# Patient Record
Sex: MALE | Race: ASIAN | URBAN - METROPOLITAN AREA
[De-identification: names, ages, dates, MRNs, and addresses within clinical notes are randomized per-mention and may not be internally consistent; named-entity substitution may affect disease eponyms.]

---

## 2019-01-01 ENCOUNTER — APPOINTMENT (OUTPATIENT)
Dept: GENERAL RADIOLOGY | Facility: CLINIC | Age: 39
DRG: 270 | End: 2019-01-01
Attending: INTERNAL MEDICINE
Payer: COMMERCIAL

## 2019-01-01 ENCOUNTER — APPOINTMENT (OUTPATIENT)
Dept: GENERAL RADIOLOGY | Facility: CLINIC | Age: 39
DRG: 270 | End: 2019-01-01
Attending: NURSE PRACTITIONER
Payer: COMMERCIAL

## 2019-01-01 ENCOUNTER — APPOINTMENT (OUTPATIENT)
Dept: GENERAL RADIOLOGY | Facility: CLINIC | Age: 39
DRG: 270 | End: 2019-01-01
Payer: COMMERCIAL

## 2019-01-01 ENCOUNTER — APPOINTMENT (OUTPATIENT)
Dept: ULTRASOUND IMAGING | Facility: CLINIC | Age: 39
DRG: 270 | End: 2019-01-01
Attending: INTERNAL MEDICINE
Payer: COMMERCIAL

## 2019-01-01 ENCOUNTER — APPOINTMENT (OUTPATIENT)
Dept: CARDIOLOGY | Facility: CLINIC | Age: 39
DRG: 270 | End: 2019-01-01
Payer: COMMERCIAL

## 2019-01-01 ENCOUNTER — APPOINTMENT (OUTPATIENT)
Dept: GENERAL RADIOLOGY | Facility: CLINIC | Age: 39
DRG: 270 | End: 2019-01-01
Attending: HOSPITALIST
Payer: COMMERCIAL

## 2019-01-01 ENCOUNTER — ALLIED HEALTH/NURSE VISIT (OUTPATIENT)
Dept: NEUROLOGY | Facility: CLINIC | Age: 39
DRG: 270 | End: 2019-01-01
Attending: PSYCHIATRY & NEUROLOGY
Payer: COMMERCIAL

## 2019-01-01 ENCOUNTER — APPOINTMENT (OUTPATIENT)
Dept: CARDIOLOGY | Facility: CLINIC | Age: 39
DRG: 270 | End: 2019-01-01
Attending: INTERNAL MEDICINE
Payer: COMMERCIAL

## 2019-01-01 ENCOUNTER — APPOINTMENT (OUTPATIENT)
Dept: CT IMAGING | Facility: CLINIC | Age: 39
DRG: 270 | End: 2019-01-01
Attending: INTERNAL MEDICINE
Payer: COMMERCIAL

## 2019-01-01 ENCOUNTER — APPOINTMENT (OUTPATIENT)
Dept: CT IMAGING | Facility: CLINIC | Age: 39
DRG: 270 | End: 2019-01-01
Payer: COMMERCIAL

## 2019-01-01 ENCOUNTER — APPOINTMENT (OUTPATIENT)
Dept: GENERAL RADIOLOGY | Facility: CLINIC | Age: 39
DRG: 270 | End: 2019-01-01
Attending: THORACIC SURGERY (CARDIOTHORACIC VASCULAR SURGERY)
Payer: COMMERCIAL

## 2019-01-01 ENCOUNTER — HOSPITAL ENCOUNTER (INPATIENT)
Facility: CLINIC | Age: 39
LOS: 36 days | DRG: 270 | End: 2019-03-31
Admitting: INTERNAL MEDICINE
Payer: COMMERCIAL

## 2019-01-01 ENCOUNTER — ANESTHESIA EVENT (OUTPATIENT)
Dept: SURGERY | Facility: CLINIC | Age: 39
End: 2019-01-01

## 2019-01-01 ENCOUNTER — ANESTHESIA (OUTPATIENT)
Dept: SURGERY | Facility: CLINIC | Age: 39
DRG: 270 | End: 2019-01-01
Payer: COMMERCIAL

## 2019-01-01 ENCOUNTER — APPOINTMENT (OUTPATIENT)
Dept: OCCUPATIONAL THERAPY | Facility: CLINIC | Age: 39
DRG: 270 | End: 2019-01-01
Payer: COMMERCIAL

## 2019-01-01 ENCOUNTER — ANESTHESIA (OUTPATIENT)
Dept: SURGERY | Facility: CLINIC | Age: 39
End: 2019-01-01

## 2019-01-01 ENCOUNTER — ANESTHESIA EVENT (OUTPATIENT)
Dept: SURGERY | Facility: CLINIC | Age: 39
DRG: 270 | End: 2019-01-01
Payer: COMMERCIAL

## 2019-01-01 ENCOUNTER — ANESTHESIA (OUTPATIENT)
Dept: CARDIOLOGY | Facility: CLINIC | Age: 39
DRG: 270 | End: 2019-01-01
Payer: COMMERCIAL

## 2019-01-01 ENCOUNTER — ANESTHESIA EVENT (OUTPATIENT)
Dept: CARDIOLOGY | Facility: CLINIC | Age: 39
DRG: 270 | End: 2019-01-01
Payer: COMMERCIAL

## 2019-01-01 ENCOUNTER — APPOINTMENT (OUTPATIENT)
Dept: OCCUPATIONAL THERAPY | Facility: CLINIC | Age: 39
DRG: 270 | End: 2019-01-01
Attending: INTERNAL MEDICINE
Payer: COMMERCIAL

## 2019-01-01 ENCOUNTER — HOSPITAL ENCOUNTER (EMERGENCY)
Facility: CLINIC | Age: 39
Discharge: HOME | End: 2019-02-24
Attending: EMERGENCY MEDICINE | Admitting: EMERGENCY MEDICINE
Payer: COMMERCIAL

## 2019-01-01 VITALS
OXYGEN SATURATION: 94 % | DIASTOLIC BLOOD PRESSURE: 49 MMHG | HEART RATE: 89 BPM | HEIGHT: 66 IN | RESPIRATION RATE: 12 BRPM | SYSTOLIC BLOOD PRESSURE: 73 MMHG | WEIGHT: 195.99 LBS | TEMPERATURE: 97.3 F | BODY MASS INDEX: 31.5 KG/M2

## 2019-01-01 VITALS
DIASTOLIC BLOOD PRESSURE: 114 MMHG | TEMPERATURE: 96.7 F | OXYGEN SATURATION: 90 % | WEIGHT: 252.4 LBS | SYSTOLIC BLOOD PRESSURE: 144 MMHG | HEART RATE: 114 BPM

## 2019-01-01 DIAGNOSIS — I46.9 CARDIAC ARREST (H): Primary | ICD-10-CM

## 2019-01-01 DIAGNOSIS — I46.9 CARDIAC ARREST (H): ICD-10-CM

## 2019-01-01 LAB
7AMINOCLONAZEPAM BLD CFM-MCNC: NEGATIVE NG/ML
7AMINOCLONAZEPAM BLD CFM-MCNC: NEGATIVE NG/ML
ABO + RH BLD: NORMAL
ALBUMIN SERPL-MCNC: 1.3 G/DL (ref 3.4–5)
ALBUMIN SERPL-MCNC: 1.4 G/DL (ref 3.4–5)
ALBUMIN SERPL-MCNC: 1.4 G/DL (ref 3.4–5)
ALBUMIN SERPL-MCNC: 1.5 G/DL (ref 3.4–5)
ALBUMIN SERPL-MCNC: 1.6 G/DL (ref 3.4–5)
ALBUMIN SERPL-MCNC: 1.7 G/DL (ref 3.4–5)
ALBUMIN SERPL-MCNC: 1.8 G/DL (ref 3.4–5)
ALBUMIN SERPL-MCNC: 1.9 G/DL (ref 3.4–5)
ALBUMIN SERPL-MCNC: 2 G/DL (ref 3.4–5)
ALBUMIN SERPL-MCNC: 2.1 G/DL (ref 3.4–5)
ALBUMIN SERPL-MCNC: 2.2 G/DL (ref 3.4–5)
ALBUMIN SERPL-MCNC: 2.3 G/DL (ref 3.4–5)
ALBUMIN SERPL-MCNC: 2.4 G/DL (ref 3.4–5)
ALBUMIN SERPL-MCNC: 2.5 G/DL (ref 3.4–5)
ALBUMIN SERPL-MCNC: 2.6 G/DL (ref 3.4–5)
ALBUMIN SERPL-MCNC: 2.7 G/DL (ref 3.4–5)
ALBUMIN SERPL-MCNC: 2.7 G/DL (ref 3.4–5)
ALBUMIN SERPL-MCNC: 2.8 G/DL (ref 3.4–5)
ALBUMIN SERPL-MCNC: 2.9 G/DL (ref 3.4–5)
ALBUMIN SERPL-MCNC: 3 G/DL (ref 3.4–5)
ALBUMIN SERPL-MCNC: 3 G/DL (ref 3.4–5)
ALBUMIN SERPL-MCNC: 3.1 G/DL (ref 3.4–5)
ALBUMIN SERPL-MCNC: 3.2 G/DL (ref 3.4–5)
ALBUMIN SERPL-MCNC: 3.2 G/DL (ref 3.4–5)
ALBUMIN SERPL-MCNC: 3.3 G/DL (ref 3.4–5)
ALBUMIN SERPL-MCNC: 3.6 G/DL (ref 3.4–5)
ALBUMIN SERPL-MCNC: 3.9 G/DL (ref 3.4–5)
ALBUMIN SERPL-MCNC: 4.1 G/DL (ref 3.4–5)
ALBUMIN UR-MCNC: 100 MG/DL
ALP SERPL-CCNC: 100 U/L (ref 40–150)
ALP SERPL-CCNC: 102 U/L (ref 40–150)
ALP SERPL-CCNC: 103 U/L (ref 40–150)
ALP SERPL-CCNC: 103 U/L (ref 40–150)
ALP SERPL-CCNC: 104 U/L (ref 40–150)
ALP SERPL-CCNC: 107 U/L (ref 40–150)
ALP SERPL-CCNC: 107 U/L (ref 40–150)
ALP SERPL-CCNC: 110 U/L (ref 40–150)
ALP SERPL-CCNC: 112 U/L (ref 40–150)
ALP SERPL-CCNC: 114 U/L (ref 40–150)
ALP SERPL-CCNC: 114 U/L (ref 40–150)
ALP SERPL-CCNC: 115 U/L (ref 40–150)
ALP SERPL-CCNC: 116 U/L (ref 40–150)
ALP SERPL-CCNC: 116 U/L (ref 40–150)
ALP SERPL-CCNC: 117 U/L (ref 40–150)
ALP SERPL-CCNC: 120 U/L (ref 40–150)
ALP SERPL-CCNC: 122 U/L (ref 40–150)
ALP SERPL-CCNC: 124 U/L (ref 40–150)
ALP SERPL-CCNC: 134 U/L (ref 40–150)
ALP SERPL-CCNC: 138 U/L (ref 40–150)
ALP SERPL-CCNC: 139 U/L (ref 40–150)
ALP SERPL-CCNC: 139 U/L (ref 40–150)
ALP SERPL-CCNC: 140 U/L (ref 40–150)
ALP SERPL-CCNC: 141 U/L (ref 40–150)
ALP SERPL-CCNC: 142 U/L (ref 40–150)
ALP SERPL-CCNC: 143 U/L (ref 40–150)
ALP SERPL-CCNC: 144 U/L (ref 40–150)
ALP SERPL-CCNC: 147 U/L (ref 40–150)
ALP SERPL-CCNC: 155 U/L (ref 40–150)
ALP SERPL-CCNC: 198 U/L (ref 40–150)
ALP SERPL-CCNC: 219 U/L (ref 40–150)
ALP SERPL-CCNC: 226 U/L (ref 40–150)
ALP SERPL-CCNC: 228 U/L (ref 40–150)
ALP SERPL-CCNC: 239 U/L (ref 40–150)
ALP SERPL-CCNC: 243 U/L (ref 40–150)
ALP SERPL-CCNC: 254 U/L (ref 40–150)
ALP SERPL-CCNC: 255 U/L (ref 40–150)
ALP SERPL-CCNC: 263 U/L (ref 40–150)
ALP SERPL-CCNC: 264 U/L (ref 40–150)
ALP SERPL-CCNC: 266 U/L (ref 40–150)
ALP SERPL-CCNC: 267 U/L (ref 40–150)
ALP SERPL-CCNC: 269 U/L (ref 40–150)
ALP SERPL-CCNC: 278 U/L (ref 40–150)
ALP SERPL-CCNC: 281 U/L (ref 40–150)
ALP SERPL-CCNC: 281 U/L (ref 40–150)
ALP SERPL-CCNC: 284 U/L (ref 40–150)
ALP SERPL-CCNC: 285 U/L (ref 40–150)
ALP SERPL-CCNC: 287 U/L (ref 40–150)
ALP SERPL-CCNC: 287 U/L (ref 40–150)
ALP SERPL-CCNC: 290 U/L (ref 40–150)
ALP SERPL-CCNC: 292 U/L (ref 40–150)
ALP SERPL-CCNC: 293 U/L (ref 40–150)
ALP SERPL-CCNC: 293 U/L (ref 40–150)
ALP SERPL-CCNC: 295 U/L (ref 40–150)
ALP SERPL-CCNC: 298 U/L (ref 40–150)
ALP SERPL-CCNC: 299 U/L (ref 40–150)
ALP SERPL-CCNC: 302 U/L (ref 40–150)
ALP SERPL-CCNC: 303 U/L (ref 40–150)
ALP SERPL-CCNC: 305 U/L (ref 40–150)
ALP SERPL-CCNC: 309 U/L (ref 40–150)
ALP SERPL-CCNC: 312 U/L (ref 40–150)
ALP SERPL-CCNC: 314 U/L (ref 40–150)
ALP SERPL-CCNC: 316 U/L (ref 40–150)
ALP SERPL-CCNC: 318 U/L (ref 40–150)
ALP SERPL-CCNC: 323 U/L (ref 40–150)
ALP SERPL-CCNC: 324 U/L (ref 40–150)
ALP SERPL-CCNC: 332 U/L (ref 40–150)
ALP SERPL-CCNC: 334 U/L (ref 40–150)
ALP SERPL-CCNC: 335 U/L (ref 40–150)
ALP SERPL-CCNC: 34 U/L (ref 40–150)
ALP SERPL-CCNC: 35 U/L (ref 40–150)
ALP SERPL-CCNC: 35 U/L (ref 40–150)
ALP SERPL-CCNC: 36 U/L (ref 40–150)
ALP SERPL-CCNC: 38 U/L (ref 40–150)
ALP SERPL-CCNC: 38 U/L (ref 40–150)
ALP SERPL-CCNC: 389 U/L (ref 40–150)
ALP SERPL-CCNC: 40 U/L (ref 40–150)
ALP SERPL-CCNC: 40 U/L (ref 40–150)
ALP SERPL-CCNC: 42 U/L (ref 40–150)
ALP SERPL-CCNC: 44 U/L (ref 40–150)
ALP SERPL-CCNC: 45 U/L (ref 40–150)
ALP SERPL-CCNC: 47 U/L (ref 40–150)
ALP SERPL-CCNC: 477 U/L (ref 40–150)
ALP SERPL-CCNC: 492 U/L (ref 40–150)
ALP SERPL-CCNC: 498 U/L (ref 40–150)
ALP SERPL-CCNC: 504 U/L (ref 40–150)
ALP SERPL-CCNC: 508 U/L (ref 40–150)
ALP SERPL-CCNC: 515 U/L (ref 40–150)
ALP SERPL-CCNC: 52 U/L (ref 40–150)
ALP SERPL-CCNC: 525 U/L (ref 40–150)
ALP SERPL-CCNC: 53 U/L (ref 40–150)
ALP SERPL-CCNC: 539 U/L (ref 40–150)
ALP SERPL-CCNC: 54 U/L (ref 40–150)
ALP SERPL-CCNC: 540 U/L (ref 40–150)
ALP SERPL-CCNC: 546 U/L (ref 40–150)
ALP SERPL-CCNC: 547 U/L (ref 40–150)
ALP SERPL-CCNC: 560 U/L (ref 40–150)
ALP SERPL-CCNC: 561 U/L (ref 40–150)
ALP SERPL-CCNC: 58 U/L (ref 40–150)
ALP SERPL-CCNC: 615 U/L (ref 40–150)
ALP SERPL-CCNC: 615 U/L (ref 40–150)
ALP SERPL-CCNC: 620 U/L (ref 40–150)
ALP SERPL-CCNC: 626 U/L (ref 40–150)
ALP SERPL-CCNC: 631 U/L (ref 40–150)
ALP SERPL-CCNC: 64 U/L (ref 40–150)
ALP SERPL-CCNC: 640 U/L (ref 40–150)
ALP SERPL-CCNC: 67 U/L (ref 40–150)
ALP SERPL-CCNC: 672 U/L (ref 40–150)
ALP SERPL-CCNC: 681 U/L (ref 40–150)
ALP SERPL-CCNC: 69 U/L (ref 40–150)
ALP SERPL-CCNC: 69 U/L (ref 40–150)
ALP SERPL-CCNC: 701 U/L (ref 40–150)
ALP SERPL-CCNC: 72 U/L (ref 40–150)
ALP SERPL-CCNC: 73 U/L (ref 40–150)
ALP SERPL-CCNC: 74 U/L (ref 40–150)
ALP SERPL-CCNC: 74 U/L (ref 40–150)
ALP SERPL-CCNC: 76 U/L (ref 40–150)
ALP SERPL-CCNC: 76 U/L (ref 40–150)
ALP SERPL-CCNC: 78 U/L (ref 40–150)
ALP SERPL-CCNC: 82 U/L (ref 40–150)
ALP SERPL-CCNC: 83 U/L (ref 40–150)
ALP SERPL-CCNC: 84 U/L (ref 40–150)
ALP SERPL-CCNC: 85 U/L (ref 40–150)
ALP SERPL-CCNC: 86 U/L (ref 40–150)
ALP SERPL-CCNC: 88 U/L (ref 40–150)
ALP SERPL-CCNC: 89 U/L (ref 40–150)
ALP SERPL-CCNC: 95 U/L (ref 40–150)
ALP SERPL-CCNC: 98 U/L (ref 40–150)
ALP SERPL-CCNC: 99 U/L (ref 40–150)
ALPRAZ SERPL-MCNC: NEGATIVE NG/ML
ALPRAZ SERPL-MCNC: NEGATIVE NG/ML
ALT SERPL W P-5'-P-CCNC: 102 U/L (ref 0–70)
ALT SERPL W P-5'-P-CCNC: 103 U/L (ref 0–70)
ALT SERPL W P-5'-P-CCNC: 104 U/L (ref 0–70)
ALT SERPL W P-5'-P-CCNC: 105 U/L (ref 0–70)
ALT SERPL W P-5'-P-CCNC: 105 U/L (ref 0–70)
ALT SERPL W P-5'-P-CCNC: 110 U/L (ref 0–70)
ALT SERPL W P-5'-P-CCNC: 1107 U/L (ref 0–70)
ALT SERPL W P-5'-P-CCNC: 112 U/L (ref 0–70)
ALT SERPL W P-5'-P-CCNC: 113 U/L (ref 0–70)
ALT SERPL W P-5'-P-CCNC: 115 U/L (ref 0–70)
ALT SERPL W P-5'-P-CCNC: 1226 U/L (ref 0–70)
ALT SERPL W P-5'-P-CCNC: 125 U/L (ref 0–70)
ALT SERPL W P-5'-P-CCNC: 129 U/L (ref 0–70)
ALT SERPL W P-5'-P-CCNC: 133 U/L (ref 0–70)
ALT SERPL W P-5'-P-CCNC: 133 U/L (ref 0–70)
ALT SERPL W P-5'-P-CCNC: 135 U/L (ref 0–70)
ALT SERPL W P-5'-P-CCNC: 1360 U/L (ref 0–70)
ALT SERPL W P-5'-P-CCNC: 1372 U/L (ref 0–70)
ALT SERPL W P-5'-P-CCNC: 144 U/L (ref 0–70)
ALT SERPL W P-5'-P-CCNC: 145 U/L (ref 0–70)
ALT SERPL W P-5'-P-CCNC: 150 U/L (ref 0–70)
ALT SERPL W P-5'-P-CCNC: 152 U/L (ref 0–70)
ALT SERPL W P-5'-P-CCNC: 1563 U/L (ref 0–70)
ALT SERPL W P-5'-P-CCNC: 157 U/L (ref 0–70)
ALT SERPL W P-5'-P-CCNC: 161 U/L (ref 0–70)
ALT SERPL W P-5'-P-CCNC: 164 U/L (ref 0–70)
ALT SERPL W P-5'-P-CCNC: 164 U/L (ref 0–70)
ALT SERPL W P-5'-P-CCNC: 1656 U/L (ref 0–70)
ALT SERPL W P-5'-P-CCNC: 166 U/L (ref 0–70)
ALT SERPL W P-5'-P-CCNC: 169 U/L (ref 0–70)
ALT SERPL W P-5'-P-CCNC: 172 U/L (ref 0–70)
ALT SERPL W P-5'-P-CCNC: 176 U/L (ref 0–70)
ALT SERPL W P-5'-P-CCNC: 177 U/L (ref 0–70)
ALT SERPL W P-5'-P-CCNC: 178 U/L (ref 0–70)
ALT SERPL W P-5'-P-CCNC: 182 U/L (ref 0–70)
ALT SERPL W P-5'-P-CCNC: 1901 U/L (ref 0–70)
ALT SERPL W P-5'-P-CCNC: 198 U/L (ref 0–70)
ALT SERPL W P-5'-P-CCNC: 199 U/L (ref 0–70)
ALT SERPL W P-5'-P-CCNC: 2006 U/L (ref 0–70)
ALT SERPL W P-5'-P-CCNC: 206 U/L (ref 0–70)
ALT SERPL W P-5'-P-CCNC: 209 U/L (ref 0–70)
ALT SERPL W P-5'-P-CCNC: 215 U/L (ref 0–70)
ALT SERPL W P-5'-P-CCNC: 216 U/L (ref 0–70)
ALT SERPL W P-5'-P-CCNC: 217 U/L (ref 0–70)
ALT SERPL W P-5'-P-CCNC: 217 U/L (ref 0–70)
ALT SERPL W P-5'-P-CCNC: 219 U/L (ref 0–70)
ALT SERPL W P-5'-P-CCNC: 219 U/L (ref 0–70)
ALT SERPL W P-5'-P-CCNC: 220 U/L (ref 0–70)
ALT SERPL W P-5'-P-CCNC: 226 U/L (ref 0–70)
ALT SERPL W P-5'-P-CCNC: 227 U/L (ref 0–70)
ALT SERPL W P-5'-P-CCNC: 227 U/L (ref 0–70)
ALT SERPL W P-5'-P-CCNC: 228 U/L (ref 0–70)
ALT SERPL W P-5'-P-CCNC: 232 U/L (ref 0–70)
ALT SERPL W P-5'-P-CCNC: 232 U/L (ref 0–70)
ALT SERPL W P-5'-P-CCNC: 234 U/L (ref 0–70)
ALT SERPL W P-5'-P-CCNC: 234 U/L (ref 0–70)
ALT SERPL W P-5'-P-CCNC: 237 U/L (ref 0–70)
ALT SERPL W P-5'-P-CCNC: 237 U/L (ref 0–70)
ALT SERPL W P-5'-P-CCNC: 2372 U/L (ref 0–70)
ALT SERPL W P-5'-P-CCNC: 238 U/L (ref 0–70)
ALT SERPL W P-5'-P-CCNC: 2382 U/L (ref 0–70)
ALT SERPL W P-5'-P-CCNC: 239 U/L (ref 0–70)
ALT SERPL W P-5'-P-CCNC: 241 U/L (ref 0–70)
ALT SERPL W P-5'-P-CCNC: 241 U/L (ref 0–70)
ALT SERPL W P-5'-P-CCNC: 243 U/L (ref 0–70)
ALT SERPL W P-5'-P-CCNC: 245 U/L (ref 0–70)
ALT SERPL W P-5'-P-CCNC: 249 U/L (ref 0–70)
ALT SERPL W P-5'-P-CCNC: 250 U/L (ref 0–70)
ALT SERPL W P-5'-P-CCNC: 253 U/L (ref 0–70)
ALT SERPL W P-5'-P-CCNC: 2530 U/L (ref 0–70)
ALT SERPL W P-5'-P-CCNC: 256 U/L (ref 0–70)
ALT SERPL W P-5'-P-CCNC: 259 U/L (ref 0–70)
ALT SERPL W P-5'-P-CCNC: 262 U/L (ref 0–70)
ALT SERPL W P-5'-P-CCNC: 264 U/L (ref 0–70)
ALT SERPL W P-5'-P-CCNC: 265 U/L (ref 0–70)
ALT SERPL W P-5'-P-CCNC: 266 U/L (ref 0–70)
ALT SERPL W P-5'-P-CCNC: 2711 U/L (ref 0–70)
ALT SERPL W P-5'-P-CCNC: 274 U/L (ref 0–70)
ALT SERPL W P-5'-P-CCNC: 275 U/L (ref 0–70)
ALT SERPL W P-5'-P-CCNC: 276 U/L (ref 0–70)
ALT SERPL W P-5'-P-CCNC: 282 U/L (ref 0–70)
ALT SERPL W P-5'-P-CCNC: 2877 U/L (ref 0–70)
ALT SERPL W P-5'-P-CCNC: 291 U/L (ref 0–70)
ALT SERPL W P-5'-P-CCNC: 294 U/L (ref 0–70)
ALT SERPL W P-5'-P-CCNC: 313 U/L (ref 0–70)
ALT SERPL W P-5'-P-CCNC: 3160 U/L (ref 0–70)
ALT SERPL W P-5'-P-CCNC: 322 U/L (ref 0–70)
ALT SERPL W P-5'-P-CCNC: 329 U/L (ref 0–70)
ALT SERPL W P-5'-P-CCNC: 331 U/L (ref 0–70)
ALT SERPL W P-5'-P-CCNC: 3411 U/L (ref 0–70)
ALT SERPL W P-5'-P-CCNC: 374 U/L (ref 0–70)
ALT SERPL W P-5'-P-CCNC: 3748 U/L (ref 0–70)
ALT SERPL W P-5'-P-CCNC: 376 U/L (ref 0–70)
ALT SERPL W P-5'-P-CCNC: 3941 U/L (ref 0–70)
ALT SERPL W P-5'-P-CCNC: 4024 U/L (ref 0–70)
ALT SERPL W P-5'-P-CCNC: 421 U/L (ref 0–70)
ALT SERPL W P-5'-P-CCNC: 4353 U/L (ref 0–70)
ALT SERPL W P-5'-P-CCNC: 436 U/L (ref 0–70)
ALT SERPL W P-5'-P-CCNC: 4496 U/L (ref 0–70)
ALT SERPL W P-5'-P-CCNC: 4881 U/L (ref 0–70)
ALT SERPL W P-5'-P-CCNC: 54 U/L (ref 0–70)
ALT SERPL W P-5'-P-CCNC: 55 U/L (ref 0–70)
ALT SERPL W P-5'-P-CCNC: 56 U/L (ref 0–70)
ALT SERPL W P-5'-P-CCNC: 57 U/L (ref 0–70)
ALT SERPL W P-5'-P-CCNC: 577 U/L (ref 0–70)
ALT SERPL W P-5'-P-CCNC: 63 U/L (ref 0–70)
ALT SERPL W P-5'-P-CCNC: 64 U/L (ref 0–70)
ALT SERPL W P-5'-P-CCNC: 65 U/L (ref 0–70)
ALT SERPL W P-5'-P-CCNC: 68 U/L (ref 0–70)
ALT SERPL W P-5'-P-CCNC: 69 U/L (ref 0–70)
ALT SERPL W P-5'-P-CCNC: 70 U/L (ref 0–70)
ALT SERPL W P-5'-P-CCNC: 71 U/L (ref 0–70)
ALT SERPL W P-5'-P-CCNC: 73 U/L (ref 0–70)
ALT SERPL W P-5'-P-CCNC: 74 U/L (ref 0–70)
ALT SERPL W P-5'-P-CCNC: 76 U/L (ref 0–70)
ALT SERPL W P-5'-P-CCNC: 76 U/L (ref 0–70)
ALT SERPL W P-5'-P-CCNC: 768 U/L (ref 0–70)
ALT SERPL W P-5'-P-CCNC: 78 U/L (ref 0–70)
ALT SERPL W P-5'-P-CCNC: 80 U/L (ref 0–70)
ALT SERPL W P-5'-P-CCNC: 86 U/L (ref 0–70)
ALT SERPL W P-5'-P-CCNC: 88 U/L (ref 0–70)
ALT SERPL W P-5'-P-CCNC: 89 U/L (ref 0–70)
ALT SERPL W P-5'-P-CCNC: 90 U/L (ref 0–70)
ALT SERPL W P-5'-P-CCNC: 902 U/L (ref 0–70)
ALT SERPL W P-5'-P-CCNC: 93 U/L (ref 0–70)
ALT SERPL W P-5'-P-CCNC: 948 U/L (ref 0–70)
ALT SERPL W P-5'-P-CCNC: 95 U/L (ref 0–70)
ALT SERPL W P-5'-P-CCNC: 96 U/L (ref 0–70)
ALT SERPL W P-5'-P-CCNC: 98 U/L (ref 0–70)
ALT SERPL W P-5'-P-CCNC: 99 U/L (ref 0–70)
AMMONIA PLAS-SCNC: 43 UMOL/L (ref 10–50)
AMPHETAMINES SPEC-MCNC: NEGATIVE NG/ML
AMPHETAMINES SPEC-MCNC: NEGATIVE NG/ML
AMPHETAMINES UR QL SCN: NEGATIVE
ANGLE RATE OF CLOT STRENGTH: 63 DEGREES (ref 53–72)
ANION GAP SERPL CALCULATED.3IONS-SCNC: 10 MMOL/L (ref 3–14)
ANION GAP SERPL CALCULATED.3IONS-SCNC: 11 MMOL/L (ref 3–14)
ANION GAP SERPL CALCULATED.3IONS-SCNC: 12 MMOL/L (ref 3–14)
ANION GAP SERPL CALCULATED.3IONS-SCNC: 13 MMOL/L (ref 3–14)
ANION GAP SERPL CALCULATED.3IONS-SCNC: 14 MMOL/L (ref 3–14)
ANION GAP SERPL CALCULATED.3IONS-SCNC: 15 MMOL/L (ref 3–14)
ANION GAP SERPL CALCULATED.3IONS-SCNC: 16 MMOL/L (ref 3–14)
ANION GAP SERPL CALCULATED.3IONS-SCNC: 17 MMOL/L (ref 3–14)
ANION GAP SERPL CALCULATED.3IONS-SCNC: 18 MMOL/L (ref 3–14)
ANION GAP SERPL CALCULATED.3IONS-SCNC: 19 MMOL/L (ref 3–14)
ANION GAP SERPL CALCULATED.3IONS-SCNC: 20 MMOL/L (ref 3–14)
ANION GAP SERPL CALCULATED.3IONS-SCNC: 27 MMOL/L (ref 3–14)
ANION GAP SERPL CALCULATED.3IONS-SCNC: 7 MMOL/L (ref 3–14)
ANION GAP SERPL CALCULATED.3IONS-SCNC: 7 MMOL/L (ref 3–14)
ANION GAP SERPL CALCULATED.3IONS-SCNC: 8 MMOL/L (ref 3–14)
ANION GAP SERPL CALCULATED.3IONS-SCNC: 9 MMOL/L (ref 3–14)
ANISOCYTOSIS BLD QL SMEAR: ABNORMAL
ANISOCYTOSIS BLD QL SMEAR: SLIGHT
APAP BLD-MCNC: NEGATIVE UG/ML
APAP BLD-MCNC: NEGATIVE UG/ML
APPEARANCE UR: ABNORMAL
APTT PPP: 102 SEC (ref 22–37)
APTT PPP: 105 SEC (ref 22–37)
APTT PPP: 108 SEC (ref 22–37)
APTT PPP: 121 SEC (ref 22–37)
APTT PPP: 128 SEC (ref 22–37)
APTT PPP: 134 SEC (ref 22–37)
APTT PPP: 138 SEC (ref 22–37)
APTT PPP: 141 SEC (ref 22–37)
APTT PPP: 151 SEC (ref 22–37)
APTT PPP: 40 SEC (ref 22–37)
APTT PPP: 41 SEC (ref 22–37)
APTT PPP: 42 SEC (ref 22–37)
APTT PPP: 42 SEC (ref 22–37)
APTT PPP: 43 SEC (ref 22–37)
APTT PPP: 44 SEC (ref 22–37)
APTT PPP: 45 SEC (ref 22–37)
APTT PPP: 46 SEC (ref 22–37)
APTT PPP: 46 SEC (ref 22–37)
APTT PPP: 47 SEC (ref 22–37)
APTT PPP: 48 SEC (ref 22–37)
APTT PPP: 49 SEC (ref 22–37)
APTT PPP: 50 SEC (ref 22–37)
APTT PPP: 51 SEC (ref 22–37)
APTT PPP: 52 SEC (ref 22–37)
APTT PPP: 53 SEC (ref 22–37)
APTT PPP: 54 SEC (ref 22–37)
APTT PPP: 55 SEC (ref 22–37)
APTT PPP: 56 SEC (ref 22–37)
APTT PPP: 57 SEC (ref 22–37)
APTT PPP: 58 SEC (ref 22–37)
APTT PPP: 59 SEC (ref 22–37)
APTT PPP: 61 SEC (ref 22–37)
APTT PPP: 62 SEC (ref 22–37)
APTT PPP: 63 SEC (ref 22–37)
APTT PPP: 63 SEC (ref 22–37)
APTT PPP: 64 SEC (ref 22–37)
APTT PPP: 64 SEC (ref 22–37)
APTT PPP: 66 SEC (ref 22–37)
APTT PPP: 67 SEC (ref 22–37)
APTT PPP: 67 SEC (ref 22–37)
APTT PPP: 68 SEC (ref 22–37)
APTT PPP: 69 SEC (ref 22–37)
APTT PPP: 71 SEC (ref 22–37)
APTT PPP: 72 SEC (ref 22–37)
APTT PPP: 72 SEC (ref 22–37)
APTT PPP: 73 SEC (ref 22–37)
APTT PPP: 74 SEC (ref 22–37)
APTT PPP: 74 SEC (ref 22–37)
APTT PPP: 75 SEC (ref 22–37)
APTT PPP: 76 SEC (ref 22–37)
APTT PPP: 77 SEC (ref 22–37)
APTT PPP: 80 SEC (ref 22–37)
APTT PPP: 80 SEC (ref 22–37)
APTT PPP: 81 SEC (ref 22–37)
APTT PPP: 84 SEC (ref 22–37)
APTT PPP: 84 SEC (ref 22–37)
APTT PPP: 86 SEC (ref 22–37)
APTT PPP: 90 SEC (ref 22–37)
APTT PPP: 92 SEC (ref 22–37)
APTT PPP: 94 SEC (ref 22–37)
APTT PPP: 98 SEC (ref 22–37)
APTT PPP: >240 SEC (ref 22–37)
AST SERPL W P-5'-P-CCNC: 102 U/L (ref 0–45)
AST SERPL W P-5'-P-CCNC: 104 U/L (ref 0–45)
AST SERPL W P-5'-P-CCNC: 104 U/L (ref 0–45)
AST SERPL W P-5'-P-CCNC: 1083 U/L (ref 0–45)
AST SERPL W P-5'-P-CCNC: 109 U/L (ref 0–45)
AST SERPL W P-5'-P-CCNC: 1092 U/L (ref 0–45)
AST SERPL W P-5'-P-CCNC: 1102 U/L (ref 0–45)
AST SERPL W P-5'-P-CCNC: 115 U/L (ref 0–45)
AST SERPL W P-5'-P-CCNC: 115 U/L (ref 0–45)
AST SERPL W P-5'-P-CCNC: 116 U/L (ref 0–45)
AST SERPL W P-5'-P-CCNC: 118 U/L (ref 0–45)
AST SERPL W P-5'-P-CCNC: 118 U/L (ref 0–45)
AST SERPL W P-5'-P-CCNC: 121 U/L (ref 0–45)
AST SERPL W P-5'-P-CCNC: 1235 U/L (ref 0–45)
AST SERPL W P-5'-P-CCNC: 124 U/L (ref 0–45)
AST SERPL W P-5'-P-CCNC: 135 U/L (ref 0–45)
AST SERPL W P-5'-P-CCNC: 1372 U/L (ref 0–45)
AST SERPL W P-5'-P-CCNC: 1452 U/L (ref 0–45)
AST SERPL W P-5'-P-CCNC: 1466 U/L (ref 0–45)
AST SERPL W P-5'-P-CCNC: 1468 U/L (ref 0–45)
AST SERPL W P-5'-P-CCNC: 148 U/L (ref 0–45)
AST SERPL W P-5'-P-CCNC: 153 U/L (ref 0–45)
AST SERPL W P-5'-P-CCNC: 154 U/L (ref 0–45)
AST SERPL W P-5'-P-CCNC: 154 U/L (ref 0–45)
AST SERPL W P-5'-P-CCNC: 1588 U/L (ref 0–45)
AST SERPL W P-5'-P-CCNC: 1590 U/L (ref 0–45)
AST SERPL W P-5'-P-CCNC: 1602 U/L (ref 0–45)
AST SERPL W P-5'-P-CCNC: 161 U/L (ref 0–45)
AST SERPL W P-5'-P-CCNC: 162 U/L (ref 0–45)
AST SERPL W P-5'-P-CCNC: 162 U/L (ref 0–45)
AST SERPL W P-5'-P-CCNC: 173 U/L (ref 0–45)
AST SERPL W P-5'-P-CCNC: 175 U/L (ref 0–45)
AST SERPL W P-5'-P-CCNC: 180 U/L (ref 0–45)
AST SERPL W P-5'-P-CCNC: 1814 U/L (ref 0–45)
AST SERPL W P-5'-P-CCNC: 1823 U/L (ref 0–45)
AST SERPL W P-5'-P-CCNC: 184 U/L (ref 0–45)
AST SERPL W P-5'-P-CCNC: 189 U/L (ref 0–45)
AST SERPL W P-5'-P-CCNC: 193 U/L (ref 0–45)
AST SERPL W P-5'-P-CCNC: 1934 U/L (ref 0–45)
AST SERPL W P-5'-P-CCNC: 199 U/L (ref 0–45)
AST SERPL W P-5'-P-CCNC: 1999 U/L (ref 0–45)
AST SERPL W P-5'-P-CCNC: 204 U/L (ref 0–45)
AST SERPL W P-5'-P-CCNC: 207 U/L (ref 0–45)
AST SERPL W P-5'-P-CCNC: 208 U/L (ref 0–45)
AST SERPL W P-5'-P-CCNC: 215 U/L (ref 0–45)
AST SERPL W P-5'-P-CCNC: 223 U/L (ref 0–45)
AST SERPL W P-5'-P-CCNC: 229 U/L (ref 0–45)
AST SERPL W P-5'-P-CCNC: 242 U/L (ref 0–45)
AST SERPL W P-5'-P-CCNC: 243 U/L (ref 0–45)
AST SERPL W P-5'-P-CCNC: 244 U/L (ref 0–45)
AST SERPL W P-5'-P-CCNC: 247 U/L (ref 0–45)
AST SERPL W P-5'-P-CCNC: 248 U/L (ref 0–45)
AST SERPL W P-5'-P-CCNC: 254 U/L (ref 0–45)
AST SERPL W P-5'-P-CCNC: 254 U/L (ref 0–45)
AST SERPL W P-5'-P-CCNC: 260 U/L (ref 0–45)
AST SERPL W P-5'-P-CCNC: 2643 U/L (ref 0–45)
AST SERPL W P-5'-P-CCNC: 266 U/L (ref 0–45)
AST SERPL W P-5'-P-CCNC: 269 U/L (ref 0–45)
AST SERPL W P-5'-P-CCNC: 270 U/L (ref 0–45)
AST SERPL W P-5'-P-CCNC: 271 U/L (ref 0–45)
AST SERPL W P-5'-P-CCNC: 2719 U/L (ref 0–45)
AST SERPL W P-5'-P-CCNC: 273 U/L (ref 0–45)
AST SERPL W P-5'-P-CCNC: 280 U/L (ref 0–45)
AST SERPL W P-5'-P-CCNC: 282 U/L (ref 0–45)
AST SERPL W P-5'-P-CCNC: 284 U/L (ref 0–45)
AST SERPL W P-5'-P-CCNC: 285 U/L (ref 0–45)
AST SERPL W P-5'-P-CCNC: 287 U/L (ref 0–45)
AST SERPL W P-5'-P-CCNC: 2886 U/L (ref 0–45)
AST SERPL W P-5'-P-CCNC: 289 U/L (ref 0–45)
AST SERPL W P-5'-P-CCNC: 291 U/L (ref 0–45)
AST SERPL W P-5'-P-CCNC: 294 U/L (ref 0–45)
AST SERPL W P-5'-P-CCNC: 297 U/L (ref 0–45)
AST SERPL W P-5'-P-CCNC: 297 U/L (ref 0–45)
AST SERPL W P-5'-P-CCNC: 300 U/L (ref 0–45)
AST SERPL W P-5'-P-CCNC: 302 U/L (ref 0–45)
AST SERPL W P-5'-P-CCNC: 304 U/L (ref 0–45)
AST SERPL W P-5'-P-CCNC: 3046 U/L (ref 0–45)
AST SERPL W P-5'-P-CCNC: 305 U/L (ref 0–45)
AST SERPL W P-5'-P-CCNC: 313 U/L (ref 0–45)
AST SERPL W P-5'-P-CCNC: 313 U/L (ref 0–45)
AST SERPL W P-5'-P-CCNC: 318 U/L (ref 0–45)
AST SERPL W P-5'-P-CCNC: 324 U/L (ref 0–45)
AST SERPL W P-5'-P-CCNC: 325 U/L (ref 0–45)
AST SERPL W P-5'-P-CCNC: 330 U/L (ref 0–45)
AST SERPL W P-5'-P-CCNC: 334 U/L (ref 0–45)
AST SERPL W P-5'-P-CCNC: 338 U/L (ref 0–45)
AST SERPL W P-5'-P-CCNC: 338 U/L (ref 0–45)
AST SERPL W P-5'-P-CCNC: 342 U/L (ref 0–45)
AST SERPL W P-5'-P-CCNC: 344 U/L (ref 0–45)
AST SERPL W P-5'-P-CCNC: 345 U/L (ref 0–45)
AST SERPL W P-5'-P-CCNC: 346 U/L (ref 0–45)
AST SERPL W P-5'-P-CCNC: 347 U/L (ref 0–45)
AST SERPL W P-5'-P-CCNC: 3526 U/L (ref 0–45)
AST SERPL W P-5'-P-CCNC: 353 U/L (ref 0–45)
AST SERPL W P-5'-P-CCNC: 354 U/L (ref 0–45)
AST SERPL W P-5'-P-CCNC: 376 U/L (ref 0–45)
AST SERPL W P-5'-P-CCNC: 384 U/L (ref 0–45)
AST SERPL W P-5'-P-CCNC: 3880 U/L (ref 0–45)
AST SERPL W P-5'-P-CCNC: 389 U/L (ref 0–45)
AST SERPL W P-5'-P-CCNC: 390 U/L (ref 0–45)
AST SERPL W P-5'-P-CCNC: 412 U/L (ref 0–45)
AST SERPL W P-5'-P-CCNC: 438 U/L (ref 0–45)
AST SERPL W P-5'-P-CCNC: 443 U/L (ref 0–45)
AST SERPL W P-5'-P-CCNC: 468 U/L (ref 0–45)
AST SERPL W P-5'-P-CCNC: 478 U/L (ref 0–45)
AST SERPL W P-5'-P-CCNC: 481 U/L (ref 0–45)
AST SERPL W P-5'-P-CCNC: 4975 U/L (ref 0–45)
AST SERPL W P-5'-P-CCNC: 517 U/L (ref 0–45)
AST SERPL W P-5'-P-CCNC: 530 U/L (ref 0–45)
AST SERPL W P-5'-P-CCNC: 569 U/L (ref 0–45)
AST SERPL W P-5'-P-CCNC: 569 U/L (ref 0–45)
AST SERPL W P-5'-P-CCNC: 5882 U/L (ref 0–45)
AST SERPL W P-5'-P-CCNC: 594 U/L (ref 0–45)
AST SERPL W P-5'-P-CCNC: 598 U/L (ref 0–45)
AST SERPL W P-5'-P-CCNC: 632 U/L (ref 0–45)
AST SERPL W P-5'-P-CCNC: 686 U/L (ref 0–45)
AST SERPL W P-5'-P-CCNC: 7064 U/L (ref 0–45)
AST SERPL W P-5'-P-CCNC: 722 U/L (ref 0–45)
AST SERPL W P-5'-P-CCNC: 7466 U/L (ref 0–45)
AST SERPL W P-5'-P-CCNC: 755 U/L (ref 0–45)
AST SERPL W P-5'-P-CCNC: 854 U/L (ref 0–45)
AST SERPL W P-5'-P-CCNC: 8672 U/L (ref 0–45)
AST SERPL W P-5'-P-CCNC: 900 U/L (ref 0–45)
AST SERPL W P-5'-P-CCNC: 958 U/L (ref 0–45)
AST SERPL W P-5'-P-CCNC: 99 U/L (ref 0–45)
AST SERPL W P-5'-P-CCNC: 99 U/L (ref 0–45)
AST SERPL W P-5'-P-CCNC: ABNORMAL U/L (ref 0–45)
AT III ACT/NOR PPP CHRO: 30 % (ref 85–135)
AT III ACT/NOR PPP CHRO: 33 % (ref 85–135)
AT III ACT/NOR PPP CHRO: 33 % (ref 85–135)
AT III ACT/NOR PPP CHRO: 37 % (ref 85–135)
AT III ACT/NOR PPP CHRO: 38 % (ref 85–135)
AT III ACT/NOR PPP CHRO: 38 % (ref 85–135)
AT III ACT/NOR PPP CHRO: 41 % (ref 85–135)
AT III ACT/NOR PPP CHRO: 51 % (ref 85–135)
AT III ACT/NOR PPP CHRO: 52 % (ref 85–135)
AT III ACT/NOR PPP CHRO: 52 % (ref 85–135)
AT III ACT/NOR PPP CHRO: 55 % (ref 85–135)
AT III ACT/NOR PPP CHRO: 56 % (ref 85–135)
AT III ACT/NOR PPP CHRO: 57 % (ref 85–135)
AT III ACT/NOR PPP CHRO: 59 % (ref 85–135)
AT III ACT/NOR PPP CHRO: 61 % (ref 85–135)
AT III ACT/NOR PPP CHRO: 61 % (ref 85–135)
AT III ACT/NOR PPP CHRO: 64 % (ref 85–135)
AT III ACT/NOR PPP CHRO: 65 % (ref 85–135)
AT III ACT/NOR PPP CHRO: 68 % (ref 85–135)
AT III ACT/NOR PPP CHRO: 69 % (ref 85–135)
AT III ACT/NOR PPP CHRO: 76 % (ref 85–135)
AT III ACT/NOR PPP CHRO: 78 % (ref 85–135)
AT III ACT/NOR PPP CHRO: 90 % (ref 85–135)
AT III ACT/NOR PPP CHRO: 95 % (ref 85–135)
BACTERIA #/AREA URNS HPF: ABNORMAL /HPF
BACTERIA SPEC CULT: ABNORMAL
BACTERIA SPEC CULT: NO GROWTH
BACTERIA SPEC CULT: NORMAL
BARBITURATES SPEC-MCNC: NEGATIVE UG/ML
BARBITURATES SPEC-MCNC: NEGATIVE UG/ML
BARBITURATES UR QL: NEGATIVE
BASE DEFICIT BLDA-SCNC: 0.1 MMOL/L
BASE DEFICIT BLDA-SCNC: 0.2 MMOL/L
BASE DEFICIT BLDA-SCNC: 0.2 MMOL/L
BASE DEFICIT BLDA-SCNC: 0.3 MMOL/L
BASE DEFICIT BLDA-SCNC: 0.3 MMOL/L
BASE DEFICIT BLDA-SCNC: 0.4 MMOL/L
BASE DEFICIT BLDA-SCNC: 0.5 MMOL/L
BASE DEFICIT BLDA-SCNC: 0.7 MMOL/L
BASE DEFICIT BLDA-SCNC: 0.8 MMOL/L
BASE DEFICIT BLDA-SCNC: 0.9 MMOL/L
BASE DEFICIT BLDA-SCNC: 1 MMOL/L
BASE DEFICIT BLDA-SCNC: 1.1 MMOL/L
BASE DEFICIT BLDA-SCNC: 1.2 MMOL/L
BASE DEFICIT BLDA-SCNC: 1.3 MMOL/L
BASE DEFICIT BLDA-SCNC: 1.4 MMOL/L
BASE DEFICIT BLDA-SCNC: 1.5 MMOL/L
BASE DEFICIT BLDA-SCNC: 1.6 MMOL/L
BASE DEFICIT BLDA-SCNC: 1.7 MMOL/L
BASE DEFICIT BLDA-SCNC: 1.8 MMOL/L
BASE DEFICIT BLDA-SCNC: 1.9 MMOL/L
BASE DEFICIT BLDA-SCNC: 10.6 MMOL/L
BASE DEFICIT BLDA-SCNC: 10.6 MMOL/L
BASE DEFICIT BLDA-SCNC: 10.8 MMOL/L
BASE DEFICIT BLDA-SCNC: 10.9 MMOL/L
BASE DEFICIT BLDA-SCNC: 11 MMOL/L
BASE DEFICIT BLDA-SCNC: 11.2 MMOL/L
BASE DEFICIT BLDA-SCNC: 11.3 MMOL/L
BASE DEFICIT BLDA-SCNC: 11.6 MMOL/L
BASE DEFICIT BLDA-SCNC: 12.2 MMOL/L
BASE DEFICIT BLDA-SCNC: 12.3 MMOL/L
BASE DEFICIT BLDA-SCNC: 13.5 MMOL/L
BASE DEFICIT BLDA-SCNC: 16.2 MMOL/L
BASE DEFICIT BLDA-SCNC: 2 MMOL/L
BASE DEFICIT BLDA-SCNC: 2.1 MMOL/L
BASE DEFICIT BLDA-SCNC: 2.2 MMOL/L
BASE DEFICIT BLDA-SCNC: 2.3 MMOL/L
BASE DEFICIT BLDA-SCNC: 2.4 MMOL/L
BASE DEFICIT BLDA-SCNC: 2.5 MMOL/L
BASE DEFICIT BLDA-SCNC: 2.6 MMOL/L
BASE DEFICIT BLDA-SCNC: 2.7 MMOL/L
BASE DEFICIT BLDA-SCNC: 2.8 MMOL/L
BASE DEFICIT BLDA-SCNC: 2.9 MMOL/L
BASE DEFICIT BLDA-SCNC: 20.3 MMOL/L
BASE DEFICIT BLDA-SCNC: 3 MMOL/L
BASE DEFICIT BLDA-SCNC: 3.1 MMOL/L
BASE DEFICIT BLDA-SCNC: 3.1 MMOL/L
BASE DEFICIT BLDA-SCNC: 3.2 MMOL/L
BASE DEFICIT BLDA-SCNC: 3.3 MMOL/L
BASE DEFICIT BLDA-SCNC: 3.4 MMOL/L
BASE DEFICIT BLDA-SCNC: 3.5 MMOL/L
BASE DEFICIT BLDA-SCNC: 3.6 MMOL/L
BASE DEFICIT BLDA-SCNC: 3.7 MMOL/L
BASE DEFICIT BLDA-SCNC: 3.7 MMOL/L
BASE DEFICIT BLDA-SCNC: 3.8 MMOL/L
BASE DEFICIT BLDA-SCNC: 3.9 MMOL/L
BASE DEFICIT BLDA-SCNC: 3.9 MMOL/L
BASE DEFICIT BLDA-SCNC: 4 MMOL/L
BASE DEFICIT BLDA-SCNC: 4.1 MMOL/L
BASE DEFICIT BLDA-SCNC: 4.2 MMOL/L
BASE DEFICIT BLDA-SCNC: 4.3 MMOL/L
BASE DEFICIT BLDA-SCNC: 4.4 MMOL/L
BASE DEFICIT BLDA-SCNC: 4.4 MMOL/L
BASE DEFICIT BLDA-SCNC: 4.5 MMOL/L
BASE DEFICIT BLDA-SCNC: 4.6 MMOL/L
BASE DEFICIT BLDA-SCNC: 4.6 MMOL/L
BASE DEFICIT BLDA-SCNC: 4.7 MMOL/L
BASE DEFICIT BLDA-SCNC: 4.8 MMOL/L
BASE DEFICIT BLDA-SCNC: 4.8 MMOL/L
BASE DEFICIT BLDA-SCNC: 4.9 MMOL/L
BASE DEFICIT BLDA-SCNC: 5 MMOL/L
BASE DEFICIT BLDA-SCNC: 5.1 MMOL/L
BASE DEFICIT BLDA-SCNC: 5.2 MMOL/L
BASE DEFICIT BLDA-SCNC: 5.3 MMOL/L
BASE DEFICIT BLDA-SCNC: 5.5 MMOL/L
BASE DEFICIT BLDA-SCNC: 5.5 MMOL/L
BASE DEFICIT BLDA-SCNC: 5.6 MMOL/L
BASE DEFICIT BLDA-SCNC: 5.8 MMOL/L
BASE DEFICIT BLDA-SCNC: 5.8 MMOL/L
BASE DEFICIT BLDA-SCNC: 5.9 MMOL/L
BASE DEFICIT BLDA-SCNC: 5.9 MMOL/L
BASE DEFICIT BLDA-SCNC: 6 MMOL/L
BASE DEFICIT BLDA-SCNC: 6.1 MMOL/L
BASE DEFICIT BLDA-SCNC: 6.3 MMOL/L
BASE DEFICIT BLDA-SCNC: 6.3 MMOL/L
BASE DEFICIT BLDA-SCNC: 6.4 MMOL/L
BASE DEFICIT BLDA-SCNC: 6.5 MMOL/L
BASE DEFICIT BLDA-SCNC: 6.5 MMOL/L
BASE DEFICIT BLDA-SCNC: 6.6 MMOL/L
BASE DEFICIT BLDA-SCNC: 6.7 MMOL/L
BASE DEFICIT BLDA-SCNC: 6.8 MMOL/L
BASE DEFICIT BLDA-SCNC: 7 MMOL/L
BASE DEFICIT BLDA-SCNC: 7.1 MMOL/L
BASE DEFICIT BLDA-SCNC: 7.1 MMOL/L
BASE DEFICIT BLDA-SCNC: 7.2 MMOL/L
BASE DEFICIT BLDA-SCNC: 7.3 MMOL/L
BASE DEFICIT BLDA-SCNC: 7.3 MMOL/L
BASE DEFICIT BLDA-SCNC: 7.4 MMOL/L
BASE DEFICIT BLDA-SCNC: 7.5 MMOL/L
BASE DEFICIT BLDA-SCNC: 7.6 MMOL/L
BASE DEFICIT BLDA-SCNC: 7.7 MMOL/L
BASE DEFICIT BLDA-SCNC: 7.8 MMOL/L
BASE DEFICIT BLDA-SCNC: 7.9 MMOL/L
BASE DEFICIT BLDA-SCNC: 8 MMOL/L
BASE DEFICIT BLDA-SCNC: 8.1 MMOL/L
BASE DEFICIT BLDA-SCNC: 8.2 MMOL/L
BASE DEFICIT BLDA-SCNC: 8.4 MMOL/L
BASE DEFICIT BLDA-SCNC: 8.4 MMOL/L
BASE DEFICIT BLDA-SCNC: 8.5 MMOL/L
BASE DEFICIT BLDA-SCNC: 8.6 MMOL/L
BASE DEFICIT BLDA-SCNC: 8.6 MMOL/L
BASE DEFICIT BLDA-SCNC: 8.7 MMOL/L
BASE DEFICIT BLDA-SCNC: 8.9 MMOL/L
BASE DEFICIT BLDA-SCNC: 9 MMOL/L
BASE DEFICIT BLDA-SCNC: 9.3 MMOL/L
BASE DEFICIT BLDA-SCNC: 9.4 MMOL/L
BASE DEFICIT BLDA-SCNC: 9.5 MMOL/L
BASE DEFICIT BLDA-SCNC: 9.5 MMOL/L
BASE DEFICIT BLDA-SCNC: 9.6 MMOL/L
BASE DEFICIT BLDA-SCNC: 9.8 MMOL/L
BASE DEFICIT BLDA-SCNC: NORMAL MMOL/L
BASE DEFICIT BLDA-SCNC: NORMAL MMOL/L
BASE DEFICIT BLDV-SCNC: 0.2 MMOL/L
BASE DEFICIT BLDV-SCNC: 0.2 MMOL/L
BASE DEFICIT BLDV-SCNC: 0.3 MMOL/L
BASE DEFICIT BLDV-SCNC: 0.4 MMOL/L
BASE DEFICIT BLDV-SCNC: 0.7 MMOL/L
BASE DEFICIT BLDV-SCNC: 0.9 MMOL/L
BASE DEFICIT BLDV-SCNC: 0.9 MMOL/L
BASE DEFICIT BLDV-SCNC: 1 MMOL/L
BASE DEFICIT BLDV-SCNC: 1.1 MMOL/L
BASE DEFICIT BLDV-SCNC: 1.1 MMOL/L
BASE DEFICIT BLDV-SCNC: 1.2 MMOL/L
BASE DEFICIT BLDV-SCNC: 1.4 MMOL/L
BASE DEFICIT BLDV-SCNC: 1.5 MMOL/L
BASE DEFICIT BLDV-SCNC: 10.4 MMOL/L
BASE DEFICIT BLDV-SCNC: 19.3 MMOL/L
BASE DEFICIT BLDV-SCNC: 2 MMOL/L
BASE DEFICIT BLDV-SCNC: 2.4 MMOL/L
BASE DEFICIT BLDV-SCNC: 2.5 MMOL/L
BASE DEFICIT BLDV-SCNC: 2.8 MMOL/L
BASE DEFICIT BLDV-SCNC: 3.3 MMOL/L
BASE DEFICIT BLDV-SCNC: 3.8 MMOL/L
BASE DEFICIT BLDV-SCNC: 4 MMOL/L
BASE DEFICIT BLDV-SCNC: 5 MMOL/L
BASE DEFICIT BLDV-SCNC: 5.3 MMOL/L
BASE DEFICIT BLDV-SCNC: 5.6 MMOL/L
BASE DEFICIT BLDV-SCNC: 5.7 MMOL/L
BASE DEFICIT BLDV-SCNC: 6.5 MMOL/L
BASE DEFICIT BLDV-SCNC: 6.8 MMOL/L
BASE DEFICIT BLDV-SCNC: 6.8 MMOL/L
BASE DEFICIT BLDV-SCNC: 7 MMOL/L
BASE DEFICIT BLDV-SCNC: 7.3 MMOL/L
BASE DEFICIT BLDV-SCNC: 7.6 MMOL/L
BASE DEFICIT BLDV-SCNC: 9.6 MMOL/L
BASE EXCESS BLDA CALC-SCNC: 0 MMOL/L
BASE EXCESS BLDA CALC-SCNC: 0.1 MMOL/L
BASE EXCESS BLDA CALC-SCNC: 0.2 MMOL/L
BASE EXCESS BLDA CALC-SCNC: 0.3 MMOL/L
BASE EXCESS BLDA CALC-SCNC: 0.4 MMOL/L
BASE EXCESS BLDA CALC-SCNC: 0.5 MMOL/L
BASE EXCESS BLDA CALC-SCNC: 0.6 MMOL/L
BASE EXCESS BLDA CALC-SCNC: 0.7 MMOL/L
BASE EXCESS BLDA CALC-SCNC: 0.7 MMOL/L
BASE EXCESS BLDA CALC-SCNC: 0.8 MMOL/L
BASE EXCESS BLDA CALC-SCNC: 0.9 MMOL/L
BASE EXCESS BLDA CALC-SCNC: 1 MMOL/L
BASE EXCESS BLDA CALC-SCNC: 1.1 MMOL/L
BASE EXCESS BLDA CALC-SCNC: 1.2 MMOL/L
BASE EXCESS BLDA CALC-SCNC: 1.3 MMOL/L
BASE EXCESS BLDA CALC-SCNC: 1.4 MMOL/L
BASE EXCESS BLDA CALC-SCNC: 1.5 MMOL/L
BASE EXCESS BLDA CALC-SCNC: 1.6 MMOL/L
BASE EXCESS BLDA CALC-SCNC: 1.7 MMOL/L
BASE EXCESS BLDA CALC-SCNC: 1.8 MMOL/L
BASE EXCESS BLDA CALC-SCNC: 1.9 MMOL/L
BASE EXCESS BLDA CALC-SCNC: 2 MMOL/L
BASE EXCESS BLDA CALC-SCNC: 2 MMOL/L
BASE EXCESS BLDA CALC-SCNC: 2.2 MMOL/L
BASE EXCESS BLDA CALC-SCNC: NORMAL MMOL/L
BASE EXCESS BLDA CALC-SCNC: NORMAL MMOL/L
BASE EXCESS BLDV CALC-SCNC: 0.1 MMOL/L
BASE EXCESS BLDV CALC-SCNC: 0.1 MMOL/L
BASE EXCESS BLDV CALC-SCNC: 0.4 MMOL/L
BASE EXCESS BLDV CALC-SCNC: 0.6 MMOL/L
BASE EXCESS BLDV CALC-SCNC: 0.8 MMOL/L
BASE EXCESS BLDV CALC-SCNC: 1 MMOL/L
BASE EXCESS BLDV CALC-SCNC: 1.4 MMOL/L
BASE EXCESS BLDV CALC-SCNC: 1.6 MMOL/L
BASE EXCESS BLDV CALC-SCNC: 1.6 MMOL/L
BASE EXCESS BLDV CALC-SCNC: 1.8 MMOL/L
BASE EXCESS BLDV CALC-SCNC: 1.8 MMOL/L
BASE EXCESS BLDV CALC-SCNC: 1.9 MMOL/L
BASE EXCESS BLDV CALC-SCNC: 2.1 MMOL/L
BASE EXCESS BLDV CALC-SCNC: 2.2 MMOL/L
BASE EXCESS BLDV CALC-SCNC: 2.3 MMOL/L
BASE EXCESS BLDV CALC-SCNC: 2.3 MMOL/L
BASE EXCESS BLDV CALC-SCNC: 2.4 MMOL/L
BASE EXCESS BLDV CALC-SCNC: 2.5 MMOL/L
BASE EXCESS BLDV CALC-SCNC: 2.8 MMOL/L
BASO STIPL BLD QL SMEAR: PRESENT
BASO STIPL BLD QL SMEAR: PRESENT
BASOPHILS # BLD AUTO: 0 10E9/L (ref 0–0.2)
BASOPHILS # BLD AUTO: 0.1 10E9/L (ref 0–0.2)
BASOPHILS # BLD AUTO: 0.2 10E9/L (ref 0–0.2)
BASOPHILS # BLD AUTO: 0.3 10E9/L (ref 0–0.2)
BASOPHILS # BLD AUTO: 0.4 10E9/L (ref 0–0.2)
BASOPHILS # BLD AUTO: 0.4 10E9/L (ref 0–0.2)
BASOPHILS NFR BLD AUTO: 0 %
BASOPHILS NFR BLD AUTO: 0.1 %
BASOPHILS NFR BLD AUTO: 0.2 %
BASOPHILS NFR BLD AUTO: 0.3 %
BASOPHILS NFR BLD AUTO: 0.4 %
BASOPHILS NFR BLD AUTO: 0.8 %
BASOPHILS NFR BLD AUTO: 0.8 %
BASOPHILS NFR BLD AUTO: 0.9 %
BASOPHILS NFR BLD AUTO: 1 %
BASOPHILS NFR BLD AUTO: 1 %
BASOPHILS NFR BLD AUTO: 1.8 %
BENZODIAZ SERPL QL: POSITIVE
BENZODIAZ SERPL QL: POSITIVE
BENZODIAZ SPEC-MCNC: POSITIVE NG/ML
BENZODIAZ SPEC-MCNC: POSITIVE NG/ML
BENZODIAZ UR QL: POSITIVE
BILIRUB DIRECT SERPL-MCNC: 17.4 MG/DL (ref 0–0.2)
BILIRUB DIRECT SERPL-MCNC: 18.6 MG/DL (ref 0–0.2)
BILIRUB DIRECT SERPL-MCNC: 19.4 MG/DL (ref 0–0.2)
BILIRUB DIRECT SERPL-MCNC: 19.4 MG/DL (ref 0–0.2)
BILIRUB DIRECT SERPL-MCNC: 19.5 MG/DL (ref 0–0.2)
BILIRUB DIRECT SERPL-MCNC: 19.9 MG/DL (ref 0–0.2)
BILIRUB DIRECT SERPL-MCNC: 20.1 MG/DL (ref 0–0.2)
BILIRUB DIRECT SERPL-MCNC: 20.9 MG/DL (ref 0–0.2)
BILIRUB DIRECT SERPL-MCNC: 21.9 MG/DL (ref 0–0.2)
BILIRUB DIRECT SERPL-MCNC: 21.9 MG/DL (ref 0–0.2)
BILIRUB DIRECT SERPL-MCNC: 23.1 MG/DL (ref 0–0.2)
BILIRUB DIRECT SERPL-MCNC: 24.3 MG/DL (ref 0–0.2)
BILIRUB DIRECT SERPL-MCNC: 24.8 MG/DL (ref 0–0.2)
BILIRUB DIRECT SERPL-MCNC: 25.9 MG/DL (ref 0–0.2)
BILIRUB DIRECT SERPL-MCNC: 26.5 MG/DL (ref 0–0.2)
BILIRUB DIRECT SERPL-MCNC: 27.3 MG/DL (ref 0–0.2)
BILIRUB DIRECT SERPL-MCNC: 27.4 MG/DL (ref 0–0.2)
BILIRUB DIRECT SERPL-MCNC: 27.7 MG/DL (ref 0–0.2)
BILIRUB DIRECT SERPL-MCNC: 29.2 MG/DL (ref 0–0.2)
BILIRUB DIRECT SERPL-MCNC: 29.6 MG/DL (ref 0–0.2)
BILIRUB DIRECT SERPL-MCNC: 30.2 MG/DL (ref 0–0.2)
BILIRUB DIRECT SERPL-MCNC: 30.4 MG/DL (ref 0–0.2)
BILIRUB DIRECT SERPL-MCNC: 30.7 MG/DL (ref 0–0.2)
BILIRUB DIRECT SERPL-MCNC: 30.9 MG/DL (ref 0–0.2)
BILIRUB DIRECT SERPL-MCNC: 31 MG/DL (ref 0–0.2)
BILIRUB DIRECT SERPL-MCNC: 31.1 MG/DL (ref 0–0.2)
BILIRUB DIRECT SERPL-MCNC: 9 MG/DL (ref 0–0.2)
BILIRUB SERPL-MCNC: 0.4 MG/DL (ref 0.2–1.3)
BILIRUB SERPL-MCNC: 0.4 MG/DL (ref 0.2–1.3)
BILIRUB SERPL-MCNC: 0.5 MG/DL (ref 0.2–1.3)
BILIRUB SERPL-MCNC: 0.7 MG/DL (ref 0.2–1.3)
BILIRUB SERPL-MCNC: 0.8 MG/DL (ref 0.2–1.3)
BILIRUB SERPL-MCNC: 0.9 MG/DL (ref 0.2–1.3)
BILIRUB SERPL-MCNC: 1 MG/DL (ref 0.2–1.3)
BILIRUB SERPL-MCNC: 1.1 MG/DL (ref 0.2–1.3)
BILIRUB SERPL-MCNC: 1.1 MG/DL (ref 0.2–1.3)
BILIRUB SERPL-MCNC: 1.2 MG/DL (ref 0.2–1.3)
BILIRUB SERPL-MCNC: 1.2 MG/DL (ref 0.2–1.3)
BILIRUB SERPL-MCNC: 1.3 MG/DL (ref 0.2–1.3)
BILIRUB SERPL-MCNC: 1.5 MG/DL (ref 0.2–1.3)
BILIRUB SERPL-MCNC: 1.5 MG/DL (ref 0.2–1.3)
BILIRUB SERPL-MCNC: 1.6 MG/DL (ref 0.2–1.3)
BILIRUB SERPL-MCNC: 1.7 MG/DL (ref 0.2–1.3)
BILIRUB SERPL-MCNC: 1.8 MG/DL (ref 0.2–1.3)
BILIRUB SERPL-MCNC: 1.9 MG/DL (ref 0.2–1.3)
BILIRUB SERPL-MCNC: 1.9 MG/DL (ref 0.2–1.3)
BILIRUB SERPL-MCNC: 10.2 MG/DL (ref 0.2–1.3)
BILIRUB SERPL-MCNC: 10.2 MG/DL (ref 0.2–1.3)
BILIRUB SERPL-MCNC: 10.6 MG/DL (ref 0.2–1.3)
BILIRUB SERPL-MCNC: 10.6 MG/DL (ref 0.2–1.3)
BILIRUB SERPL-MCNC: 10.7 MG/DL (ref 0.2–1.3)
BILIRUB SERPL-MCNC: 10.8 MG/DL (ref 0.2–1.3)
BILIRUB SERPL-MCNC: 10.8 MG/DL (ref 0.2–1.3)
BILIRUB SERPL-MCNC: 11 MG/DL (ref 0.2–1.3)
BILIRUB SERPL-MCNC: 11.1 MG/DL (ref 0.2–1.3)
BILIRUB SERPL-MCNC: 11.2 MG/DL (ref 0.2–1.3)
BILIRUB SERPL-MCNC: 11.4 MG/DL (ref 0.2–1.3)
BILIRUB SERPL-MCNC: 11.5 MG/DL (ref 0.2–1.3)
BILIRUB SERPL-MCNC: 11.7 MG/DL (ref 0.2–1.3)
BILIRUB SERPL-MCNC: 11.9 MG/DL (ref 0.2–1.3)
BILIRUB SERPL-MCNC: 12.2 MG/DL (ref 0.2–1.3)
BILIRUB SERPL-MCNC: 12.3 MG/DL (ref 0.2–1.3)
BILIRUB SERPL-MCNC: 12.5 MG/DL (ref 0.2–1.3)
BILIRUB SERPL-MCNC: 13 MG/DL (ref 0.2–1.3)
BILIRUB SERPL-MCNC: 13.6 MG/DL (ref 0.2–1.3)
BILIRUB SERPL-MCNC: 14.1 MG/DL (ref 0.2–1.3)
BILIRUB SERPL-MCNC: 15.7 MG/DL (ref 0.2–1.3)
BILIRUB SERPL-MCNC: 17 MG/DL (ref 0.2–1.3)
BILIRUB SERPL-MCNC: 17.8 MG/DL (ref 0.2–1.3)
BILIRUB SERPL-MCNC: 19.8 MG/DL (ref 0.2–1.3)
BILIRUB SERPL-MCNC: 2 MG/DL (ref 0.2–1.3)
BILIRUB SERPL-MCNC: 2.4 MG/DL (ref 0.2–1.3)
BILIRUB SERPL-MCNC: 2.5 MG/DL (ref 0.2–1.3)
BILIRUB SERPL-MCNC: 2.7 MG/DL (ref 0.2–1.3)
BILIRUB SERPL-MCNC: 2.8 MG/DL (ref 0.2–1.3)
BILIRUB SERPL-MCNC: 2.9 MG/DL (ref 0.2–1.3)
BILIRUB SERPL-MCNC: 20.7 MG/DL (ref 0.2–1.3)
BILIRUB SERPL-MCNC: 21.4 MG/DL (ref 0.2–1.3)
BILIRUB SERPL-MCNC: 22 MG/DL (ref 0.2–1.3)
BILIRUB SERPL-MCNC: 22 MG/DL (ref 0.2–1.3)
BILIRUB SERPL-MCNC: 22.1 MG/DL (ref 0.2–1.3)
BILIRUB SERPL-MCNC: 22.1 MG/DL (ref 0.2–1.3)
BILIRUB SERPL-MCNC: 22.4 MG/DL (ref 0.2–1.3)
BILIRUB SERPL-MCNC: 22.9 MG/DL (ref 0.2–1.3)
BILIRUB SERPL-MCNC: 23.3 MG/DL (ref 0.2–1.3)
BILIRUB SERPL-MCNC: 23.5 MG/DL (ref 0.2–1.3)
BILIRUB SERPL-MCNC: 23.5 MG/DL (ref 0.2–1.3)
BILIRUB SERPL-MCNC: 23.6 MG/DL (ref 0.2–1.3)
BILIRUB SERPL-MCNC: 23.7 MG/DL (ref 0.2–1.3)
BILIRUB SERPL-MCNC: 24.2 MG/DL (ref 0.2–1.3)
BILIRUB SERPL-MCNC: 24.2 MG/DL (ref 0.2–1.3)
BILIRUB SERPL-MCNC: 24.3 MG/DL (ref 0.2–1.3)
BILIRUB SERPL-MCNC: 24.6 MG/DL (ref 0.2–1.3)
BILIRUB SERPL-MCNC: 24.6 MG/DL (ref 0.2–1.3)
BILIRUB SERPL-MCNC: 25 MG/DL (ref 0.2–1.3)
BILIRUB SERPL-MCNC: 25.3 MG/DL (ref 0.2–1.3)
BILIRUB SERPL-MCNC: 25.4 MG/DL (ref 0.2–1.3)
BILIRUB SERPL-MCNC: 26 MG/DL (ref 0.2–1.3)
BILIRUB SERPL-MCNC: 26.9 MG/DL (ref 0.2–1.3)
BILIRUB SERPL-MCNC: 27 MG/DL (ref 0.2–1.3)
BILIRUB SERPL-MCNC: 27.1 MG/DL (ref 0.2–1.3)
BILIRUB SERPL-MCNC: 27.3 MG/DL (ref 0.2–1.3)
BILIRUB SERPL-MCNC: 27.3 MG/DL (ref 0.2–1.3)
BILIRUB SERPL-MCNC: 27.4 MG/DL (ref 0.2–1.3)
BILIRUB SERPL-MCNC: 28.6 MG/DL (ref 0.2–1.3)
BILIRUB SERPL-MCNC: 29 MG/DL (ref 0.2–1.3)
BILIRUB SERPL-MCNC: 29.1 MG/DL (ref 0.2–1.3)
BILIRUB SERPL-MCNC: 3 MG/DL (ref 0.2–1.3)
BILIRUB SERPL-MCNC: 3.1 MG/DL (ref 0.2–1.3)
BILIRUB SERPL-MCNC: 3.1 MG/DL (ref 0.2–1.3)
BILIRUB SERPL-MCNC: 3.2 MG/DL (ref 0.2–1.3)
BILIRUB SERPL-MCNC: 3.5 MG/DL (ref 0.2–1.3)
BILIRUB SERPL-MCNC: 3.7 MG/DL (ref 0.2–1.3)
BILIRUB SERPL-MCNC: 3.9 MG/DL (ref 0.2–1.3)
BILIRUB SERPL-MCNC: 30.2 MG/DL (ref 0.2–1.3)
BILIRUB SERPL-MCNC: 30.4 MG/DL (ref 0.2–1.3)
BILIRUB SERPL-MCNC: 30.6 MG/DL (ref 0.2–1.3)
BILIRUB SERPL-MCNC: 30.7 MG/DL (ref 0.2–1.3)
BILIRUB SERPL-MCNC: 31 MG/DL (ref 0.2–1.3)
BILIRUB SERPL-MCNC: 32.1 MG/DL (ref 0.2–1.3)
BILIRUB SERPL-MCNC: 32.9 MG/DL (ref 0.2–1.3)
BILIRUB SERPL-MCNC: 33.4 MG/DL (ref 0.2–1.3)
BILIRUB SERPL-MCNC: 33.4 MG/DL (ref 0.2–1.3)
BILIRUB SERPL-MCNC: 34.2 MG/DL (ref 0.2–1.3)
BILIRUB SERPL-MCNC: 34.5 MG/DL (ref 0.2–1.3)
BILIRUB SERPL-MCNC: 34.6 MG/DL (ref 0.2–1.3)
BILIRUB SERPL-MCNC: 34.7 MG/DL (ref 0.2–1.3)
BILIRUB SERPL-MCNC: 35 MG/DL (ref 0.2–1.3)
BILIRUB SERPL-MCNC: 35.3 MG/DL (ref 0.2–1.3)
BILIRUB SERPL-MCNC: 35.9 MG/DL (ref 0.2–1.3)
BILIRUB SERPL-MCNC: 36.5 MG/DL (ref 0.2–1.3)
BILIRUB SERPL-MCNC: 36.6 MG/DL (ref 0.2–1.3)
BILIRUB SERPL-MCNC: 36.7 MG/DL (ref 0.2–1.3)
BILIRUB SERPL-MCNC: 36.7 MG/DL (ref 0.2–1.3)
BILIRUB SERPL-MCNC: 4.2 MG/DL (ref 0.2–1.3)
BILIRUB SERPL-MCNC: 4.3 MG/DL (ref 0.2–1.3)
BILIRUB SERPL-MCNC: 4.5 MG/DL (ref 0.2–1.3)
BILIRUB SERPL-MCNC: 4.7 MG/DL (ref 0.2–1.3)
BILIRUB SERPL-MCNC: 5.2 MG/DL (ref 0.2–1.3)
BILIRUB SERPL-MCNC: 5.8 MG/DL (ref 0.2–1.3)
BILIRUB SERPL-MCNC: 6.1 MG/DL (ref 0.2–1.3)
BILIRUB SERPL-MCNC: 6.8 MG/DL (ref 0.2–1.3)
BILIRUB SERPL-MCNC: 7.3 MG/DL (ref 0.2–1.3)
BILIRUB SERPL-MCNC: 8.7 MG/DL (ref 0.2–1.3)
BILIRUB SERPL-MCNC: 8.7 MG/DL (ref 0.2–1.3)
BILIRUB SERPL-MCNC: 8.8 MG/DL (ref 0.2–1.3)
BILIRUB SERPL-MCNC: 9.3 MG/DL (ref 0.2–1.3)
BILIRUB SERPL-MCNC: 9.9 MG/DL (ref 0.2–1.3)
BILIRUB SERPL-MCNC: 9.9 MG/DL (ref 0.2–1.3)
BILIRUB UR QL STRIP: NEGATIVE
BITE CELLS BLD QL SMEAR: SLIGHT
BLD GP AB SCN SERPL QL: NORMAL
BLD PROD TYP BPU: NORMAL
BLD UNIT ID BPU: 0
BLOOD BANK CMNT PATIENT-IMP: NORMAL
BLOOD PRODUCT CODE: NORMAL
BPU ID: NORMAL
BUN SERPL-MCNC: 16 MG/DL (ref 7–30)
BUN SERPL-MCNC: 21 MG/DL (ref 7–30)
BUN SERPL-MCNC: 25 MG/DL (ref 7–30)
BUN SERPL-MCNC: 26 MG/DL (ref 7–30)
BUN SERPL-MCNC: 28 MG/DL (ref 7–30)
BUN SERPL-MCNC: 28 MG/DL (ref 7–30)
BUN SERPL-MCNC: 30 MG/DL (ref 7–30)
BUN SERPL-MCNC: 31 MG/DL (ref 7–30)
BUN SERPL-MCNC: 32 MG/DL (ref 7–30)
BUN SERPL-MCNC: 35 MG/DL (ref 7–30)
BUN SERPL-MCNC: 36 MG/DL (ref 7–30)
BUN SERPL-MCNC: 37 MG/DL (ref 7–30)
BUN SERPL-MCNC: 38 MG/DL (ref 7–30)
BUN SERPL-MCNC: 39 MG/DL (ref 7–30)
BUN SERPL-MCNC: 40 MG/DL (ref 7–30)
BUN SERPL-MCNC: 40 MG/DL (ref 7–30)
BUN SERPL-MCNC: 41 MG/DL (ref 7–30)
BUN SERPL-MCNC: 42 MG/DL (ref 7–30)
BUN SERPL-MCNC: 43 MG/DL (ref 7–30)
BUN SERPL-MCNC: 43 MG/DL (ref 7–30)
BUN SERPL-MCNC: 45 MG/DL (ref 7–30)
BUN SERPL-MCNC: 46 MG/DL (ref 7–30)
BUN SERPL-MCNC: 46 MG/DL (ref 7–30)
BUN SERPL-MCNC: 47 MG/DL (ref 7–30)
BUN SERPL-MCNC: 47 MG/DL (ref 7–30)
BUN SERPL-MCNC: 48 MG/DL (ref 7–30)
BUN SERPL-MCNC: 49 MG/DL (ref 7–30)
BUN SERPL-MCNC: 50 MG/DL (ref 7–30)
BUN SERPL-MCNC: 51 MG/DL (ref 7–30)
BUN SERPL-MCNC: 52 MG/DL (ref 7–30)
BUN SERPL-MCNC: 53 MG/DL (ref 7–30)
BUN SERPL-MCNC: 54 MG/DL (ref 7–30)
BUN SERPL-MCNC: 55 MG/DL (ref 7–30)
BUN SERPL-MCNC: 56 MG/DL (ref 7–30)
BUN SERPL-MCNC: 57 MG/DL (ref 7–30)
BUN SERPL-MCNC: 58 MG/DL (ref 7–30)
BUN SERPL-MCNC: 60 MG/DL (ref 7–30)
BUN SERPL-MCNC: 61 MG/DL (ref 7–30)
BUN SERPL-MCNC: 62 MG/DL (ref 7–30)
BUN SERPL-MCNC: 63 MG/DL (ref 7–30)
BUN SERPL-MCNC: 64 MG/DL (ref 7–30)
BUN SERPL-MCNC: 65 MG/DL (ref 7–30)
BUN SERPL-MCNC: 66 MG/DL (ref 7–30)
BUN SERPL-MCNC: 67 MG/DL (ref 7–30)
BUN SERPL-MCNC: 68 MG/DL (ref 7–30)
BUN SERPL-MCNC: 68 MG/DL (ref 7–30)
BUN SERPL-MCNC: 69 MG/DL (ref 7–30)
BUN SERPL-MCNC: 70 MG/DL (ref 7–30)
BUN SERPL-MCNC: 72 MG/DL (ref 7–30)
BUN SERPL-MCNC: 73 MG/DL (ref 7–30)
BUN SERPL-MCNC: 74 MG/DL (ref 7–30)
BUN SERPL-MCNC: 75 MG/DL (ref 7–30)
BUN SERPL-MCNC: 75 MG/DL (ref 7–30)
BUN SERPL-MCNC: 76 MG/DL (ref 7–30)
BUN SERPL-MCNC: 78 MG/DL (ref 7–30)
BUN SERPL-MCNC: 80 MG/DL (ref 7–30)
BUN SERPL-MCNC: 86 MG/DL (ref 7–30)
BUN SERPL-MCNC: 86 MG/DL (ref 7–30)
BUPRENORPHINE SERPLBLD-MCNC: NEGATIVE NG/ML
BUPRENORPHINE SERPLBLD-MCNC: NEGATIVE NG/ML
BURR CELLS BLD QL SMEAR: ABNORMAL
BURR CELLS BLD QL SMEAR: ABNORMAL
BURR CELLS BLD QL SMEAR: SLIGHT
C DIFF TOX B STL QL: NEGATIVE
CA-I BLD-MCNC: 3.9 MG/DL (ref 4.4–5.2)
CA-I BLD-MCNC: 4 MG/DL (ref 4.4–5.2)
CA-I BLD-MCNC: 4.1 MG/DL (ref 4.4–5.2)
CA-I BLD-MCNC: 4.1 MG/DL (ref 4.4–5.2)
CA-I BLD-MCNC: 4.2 MG/DL (ref 4.4–5.2)
CA-I BLD-MCNC: 4.3 MG/DL (ref 4.4–5.2)
CA-I BLD-MCNC: 4.4 MG/DL (ref 4.4–5.2)
CA-I BLD-MCNC: 4.5 MG/DL (ref 4.4–5.2)
CA-I BLD-MCNC: 4.6 MG/DL (ref 4.4–5.2)
CA-I BLD-MCNC: 4.7 MG/DL (ref 4.4–5.2)
CA-I BLD-MCNC: 4.8 MG/DL (ref 4.4–5.2)
CA-I BLD-MCNC: 4.9 MG/DL (ref 4.4–5.2)
CA-I BLD-MCNC: 5 MG/DL (ref 4.4–5.2)
CA-I BLD-MCNC: 5.1 MG/DL (ref 4.4–5.2)
CA-I BLD-MCNC: 5.2 MG/DL (ref 4.4–5.2)
CA-I BLD-MCNC: 5.4 MG/DL (ref 4.4–5.2)
CA-I BLD-MCNC: NORMAL MG/DL (ref 4.4–5.2)
CA-I BLD-SCNC: 3.4 MG/DL (ref 4.4–5.2)
CA-I BLD-SCNC: 3.9 MG/DL (ref 4.4–5.2)
CA-I BLD-SCNC: 4 MG/DL (ref 4.4–5.2)
CA-I SERPL ISE-MCNC: 4 MG/DL (ref 4.4–5.2)
CA-I SERPL ISE-MCNC: 4.1 MG/DL (ref 4.4–5.2)
CA-I SERPL ISE-MCNC: 4.2 MG/DL (ref 4.4–5.2)
CA-I SERPL ISE-MCNC: 4.3 MG/DL (ref 4.4–5.2)
CA-I SERPL ISE-MCNC: 4.4 MG/DL (ref 4.4–5.2)
CA-I SERPL ISE-MCNC: 4.5 MG/DL (ref 4.4–5.2)
CA-I SERPL ISE-MCNC: 4.6 MG/DL (ref 4.4–5.2)
CA-I SERPL ISE-MCNC: 4.7 MG/DL (ref 4.4–5.2)
CA-I SERPL ISE-MCNC: NORMAL MG/DL (ref 4.4–5.2)
CALCIUM SERPL-MCNC: 6 MG/DL (ref 8.5–10.1)
CALCIUM SERPL-MCNC: 6.3 MG/DL (ref 8.5–10.1)
CALCIUM SERPL-MCNC: 6.8 MG/DL (ref 8.5–10.1)
CALCIUM SERPL-MCNC: 6.8 MG/DL (ref 8.5–10.1)
CALCIUM SERPL-MCNC: 7 MG/DL (ref 8.5–10.1)
CALCIUM SERPL-MCNC: 7.1 MG/DL (ref 8.5–10.1)
CALCIUM SERPL-MCNC: 7.1 MG/DL (ref 8.5–10.1)
CALCIUM SERPL-MCNC: 7.2 MG/DL (ref 8.5–10.1)
CALCIUM SERPL-MCNC: 7.3 MG/DL (ref 8.5–10.1)
CALCIUM SERPL-MCNC: 7.4 MG/DL (ref 8.5–10.1)
CALCIUM SERPL-MCNC: 7.5 MG/DL (ref 8.5–10.1)
CALCIUM SERPL-MCNC: 7.6 MG/DL (ref 8.5–10.1)
CALCIUM SERPL-MCNC: 7.7 MG/DL (ref 8.5–10.1)
CALCIUM SERPL-MCNC: 7.8 MG/DL (ref 8.5–10.1)
CALCIUM SERPL-MCNC: 7.9 MG/DL (ref 8.5–10.1)
CALCIUM SERPL-MCNC: 8 MG/DL (ref 8.5–10.1)
CALCIUM SERPL-MCNC: 8.1 MG/DL (ref 8.5–10.1)
CALCIUM SERPL-MCNC: 8.2 MG/DL (ref 8.5–10.1)
CALCIUM SERPL-MCNC: 8.3 MG/DL (ref 8.5–10.1)
CALCIUM SERPL-MCNC: 8.4 MG/DL (ref 8.5–10.1)
CALCIUM SERPL-MCNC: 8.5 MG/DL (ref 8.5–10.1)
CALCIUM SERPL-MCNC: 8.6 MG/DL (ref 8.5–10.1)
CALCIUM SERPL-MCNC: 8.7 MG/DL (ref 8.5–10.1)
CALCIUM SERPL-MCNC: 8.8 MG/DL (ref 8.5–10.1)
CALCIUM SERPL-MCNC: 9 MG/DL (ref 8.5–10.1)
CALCIUM SERPL-MCNC: 9.1 MG/DL (ref 8.5–10.1)
CALCIUM SERPL-MCNC: 9.2 MG/DL (ref 8.5–10.1)
CALCIUM SERPL-MCNC: 9.3 MG/DL (ref 8.5–10.1)
CALCIUM SERPL-MCNC: 9.3 MG/DL (ref 8.5–10.1)
CALCIUM SERPL-MCNC: 9.4 MG/DL (ref 8.5–10.1)
CALCIUM SERPL-MCNC: 9.4 MG/DL (ref 8.5–10.1)
CALCIUM SERPL-MCNC: 9.5 MG/DL (ref 8.5–10.1)
CALCIUM SERPL-MCNC: 9.6 MG/DL (ref 8.5–10.1)
CANNABINOIDS UR QL SCN: NEGATIVE
CARBOXYTHC BLD-MCNC: NEGATIVE NG/ML
CARBOXYTHC BLD-MCNC: NEGATIVE NG/ML
CARISOPRODOL IA: NEGATIVE UG/ML
CARISOPRODOL IA: NEGATIVE UG/ML
CHLORDIAZEP SERPL-MCNC: NEGATIVE NG/ML
CHLORDIAZEP SERPL-MCNC: NEGATIVE NG/ML
CHLORIDE SERPL-SCNC: 100 MMOL/L (ref 94–109)
CHLORIDE SERPL-SCNC: 101 MMOL/L (ref 94–109)
CHLORIDE SERPL-SCNC: 102 MMOL/L (ref 94–109)
CHLORIDE SERPL-SCNC: 103 MMOL/L (ref 94–109)
CHLORIDE SERPL-SCNC: 104 MMOL/L (ref 94–109)
CHLORIDE SERPL-SCNC: 105 MMOL/L (ref 94–109)
CHLORIDE SERPL-SCNC: 106 MMOL/L (ref 94–109)
CHLORIDE SERPL-SCNC: 107 MMOL/L (ref 94–109)
CHLORIDE SERPL-SCNC: 108 MMOL/L (ref 94–109)
CHLORIDE SERPL-SCNC: 109 MMOL/L (ref 94–109)
CHLORIDE SERPL-SCNC: 112 MMOL/L (ref 94–109)
CHLORIDE SERPL-SCNC: 113 MMOL/L (ref 94–109)
CHLORIDE SERPL-SCNC: 115 MMOL/L (ref 94–109)
CHLORIDE SERPL-SCNC: 115 MMOL/L (ref 94–109)
CHLORIDE SERPL-SCNC: 116 MMOL/L (ref 94–109)
CHLORIDE SERPL-SCNC: 116 MMOL/L (ref 94–109)
CHLORIDE SERPL-SCNC: 117 MMOL/L (ref 94–109)
CHLORIDE SERPL-SCNC: 118 MMOL/L (ref 94–109)
CHLORIDE SERPL-SCNC: 119 MMOL/L (ref 94–109)
CHLORIDE SERPL-SCNC: 120 MMOL/L (ref 94–109)
CHLORIDE SERPL-SCNC: 121 MMOL/L (ref 94–109)
CHLORIDE SERPL-SCNC: 121 MMOL/L (ref 94–109)
CHLORIDE SERPL-SCNC: 122 MMOL/L (ref 94–109)
CHLORIDE SERPL-SCNC: 124 MMOL/L (ref 94–109)
CHLORIDE SERPL-SCNC: 125 MMOL/L (ref 94–109)
CHLORIDE SERPL-SCNC: 126 MMOL/L (ref 94–109)
CHLORIDE SERPL-SCNC: 127 MMOL/L (ref 94–109)
CHLORIDE SERPL-SCNC: 128 MMOL/L (ref 94–109)
CHLORIDE SERPL-SCNC: 95 MMOL/L (ref 94–109)
CHLORIDE SERPL-SCNC: 97 MMOL/L (ref 94–109)
CHLORIDE SERPL-SCNC: 98 MMOL/L (ref 94–109)
CHLORIDE SERPL-SCNC: 98 MMOL/L (ref 94–109)
CHLORIDE SERPL-SCNC: 99 MMOL/L (ref 94–109)
CHOLEST SERPL-MCNC: ABNORMAL MG/DL
CI HYPOCOAGULATION INDEX: 0.6 RATIO (ref 0–3)
CK SERPL-CCNC: 1064 U/L (ref 30–300)
CK SERPL-CCNC: 3187 U/L (ref 30–300)
CK SERPL-CCNC: 358 U/L (ref 30–300)
CK SERPL-CCNC: 359 U/L (ref 30–300)
CK SERPL-CCNC: 3676 U/L (ref 30–300)
CK SERPL-CCNC: 413 U/L (ref 30–300)
CK SERPL-CCNC: 593 U/L (ref 30–300)
CLONAZEPAM SERPL CFM-MCNC: NEGATIVE NG/ML
CLONAZEPAM SERPL CFM-MCNC: NEGATIVE NG/ML
CO2 BLD-SCNC: 15 MMOL/L (ref 21–28)
CO2 BLD-SCNC: 16 MMOL/L (ref 21–28)
CO2 BLD-SCNC: 20 MMOL/L (ref 21–28)
CO2 BLD-SCNC: 21 MMOL/L (ref 21–28)
CO2 BLD-SCNC: 22 MMOL/L (ref 21–28)
CO2 BLDCOV-SCNC: 25 MMOL/L (ref 21–28)
CO2 BLDCOV-SCNC: 25 MMOL/L (ref 21–28)
CO2 SERPL-SCNC: 12 MMOL/L (ref 20–32)
CO2 SERPL-SCNC: 13 MMOL/L (ref 20–32)
CO2 SERPL-SCNC: 14 MMOL/L (ref 20–32)
CO2 SERPL-SCNC: 14 MMOL/L (ref 20–32)
CO2 SERPL-SCNC: 15 MMOL/L (ref 20–32)
CO2 SERPL-SCNC: 16 MMOL/L (ref 20–32)
CO2 SERPL-SCNC: 17 MMOL/L (ref 20–32)
CO2 SERPL-SCNC: 18 MMOL/L (ref 20–32)
CO2 SERPL-SCNC: 19 MMOL/L (ref 20–32)
CO2 SERPL-SCNC: 20 MMOL/L (ref 20–32)
CO2 SERPL-SCNC: 21 MMOL/L (ref 20–32)
CO2 SERPL-SCNC: 22 MMOL/L (ref 20–32)
CO2 SERPL-SCNC: 23 MMOL/L (ref 20–32)
CO2 SERPL-SCNC: 24 MMOL/L (ref 20–32)
CO2 SERPL-SCNC: 25 MMOL/L (ref 20–32)
COCAINE METABOLITE IA: NEGATIVE NG/ML
COCAINE METABOLITE IA: NEGATIVE NG/ML
COCAINE UR QL: NEGATIVE
COLOR UR AUTO: YELLOW
CORTIS SERPL-MCNC: 47.7 UG/DL (ref 4–22)
CREAT SERPL-MCNC: 0.67 MG/DL (ref 0.66–1.25)
CREAT SERPL-MCNC: 0.71 MG/DL (ref 0.66–1.25)
CREAT SERPL-MCNC: 0.74 MG/DL (ref 0.66–1.25)
CREAT SERPL-MCNC: 0.84 MG/DL (ref 0.66–1.25)
CREAT SERPL-MCNC: 0.85 MG/DL (ref 0.66–1.25)
CREAT SERPL-MCNC: 0.87 MG/DL (ref 0.66–1.25)
CREAT SERPL-MCNC: 0.87 MG/DL (ref 0.66–1.25)
CREAT SERPL-MCNC: 0.88 MG/DL (ref 0.66–1.25)
CREAT SERPL-MCNC: 0.89 MG/DL (ref 0.66–1.25)
CREAT SERPL-MCNC: 0.92 MG/DL (ref 0.66–1.25)
CREAT SERPL-MCNC: 0.93 MG/DL (ref 0.66–1.25)
CREAT SERPL-MCNC: 0.93 MG/DL (ref 0.66–1.25)
CREAT SERPL-MCNC: 0.94 MG/DL (ref 0.66–1.25)
CREAT SERPL-MCNC: 0.95 MG/DL (ref 0.66–1.25)
CREAT SERPL-MCNC: 0.95 MG/DL (ref 0.66–1.25)
CREAT SERPL-MCNC: 0.96 MG/DL (ref 0.66–1.25)
CREAT SERPL-MCNC: 0.97 MG/DL (ref 0.66–1.25)
CREAT SERPL-MCNC: 0.97 MG/DL (ref 0.66–1.25)
CREAT SERPL-MCNC: 0.98 MG/DL (ref 0.66–1.25)
CREAT SERPL-MCNC: 0.99 MG/DL (ref 0.66–1.25)
CREAT SERPL-MCNC: 1 MG/DL (ref 0.66–1.25)
CREAT SERPL-MCNC: 1.01 MG/DL (ref 0.66–1.25)
CREAT SERPL-MCNC: 1.02 MG/DL (ref 0.66–1.25)
CREAT SERPL-MCNC: 1.03 MG/DL (ref 0.66–1.25)
CREAT SERPL-MCNC: 1.05 MG/DL (ref 0.66–1.25)
CREAT SERPL-MCNC: 1.05 MG/DL (ref 0.66–1.25)
CREAT SERPL-MCNC: 1.06 MG/DL (ref 0.66–1.25)
CREAT SERPL-MCNC: 1.08 MG/DL (ref 0.66–1.25)
CREAT SERPL-MCNC: 1.08 MG/DL (ref 0.66–1.25)
CREAT SERPL-MCNC: 1.09 MG/DL (ref 0.66–1.25)
CREAT SERPL-MCNC: 1.12 MG/DL (ref 0.66–1.25)
CREAT SERPL-MCNC: 1.13 MG/DL (ref 0.66–1.25)
CREAT SERPL-MCNC: 1.14 MG/DL (ref 0.66–1.25)
CREAT SERPL-MCNC: 1.15 MG/DL (ref 0.66–1.25)
CREAT SERPL-MCNC: 1.22 MG/DL (ref 0.66–1.25)
CREAT SERPL-MCNC: 1.24 MG/DL (ref 0.66–1.25)
CREAT SERPL-MCNC: 1.26 MG/DL (ref 0.66–1.25)
CREAT SERPL-MCNC: 1.26 MG/DL (ref 0.66–1.25)
CREAT SERPL-MCNC: 1.27 MG/DL (ref 0.66–1.25)
CREAT SERPL-MCNC: 1.28 MG/DL (ref 0.66–1.25)
CREAT SERPL-MCNC: 1.28 MG/DL (ref 0.66–1.25)
CREAT SERPL-MCNC: 1.29 MG/DL (ref 0.66–1.25)
CREAT SERPL-MCNC: 1.3 MG/DL (ref 0.66–1.25)
CREAT SERPL-MCNC: 1.33 MG/DL (ref 0.66–1.25)
CREAT SERPL-MCNC: 1.33 MG/DL (ref 0.66–1.25)
CREAT SERPL-MCNC: 1.34 MG/DL (ref 0.66–1.25)
CREAT SERPL-MCNC: 1.34 MG/DL (ref 0.66–1.25)
CREAT SERPL-MCNC: 1.36 MG/DL (ref 0.66–1.25)
CREAT SERPL-MCNC: 1.38 MG/DL (ref 0.66–1.25)
CREAT SERPL-MCNC: 1.38 MG/DL (ref 0.66–1.25)
CREAT SERPL-MCNC: 1.39 MG/DL (ref 0.66–1.25)
CREAT SERPL-MCNC: 1.41 MG/DL (ref 0.66–1.25)
CREAT SERPL-MCNC: 1.42 MG/DL (ref 0.66–1.25)
CREAT SERPL-MCNC: 1.44 MG/DL (ref 0.66–1.25)
CREAT SERPL-MCNC: 1.47 MG/DL (ref 0.66–1.25)
CREAT SERPL-MCNC: 1.48 MG/DL (ref 0.66–1.25)
CREAT SERPL-MCNC: 1.53 MG/DL (ref 0.66–1.25)
CREAT SERPL-MCNC: 1.55 MG/DL (ref 0.66–1.25)
CREAT SERPL-MCNC: 1.56 MG/DL (ref 0.66–1.25)
CREAT SERPL-MCNC: 1.56 MG/DL (ref 0.66–1.25)
CREAT SERPL-MCNC: 1.58 MG/DL (ref 0.66–1.25)
CREAT SERPL-MCNC: 1.59 MG/DL (ref 0.66–1.25)
CREAT SERPL-MCNC: 1.59 MG/DL (ref 0.66–1.25)
CREAT SERPL-MCNC: 1.61 MG/DL (ref 0.66–1.25)
CREAT SERPL-MCNC: 1.65 MG/DL (ref 0.66–1.25)
CREAT SERPL-MCNC: 1.68 MG/DL (ref 0.66–1.25)
CREAT SERPL-MCNC: 1.69 MG/DL (ref 0.66–1.25)
CREAT SERPL-MCNC: 1.69 MG/DL (ref 0.66–1.25)
CREAT SERPL-MCNC: 1.7 MG/DL (ref 0.66–1.25)
CREAT SERPL-MCNC: 1.72 MG/DL (ref 0.66–1.25)
CREAT SERPL-MCNC: 1.74 MG/DL (ref 0.66–1.25)
CREAT SERPL-MCNC: 1.75 MG/DL (ref 0.66–1.25)
CREAT SERPL-MCNC: 1.79 MG/DL (ref 0.66–1.25)
CREAT SERPL-MCNC: 1.87 MG/DL (ref 0.66–1.25)
CREAT SERPL-MCNC: 1.89 MG/DL (ref 0.66–1.25)
CREAT SERPL-MCNC: 1.93 MG/DL (ref 0.66–1.25)
CREAT SERPL-MCNC: 1.93 MG/DL (ref 0.66–1.25)
CREAT SERPL-MCNC: 1.98 MG/DL (ref 0.66–1.25)
CREAT SERPL-MCNC: 2.02 MG/DL (ref 0.66–1.25)
CREAT SERPL-MCNC: 2.04 MG/DL (ref 0.66–1.25)
CREAT SERPL-MCNC: 2.05 MG/DL (ref 0.66–1.25)
CREAT SERPL-MCNC: 2.07 MG/DL (ref 0.66–1.25)
CREAT SERPL-MCNC: 2.07 MG/DL (ref 0.66–1.25)
CREAT SERPL-MCNC: 2.09 MG/DL (ref 0.66–1.25)
CREAT SERPL-MCNC: 2.19 MG/DL (ref 0.66–1.25)
CREAT SERPL-MCNC: 2.22 MG/DL (ref 0.66–1.25)
CREAT SERPL-MCNC: 2.3 MG/DL (ref 0.66–1.25)
CREAT SERPL-MCNC: 2.33 MG/DL (ref 0.66–1.25)
CREAT SERPL-MCNC: 2.36 MG/DL (ref 0.66–1.25)
CREAT SERPL-MCNC: 2.37 MG/DL (ref 0.66–1.25)
CREAT SERPL-MCNC: 2.39 MG/DL (ref 0.66–1.25)
CREAT SERPL-MCNC: 2.41 MG/DL (ref 0.66–1.25)
CREAT SERPL-MCNC: 2.42 MG/DL (ref 0.66–1.25)
CREAT SERPL-MCNC: 2.48 MG/DL (ref 0.66–1.25)
CREAT SERPL-MCNC: 2.5 MG/DL (ref 0.66–1.25)
CREAT SERPL-MCNC: 2.52 MG/DL (ref 0.66–1.25)
CREAT SERPL-MCNC: 2.52 MG/DL (ref 0.66–1.25)
CREAT SERPL-MCNC: 2.53 MG/DL (ref 0.66–1.25)
CREAT SERPL-MCNC: 2.59 MG/DL (ref 0.66–1.25)
CREAT SERPL-MCNC: 2.6 MG/DL (ref 0.66–1.25)
CREAT SERPL-MCNC: 2.6 MG/DL (ref 0.66–1.25)
CREAT SERPL-MCNC: 2.61 MG/DL (ref 0.66–1.25)
CREAT SERPL-MCNC: 2.62 MG/DL (ref 0.66–1.25)
CREAT SERPL-MCNC: 2.63 MG/DL (ref 0.66–1.25)
CREAT SERPL-MCNC: 2.68 MG/DL (ref 0.66–1.25)
CREAT SERPL-MCNC: 2.69 MG/DL (ref 0.66–1.25)
CREAT SERPL-MCNC: 2.7 MG/DL (ref 0.66–1.25)
CREAT SERPL-MCNC: 2.76 MG/DL (ref 0.66–1.25)
CREAT SERPL-MCNC: 2.78 MG/DL (ref 0.66–1.25)
CREAT SERPL-MCNC: 2.79 MG/DL (ref 0.66–1.25)
CREAT SERPL-MCNC: 2.81 MG/DL (ref 0.66–1.25)
CREAT SERPL-MCNC: 2.85 MG/DL (ref 0.66–1.25)
CREAT SERPL-MCNC: 2.88 MG/DL (ref 0.66–1.25)
CREAT SERPL-MCNC: 2.94 MG/DL (ref 0.66–1.25)
CREAT SERPL-MCNC: 2.98 MG/DL (ref 0.66–1.25)
CREAT SERPL-MCNC: 3.05 MG/DL (ref 0.66–1.25)
CREAT SERPL-MCNC: 3.14 MG/DL (ref 0.66–1.25)
CREAT SERPL-MCNC: 3.14 MG/DL (ref 0.66–1.25)
CREAT SERPL-MCNC: 3.28 MG/DL (ref 0.66–1.25)
CREAT SERPL-MCNC: 3.31 MG/DL (ref 0.66–1.25)
CREAT SERPL-MCNC: 3.51 MG/DL (ref 0.66–1.25)
CREAT SERPL-MCNC: 3.64 MG/DL (ref 0.66–1.25)
CREAT SERPL-MCNC: 3.91 MG/DL (ref 0.66–1.25)
CREAT SERPL-MCNC: 4.23 MG/DL (ref 0.66–1.25)
CREAT SERPL-MCNC: 4.26 MG/DL (ref 0.66–1.25)
CREAT SERPL-MCNC: 4.3 MG/DL (ref 0.66–1.25)
CREAT SERPL-MCNC: 4.43 MG/DL (ref 0.66–1.25)
CREAT SERPL-MCNC: 4.43 MG/DL (ref 0.66–1.25)
CREAT SERPL-MCNC: 4.48 MG/DL (ref 0.66–1.25)
CREAT SERPL-MCNC: 4.58 MG/DL (ref 0.66–1.25)
CREAT SERPL-MCNC: 4.6 MG/DL (ref 0.66–1.25)
CREAT SERPL-MCNC: 4.71 MG/DL (ref 0.66–1.25)
CREAT SERPL-MCNC: 4.76 MG/DL (ref 0.66–1.25)
CREAT SERPL-MCNC: 4.86 MG/DL (ref 0.66–1.25)
CREAT SERPL-MCNC: 4.93 MG/DL (ref 0.66–1.25)
CREAT SERPL-MCNC: 4.93 MG/DL (ref 0.66–1.25)
CRP SERPL-MCNC: 100 MG/L (ref 0–8)
CRP SERPL-MCNC: 110 MG/L (ref 0–8)
CRP SERPL-MCNC: 110 MG/L (ref 0–8)
CRP SERPL-MCNC: 120 MG/L (ref 0–8)
CRP SERPL-MCNC: 120 MG/L (ref 0–8)
CRP SERPL-MCNC: 130 MG/L (ref 0–8)
CRP SERPL-MCNC: 16 MG/L (ref 0–8)
CRP SERPL-MCNC: 160 MG/L (ref 0–8)
CRP SERPL-MCNC: 170 MG/L (ref 0–8)
CRP SERPL-MCNC: 180 MG/L (ref 0–8)
CRP SERPL-MCNC: 200 MG/L (ref 0–8)
CRP SERPL-MCNC: 230 MG/L (ref 0–8)
CRP SERPL-MCNC: 240 MG/L (ref 0–8)
CRP SERPL-MCNC: 250 MG/L (ref 0–8)
CRP SERPL-MCNC: 250 MG/L (ref 0–8)
CRP SERPL-MCNC: 260 MG/L (ref 0–8)
CRP SERPL-MCNC: 270 MG/L (ref 0–8)
CRP SERPL-MCNC: 280 MG/L (ref 0–8)
CRP SERPL-MCNC: 300 MG/L (ref 0–8)
CRP SERPL-MCNC: 320 MG/L (ref 0–8)
CRP SERPL-MCNC: 340 MG/L (ref 0–8)
CRP SERPL-MCNC: 57 MG/L (ref 0–8)
CRP SERPL-MCNC: 59 MG/L (ref 0–8)
CRP SERPL-MCNC: 61 MG/L (ref 0–8)
CRP SERPL-MCNC: 66 MG/L (ref 0–8)
CRP SERPL-MCNC: 68 MG/L (ref 0–8)
CRP SERPL-MCNC: 70 MG/L (ref 0–8)
CRP SERPL-MCNC: 74 MG/L (ref 0–8)
CRP SERPL-MCNC: 79 MG/L (ref 0–8)
CRP SERPL-MCNC: 86 MG/L (ref 0–8)
D DIMER PPP FEU-MCNC: 10.2 UG/ML FEU (ref 0–0.5)
D DIMER PPP FEU-MCNC: 11 UG/ML FEU (ref 0–0.5)
D DIMER PPP FEU-MCNC: 11 UG/ML FEU (ref 0–0.5)
D DIMER PPP FEU-MCNC: 11.3 UG/ML FEU (ref 0–0.5)
D DIMER PPP FEU-MCNC: 11.4 UG/ML FEU (ref 0–0.5)
D DIMER PPP FEU-MCNC: 11.7 UG/ML FEU (ref 0–0.5)
D DIMER PPP FEU-MCNC: 11.8 UG/ML FEU (ref 0–0.5)
D DIMER PPP FEU-MCNC: 12.1 UG/ML FEU (ref 0–0.5)
D DIMER PPP FEU-MCNC: 12.1 UG/ML FEU (ref 0–0.5)
D DIMER PPP FEU-MCNC: 12.3 UG/ML FEU (ref 0–0.5)
D DIMER PPP FEU-MCNC: 12.3 UG/ML FEU (ref 0–0.5)
D DIMER PPP FEU-MCNC: 12.6 UG/ML FEU (ref 0–0.5)
D DIMER PPP FEU-MCNC: 12.6 UG/ML FEU (ref 0–0.5)
D DIMER PPP FEU-MCNC: 12.7 UG/ML FEU (ref 0–0.5)
D DIMER PPP FEU-MCNC: 12.8 UG/ML FEU (ref 0–0.5)
D DIMER PPP FEU-MCNC: 12.9 UG/ML FEU (ref 0–0.5)
D DIMER PPP FEU-MCNC: 13 UG/ML FEU (ref 0–0.5)
D DIMER PPP FEU-MCNC: 13.1 UG/ML FEU (ref 0–0.5)
D DIMER PPP FEU-MCNC: 13.2 UG/ML FEU (ref 0–0.5)
D DIMER PPP FEU-MCNC: 13.4 UG/ML FEU (ref 0–0.5)
D DIMER PPP FEU-MCNC: 13.4 UG/ML FEU (ref 0–0.5)
D DIMER PPP FEU-MCNC: 13.6 UG/ML FEU (ref 0–0.5)
D DIMER PPP FEU-MCNC: 13.7 UG/ML FEU (ref 0–0.5)
D DIMER PPP FEU-MCNC: 13.7 UG/ML FEU (ref 0–0.5)
D DIMER PPP FEU-MCNC: 13.9 UG/ML FEU (ref 0–0.5)
D DIMER PPP FEU-MCNC: 14.1 UG/ML FEU (ref 0–0.5)
D DIMER PPP FEU-MCNC: 14.2 UG/ML FEU (ref 0–0.5)
D DIMER PPP FEU-MCNC: 14.2 UG/ML FEU (ref 0–0.5)
D DIMER PPP FEU-MCNC: 14.4 UG/ML FEU (ref 0–0.5)
D DIMER PPP FEU-MCNC: 14.4 UG/ML FEU (ref 0–0.5)
D DIMER PPP FEU-MCNC: 15.4 UG/ML FEU (ref 0–0.5)
D DIMER PPP FEU-MCNC: 15.6 UG/ML FEU (ref 0–0.5)
D DIMER PPP FEU-MCNC: 15.7 UG/ML FEU (ref 0–0.5)
D DIMER PPP FEU-MCNC: 15.7 UG/ML FEU (ref 0–0.5)
D DIMER PPP FEU-MCNC: 16.1 UG/ML FEU (ref 0–0.5)
D DIMER PPP FEU-MCNC: 17.3 UG/ML FEU (ref 0–0.5)
D DIMER PPP FEU-MCNC: 17.5 UG/ML FEU (ref 0–0.5)
D DIMER PPP FEU-MCNC: 2.1 UG/ML FEU (ref 0–0.5)
D DIMER PPP FEU-MCNC: 2.8 UG/ML FEU (ref 0–0.5)
D DIMER PPP FEU-MCNC: 3.1 UG/ML FEU (ref 0–0.5)
D DIMER PPP FEU-MCNC: 3.4 UG/ML FEU (ref 0–0.5)
D DIMER PPP FEU-MCNC: 4 UG/ML FEU (ref 0–0.5)
D DIMER PPP FEU-MCNC: 5.2 UG/ML FEU (ref 0–0.5)
D DIMER PPP FEU-MCNC: 5.8 UG/ML FEU (ref 0–0.5)
D DIMER PPP FEU-MCNC: 7.8 UG/ML FEU (ref 0–0.5)
D DIMER PPP FEU-MCNC: 8.5 UG/ML FEU (ref 0–0.5)
D DIMER PPP FEU-MCNC: 8.7 UG/ML FEU (ref 0–0.5)
D DIMER PPP FEU-MCNC: >20 UG/ML FEU (ref 0–0.5)
DACRYOCYTES BLD QL SMEAR: SLIGHT
DECLARED MEDICATIONS: ABNORMAL
DECLARED MEDICATIONS: ABNORMAL
DESALKYLFLURAZ SERPL-MCNC: NEGATIVE NG/ML
DESALKYLFLURAZ SERPL-MCNC: NEGATIVE NG/ML
DIAZEPAM SERPL-MCNC: NEGATIVE NG/ML
DIAZEPAM SERPL-MCNC: NEGATIVE NG/ML
DIFFERENTIAL METHOD BLD: ABNORMAL
DIFFERENTIAL METHOD BLD: NORMAL
ELLIPTOCYTES BLD QL SMEAR: SLIGHT
ELLIPTOCYTES BLD QL SMEAR: SLIGHT
EOSINOPHIL # BLD AUTO: 0 10E9/L (ref 0–0.7)
EOSINOPHIL # BLD AUTO: 0.1 10E9/L (ref 0–0.7)
EOSINOPHIL # BLD AUTO: 0.2 10E9/L (ref 0–0.7)
EOSINOPHIL # BLD AUTO: 0.3 10E9/L (ref 0–0.7)
EOSINOPHIL # BLD AUTO: 0.4 10E9/L (ref 0–0.7)
EOSINOPHIL # BLD AUTO: 0.5 10E9/L (ref 0–0.7)
EOSINOPHIL # BLD AUTO: 0.6 10E9/L (ref 0–0.7)
EOSINOPHIL # BLD AUTO: 0.7 10E9/L (ref 0–0.7)
EOSINOPHIL # BLD AUTO: 0.8 10E9/L (ref 0–0.7)
EOSINOPHIL # BLD AUTO: 0.8 10E9/L (ref 0–0.7)
EOSINOPHIL # BLD AUTO: 0.9 10E9/L (ref 0–0.7)
EOSINOPHIL # BLD AUTO: 1.2 10E9/L (ref 0–0.7)
EOSINOPHIL # BLD AUTO: 1.3 10E9/L (ref 0–0.7)
EOSINOPHIL # BLD AUTO: 1.3 10E9/L (ref 0–0.7)
EOSINOPHIL # BLD AUTO: 1.7 10E9/L (ref 0–0.7)
EOSINOPHIL NFR BLD AUTO: 0 %
EOSINOPHIL NFR BLD AUTO: 0.1 %
EOSINOPHIL NFR BLD AUTO: 0.2 %
EOSINOPHIL NFR BLD AUTO: 0.3 %
EOSINOPHIL NFR BLD AUTO: 0.5 %
EOSINOPHIL NFR BLD AUTO: 0.8 %
EOSINOPHIL NFR BLD AUTO: 0.9 %
EOSINOPHIL NFR BLD AUTO: 1 %
EOSINOPHIL NFR BLD AUTO: 1.5 %
EOSINOPHIL NFR BLD AUTO: 1.7 %
EOSINOPHIL NFR BLD AUTO: 1.8 %
EOSINOPHIL NFR BLD AUTO: 2 %
EOSINOPHIL NFR BLD AUTO: 2.6 %
EOSINOPHIL NFR BLD AUTO: 2.6 %
EOSINOPHIL NFR BLD AUTO: 2.7 %
EOSINOPHIL NFR BLD AUTO: 2.8 %
EOSINOPHIL NFR BLD AUTO: 3.4 %
EOSINOPHIL NFR BLD AUTO: 3.5 %
EOSINOPHIL NFR BLD AUTO: 3.5 %
EOSINOPHIL NFR BLD AUTO: 3.7 %
EOSINOPHIL NFR BLD AUTO: 3.7 %
EOSINOPHIL NFR BLD AUTO: 4.4 %
EOSINOPHIL NFR BLD AUTO: 4.5 %
EOSINOPHIL NFR BLD AUTO: 4.6 %
EOSINOPHIL NFR BLD AUTO: 4.7 %
EOSINOPHIL NFR BLD AUTO: 5 %
EOSINOPHIL NFR BLD AUTO: 5.2 %
EOSINOPHIL NFR BLD AUTO: 5.4 %
EOSINOPHIL NFR BLD AUTO: 5.4 %
EOSINOPHIL NFR BLD AUTO: 6.1 %
EOSINOPHIL NFR BLD AUTO: 6.8 %
EOSINOPHIL NFR BLD AUTO: 7.1 %
EOSINOPHIL NFR BLD AUTO: 8 %
ERYTHROCYTE [DISTWIDTH] IN BLOOD BY AUTOMATED COUNT: 13.2 % (ref 10–15)
ERYTHROCYTE [DISTWIDTH] IN BLOOD BY AUTOMATED COUNT: 13.3 % (ref 10–15)
ERYTHROCYTE [DISTWIDTH] IN BLOOD BY AUTOMATED COUNT: 13.3 % (ref 10–15)
ERYTHROCYTE [DISTWIDTH] IN BLOOD BY AUTOMATED COUNT: 13.4 % (ref 10–15)
ERYTHROCYTE [DISTWIDTH] IN BLOOD BY AUTOMATED COUNT: 13.5 % (ref 10–15)
ERYTHROCYTE [DISTWIDTH] IN BLOOD BY AUTOMATED COUNT: 13.6 % (ref 10–15)
ERYTHROCYTE [DISTWIDTH] IN BLOOD BY AUTOMATED COUNT: 14.2 % (ref 10–15)
ERYTHROCYTE [DISTWIDTH] IN BLOOD BY AUTOMATED COUNT: 14.4 % (ref 10–15)
ERYTHROCYTE [DISTWIDTH] IN BLOOD BY AUTOMATED COUNT: 14.4 % (ref 10–15)
ERYTHROCYTE [DISTWIDTH] IN BLOOD BY AUTOMATED COUNT: 14.5 % (ref 10–15)
ERYTHROCYTE [DISTWIDTH] IN BLOOD BY AUTOMATED COUNT: 14.6 % (ref 10–15)
ERYTHROCYTE [DISTWIDTH] IN BLOOD BY AUTOMATED COUNT: 14.7 % (ref 10–15)
ERYTHROCYTE [DISTWIDTH] IN BLOOD BY AUTOMATED COUNT: 14.9 % (ref 10–15)
ERYTHROCYTE [DISTWIDTH] IN BLOOD BY AUTOMATED COUNT: 15 % (ref 10–15)
ERYTHROCYTE [DISTWIDTH] IN BLOOD BY AUTOMATED COUNT: 15.1 % (ref 10–15)
ERYTHROCYTE [DISTWIDTH] IN BLOOD BY AUTOMATED COUNT: 15.1 % (ref 10–15)
ERYTHROCYTE [DISTWIDTH] IN BLOOD BY AUTOMATED COUNT: 15.2 % (ref 10–15)
ERYTHROCYTE [DISTWIDTH] IN BLOOD BY AUTOMATED COUNT: 15.3 % (ref 10–15)
ERYTHROCYTE [DISTWIDTH] IN BLOOD BY AUTOMATED COUNT: 15.4 % (ref 10–15)
ERYTHROCYTE [DISTWIDTH] IN BLOOD BY AUTOMATED COUNT: 15.5 % (ref 10–15)
ERYTHROCYTE [DISTWIDTH] IN BLOOD BY AUTOMATED COUNT: 15.6 % (ref 10–15)
ERYTHROCYTE [DISTWIDTH] IN BLOOD BY AUTOMATED COUNT: 15.9 % (ref 10–15)
ERYTHROCYTE [DISTWIDTH] IN BLOOD BY AUTOMATED COUNT: 15.9 % (ref 10–15)
ERYTHROCYTE [DISTWIDTH] IN BLOOD BY AUTOMATED COUNT: 16 % (ref 10–15)
ERYTHROCYTE [DISTWIDTH] IN BLOOD BY AUTOMATED COUNT: 16.6 % (ref 10–15)
ERYTHROCYTE [DISTWIDTH] IN BLOOD BY AUTOMATED COUNT: 16.7 % (ref 10–15)
ERYTHROCYTE [DISTWIDTH] IN BLOOD BY AUTOMATED COUNT: 16.9 % (ref 10–15)
ERYTHROCYTE [DISTWIDTH] IN BLOOD BY AUTOMATED COUNT: 17.1 % (ref 10–15)
ERYTHROCYTE [DISTWIDTH] IN BLOOD BY AUTOMATED COUNT: 17.2 % (ref 10–15)
ERYTHROCYTE [DISTWIDTH] IN BLOOD BY AUTOMATED COUNT: 17.3 % (ref 10–15)
ERYTHROCYTE [DISTWIDTH] IN BLOOD BY AUTOMATED COUNT: 17.4 % (ref 10–15)
ERYTHROCYTE [DISTWIDTH] IN BLOOD BY AUTOMATED COUNT: 17.6 % (ref 10–15)
ERYTHROCYTE [DISTWIDTH] IN BLOOD BY AUTOMATED COUNT: 18 % (ref 10–15)
ERYTHROCYTE [DISTWIDTH] IN BLOOD BY AUTOMATED COUNT: 18.1 % (ref 10–15)
ERYTHROCYTE [DISTWIDTH] IN BLOOD BY AUTOMATED COUNT: 18.2 % (ref 10–15)
ERYTHROCYTE [DISTWIDTH] IN BLOOD BY AUTOMATED COUNT: 18.2 % (ref 10–15)
ERYTHROCYTE [DISTWIDTH] IN BLOOD BY AUTOMATED COUNT: 18.3 % (ref 10–15)
ERYTHROCYTE [DISTWIDTH] IN BLOOD BY AUTOMATED COUNT: 18.6 % (ref 10–15)
ERYTHROCYTE [DISTWIDTH] IN BLOOD BY AUTOMATED COUNT: 18.8 % (ref 10–15)
ERYTHROCYTE [DISTWIDTH] IN BLOOD BY AUTOMATED COUNT: 19 % (ref 10–15)
ERYTHROCYTE [DISTWIDTH] IN BLOOD BY AUTOMATED COUNT: 19 % (ref 10–15)
ERYTHROCYTE [DISTWIDTH] IN BLOOD BY AUTOMATED COUNT: 19.1 % (ref 10–15)
ERYTHROCYTE [DISTWIDTH] IN BLOOD BY AUTOMATED COUNT: 19.1 % (ref 10–15)
ERYTHROCYTE [DISTWIDTH] IN BLOOD BY AUTOMATED COUNT: 19.2 % (ref 10–15)
ERYTHROCYTE [DISTWIDTH] IN BLOOD BY AUTOMATED COUNT: 19.2 % (ref 10–15)
ERYTHROCYTE [DISTWIDTH] IN BLOOD BY AUTOMATED COUNT: 19.3 % (ref 10–15)
ERYTHROCYTE [DISTWIDTH] IN BLOOD BY AUTOMATED COUNT: 19.3 % (ref 10–15)
ERYTHROCYTE [DISTWIDTH] IN BLOOD BY AUTOMATED COUNT: 19.4 % (ref 10–15)
ERYTHROCYTE [DISTWIDTH] IN BLOOD BY AUTOMATED COUNT: 19.4 % (ref 10–15)
ERYTHROCYTE [DISTWIDTH] IN BLOOD BY AUTOMATED COUNT: 19.5 % (ref 10–15)
ERYTHROCYTE [DISTWIDTH] IN BLOOD BY AUTOMATED COUNT: 19.6 % (ref 10–15)
ERYTHROCYTE [DISTWIDTH] IN BLOOD BY AUTOMATED COUNT: 19.7 % (ref 10–15)
ERYTHROCYTE [DISTWIDTH] IN BLOOD BY AUTOMATED COUNT: 19.8 % (ref 10–15)
ERYTHROCYTE [DISTWIDTH] IN BLOOD BY AUTOMATED COUNT: 19.9 % (ref 10–15)
ERYTHROCYTE [DISTWIDTH] IN BLOOD BY AUTOMATED COUNT: 20.1 % (ref 10–15)
ERYTHROCYTE [DISTWIDTH] IN BLOOD BY AUTOMATED COUNT: 20.2 % (ref 10–15)
ERYTHROCYTE [DISTWIDTH] IN BLOOD BY AUTOMATED COUNT: 20.3 % (ref 10–15)
ERYTHROCYTE [DISTWIDTH] IN BLOOD BY AUTOMATED COUNT: 20.4 % (ref 10–15)
ERYTHROCYTE [DISTWIDTH] IN BLOOD BY AUTOMATED COUNT: 20.5 % (ref 10–15)
ERYTHROCYTE [DISTWIDTH] IN BLOOD BY AUTOMATED COUNT: 20.6 % (ref 10–15)
ERYTHROCYTE [DISTWIDTH] IN BLOOD BY AUTOMATED COUNT: 20.7 % (ref 10–15)
ERYTHROCYTE [DISTWIDTH] IN BLOOD BY AUTOMATED COUNT: 20.8 % (ref 10–15)
ERYTHROCYTE [DISTWIDTH] IN BLOOD BY AUTOMATED COUNT: 20.9 % (ref 10–15)
ERYTHROCYTE [DISTWIDTH] IN BLOOD BY AUTOMATED COUNT: 21 % (ref 10–15)
ERYTHROCYTE [DISTWIDTH] IN BLOOD BY AUTOMATED COUNT: 21 % (ref 10–15)
ERYTHROCYTE [DISTWIDTH] IN BLOOD BY AUTOMATED COUNT: 21.1 % (ref 10–15)
ERYTHROCYTE [DISTWIDTH] IN BLOOD BY AUTOMATED COUNT: 21.1 % (ref 10–15)
ERYTHROCYTE [DISTWIDTH] IN BLOOD BY AUTOMATED COUNT: 21.2 % (ref 10–15)
ERYTHROCYTE [DISTWIDTH] IN BLOOD BY AUTOMATED COUNT: 21.4 % (ref 10–15)
ERYTHROCYTE [DISTWIDTH] IN BLOOD BY AUTOMATED COUNT: 21.4 % (ref 10–15)
ERYTHROCYTE [DISTWIDTH] IN BLOOD BY AUTOMATED COUNT: 21.6 % (ref 10–15)
ERYTHROCYTE [DISTWIDTH] IN BLOOD BY AUTOMATED COUNT: 21.8 % (ref 10–15)
ERYTHROCYTE [DISTWIDTH] IN BLOOD BY AUTOMATED COUNT: 21.9 % (ref 10–15)
ERYTHROCYTE [DISTWIDTH] IN BLOOD BY AUTOMATED COUNT: 22 % (ref 10–15)
ERYTHROCYTE [DISTWIDTH] IN BLOOD BY AUTOMATED COUNT: 22.4 % (ref 10–15)
ERYTHROCYTE [DISTWIDTH] IN BLOOD BY AUTOMATED COUNT: 22.4 % (ref 10–15)
ERYTHROCYTE [DISTWIDTH] IN BLOOD BY AUTOMATED COUNT: 22.5 % (ref 10–15)
ERYTHROCYTE [DISTWIDTH] IN BLOOD BY AUTOMATED COUNT: 22.6 % (ref 10–15)
ERYTHROCYTE [DISTWIDTH] IN BLOOD BY AUTOMATED COUNT: 23.2 % (ref 10–15)
ERYTHROCYTE [DISTWIDTH] IN BLOOD BY AUTOMATED COUNT: 23.2 % (ref 10–15)
ERYTHROCYTE [DISTWIDTH] IN BLOOD BY AUTOMATED COUNT: 23.3 % (ref 10–15)
ERYTHROCYTE [DISTWIDTH] IN BLOOD BY AUTOMATED COUNT: 23.4 % (ref 10–15)
ERYTHROCYTE [DISTWIDTH] IN BLOOD BY AUTOMATED COUNT: 23.5 % (ref 10–15)
ERYTHROCYTE [DISTWIDTH] IN BLOOD BY AUTOMATED COUNT: 23.7 % (ref 10–15)
ERYTHROCYTE [DISTWIDTH] IN BLOOD BY AUTOMATED COUNT: 23.7 % (ref 10–15)
ERYTHROCYTE [DISTWIDTH] IN BLOOD BY AUTOMATED COUNT: 23.8 % (ref 10–15)
ERYTHROCYTE [DISTWIDTH] IN BLOOD BY AUTOMATED COUNT: 23.9 % (ref 10–15)
ERYTHROCYTE [DISTWIDTH] IN BLOOD BY AUTOMATED COUNT: 24.4 % (ref 10–15)
ERYTHROCYTE [DISTWIDTH] IN BLOOD BY AUTOMATED COUNT: 24.9 % (ref 10–15)
ERYTHROCYTE [DISTWIDTH] IN BLOOD BY AUTOMATED COUNT: 25.3 % (ref 10–15)
ERYTHROCYTE [DISTWIDTH] IN BLOOD BY AUTOMATED COUNT: 25.8 % (ref 10–15)
ERYTHROCYTE [DISTWIDTH] IN BLOOD BY AUTOMATED COUNT: 26.3 % (ref 10–15)
ERYTHROCYTE [DISTWIDTH] IN BLOOD BY AUTOMATED COUNT: 26.5 % (ref 10–15)
ERYTHROCYTE [DISTWIDTH] IN BLOOD BY AUTOMATED COUNT: NORMAL % (ref 10–15)
ERYTHROCYTE [SEDIMENTATION RATE] IN BLOOD BY WESTERGREN METHOD: 104 MM/H (ref 0–15)
ERYTHROCYTE [SEDIMENTATION RATE] IN BLOOD BY WESTERGREN METHOD: 116 MM/H (ref 0–15)
ERYTHROCYTE [SEDIMENTATION RATE] IN BLOOD BY WESTERGREN METHOD: 140 MM/H (ref 0–15)
ERYTHROCYTE [SEDIMENTATION RATE] IN BLOOD BY WESTERGREN METHOD: 15 MM/H (ref 0–15)
ERYTHROCYTE [SEDIMENTATION RATE] IN BLOOD BY WESTERGREN METHOD: 17 MM/H (ref 0–15)
ERYTHROCYTE [SEDIMENTATION RATE] IN BLOOD BY WESTERGREN METHOD: 2 MM/H (ref 0–15)
ERYTHROCYTE [SEDIMENTATION RATE] IN BLOOD BY WESTERGREN METHOD: 27 MM/H (ref 0–15)
ERYTHROCYTE [SEDIMENTATION RATE] IN BLOOD BY WESTERGREN METHOD: 28 MM/H (ref 0–15)
ERYTHROCYTE [SEDIMENTATION RATE] IN BLOOD BY WESTERGREN METHOD: 36 MM/H (ref 0–15)
ERYTHROCYTE [SEDIMENTATION RATE] IN BLOOD BY WESTERGREN METHOD: 40 MM/H (ref 0–15)
ERYTHROCYTE [SEDIMENTATION RATE] IN BLOOD BY WESTERGREN METHOD: 42 MM/H (ref 0–15)
ERYTHROCYTE [SEDIMENTATION RATE] IN BLOOD BY WESTERGREN METHOD: 44 MM/H (ref 0–15)
ERYTHROCYTE [SEDIMENTATION RATE] IN BLOOD BY WESTERGREN METHOD: 44 MM/H (ref 0–15)
ERYTHROCYTE [SEDIMENTATION RATE] IN BLOOD BY WESTERGREN METHOD: 5 MM/H (ref 0–15)
ERYTHROCYTE [SEDIMENTATION RATE] IN BLOOD BY WESTERGREN METHOD: 54 MM/H (ref 0–15)
ERYTHROCYTE [SEDIMENTATION RATE] IN BLOOD BY WESTERGREN METHOD: 56 MM/H (ref 0–15)
ERYTHROCYTE [SEDIMENTATION RATE] IN BLOOD BY WESTERGREN METHOD: 57 MM/H (ref 0–15)
ERYTHROCYTE [SEDIMENTATION RATE] IN BLOOD BY WESTERGREN METHOD: 57 MM/H (ref 0–15)
ERYTHROCYTE [SEDIMENTATION RATE] IN BLOOD BY WESTERGREN METHOD: 58 MM/H (ref 0–15)
ERYTHROCYTE [SEDIMENTATION RATE] IN BLOOD BY WESTERGREN METHOD: 59 MM/H (ref 0–15)
ERYTHROCYTE [SEDIMENTATION RATE] IN BLOOD BY WESTERGREN METHOD: 6 MM/H (ref 0–15)
ERYTHROCYTE [SEDIMENTATION RATE] IN BLOOD BY WESTERGREN METHOD: 69 MM/H (ref 0–15)
ERYTHROCYTE [SEDIMENTATION RATE] IN BLOOD BY WESTERGREN METHOD: 7 MM/H (ref 0–15)
ERYTHROCYTE [SEDIMENTATION RATE] IN BLOOD BY WESTERGREN METHOD: 77 MM/H (ref 0–15)
ERYTHROCYTE [SEDIMENTATION RATE] IN BLOOD BY WESTERGREN METHOD: 79 MM/H (ref 0–15)
ERYTHROCYTE [SEDIMENTATION RATE] IN BLOOD BY WESTERGREN METHOD: 8 MM/H (ref 0–15)
ERYTHROCYTE [SEDIMENTATION RATE] IN BLOOD BY WESTERGREN METHOD: 80 MM/H (ref 0–15)
ERYTHROCYTE [SEDIMENTATION RATE] IN BLOOD BY WESTERGREN METHOD: 80 MM/H (ref 0–15)
ERYTHROCYTE [SEDIMENTATION RATE] IN BLOOD BY WESTERGREN METHOD: 83 MM/H (ref 0–15)
ERYTHROCYTE [SEDIMENTATION RATE] IN BLOOD BY WESTERGREN METHOD: 93 MM/H (ref 0–15)
ERYTHROCYTE [SEDIMENTATION RATE] IN BLOOD BY WESTERGREN METHOD: 97 MM/H (ref 0–15)
ERYTHROCYTE [SEDIMENTATION RATE] IN BLOOD BY WESTERGREN METHOD: >140 MM/H (ref 0–15)
ERYTHROCYTE [SEDIMENTATION RATE] IN BLOOD BY WESTERGREN METHOD: >140 MM/H (ref 0–15)
ETHANOL BLD-MCNC: NEGATIVE GM/DL
ETHANOL BLD-MCNC: NEGATIVE GM/DL
ETHANOL UR QL SCN: NEGATIVE
FENTANYL BLD CFM-MCNC: 2 NG/ML
FENTANYL IA: NEGATIVE NG/ML
FENTANYL IA: POSITIVE NG/ML
FENTANYL SPEC QL: POSITIVE
FIBRINOGEN PPP-MCNC: 230 MG/DL (ref 200–420)
FIBRINOGEN PPP-MCNC: 243 MG/DL (ref 200–420)
FIBRINOGEN PPP-MCNC: 244 MG/DL (ref 200–420)
FIBRINOGEN PPP-MCNC: 254 MG/DL (ref 200–420)
FIBRINOGEN PPP-MCNC: 259 MG/DL (ref 200–420)
FIBRINOGEN PPP-MCNC: 259 MG/DL (ref 200–420)
FIBRINOGEN PPP-MCNC: 260 MG/DL (ref 200–420)
FIBRINOGEN PPP-MCNC: 270 MG/DL (ref 200–420)
FIBRINOGEN PPP-MCNC: 277 MG/DL (ref 200–420)
FIBRINOGEN PPP-MCNC: 317 MG/DL (ref 200–420)
FIBRINOGEN PPP-MCNC: 355 MG/DL (ref 200–420)
FIBRINOGEN PPP-MCNC: 359 MG/DL (ref 200–420)
FIBRINOGEN PPP-MCNC: 365 MG/DL (ref 200–420)
FIBRINOGEN PPP-MCNC: 369 MG/DL (ref 200–420)
FIBRINOGEN PPP-MCNC: 372 MG/DL (ref 200–420)
FIBRINOGEN PPP-MCNC: 377 MG/DL (ref 200–420)
FIBRINOGEN PPP-MCNC: 380 MG/DL (ref 200–420)
FIBRINOGEN PPP-MCNC: 389 MG/DL (ref 200–420)
FIBRINOGEN PPP-MCNC: 389 MG/DL (ref 200–420)
FIBRINOGEN PPP-MCNC: 392 MG/DL (ref 200–420)
FIBRINOGEN PPP-MCNC: 395 MG/DL (ref 200–420)
FIBRINOGEN PPP-MCNC: 404 MG/DL (ref 200–420)
FIBRINOGEN PPP-MCNC: 414 MG/DL (ref 200–420)
FIBRINOGEN PPP-MCNC: 424 MG/DL (ref 200–420)
FIBRINOGEN PPP-MCNC: 427 MG/DL (ref 200–420)
FIBRINOGEN PPP-MCNC: 435 MG/DL (ref 200–420)
FIBRINOGEN PPP-MCNC: 442 MG/DL (ref 200–420)
FIBRINOGEN PPP-MCNC: 442 MG/DL (ref 200–420)
FIBRINOGEN PPP-MCNC: 446 MG/DL (ref 200–420)
FIBRINOGEN PPP-MCNC: 450 MG/DL (ref 200–420)
FIBRINOGEN PPP-MCNC: 451 MG/DL (ref 200–420)
FIBRINOGEN PPP-MCNC: 454 MG/DL (ref 200–420)
FIBRINOGEN PPP-MCNC: 458 MG/DL (ref 200–420)
FIBRINOGEN PPP-MCNC: 459 MG/DL (ref 200–420)
FIBRINOGEN PPP-MCNC: 466 MG/DL (ref 200–420)
FIBRINOGEN PPP-MCNC: 466 MG/DL (ref 200–420)
FIBRINOGEN PPP-MCNC: 470 MG/DL (ref 200–420)
FIBRINOGEN PPP-MCNC: 475 MG/DL (ref 200–420)
FIBRINOGEN PPP-MCNC: 488 MG/DL (ref 200–420)
FIBRINOGEN PPP-MCNC: 493 MG/DL (ref 200–420)
FIBRINOGEN PPP-MCNC: 498 MG/DL (ref 200–420)
FIBRINOGEN PPP-MCNC: 503 MG/DL (ref 200–420)
FIBRINOGEN PPP-MCNC: 508 MG/DL (ref 200–420)
FIBRINOGEN PPP-MCNC: 513 MG/DL (ref 200–420)
FIBRINOGEN PPP-MCNC: 521 MG/DL (ref 200–420)
FIBRINOGEN PPP-MCNC: 523 MG/DL (ref 200–420)
FIBRINOGEN PPP-MCNC: 527 MG/DL (ref 200–420)
FIBRINOGEN PPP-MCNC: 528 MG/DL (ref 200–420)
FIBRINOGEN PPP-MCNC: 540 MG/DL (ref 200–420)
FIBRINOGEN PPP-MCNC: 557 MG/DL (ref 200–420)
FIBRINOGEN PPP-MCNC: 651 MG/DL (ref 200–420)
FIBRINOGEN PPP-MCNC: 696 MG/DL (ref 200–420)
FIBRINOGEN PPP-MCNC: 717 MG/DL (ref 200–420)
FIBRINOGEN PPP-MCNC: 717 MG/DL (ref 200–420)
FIBRINOGEN PPP-MCNC: 727 MG/DL (ref 200–420)
FIBRINOGEN PPP-MCNC: 772 MG/DL (ref 200–420)
FIBRINOGEN PPP-MCNC: 772 MG/DL (ref 200–420)
FIBRINOGEN PPP-MCNC: 785 MG/DL (ref 200–420)
FIBRINOGEN PPP-MCNC: 785 MG/DL (ref 200–420)
FIBRINOGEN PPP-MCNC: 810 MG/DL (ref 200–420)
FIBRINOGEN PPP-MCNC: 824 MG/DL (ref 200–420)
FIBRINOGEN PPP-MCNC: 824 MG/DL (ref 200–420)
FIBRINOGEN PPP-MCNC: 849 MG/DL (ref 200–420)
FIBRINOGEN PPP-MCNC: 867 MG/DL (ref 200–420)
FIBRINOGEN PPP-MCNC: 981 MG/DL (ref 200–420)
FLUAV H1 2009 PAND RNA SPEC QL NAA+PROBE: NEGATIVE
FLUAV H1 RNA SPEC QL NAA+PROBE: NEGATIVE
FLUAV H3 RNA SPEC QL NAA+PROBE: POSITIVE
FLUAV RNA SPEC QL NAA+PROBE: POSITIVE
FLUBV RNA SPEC QL NAA+PROBE: NEGATIVE
FLURAZEPAM SERPL-MCNC: NEGATIVE NG/ML
FLURAZEPAM SERPL-MCNC: NEGATIVE NG/ML
G ACTUAL CLOT STRENGTH: 7.4 KD/SC (ref 4.5–11)
GABAPENTIN IA: NEGATIVE UG/ML
GABAPENTIN IA: NEGATIVE UG/ML
GFR SERPL CREATININE-BSD FRML MDRD: 14 ML/MIN/{1.73_M2}
GFR SERPL CREATININE-BSD FRML MDRD: 15 ML/MIN/{1.73_M2}
GFR SERPL CREATININE-BSD FRML MDRD: 16 ML/MIN/{1.73_M2}
GFR SERPL CREATININE-BSD FRML MDRD: 18 ML/MIN/{1.73_M2}
GFR SERPL CREATININE-BSD FRML MDRD: 20 ML/MIN/{1.73_M2}
GFR SERPL CREATININE-BSD FRML MDRD: 20 ML/MIN/{1.73_M2}
GFR SERPL CREATININE-BSD FRML MDRD: 22 ML/MIN/{1.73_M2}
GFR SERPL CREATININE-BSD FRML MDRD: 22 ML/MIN/{1.73_M2}
GFR SERPL CREATININE-BSD FRML MDRD: 23 ML/MIN/{1.73_M2}
GFR SERPL CREATININE-BSD FRML MDRD: 23 ML/MIN/{1.73_M2}
GFR SERPL CREATININE-BSD FRML MDRD: 24 ML/MIN/{1.73_M2}
GFR SERPL CREATININE-BSD FRML MDRD: 25 ML/MIN/{1.73_M2}
GFR SERPL CREATININE-BSD FRML MDRD: 25 ML/MIN/{1.73_M2}
GFR SERPL CREATININE-BSD FRML MDRD: 26 ML/MIN/{1.73_M2}
GFR SERPL CREATININE-BSD FRML MDRD: 26 ML/MIN/{1.73_M2}
GFR SERPL CREATININE-BSD FRML MDRD: 27 ML/MIN/{1.73_M2}
GFR SERPL CREATININE-BSD FRML MDRD: 28 ML/MIN/{1.73_M2}
GFR SERPL CREATININE-BSD FRML MDRD: 29 ML/MIN/{1.73_M2}
GFR SERPL CREATININE-BSD FRML MDRD: 30 ML/MIN/{1.73_M2}
GFR SERPL CREATININE-BSD FRML MDRD: 31 ML/MIN/{1.73_M2}
GFR SERPL CREATININE-BSD FRML MDRD: 31 ML/MIN/{1.73_M2}
GFR SERPL CREATININE-BSD FRML MDRD: 32 ML/MIN/{1.73_M2}
GFR SERPL CREATININE-BSD FRML MDRD: 33 ML/MIN/{1.73_M2}
GFR SERPL CREATININE-BSD FRML MDRD: 34 ML/MIN/{1.73_M2}
GFR SERPL CREATININE-BSD FRML MDRD: 35 ML/MIN/{1.73_M2}
GFR SERPL CREATININE-BSD FRML MDRD: 36 ML/MIN/{1.73_M2}
GFR SERPL CREATININE-BSD FRML MDRD: 38 ML/MIN/{1.73_M2}
GFR SERPL CREATININE-BSD FRML MDRD: 39 ML/MIN/{1.73_M2}
GFR SERPL CREATININE-BSD FRML MDRD: 40 ML/MIN/{1.73_M2}
GFR SERPL CREATININE-BSD FRML MDRD: 41 ML/MIN/{1.73_M2}
GFR SERPL CREATININE-BSD FRML MDRD: 42 ML/MIN/{1.73_M2}
GFR SERPL CREATININE-BSD FRML MDRD: 43 ML/MIN/{1.73_M2}
GFR SERPL CREATININE-BSD FRML MDRD: 44 ML/MIN/{1.73_M2}
GFR SERPL CREATININE-BSD FRML MDRD: 46 ML/MIN/{1.73_M2}
GFR SERPL CREATININE-BSD FRML MDRD: 47 ML/MIN/{1.73_M2}
GFR SERPL CREATININE-BSD FRML MDRD: 48 ML/MIN/{1.73_M2}
GFR SERPL CREATININE-BSD FRML MDRD: 48 ML/MIN/{1.73_M2}
GFR SERPL CREATININE-BSD FRML MDRD: 49 ML/MIN/{1.73_M2}
GFR SERPL CREATININE-BSD FRML MDRD: 50 ML/MIN/{1.73_M2}
GFR SERPL CREATININE-BSD FRML MDRD: 52 ML/MIN/{1.73_M2}
GFR SERPL CREATININE-BSD FRML MDRD: 53 ML/MIN/{1.73_M2}
GFR SERPL CREATININE-BSD FRML MDRD: 53 ML/MIN/{1.73_M2}
GFR SERPL CREATININE-BSD FRML MDRD: 54 ML/MIN/{1.73_M2}
GFR SERPL CREATININE-BSD FRML MDRD: 55 ML/MIN/{1.73_M2}
GFR SERPL CREATININE-BSD FRML MDRD: 56 ML/MIN/{1.73_M2}
GFR SERPL CREATININE-BSD FRML MDRD: 58 ML/MIN/{1.73_M2}
GFR SERPL CREATININE-BSD FRML MDRD: 58 ML/MIN/{1.73_M2}
GFR SERPL CREATININE-BSD FRML MDRD: 60 ML/MIN/{1.73_M2}
GFR SERPL CREATININE-BSD FRML MDRD: 61 ML/MIN/{1.73_M2}
GFR SERPL CREATININE-BSD FRML MDRD: 61 ML/MIN/{1.73_M2}
GFR SERPL CREATININE-BSD FRML MDRD: 62 ML/MIN/{1.73_M2}
GFR SERPL CREATININE-BSD FRML MDRD: 63 ML/MIN/{1.73_M2}
GFR SERPL CREATININE-BSD FRML MDRD: 63 ML/MIN/{1.73_M2}
GFR SERPL CREATININE-BSD FRML MDRD: 64 ML/MIN/{1.73_M2}
GFR SERPL CREATININE-BSD FRML MDRD: 65 ML/MIN/{1.73_M2}
GFR SERPL CREATININE-BSD FRML MDRD: 65 ML/MIN/{1.73_M2}
GFR SERPL CREATININE-BSD FRML MDRD: 66 ML/MIN/{1.73_M2}
GFR SERPL CREATININE-BSD FRML MDRD: 66 ML/MIN/{1.73_M2}
GFR SERPL CREATININE-BSD FRML MDRD: 68 ML/MIN/{1.73_M2}
GFR SERPL CREATININE-BSD FRML MDRD: 68 ML/MIN/{1.73_M2}
GFR SERPL CREATININE-BSD FRML MDRD: 69 ML/MIN/{1.73_M2}
GFR SERPL CREATININE-BSD FRML MDRD: 69 ML/MIN/{1.73_M2}
GFR SERPL CREATININE-BSD FRML MDRD: 70 ML/MIN/{1.73_M2}
GFR SERPL CREATININE-BSD FRML MDRD: 72 ML/MIN/{1.73_M2}
GFR SERPL CREATININE-BSD FRML MDRD: 73 ML/MIN/{1.73_M2}
GFR SERPL CREATININE-BSD FRML MDRD: 79 ML/MIN/{1.73_M2}
GFR SERPL CREATININE-BSD FRML MDRD: 79 ML/MIN/{1.73_M2}
GFR SERPL CREATININE-BSD FRML MDRD: 80 ML/MIN/{1.73_M2}
GFR SERPL CREATININE-BSD FRML MDRD: 81 ML/MIN/{1.73_M2}
GFR SERPL CREATININE-BSD FRML MDRD: 84 ML/MIN/{1.73_M2}
GFR SERPL CREATININE-BSD FRML MDRD: 85 ML/MIN/{1.73_M2}
GFR SERPL CREATININE-BSD FRML MDRD: 85 ML/MIN/{1.73_M2}
GFR SERPL CREATININE-BSD FRML MDRD: 87 ML/MIN/{1.73_M2}
GFR SERPL CREATININE-BSD FRML MDRD: 88 ML/MIN/{1.73_M2}
GFR SERPL CREATININE-BSD FRML MDRD: 88 ML/MIN/{1.73_M2}
GFR SERPL CREATININE-BSD FRML MDRD: 90 ML/MIN/{1.73_M2}
GFR SERPL CREATININE-BSD FRML MDRD: >90 ML/MIN/{1.73_M2}
GFR SERPL CREATININE-BSD FRML MDRD: ABNORMAL ML/MIN/{1.73_M2}
GFR SERPL CREATININE-BSD FRML MDRD: ABNORMAL ML/MIN/{1.73_M2}
GLUCOSE BLD-MCNC: 108 MG/DL (ref 70–99)
GLUCOSE BLD-MCNC: 109 MG/DL (ref 70–99)
GLUCOSE BLD-MCNC: 111 MG/DL (ref 70–99)
GLUCOSE BLD-MCNC: 112 MG/DL (ref 70–99)
GLUCOSE BLD-MCNC: 115 MG/DL (ref 70–99)
GLUCOSE BLD-MCNC: 116 MG/DL (ref 70–99)
GLUCOSE BLD-MCNC: 117 MG/DL (ref 70–99)
GLUCOSE BLD-MCNC: 118 MG/DL (ref 70–99)
GLUCOSE BLD-MCNC: 118 MG/DL (ref 70–99)
GLUCOSE BLD-MCNC: 119 MG/DL (ref 70–99)
GLUCOSE BLD-MCNC: 120 MG/DL (ref 70–99)
GLUCOSE BLD-MCNC: 122 MG/DL (ref 70–99)
GLUCOSE BLD-MCNC: 122 MG/DL (ref 70–99)
GLUCOSE BLD-MCNC: 123 MG/DL (ref 70–99)
GLUCOSE BLD-MCNC: 125 MG/DL (ref 70–99)
GLUCOSE BLD-MCNC: 127 MG/DL (ref 70–99)
GLUCOSE BLD-MCNC: 128 MG/DL (ref 70–99)
GLUCOSE BLD-MCNC: 129 MG/DL (ref 70–99)
GLUCOSE BLD-MCNC: 130 MG/DL (ref 70–99)
GLUCOSE BLD-MCNC: 130 MG/DL (ref 70–99)
GLUCOSE BLD-MCNC: 131 MG/DL (ref 70–99)
GLUCOSE BLD-MCNC: 134 MG/DL (ref 70–99)
GLUCOSE BLD-MCNC: 136 MG/DL (ref 70–99)
GLUCOSE BLD-MCNC: 137 MG/DL (ref 70–99)
GLUCOSE BLD-MCNC: 139 MG/DL (ref 70–99)
GLUCOSE BLD-MCNC: 140 MG/DL (ref 70–99)
GLUCOSE BLD-MCNC: 141 MG/DL (ref 70–99)
GLUCOSE BLD-MCNC: 142 MG/DL (ref 70–99)
GLUCOSE BLD-MCNC: 143 MG/DL (ref 70–99)
GLUCOSE BLD-MCNC: 144 MG/DL (ref 70–99)
GLUCOSE BLD-MCNC: 144 MG/DL (ref 70–99)
GLUCOSE BLD-MCNC: 145 MG/DL (ref 70–99)
GLUCOSE BLD-MCNC: 146 MG/DL (ref 70–99)
GLUCOSE BLD-MCNC: 146 MG/DL (ref 70–99)
GLUCOSE BLD-MCNC: 147 MG/DL (ref 70–99)
GLUCOSE BLD-MCNC: 148 MG/DL (ref 70–99)
GLUCOSE BLD-MCNC: 149 MG/DL (ref 70–99)
GLUCOSE BLD-MCNC: 151 MG/DL (ref 70–99)
GLUCOSE BLD-MCNC: 152 MG/DL (ref 70–99)
GLUCOSE BLD-MCNC: 152 MG/DL (ref 70–99)
GLUCOSE BLD-MCNC: 153 MG/DL (ref 70–99)
GLUCOSE BLD-MCNC: 154 MG/DL (ref 70–99)
GLUCOSE BLD-MCNC: 154 MG/DL (ref 70–99)
GLUCOSE BLD-MCNC: 155 MG/DL (ref 70–99)
GLUCOSE BLD-MCNC: 156 MG/DL (ref 70–99)
GLUCOSE BLD-MCNC: 157 MG/DL (ref 70–99)
GLUCOSE BLD-MCNC: 157 MG/DL (ref 70–99)
GLUCOSE BLD-MCNC: 159 MG/DL (ref 70–99)
GLUCOSE BLD-MCNC: 160 MG/DL (ref 70–99)
GLUCOSE BLD-MCNC: 161 MG/DL (ref 70–99)
GLUCOSE BLD-MCNC: 161 MG/DL (ref 70–99)
GLUCOSE BLD-MCNC: 162 MG/DL (ref 70–99)
GLUCOSE BLD-MCNC: 163 MG/DL (ref 70–99)
GLUCOSE BLD-MCNC: 164 MG/DL (ref 70–99)
GLUCOSE BLD-MCNC: 164 MG/DL (ref 70–99)
GLUCOSE BLD-MCNC: 165 MG/DL (ref 70–99)
GLUCOSE BLD-MCNC: 167 MG/DL (ref 70–99)
GLUCOSE BLD-MCNC: 168 MG/DL (ref 70–99)
GLUCOSE BLD-MCNC: 169 MG/DL (ref 70–99)
GLUCOSE BLD-MCNC: 175 MG/DL (ref 70–99)
GLUCOSE BLD-MCNC: 176 MG/DL (ref 70–99)
GLUCOSE BLD-MCNC: 181 MG/DL (ref 70–99)
GLUCOSE BLD-MCNC: 183 MG/DL (ref 70–99)
GLUCOSE BLD-MCNC: 187 MG/DL (ref 70–99)
GLUCOSE BLD-MCNC: 188 MG/DL (ref 70–99)
GLUCOSE BLD-MCNC: 189 MG/DL (ref 70–99)
GLUCOSE BLD-MCNC: 190 MG/DL (ref 70–99)
GLUCOSE BLD-MCNC: 190 MG/DL (ref 70–99)
GLUCOSE BLD-MCNC: 191 MG/DL (ref 70–99)
GLUCOSE BLD-MCNC: 198 MG/DL (ref 70–99)
GLUCOSE BLD-MCNC: 206 MG/DL (ref 70–99)
GLUCOSE BLD-MCNC: 215 MG/DL (ref 70–99)
GLUCOSE BLD-MCNC: 233 MG/DL (ref 70–99)
GLUCOSE BLD-MCNC: 278 MG/DL (ref 70–99)
GLUCOSE BLD-MCNC: 287 MG/DL (ref 70–99)
GLUCOSE BLD-MCNC: 310 MG/DL (ref 70–99)
GLUCOSE BLD-MCNC: 324 MG/DL (ref 70–99)
GLUCOSE BLD-MCNC: NORMAL MG/DL (ref 70–99)
GLUCOSE BLDC GLUCOMTR-MCNC: 100 MG/DL (ref 70–99)
GLUCOSE BLDC GLUCOMTR-MCNC: 101 MG/DL (ref 70–99)
GLUCOSE BLDC GLUCOMTR-MCNC: 103 MG/DL (ref 70–99)
GLUCOSE BLDC GLUCOMTR-MCNC: 103 MG/DL (ref 70–99)
GLUCOSE BLDC GLUCOMTR-MCNC: 106 MG/DL (ref 70–99)
GLUCOSE BLDC GLUCOMTR-MCNC: 107 MG/DL (ref 70–99)
GLUCOSE BLDC GLUCOMTR-MCNC: 108 MG/DL (ref 70–99)
GLUCOSE BLDC GLUCOMTR-MCNC: 109 MG/DL (ref 70–99)
GLUCOSE BLDC GLUCOMTR-MCNC: 109 MG/DL (ref 70–99)
GLUCOSE BLDC GLUCOMTR-MCNC: 110 MG/DL (ref 70–99)
GLUCOSE BLDC GLUCOMTR-MCNC: 111 MG/DL (ref 70–99)
GLUCOSE BLDC GLUCOMTR-MCNC: 112 MG/DL (ref 70–99)
GLUCOSE BLDC GLUCOMTR-MCNC: 113 MG/DL (ref 70–99)
GLUCOSE BLDC GLUCOMTR-MCNC: 114 MG/DL (ref 70–99)
GLUCOSE BLDC GLUCOMTR-MCNC: 115 MG/DL (ref 70–99)
GLUCOSE BLDC GLUCOMTR-MCNC: 115 MG/DL (ref 70–99)
GLUCOSE BLDC GLUCOMTR-MCNC: 116 MG/DL (ref 70–99)
GLUCOSE BLDC GLUCOMTR-MCNC: 116 MG/DL (ref 70–99)
GLUCOSE BLDC GLUCOMTR-MCNC: 118 MG/DL (ref 70–99)
GLUCOSE BLDC GLUCOMTR-MCNC: 119 MG/DL (ref 70–99)
GLUCOSE BLDC GLUCOMTR-MCNC: 120 MG/DL (ref 70–99)
GLUCOSE BLDC GLUCOMTR-MCNC: 121 MG/DL (ref 70–99)
GLUCOSE BLDC GLUCOMTR-MCNC: 122 MG/DL (ref 70–99)
GLUCOSE BLDC GLUCOMTR-MCNC: 123 MG/DL (ref 70–99)
GLUCOSE BLDC GLUCOMTR-MCNC: 124 MG/DL (ref 70–99)
GLUCOSE BLDC GLUCOMTR-MCNC: 125 MG/DL (ref 70–99)
GLUCOSE BLDC GLUCOMTR-MCNC: 126 MG/DL (ref 70–99)
GLUCOSE BLDC GLUCOMTR-MCNC: 127 MG/DL (ref 70–99)
GLUCOSE BLDC GLUCOMTR-MCNC: 128 MG/DL (ref 70–99)
GLUCOSE BLDC GLUCOMTR-MCNC: 129 MG/DL (ref 70–99)
GLUCOSE BLDC GLUCOMTR-MCNC: 130 MG/DL (ref 70–99)
GLUCOSE BLDC GLUCOMTR-MCNC: 131 MG/DL (ref 70–99)
GLUCOSE BLDC GLUCOMTR-MCNC: 132 MG/DL (ref 70–99)
GLUCOSE BLDC GLUCOMTR-MCNC: 133 MG/DL (ref 70–99)
GLUCOSE BLDC GLUCOMTR-MCNC: 134 MG/DL (ref 70–99)
GLUCOSE BLDC GLUCOMTR-MCNC: 135 MG/DL (ref 70–99)
GLUCOSE BLDC GLUCOMTR-MCNC: 136 MG/DL (ref 70–99)
GLUCOSE BLDC GLUCOMTR-MCNC: 137 MG/DL (ref 70–99)
GLUCOSE BLDC GLUCOMTR-MCNC: 138 MG/DL (ref 70–99)
GLUCOSE BLDC GLUCOMTR-MCNC: 139 MG/DL (ref 70–99)
GLUCOSE BLDC GLUCOMTR-MCNC: 140 MG/DL (ref 70–99)
GLUCOSE BLDC GLUCOMTR-MCNC: 141 MG/DL (ref 70–99)
GLUCOSE BLDC GLUCOMTR-MCNC: 142 MG/DL (ref 70–99)
GLUCOSE BLDC GLUCOMTR-MCNC: 143 MG/DL (ref 70–99)
GLUCOSE BLDC GLUCOMTR-MCNC: 144 MG/DL (ref 70–99)
GLUCOSE BLDC GLUCOMTR-MCNC: 145 MG/DL (ref 70–99)
GLUCOSE BLDC GLUCOMTR-MCNC: 146 MG/DL (ref 70–99)
GLUCOSE BLDC GLUCOMTR-MCNC: 146 MG/DL (ref 70–99)
GLUCOSE BLDC GLUCOMTR-MCNC: 147 MG/DL (ref 70–99)
GLUCOSE BLDC GLUCOMTR-MCNC: 148 MG/DL (ref 70–99)
GLUCOSE BLDC GLUCOMTR-MCNC: 149 MG/DL (ref 70–99)
GLUCOSE BLDC GLUCOMTR-MCNC: 150 MG/DL (ref 70–99)
GLUCOSE BLDC GLUCOMTR-MCNC: 151 MG/DL (ref 70–99)
GLUCOSE BLDC GLUCOMTR-MCNC: 152 MG/DL (ref 70–99)
GLUCOSE BLDC GLUCOMTR-MCNC: 153 MG/DL (ref 70–99)
GLUCOSE BLDC GLUCOMTR-MCNC: 153 MG/DL (ref 70–99)
GLUCOSE BLDC GLUCOMTR-MCNC: 154 MG/DL (ref 70–99)
GLUCOSE BLDC GLUCOMTR-MCNC: 155 MG/DL (ref 70–99)
GLUCOSE BLDC GLUCOMTR-MCNC: 156 MG/DL (ref 70–99)
GLUCOSE BLDC GLUCOMTR-MCNC: 157 MG/DL (ref 70–99)
GLUCOSE BLDC GLUCOMTR-MCNC: 158 MG/DL (ref 70–99)
GLUCOSE BLDC GLUCOMTR-MCNC: 158 MG/DL (ref 70–99)
GLUCOSE BLDC GLUCOMTR-MCNC: 159 MG/DL (ref 70–99)
GLUCOSE BLDC GLUCOMTR-MCNC: 160 MG/DL (ref 70–99)
GLUCOSE BLDC GLUCOMTR-MCNC: 161 MG/DL (ref 70–99)
GLUCOSE BLDC GLUCOMTR-MCNC: 161 MG/DL (ref 70–99)
GLUCOSE BLDC GLUCOMTR-MCNC: 162 MG/DL (ref 70–99)
GLUCOSE BLDC GLUCOMTR-MCNC: 163 MG/DL (ref 70–99)
GLUCOSE BLDC GLUCOMTR-MCNC: 164 MG/DL (ref 70–99)
GLUCOSE BLDC GLUCOMTR-MCNC: 165 MG/DL (ref 70–99)
GLUCOSE BLDC GLUCOMTR-MCNC: 166 MG/DL (ref 70–99)
GLUCOSE BLDC GLUCOMTR-MCNC: 166 MG/DL (ref 70–99)
GLUCOSE BLDC GLUCOMTR-MCNC: 167 MG/DL (ref 70–99)
GLUCOSE BLDC GLUCOMTR-MCNC: 168 MG/DL (ref 70–99)
GLUCOSE BLDC GLUCOMTR-MCNC: 171 MG/DL (ref 70–99)
GLUCOSE BLDC GLUCOMTR-MCNC: 171 MG/DL (ref 70–99)
GLUCOSE BLDC GLUCOMTR-MCNC: 172 MG/DL (ref 70–99)
GLUCOSE BLDC GLUCOMTR-MCNC: 173 MG/DL (ref 70–99)
GLUCOSE BLDC GLUCOMTR-MCNC: 174 MG/DL (ref 70–99)
GLUCOSE BLDC GLUCOMTR-MCNC: 175 MG/DL (ref 70–99)
GLUCOSE BLDC GLUCOMTR-MCNC: 176 MG/DL (ref 70–99)
GLUCOSE BLDC GLUCOMTR-MCNC: 177 MG/DL (ref 70–99)
GLUCOSE BLDC GLUCOMTR-MCNC: 178 MG/DL (ref 70–99)
GLUCOSE BLDC GLUCOMTR-MCNC: 179 MG/DL (ref 70–99)
GLUCOSE BLDC GLUCOMTR-MCNC: 179 MG/DL (ref 70–99)
GLUCOSE BLDC GLUCOMTR-MCNC: 180 MG/DL (ref 70–99)
GLUCOSE BLDC GLUCOMTR-MCNC: 182 MG/DL (ref 70–99)
GLUCOSE BLDC GLUCOMTR-MCNC: 186 MG/DL (ref 70–99)
GLUCOSE BLDC GLUCOMTR-MCNC: 188 MG/DL (ref 70–99)
GLUCOSE BLDC GLUCOMTR-MCNC: 190 MG/DL (ref 70–99)
GLUCOSE BLDC GLUCOMTR-MCNC: 190 MG/DL (ref 70–99)
GLUCOSE BLDC GLUCOMTR-MCNC: 193 MG/DL (ref 70–99)
GLUCOSE BLDC GLUCOMTR-MCNC: 193 MG/DL (ref 70–99)
GLUCOSE BLDC GLUCOMTR-MCNC: 194 MG/DL (ref 70–99)
GLUCOSE BLDC GLUCOMTR-MCNC: 198 MG/DL (ref 70–99)
GLUCOSE BLDC GLUCOMTR-MCNC: 212 MG/DL (ref 70–99)
GLUCOSE BLDC GLUCOMTR-MCNC: 230 MG/DL (ref 70–99)
GLUCOSE BLDC GLUCOMTR-MCNC: 239 MG/DL (ref 70–99)
GLUCOSE BLDC GLUCOMTR-MCNC: 277 MG/DL (ref 70–99)
GLUCOSE BLDC GLUCOMTR-MCNC: 41 MG/DL (ref 70–99)
GLUCOSE BLDC GLUCOMTR-MCNC: 50 MG/DL (ref 70–99)
GLUCOSE BLDC GLUCOMTR-MCNC: 59 MG/DL (ref 70–99)
GLUCOSE BLDC GLUCOMTR-MCNC: 60 MG/DL (ref 70–99)
GLUCOSE BLDC GLUCOMTR-MCNC: 63 MG/DL (ref 70–99)
GLUCOSE BLDC GLUCOMTR-MCNC: 65 MG/DL (ref 70–99)
GLUCOSE BLDC GLUCOMTR-MCNC: 74 MG/DL (ref 70–99)
GLUCOSE BLDC GLUCOMTR-MCNC: 77 MG/DL (ref 70–99)
GLUCOSE BLDC GLUCOMTR-MCNC: 77 MG/DL (ref 70–99)
GLUCOSE BLDC GLUCOMTR-MCNC: 80 MG/DL (ref 70–99)
GLUCOSE BLDC GLUCOMTR-MCNC: 81 MG/DL (ref 70–99)
GLUCOSE BLDC GLUCOMTR-MCNC: 82 MG/DL (ref 70–99)
GLUCOSE BLDC GLUCOMTR-MCNC: 83 MG/DL (ref 70–99)
GLUCOSE BLDC GLUCOMTR-MCNC: 85 MG/DL (ref 70–99)
GLUCOSE BLDC GLUCOMTR-MCNC: 88 MG/DL (ref 70–99)
GLUCOSE BLDC GLUCOMTR-MCNC: 94 MG/DL (ref 70–99)
GLUCOSE BLDC GLUCOMTR-MCNC: 95 MG/DL (ref 70–99)
GLUCOSE BLDC GLUCOMTR-MCNC: 96 MG/DL (ref 70–99)
GLUCOSE BLDC GLUCOMTR-MCNC: 96 MG/DL (ref 70–99)
GLUCOSE BLDC GLUCOMTR-MCNC: 97 MG/DL (ref 70–99)
GLUCOSE BLDC GLUCOMTR-MCNC: 99 MG/DL (ref 70–99)
GLUCOSE SERPL-MCNC: 100 MG/DL (ref 70–99)
GLUCOSE SERPL-MCNC: 101 MG/DL (ref 70–99)
GLUCOSE SERPL-MCNC: 101 MG/DL (ref 70–99)
GLUCOSE SERPL-MCNC: 102 MG/DL (ref 70–99)
GLUCOSE SERPL-MCNC: 104 MG/DL (ref 70–99)
GLUCOSE SERPL-MCNC: 104 MG/DL (ref 70–99)
GLUCOSE SERPL-MCNC: 105 MG/DL (ref 70–99)
GLUCOSE SERPL-MCNC: 106 MG/DL (ref 70–99)
GLUCOSE SERPL-MCNC: 107 MG/DL (ref 70–99)
GLUCOSE SERPL-MCNC: 108 MG/DL (ref 70–99)
GLUCOSE SERPL-MCNC: 109 MG/DL (ref 70–99)
GLUCOSE SERPL-MCNC: 110 MG/DL (ref 70–99)
GLUCOSE SERPL-MCNC: 111 MG/DL (ref 70–99)
GLUCOSE SERPL-MCNC: 112 MG/DL (ref 70–99)
GLUCOSE SERPL-MCNC: 114 MG/DL (ref 70–99)
GLUCOSE SERPL-MCNC: 115 MG/DL (ref 70–99)
GLUCOSE SERPL-MCNC: 115 MG/DL (ref 70–99)
GLUCOSE SERPL-MCNC: 116 MG/DL (ref 70–99)
GLUCOSE SERPL-MCNC: 117 MG/DL (ref 70–99)
GLUCOSE SERPL-MCNC: 118 MG/DL (ref 70–99)
GLUCOSE SERPL-MCNC: 119 MG/DL (ref 70–99)
GLUCOSE SERPL-MCNC: 120 MG/DL (ref 70–99)
GLUCOSE SERPL-MCNC: 121 MG/DL (ref 70–99)
GLUCOSE SERPL-MCNC: 122 MG/DL (ref 70–99)
GLUCOSE SERPL-MCNC: 122 MG/DL (ref 70–99)
GLUCOSE SERPL-MCNC: 124 MG/DL (ref 70–99)
GLUCOSE SERPL-MCNC: 124 MG/DL (ref 70–99)
GLUCOSE SERPL-MCNC: 125 MG/DL (ref 70–99)
GLUCOSE SERPL-MCNC: 126 MG/DL (ref 70–99)
GLUCOSE SERPL-MCNC: 126 MG/DL (ref 70–99)
GLUCOSE SERPL-MCNC: 127 MG/DL (ref 70–99)
GLUCOSE SERPL-MCNC: 128 MG/DL (ref 70–99)
GLUCOSE SERPL-MCNC: 128 MG/DL (ref 70–99)
GLUCOSE SERPL-MCNC: 129 MG/DL (ref 70–99)
GLUCOSE SERPL-MCNC: 129 MG/DL (ref 70–99)
GLUCOSE SERPL-MCNC: 130 MG/DL (ref 70–99)
GLUCOSE SERPL-MCNC: 130 MG/DL (ref 70–99)
GLUCOSE SERPL-MCNC: 131 MG/DL (ref 70–99)
GLUCOSE SERPL-MCNC: 132 MG/DL (ref 70–99)
GLUCOSE SERPL-MCNC: 133 MG/DL (ref 70–99)
GLUCOSE SERPL-MCNC: 133 MG/DL (ref 70–99)
GLUCOSE SERPL-MCNC: 134 MG/DL (ref 70–99)
GLUCOSE SERPL-MCNC: 135 MG/DL (ref 70–99)
GLUCOSE SERPL-MCNC: 136 MG/DL (ref 70–99)
GLUCOSE SERPL-MCNC: 139 MG/DL (ref 70–99)
GLUCOSE SERPL-MCNC: 140 MG/DL (ref 70–99)
GLUCOSE SERPL-MCNC: 141 MG/DL (ref 70–99)
GLUCOSE SERPL-MCNC: 142 MG/DL (ref 70–99)
GLUCOSE SERPL-MCNC: 142 MG/DL (ref 70–99)
GLUCOSE SERPL-MCNC: 143 MG/DL (ref 70–99)
GLUCOSE SERPL-MCNC: 144 MG/DL (ref 70–99)
GLUCOSE SERPL-MCNC: 145 MG/DL (ref 70–99)
GLUCOSE SERPL-MCNC: 146 MG/DL (ref 70–99)
GLUCOSE SERPL-MCNC: 146 MG/DL (ref 70–99)
GLUCOSE SERPL-MCNC: 147 MG/DL (ref 70–99)
GLUCOSE SERPL-MCNC: 148 MG/DL (ref 70–99)
GLUCOSE SERPL-MCNC: 149 MG/DL (ref 70–99)
GLUCOSE SERPL-MCNC: 150 MG/DL (ref 70–99)
GLUCOSE SERPL-MCNC: 150 MG/DL (ref 70–99)
GLUCOSE SERPL-MCNC: 151 MG/DL (ref 70–99)
GLUCOSE SERPL-MCNC: 152 MG/DL (ref 70–99)
GLUCOSE SERPL-MCNC: 153 MG/DL (ref 70–99)
GLUCOSE SERPL-MCNC: 154 MG/DL (ref 70–99)
GLUCOSE SERPL-MCNC: 154 MG/DL (ref 70–99)
GLUCOSE SERPL-MCNC: 155 MG/DL (ref 70–99)
GLUCOSE SERPL-MCNC: 156 MG/DL (ref 70–99)
GLUCOSE SERPL-MCNC: 157 MG/DL (ref 70–99)
GLUCOSE SERPL-MCNC: 158 MG/DL (ref 70–99)
GLUCOSE SERPL-MCNC: 158 MG/DL (ref 70–99)
GLUCOSE SERPL-MCNC: 159 MG/DL (ref 70–99)
GLUCOSE SERPL-MCNC: 161 MG/DL (ref 70–99)
GLUCOSE SERPL-MCNC: 162 MG/DL (ref 70–99)
GLUCOSE SERPL-MCNC: 163 MG/DL (ref 70–99)
GLUCOSE SERPL-MCNC: 163 MG/DL (ref 70–99)
GLUCOSE SERPL-MCNC: 164 MG/DL (ref 70–99)
GLUCOSE SERPL-MCNC: 166 MG/DL (ref 70–99)
GLUCOSE SERPL-MCNC: 167 MG/DL (ref 70–99)
GLUCOSE SERPL-MCNC: 168 MG/DL (ref 70–99)
GLUCOSE SERPL-MCNC: 169 MG/DL (ref 70–99)
GLUCOSE SERPL-MCNC: 172 MG/DL (ref 70–99)
GLUCOSE SERPL-MCNC: 173 MG/DL (ref 70–99)
GLUCOSE SERPL-MCNC: 174 MG/DL (ref 70–99)
GLUCOSE SERPL-MCNC: 178 MG/DL (ref 70–99)
GLUCOSE SERPL-MCNC: 180 MG/DL (ref 70–99)
GLUCOSE SERPL-MCNC: 181 MG/DL (ref 70–99)
GLUCOSE SERPL-MCNC: 181 MG/DL (ref 70–99)
GLUCOSE SERPL-MCNC: 183 MG/DL (ref 70–99)
GLUCOSE SERPL-MCNC: 183 MG/DL (ref 70–99)
GLUCOSE SERPL-MCNC: 184 MG/DL (ref 70–99)
GLUCOSE SERPL-MCNC: 186 MG/DL (ref 70–99)
GLUCOSE SERPL-MCNC: 188 MG/DL (ref 70–99)
GLUCOSE SERPL-MCNC: 192 MG/DL (ref 70–99)
GLUCOSE SERPL-MCNC: 194 MG/DL (ref 70–99)
GLUCOSE SERPL-MCNC: 208 MG/DL (ref 70–99)
GLUCOSE SERPL-MCNC: 215 MG/DL (ref 70–99)
GLUCOSE SERPL-MCNC: 228 MG/DL (ref 70–99)
GLUCOSE SERPL-MCNC: 272 MG/DL (ref 70–99)
GLUCOSE SERPL-MCNC: 326 MG/DL (ref 70–99)
GLUCOSE SERPL-MCNC: 42 MG/DL (ref 70–99)
GLUCOSE SERPL-MCNC: 52 MG/DL (ref 70–99)
GLUCOSE SERPL-MCNC: 72 MG/DL (ref 70–99)
GLUCOSE SERPL-MCNC: 82 MG/DL (ref 70–99)
GLUCOSE SERPL-MCNC: 87 MG/DL (ref 70–99)
GLUCOSE SERPL-MCNC: 98 MG/DL (ref 70–99)
GLUCOSE UR STRIP-MCNC: 300 MG/DL
GRAM STN SPEC: ABNORMAL
GRAM STN SPEC: ABNORMAL
GRAM STN SPEC: NORMAL
HADV DNA SPEC QL NAA+PROBE: NEGATIVE
HADV DNA SPEC QL NAA+PROBE: NEGATIVE
HBA1C MFR BLD: 5 % (ref 0–5.6)
HBA1C MFR BLD: 6.3 % (ref 0–5.6)
HCO3 BLD-SCNC: 11 MMOL/L (ref 21–28)
HCO3 BLD-SCNC: 13 MMOL/L (ref 21–28)
HCO3 BLD-SCNC: 13 MMOL/L (ref 21–28)
HCO3 BLD-SCNC: 14 MMOL/L (ref 21–28)
HCO3 BLD-SCNC: 15 MMOL/L (ref 21–28)
HCO3 BLD-SCNC: 16 MMOL/L (ref 21–28)
HCO3 BLD-SCNC: 17 MMOL/L (ref 21–28)
HCO3 BLD-SCNC: 18 MMOL/L (ref 21–28)
HCO3 BLD-SCNC: 19 MMOL/L (ref 21–28)
HCO3 BLD-SCNC: 20 MMOL/L (ref 21–28)
HCO3 BLD-SCNC: 21 MMOL/L (ref 21–28)
HCO3 BLD-SCNC: 22 MMOL/L (ref 21–28)
HCO3 BLD-SCNC: 23 MMOL/L (ref 21–28)
HCO3 BLD-SCNC: 24 MMOL/L (ref 21–28)
HCO3 BLD-SCNC: 25 MMOL/L (ref 21–28)
HCO3 BLD-SCNC: 26 MMOL/L (ref 21–28)
HCO3 BLD-SCNC: 27 MMOL/L (ref 21–28)
HCO3 BLD-SCNC: NORMAL MMOL/L (ref 21–28)
HCO3 BLD-SCNC: NORMAL MMOL/L (ref 21–28)
HCO3 BLDA-SCNC: 15 MMOL/L (ref 21–28)
HCO3 BLDA-SCNC: 16 MMOL/L (ref 21–28)
HCO3 BLDA-SCNC: 17 MMOL/L (ref 21–28)
HCO3 BLDA-SCNC: 17 MMOL/L (ref 21–28)
HCO3 BLDA-SCNC: 18 MMOL/L (ref 21–28)
HCO3 BLDA-SCNC: 19 MMOL/L (ref 21–28)
HCO3 BLDA-SCNC: 20 MMOL/L (ref 21–28)
HCO3 BLDA-SCNC: 21 MMOL/L (ref 21–28)
HCO3 BLDA-SCNC: 22 MMOL/L (ref 21–28)
HCO3 BLDA-SCNC: 23 MMOL/L (ref 21–28)
HCO3 BLDA-SCNC: 24 MMOL/L (ref 21–28)
HCO3 BLDA-SCNC: 25 MMOL/L (ref 21–28)
HCO3 BLDA-SCNC: 26 MMOL/L (ref 21–28)
HCO3 BLDA-SCNC: 27 MMOL/L (ref 21–28)
HCO3 BLDA-SCNC: NORMAL MMOL/L (ref 21–28)
HCO3 BLDV-SCNC: 12 MMOL/L (ref 21–28)
HCO3 BLDV-SCNC: 17 MMOL/L (ref 21–28)
HCO3 BLDV-SCNC: 17 MMOL/L (ref 21–28)
HCO3 BLDV-SCNC: 19 MMOL/L (ref 21–28)
HCO3 BLDV-SCNC: 20 MMOL/L (ref 21–28)
HCO3 BLDV-SCNC: 20 MMOL/L (ref 21–28)
HCO3 BLDV-SCNC: 21 MMOL/L (ref 21–28)
HCO3 BLDV-SCNC: 21 MMOL/L (ref 21–28)
HCO3 BLDV-SCNC: 22 MMOL/L (ref 21–28)
HCO3 BLDV-SCNC: 23 MMOL/L (ref 21–28)
HCO3 BLDV-SCNC: 23 MMOL/L (ref 21–28)
HCO3 BLDV-SCNC: 24 MMOL/L (ref 21–28)
HCO3 BLDV-SCNC: 25 MMOL/L (ref 21–28)
HCO3 BLDV-SCNC: 26 MMOL/L (ref 21–28)
HCO3 BLDV-SCNC: 27 MMOL/L (ref 21–28)
HCO3 BLDV-SCNC: 28 MMOL/L (ref 21–28)
HCT VFR BLD AUTO: 23.5 % (ref 40–53)
HCT VFR BLD AUTO: 23.8 % (ref 40–53)
HCT VFR BLD AUTO: 24.5 % (ref 40–53)
HCT VFR BLD AUTO: 24.5 % (ref 40–53)
HCT VFR BLD AUTO: 24.6 % (ref 40–53)
HCT VFR BLD AUTO: 24.7 % (ref 40–53)
HCT VFR BLD AUTO: 24.8 % (ref 40–53)
HCT VFR BLD AUTO: 24.8 % (ref 40–53)
HCT VFR BLD AUTO: 25.1 % (ref 40–53)
HCT VFR BLD AUTO: 25.3 % (ref 40–53)
HCT VFR BLD AUTO: 25.4 % (ref 40–53)
HCT VFR BLD AUTO: 25.7 % (ref 40–53)
HCT VFR BLD AUTO: 25.8 % (ref 40–53)
HCT VFR BLD AUTO: 25.9 % (ref 40–53)
HCT VFR BLD AUTO: 26 % (ref 40–53)
HCT VFR BLD AUTO: 26.2 % (ref 40–53)
HCT VFR BLD AUTO: 26.3 % (ref 40–53)
HCT VFR BLD AUTO: 26.5 % (ref 40–53)
HCT VFR BLD AUTO: 26.6 % (ref 40–53)
HCT VFR BLD AUTO: 26.6 % (ref 40–53)
HCT VFR BLD AUTO: 26.7 % (ref 40–53)
HCT VFR BLD AUTO: 26.8 % (ref 40–53)
HCT VFR BLD AUTO: 26.9 % (ref 40–53)
HCT VFR BLD AUTO: 27 % (ref 40–53)
HCT VFR BLD AUTO: 27.1 % (ref 40–53)
HCT VFR BLD AUTO: 27.2 % (ref 40–53)
HCT VFR BLD AUTO: 27.2 % (ref 40–53)
HCT VFR BLD AUTO: 27.3 % (ref 40–53)
HCT VFR BLD AUTO: 27.3 % (ref 40–53)
HCT VFR BLD AUTO: 27.4 % (ref 40–53)
HCT VFR BLD AUTO: 27.4 % (ref 40–53)
HCT VFR BLD AUTO: 27.5 % (ref 40–53)
HCT VFR BLD AUTO: 27.5 % (ref 40–53)
HCT VFR BLD AUTO: 27.6 % (ref 40–53)
HCT VFR BLD AUTO: 27.7 % (ref 40–53)
HCT VFR BLD AUTO: 27.8 % (ref 40–53)
HCT VFR BLD AUTO: 27.8 % (ref 40–53)
HCT VFR BLD AUTO: 27.9 % (ref 40–53)
HCT VFR BLD AUTO: 27.9 % (ref 40–53)
HCT VFR BLD AUTO: 28 % (ref 40–53)
HCT VFR BLD AUTO: 28.1 % (ref 40–53)
HCT VFR BLD AUTO: 28.2 % (ref 40–53)
HCT VFR BLD AUTO: 28.3 % (ref 40–53)
HCT VFR BLD AUTO: 28.4 % (ref 40–53)
HCT VFR BLD AUTO: 28.4 % (ref 40–53)
HCT VFR BLD AUTO: 28.5 % (ref 40–53)
HCT VFR BLD AUTO: 28.6 % (ref 40–53)
HCT VFR BLD AUTO: 28.7 % (ref 40–53)
HCT VFR BLD AUTO: 28.8 % (ref 40–53)
HCT VFR BLD AUTO: 28.8 % (ref 40–53)
HCT VFR BLD AUTO: 28.9 % (ref 40–53)
HCT VFR BLD AUTO: 29 % (ref 40–53)
HCT VFR BLD AUTO: 29.1 % (ref 40–53)
HCT VFR BLD AUTO: 29.2 % (ref 40–53)
HCT VFR BLD AUTO: 29.3 % (ref 40–53)
HCT VFR BLD AUTO: 29.3 % (ref 40–53)
HCT VFR BLD AUTO: 29.4 % (ref 40–53)
HCT VFR BLD AUTO: 29.4 % (ref 40–53)
HCT VFR BLD AUTO: 29.9 % (ref 40–53)
HCT VFR BLD AUTO: 29.9 % (ref 40–53)
HCT VFR BLD AUTO: 30.2 % (ref 40–53)
HCT VFR BLD AUTO: 30.4 % (ref 40–53)
HCT VFR BLD AUTO: 30.4 % (ref 40–53)
HCT VFR BLD AUTO: 30.5 % (ref 40–53)
HCT VFR BLD AUTO: 30.6 % (ref 40–53)
HCT VFR BLD AUTO: 30.6 % (ref 40–53)
HCT VFR BLD AUTO: 30.7 % (ref 40–53)
HCT VFR BLD AUTO: 30.8 % (ref 40–53)
HCT VFR BLD AUTO: 30.8 % (ref 40–53)
HCT VFR BLD AUTO: 30.9 % (ref 40–53)
HCT VFR BLD AUTO: 31 % (ref 40–53)
HCT VFR BLD AUTO: 31.1 % (ref 40–53)
HCT VFR BLD AUTO: 31.2 % (ref 40–53)
HCT VFR BLD AUTO: 31.4 % (ref 40–53)
HCT VFR BLD AUTO: 31.5 % (ref 40–53)
HCT VFR BLD AUTO: 31.5 % (ref 40–53)
HCT VFR BLD AUTO: 31.6 % (ref 40–53)
HCT VFR BLD AUTO: 31.7 % (ref 40–53)
HCT VFR BLD AUTO: 31.8 % (ref 40–53)
HCT VFR BLD AUTO: 32 % (ref 40–53)
HCT VFR BLD AUTO: 32 % (ref 40–53)
HCT VFR BLD AUTO: 32.1 % (ref 40–53)
HCT VFR BLD AUTO: 32.2 % (ref 40–53)
HCT VFR BLD AUTO: 32.2 % (ref 40–53)
HCT VFR BLD AUTO: 32.3 % (ref 40–53)
HCT VFR BLD AUTO: 32.6 % (ref 40–53)
HCT VFR BLD AUTO: 32.9 % (ref 40–53)
HCT VFR BLD AUTO: 33.1 % (ref 40–53)
HCT VFR BLD AUTO: 33.2 % (ref 40–53)
HCT VFR BLD AUTO: 33.2 % (ref 40–53)
HCT VFR BLD AUTO: 33.4 % (ref 40–53)
HCT VFR BLD AUTO: 33.4 % (ref 40–53)
HCT VFR BLD AUTO: 34 % (ref 40–53)
HCT VFR BLD AUTO: 34 % (ref 40–53)
HCT VFR BLD AUTO: 34.5 % (ref 40–53)
HCT VFR BLD AUTO: 34.5 % (ref 40–53)
HCT VFR BLD AUTO: 34.6 % (ref 40–53)
HCT VFR BLD AUTO: 35 % (ref 40–53)
HCT VFR BLD AUTO: 35.3 % (ref 40–53)
HCT VFR BLD AUTO: 35.5 % (ref 40–53)
HCT VFR BLD AUTO: 35.5 % (ref 40–53)
HCT VFR BLD AUTO: 35.7 % (ref 40–53)
HCT VFR BLD AUTO: 36.7 % (ref 40–53)
HCT VFR BLD AUTO: 39.8 % (ref 40–53)
HCT VFR BLD AUTO: 41.9 % (ref 40–53)
HCT VFR BLD AUTO: 43.2 % (ref 40–53)
HCT VFR BLD AUTO: 45 % (ref 40–53)
HCT VFR BLD AUTO: 46.1 % (ref 40–53)
HCT VFR BLD AUTO: 49.1 % (ref 40–53)
HCT VFR BLD AUTO: NORMAL % (ref 40–53)
HCT VFR BLD CALC: 43 %PCV (ref 40–53)
HCT VFR BLD CALC: 51 %PCV (ref 40–53)
HCT VFR BLD CALC: 56 %PCV (ref 40–53)
HDLC SERPL-MCNC: 8 MG/DL
HGB BLD CALC-MCNC: 14.6 G/DL (ref 13.3–17.7)
HGB BLD CALC-MCNC: 17.3 G/DL (ref 13.3–17.7)
HGB BLD CALC-MCNC: 19 G/DL (ref 13.3–17.7)
HGB BLD-MCNC: 10 G/DL (ref 13.3–17.7)
HGB BLD-MCNC: 10.1 G/DL (ref 13.3–17.7)
HGB BLD-MCNC: 10.2 G/DL (ref 13.3–17.7)
HGB BLD-MCNC: 10.3 G/DL (ref 13.3–17.7)
HGB BLD-MCNC: 10.4 G/DL (ref 13.3–17.7)
HGB BLD-MCNC: 10.5 G/DL (ref 13.3–17.7)
HGB BLD-MCNC: 10.6 G/DL (ref 13.3–17.7)
HGB BLD-MCNC: 10.6 G/DL (ref 13.3–17.7)
HGB BLD-MCNC: 10.7 G/DL (ref 13.3–17.7)
HGB BLD-MCNC: 10.7 G/DL (ref 13.3–17.7)
HGB BLD-MCNC: 10.8 G/DL (ref 13.3–17.7)
HGB BLD-MCNC: 10.9 G/DL (ref 13.3–17.7)
HGB BLD-MCNC: 11 G/DL (ref 13.3–17.7)
HGB BLD-MCNC: 11 G/DL (ref 13.3–17.7)
HGB BLD-MCNC: 11.1 G/DL (ref 13.3–17.7)
HGB BLD-MCNC: 11.2 G/DL (ref 13.3–17.7)
HGB BLD-MCNC: 11.4 G/DL (ref 13.3–17.7)
HGB BLD-MCNC: 11.5 G/DL (ref 13.3–17.7)
HGB BLD-MCNC: 11.9 G/DL (ref 13.3–17.7)
HGB BLD-MCNC: 12.1 G/DL (ref 13.3–17.7)
HGB BLD-MCNC: 12.4 G/DL (ref 13.3–17.7)
HGB BLD-MCNC: 12.9 G/DL (ref 13.3–17.7)
HGB BLD-MCNC: 13.8 G/DL (ref 13.3–17.7)
HGB BLD-MCNC: 14.3 G/DL (ref 13.3–17.7)
HGB BLD-MCNC: 14.9 G/DL (ref 13.3–17.7)
HGB BLD-MCNC: 14.9 G/DL (ref 13.3–17.7)
HGB BLD-MCNC: 15.6 G/DL (ref 13.3–17.7)
HGB BLD-MCNC: 16.3 G/DL (ref 13.3–17.7)
HGB BLD-MCNC: 6.9 G/DL (ref 13.3–17.7)
HGB BLD-MCNC: 7.4 G/DL (ref 13.3–17.7)
HGB BLD-MCNC: 7.4 G/DL (ref 13.3–17.7)
HGB BLD-MCNC: 7.6 G/DL (ref 13.3–17.7)
HGB BLD-MCNC: 7.7 G/DL (ref 13.3–17.7)
HGB BLD-MCNC: 7.9 G/DL (ref 13.3–17.7)
HGB BLD-MCNC: 8 G/DL (ref 13.3–17.7)
HGB BLD-MCNC: 8.1 G/DL (ref 13.3–17.7)
HGB BLD-MCNC: 8.2 G/DL (ref 13.3–17.7)
HGB BLD-MCNC: 8.3 G/DL (ref 13.3–17.7)
HGB BLD-MCNC: 8.4 G/DL (ref 13.3–17.7)
HGB BLD-MCNC: 8.4 G/DL (ref 13.3–17.7)
HGB BLD-MCNC: 8.5 G/DL (ref 13.3–17.7)
HGB BLD-MCNC: 8.6 G/DL (ref 13.3–17.7)
HGB BLD-MCNC: 8.7 G/DL (ref 13.3–17.7)
HGB BLD-MCNC: 8.8 G/DL (ref 13.3–17.7)
HGB BLD-MCNC: 8.9 G/DL (ref 13.3–17.7)
HGB BLD-MCNC: 9 G/DL (ref 13.3–17.7)
HGB BLD-MCNC: 9.1 G/DL (ref 13.3–17.7)
HGB BLD-MCNC: 9.2 G/DL (ref 13.3–17.7)
HGB BLD-MCNC: 9.3 G/DL (ref 13.3–17.7)
HGB BLD-MCNC: 9.4 G/DL (ref 13.3–17.7)
HGB BLD-MCNC: 9.5 G/DL (ref 13.3–17.7)
HGB BLD-MCNC: 9.6 G/DL (ref 13.3–17.7)
HGB BLD-MCNC: 9.6 G/DL (ref 13.3–17.7)
HGB BLD-MCNC: 9.7 G/DL (ref 13.3–17.7)
HGB BLD-MCNC: 9.8 G/DL (ref 13.3–17.7)
HGB BLD-MCNC: 9.9 G/DL (ref 13.3–17.7)
HGB BLD-MCNC: 9.9 G/DL (ref 13.3–17.7)
HGB BLD-MCNC: NORMAL G/DL (ref 13.3–17.7)
HGB FREE PLAS-MCNC: 100 MG/DL
HGB FREE PLAS-MCNC: 130 MG/DL
HGB FREE PLAS-MCNC: 160 MG/DL
HGB FREE PLAS-MCNC: 30 MG/DL
HGB FREE PLAS-MCNC: 40 MG/DL
HGB FREE PLAS-MCNC: 40 MG/DL
HGB FREE PLAS-MCNC: 50 MG/DL
HGB FREE PLAS-MCNC: 60 MG/DL
HGB FREE PLAS-MCNC: 70 MG/DL
HGB FREE PLAS-MCNC: 80 MG/DL
HGB FREE PLAS-MCNC: 80 MG/DL
HGB FREE PLAS-MCNC: <30 MG/DL
HGB FREE PLAS-MCNC: <30 MG/DL
HGB FREE PLAS-MCNC: NORMAL MG/DL
HGB UR QL STRIP: ABNORMAL
HMPV RNA SPEC QL NAA+PROBE: NEGATIVE
HPIV1 RNA SPEC QL NAA+PROBE: NEGATIVE
HPIV2 RNA SPEC QL NAA+PROBE: NEGATIVE
HPIV3 RNA SPEC QL NAA+PROBE: NEGATIVE
HYPOCHROMIA BLD QL: PRESENT
IMM GRANULOCYTES # BLD: 0.1 10E9/L (ref 0–0.4)
IMM GRANULOCYTES # BLD: 0.2 10E9/L (ref 0–0.4)
IMM GRANULOCYTES # BLD: 0.3 10E9/L (ref 0–0.4)
IMM GRANULOCYTES # BLD: 0.4 10E9/L (ref 0–0.4)
IMM GRANULOCYTES # BLD: 0.5 10E9/L (ref 0–0.4)
IMM GRANULOCYTES # BLD: 0.6 10E9/L (ref 0–0.4)
IMM GRANULOCYTES # BLD: 0.7 10E9/L (ref 0–0.4)
IMM GRANULOCYTES # BLD: 0.8 10E9/L (ref 0–0.4)
IMM GRANULOCYTES # BLD: 1 10E9/L (ref 0–0.4)
IMM GRANULOCYTES # BLD: 1.6 10E9/L (ref 0–0.4)
IMM GRANULOCYTES NFR BLD: 0.8 %
IMM GRANULOCYTES NFR BLD: 0.8 %
IMM GRANULOCYTES NFR BLD: 0.9 %
IMM GRANULOCYTES NFR BLD: 1.1 %
IMM GRANULOCYTES NFR BLD: 1.1 %
IMM GRANULOCYTES NFR BLD: 1.4 %
IMM GRANULOCYTES NFR BLD: 1.5 %
IMM GRANULOCYTES NFR BLD: 1.6 %
IMM GRANULOCYTES NFR BLD: 1.7 %
IMM GRANULOCYTES NFR BLD: 1.7 %
IMM GRANULOCYTES NFR BLD: 1.8 %
IMM GRANULOCYTES NFR BLD: 1.9 %
IMM GRANULOCYTES NFR BLD: 2 %
IMM GRANULOCYTES NFR BLD: 2.1 %
IMM GRANULOCYTES NFR BLD: 2.3 %
IMM GRANULOCYTES NFR BLD: 2.6 %
IMM GRANULOCYTES NFR BLD: 2.7 %
IMM GRANULOCYTES NFR BLD: 2.8 %
IMM GRANULOCYTES NFR BLD: 3.3 %
IMM GRANULOCYTES NFR BLD: 3.6 %
IMM GRANULOCYTES NFR BLD: 4.5 %
IMM PLASMA CELLS NFR BLD: 0.9 %
INR PPP: 1.09 (ref 0.86–1.14)
INR PPP: 1.1 (ref 0.86–1.14)
INR PPP: 1.11 (ref 0.86–1.14)
INR PPP: 1.12 (ref 0.86–1.14)
INR PPP: 1.13 (ref 0.86–1.14)
INR PPP: 1.14 (ref 0.86–1.14)
INR PPP: 1.15 (ref 0.86–1.14)
INR PPP: 1.16 (ref 0.86–1.14)
INR PPP: 1.17 (ref 0.86–1.14)
INR PPP: 1.18 (ref 0.86–1.14)
INR PPP: 1.18 (ref 0.86–1.14)
INR PPP: 1.19 (ref 0.86–1.14)
INR PPP: 1.2 (ref 0.86–1.14)
INR PPP: 1.21 (ref 0.86–1.14)
INR PPP: 1.22 (ref 0.86–1.14)
INR PPP: 1.23 (ref 0.86–1.14)
INR PPP: 1.24 (ref 0.86–1.14)
INR PPP: 1.25 (ref 0.86–1.14)
INR PPP: 1.26 (ref 0.86–1.14)
INR PPP: 1.28 (ref 0.86–1.14)
INR PPP: 1.28 (ref 0.86–1.14)
INR PPP: 1.29 (ref 0.86–1.14)
INR PPP: 1.29 (ref 0.86–1.14)
INR PPP: 1.3 (ref 0.86–1.14)
INR PPP: 1.33 (ref 0.86–1.14)
INR PPP: 1.34 (ref 0.86–1.14)
INR PPP: 1.35 (ref 0.86–1.14)
INR PPP: 1.36 (ref 0.86–1.14)
INR PPP: 1.37 (ref 0.86–1.14)
INR PPP: 1.39 (ref 0.86–1.14)
INR PPP: 1.43 (ref 0.86–1.14)
INR PPP: 1.43 (ref 0.86–1.14)
INR PPP: 1.44 (ref 0.86–1.14)
INR PPP: 1.44 (ref 0.86–1.14)
INR PPP: 1.47 (ref 0.86–1.14)
INR PPP: 1.47 (ref 0.86–1.14)
INR PPP: 1.48 (ref 0.86–1.14)
INR PPP: 1.51 (ref 0.86–1.14)
INR PPP: 1.53 (ref 0.86–1.14)
INR PPP: 1.54 (ref 0.86–1.14)
INR PPP: 1.56 (ref 0.86–1.14)
INR PPP: 1.59 (ref 0.86–1.14)
INR PPP: 1.6 (ref 0.86–1.14)
INR PPP: 1.67 (ref 0.86–1.14)
INR PPP: 1.67 (ref 0.86–1.14)
INR PPP: 1.69 (ref 0.86–1.14)
INR PPP: 1.69 (ref 0.86–1.14)
INR PPP: 1.72 (ref 0.86–1.14)
INR PPP: 1.77 (ref 0.86–1.14)
INR PPP: 1.83 (ref 0.86–1.14)
INR PPP: 1.86 (ref 0.86–1.14)
INR PPP: 1.9 (ref 0.86–1.14)
INR PPP: 1.92 (ref 0.86–1.14)
INR PPP: 1.94 (ref 0.86–1.14)
INR PPP: 1.97 (ref 0.86–1.14)
INR PPP: 1.98 (ref 0.86–1.14)
INR PPP: 2.02 (ref 0.86–1.14)
INR PPP: 2.02 (ref 0.86–1.14)
INR PPP: 2.05 (ref 0.86–1.14)
INR PPP: 2.05 (ref 0.86–1.14)
INR PPP: 2.06 (ref 0.86–1.14)
INR PPP: 2.1 (ref 0.86–1.14)
INR PPP: 2.13 (ref 0.86–1.14)
INR PPP: 2.15 (ref 0.86–1.14)
INR PPP: 2.16 (ref 0.86–1.14)
INR PPP: 2.33 (ref 0.86–1.14)
INR PPP: 2.35 (ref 0.86–1.14)
INR PPP: 2.57 (ref 0.86–1.14)
INTERPRETATION ECG - MUSE: NORMAL
K TIME TO SPEC CLOT STRENGTH: 2 MINUTE (ref 1–3)
KCT BLD-ACNC: 135 SEC (ref 75–150)
KCT BLD-ACNC: 140 SEC (ref 75–150)
KCT BLD-ACNC: 144 SEC (ref 75–150)
KCT BLD-ACNC: 148 SEC (ref 75–150)
KCT BLD-ACNC: 152 SEC (ref 75–150)
KCT BLD-ACNC: 156 SEC (ref 75–150)
KCT BLD-ACNC: 160 SEC (ref 75–150)
KCT BLD-ACNC: 164 SEC (ref 75–150)
KCT BLD-ACNC: 168 SEC (ref 75–150)
KCT BLD-ACNC: 172 SEC (ref 75–150)
KCT BLD-ACNC: 176 SEC (ref 75–150)
KCT BLD-ACNC: 180 SEC (ref 75–150)
KCT BLD-ACNC: 184 SEC (ref 75–150)
KCT BLD-ACNC: 188 SEC (ref 75–150)
KCT BLD-ACNC: 192 SEC (ref 75–150)
KCT BLD-ACNC: 196 SEC (ref 75–150)
KCT BLD-ACNC: 197 SEC (ref 75–150)
KCT BLD-ACNC: 200 SEC (ref 75–150)
KCT BLD-ACNC: 204 SEC (ref 75–150)
KCT BLD-ACNC: 205 SEC (ref 75–150)
KCT BLD-ACNC: 209 SEC (ref 75–150)
KCT BLD-ACNC: 209 SEC (ref 75–150)
KCT BLD-ACNC: 213 SEC (ref 75–150)
KCT BLD-ACNC: 217 SEC (ref 75–150)
KCT BLD-ACNC: 221 SEC (ref 75–150)
KCT BLD-ACNC: 225 SEC (ref 75–150)
KCT BLD-ACNC: 233 SEC (ref 75–150)
KCT BLD-ACNC: 245 SEC (ref 75–150)
KCT BLD-ACNC: 249 SEC (ref 75–150)
KCT BLD-ACNC: 253 SEC (ref 75–150)
KCT BLD-ACNC: 261 SEC (ref 75–150)
KCT BLD-ACNC: 265 SEC (ref 75–150)
KCT BLD-ACNC: 270 SEC (ref 75–150)
KCT BLD-ACNC: 274 SEC (ref 75–150)
KCT BLD-ACNC: 294 SEC (ref 75–150)
KETONES UR STRIP-MCNC: 5 MG/DL
LACTATE BLD-SCNC: 1.2 MMOL/L (ref 0.7–2)
LACTATE BLD-SCNC: 1.3 MMOL/L (ref 0.7–2)
LACTATE BLD-SCNC: 1.4 MMOL/L (ref 0.7–2)
LACTATE BLD-SCNC: 1.5 MMOL/L (ref 0.7–2)
LACTATE BLD-SCNC: 1.6 MMOL/L (ref 0.7–2)
LACTATE BLD-SCNC: 1.7 MMOL/L (ref 0.7–2)
LACTATE BLD-SCNC: 1.8 MMOL/L (ref 0.7–2)
LACTATE BLD-SCNC: 1.9 MMOL/L (ref 0.7–2)
LACTATE BLD-SCNC: 10.3 MMOL/L (ref 0.7–2.1)
LACTATE BLD-SCNC: 11 MMOL/L (ref 0.7–2)
LACTATE BLD-SCNC: 13.2 MMOL/L (ref 0.7–2.1)
LACTATE BLD-SCNC: 13.5 MMOL/L (ref 0.7–2)
LACTATE BLD-SCNC: 13.8 MMOL/L (ref 0.7–2)
LACTATE BLD-SCNC: 14.4 MMOL/L (ref 0.7–2.1)
LACTATE BLD-SCNC: 2 MMOL/L (ref 0.7–2)
LACTATE BLD-SCNC: 2.1 MMOL/L (ref 0.7–2)
LACTATE BLD-SCNC: 2.2 MMOL/L (ref 0.7–2)
LACTATE BLD-SCNC: 2.3 MMOL/L (ref 0.7–2)
LACTATE BLD-SCNC: 2.4 MMOL/L (ref 0.7–2)
LACTATE BLD-SCNC: 2.5 MMOL/L (ref 0.7–2)
LACTATE BLD-SCNC: 2.6 MMOL/L (ref 0.7–2)
LACTATE BLD-SCNC: 2.7 MMOL/L (ref 0.7–2)
LACTATE BLD-SCNC: 2.8 MMOL/L (ref 0.7–2)
LACTATE BLD-SCNC: 2.9 MMOL/L (ref 0.7–2)
LACTATE BLD-SCNC: 2.9 MMOL/L (ref 0.7–2)
LACTATE BLD-SCNC: 3 MMOL/L (ref 0.7–2)
LACTATE BLD-SCNC: 3.1 MMOL/L (ref 0.7–2)
LACTATE BLD-SCNC: 3.2 MMOL/L (ref 0.7–2)
LACTATE BLD-SCNC: 3.3 MMOL/L (ref 0.7–2)
LACTATE BLD-SCNC: 3.4 MMOL/L (ref 0.7–2)
LACTATE BLD-SCNC: 3.6 MMOL/L (ref 0.7–2)
LACTATE BLD-SCNC: 3.7 MMOL/L (ref 0.7–2)
LACTATE BLD-SCNC: 3.8 MMOL/L (ref 0.7–2)
LACTATE BLD-SCNC: 3.9 MMOL/L (ref 0.7–2)
LACTATE BLD-SCNC: 3.9 MMOL/L (ref 0.7–2)
LACTATE BLD-SCNC: 4 MMOL/L (ref 0.7–2)
LACTATE BLD-SCNC: 4.1 MMOL/L (ref 0.7–2)
LACTATE BLD-SCNC: 4.2 MMOL/L (ref 0.7–2)
LACTATE BLD-SCNC: 4.3 MMOL/L (ref 0.7–2)
LACTATE BLD-SCNC: 4.5 MMOL/L (ref 0.7–2)
LACTATE BLD-SCNC: 4.7 MMOL/L (ref 0.7–2)
LACTATE BLD-SCNC: 4.8 MMOL/L (ref 0.7–2)
LACTATE BLD-SCNC: 4.9 MMOL/L (ref 0.7–2)
LACTATE BLD-SCNC: 5 MMOL/L (ref 0.7–2)
LACTATE BLD-SCNC: 5.1 MMOL/L (ref 0.7–2)
LACTATE BLD-SCNC: 5.2 MMOL/L (ref 0.7–2)
LACTATE BLD-SCNC: 5.3 MMOL/L (ref 0.7–2)
LACTATE BLD-SCNC: 5.4 MMOL/L (ref 0.7–2)
LACTATE BLD-SCNC: 5.4 MMOL/L (ref 0.7–2)
LACTATE BLD-SCNC: 5.5 MMOL/L (ref 0.7–2)
LACTATE BLD-SCNC: 5.5 MMOL/L (ref 0.7–2)
LACTATE BLD-SCNC: 5.6 MMOL/L (ref 0.7–2)
LACTATE BLD-SCNC: 5.7 MMOL/L (ref 0.7–2)
LACTATE BLD-SCNC: 5.8 MMOL/L (ref 0.7–2)
LACTATE BLD-SCNC: 5.9 MMOL/L (ref 0.7–2)
LACTATE BLD-SCNC: 6 MMOL/L (ref 0.7–2)
LACTATE BLD-SCNC: 6.2 MMOL/L (ref 0.7–2)
LACTATE BLD-SCNC: 6.3 MMOL/L (ref 0.7–2)
LACTATE BLD-SCNC: 6.4 MMOL/L (ref 0.7–2)
LACTATE BLD-SCNC: 6.5 MMOL/L (ref 0.7–2)
LACTATE BLD-SCNC: 6.6 MMOL/L (ref 0.7–2)
LACTATE BLD-SCNC: 6.7 MMOL/L (ref 0.7–2)
LACTATE BLD-SCNC: 6.7 MMOL/L (ref 0.7–2)
LACTATE BLD-SCNC: 6.9 MMOL/L (ref 0.7–2)
LACTATE BLD-SCNC: 7 MMOL/L (ref 0.7–2)
LACTATE BLD-SCNC: 7 MMOL/L (ref 0.7–2)
LACTATE BLD-SCNC: 7.1 MMOL/L (ref 0.7–2)
LACTATE BLD-SCNC: 7.2 MMOL/L (ref 0.7–2)
LACTATE BLD-SCNC: 7.3 MMOL/L (ref 0.7–2)
LACTATE BLD-SCNC: 7.5 MMOL/L (ref 0.7–2)
LACTATE BLD-SCNC: 7.5 MMOL/L (ref 0.7–2)
LACTATE BLD-SCNC: 7.6 MMOL/L (ref 0.7–2)
LACTATE BLD-SCNC: 7.8 MMOL/L (ref 0.7–2)
LACTATE BLD-SCNC: 7.9 MMOL/L (ref 0.7–2)
LACTATE BLD-SCNC: 8 MMOL/L (ref 0.7–2)
LACTATE BLD-SCNC: 8 MMOL/L (ref 0.7–2)
LACTATE BLD-SCNC: 8.3 MMOL/L (ref 0.7–2)
LACTATE BLD-SCNC: 8.3 MMOL/L (ref 0.7–2)
LACTATE BLD-SCNC: 8.4 MMOL/L (ref 0.7–2)
LACTATE BLD-SCNC: 8.7 MMOL/L (ref 0.7–2)
LACTATE BLD-SCNC: 8.7 MMOL/L (ref 0.7–2)
LACTATE BLD-SCNC: 8.7 MMOL/L (ref 0.7–2.1)
LACTATE BLD-SCNC: 8.9 MMOL/L (ref 0.7–2)
LACTATE BLD-SCNC: 9.4 MMOL/L (ref 0.7–2)
LACTATE BLD-SCNC: NORMAL MMOL/L (ref 0.7–2)
LDH SERPL L TO P-CCNC: 1024 U/L (ref 85–227)
LDH SERPL L TO P-CCNC: 1044 U/L (ref 85–227)
LDH SERPL L TO P-CCNC: 1067 U/L (ref 85–227)
LDH SERPL L TO P-CCNC: 1068 U/L (ref 85–227)
LDH SERPL L TO P-CCNC: 1106 U/L (ref 85–227)
LDH SERPL L TO P-CCNC: 1140 U/L (ref 85–227)
LDH SERPL L TO P-CCNC: 1175 U/L (ref 85–227)
LDH SERPL L TO P-CCNC: 1222 U/L (ref 85–227)
LDH SERPL L TO P-CCNC: 1348 U/L (ref 85–227)
LDH SERPL L TO P-CCNC: 1439 U/L (ref 85–227)
LDH SERPL L TO P-CCNC: 1534 U/L (ref 85–227)
LDH SERPL L TO P-CCNC: 1539 U/L (ref 85–227)
LDH SERPL L TO P-CCNC: 1547 U/L (ref 85–227)
LDH SERPL L TO P-CCNC: 1620 U/L (ref 85–227)
LDH SERPL L TO P-CCNC: 1698 U/L (ref 85–227)
LDH SERPL L TO P-CCNC: 1788 U/L (ref 85–227)
LDH SERPL L TO P-CCNC: 1844 U/L (ref 85–227)
LDH SERPL L TO P-CCNC: 1938 U/L (ref 85–227)
LDH SERPL L TO P-CCNC: 2131 U/L (ref 85–227)
LDH SERPL L TO P-CCNC: 2463 U/L (ref 85–227)
LDH SERPL L TO P-CCNC: 2484 U/L (ref 85–227)
LDH SERPL L TO P-CCNC: 2606 U/L (ref 85–227)
LDH SERPL L TO P-CCNC: 2656 U/L (ref 85–227)
LDH SERPL L TO P-CCNC: 2910 U/L (ref 85–227)
LDH SERPL L TO P-CCNC: 6194 U/L (ref 85–227)
LDH SERPL L TO P-CCNC: 662 U/L (ref 85–227)
LDH SERPL L TO P-CCNC: 678 U/L (ref 85–227)
LDH SERPL L TO P-CCNC: 698 U/L (ref 85–227)
LDH SERPL L TO P-CCNC: 707 U/L (ref 85–227)
LDH SERPL L TO P-CCNC: 745 U/L (ref 85–227)
LDH SERPL L TO P-CCNC: 777 U/L (ref 85–227)
LDH SERPL L TO P-CCNC: 795 U/L (ref 85–227)
LDH SERPL L TO P-CCNC: 837 U/L (ref 85–227)
LDH SERPL L TO P-CCNC: 856 U/L (ref 85–227)
LDH SERPL L TO P-CCNC: 924 U/L (ref 85–227)
LDH SERPL L TO P-CCNC: 996 U/L (ref 85–227)
LDH SERPL L TO P-CCNC: >1000 U/L (ref 85–227)
LDH SERPL L TO P-CCNC: NORMAL U/L (ref 85–227)
LDLC SERPL CALC-MCNC: ABNORMAL MG/DL
LEUKOCYTE ESTERASE UR QL STRIP: NEGATIVE
LIPASE SERPL-CCNC: 476 U/L (ref 73–393)
LMWH PPP CHRO-ACNC: 0.11 IU/ML
LMWH PPP CHRO-ACNC: 0.11 IU/ML
LMWH PPP CHRO-ACNC: 0.13 IU/ML
LMWH PPP CHRO-ACNC: 0.14 IU/ML
LMWH PPP CHRO-ACNC: 0.16 IU/ML
LMWH PPP CHRO-ACNC: 0.17 IU/ML
LMWH PPP CHRO-ACNC: 0.18 IU/ML
LMWH PPP CHRO-ACNC: 0.18 IU/ML
LMWH PPP CHRO-ACNC: 0.2 IU/ML
LMWH PPP CHRO-ACNC: 0.21 IU/ML
LMWH PPP CHRO-ACNC: 0.24 IU/ML
LMWH PPP CHRO-ACNC: 0.26 IU/ML
LMWH PPP CHRO-ACNC: 0.27 IU/ML
LMWH PPP CHRO-ACNC: 0.27 IU/ML
LMWH PPP CHRO-ACNC: 0.28 IU/ML
LMWH PPP CHRO-ACNC: 0.31 IU/ML
LMWH PPP CHRO-ACNC: 0.31 IU/ML
LMWH PPP CHRO-ACNC: 0.32 IU/ML
LMWH PPP CHRO-ACNC: 0.33 IU/ML
LMWH PPP CHRO-ACNC: 0.33 IU/ML
LMWH PPP CHRO-ACNC: 0.34 IU/ML
LMWH PPP CHRO-ACNC: 0.35 IU/ML
LMWH PPP CHRO-ACNC: 0.35 IU/ML
LMWH PPP CHRO-ACNC: 0.36 IU/ML
LMWH PPP CHRO-ACNC: 0.37 IU/ML
LMWH PPP CHRO-ACNC: 0.4 IU/ML
LMWH PPP CHRO-ACNC: 0.41 IU/ML
LMWH PPP CHRO-ACNC: 0.42 IU/ML
LMWH PPP CHRO-ACNC: 0.43 IU/ML
LMWH PPP CHRO-ACNC: 0.45 IU/ML
LMWH PPP CHRO-ACNC: 0.45 IU/ML
LMWH PPP CHRO-ACNC: 0.46 IU/ML
LMWH PPP CHRO-ACNC: 0.47 IU/ML
LMWH PPP CHRO-ACNC: 0.52 IU/ML
LMWH PPP CHRO-ACNC: 0.55 IU/ML
LMWH PPP CHRO-ACNC: 0.58 IU/ML
LMWH PPP CHRO-ACNC: 0.59 IU/ML
LMWH PPP CHRO-ACNC: 0.62 IU/ML
LMWH PPP CHRO-ACNC: 0.65 IU/ML
LMWH PPP CHRO-ACNC: 0.7 IU/ML
LMWH PPP CHRO-ACNC: 0.73 IU/ML
LMWH PPP CHRO-ACNC: 0.73 IU/ML
LMWH PPP CHRO-ACNC: 0.84 IU/ML
LMWH PPP CHRO-ACNC: 1.44 IU/ML
LMWH PPP CHRO-ACNC: <0.1 IU/ML
LMWH PPP CHRO-ACNC: NORMAL IU/ML
LORAZEPAM BLD CFM-MCNC: NEGATIVE NG/ML
LORAZEPAM BLD CFM-MCNC: NEGATIVE NG/ML
LY30 LYSIS AT 30 MINUTES: 0 % (ref 0–8)
LY60 LYSIS AT 60 MINUTES: 0.1 % (ref 0–15)
LYMPHOCYTES # BLD AUTO: 0 10E9/L (ref 0.8–5.3)
LYMPHOCYTES # BLD AUTO: 0.2 10E9/L (ref 0.8–5.3)
LYMPHOCYTES # BLD AUTO: 0.3 10E9/L (ref 0.8–5.3)
LYMPHOCYTES # BLD AUTO: 0.4 10E9/L (ref 0.8–5.3)
LYMPHOCYTES # BLD AUTO: 0.5 10E9/L (ref 0.8–5.3)
LYMPHOCYTES # BLD AUTO: 0.6 10E9/L (ref 0.8–5.3)
LYMPHOCYTES # BLD AUTO: 0.7 10E9/L (ref 0.8–5.3)
LYMPHOCYTES # BLD AUTO: 0.8 10E9/L (ref 0.8–5.3)
LYMPHOCYTES # BLD AUTO: 0.9 10E9/L (ref 0.8–5.3)
LYMPHOCYTES # BLD AUTO: 1 10E9/L (ref 0.8–5.3)
LYMPHOCYTES # BLD AUTO: 1.1 10E9/L (ref 0.8–5.3)
LYMPHOCYTES # BLD AUTO: 1.2 10E9/L (ref 0.8–5.3)
LYMPHOCYTES # BLD AUTO: 1.3 10E9/L (ref 0.8–5.3)
LYMPHOCYTES # BLD AUTO: 1.4 10E9/L (ref 0.8–5.3)
LYMPHOCYTES # BLD AUTO: 1.5 10E9/L (ref 0.8–5.3)
LYMPHOCYTES # BLD AUTO: 1.6 10E9/L (ref 0.8–5.3)
LYMPHOCYTES # BLD AUTO: 1.8 10E9/L (ref 0.8–5.3)
LYMPHOCYTES # BLD AUTO: 1.8 10E9/L (ref 0.8–5.3)
LYMPHOCYTES # BLD AUTO: 1.9 10E9/L (ref 0.8–5.3)
LYMPHOCYTES # BLD AUTO: 1.9 10E9/L (ref 0.8–5.3)
LYMPHOCYTES # BLD AUTO: 2 10E9/L (ref 0.8–5.3)
LYMPHOCYTES # BLD AUTO: 2 10E9/L (ref 0.8–5.3)
LYMPHOCYTES # BLD AUTO: 2.1 10E9/L (ref 0.8–5.3)
LYMPHOCYTES # BLD AUTO: 2.2 10E9/L (ref 0.8–5.3)
LYMPHOCYTES # BLD AUTO: 2.5 10E9/L (ref 0.8–5.3)
LYMPHOCYTES # BLD AUTO: 2.8 10E9/L (ref 0.8–5.3)
LYMPHOCYTES NFR BLD AUTO: 0 %
LYMPHOCYTES NFR BLD AUTO: 0.8 %
LYMPHOCYTES NFR BLD AUTO: 0.9 %
LYMPHOCYTES NFR BLD AUTO: 1 %
LYMPHOCYTES NFR BLD AUTO: 1.1 %
LYMPHOCYTES NFR BLD AUTO: 1.2 %
LYMPHOCYTES NFR BLD AUTO: 1.3 %
LYMPHOCYTES NFR BLD AUTO: 1.4 %
LYMPHOCYTES NFR BLD AUTO: 1.5 %
LYMPHOCYTES NFR BLD AUTO: 1.6 %
LYMPHOCYTES NFR BLD AUTO: 1.7 %
LYMPHOCYTES NFR BLD AUTO: 1.8 %
LYMPHOCYTES NFR BLD AUTO: 1.9 %
LYMPHOCYTES NFR BLD AUTO: 1.9 %
LYMPHOCYTES NFR BLD AUTO: 10.1 %
LYMPHOCYTES NFR BLD AUTO: 10.7 %
LYMPHOCYTES NFR BLD AUTO: 11 %
LYMPHOCYTES NFR BLD AUTO: 11 %
LYMPHOCYTES NFR BLD AUTO: 11.1 %
LYMPHOCYTES NFR BLD AUTO: 15 %
LYMPHOCYTES NFR BLD AUTO: 2 %
LYMPHOCYTES NFR BLD AUTO: 2 %
LYMPHOCYTES NFR BLD AUTO: 2.1 %
LYMPHOCYTES NFR BLD AUTO: 2.1 %
LYMPHOCYTES NFR BLD AUTO: 2.5 %
LYMPHOCYTES NFR BLD AUTO: 2.6 %
LYMPHOCYTES NFR BLD AUTO: 2.7 %
LYMPHOCYTES NFR BLD AUTO: 2.8 %
LYMPHOCYTES NFR BLD AUTO: 2.8 %
LYMPHOCYTES NFR BLD AUTO: 2.9 %
LYMPHOCYTES NFR BLD AUTO: 3 %
LYMPHOCYTES NFR BLD AUTO: 3 %
LYMPHOCYTES NFR BLD AUTO: 3.3 %
LYMPHOCYTES NFR BLD AUTO: 3.3 %
LYMPHOCYTES NFR BLD AUTO: 3.4 %
LYMPHOCYTES NFR BLD AUTO: 3.4 %
LYMPHOCYTES NFR BLD AUTO: 3.5 %
LYMPHOCYTES NFR BLD AUTO: 3.6 %
LYMPHOCYTES NFR BLD AUTO: 3.9 %
LYMPHOCYTES NFR BLD AUTO: 4.1 %
LYMPHOCYTES NFR BLD AUTO: 4.3 %
LYMPHOCYTES NFR BLD AUTO: 4.6 %
LYMPHOCYTES NFR BLD AUTO: 4.6 %
LYMPHOCYTES NFR BLD AUTO: 4.9 %
LYMPHOCYTES NFR BLD AUTO: 4.9 %
LYMPHOCYTES NFR BLD AUTO: 5 %
LYMPHOCYTES NFR BLD AUTO: 5 %
LYMPHOCYTES NFR BLD AUTO: 5.2 %
LYMPHOCYTES NFR BLD AUTO: 5.2 %
LYMPHOCYTES NFR BLD AUTO: 5.3 %
LYMPHOCYTES NFR BLD AUTO: 5.4 %
LYMPHOCYTES NFR BLD AUTO: 5.5 %
LYMPHOCYTES NFR BLD AUTO: 5.6 %
LYMPHOCYTES NFR BLD AUTO: 5.6 %
LYMPHOCYTES NFR BLD AUTO: 5.9 %
LYMPHOCYTES NFR BLD AUTO: 5.9 %
LYMPHOCYTES NFR BLD AUTO: 6 %
LYMPHOCYTES NFR BLD AUTO: 6.1 %
LYMPHOCYTES NFR BLD AUTO: 6.3 %
LYMPHOCYTES NFR BLD AUTO: 6.3 %
LYMPHOCYTES NFR BLD AUTO: 6.4 %
LYMPHOCYTES NFR BLD AUTO: 6.4 %
LYMPHOCYTES NFR BLD AUTO: 6.5 %
LYMPHOCYTES NFR BLD AUTO: 7 %
LYMPHOCYTES NFR BLD AUTO: 8 %
LYMPHOCYTES NFR BLD AUTO: 8 %
LYMPHOCYTES NFR BLD AUTO: 8.2 %
LYMPHOCYTES NFR BLD AUTO: 8.4 %
LYMPHOCYTES NFR BLD AUTO: 8.8 %
LYMPHOCYTES NFR BLD AUTO: 8.9 %
LYMPHOCYTES NFR BLD AUTO: 9 %
LYMPHOCYTES NFR BLD AUTO: 9 %
LYMPHOCYTES NFR BLD AUTO: 9.7 %
Lab: ABNORMAL
Lab: NORMAL
MA MAXIMUM CLOT STRENGTH: 59.7 MM (ref 50–70)
MACROCYTES BLD QL SMEAR: PRESENT
MAGNESIUM SERPL-MCNC: 1.8 MG/DL (ref 1.6–2.3)
MAGNESIUM SERPL-MCNC: 1.9 MG/DL (ref 1.6–2.3)
MAGNESIUM SERPL-MCNC: 1.9 MG/DL (ref 1.6–2.3)
MAGNESIUM SERPL-MCNC: 2 MG/DL (ref 1.6–2.3)
MAGNESIUM SERPL-MCNC: 2.1 MG/DL (ref 1.6–2.3)
MAGNESIUM SERPL-MCNC: 2.2 MG/DL (ref 1.6–2.3)
MAGNESIUM SERPL-MCNC: 2.3 MG/DL (ref 1.6–2.3)
MAGNESIUM SERPL-MCNC: 2.4 MG/DL (ref 1.6–2.3)
MAGNESIUM SERPL-MCNC: 2.5 MG/DL (ref 1.6–2.3)
MAGNESIUM SERPL-MCNC: 2.6 MG/DL (ref 1.6–2.3)
MAGNESIUM SERPL-MCNC: 2.7 MG/DL (ref 1.6–2.3)
MAGNESIUM SERPL-MCNC: 2.8 MG/DL (ref 1.6–2.3)
MAGNESIUM SERPL-MCNC: 2.9 MG/DL (ref 1.6–2.3)
MAGNESIUM SERPL-MCNC: 3 MG/DL (ref 1.6–2.3)
MAGNESIUM SERPL-MCNC: 3.1 MG/DL (ref 1.6–2.3)
MCH RBC QN AUTO: 29.2 PG (ref 26.5–33)
MCH RBC QN AUTO: 29.3 PG (ref 26.5–33)
MCH RBC QN AUTO: 29.4 PG (ref 26.5–33)
MCH RBC QN AUTO: 29.4 PG (ref 26.5–33)
MCH RBC QN AUTO: 29.5 PG (ref 26.5–33)
MCH RBC QN AUTO: 29.5 PG (ref 26.5–33)
MCH RBC QN AUTO: 29.6 PG (ref 26.5–33)
MCH RBC QN AUTO: 29.6 PG (ref 26.5–33)
MCH RBC QN AUTO: 29.7 PG (ref 26.5–33)
MCH RBC QN AUTO: 29.8 PG (ref 26.5–33)
MCH RBC QN AUTO: 29.8 PG (ref 26.5–33)
MCH RBC QN AUTO: 29.9 PG (ref 26.5–33)
MCH RBC QN AUTO: 30 PG (ref 26.5–33)
MCH RBC QN AUTO: 30.1 PG (ref 26.5–33)
MCH RBC QN AUTO: 30.2 PG (ref 26.5–33)
MCH RBC QN AUTO: 30.3 PG (ref 26.5–33)
MCH RBC QN AUTO: 30.4 PG (ref 26.5–33)
MCH RBC QN AUTO: 30.5 PG (ref 26.5–33)
MCH RBC QN AUTO: 30.6 PG (ref 26.5–33)
MCH RBC QN AUTO: 30.7 PG (ref 26.5–33)
MCH RBC QN AUTO: 30.8 PG (ref 26.5–33)
MCH RBC QN AUTO: 30.9 PG (ref 26.5–33)
MCH RBC QN AUTO: 31 PG (ref 26.5–33)
MCH RBC QN AUTO: 31.1 PG (ref 26.5–33)
MCH RBC QN AUTO: 31.1 PG (ref 26.5–33)
MCH RBC QN AUTO: 31.2 PG (ref 26.5–33)
MCH RBC QN AUTO: 31.2 PG (ref 26.5–33)
MCH RBC QN AUTO: 31.3 PG (ref 26.5–33)
MCH RBC QN AUTO: 31.4 PG (ref 26.5–33)
MCH RBC QN AUTO: 31.5 PG (ref 26.5–33)
MCH RBC QN AUTO: 31.6 PG (ref 26.5–33)
MCH RBC QN AUTO: NORMAL PG (ref 26.5–33)
MCHC RBC AUTO-ENTMCNC: 28 G/DL (ref 31.5–36.5)
MCHC RBC AUTO-ENTMCNC: 30 G/DL (ref 31.5–36.5)
MCHC RBC AUTO-ENTMCNC: 31 G/DL (ref 31.5–36.5)
MCHC RBC AUTO-ENTMCNC: 31 G/DL (ref 31.5–36.5)
MCHC RBC AUTO-ENTMCNC: 31.1 G/DL (ref 31.5–36.5)
MCHC RBC AUTO-ENTMCNC: 31.2 G/DL (ref 31.5–36.5)
MCHC RBC AUTO-ENTMCNC: 31.2 G/DL (ref 31.5–36.5)
MCHC RBC AUTO-ENTMCNC: 31.3 G/DL (ref 31.5–36.5)
MCHC RBC AUTO-ENTMCNC: 31.3 G/DL (ref 31.5–36.5)
MCHC RBC AUTO-ENTMCNC: 31.5 G/DL (ref 31.5–36.5)
MCHC RBC AUTO-ENTMCNC: 31.6 G/DL (ref 31.5–36.5)
MCHC RBC AUTO-ENTMCNC: 31.7 G/DL (ref 31.5–36.5)
MCHC RBC AUTO-ENTMCNC: 31.8 G/DL (ref 31.5–36.5)
MCHC RBC AUTO-ENTMCNC: 31.9 G/DL (ref 31.5–36.5)
MCHC RBC AUTO-ENTMCNC: 32 G/DL (ref 31.5–36.5)
MCHC RBC AUTO-ENTMCNC: 32.1 G/DL (ref 31.5–36.5)
MCHC RBC AUTO-ENTMCNC: 32.2 G/DL (ref 31.5–36.5)
MCHC RBC AUTO-ENTMCNC: 32.3 G/DL (ref 31.5–36.5)
MCHC RBC AUTO-ENTMCNC: 32.4 G/DL (ref 31.5–36.5)
MCHC RBC AUTO-ENTMCNC: 32.5 G/DL (ref 31.5–36.5)
MCHC RBC AUTO-ENTMCNC: 32.6 G/DL (ref 31.5–36.5)
MCHC RBC AUTO-ENTMCNC: 32.7 G/DL (ref 31.5–36.5)
MCHC RBC AUTO-ENTMCNC: 32.8 G/DL (ref 31.5–36.5)
MCHC RBC AUTO-ENTMCNC: 32.9 G/DL (ref 31.5–36.5)
MCHC RBC AUTO-ENTMCNC: 33 G/DL (ref 31.5–36.5)
MCHC RBC AUTO-ENTMCNC: 33.1 G/DL (ref 31.5–36.5)
MCHC RBC AUTO-ENTMCNC: 33.2 G/DL (ref 31.5–36.5)
MCHC RBC AUTO-ENTMCNC: 33.3 G/DL (ref 31.5–36.5)
MCHC RBC AUTO-ENTMCNC: 33.4 G/DL (ref 31.5–36.5)
MCHC RBC AUTO-ENTMCNC: 33.5 G/DL (ref 31.5–36.5)
MCHC RBC AUTO-ENTMCNC: 33.6 G/DL (ref 31.5–36.5)
MCHC RBC AUTO-ENTMCNC: 33.6 G/DL (ref 31.5–36.5)
MCHC RBC AUTO-ENTMCNC: 33.7 G/DL (ref 31.5–36.5)
MCHC RBC AUTO-ENTMCNC: 33.8 G/DL (ref 31.5–36.5)
MCHC RBC AUTO-ENTMCNC: 34 G/DL (ref 31.5–36.5)
MCHC RBC AUTO-ENTMCNC: 34 G/DL (ref 31.5–36.5)
MCHC RBC AUTO-ENTMCNC: 34.1 G/DL (ref 31.5–36.5)
MCHC RBC AUTO-ENTMCNC: 34.1 G/DL (ref 31.5–36.5)
MCHC RBC AUTO-ENTMCNC: 34.3 G/DL (ref 31.5–36.5)
MCHC RBC AUTO-ENTMCNC: 34.4 G/DL (ref 31.5–36.5)
MCHC RBC AUTO-ENTMCNC: 34.5 G/DL (ref 31.5–36.5)
MCHC RBC AUTO-ENTMCNC: 34.6 G/DL (ref 31.5–36.5)
MCHC RBC AUTO-ENTMCNC: 34.7 G/DL (ref 31.5–36.5)
MCHC RBC AUTO-ENTMCNC: 35.1 G/DL (ref 31.5–36.5)
MCHC RBC AUTO-ENTMCNC: 35.1 G/DL (ref 31.5–36.5)
MCHC RBC AUTO-ENTMCNC: NORMAL G/DL (ref 31.5–36.5)
MCV RBC AUTO: 102 FL (ref 78–100)
MCV RBC AUTO: 106 FL (ref 78–100)
MCV RBC AUTO: 110 FL (ref 78–100)
MCV RBC AUTO: 88 FL (ref 78–100)
MCV RBC AUTO: 88 FL (ref 78–100)
MCV RBC AUTO: 89 FL (ref 78–100)
MCV RBC AUTO: 90 FL (ref 78–100)
MCV RBC AUTO: 91 FL (ref 78–100)
MCV RBC AUTO: 92 FL (ref 78–100)
MCV RBC AUTO: 93 FL (ref 78–100)
MCV RBC AUTO: 94 FL (ref 78–100)
MCV RBC AUTO: 95 FL (ref 78–100)
MCV RBC AUTO: 96 FL (ref 78–100)
MCV RBC AUTO: 97 FL (ref 78–100)
MCV RBC AUTO: 98 FL (ref 78–100)
MCV RBC AUTO: 99 FL (ref 78–100)
MCV RBC AUTO: NORMAL FL (ref 78–100)
MEPERIDINE SERPLBLD-MCNC: NEGATIVE NG/ML
MEPERIDINE SERPLBLD-MCNC: NEGATIVE NG/ML
METAMYELOCYTES # BLD: 0.1 10E9/L
METAMYELOCYTES # BLD: 0.1 10E9/L
METAMYELOCYTES # BLD: 0.2 10E9/L
METAMYELOCYTES # BLD: 0.3 10E9/L
METAMYELOCYTES # BLD: 0.4 10E9/L
METAMYELOCYTES # BLD: 0.5 10E9/L
METAMYELOCYTES # BLD: 0.5 10E9/L
METAMYELOCYTES # BLD: 0.6 10E9/L
METAMYELOCYTES # BLD: 0.7 10E9/L
METAMYELOCYTES # BLD: 0.8 10E9/L
METAMYELOCYTES # BLD: 0.9 10E9/L
METAMYELOCYTES # BLD: 1 10E9/L
METAMYELOCYTES # BLD: 1.1 10E9/L
METAMYELOCYTES # BLD: 1.2 10E9/L
METAMYELOCYTES # BLD: 1.8 10E9/L
METAMYELOCYTES # BLD: 2 10E9/L
METAMYELOCYTES NFR BLD MANUAL: 0.9 %
METAMYELOCYTES NFR BLD MANUAL: 1 %
METAMYELOCYTES NFR BLD MANUAL: 1.7 %
METAMYELOCYTES NFR BLD MANUAL: 1.7 %
METAMYELOCYTES NFR BLD MANUAL: 1.8 %
METAMYELOCYTES NFR BLD MANUAL: 1.9 %
METAMYELOCYTES NFR BLD MANUAL: 1.9 %
METAMYELOCYTES NFR BLD MANUAL: 2 %
METAMYELOCYTES NFR BLD MANUAL: 2.6 %
METAMYELOCYTES NFR BLD MANUAL: 2.7 %
METAMYELOCYTES NFR BLD MANUAL: 2.7 %
METAMYELOCYTES NFR BLD MANUAL: 2.8 %
METAMYELOCYTES NFR BLD MANUAL: 3 %
METAMYELOCYTES NFR BLD MANUAL: 3 %
METAMYELOCYTES NFR BLD MANUAL: 3.6 %
METAMYELOCYTES NFR BLD MANUAL: 3.7 %
METAMYELOCYTES NFR BLD MANUAL: 4.6 %
METAMYELOCYTES NFR BLD MANUAL: 5.4 %
METAMYELOCYTES NFR BLD MANUAL: 7.4 %
METHADONE BLD-MCNC: NEGATIVE NG/ML
METHADONE BLD-MCNC: NEGATIVE NG/ML
MICROBIOLOGIST REVIEW: ABNORMAL
MICROCYTES BLD QL SMEAR: PRESENT
MIDAZOLAM BLD CFM-MCNC: 24 NG/ML
MIDAZOLAM BLD CFM-MCNC: 91 NG/ML
MISCELLANEOUS TEST: NORMAL
MISCELLANEOUS TEST: NORMAL
MONOCYTES # BLD AUTO: 0 10E9/L (ref 0–1.3)
MONOCYTES # BLD AUTO: 0.2 10E9/L (ref 0–1.3)
MONOCYTES # BLD AUTO: 0.3 10E9/L (ref 0–1.3)
MONOCYTES # BLD AUTO: 0.4 10E9/L (ref 0–1.3)
MONOCYTES # BLD AUTO: 0.5 10E9/L (ref 0–1.3)
MONOCYTES # BLD AUTO: 0.6 10E9/L (ref 0–1.3)
MONOCYTES # BLD AUTO: 0.7 10E9/L (ref 0–1.3)
MONOCYTES # BLD AUTO: 0.8 10E9/L (ref 0–1.3)
MONOCYTES # BLD AUTO: 0.9 10E9/L (ref 0–1.3)
MONOCYTES # BLD AUTO: 1 10E9/L (ref 0–1.3)
MONOCYTES # BLD AUTO: 1.1 10E9/L (ref 0–1.3)
MONOCYTES # BLD AUTO: 1.2 10E9/L (ref 0–1.3)
MONOCYTES # BLD AUTO: 1.3 10E9/L (ref 0–1.3)
MONOCYTES # BLD AUTO: 1.4 10E9/L (ref 0–1.3)
MONOCYTES # BLD AUTO: 1.5 10E9/L (ref 0–1.3)
MONOCYTES # BLD AUTO: 1.6 10E9/L (ref 0–1.3)
MONOCYTES # BLD AUTO: 1.7 10E9/L (ref 0–1.3)
MONOCYTES # BLD AUTO: 1.8 10E9/L (ref 0–1.3)
MONOCYTES # BLD AUTO: 1.8 10E9/L (ref 0–1.3)
MONOCYTES # BLD AUTO: 1.9 10E9/L (ref 0–1.3)
MONOCYTES # BLD AUTO: 2 10E9/L (ref 0–1.3)
MONOCYTES # BLD AUTO: 2 10E9/L (ref 0–1.3)
MONOCYTES # BLD AUTO: 2.1 10E9/L (ref 0–1.3)
MONOCYTES # BLD AUTO: 2.1 10E9/L (ref 0–1.3)
MONOCYTES # BLD AUTO: 2.2 10E9/L (ref 0–1.3)
MONOCYTES # BLD AUTO: 2.3 10E9/L (ref 0–1.3)
MONOCYTES # BLD AUTO: 2.4 10E9/L (ref 0–1.3)
MONOCYTES # BLD AUTO: 2.7 10E9/L (ref 0–1.3)
MONOCYTES # BLD AUTO: 2.8 10E9/L (ref 0–1.3)
MONOCYTES # BLD AUTO: 3.5 10E9/L (ref 0–1.3)
MONOCYTES # BLD AUTO: 3.5 10E9/L (ref 0–1.3)
MONOCYTES # BLD AUTO: 3.8 10E9/L (ref 0–1.3)
MONOCYTES NFR BLD AUTO: 0 %
MONOCYTES NFR BLD AUTO: 0.8 %
MONOCYTES NFR BLD AUTO: 0.9 %
MONOCYTES NFR BLD AUTO: 1 %
MONOCYTES NFR BLD AUTO: 1.3 %
MONOCYTES NFR BLD AUTO: 1.5 %
MONOCYTES NFR BLD AUTO: 1.5 %
MONOCYTES NFR BLD AUTO: 1.6 %
MONOCYTES NFR BLD AUTO: 1.7 %
MONOCYTES NFR BLD AUTO: 1.8 %
MONOCYTES NFR BLD AUTO: 1.9 %
MONOCYTES NFR BLD AUTO: 10 %
MONOCYTES NFR BLD AUTO: 10 %
MONOCYTES NFR BLD AUTO: 13 %
MONOCYTES NFR BLD AUTO: 2 %
MONOCYTES NFR BLD AUTO: 2.1 %
MONOCYTES NFR BLD AUTO: 2.1 %
MONOCYTES NFR BLD AUTO: 2.2 %
MONOCYTES NFR BLD AUTO: 2.3 %
MONOCYTES NFR BLD AUTO: 2.3 %
MONOCYTES NFR BLD AUTO: 2.4 %
MONOCYTES NFR BLD AUTO: 2.4 %
MONOCYTES NFR BLD AUTO: 2.5 %
MONOCYTES NFR BLD AUTO: 2.5 %
MONOCYTES NFR BLD AUTO: 2.6 %
MONOCYTES NFR BLD AUTO: 2.7 %
MONOCYTES NFR BLD AUTO: 2.8 %
MONOCYTES NFR BLD AUTO: 2.9 %
MONOCYTES NFR BLD AUTO: 3 %
MONOCYTES NFR BLD AUTO: 3 %
MONOCYTES NFR BLD AUTO: 3.1 %
MONOCYTES NFR BLD AUTO: 3.3 %
MONOCYTES NFR BLD AUTO: 3.4 %
MONOCYTES NFR BLD AUTO: 3.4 %
MONOCYTES NFR BLD AUTO: 3.5 %
MONOCYTES NFR BLD AUTO: 3.6 %
MONOCYTES NFR BLD AUTO: 3.7 %
MONOCYTES NFR BLD AUTO: 3.7 %
MONOCYTES NFR BLD AUTO: 3.9 %
MONOCYTES NFR BLD AUTO: 4 %
MONOCYTES NFR BLD AUTO: 4.2 %
MONOCYTES NFR BLD AUTO: 4.3 %
MONOCYTES NFR BLD AUTO: 4.4 %
MONOCYTES NFR BLD AUTO: 4.4 %
MONOCYTES NFR BLD AUTO: 4.5 %
MONOCYTES NFR BLD AUTO: 4.6 %
MONOCYTES NFR BLD AUTO: 4.7 %
MONOCYTES NFR BLD AUTO: 4.7 %
MONOCYTES NFR BLD AUTO: 5 %
MONOCYTES NFR BLD AUTO: 5.2 %
MONOCYTES NFR BLD AUTO: 5.4 %
MONOCYTES NFR BLD AUTO: 5.5 %
MONOCYTES NFR BLD AUTO: 5.9 %
MONOCYTES NFR BLD AUTO: 6 %
MONOCYTES NFR BLD AUTO: 6 %
MONOCYTES NFR BLD AUTO: 6.1 %
MONOCYTES NFR BLD AUTO: 6.2 %
MONOCYTES NFR BLD AUTO: 6.3 %
MONOCYTES NFR BLD AUTO: 6.4 %
MONOCYTES NFR BLD AUTO: 6.4 %
MONOCYTES NFR BLD AUTO: 6.8 %
MONOCYTES NFR BLD AUTO: 6.9 %
MONOCYTES NFR BLD AUTO: 7 %
MONOCYTES NFR BLD AUTO: 7.1 %
MONOCYTES NFR BLD AUTO: 8 %
MONOCYTES NFR BLD AUTO: 8 %
MONOCYTES NFR BLD AUTO: 8.6 %
MONOCYTES NFR BLD AUTO: 8.7 %
MONOCYTES NFR BLD AUTO: 8.7 %
MONOCYTES NFR BLD AUTO: 9.6 %
MONOCYTES NFR BLD AUTO: 9.6 %
MRSA DNA SPEC QL NAA+PROBE: NEGATIVE
MUCOUS THREADS #/AREA URNS LPF: PRESENT /LPF
MYELOCYTES # BLD: 0.2 10E9/L
MYELOCYTES # BLD: 0.2 10E9/L
MYELOCYTES # BLD: 0.3 10E9/L
MYELOCYTES # BLD: 0.4 10E9/L
MYELOCYTES # BLD: 0.5 10E9/L
MYELOCYTES # BLD: 0.6 10E9/L
MYELOCYTES # BLD: 0.7 10E9/L
MYELOCYTES # BLD: 0.8 10E9/L
MYELOCYTES # BLD: 0.9 10E9/L
MYELOCYTES # BLD: 1 10E9/L
MYELOCYTES # BLD: 1 10E9/L
MYELOCYTES # BLD: 1.1 10E9/L
MYELOCYTES # BLD: 1.2 10E9/L
MYELOCYTES # BLD: 1.3 10E9/L
MYELOCYTES # BLD: 1.4 10E9/L
MYELOCYTES # BLD: 1.5 10E9/L
MYELOCYTES # BLD: 1.7 10E9/L
MYELOCYTES # BLD: 1.9 10E9/L
MYELOCYTES # BLD: 2 10E9/L
MYELOCYTES # BLD: 2.1 10E9/L
MYELOCYTES # BLD: 2.2 10E9/L
MYELOCYTES # BLD: 2.2 10E9/L
MYELOCYTES # BLD: 2.4 10E9/L
MYELOCYTES # BLD: 2.5 10E9/L
MYELOCYTES # BLD: 2.5 10E9/L
MYELOCYTES # BLD: 2.7 10E9/L
MYELOCYTES # BLD: 2.7 10E9/L
MYELOCYTES # BLD: 2.9 10E9/L
MYELOCYTES # BLD: 3 10E9/L
MYELOCYTES # BLD: 3 10E9/L
MYELOCYTES # BLD: 3.2 10E9/L
MYELOCYTES # BLD: 3.6 10E9/L
MYELOCYTES # BLD: 3.6 10E9/L
MYELOCYTES # BLD: 3.8 10E9/L
MYELOCYTES NFR BLD MANUAL: 0.9 %
MYELOCYTES NFR BLD MANUAL: 1 %
MYELOCYTES NFR BLD MANUAL: 1 %
MYELOCYTES NFR BLD MANUAL: 1.7 %
MYELOCYTES NFR BLD MANUAL: 1.8 %
MYELOCYTES NFR BLD MANUAL: 10 %
MYELOCYTES NFR BLD MANUAL: 11.5 %
MYELOCYTES NFR BLD MANUAL: 11.7 %
MYELOCYTES NFR BLD MANUAL: 13.9 %
MYELOCYTES NFR BLD MANUAL: 14.7 %
MYELOCYTES NFR BLD MANUAL: 15 %
MYELOCYTES NFR BLD MANUAL: 16.4 %
MYELOCYTES NFR BLD MANUAL: 2 %
MYELOCYTES NFR BLD MANUAL: 2 %
MYELOCYTES NFR BLD MANUAL: 2.5 %
MYELOCYTES NFR BLD MANUAL: 2.6 %
MYELOCYTES NFR BLD MANUAL: 2.7 %
MYELOCYTES NFR BLD MANUAL: 2.8 %
MYELOCYTES NFR BLD MANUAL: 3 %
MYELOCYTES NFR BLD MANUAL: 3.3 %
MYELOCYTES NFR BLD MANUAL: 3.4 %
MYELOCYTES NFR BLD MANUAL: 3.5 %
MYELOCYTES NFR BLD MANUAL: 3.5 %
MYELOCYTES NFR BLD MANUAL: 3.6 %
MYELOCYTES NFR BLD MANUAL: 3.7 %
MYELOCYTES NFR BLD MANUAL: 4 %
MYELOCYTES NFR BLD MANUAL: 4 %
MYELOCYTES NFR BLD MANUAL: 4.4 %
MYELOCYTES NFR BLD MANUAL: 4.5 %
MYELOCYTES NFR BLD MANUAL: 4.7 %
MYELOCYTES NFR BLD MANUAL: 4.9 %
MYELOCYTES NFR BLD MANUAL: 5 %
MYELOCYTES NFR BLD MANUAL: 5.2 %
MYELOCYTES NFR BLD MANUAL: 5.3 %
MYELOCYTES NFR BLD MANUAL: 5.4 %
MYELOCYTES NFR BLD MANUAL: 5.9 %
MYELOCYTES NFR BLD MANUAL: 6 %
MYELOCYTES NFR BLD MANUAL: 6 %
MYELOCYTES NFR BLD MANUAL: 6.3 %
MYELOCYTES NFR BLD MANUAL: 6.3 %
MYELOCYTES NFR BLD MANUAL: 6.4 %
MYELOCYTES NFR BLD MANUAL: 6.5 %
MYELOCYTES NFR BLD MANUAL: 7 %
MYELOCYTES NFR BLD MANUAL: 7.1 %
MYELOCYTES NFR BLD MANUAL: 7.2 %
MYELOCYTES NFR BLD MANUAL: 7.6 %
MYELOCYTES NFR BLD MANUAL: 8 %
MYELOCYTES NFR BLD MANUAL: 8.7 %
MYELOCYTES NFR BLD MANUAL: 8.8 %
MYELOCYTES NFR BLD MANUAL: 9 %
MYELOCYTES NFR BLD MANUAL: 9.8 %
NEUTROPHILS # BLD AUTO: 10.2 10E9/L (ref 1.6–8.3)
NEUTROPHILS # BLD AUTO: 10.2 10E9/L (ref 1.6–8.3)
NEUTROPHILS # BLD AUTO: 11.6 10E9/L (ref 1.6–8.3)
NEUTROPHILS # BLD AUTO: 12.2 10E9/L (ref 1.6–8.3)
NEUTROPHILS # BLD AUTO: 12.7 10E9/L (ref 1.6–8.3)
NEUTROPHILS # BLD AUTO: 13.1 10E9/L (ref 1.6–8.3)
NEUTROPHILS # BLD AUTO: 14.1 10E9/L (ref 1.6–8.3)
NEUTROPHILS # BLD AUTO: 14.2 10E9/L (ref 1.6–8.3)
NEUTROPHILS # BLD AUTO: 14.2 10E9/L (ref 1.6–8.3)
NEUTROPHILS # BLD AUTO: 14.6 10E9/L (ref 1.6–8.3)
NEUTROPHILS # BLD AUTO: 15 10E9/L (ref 1.6–8.3)
NEUTROPHILS # BLD AUTO: 15.4 10E9/L (ref 1.6–8.3)
NEUTROPHILS # BLD AUTO: 15.4 10E9/L (ref 1.6–8.3)
NEUTROPHILS # BLD AUTO: 15.6 10E9/L (ref 1.6–8.3)
NEUTROPHILS # BLD AUTO: 15.7 10E9/L (ref 1.6–8.3)
NEUTROPHILS # BLD AUTO: 15.8 10E9/L (ref 1.6–8.3)
NEUTROPHILS # BLD AUTO: 16.1 10E9/L (ref 1.6–8.3)
NEUTROPHILS # BLD AUTO: 16.1 10E9/L (ref 1.6–8.3)
NEUTROPHILS # BLD AUTO: 17.1 10E9/L (ref 1.6–8.3)
NEUTROPHILS # BLD AUTO: 17.4 10E9/L (ref 1.6–8.3)
NEUTROPHILS # BLD AUTO: 17.5 10E9/L (ref 1.6–8.3)
NEUTROPHILS # BLD AUTO: 17.6 10E9/L (ref 1.6–8.3)
NEUTROPHILS # BLD AUTO: 17.6 10E9/L (ref 1.6–8.3)
NEUTROPHILS # BLD AUTO: 17.8 10E9/L (ref 1.6–8.3)
NEUTROPHILS # BLD AUTO: 18.2 10E9/L (ref 1.6–8.3)
NEUTROPHILS # BLD AUTO: 18.3 10E9/L (ref 1.6–8.3)
NEUTROPHILS # BLD AUTO: 18.4 10E9/L (ref 1.6–8.3)
NEUTROPHILS # BLD AUTO: 18.4 10E9/L (ref 1.6–8.3)
NEUTROPHILS # BLD AUTO: 18.6 10E9/L (ref 1.6–8.3)
NEUTROPHILS # BLD AUTO: 18.7 10E9/L (ref 1.6–8.3)
NEUTROPHILS # BLD AUTO: 18.8 10E9/L (ref 1.6–8.3)
NEUTROPHILS # BLD AUTO: 19 10E9/L (ref 1.6–8.3)
NEUTROPHILS # BLD AUTO: 19.4 10E9/L (ref 1.6–8.3)
NEUTROPHILS # BLD AUTO: 19.5 10E9/L (ref 1.6–8.3)
NEUTROPHILS # BLD AUTO: 19.6 10E9/L (ref 1.6–8.3)
NEUTROPHILS # BLD AUTO: 20.4 10E9/L (ref 1.6–8.3)
NEUTROPHILS # BLD AUTO: 20.6 10E9/L (ref 1.6–8.3)
NEUTROPHILS # BLD AUTO: 20.7 10E9/L (ref 1.6–8.3)
NEUTROPHILS # BLD AUTO: 20.8 10E9/L (ref 1.6–8.3)
NEUTROPHILS # BLD AUTO: 21.2 10E9/L (ref 1.6–8.3)
NEUTROPHILS # BLD AUTO: 21.9 10E9/L (ref 1.6–8.3)
NEUTROPHILS # BLD AUTO: 22 10E9/L (ref 1.6–8.3)
NEUTROPHILS # BLD AUTO: 22.2 10E9/L (ref 1.6–8.3)
NEUTROPHILS # BLD AUTO: 22.4 10E9/L (ref 1.6–8.3)
NEUTROPHILS # BLD AUTO: 22.5 10E9/L (ref 1.6–8.3)
NEUTROPHILS # BLD AUTO: 22.6 10E9/L (ref 1.6–8.3)
NEUTROPHILS # BLD AUTO: 22.6 10E9/L (ref 1.6–8.3)
NEUTROPHILS # BLD AUTO: 22.9 10E9/L (ref 1.6–8.3)
NEUTROPHILS # BLD AUTO: 23.3 10E9/L (ref 1.6–8.3)
NEUTROPHILS # BLD AUTO: 23.4 10E9/L (ref 1.6–8.3)
NEUTROPHILS # BLD AUTO: 23.4 10E9/L (ref 1.6–8.3)
NEUTROPHILS # BLD AUTO: 23.8 10E9/L (ref 1.6–8.3)
NEUTROPHILS # BLD AUTO: 23.9 10E9/L (ref 1.6–8.3)
NEUTROPHILS # BLD AUTO: 24 10E9/L (ref 1.6–8.3)
NEUTROPHILS # BLD AUTO: 24.2 10E9/L (ref 1.6–8.3)
NEUTROPHILS # BLD AUTO: 24.4 10E9/L (ref 1.6–8.3)
NEUTROPHILS # BLD AUTO: 24.4 10E9/L (ref 1.6–8.3)
NEUTROPHILS # BLD AUTO: 24.6 10E9/L (ref 1.6–8.3)
NEUTROPHILS # BLD AUTO: 24.8 10E9/L (ref 1.6–8.3)
NEUTROPHILS # BLD AUTO: 25 10E9/L (ref 1.6–8.3)
NEUTROPHILS # BLD AUTO: 25.1 10E9/L (ref 1.6–8.3)
NEUTROPHILS # BLD AUTO: 25.3 10E9/L (ref 1.6–8.3)
NEUTROPHILS # BLD AUTO: 25.3 10E9/L (ref 1.6–8.3)
NEUTROPHILS # BLD AUTO: 25.6 10E9/L (ref 1.6–8.3)
NEUTROPHILS # BLD AUTO: 25.8 10E9/L (ref 1.6–8.3)
NEUTROPHILS # BLD AUTO: 26.1 10E9/L (ref 1.6–8.3)
NEUTROPHILS # BLD AUTO: 26.1 10E9/L (ref 1.6–8.3)
NEUTROPHILS # BLD AUTO: 26.5 10E9/L (ref 1.6–8.3)
NEUTROPHILS # BLD AUTO: 26.8 10E9/L (ref 1.6–8.3)
NEUTROPHILS # BLD AUTO: 26.9 10E9/L (ref 1.6–8.3)
NEUTROPHILS # BLD AUTO: 27 10E9/L (ref 1.6–8.3)
NEUTROPHILS # BLD AUTO: 27.5 10E9/L (ref 1.6–8.3)
NEUTROPHILS # BLD AUTO: 27.5 10E9/L (ref 1.6–8.3)
NEUTROPHILS # BLD AUTO: 27.6 10E9/L (ref 1.6–8.3)
NEUTROPHILS # BLD AUTO: 28.3 10E9/L (ref 1.6–8.3)
NEUTROPHILS # BLD AUTO: 28.4 10E9/L (ref 1.6–8.3)
NEUTROPHILS # BLD AUTO: 28.5 10E9/L (ref 1.6–8.3)
NEUTROPHILS # BLD AUTO: 28.6 10E9/L (ref 1.6–8.3)
NEUTROPHILS # BLD AUTO: 28.6 10E9/L (ref 1.6–8.3)
NEUTROPHILS # BLD AUTO: 28.7 10E9/L (ref 1.6–8.3)
NEUTROPHILS # BLD AUTO: 28.9 10E9/L (ref 1.6–8.3)
NEUTROPHILS # BLD AUTO: 29 10E9/L (ref 1.6–8.3)
NEUTROPHILS # BLD AUTO: 29.3 10E9/L (ref 1.6–8.3)
NEUTROPHILS # BLD AUTO: 29.7 10E9/L (ref 1.6–8.3)
NEUTROPHILS # BLD AUTO: 29.7 10E9/L (ref 1.6–8.3)
NEUTROPHILS # BLD AUTO: 30.2 10E9/L (ref 1.6–8.3)
NEUTROPHILS # BLD AUTO: 30.4 10E9/L (ref 1.6–8.3)
NEUTROPHILS # BLD AUTO: 30.6 10E9/L (ref 1.6–8.3)
NEUTROPHILS # BLD AUTO: 30.6 10E9/L (ref 1.6–8.3)
NEUTROPHILS # BLD AUTO: 30.9 10E9/L (ref 1.6–8.3)
NEUTROPHILS # BLD AUTO: 31.2 10E9/L (ref 1.6–8.3)
NEUTROPHILS # BLD AUTO: 31.2 10E9/L (ref 1.6–8.3)
NEUTROPHILS # BLD AUTO: 31.6 10E9/L (ref 1.6–8.3)
NEUTROPHILS # BLD AUTO: 32.1 10E9/L (ref 1.6–8.3)
NEUTROPHILS # BLD AUTO: 32.3 10E9/L (ref 1.6–8.3)
NEUTROPHILS # BLD AUTO: 32.4 10E9/L (ref 1.6–8.3)
NEUTROPHILS # BLD AUTO: 32.6 10E9/L (ref 1.6–8.3)
NEUTROPHILS # BLD AUTO: 33 10E9/L (ref 1.6–8.3)
NEUTROPHILS # BLD AUTO: 33 10E9/L (ref 1.6–8.3)
NEUTROPHILS # BLD AUTO: 33.1 10E9/L (ref 1.6–8.3)
NEUTROPHILS # BLD AUTO: 33.4 10E9/L (ref 1.6–8.3)
NEUTROPHILS # BLD AUTO: 34.3 10E9/L (ref 1.6–8.3)
NEUTROPHILS # BLD AUTO: 35.3 10E9/L (ref 1.6–8.3)
NEUTROPHILS # BLD AUTO: 37.2 10E9/L (ref 1.6–8.3)
NEUTROPHILS # BLD AUTO: 37.7 10E9/L (ref 1.6–8.3)
NEUTROPHILS # BLD AUTO: 42.7 10E9/L (ref 1.6–8.3)
NEUTROPHILS # BLD AUTO: 6 10E9/L (ref 1.6–8.3)
NEUTROPHILS # BLD AUTO: 7.2 10E9/L (ref 1.6–8.3)
NEUTROPHILS # BLD AUTO: 7.3 10E9/L (ref 1.6–8.3)
NEUTROPHILS # BLD AUTO: 7.5 10E9/L (ref 1.6–8.3)
NEUTROPHILS # BLD AUTO: 8.1 10E9/L (ref 1.6–8.3)
NEUTROPHILS # BLD AUTO: 8.7 10E9/L (ref 1.6–8.3)
NEUTROPHILS # BLD AUTO: 8.8 10E9/L (ref 1.6–8.3)
NEUTROPHILS # BLD AUTO: 9.2 10E9/L (ref 1.6–8.3)
NEUTROPHILS # BLD AUTO: 9.5 10E9/L (ref 1.6–8.3)
NEUTROPHILS NFR BLD AUTO: 64 %
NEUTROPHILS NFR BLD AUTO: 67 %
NEUTROPHILS NFR BLD AUTO: 67.8 %
NEUTROPHILS NFR BLD AUTO: 70.6 %
NEUTROPHILS NFR BLD AUTO: 72.5 %
NEUTROPHILS NFR BLD AUTO: 72.7 %
NEUTROPHILS NFR BLD AUTO: 73.7 %
NEUTROPHILS NFR BLD AUTO: 73.9 %
NEUTROPHILS NFR BLD AUTO: 75.2 %
NEUTROPHILS NFR BLD AUTO: 75.7 %
NEUTROPHILS NFR BLD AUTO: 75.9 %
NEUTROPHILS NFR BLD AUTO: 76.3 %
NEUTROPHILS NFR BLD AUTO: 77 %
NEUTROPHILS NFR BLD AUTO: 77 %
NEUTROPHILS NFR BLD AUTO: 77.6 %
NEUTROPHILS NFR BLD AUTO: 77.6 %
NEUTROPHILS NFR BLD AUTO: 78 %
NEUTROPHILS NFR BLD AUTO: 78.1 %
NEUTROPHILS NFR BLD AUTO: 78.5 %
NEUTROPHILS NFR BLD AUTO: 78.9 %
NEUTROPHILS NFR BLD AUTO: 79.3 %
NEUTROPHILS NFR BLD AUTO: 80 %
NEUTROPHILS NFR BLD AUTO: 80.1 %
NEUTROPHILS NFR BLD AUTO: 80.2 %
NEUTROPHILS NFR BLD AUTO: 80.4 %
NEUTROPHILS NFR BLD AUTO: 80.9 %
NEUTROPHILS NFR BLD AUTO: 80.9 %
NEUTROPHILS NFR BLD AUTO: 81.1 %
NEUTROPHILS NFR BLD AUTO: 81.2 %
NEUTROPHILS NFR BLD AUTO: 81.2 %
NEUTROPHILS NFR BLD AUTO: 81.4 %
NEUTROPHILS NFR BLD AUTO: 81.4 %
NEUTROPHILS NFR BLD AUTO: 81.6 %
NEUTROPHILS NFR BLD AUTO: 81.7 %
NEUTROPHILS NFR BLD AUTO: 81.8 %
NEUTROPHILS NFR BLD AUTO: 82 %
NEUTROPHILS NFR BLD AUTO: 82 %
NEUTROPHILS NFR BLD AUTO: 82.4 %
NEUTROPHILS NFR BLD AUTO: 82.5 %
NEUTROPHILS NFR BLD AUTO: 82.9 %
NEUTROPHILS NFR BLD AUTO: 83.5 %
NEUTROPHILS NFR BLD AUTO: 84 %
NEUTROPHILS NFR BLD AUTO: 84 %
NEUTROPHILS NFR BLD AUTO: 84.1 %
NEUTROPHILS NFR BLD AUTO: 84.4 %
NEUTROPHILS NFR BLD AUTO: 84.7 %
NEUTROPHILS NFR BLD AUTO: 84.7 %
NEUTROPHILS NFR BLD AUTO: 84.8 %
NEUTROPHILS NFR BLD AUTO: 84.9 %
NEUTROPHILS NFR BLD AUTO: 84.9 %
NEUTROPHILS NFR BLD AUTO: 85 %
NEUTROPHILS NFR BLD AUTO: 85.3 %
NEUTROPHILS NFR BLD AUTO: 85.6 %
NEUTROPHILS NFR BLD AUTO: 85.9 %
NEUTROPHILS NFR BLD AUTO: 86.2 %
NEUTROPHILS NFR BLD AUTO: 87 %
NEUTROPHILS NFR BLD AUTO: 87.3 %
NEUTROPHILS NFR BLD AUTO: 87.5 %
NEUTROPHILS NFR BLD AUTO: 87.6 %
NEUTROPHILS NFR BLD AUTO: 88 %
NEUTROPHILS NFR BLD AUTO: 88.1 %
NEUTROPHILS NFR BLD AUTO: 88.3 %
NEUTROPHILS NFR BLD AUTO: 88.3 %
NEUTROPHILS NFR BLD AUTO: 88.6 %
NEUTROPHILS NFR BLD AUTO: 88.6 %
NEUTROPHILS NFR BLD AUTO: 89.3 %
NEUTROPHILS NFR BLD AUTO: 90 %
NEUTROPHILS NFR BLD AUTO: 90 %
NEUTROPHILS NFR BLD AUTO: 90.1 %
NEUTROPHILS NFR BLD AUTO: 90.2 %
NEUTROPHILS NFR BLD AUTO: 90.3 %
NEUTROPHILS NFR BLD AUTO: 90.5 %
NEUTROPHILS NFR BLD AUTO: 90.7 %
NEUTROPHILS NFR BLD AUTO: 90.9 %
NEUTROPHILS NFR BLD AUTO: 91 %
NEUTROPHILS NFR BLD AUTO: 91.1 %
NEUTROPHILS NFR BLD AUTO: 91.1 %
NEUTROPHILS NFR BLD AUTO: 91.7 %
NEUTROPHILS NFR BLD AUTO: 91.9 %
NEUTROPHILS NFR BLD AUTO: 92 %
NEUTROPHILS NFR BLD AUTO: 92 %
NEUTROPHILS NFR BLD AUTO: 92.3 %
NEUTROPHILS NFR BLD AUTO: 92.3 %
NEUTROPHILS NFR BLD AUTO: 92.5 %
NEUTROPHILS NFR BLD AUTO: 92.5 %
NEUTROPHILS NFR BLD AUTO: 92.7 %
NEUTROPHILS NFR BLD AUTO: 92.8 %
NEUTROPHILS NFR BLD AUTO: 93.1 %
NEUTROPHILS NFR BLD AUTO: 93.1 %
NEUTROPHILS NFR BLD AUTO: 93.3 %
NEUTROPHILS NFR BLD AUTO: 93.4 %
NEUTROPHILS NFR BLD AUTO: 93.5 %
NEUTROPHILS NFR BLD AUTO: 93.6 %
NEUTROPHILS NFR BLD AUTO: 93.6 %
NEUTROPHILS NFR BLD AUTO: 93.7 %
NEUTROPHILS NFR BLD AUTO: 93.8 %
NEUTROPHILS NFR BLD AUTO: 93.8 %
NEUTROPHILS NFR BLD AUTO: 94 %
NEUTROPHILS NFR BLD AUTO: 94.2 %
NEUTROPHILS NFR BLD AUTO: 94.4 %
NEUTROPHILS NFR BLD AUTO: 94.5 %
NEUTROPHILS NFR BLD AUTO: 94.5 %
NEUTROPHILS NFR BLD AUTO: 94.6 %
NEUTROPHILS NFR BLD AUTO: 94.7 %
NEUTROPHILS NFR BLD AUTO: 94.7 %
NEUTROPHILS NFR BLD AUTO: 94.8 %
NEUTROPHILS NFR BLD AUTO: 94.9 %
NEUTROPHILS NFR BLD AUTO: 95 %
NEUTROPHILS NFR BLD AUTO: 95 %
NEUTROPHILS NFR BLD AUTO: 95.1 %
NEUTROPHILS NFR BLD AUTO: 95.1 %
NEUTROPHILS NFR BLD AUTO: 95.2 %
NEUTROPHILS NFR BLD AUTO: 95.3 %
NEUTROPHILS NFR BLD AUTO: 95.4 %
NEUTROPHILS NFR BLD AUTO: 95.6 %
NEUTROPHILS NFR BLD AUTO: 96.1 %
NEUTROPHILS NFR BLD AUTO: 96.4 %
NEUTROPHILS NFR BLD AUTO: 96.6 %
NEUTROPHILS NFR BLD AUTO: 97.4 %
NEUTROPHILS NFR BLD AUTO: 97.4 %
NEUTROPHILS NFR BLD AUTO: 98.1 %
NITRATE UR QL: NEGATIVE
NONHDLC SERPL-MCNC: ABNORMAL MG/DL
NORCHLORDIAZEP SERPL-MCNC: NEGATIVE NG/ML
NORCHLORDIAZEP SERPL-MCNC: NEGATIVE NG/ML
NORDIAZEPAM SERPL-MCNC: NEGATIVE NG/ML
NORDIAZEPAM SERPL-MCNC: NEGATIVE NG/ML
NORFENTANYL BLD CFM-MCNC: 0.5 NG/ML
NRBC # BLD AUTO: 0 10*3/UL
NRBC # BLD AUTO: 0 10*3/UL
NRBC # BLD AUTO: 0.1 10*3/UL
NRBC # BLD AUTO: 0.2 10*3/UL
NRBC # BLD AUTO: 0.3 10*3/UL
NRBC # BLD AUTO: 0.4 10*3/UL
NRBC # BLD AUTO: 0.5 10*3/UL
NRBC # BLD AUTO: 0.6 10*3/UL
NRBC # BLD AUTO: 0.7 10*3/UL
NRBC # BLD AUTO: 0.8 10*3/UL
NRBC # BLD AUTO: 0.8 10*3/UL
NRBC # BLD AUTO: 1 10*3/UL
NRBC # BLD AUTO: 1.2 10*3/UL
NRBC # BLD AUTO: 1.2 10*3/UL
NRBC # BLD AUTO: 1.3 10*3/UL
NRBC # BLD AUTO: 1.5 10*3/UL
NRBC # BLD AUTO: 1.6 10*3/UL
NRBC # BLD AUTO: 1.7 10*3/UL
NRBC # BLD AUTO: 1.8 10*3/UL
NRBC # BLD AUTO: 1.8 10*3/UL
NRBC # BLD AUTO: 1.9 10*3/UL
NRBC # BLD AUTO: 1.9 10*3/UL
NRBC # BLD AUTO: 10.5 10*3/UL
NRBC # BLD AUTO: 10.6 10*3/UL
NRBC # BLD AUTO: 12 10*3/UL
NRBC # BLD AUTO: 12.8 10*3/UL
NRBC # BLD AUTO: 16.1 10*3/UL
NRBC # BLD AUTO: 2.1 10*3/UL
NRBC # BLD AUTO: 2.3 10*3/UL
NRBC # BLD AUTO: 2.5 10*3/UL
NRBC # BLD AUTO: 2.9 10*3/UL
NRBC # BLD AUTO: 20.7 10*3/UL
NRBC # BLD AUTO: 21.3 10*3/UL
NRBC # BLD AUTO: 22.3 10*3/UL
NRBC # BLD AUTO: 25.7 10*3/UL
NRBC # BLD AUTO: 26.9 10*3/UL
NRBC # BLD AUTO: 27.9 10*3/UL
NRBC # BLD AUTO: 29.3 10*3/UL
NRBC # BLD AUTO: 3.1 10*3/UL
NRBC # BLD AUTO: 3.2 10*3/UL
NRBC # BLD AUTO: 3.3 10*3/UL
NRBC # BLD AUTO: 3.3 10*3/UL
NRBC # BLD AUTO: 3.5 10*3/UL
NRBC # BLD AUTO: 3.6 10*3/UL
NRBC # BLD AUTO: 3.8 10*3/UL
NRBC # BLD AUTO: 3.9 10*3/UL
NRBC # BLD AUTO: 31.2 10*3/UL
NRBC # BLD AUTO: 31.4 10*3/UL
NRBC # BLD AUTO: 31.5 10*3/UL
NRBC # BLD AUTO: 34.4 10*3/UL
NRBC # BLD AUTO: 34.6 10*3/UL
NRBC # BLD AUTO: 34.8 10*3/UL
NRBC # BLD AUTO: 36.8 10*3/UL
NRBC # BLD AUTO: 38 10*3/UL
NRBC # BLD AUTO: 4.3 10*3/UL
NRBC # BLD AUTO: 4.3 10*3/UL
NRBC # BLD AUTO: 4.6 10*3/UL
NRBC # BLD AUTO: 4.9 10*3/UL
NRBC # BLD AUTO: 5 10*3/UL
NRBC # BLD AUTO: 5.1 10*3/UL
NRBC # BLD AUTO: 5.4 10*3/UL
NRBC # BLD AUTO: 5.5 10*3/UL
NRBC # BLD AUTO: 5.7 10*3/UL
NRBC # BLD AUTO: 5.7 10*3/UL
NRBC # BLD AUTO: 5.9 10*3/UL
NRBC # BLD AUTO: 6.3 10*3/UL
NRBC # BLD AUTO: 6.4 10*3/UL
NRBC # BLD AUTO: 7 10*3/UL
NRBC # BLD AUTO: 7.3 10*3/UL
NRBC # BLD AUTO: 7.8 10*3/UL
NRBC # BLD AUTO: 8.1 10*3/UL
NRBC # BLD AUTO: 8.8 10*3/UL
NRBC BLD AUTO-RTO: 0 /100
NRBC BLD AUTO-RTO: 1 /100
NRBC BLD AUTO-RTO: 10 /100
NRBC BLD AUTO-RTO: 10 /100
NRBC BLD AUTO-RTO: 101 /100
NRBC BLD AUTO-RTO: 101 /100
NRBC BLD AUTO-RTO: 103 /100
NRBC BLD AUTO-RTO: 11 /100
NRBC BLD AUTO-RTO: 12 /100
NRBC BLD AUTO-RTO: 13 /100
NRBC BLD AUTO-RTO: 13 /100
NRBC BLD AUTO-RTO: 14 /100
NRBC BLD AUTO-RTO: 16 /100
NRBC BLD AUTO-RTO: 16 /100
NRBC BLD AUTO-RTO: 18 /100
NRBC BLD AUTO-RTO: 19 /100
NRBC BLD AUTO-RTO: 2 /100
NRBC BLD AUTO-RTO: 20 /100
NRBC BLD AUTO-RTO: 21 /100
NRBC BLD AUTO-RTO: 22 /100
NRBC BLD AUTO-RTO: 22 /100
NRBC BLD AUTO-RTO: 23 /100
NRBC BLD AUTO-RTO: 26 /100
NRBC BLD AUTO-RTO: 27 /100
NRBC BLD AUTO-RTO: 28 /100
NRBC BLD AUTO-RTO: 3 /100
NRBC BLD AUTO-RTO: 33 /100
NRBC BLD AUTO-RTO: 33 /100
NRBC BLD AUTO-RTO: 34 /100
NRBC BLD AUTO-RTO: 37 /100
NRBC BLD AUTO-RTO: 39 /100
NRBC BLD AUTO-RTO: 4 /100
NRBC BLD AUTO-RTO: 4 /100
NRBC BLD AUTO-RTO: 49 /100
NRBC BLD AUTO-RTO: 5 /100
NRBC BLD AUTO-RTO: 58 /100
NRBC BLD AUTO-RTO: 59 /100
NRBC BLD AUTO-RTO: 6 /100
NRBC BLD AUTO-RTO: 63 /100
NRBC BLD AUTO-RTO: 67 /100
NRBC BLD AUTO-RTO: 68 /100
NRBC BLD AUTO-RTO: 7 /100
NRBC BLD AUTO-RTO: 7 /100
NRBC BLD AUTO-RTO: 73 /100
NRBC BLD AUTO-RTO: 77 /100
NRBC BLD AUTO-RTO: 79 /100
NRBC BLD AUTO-RTO: 8 /100
NRBC BLD AUTO-RTO: 8 /100
NRBC BLD AUTO-RTO: 83 /100
NRBC BLD AUTO-RTO: 88 /100
NRBC BLD AUTO-RTO: 89 /100
NRBC BLD AUTO-RTO: 9 /100
NRBC BLD AUTO-RTO: 9 /100
NRBC BLD AUTO-RTO: 94 /100
NSE SERPL-MCNC: 27.2 UG/L (ref 3.7–8.9)
NSE SERPL-MCNC: 27.8 UG/L (ref 3.7–8.9)
NSE SERPL-MCNC: 34.7 UG/L (ref 3.7–8.9)
NUM BPU REQUESTED: 1
NUM BPU REQUESTED: 15
NUM BPU REQUESTED: 2
NUM BPU REQUESTED: 3
NUM BPU REQUESTED: 3
NUM BPU REQUESTED: 5
NUM BPU REQUESTED: 6
NUM BPU REQUESTED: 9
NUM BPU REQUESTED: NORMAL
O2/TOTAL GAS SETTING VFR VENT: 100 %
O2/TOTAL GAS SETTING VFR VENT: 21 %
O2/TOTAL GAS SETTING VFR VENT: 40 %
O2/TOTAL GAS SETTING VFR VENT: 45 %
O2/TOTAL GAS SETTING VFR VENT: 50 %
O2/TOTAL GAS SETTING VFR VENT: 55 %
O2/TOTAL GAS SETTING VFR VENT: 60 %
O2/TOTAL GAS SETTING VFR VENT: 7 %
O2/TOTAL GAS SETTING VFR VENT: 70 %
O2/TOTAL GAS SETTING VFR VENT: 80 %
O2/TOTAL GAS SETTING VFR VENT: 90 %
O2/TOTAL GAS SETTING VFR VENT: 956 %
O2/TOTAL GAS SETTING VFR VENT: ABNORMAL %
O2/TOTAL GAS SETTING VFR VENT: NORMAL %
O2/TOTAL GAS SETTING VFR VENT: NORMAL %
OPIATES SPEC-MCNC: NEGATIVE NG/ML
OPIATES SPEC-MCNC: NEGATIVE NG/ML
OPIATES UR QL SCN: NEGATIVE
OTHER DRUGS DETECTED: ABNORMAL
OTHER DRUGS DETECTED: ABNORMAL
OVALOCYTES BLD QL SMEAR: ABNORMAL
OVALOCYTES BLD QL SMEAR: SLIGHT
OXAZEPAM SERPL-MCNC: NEGATIVE NG/ML
OXAZEPAM SERPL-MCNC: NEGATIVE NG/ML
OXYCODONE SERPLBLD SCN-MCNC: NEGATIVE NG/ML
OXYCODONE SERPLBLD SCN-MCNC: NEGATIVE NG/ML
OXYHGB MFR BLD: 61 % (ref 92–100)
OXYHGB MFR BLD: 76 % (ref 92–100)
OXYHGB MFR BLD: 81 % (ref 92–100)
OXYHGB MFR BLD: 84 % (ref 92–100)
OXYHGB MFR BLD: 85 % (ref 92–100)
OXYHGB MFR BLD: 86 % (ref 92–100)
OXYHGB MFR BLD: 87 % (ref 92–100)
OXYHGB MFR BLD: 88 % (ref 92–100)
OXYHGB MFR BLD: 89 % (ref 92–100)
OXYHGB MFR BLD: 90 % (ref 92–100)
OXYHGB MFR BLD: 91 % (ref 92–100)
OXYHGB MFR BLD: 92 % (ref 92–100)
OXYHGB MFR BLD: 93 % (ref 92–100)
OXYHGB MFR BLD: 94 % (ref 92–100)
OXYHGB MFR BLD: 95 % (ref 92–100)
OXYHGB MFR BLD: 96 % (ref 92–100)
OXYHGB MFR BLD: 97 % (ref 92–100)
OXYHGB MFR BLD: 98 % (ref 92–100)
OXYHGB MFR BLD: 99 % (ref 92–100)
OXYHGB MFR BLD: NORMAL % (ref 92–100)
OXYHGB MFR BLD: NORMAL % (ref 92–100)
OXYHGB MFR BLDA: 55 % (ref 75–100)
OXYHGB MFR BLDA: 89 % (ref 75–100)
OXYHGB MFR BLDA: 90 % (ref 75–100)
OXYHGB MFR BLDA: 92 % (ref 75–100)
OXYHGB MFR BLDA: 95 % (ref 75–100)
OXYHGB MFR BLDA: 96 % (ref 75–100)
OXYHGB MFR BLDA: 97 % (ref 75–100)
OXYHGB MFR BLDA: 98 % (ref 75–100)
OXYHGB MFR BLDA: NORMAL % (ref 75–100)
OXYHGB MFR BLDV: 31 %
OXYHGB MFR BLDV: 49 %
OXYHGB MFR BLDV: 50 %
OXYHGB MFR BLDV: 51 %
OXYHGB MFR BLDV: 52 %
OXYHGB MFR BLDV: 53 %
OXYHGB MFR BLDV: 54 %
OXYHGB MFR BLDV: 55 %
OXYHGB MFR BLDV: 56 %
OXYHGB MFR BLDV: 57 %
OXYHGB MFR BLDV: 58 %
OXYHGB MFR BLDV: 59 %
OXYHGB MFR BLDV: 60 %
OXYHGB MFR BLDV: 60 %
OXYHGB MFR BLDV: 61 %
OXYHGB MFR BLDV: 62 %
OXYHGB MFR BLDV: 63 %
OXYHGB MFR BLDV: 63 %
OXYHGB MFR BLDV: 64 %
OXYHGB MFR BLDV: 64 %
OXYHGB MFR BLDV: 65 %
OXYHGB MFR BLDV: 66 %
OXYHGB MFR BLDV: 66 %
OXYHGB MFR BLDV: 67 %
OXYHGB MFR BLDV: 68 %
OXYHGB MFR BLDV: 69 %
OXYHGB MFR BLDV: 69 %
OXYHGB MFR BLDV: 70 %
OXYHGB MFR BLDV: 72 %
OXYHGB MFR BLDV: 72 %
OXYHGB MFR BLDV: 73 %
OXYHGB MFR BLDV: 73 %
OXYHGB MFR BLDV: 74 %
OXYHGB MFR BLDV: 76 %
OXYHGB MFR BLDV: 77 %
OXYHGB MFR BLDV: 77 %
PCO2 BLD: 28 MM HG (ref 35–45)
PCO2 BLD: 29 MM HG (ref 35–45)
PCO2 BLD: 30 MM HG (ref 35–45)
PCO2 BLD: 31 MM HG (ref 35–45)
PCO2 BLD: 32 MM HG (ref 35–45)
PCO2 BLD: 33 MM HG (ref 35–45)
PCO2 BLD: 34 MM HG (ref 35–45)
PCO2 BLD: 35 MM HG (ref 35–45)
PCO2 BLD: 36 MM HG (ref 35–45)
PCO2 BLD: 37 MM HG (ref 35–45)
PCO2 BLD: 38 MM HG (ref 35–45)
PCO2 BLD: 39 MM HG (ref 35–45)
PCO2 BLD: 40 MM HG (ref 35–45)
PCO2 BLD: 41 MM HG (ref 35–45)
PCO2 BLD: 42 MM HG (ref 35–45)
PCO2 BLD: 43 MM HG (ref 35–45)
PCO2 BLD: 44 MM HG (ref 35–45)
PCO2 BLD: 46 MM HG (ref 35–45)
PCO2 BLD: 47 MM HG (ref 35–45)
PCO2 BLD: 47 MM HG (ref 35–45)
PCO2 BLD: 48 MM HG (ref 35–45)
PCO2 BLD: 49 MM HG (ref 35–45)
PCO2 BLD: 50 MM HG (ref 35–45)
PCO2 BLD: 50 MM HG (ref 35–45)
PCO2 BLD: 52 MM HG (ref 35–45)
PCO2 BLD: 53 MM HG (ref 35–45)
PCO2 BLD: 53 MM HG (ref 35–45)
PCO2 BLD: 54 MM HG (ref 35–45)
PCO2 BLD: 54 MM HG (ref 35–45)
PCO2 BLD: 56 MM HG (ref 35–45)
PCO2 BLD: 75 MM HG (ref 35–45)
PCO2 BLD: 90 MM HG (ref 35–45)
PCO2 BLD: 93 MM HG (ref 35–45)
PCO2 BLD: NORMAL MM HG (ref 35–45)
PCO2 BLD: NORMAL MM HG (ref 35–45)
PCO2 BLDA: 29 MM HG (ref 35–45)
PCO2 BLDA: 30 MM HG (ref 35–45)
PCO2 BLDA: 30 MM HG (ref 35–45)
PCO2 BLDA: 32 MM HG (ref 35–45)
PCO2 BLDA: 32 MM HG (ref 35–45)
PCO2 BLDA: 33 MM HG (ref 35–45)
PCO2 BLDA: 34 MM HG (ref 35–45)
PCO2 BLDA: 35 MM HG (ref 35–45)
PCO2 BLDA: 36 MM HG (ref 35–45)
PCO2 BLDA: 37 MM HG (ref 35–45)
PCO2 BLDA: 38 MM HG (ref 35–45)
PCO2 BLDA: 39 MM HG (ref 35–45)
PCO2 BLDA: 40 MM HG (ref 35–45)
PCO2 BLDA: 41 MM HG (ref 35–45)
PCO2 BLDA: 42 MM HG (ref 35–45)
PCO2 BLDA: 43 MM HG (ref 35–45)
PCO2 BLDA: 44 MM HG (ref 35–45)
PCO2 BLDA: 46 MM HG (ref 35–45)
PCO2 BLDA: 46 MM HG (ref 35–45)
PCO2 BLDA: 48 MM HG (ref 35–45)
PCO2 BLDA: 49 MM HG (ref 35–45)
PCO2 BLDA: 51 MM HG (ref 35–45)
PCO2 BLDA: 69 MM HG (ref 35–45)
PCO2 BLDA: NORMAL MM HG (ref 35–45)
PCO2 BLDV: 35 MM HG (ref 40–50)
PCO2 BLDV: 35 MM HG (ref 40–50)
PCO2 BLDV: 36 MM HG (ref 40–50)
PCO2 BLDV: 37 MM HG (ref 40–50)
PCO2 BLDV: 39 MM HG (ref 40–50)
PCO2 BLDV: 40 MM HG (ref 40–50)
PCO2 BLDV: 41 MM HG (ref 40–50)
PCO2 BLDV: 42 MM HG (ref 40–50)
PCO2 BLDV: 44 MM HG (ref 40–50)
PCO2 BLDV: 45 MM HG (ref 40–50)
PCO2 BLDV: 46 MM HG (ref 40–50)
PCO2 BLDV: 47 MM HG (ref 40–50)
PCO2 BLDV: 48 MM HG (ref 40–50)
PCO2 BLDV: 49 MM HG (ref 40–50)
PCO2 BLDV: 49 MM HG (ref 40–50)
PCO2 BLDV: 50 MM HG (ref 40–50)
PCO2 BLDV: 52 MM HG (ref 40–50)
PCO2 BLDV: 54 MM HG (ref 40–50)
PCO2 BLDV: 55 MM HG (ref 40–50)
PCO2 BLDV: 55 MM HG (ref 40–50)
PCO2 BLDV: 85 MM HG (ref 40–50)
PCO2 BLDV: 89 MM HG (ref 40–50)
PCO2 BLDV: 90 MM HG (ref 40–50)
PCP SPEC-MCNC: NEGATIVE NG/ML
PCP SPEC-MCNC: NEGATIVE NG/ML
PH BLD: 6.91 PH (ref 7.35–7.45)
PH BLD: 6.95 PH (ref 7.35–7.45)
PH BLD: 6.96 PH (ref 7.35–7.45)
PH BLD: 6.99 PH (ref 7.35–7.45)
PH BLD: 7.09 PH (ref 7.35–7.45)
PH BLD: 7.14 PH (ref 7.35–7.45)
PH BLD: 7.17 PH (ref 7.35–7.45)
PH BLD: 7.17 PH (ref 7.35–7.45)
PH BLD: 7.18 PH (ref 7.35–7.45)
PH BLD: 7.19 PH (ref 7.35–7.45)
PH BLD: 7.2 PH (ref 7.35–7.45)
PH BLD: 7.2 PH (ref 7.35–7.45)
PH BLD: 7.21 PH (ref 7.35–7.45)
PH BLD: 7.22 PH (ref 7.35–7.45)
PH BLD: 7.23 PH (ref 7.35–7.45)
PH BLD: 7.23 PH (ref 7.35–7.45)
PH BLD: 7.24 PH (ref 7.35–7.45)
PH BLD: 7.24 PH (ref 7.35–7.45)
PH BLD: 7.25 PH (ref 7.35–7.45)
PH BLD: 7.26 PH (ref 7.35–7.45)
PH BLD: 7.27 PH (ref 7.35–7.45)
PH BLD: 7.28 PH (ref 7.35–7.45)
PH BLD: 7.29 PH (ref 7.35–7.45)
PH BLD: 7.3 PH (ref 7.35–7.45)
PH BLD: 7.31 PH (ref 7.35–7.45)
PH BLD: 7.32 PH (ref 7.35–7.45)
PH BLD: 7.33 PH (ref 7.35–7.45)
PH BLD: 7.34 PH (ref 7.35–7.45)
PH BLD: 7.35 PH (ref 7.35–7.45)
PH BLD: 7.36 PH (ref 7.35–7.45)
PH BLD: 7.37 PH (ref 7.35–7.45)
PH BLD: 7.38 PH (ref 7.35–7.45)
PH BLD: 7.39 PH (ref 7.35–7.45)
PH BLD: 7.4 PH (ref 7.35–7.45)
PH BLD: 7.41 PH (ref 7.35–7.45)
PH BLD: 7.42 PH (ref 7.35–7.45)
PH BLD: 7.43 PH (ref 7.35–7.45)
PH BLD: 7.44 PH (ref 7.35–7.45)
PH BLD: 7.45 PH (ref 7.35–7.45)
PH BLD: 7.46 PH (ref 7.35–7.45)
PH BLD: 7.47 PH (ref 7.35–7.45)
PH BLD: 7.48 PH (ref 7.35–7.45)
PH BLD: 7.49 PH (ref 7.35–7.45)
PH BLD: 7.5 PH (ref 7.35–7.45)
PH BLD: 7.51 PH (ref 7.35–7.45)
PH BLD: NORMAL PH (ref 7.35–7.45)
PH BLD: NORMAL PH (ref 7.35–7.45)
PH BLDA: 7.11 PH (ref 7.35–7.45)
PH BLDA: 7.23 PH (ref 7.35–7.45)
PH BLDA: 7.25 PH (ref 7.35–7.45)
PH BLDA: 7.25 PH (ref 7.35–7.45)
PH BLDA: 7.27 PH (ref 7.35–7.45)
PH BLDA: 7.28 PH (ref 7.35–7.45)
PH BLDA: 7.29 PH (ref 7.35–7.45)
PH BLDA: 7.31 PH (ref 7.35–7.45)
PH BLDA: 7.32 PH (ref 7.35–7.45)
PH BLDA: 7.33 PH (ref 7.35–7.45)
PH BLDA: 7.33 PH (ref 7.35–7.45)
PH BLDA: 7.34 PH (ref 7.35–7.45)
PH BLDA: 7.35 PH (ref 7.35–7.45)
PH BLDA: 7.35 PH (ref 7.35–7.45)
PH BLDA: 7.36 PH (ref 7.35–7.45)
PH BLDA: 7.36 PH (ref 7.35–7.45)
PH BLDA: 7.37 PH (ref 7.35–7.45)
PH BLDA: 7.38 PH (ref 7.35–7.45)
PH BLDA: 7.39 PH (ref 7.35–7.45)
PH BLDA: 7.4 PH (ref 7.35–7.45)
PH BLDA: 7.4 PH (ref 7.35–7.45)
PH BLDA: 7.41 PH (ref 7.35–7.45)
PH BLDA: 7.42 PH (ref 7.35–7.45)
PH BLDA: 7.43 PH (ref 7.35–7.45)
PH BLDA: 7.44 PH (ref 7.35–7.45)
PH BLDA: 7.44 PH (ref 7.35–7.45)
PH BLDA: 7.45 PH (ref 7.35–7.45)
PH BLDA: 7.45 PH (ref 7.35–7.45)
PH BLDA: 7.46 PH (ref 7.35–7.45)
PH BLDA: 7.48 PH (ref 7.35–7.45)
PH BLDA: 7.49 PH (ref 7.35–7.45)
PH BLDA: 7.53 PH (ref 7.35–7.45)
PH BLDA: NORMAL PH (ref 7.35–7.45)
PH BLDV: 6.94 PH (ref 7.32–7.43)
PH BLDV: 7.05 PH (ref 7.32–7.43)
PH BLDV: 7.06 PH (ref 7.32–7.43)
PH BLDV: 7.07 PH (ref 7.32–7.43)
PH BLDV: 7.2 PH (ref 7.32–7.43)
PH BLDV: 7.23 PH (ref 7.32–7.43)
PH BLDV: 7.24 PH (ref 7.32–7.43)
PH BLDV: 7.25 PH (ref 7.32–7.43)
PH BLDV: 7.26 PH (ref 7.32–7.43)
PH BLDV: 7.27 PH (ref 7.32–7.43)
PH BLDV: 7.28 PH (ref 7.32–7.43)
PH BLDV: 7.29 PH (ref 7.32–7.43)
PH BLDV: 7.3 PH (ref 7.32–7.43)
PH BLDV: 7.3 PH (ref 7.32–7.43)
PH BLDV: 7.32 PH (ref 7.32–7.43)
PH BLDV: 7.33 PH (ref 7.32–7.43)
PH BLDV: 7.34 PH (ref 7.32–7.43)
PH BLDV: 7.34 PH (ref 7.32–7.43)
PH BLDV: 7.35 PH (ref 7.32–7.43)
PH BLDV: 7.36 PH (ref 7.32–7.43)
PH BLDV: 7.37 PH (ref 7.32–7.43)
PH BLDV: 7.38 PH (ref 7.32–7.43)
PH BLDV: 7.39 PH (ref 7.32–7.43)
PH BLDV: 7.39 PH (ref 7.32–7.43)
PH BLDV: 7.4 PH (ref 7.32–7.43)
PH BLDV: 7.41 PH (ref 7.32–7.43)
PH BLDV: 7.42 PH (ref 7.32–7.43)
PH BLDV: 7.43 PH (ref 7.32–7.43)
PH BLDV: 7.43 PH (ref 7.32–7.43)
PH BLDV: 7.44 PH (ref 7.32–7.43)
PH UR STRIP: 5.5 PH (ref 5–7)
PHOSPHATE SERPL-MCNC: 0.8 MG/DL (ref 2.5–4.5)
PHOSPHATE SERPL-MCNC: 1.9 MG/DL (ref 2.5–4.5)
PHOSPHATE SERPL-MCNC: 2.7 MG/DL (ref 2.5–4.5)
PHOSPHATE SERPL-MCNC: 2.7 MG/DL (ref 2.5–4.5)
PHOSPHATE SERPL-MCNC: 2.8 MG/DL (ref 2.5–4.5)
PHOSPHATE SERPL-MCNC: 2.8 MG/DL (ref 2.5–4.5)
PHOSPHATE SERPL-MCNC: 2.9 MG/DL (ref 2.5–4.5)
PHOSPHATE SERPL-MCNC: 2.9 MG/DL (ref 2.5–4.5)
PHOSPHATE SERPL-MCNC: 3 MG/DL (ref 2.5–4.5)
PHOSPHATE SERPL-MCNC: 3.1 MG/DL (ref 2.5–4.5)
PHOSPHATE SERPL-MCNC: 3.1 MG/DL (ref 2.5–4.5)
PHOSPHATE SERPL-MCNC: 3.2 MG/DL (ref 2.5–4.5)
PHOSPHATE SERPL-MCNC: 3.2 MG/DL (ref 2.5–4.5)
PHOSPHATE SERPL-MCNC: 3.3 MG/DL (ref 2.5–4.5)
PHOSPHATE SERPL-MCNC: 3.4 MG/DL (ref 2.5–4.5)
PHOSPHATE SERPL-MCNC: 3.5 MG/DL (ref 2.5–4.5)
PHOSPHATE SERPL-MCNC: 3.6 MG/DL (ref 2.5–4.5)
PHOSPHATE SERPL-MCNC: 3.7 MG/DL (ref 2.5–4.5)
PHOSPHATE SERPL-MCNC: 3.8 MG/DL (ref 2.5–4.5)
PHOSPHATE SERPL-MCNC: 3.8 MG/DL (ref 2.5–4.5)
PHOSPHATE SERPL-MCNC: 3.9 MG/DL (ref 2.5–4.5)
PHOSPHATE SERPL-MCNC: 4 MG/DL (ref 2.5–4.5)
PHOSPHATE SERPL-MCNC: 4.1 MG/DL (ref 2.5–4.5)
PHOSPHATE SERPL-MCNC: 4.2 MG/DL (ref 2.5–4.5)
PHOSPHATE SERPL-MCNC: 4.3 MG/DL (ref 2.5–4.5)
PHOSPHATE SERPL-MCNC: 4.4 MG/DL (ref 2.5–4.5)
PHOSPHATE SERPL-MCNC: 4.5 MG/DL (ref 2.5–4.5)
PHOSPHATE SERPL-MCNC: 4.6 MG/DL (ref 2.5–4.5)
PHOSPHATE SERPL-MCNC: 4.7 MG/DL (ref 2.5–4.5)
PHOSPHATE SERPL-MCNC: 4.8 MG/DL (ref 2.5–4.5)
PHOSPHATE SERPL-MCNC: 4.9 MG/DL (ref 2.5–4.5)
PHOSPHATE SERPL-MCNC: 5 MG/DL (ref 2.5–4.5)
PHOSPHATE SERPL-MCNC: 5.1 MG/DL (ref 2.5–4.5)
PHOSPHATE SERPL-MCNC: 5.1 MG/DL (ref 2.5–4.5)
PHOSPHATE SERPL-MCNC: 5.2 MG/DL (ref 2.5–4.5)
PHOSPHATE SERPL-MCNC: 5.3 MG/DL (ref 2.5–4.5)
PHOSPHATE SERPL-MCNC: 5.4 MG/DL (ref 2.5–4.5)
PHOSPHATE SERPL-MCNC: 5.4 MG/DL (ref 2.5–4.5)
PHOSPHATE SERPL-MCNC: 5.5 MG/DL (ref 2.5–4.5)
PHOSPHATE SERPL-MCNC: 5.6 MG/DL (ref 2.5–4.5)
PHOSPHATE SERPL-MCNC: 5.7 MG/DL (ref 2.5–4.5)
PHOSPHATE SERPL-MCNC: 5.8 MG/DL (ref 2.5–4.5)
PHOSPHATE SERPL-MCNC: 5.8 MG/DL (ref 2.5–4.5)
PHOSPHATE SERPL-MCNC: 5.9 MG/DL (ref 2.5–4.5)
PHOSPHATE SERPL-MCNC: 6 MG/DL (ref 2.5–4.5)
PHOSPHATE SERPL-MCNC: 6.1 MG/DL (ref 2.5–4.5)
PHOSPHATE SERPL-MCNC: 6.1 MG/DL (ref 2.5–4.5)
PHOSPHATE SERPL-MCNC: 6.2 MG/DL (ref 2.5–4.5)
PHOSPHATE SERPL-MCNC: 6.4 MG/DL (ref 2.5–4.5)
PHOSPHATE SERPL-MCNC: 6.5 MG/DL (ref 2.5–4.5)
PHOSPHATE SERPL-MCNC: 6.6 MG/DL (ref 2.5–4.5)
PHOSPHATE SERPL-MCNC: 6.6 MG/DL (ref 2.5–4.5)
PHOSPHATE SERPL-MCNC: 6.7 MG/DL (ref 2.5–4.5)
PHOSPHATE SERPL-MCNC: 6.9 MG/DL (ref 2.5–4.5)
PHOSPHATE SERPL-MCNC: 6.9 MG/DL (ref 2.5–4.5)
PHOSPHATE SERPL-MCNC: 7 MG/DL (ref 2.5–4.5)
PHOSPHATE SERPL-MCNC: 7 MG/DL (ref 2.5–4.5)
PHOSPHATE SERPL-MCNC: 7.1 MG/DL (ref 2.5–4.5)
PHOSPHATE SERPL-MCNC: 7.1 MG/DL (ref 2.5–4.5)
PHOSPHATE SERPL-MCNC: 7.2 MG/DL (ref 2.5–4.5)
PHOSPHATE SERPL-MCNC: 7.3 MG/DL (ref 2.5–4.5)
PHOSPHATE SERPL-MCNC: 7.3 MG/DL (ref 2.5–4.5)
PHOSPHATE SERPL-MCNC: 7.5 MG/DL (ref 2.5–4.5)
PHOSPHATE SERPL-MCNC: 7.6 MG/DL (ref 2.5–4.5)
PHOSPHATE SERPL-MCNC: 7.6 MG/DL (ref 2.5–4.5)
PHOSPHATE SERPL-MCNC: 7.8 MG/DL (ref 2.5–4.5)
PHOSPHATE SERPL-MCNC: 7.8 MG/DL (ref 2.5–4.5)
PHOSPHATE SERPL-MCNC: 7.9 MG/DL (ref 2.5–4.5)
PHOSPHATE SERPL-MCNC: 8.3 MG/DL (ref 2.5–4.5)
PHOSPHATE SERPL-MCNC: 8.5 MG/DL (ref 2.5–4.5)
PHOSPHATE SERPL-MCNC: 8.9 MG/DL (ref 2.5–4.5)
PHOSPHATE SERPL-MCNC: 9 MG/DL (ref 2.5–4.5)
PLASMA CELLS # BLD MANUAL: 0.2 10E9/L
PLATELET # BLD AUTO: 100 10E9/L (ref 150–450)
PLATELET # BLD AUTO: 100 10E9/L (ref 150–450)
PLATELET # BLD AUTO: 101 10E9/L (ref 150–450)
PLATELET # BLD AUTO: 102 10E9/L (ref 150–450)
PLATELET # BLD AUTO: 103 10E9/L (ref 150–450)
PLATELET # BLD AUTO: 103 10E9/L (ref 150–450)
PLATELET # BLD AUTO: 105 10E9/L (ref 150–450)
PLATELET # BLD AUTO: 108 10E9/L (ref 150–450)
PLATELET # BLD AUTO: 109 10E9/L (ref 150–450)
PLATELET # BLD AUTO: 110 10E9/L (ref 150–450)
PLATELET # BLD AUTO: 111 10E9/L (ref 150–450)
PLATELET # BLD AUTO: 113 10E9/L (ref 150–450)
PLATELET # BLD AUTO: 116 10E9/L (ref 150–450)
PLATELET # BLD AUTO: 117 10E9/L (ref 150–450)
PLATELET # BLD AUTO: 119 10E9/L (ref 150–450)
PLATELET # BLD AUTO: 121 10E9/L (ref 150–450)
PLATELET # BLD AUTO: 123 10E9/L (ref 150–450)
PLATELET # BLD AUTO: 124 10E9/L (ref 150–450)
PLATELET # BLD AUTO: 124 10E9/L (ref 150–450)
PLATELET # BLD AUTO: 127 10E9/L (ref 150–450)
PLATELET # BLD AUTO: 130 10E9/L (ref 150–450)
PLATELET # BLD AUTO: 131 10E9/L (ref 150–450)
PLATELET # BLD AUTO: 131 10E9/L (ref 150–450)
PLATELET # BLD AUTO: 133 10E9/L (ref 150–450)
PLATELET # BLD AUTO: 134 10E9/L (ref 150–450)
PLATELET # BLD AUTO: 135 10E9/L (ref 150–450)
PLATELET # BLD AUTO: 137 10E9/L (ref 150–450)
PLATELET # BLD AUTO: 138 10E9/L (ref 150–450)
PLATELET # BLD AUTO: 139 10E9/L (ref 150–450)
PLATELET # BLD AUTO: 140 10E9/L (ref 150–450)
PLATELET # BLD AUTO: 143 10E9/L (ref 150–450)
PLATELET # BLD AUTO: 145 10E9/L (ref 150–450)
PLATELET # BLD AUTO: 146 10E9/L (ref 150–450)
PLATELET # BLD AUTO: 147 10E9/L (ref 150–450)
PLATELET # BLD AUTO: 148 10E9/L (ref 150–450)
PLATELET # BLD AUTO: 150 10E9/L (ref 150–450)
PLATELET # BLD AUTO: 155 10E9/L (ref 150–450)
PLATELET # BLD AUTO: 161 10E9/L (ref 150–450)
PLATELET # BLD AUTO: 165 10E9/L (ref 150–450)
PLATELET # BLD AUTO: 165 10E9/L (ref 150–450)
PLATELET # BLD AUTO: 166 10E9/L (ref 150–450)
PLATELET # BLD AUTO: 167 10E9/L (ref 150–450)
PLATELET # BLD AUTO: 170 10E9/L (ref 150–450)
PLATELET # BLD AUTO: 172 10E9/L (ref 150–450)
PLATELET # BLD AUTO: 179 10E9/L (ref 150–450)
PLATELET # BLD AUTO: 183 10E9/L (ref 150–450)
PLATELET # BLD AUTO: 185 10E9/L (ref 150–450)
PLATELET # BLD AUTO: 186 10E9/L (ref 150–450)
PLATELET # BLD AUTO: 187 10E9/L (ref 150–450)
PLATELET # BLD AUTO: 198 10E9/L (ref 150–450)
PLATELET # BLD AUTO: 199 10E9/L (ref 150–450)
PLATELET # BLD AUTO: 199 10E9/L (ref 150–450)
PLATELET # BLD AUTO: 202 10E9/L (ref 150–450)
PLATELET # BLD AUTO: 208 10E9/L (ref 150–450)
PLATELET # BLD AUTO: 214 10E9/L (ref 150–450)
PLATELET # BLD AUTO: 216 10E9/L (ref 150–450)
PLATELET # BLD AUTO: 224 10E9/L (ref 150–450)
PLATELET # BLD AUTO: 225 10E9/L (ref 150–450)
PLATELET # BLD AUTO: 231 10E9/L (ref 150–450)
PLATELET # BLD AUTO: 234 10E9/L (ref 150–450)
PLATELET # BLD AUTO: 239 10E9/L (ref 150–450)
PLATELET # BLD AUTO: 239 10E9/L (ref 150–450)
PLATELET # BLD AUTO: 241 10E9/L (ref 150–450)
PLATELET # BLD AUTO: 261 10E9/L (ref 150–450)
PLATELET # BLD AUTO: 261 10E9/L (ref 150–450)
PLATELET # BLD AUTO: 264 10E9/L (ref 150–450)
PLATELET # BLD AUTO: 271 10E9/L (ref 150–450)
PLATELET # BLD AUTO: 272 10E9/L (ref 150–450)
PLATELET # BLD AUTO: 272 10E9/L (ref 150–450)
PLATELET # BLD AUTO: 277 10E9/L (ref 150–450)
PLATELET # BLD AUTO: 279 10E9/L (ref 150–450)
PLATELET # BLD AUTO: 289 10E9/L (ref 150–450)
PLATELET # BLD AUTO: 289 10E9/L (ref 150–450)
PLATELET # BLD AUTO: 298 10E9/L (ref 150–450)
PLATELET # BLD AUTO: 298 10E9/L (ref 150–450)
PLATELET # BLD AUTO: 300 10E9/L (ref 150–450)
PLATELET # BLD AUTO: 308 10E9/L (ref 150–450)
PLATELET # BLD AUTO: 308 10E9/L (ref 150–450)
PLATELET # BLD AUTO: 314 10E9/L (ref 150–450)
PLATELET # BLD AUTO: 316 10E9/L (ref 150–450)
PLATELET # BLD AUTO: 326 10E9/L (ref 150–450)
PLATELET # BLD AUTO: 334 10E9/L (ref 150–450)
PLATELET # BLD AUTO: 335 10E9/L (ref 150–450)
PLATELET # BLD AUTO: 336 10E9/L (ref 150–450)
PLATELET # BLD AUTO: 345 10E9/L (ref 150–450)
PLATELET # BLD AUTO: 348 10E9/L (ref 150–450)
PLATELET # BLD AUTO: 359 10E9/L (ref 150–450)
PLATELET # BLD AUTO: 372 10E9/L (ref 150–450)
PLATELET # BLD AUTO: 378 10E9/L (ref 150–450)
PLATELET # BLD AUTO: 379 10E9/L (ref 150–450)
PLATELET # BLD AUTO: 379 10E9/L (ref 150–450)
PLATELET # BLD AUTO: 382 10E9/L (ref 150–450)
PLATELET # BLD AUTO: 389 10E9/L (ref 150–450)
PLATELET # BLD AUTO: 394 10E9/L (ref 150–450)
PLATELET # BLD AUTO: 49 10E9/L (ref 150–450)
PLATELET # BLD AUTO: 52 10E9/L (ref 150–450)
PLATELET # BLD AUTO: 54 10E9/L (ref 150–450)
PLATELET # BLD AUTO: 55 10E9/L (ref 150–450)
PLATELET # BLD AUTO: 57 10E9/L (ref 150–450)
PLATELET # BLD AUTO: 58 10E9/L (ref 150–450)
PLATELET # BLD AUTO: 59 10E9/L (ref 150–450)
PLATELET # BLD AUTO: 60 10E9/L (ref 150–450)
PLATELET # BLD AUTO: 63 10E9/L (ref 150–450)
PLATELET # BLD AUTO: 65 10E9/L (ref 150–450)
PLATELET # BLD AUTO: 66 10E9/L (ref 150–450)
PLATELET # BLD AUTO: 66 10E9/L (ref 150–450)
PLATELET # BLD AUTO: 70 10E9/L (ref 150–450)
PLATELET # BLD AUTO: 70 10E9/L (ref 150–450)
PLATELET # BLD AUTO: 73 10E9/L (ref 150–450)
PLATELET # BLD AUTO: 74 10E9/L (ref 150–450)
PLATELET # BLD AUTO: 75 10E9/L (ref 150–450)
PLATELET # BLD AUTO: 75 10E9/L (ref 150–450)
PLATELET # BLD AUTO: 77 10E9/L (ref 150–450)
PLATELET # BLD AUTO: 77 10E9/L (ref 150–450)
PLATELET # BLD AUTO: 78 10E9/L (ref 150–450)
PLATELET # BLD AUTO: 80 10E9/L (ref 150–450)
PLATELET # BLD AUTO: 81 10E9/L (ref 150–450)
PLATELET # BLD AUTO: 81 10E9/L (ref 150–450)
PLATELET # BLD AUTO: 82 10E9/L (ref 150–450)
PLATELET # BLD AUTO: 82 10E9/L (ref 150–450)
PLATELET # BLD AUTO: 83 10E9/L (ref 150–450)
PLATELET # BLD AUTO: 84 10E9/L (ref 150–450)
PLATELET # BLD AUTO: 85 10E9/L (ref 150–450)
PLATELET # BLD AUTO: 85 10E9/L (ref 150–450)
PLATELET # BLD AUTO: 87 10E9/L (ref 150–450)
PLATELET # BLD AUTO: 88 10E9/L (ref 150–450)
PLATELET # BLD AUTO: 90 10E9/L (ref 150–450)
PLATELET # BLD AUTO: 91 10E9/L (ref 150–450)
PLATELET # BLD AUTO: 91 10E9/L (ref 150–450)
PLATELET # BLD AUTO: 92 10E9/L (ref 150–450)
PLATELET # BLD AUTO: 93 10E9/L (ref 150–450)
PLATELET # BLD AUTO: 94 10E9/L (ref 150–450)
PLATELET # BLD AUTO: 96 10E9/L (ref 150–450)
PLATELET # BLD AUTO: NORMAL 10E9/L (ref 150–450)
PLATELET # BLD EST: ABNORMAL 10*3/UL
PLATELET INHIBITION WITH AA: 89 %
PLATELET INHIBITION WITH ADP: 97 %
PO2 BLD: 100 MM HG (ref 80–105)
PO2 BLD: 101 MM HG (ref 80–105)
PO2 BLD: 102 MM HG (ref 80–105)
PO2 BLD: 103 MM HG (ref 80–105)
PO2 BLD: 104 MM HG (ref 80–105)
PO2 BLD: 105 MM HG (ref 80–105)
PO2 BLD: 105 MM HG (ref 80–105)
PO2 BLD: 106 MM HG (ref 80–105)
PO2 BLD: 107 MM HG (ref 80–105)
PO2 BLD: 108 MM HG (ref 80–105)
PO2 BLD: 109 MM HG (ref 80–105)
PO2 BLD: 111 MM HG (ref 80–105)
PO2 BLD: 112 MM HG (ref 80–105)
PO2 BLD: 113 MM HG (ref 80–105)
PO2 BLD: 114 MM HG (ref 80–105)
PO2 BLD: 115 MM HG (ref 80–105)
PO2 BLD: 116 MM HG (ref 80–105)
PO2 BLD: 116 MM HG (ref 80–105)
PO2 BLD: 118 MM HG (ref 80–105)
PO2 BLD: 118 MM HG (ref 80–105)
PO2 BLD: 119 MM HG (ref 80–105)
PO2 BLD: 120 MM HG (ref 80–105)
PO2 BLD: 120 MM HG (ref 80–105)
PO2 BLD: 121 MM HG (ref 80–105)
PO2 BLD: 122 MM HG (ref 80–105)
PO2 BLD: 122 MM HG (ref 80–105)
PO2 BLD: 124 MM HG (ref 80–105)
PO2 BLD: 124 MM HG (ref 80–105)
PO2 BLD: 125 MM HG (ref 80–105)
PO2 BLD: 126 MM HG (ref 80–105)
PO2 BLD: 127 MM HG (ref 80–105)
PO2 BLD: 128 MM HG (ref 80–105)
PO2 BLD: 129 MM HG (ref 80–105)
PO2 BLD: 129 MM HG (ref 80–105)
PO2 BLD: 130 MM HG (ref 80–105)
PO2 BLD: 131 MM HG (ref 80–105)
PO2 BLD: 132 MM HG (ref 80–105)
PO2 BLD: 133 MM HG (ref 80–105)
PO2 BLD: 134 MM HG (ref 80–105)
PO2 BLD: 135 MM HG (ref 80–105)
PO2 BLD: 137 MM HG (ref 80–105)
PO2 BLD: 137 MM HG (ref 80–105)
PO2 BLD: 138 MM HG (ref 80–105)
PO2 BLD: 139 MM HG (ref 80–105)
PO2 BLD: 140 MM HG (ref 80–105)
PO2 BLD: 141 MM HG (ref 80–105)
PO2 BLD: 142 MM HG (ref 80–105)
PO2 BLD: 143 MM HG (ref 80–105)
PO2 BLD: 144 MM HG (ref 80–105)
PO2 BLD: 144 MM HG (ref 80–105)
PO2 BLD: 145 MM HG (ref 80–105)
PO2 BLD: 146 MM HG (ref 80–105)
PO2 BLD: 146 MM HG (ref 80–105)
PO2 BLD: 147 MM HG (ref 80–105)
PO2 BLD: 149 MM HG (ref 80–105)
PO2 BLD: 149 MM HG (ref 80–105)
PO2 BLD: 152 MM HG (ref 80–105)
PO2 BLD: 152 MM HG (ref 80–105)
PO2 BLD: 153 MM HG (ref 80–105)
PO2 BLD: 153 MM HG (ref 80–105)
PO2 BLD: 154 MM HG (ref 80–105)
PO2 BLD: 154 MM HG (ref 80–105)
PO2 BLD: 156 MM HG (ref 80–105)
PO2 BLD: 157 MM HG (ref 80–105)
PO2 BLD: 157 MM HG (ref 80–105)
PO2 BLD: 160 MM HG (ref 80–105)
PO2 BLD: 160 MM HG (ref 80–105)
PO2 BLD: 161 MM HG (ref 80–105)
PO2 BLD: 162 MM HG (ref 80–105)
PO2 BLD: 163 MM HG (ref 80–105)
PO2 BLD: 163 MM HG (ref 80–105)
PO2 BLD: 166 MM HG (ref 80–105)
PO2 BLD: 168 MM HG (ref 80–105)
PO2 BLD: 169 MM HG (ref 80–105)
PO2 BLD: 169 MM HG (ref 80–105)
PO2 BLD: 173 MM HG (ref 80–105)
PO2 BLD: 174 MM HG (ref 80–105)
PO2 BLD: 175 MM HG (ref 80–105)
PO2 BLD: 178 MM HG (ref 80–105)
PO2 BLD: 179 MM HG (ref 80–105)
PO2 BLD: 179 MM HG (ref 80–105)
PO2 BLD: 184 MM HG (ref 80–105)
PO2 BLD: 186 MM HG (ref 80–105)
PO2 BLD: 193 MM HG (ref 80–105)
PO2 BLD: 201 MM HG (ref 80–105)
PO2 BLD: 201 MM HG (ref 80–105)
PO2 BLD: 204 MM HG (ref 80–105)
PO2 BLD: 205 MM HG (ref 80–105)
PO2 BLD: 210 MM HG (ref 80–105)
PO2 BLD: 211 MM HG (ref 80–105)
PO2 BLD: 215 MM HG (ref 80–105)
PO2 BLD: 221 MM HG (ref 80–105)
PO2 BLD: 222 MM HG (ref 80–105)
PO2 BLD: 223 MM HG (ref 80–105)
PO2 BLD: 224 MM HG (ref 80–105)
PO2 BLD: 225 MM HG (ref 80–105)
PO2 BLD: 237 MM HG (ref 80–105)
PO2 BLD: 240 MM HG (ref 80–105)
PO2 BLD: 241 MM HG (ref 80–105)
PO2 BLD: 249 MM HG (ref 80–105)
PO2 BLD: 253 MM HG (ref 80–105)
PO2 BLD: 254 MM HG (ref 80–105)
PO2 BLD: 265 MM HG (ref 80–105)
PO2 BLD: 266 MM HG (ref 80–105)
PO2 BLD: 273 MM HG (ref 80–105)
PO2 BLD: 293 MM HG (ref 80–105)
PO2 BLD: 293 MM HG (ref 80–105)
PO2 BLD: 294 MM HG (ref 80–105)
PO2 BLD: 302 MM HG (ref 80–105)
PO2 BLD: 344 MM HG (ref 80–105)
PO2 BLD: 358 MM HG (ref 80–105)
PO2 BLD: 45 MM HG (ref 80–105)
PO2 BLD: 469 MM HG (ref 80–105)
PO2 BLD: 48 MM HG (ref 80–105)
PO2 BLD: 49 MM HG (ref 80–105)
PO2 BLD: 49 MM HG (ref 80–105)
PO2 BLD: 53 MM HG (ref 80–105)
PO2 BLD: 55 MM HG (ref 80–105)
PO2 BLD: 56 MM HG (ref 80–105)
PO2 BLD: 57 MM HG (ref 80–105)
PO2 BLD: 58 MM HG (ref 80–105)
PO2 BLD: 58 MM HG (ref 80–105)
PO2 BLD: 59 MM HG (ref 80–105)
PO2 BLD: 60 MM HG (ref 80–105)
PO2 BLD: 61 MM HG (ref 80–105)
PO2 BLD: 61 MM HG (ref 80–105)
PO2 BLD: 62 MM HG (ref 80–105)
PO2 BLD: 63 MM HG (ref 80–105)
PO2 BLD: 63 MM HG (ref 80–105)
PO2 BLD: 64 MM HG (ref 80–105)
PO2 BLD: 65 MM HG (ref 80–105)
PO2 BLD: 65 MM HG (ref 80–105)
PO2 BLD: 66 MM HG (ref 80–105)
PO2 BLD: 68 MM HG (ref 80–105)
PO2 BLD: 69 MM HG (ref 80–105)
PO2 BLD: 70 MM HG (ref 80–105)
PO2 BLD: 71 MM HG (ref 80–105)
PO2 BLD: 72 MM HG (ref 80–105)
PO2 BLD: 73 MM HG (ref 80–105)
PO2 BLD: 74 MM HG (ref 80–105)
PO2 BLD: 75 MM HG (ref 80–105)
PO2 BLD: 76 MM HG (ref 80–105)
PO2 BLD: 77 MM HG (ref 80–105)
PO2 BLD: 79 MM HG (ref 80–105)
PO2 BLD: 80 MM HG (ref 80–105)
PO2 BLD: 81 MM HG (ref 80–105)
PO2 BLD: 82 MM HG (ref 80–105)
PO2 BLD: 83 MM HG (ref 80–105)
PO2 BLD: 84 MM HG (ref 80–105)
PO2 BLD: 85 MM HG (ref 80–105)
PO2 BLD: 86 MM HG (ref 80–105)
PO2 BLD: 87 MM HG (ref 80–105)
PO2 BLD: 88 MM HG (ref 80–105)
PO2 BLD: 89 MM HG (ref 80–105)
PO2 BLD: 90 MM HG (ref 80–105)
PO2 BLD: 91 MM HG (ref 80–105)
PO2 BLD: 92 MM HG (ref 80–105)
PO2 BLD: 93 MM HG (ref 80–105)
PO2 BLD: 94 MM HG (ref 80–105)
PO2 BLD: 95 MM HG (ref 80–105)
PO2 BLD: 96 MM HG (ref 80–105)
PO2 BLD: 97 MM HG (ref 80–105)
PO2 BLD: 98 MM HG (ref 80–105)
PO2 BLD: 99 MM HG (ref 80–105)
PO2 BLD: NORMAL MM HG (ref 80–105)
PO2 BLD: NORMAL MM HG (ref 80–105)
PO2 BLDA: 114 MM HG (ref 80–105)
PO2 BLDA: 114 MM HG (ref 80–105)
PO2 BLDA: 127 MM HG (ref 80–105)
PO2 BLDA: 142 MM HG (ref 80–105)
PO2 BLDA: 150 MM HG (ref 80–105)
PO2 BLDA: 150 MM HG (ref 80–105)
PO2 BLDA: 156 MM HG (ref 80–105)
PO2 BLDA: 161 MM HG (ref 80–105)
PO2 BLDA: 180 MM HG (ref 80–105)
PO2 BLDA: 188 MM HG (ref 80–105)
PO2 BLDA: 192 MM HG (ref 80–105)
PO2 BLDA: 203 MM HG (ref 80–105)
PO2 BLDA: 207 MM HG (ref 80–105)
PO2 BLDA: 250 MM HG (ref 80–105)
PO2 BLDA: 253 MM HG (ref 80–105)
PO2 BLDA: 253 MM HG (ref 80–105)
PO2 BLDA: 262 MM HG (ref 80–105)
PO2 BLDA: 263 MM HG (ref 80–105)
PO2 BLDA: 270 MM HG (ref 80–105)
PO2 BLDA: 286 MM HG (ref 80–105)
PO2 BLDA: 290 MM HG (ref 80–105)
PO2 BLDA: 298 MM HG (ref 80–105)
PO2 BLDA: 302 MM HG (ref 80–105)
PO2 BLDA: 307 MM HG (ref 80–105)
PO2 BLDA: 308 MM HG (ref 80–105)
PO2 BLDA: 31 MM HG (ref 80–105)
PO2 BLDA: 320 MM HG (ref 80–105)
PO2 BLDA: 321 MM HG (ref 80–105)
PO2 BLDA: 325 MM HG (ref 80–105)
PO2 BLDA: 331 MM HG (ref 80–105)
PO2 BLDA: 332 MM HG (ref 80–105)
PO2 BLDA: 334 MM HG (ref 80–105)
PO2 BLDA: 334 MM HG (ref 80–105)
PO2 BLDA: 340 MM HG (ref 80–105)
PO2 BLDA: 347 MM HG (ref 80–105)
PO2 BLDA: 350 MM HG (ref 80–105)
PO2 BLDA: 350 MM HG (ref 80–105)
PO2 BLDA: 353 MM HG (ref 80–105)
PO2 BLDA: 360 MM HG (ref 80–105)
PO2 BLDA: 360 MM HG (ref 80–105)
PO2 BLDA: 363 MM HG (ref 80–105)
PO2 BLDA: 368 MM HG (ref 80–105)
PO2 BLDA: 370 MM HG (ref 80–105)
PO2 BLDA: 386 MM HG (ref 80–105)
PO2 BLDA: 386 MM HG (ref 80–105)
PO2 BLDA: 389 MM HG (ref 80–105)
PO2 BLDA: 391 MM HG (ref 80–105)
PO2 BLDA: 407 MM HG (ref 80–105)
PO2 BLDA: 410 MM HG (ref 80–105)
PO2 BLDA: 427 MM HG (ref 80–105)
PO2 BLDA: 506 MM HG (ref 80–105)
PO2 BLDA: 511 MM HG (ref 80–105)
PO2 BLDA: 64 MM HG (ref 80–105)
PO2 BLDA: 65 MM HG (ref 80–105)
PO2 BLDA: 68 MM HG (ref 80–105)
PO2 BLDA: 88 MM HG (ref 80–105)
PO2 BLDA: 90 MM HG (ref 80–105)
PO2 BLDA: NORMAL MM HG (ref 80–105)
PO2 BLDV: 19 MM HG (ref 25–47)
PO2 BLDV: 21 MM HG (ref 25–47)
PO2 BLDV: 22 MM HG (ref 25–47)
PO2 BLDV: 27 MM HG (ref 25–47)
PO2 BLDV: 29 MM HG (ref 25–47)
PO2 BLDV: 30 MM HG (ref 25–47)
PO2 BLDV: 31 MM HG (ref 25–47)
PO2 BLDV: 32 MM HG (ref 25–47)
PO2 BLDV: 33 MM HG (ref 25–47)
PO2 BLDV: 34 MM HG (ref 25–47)
PO2 BLDV: 35 MM HG (ref 25–47)
PO2 BLDV: 35 MM HG (ref 25–47)
PO2 BLDV: 36 MM HG (ref 25–47)
PO2 BLDV: 36 MM HG (ref 25–47)
PO2 BLDV: 37 MM HG (ref 25–47)
PO2 BLDV: 38 MM HG (ref 25–47)
PO2 BLDV: 39 MM HG (ref 25–47)
PO2 BLDV: 40 MM HG (ref 25–47)
PO2 BLDV: 41 MM HG (ref 25–47)
PO2 BLDV: 42 MM HG (ref 25–47)
PO2 BLDV: 42 MM HG (ref 25–47)
PO2 BLDV: 43 MM HG (ref 25–47)
PO2 BLDV: 44 MM HG (ref 25–47)
PO2 BLDV: 45 MM HG (ref 25–47)
PO2 BLDV: 45 MM HG (ref 25–47)
PO2 BLDV: 46 MM HG (ref 25–47)
PO2 BLDV: 47 MM HG (ref 25–47)
PO2 BLDV: 48 MM HG (ref 25–47)
PO2 BLDV: 49 MM HG (ref 25–47)
PO2 BLDV: 54 MM HG (ref 25–47)
POIKILOCYTOSIS BLD QL SMEAR: ABNORMAL
POIKILOCYTOSIS BLD QL SMEAR: SLIGHT
POLYCHROMASIA BLD QL SMEAR: ABNORMAL
POLYCHROMASIA BLD QL SMEAR: SLIGHT
POTASSIUM BLD-SCNC: 2.1 MMOL/L (ref 3.4–5.3)
POTASSIUM BLD-SCNC: 2.1 MMOL/L (ref 3.4–5.3)
POTASSIUM BLD-SCNC: 2.6 MMOL/L (ref 3.4–5.3)
POTASSIUM BLD-SCNC: 3 MMOL/L (ref 3.4–5.3)
POTASSIUM BLD-SCNC: 3 MMOL/L (ref 3.4–5.3)
POTASSIUM BLD-SCNC: 3.1 MMOL/L (ref 3.4–5.3)
POTASSIUM BLD-SCNC: 3.4 MMOL/L (ref 3.4–5.3)
POTASSIUM BLD-SCNC: 3.5 MMOL/L (ref 3.4–5.3)
POTASSIUM BLD-SCNC: 3.8 MMOL/L (ref 3.4–5.3)
POTASSIUM BLD-SCNC: 4 MMOL/L (ref 3.4–5.3)
POTASSIUM BLD-SCNC: 4 MMOL/L (ref 3.4–5.3)
POTASSIUM BLD-SCNC: 4.2 MMOL/L (ref 3.4–5.3)
POTASSIUM BLD-SCNC: 4.3 MMOL/L (ref 3.4–5.3)
POTASSIUM BLD-SCNC: 4.4 MMOL/L (ref 3.4–5.3)
POTASSIUM BLD-SCNC: 4.5 MMOL/L (ref 3.4–5.3)
POTASSIUM BLD-SCNC: 4.6 MMOL/L (ref 3.4–5.3)
POTASSIUM SERPL-SCNC: 2.2 MMOL/L (ref 3.4–5.3)
POTASSIUM SERPL-SCNC: 2.4 MMOL/L (ref 3.4–5.3)
POTASSIUM SERPL-SCNC: 2.7 MMOL/L (ref 3.4–5.3)
POTASSIUM SERPL-SCNC: 2.9 MMOL/L (ref 3.4–5.3)
POTASSIUM SERPL-SCNC: 2.9 MMOL/L (ref 3.4–5.3)
POTASSIUM SERPL-SCNC: 3 MMOL/L (ref 3.4–5.3)
POTASSIUM SERPL-SCNC: 3.4 MMOL/L (ref 3.4–5.3)
POTASSIUM SERPL-SCNC: 3.6 MMOL/L (ref 3.4–5.3)
POTASSIUM SERPL-SCNC: 3.6 MMOL/L (ref 3.4–5.3)
POTASSIUM SERPL-SCNC: 3.7 MMOL/L (ref 3.4–5.3)
POTASSIUM SERPL-SCNC: 3.8 MMOL/L (ref 3.4–5.3)
POTASSIUM SERPL-SCNC: 3.9 MMOL/L (ref 3.4–5.3)
POTASSIUM SERPL-SCNC: 4 MMOL/L (ref 3.4–5.3)
POTASSIUM SERPL-SCNC: 4.1 MMOL/L (ref 3.4–5.3)
POTASSIUM SERPL-SCNC: 4.2 MMOL/L (ref 3.4–5.3)
POTASSIUM SERPL-SCNC: 4.3 MMOL/L (ref 3.4–5.3)
POTASSIUM SERPL-SCNC: 4.4 MMOL/L (ref 3.4–5.3)
POTASSIUM SERPL-SCNC: 4.5 MMOL/L (ref 3.4–5.3)
POTASSIUM SERPL-SCNC: 4.6 MMOL/L (ref 3.4–5.3)
POTASSIUM SERPL-SCNC: 4.7 MMOL/L (ref 3.4–5.3)
POTASSIUM SERPL-SCNC: 4.8 MMOL/L (ref 3.4–5.3)
POTASSIUM SERPL-SCNC: 4.9 MMOL/L (ref 3.4–5.3)
POTASSIUM SERPL-SCNC: 5 MMOL/L (ref 3.4–5.3)
POTASSIUM SERPL-SCNC: 5.1 MMOL/L (ref 3.4–5.3)
POTASSIUM SERPL-SCNC: 5.2 MMOL/L (ref 3.4–5.3)
POTASSIUM SERPL-SCNC: 5.3 MMOL/L (ref 3.4–5.3)
POTASSIUM SERPL-SCNC: 5.4 MMOL/L (ref 3.4–5.3)
POTASSIUM SERPL-SCNC: 5.4 MMOL/L (ref 3.4–5.3)
POTASSIUM SERPL-SCNC: 5.5 MMOL/L (ref 3.4–5.3)
POTASSIUM SERPL-SCNC: 5.6 MMOL/L (ref 3.4–5.3)
POTASSIUM SERPL-SCNC: 5.7 MMOL/L (ref 3.4–5.3)
POTASSIUM SERPL-SCNC: 5.8 MMOL/L (ref 3.4–5.3)
POTASSIUM SERPL-SCNC: 6.4 MMOL/L (ref 3.4–5.3)
PROMYELOCYTES # BLD MANUAL: 0.1 10E9/L
PROMYELOCYTES # BLD MANUAL: 0.1 10E9/L
PROMYELOCYTES # BLD MANUAL: 0.2 10E9/L
PROMYELOCYTES # BLD MANUAL: 0.3 10E9/L
PROMYELOCYTES # BLD MANUAL: 0.4 10E9/L
PROMYELOCYTES # BLD MANUAL: 0.5 10E9/L
PROMYELOCYTES # BLD MANUAL: 0.6 10E9/L
PROMYELOCYTES # BLD MANUAL: 0.6 10E9/L
PROMYELOCYTES # BLD MANUAL: 0.7 10E9/L
PROMYELOCYTES # BLD MANUAL: 0.7 10E9/L
PROMYELOCYTES # BLD MANUAL: 0.8 10E9/L
PROMYELOCYTES # BLD MANUAL: 0.8 10E9/L
PROMYELOCYTES NFR BLD MANUAL: 0.9 %
PROMYELOCYTES NFR BLD MANUAL: 1 %
PROMYELOCYTES NFR BLD MANUAL: 1 %
PROMYELOCYTES NFR BLD MANUAL: 1.7 %
PROMYELOCYTES NFR BLD MANUAL: 1.8 %
PROMYELOCYTES NFR BLD MANUAL: 2 %
PROMYELOCYTES NFR BLD MANUAL: 2 %
PROMYELOCYTES NFR BLD MANUAL: 2.5 %
PROMYELOCYTES NFR BLD MANUAL: 2.6 %
PROMYELOCYTES NFR BLD MANUAL: 2.8 %
PROMYELOCYTES NFR BLD MANUAL: 3.6 %
PROPOXYPH SPEC-MCNC: NEGATIVE NG/ML
PROPOXYPH SPEC-MCNC: NEGATIVE NG/ML
PROT SERPL-MCNC: 3.9 G/DL (ref 6.8–8.8)
PROT SERPL-MCNC: 4.2 G/DL (ref 6.8–8.8)
PROT SERPL-MCNC: 4.3 G/DL (ref 6.8–8.8)
PROT SERPL-MCNC: 4.4 G/DL (ref 6.8–8.8)
PROT SERPL-MCNC: 4.4 G/DL (ref 6.8–8.8)
PROT SERPL-MCNC: 4.5 G/DL (ref 6.8–8.8)
PROT SERPL-MCNC: 4.5 G/DL (ref 6.8–8.8)
PROT SERPL-MCNC: 4.6 G/DL (ref 6.8–8.8)
PROT SERPL-MCNC: 4.7 G/DL (ref 6.8–8.8)
PROT SERPL-MCNC: 4.8 G/DL (ref 6.8–8.8)
PROT SERPL-MCNC: 4.9 G/DL (ref 6.8–8.8)
PROT SERPL-MCNC: 5 G/DL (ref 6.8–8.8)
PROT SERPL-MCNC: 5.1 G/DL (ref 6.8–8.8)
PROT SERPL-MCNC: 5.2 G/DL (ref 6.8–8.8)
PROT SERPL-MCNC: 5.3 G/DL (ref 6.8–8.8)
PROT SERPL-MCNC: 5.4 G/DL (ref 6.8–8.8)
PROT SERPL-MCNC: 5.5 G/DL (ref 6.8–8.8)
PROT SERPL-MCNC: 5.6 G/DL (ref 6.8–8.8)
PROT SERPL-MCNC: 5.6 G/DL (ref 6.8–8.8)
PROT SERPL-MCNC: 5.8 G/DL (ref 6.8–8.8)
PROT SERPL-MCNC: 5.8 G/DL (ref 6.8–8.8)
PROT SERPL-MCNC: 5.9 G/DL (ref 6.8–8.8)
PROT SERPL-MCNC: 6 G/DL (ref 6.8–8.8)
PROT SERPL-MCNC: 6.1 G/DL (ref 6.8–8.8)
PROT SERPL-MCNC: 6.2 G/DL (ref 6.8–8.8)
PROT SERPL-MCNC: 6.2 G/DL (ref 6.8–8.8)
PROT SERPL-MCNC: 6.4 G/DL (ref 6.8–8.8)
PROT SERPL-MCNC: 6.4 G/DL (ref 6.8–8.8)
PROT SERPL-MCNC: 6.6 G/DL (ref 6.8–8.8)
PROT SERPL-MCNC: 6.6 G/DL (ref 6.8–8.8)
PROT SERPL-MCNC: 6.7 G/DL (ref 6.8–8.8)
PROT SERPL-MCNC: 6.7 G/DL (ref 6.8–8.8)
PROT SERPL-MCNC: 6.8 G/DL (ref 6.8–8.8)
PROT SERPL-MCNC: 6.8 G/DL (ref 6.8–8.8)
PROT SERPL-MCNC: 6.9 G/DL (ref 6.8–8.8)
PROT SERPL-MCNC: 7 G/DL (ref 6.8–8.8)
PROT SERPL-MCNC: 7.1 G/DL (ref 6.8–8.8)
PROT SERPL-MCNC: 7.1 G/DL (ref 6.8–8.8)
PROT SERPL-MCNC: 7.2 G/DL (ref 6.8–8.8)
PROT SERPL-MCNC: 7.3 G/DL (ref 6.8–8.8)
PROT SERPL-MCNC: 7.4 G/DL (ref 6.8–8.8)
PROT SERPL-MCNC: 7.4 G/DL (ref 6.8–8.8)
PROT SERPL-MCNC: 7.5 G/DL (ref 6.8–8.8)
PROT SERPL-MCNC: 7.8 G/DL (ref 6.8–8.8)
PROT SERPL-MCNC: 7.8 G/DL (ref 6.8–8.8)
PROT SERPL-MCNC: 8 G/DL (ref 6.8–8.8)
PROT SERPL-MCNC: 8.1 G/DL (ref 6.8–8.8)
R TIME UNTIL CLOT FORMS: 6.3 MINUTE (ref 5–10)
RADIOLOGIST FLAGS: ABNORMAL
RADIOLOGIST FLAGS: ABNORMAL
RBC # BLD AUTO: 2.23 10E12/L (ref 4.4–5.9)
RBC # BLD AUTO: 2.34 10E12/L (ref 4.4–5.9)
RBC # BLD AUTO: 2.41 10E12/L (ref 4.4–5.9)
RBC # BLD AUTO: 2.5 10E12/L (ref 4.4–5.9)
RBC # BLD AUTO: 2.5 10E12/L (ref 4.4–5.9)
RBC # BLD AUTO: 2.52 10E12/L (ref 4.4–5.9)
RBC # BLD AUTO: 2.55 10E12/L (ref 4.4–5.9)
RBC # BLD AUTO: 2.58 10E12/L (ref 4.4–5.9)
RBC # BLD AUTO: 2.6 10E12/L (ref 4.4–5.9)
RBC # BLD AUTO: 2.61 10E12/L (ref 4.4–5.9)
RBC # BLD AUTO: 2.63 10E12/L (ref 4.4–5.9)
RBC # BLD AUTO: 2.69 10E12/L (ref 4.4–5.9)
RBC # BLD AUTO: 2.69 10E12/L (ref 4.4–5.9)
RBC # BLD AUTO: 2.7 10E12/L (ref 4.4–5.9)
RBC # BLD AUTO: 2.71 10E12/L (ref 4.4–5.9)
RBC # BLD AUTO: 2.74 10E12/L (ref 4.4–5.9)
RBC # BLD AUTO: 2.75 10E12/L (ref 4.4–5.9)
RBC # BLD AUTO: 2.77 10E12/L (ref 4.4–5.9)
RBC # BLD AUTO: 2.78 10E12/L (ref 4.4–5.9)
RBC # BLD AUTO: 2.79 10E12/L (ref 4.4–5.9)
RBC # BLD AUTO: 2.8 10E12/L (ref 4.4–5.9)
RBC # BLD AUTO: 2.8 10E12/L (ref 4.4–5.9)
RBC # BLD AUTO: 2.81 10E12/L (ref 4.4–5.9)
RBC # BLD AUTO: 2.82 10E12/L (ref 4.4–5.9)
RBC # BLD AUTO: 2.82 10E12/L (ref 4.4–5.9)
RBC # BLD AUTO: 2.85 10E12/L (ref 4.4–5.9)
RBC # BLD AUTO: 2.86 10E12/L (ref 4.4–5.9)
RBC # BLD AUTO: 2.86 10E12/L (ref 4.4–5.9)
RBC # BLD AUTO: 2.87 10E12/L (ref 4.4–5.9)
RBC # BLD AUTO: 2.88 10E12/L (ref 4.4–5.9)
RBC # BLD AUTO: 2.89 10E12/L (ref 4.4–5.9)
RBC # BLD AUTO: 2.91 10E12/L (ref 4.4–5.9)
RBC # BLD AUTO: 2.92 10E12/L (ref 4.4–5.9)
RBC # BLD AUTO: 2.93 10E12/L (ref 4.4–5.9)
RBC # BLD AUTO: 2.94 10E12/L (ref 4.4–5.9)
RBC # BLD AUTO: 2.94 10E12/L (ref 4.4–5.9)
RBC # BLD AUTO: 2.95 10E12/L (ref 4.4–5.9)
RBC # BLD AUTO: 2.96 10E12/L (ref 4.4–5.9)
RBC # BLD AUTO: 2.97 10E12/L (ref 4.4–5.9)
RBC # BLD AUTO: 2.97 10E12/L (ref 4.4–5.9)
RBC # BLD AUTO: 2.98 10E12/L (ref 4.4–5.9)
RBC # BLD AUTO: 3 10E12/L (ref 4.4–5.9)
RBC # BLD AUTO: 3 10E12/L (ref 4.4–5.9)
RBC # BLD AUTO: 3.02 10E12/L (ref 4.4–5.9)
RBC # BLD AUTO: 3.03 10E12/L (ref 4.4–5.9)
RBC # BLD AUTO: 3.04 10E12/L (ref 4.4–5.9)
RBC # BLD AUTO: 3.06 10E12/L (ref 4.4–5.9)
RBC # BLD AUTO: 3.06 10E12/L (ref 4.4–5.9)
RBC # BLD AUTO: 3.07 10E12/L (ref 4.4–5.9)
RBC # BLD AUTO: 3.07 10E12/L (ref 4.4–5.9)
RBC # BLD AUTO: 3.08 10E12/L (ref 4.4–5.9)
RBC # BLD AUTO: 3.08 10E12/L (ref 4.4–5.9)
RBC # BLD AUTO: 3.09 10E12/L (ref 4.4–5.9)
RBC # BLD AUTO: 3.1 10E12/L (ref 4.4–5.9)
RBC # BLD AUTO: 3.11 10E12/L (ref 4.4–5.9)
RBC # BLD AUTO: 3.12 10E12/L (ref 4.4–5.9)
RBC # BLD AUTO: 3.16 10E12/L (ref 4.4–5.9)
RBC # BLD AUTO: 3.17 10E12/L (ref 4.4–5.9)
RBC # BLD AUTO: 3.19 10E12/L (ref 4.4–5.9)
RBC # BLD AUTO: 3.19 10E12/L (ref 4.4–5.9)
RBC # BLD AUTO: 3.2 10E12/L (ref 4.4–5.9)
RBC # BLD AUTO: 3.21 10E12/L (ref 4.4–5.9)
RBC # BLD AUTO: 3.22 10E12/L (ref 4.4–5.9)
RBC # BLD AUTO: 3.25 10E12/L (ref 4.4–5.9)
RBC # BLD AUTO: 3.27 10E12/L (ref 4.4–5.9)
RBC # BLD AUTO: 3.28 10E12/L (ref 4.4–5.9)
RBC # BLD AUTO: 3.28 10E12/L (ref 4.4–5.9)
RBC # BLD AUTO: 3.3 10E12/L (ref 4.4–5.9)
RBC # BLD AUTO: 3.31 10E12/L (ref 4.4–5.9)
RBC # BLD AUTO: 3.31 10E12/L (ref 4.4–5.9)
RBC # BLD AUTO: 3.32 10E12/L (ref 4.4–5.9)
RBC # BLD AUTO: 3.33 10E12/L (ref 4.4–5.9)
RBC # BLD AUTO: 3.34 10E12/L (ref 4.4–5.9)
RBC # BLD AUTO: 3.35 10E12/L (ref 4.4–5.9)
RBC # BLD AUTO: 3.35 10E12/L (ref 4.4–5.9)
RBC # BLD AUTO: 3.36 10E12/L (ref 4.4–5.9)
RBC # BLD AUTO: 3.36 10E12/L (ref 4.4–5.9)
RBC # BLD AUTO: 3.37 10E12/L (ref 4.4–5.9)
RBC # BLD AUTO: 3.38 10E12/L (ref 4.4–5.9)
RBC # BLD AUTO: 3.38 10E12/L (ref 4.4–5.9)
RBC # BLD AUTO: 3.39 10E12/L (ref 4.4–5.9)
RBC # BLD AUTO: 3.41 10E12/L (ref 4.4–5.9)
RBC # BLD AUTO: 3.43 10E12/L (ref 4.4–5.9)
RBC # BLD AUTO: 3.44 10E12/L (ref 4.4–5.9)
RBC # BLD AUTO: 3.45 10E12/L (ref 4.4–5.9)
RBC # BLD AUTO: 3.45 10E12/L (ref 4.4–5.9)
RBC # BLD AUTO: 3.46 10E12/L (ref 4.4–5.9)
RBC # BLD AUTO: 3.46 10E12/L (ref 4.4–5.9)
RBC # BLD AUTO: 3.5 10E12/L (ref 4.4–5.9)
RBC # BLD AUTO: 3.51 10E12/L (ref 4.4–5.9)
RBC # BLD AUTO: 3.53 10E12/L (ref 4.4–5.9)
RBC # BLD AUTO: 3.54 10E12/L (ref 4.4–5.9)
RBC # BLD AUTO: 3.55 10E12/L (ref 4.4–5.9)
RBC # BLD AUTO: 3.55 10E12/L (ref 4.4–5.9)
RBC # BLD AUTO: 3.57 10E12/L (ref 4.4–5.9)
RBC # BLD AUTO: 3.61 10E12/L (ref 4.4–5.9)
RBC # BLD AUTO: 3.62 10E12/L (ref 4.4–5.9)
RBC # BLD AUTO: 3.63 10E12/L (ref 4.4–5.9)
RBC # BLD AUTO: 3.65 10E12/L (ref 4.4–5.9)
RBC # BLD AUTO: 3.66 10E12/L (ref 4.4–5.9)
RBC # BLD AUTO: 3.68 10E12/L (ref 4.4–5.9)
RBC # BLD AUTO: 3.77 10E12/L (ref 4.4–5.9)
RBC # BLD AUTO: 3.78 10E12/L (ref 4.4–5.9)
RBC # BLD AUTO: 3.85 10E12/L (ref 4.4–5.9)
RBC # BLD AUTO: 3.95 10E12/L (ref 4.4–5.9)
RBC # BLD AUTO: 4.02 10E12/L (ref 4.4–5.9)
RBC # BLD AUTO: 4.13 10E12/L (ref 4.4–5.9)
RBC # BLD AUTO: 4.5 10E12/L (ref 4.4–5.9)
RBC # BLD AUTO: 4.62 10E12/L (ref 4.4–5.9)
RBC # BLD AUTO: 4.8 10E12/L (ref 4.4–5.9)
RBC # BLD AUTO: 4.84 10E12/L (ref 4.4–5.9)
RBC # BLD AUTO: 5.05 10E12/L (ref 4.4–5.9)
RBC # BLD AUTO: 5.3 10E12/L (ref 4.4–5.9)
RBC # BLD AUTO: NORMAL 10E12/L (ref 4.4–5.9)
RBC #/AREA URNS AUTO: >182 /HPF (ref 0–2)
RBC INCLUSIONS BLD: SLIGHT
RBC MORPH BLD: NORMAL
RESULT: ABNORMAL
RHINOVIRUS RNA SPEC QL NAA+PROBE: NEGATIVE
RSV RNA SPEC QL NAA+PROBE: NEGATIVE
RSV RNA SPEC QL NAA+PROBE: NEGATIVE
SAO2 % BLDA FROM PO2: 100 % (ref 92–100)
SAO2 % BLDA FROM PO2: 53 % (ref 92–100)
SAO2 % BLDA FROM PO2: 59 % (ref 92–100)
SAO2 % BLDA FROM PO2: 85 % (ref 92–100)
SAO2 % BLDA FROM PO2: 99 % (ref 92–100)
SAO2 % BLDV FROM PO2: 19 %
SAO2 % BLDV FROM PO2: 19 %
SEND OUTS MISC TEST CODE: ABNORMAL
SEND OUTS MISC TEST SPECIMEN: ABNORMAL
SODIUM BLD-SCNC: 134 MMOL/L (ref 133–144)
SODIUM BLD-SCNC: 135 MMOL/L (ref 133–144)
SODIUM BLD-SCNC: 136 MMOL/L (ref 133–144)
SODIUM BLD-SCNC: 136 MMOL/L (ref 133–144)
SODIUM BLD-SCNC: 138 MMOL/L (ref 133–144)
SODIUM BLD-SCNC: 139 MMOL/L (ref 133–144)
SODIUM BLD-SCNC: 140 MMOL/L (ref 133–144)
SODIUM BLD-SCNC: 140 MMOL/L (ref 133–144)
SODIUM BLD-SCNC: 149 MMOL/L (ref 133–144)
SODIUM BLD-SCNC: 149 MMOL/L (ref 133–144)
SODIUM BLD-SCNC: 152 MMOL/L (ref 133–144)
SODIUM BLD-SCNC: 153 MMOL/L (ref 133–144)
SODIUM BLD-SCNC: 153 MMOL/L (ref 133–144)
SODIUM BLD-SCNC: 155 MMOL/L (ref 133–144)
SODIUM BLD-SCNC: 157 MMOL/L (ref 133–144)
SODIUM BLD-SCNC: 157 MMOL/L (ref 133–144)
SODIUM BLD-SCNC: 158 MMOL/L (ref 133–144)
SODIUM BLD-SCNC: 159 MMOL/L (ref 133–144)
SODIUM BLD-SCNC: NORMAL MMOL/L (ref 133–144)
SODIUM SERPL-SCNC: 132 MMOL/L (ref 133–144)
SODIUM SERPL-SCNC: 133 MMOL/L (ref 133–144)
SODIUM SERPL-SCNC: 134 MMOL/L (ref 133–144)
SODIUM SERPL-SCNC: 135 MMOL/L (ref 133–144)
SODIUM SERPL-SCNC: 136 MMOL/L (ref 133–144)
SODIUM SERPL-SCNC: 137 MMOL/L (ref 133–144)
SODIUM SERPL-SCNC: 138 MMOL/L (ref 133–144)
SODIUM SERPL-SCNC: 139 MMOL/L (ref 133–144)
SODIUM SERPL-SCNC: 140 MMOL/L (ref 133–144)
SODIUM SERPL-SCNC: 141 MMOL/L (ref 133–144)
SODIUM SERPL-SCNC: 141 MMOL/L (ref 133–144)
SODIUM SERPL-SCNC: 144 MMOL/L (ref 133–144)
SODIUM SERPL-SCNC: 145 MMOL/L (ref 133–144)
SODIUM SERPL-SCNC: 147 MMOL/L (ref 133–144)
SODIUM SERPL-SCNC: 148 MMOL/L (ref 133–144)
SODIUM SERPL-SCNC: 150 MMOL/L (ref 133–144)
SODIUM SERPL-SCNC: 150 MMOL/L (ref 133–144)
SODIUM SERPL-SCNC: 151 MMOL/L (ref 133–144)
SODIUM SERPL-SCNC: 152 MMOL/L (ref 133–144)
SODIUM SERPL-SCNC: 153 MMOL/L (ref 133–144)
SODIUM SERPL-SCNC: 154 MMOL/L (ref 133–144)
SODIUM SERPL-SCNC: 155 MMOL/L (ref 133–144)
SODIUM SERPL-SCNC: 156 MMOL/L (ref 133–144)
SODIUM SERPL-SCNC: 158 MMOL/L (ref 133–144)
SODIUM SERPL-SCNC: 158 MMOL/L (ref 133–144)
SODIUM SERPL-SCNC: 159 MMOL/L (ref 133–144)
SODIUM SERPL-SCNC: 161 MMOL/L (ref 133–144)
SOURCE: ABNORMAL
SP GR UR STRIP: 1.03 (ref 1–1.03)
SPECIMEN EXP DATE BLD: NORMAL
SPECIMEN SOURCE: ABNORMAL
SPECIMEN SOURCE: NORMAL
SPHEROCYTES BLD QL SMEAR: SLIGHT
SQUAMOUS #/AREA URNS AUTO: 1 /HPF (ref 0–1)
TARGETS BLD QL SMEAR: ABNORMAL
TARGETS BLD QL SMEAR: SLIGHT
TEMAZEPAM SERPL-MCNC: NEGATIVE NG/ML
TEMAZEPAM SERPL-MCNC: NEGATIVE NG/ML
TEST NAME: ABNORMAL
TRAMADOL BLD-MCNC: NEGATIVE NG/ML
TRAMADOL BLD-MCNC: NEGATIVE NG/ML
TRANS CELLS #/AREA URNS HPF: <1 /HPF (ref 0–1)
TRANSFUSION STATUS PATIENT QL: NORMAL
TRIAZOLAM BLD-MCNC: NEGATIVE NG/ML
TRIAZOLAM BLD-MCNC: NEGATIVE NG/ML
TRIGL SERPL-MCNC: 292 MG/DL
TRIGL SERPL-MCNC: 619 MG/DL
TRIGL SERPL-MCNC: ABNORMAL MG/DL
TROPONIN I BLD-MCNC: 0.44 UG/L (ref 0–0.08)
TROPONIN I SERPL-MCNC: 0.26 UG/L (ref 0–0.04)
TROPONIN I SERPL-MCNC: 0.27 UG/L (ref 0–0.04)
TROPONIN I SERPL-MCNC: 0.27 UG/L (ref 0–0.04)
TROPONIN I SERPL-MCNC: 0.29 UG/L (ref 0–0.04)
TROPONIN I SERPL-MCNC: 0.29 UG/L (ref 0–0.04)
TROPONIN I SERPL-MCNC: 0.34 UG/L (ref 0–0.04)
TROPONIN I SERPL-MCNC: 0.37 UG/L (ref 0–0.04)
TROPONIN I SERPL-MCNC: 0.39 UG/L (ref 0–0.04)
TROPONIN I SERPL-MCNC: 0.42 UG/L (ref 0–0.04)
TROPONIN I SERPL-MCNC: 0.47 UG/L (ref 0–0.04)
TROPONIN I SERPL-MCNC: 0.55 UG/L (ref 0–0.04)
TROPONIN I SERPL-MCNC: 0.69 UG/L (ref 0–0.04)
TROPONIN I SERPL-MCNC: 0.86 UG/L (ref 0–0.04)
TROPONIN I SERPL-MCNC: 1.36 UG/L (ref 0–0.04)
TROPONIN I SERPL-MCNC: 1.44 UG/L (ref 0–0.04)
TROPONIN I SERPL-MCNC: 1.46 UG/L (ref 0–0.04)
TROPONIN I SERPL-MCNC: 1.52 UG/L (ref 0–0.04)
TROPONIN I SERPL-MCNC: 1.54 UG/L (ref 0–0.04)
TROPONIN I SERPL-MCNC: 1.56 UG/L (ref 0–0.04)
TROPONIN I SERPL-MCNC: 1.59 UG/L (ref 0–0.04)
TROPONIN I SERPL-MCNC: 1.73 UG/L (ref 0–0.04)
TROPONIN I SERPL-MCNC: 1.76 UG/L (ref 0–0.04)
TROPONIN I SERPL-MCNC: 1.82 UG/L (ref 0–0.04)
TROPONIN I SERPL-MCNC: 1.87 UG/L (ref 0–0.04)
TROPONIN I SERPL-MCNC: 10.45 UG/L (ref 0–0.04)
TROPONIN I SERPL-MCNC: 10.55 UG/L (ref 0–0.04)
TROPONIN I SERPL-MCNC: 114.74 UG/L (ref 0–0.04)
TROPONIN I SERPL-MCNC: 119.87 UG/L (ref 0–0.04)
TROPONIN I SERPL-MCNC: 126.13 UG/L (ref 0–0.04)
TROPONIN I SERPL-MCNC: 127.7 UG/L (ref 0–0.04)
TROPONIN I SERPL-MCNC: 13.76 UG/L (ref 0–0.04)
TROPONIN I SERPL-MCNC: 133.56 UG/L (ref 0–0.04)
TROPONIN I SERPL-MCNC: 134.29 UG/L (ref 0–0.04)
TROPONIN I SERPL-MCNC: 14.34 UG/L (ref 0–0.04)
TROPONIN I SERPL-MCNC: 143.01 UG/L (ref 0–0.04)
TROPONIN I SERPL-MCNC: 150.44 UG/L (ref 0–0.04)
TROPONIN I SERPL-MCNC: 151.69 UG/L (ref 0–0.04)
TROPONIN I SERPL-MCNC: 158.71 UG/L (ref 0–0.04)
TROPONIN I SERPL-MCNC: 164.6 UG/L (ref 0–0.04)
TROPONIN I SERPL-MCNC: 167.85 UG/L (ref 0–0.04)
TROPONIN I SERPL-MCNC: 17.64 UG/L (ref 0–0.04)
TROPONIN I SERPL-MCNC: 17.66 UG/L (ref 0–0.04)
TROPONIN I SERPL-MCNC: 196.43 UG/L (ref 0–0.04)
TROPONIN I SERPL-MCNC: 2.08 UG/L (ref 0–0.04)
TROPONIN I SERPL-MCNC: 2.09 UG/L (ref 0–0.04)
TROPONIN I SERPL-MCNC: 2.21 UG/L (ref 0–0.04)
TROPONIN I SERPL-MCNC: 2.29 UG/L (ref 0–0.04)
TROPONIN I SERPL-MCNC: 2.42 UG/L (ref 0–0.04)
TROPONIN I SERPL-MCNC: 2.52 UG/L (ref 0–0.04)
TROPONIN I SERPL-MCNC: 2.52 UG/L (ref 0–0.04)
TROPONIN I SERPL-MCNC: 2.53 UG/L (ref 0–0.04)
TROPONIN I SERPL-MCNC: 2.53 UG/L (ref 0–0.04)
TROPONIN I SERPL-MCNC: 2.58 UG/L (ref 0–0.04)
TROPONIN I SERPL-MCNC: 2.61 UG/L (ref 0–0.04)
TROPONIN I SERPL-MCNC: 2.62 UG/L (ref 0–0.04)
TROPONIN I SERPL-MCNC: 2.62 UG/L (ref 0–0.04)
TROPONIN I SERPL-MCNC: 2.63 UG/L (ref 0–0.04)
TROPONIN I SERPL-MCNC: 2.63 UG/L (ref 0–0.04)
TROPONIN I SERPL-MCNC: 2.64 UG/L (ref 0–0.04)
TROPONIN I SERPL-MCNC: 2.84 UG/L (ref 0–0.04)
TROPONIN I SERPL-MCNC: 2.87 UG/L (ref 0–0.04)
TROPONIN I SERPL-MCNC: 24.61 UG/L (ref 0–0.04)
TROPONIN I SERPL-MCNC: 27.7 UG/L (ref 0–0.04)
TROPONIN I SERPL-MCNC: 29.78 UG/L (ref 0–0.04)
TROPONIN I SERPL-MCNC: 3.01 UG/L (ref 0–0.04)
TROPONIN I SERPL-MCNC: 3.09 UG/L (ref 0–0.04)
TROPONIN I SERPL-MCNC: 3.1 UG/L (ref 0–0.04)
TROPONIN I SERPL-MCNC: 3.13 UG/L (ref 0–0.04)
TROPONIN I SERPL-MCNC: 3.16 UG/L (ref 0–0.04)
TROPONIN I SERPL-MCNC: 3.17 UG/L (ref 0–0.04)
TROPONIN I SERPL-MCNC: 3.26 UG/L (ref 0–0.04)
TROPONIN I SERPL-MCNC: 3.3 UG/L (ref 0–0.04)
TROPONIN I SERPL-MCNC: 3.6 UG/L (ref 0–0.04)
TROPONIN I SERPL-MCNC: 4.48 UG/L (ref 0–0.04)
TROPONIN I SERPL-MCNC: 48.49 UG/L (ref 0–0.04)
TROPONIN I SERPL-MCNC: 5.48 UG/L (ref 0–0.04)
TROPONIN I SERPL-MCNC: 5.5 UG/L (ref 0–0.04)
TROPONIN I SERPL-MCNC: 5.82 UG/L (ref 0–0.04)
TROPONIN I SERPL-MCNC: 6.74 UG/L (ref 0–0.04)
TROPONIN I SERPL-MCNC: 6.84 UG/L (ref 0–0.04)
TROPONIN I SERPL-MCNC: 61.7 UG/L (ref 0–0.04)
TROPONIN I SERPL-MCNC: 66.06 UG/L (ref 0–0.04)
TROPONIN I SERPL-MCNC: 69.34 UG/L (ref 0–0.04)
TROPONIN I SERPL-MCNC: 69.8 UG/L (ref 0–0.04)
TROPONIN I SERPL-MCNC: 7.07 UG/L (ref 0–0.04)
TROPONIN I SERPL-MCNC: 72.55 UG/L (ref 0–0.04)
TROPONIN I SERPL-MCNC: 9.04 UG/L (ref 0–0.04)
TROPONIN I SERPL-MCNC: 94.13 UG/L (ref 0–0.04)
TROPONIN I SERPL-MCNC: 94.82 UG/L (ref 0–0.04)
TROPONIN I SERPL-MCNC: >200 UG/L (ref 0–0.04)
UROBILINOGEN UR STRIP-MCNC: NORMAL MG/DL (ref 0–2)
VANCOMYCIN SERPL-MCNC: 21.3 MG/L
VANCOMYCIN SERPL-MCNC: 24.5 MG/L
VANCOMYCIN SERPL-MCNC: 26.3 MG/L
VARIANT LYMPHS BLD QL SMEAR: PRESENT
VARIANT LYMPHS BLD QL SMEAR: PRESENT
WBC # BLD AUTO: 10 10E9/L (ref 4–11)
WBC # BLD AUTO: 10 10E9/L (ref 4–11)
WBC # BLD AUTO: 11.4 10E9/L (ref 4–11)
WBC # BLD AUTO: 11.7 10E9/L (ref 4–11)
WBC # BLD AUTO: 12.4 10E9/L (ref 4–11)
WBC # BLD AUTO: 12.7 10E9/L (ref 4–11)
WBC # BLD AUTO: 12.8 10E9/L (ref 4–11)
WBC # BLD AUTO: 12.9 10E9/L (ref 4–11)
WBC # BLD AUTO: 13.2 10E9/L (ref 4–11)
WBC # BLD AUTO: 13.6 10E9/L (ref 4–11)
WBC # BLD AUTO: 15 10E9/L (ref 4–11)
WBC # BLD AUTO: 15.2 10E9/L (ref 4–11)
WBC # BLD AUTO: 15.4 10E9/L (ref 4–11)
WBC # BLD AUTO: 15.5 10E9/L (ref 4–11)
WBC # BLD AUTO: 16.2 10E9/L (ref 4–11)
WBC # BLD AUTO: 16.6 10E9/L (ref 4–11)
WBC # BLD AUTO: 17 10E9/L (ref 4–11)
WBC # BLD AUTO: 17.3 10E9/L (ref 4–11)
WBC # BLD AUTO: 17.4 10E9/L (ref 4–11)
WBC # BLD AUTO: 17.4 10E9/L (ref 4–11)
WBC # BLD AUTO: 17.6 10E9/L (ref 4–11)
WBC # BLD AUTO: 17.9 10E9/L (ref 4–11)
WBC # BLD AUTO: 17.9 10E9/L (ref 4–11)
WBC # BLD AUTO: 18.1 10E9/L (ref 4–11)
WBC # BLD AUTO: 18.3 10E9/L (ref 4–11)
WBC # BLD AUTO: 18.4 10E9/L (ref 4–11)
WBC # BLD AUTO: 18.6 10E9/L (ref 4–11)
WBC # BLD AUTO: 19.4 10E9/L (ref 4–11)
WBC # BLD AUTO: 19.8 10E9/L (ref 4–11)
WBC # BLD AUTO: 19.9 10E9/L (ref 4–11)
WBC # BLD AUTO: 21.1 10E9/L (ref 4–11)
WBC # BLD AUTO: 21.2 10E9/L (ref 4–11)
WBC # BLD AUTO: 21.3 10E9/L (ref 4–11)
WBC # BLD AUTO: 21.5 10E9/L (ref 4–11)
WBC # BLD AUTO: 21.6 10E9/L (ref 4–11)
WBC # BLD AUTO: 21.7 10E9/L (ref 4–11)
WBC # BLD AUTO: 21.8 10E9/L (ref 4–11)
WBC # BLD AUTO: 22 10E9/L (ref 4–11)
WBC # BLD AUTO: 22.7 10E9/L (ref 4–11)
WBC # BLD AUTO: 23 10E9/L (ref 4–11)
WBC # BLD AUTO: 23.1 10E9/L (ref 4–11)
WBC # BLD AUTO: 23.2 10E9/L (ref 4–11)
WBC # BLD AUTO: 23.3 10E9/L (ref 4–11)
WBC # BLD AUTO: 23.4 10E9/L (ref 4–11)
WBC # BLD AUTO: 23.5 10E9/L (ref 4–11)
WBC # BLD AUTO: 23.6 10E9/L (ref 4–11)
WBC # BLD AUTO: 23.7 10E9/L (ref 4–11)
WBC # BLD AUTO: 24.2 10E9/L (ref 4–11)
WBC # BLD AUTO: 24.2 10E9/L (ref 4–11)
WBC # BLD AUTO: 24.4 10E9/L (ref 4–11)
WBC # BLD AUTO: 24.5 10E9/L (ref 4–11)
WBC # BLD AUTO: 24.6 10E9/L (ref 4–11)
WBC # BLD AUTO: 24.6 10E9/L (ref 4–11)
WBC # BLD AUTO: 24.7 10E9/L (ref 4–11)
WBC # BLD AUTO: 24.7 10E9/L (ref 4–11)
WBC # BLD AUTO: 24.8 10E9/L (ref 4–11)
WBC # BLD AUTO: 24.9 10E9/L (ref 4–11)
WBC # BLD AUTO: 25.1 10E9/L (ref 4–11)
WBC # BLD AUTO: 25.1 10E9/L (ref 4–11)
WBC # BLD AUTO: 25.2 10E9/L (ref 4–11)
WBC # BLD AUTO: 25.6 10E9/L (ref 4–11)
WBC # BLD AUTO: 25.7 10E9/L (ref 4–11)
WBC # BLD AUTO: 26 10E9/L (ref 4–11)
WBC # BLD AUTO: 26.2 10E9/L (ref 4–11)
WBC # BLD AUTO: 26.3 10E9/L (ref 4–11)
WBC # BLD AUTO: 26.4 10E9/L (ref 4–11)
WBC # BLD AUTO: 26.4 10E9/L (ref 4–11)
WBC # BLD AUTO: 26.6 10E9/L (ref 4–11)
WBC # BLD AUTO: 26.7 10E9/L (ref 4–11)
WBC # BLD AUTO: 26.7 10E9/L (ref 4–11)
WBC # BLD AUTO: 26.8 10E9/L (ref 4–11)
WBC # BLD AUTO: 26.8 10E9/L (ref 4–11)
WBC # BLD AUTO: 27 10E9/L (ref 4–11)
WBC # BLD AUTO: 27.3 10E9/L (ref 4–11)
WBC # BLD AUTO: 27.4 10E9/L (ref 4–11)
WBC # BLD AUTO: 27.7 10E9/L (ref 4–11)
WBC # BLD AUTO: 27.7 10E9/L (ref 4–11)
WBC # BLD AUTO: 28 10E9/L (ref 4–11)
WBC # BLD AUTO: 28.3 10E9/L (ref 4–11)
WBC # BLD AUTO: 28.6 10E9/L (ref 4–11)
WBC # BLD AUTO: 28.9 10E9/L (ref 4–11)
WBC # BLD AUTO: 29 10E9/L (ref 4–11)
WBC # BLD AUTO: 29.1 10E9/L (ref 4–11)
WBC # BLD AUTO: 29.2 10E9/L (ref 4–11)
WBC # BLD AUTO: 29.8 10E9/L (ref 4–11)
WBC # BLD AUTO: 30.2 10E9/L (ref 4–11)
WBC # BLD AUTO: 30.2 10E9/L (ref 4–11)
WBC # BLD AUTO: 30.9 10E9/L (ref 4–11)
WBC # BLD AUTO: 30.9 10E9/L (ref 4–11)
WBC # BLD AUTO: 31.6 10E9/L (ref 4–11)
WBC # BLD AUTO: 31.9 10E9/L (ref 4–11)
WBC # BLD AUTO: 32.2 10E9/L (ref 4–11)
WBC # BLD AUTO: 32.4 10E9/L (ref 4–11)
WBC # BLD AUTO: 32.5 10E9/L (ref 4–11)
WBC # BLD AUTO: 32.7 10E9/L (ref 4–11)
WBC # BLD AUTO: 32.8 10E9/L (ref 4–11)
WBC # BLD AUTO: 32.9 10E9/L (ref 4–11)
WBC # BLD AUTO: 33 10E9/L (ref 4–11)
WBC # BLD AUTO: 33.7 10E9/L (ref 4–11)
WBC # BLD AUTO: 33.8 10E9/L (ref 4–11)
WBC # BLD AUTO: 34.1 10E9/L (ref 4–11)
WBC # BLD AUTO: 34.2 10E9/L (ref 4–11)
WBC # BLD AUTO: 34.3 10E9/L (ref 4–11)
WBC # BLD AUTO: 34.3 10E9/L (ref 4–11)
WBC # BLD AUTO: 34.6 10E9/L (ref 4–11)
WBC # BLD AUTO: 34.6 10E9/L (ref 4–11)
WBC # BLD AUTO: 34.7 10E9/L (ref 4–11)
WBC # BLD AUTO: 34.7 10E9/L (ref 4–11)
WBC # BLD AUTO: 35 10E9/L (ref 4–11)
WBC # BLD AUTO: 35.2 10E9/L (ref 4–11)
WBC # BLD AUTO: 35.3 10E9/L (ref 4–11)
WBC # BLD AUTO: 35.8 10E9/L (ref 4–11)
WBC # BLD AUTO: 36.8 10E9/L (ref 4–11)
WBC # BLD AUTO: 36.9 10E9/L (ref 4–11)
WBC # BLD AUTO: 37.1 10E9/L (ref 4–11)
WBC # BLD AUTO: 37.6 10E9/L (ref 4–11)
WBC # BLD AUTO: 37.9 10E9/L (ref 4–11)
WBC # BLD AUTO: 38.1 10E9/L (ref 4–11)
WBC # BLD AUTO: 38.4 10E9/L (ref 4–11)
WBC # BLD AUTO: 41.1 10E9/L (ref 4–11)
WBC # BLD AUTO: 41.5 10E9/L (ref 4–11)
WBC # BLD AUTO: 42.1 10E9/L (ref 4–11)
WBC # BLD AUTO: 45.7 10E9/L (ref 4–11)
WBC # BLD AUTO: 8.9 10E9/L (ref 4–11)
WBC # BLD AUTO: 9.4 10E9/L (ref 4–11)
WBC # BLD AUTO: 9.7 10E9/L (ref 4–11)
WBC # BLD AUTO: 9.8 10E9/L (ref 4–11)
WBC # BLD AUTO: NORMAL 10E9/L (ref 4–11)
WBC #/AREA URNS AUTO: 7 /HPF (ref 0–5)
WBC CLUMPS #/AREA URNS HPF: PRESENT /HPF

## 2019-01-01 PROCEDURE — 25000132 ZZH RX MED GY IP 250 OP 250 PS 637

## 2019-01-01 PROCEDURE — 93321 DOPPLER ECHO F-UP/LMTD STD: CPT | Mod: 26 | Performed by: INTERNAL MEDICINE

## 2019-01-01 PROCEDURE — 27210437 ZZH NUTRITION PRODUCT SEMIELEM INTERMED LITER

## 2019-01-01 PROCEDURE — 84484 ASSAY OF TROPONIN QUANT: CPT | Performed by: GENERAL ACUTE CARE HOSPITAL

## 2019-01-01 PROCEDURE — 83735 ASSAY OF MAGNESIUM: CPT | Performed by: CLINICAL NURSE SPECIALIST

## 2019-01-01 PROCEDURE — 40000281 ZZH STATISTIC TRANSPORT TIME EA 15 MIN

## 2019-01-01 PROCEDURE — 83605 ASSAY OF LACTIC ACID: CPT | Performed by: INTERNAL MEDICINE

## 2019-01-01 PROCEDURE — 85730 THROMBOPLASTIN TIME PARTIAL: CPT | Performed by: INTERNAL MEDICINE

## 2019-01-01 PROCEDURE — 83051 HEMOGLOBIN PLASMA: CPT | Performed by: CLINICAL NURSE SPECIALIST

## 2019-01-01 PROCEDURE — 33949 ECMO/ECLS DAILY MGMT ARTERY: CPT

## 2019-01-01 PROCEDURE — 86901 BLOOD TYPING SEROLOGIC RH(D): CPT | Performed by: INTERNAL MEDICINE

## 2019-01-01 PROCEDURE — 25000128 H RX IP 250 OP 636: Performed by: INTERNAL MEDICINE

## 2019-01-01 PROCEDURE — 82330 ASSAY OF CALCIUM: CPT | Performed by: INTERNAL MEDICINE

## 2019-01-01 PROCEDURE — 00000146 ZZHCL STATISTIC GLUCOSE BY METER IP

## 2019-01-01 PROCEDURE — 25000132 ZZH RX MED GY IP 250 OP 250 PS 637: Performed by: INTERNAL MEDICINE

## 2019-01-01 PROCEDURE — 25000125 ZZHC RX 250: Performed by: INTERNAL MEDICINE

## 2019-01-01 PROCEDURE — 95951 ZZHC EEG VIDEO EACH 24 HR: CPT | Mod: ZF

## 2019-01-01 PROCEDURE — 87040 BLOOD CULTURE FOR BACTERIA: CPT | Performed by: INTERNAL MEDICINE

## 2019-01-01 PROCEDURE — 85379 FIBRIN DEGRADATION QUANT: CPT | Performed by: INTERNAL MEDICINE

## 2019-01-01 PROCEDURE — 85347 COAGULATION TIME ACTIVATED: CPT

## 2019-01-01 PROCEDURE — 36592 COLLECT BLOOD FROM PICC: CPT

## 2019-01-01 PROCEDURE — 90947 DIALYSIS REPEATED EVAL: CPT

## 2019-01-01 PROCEDURE — 80354 DRUG SCREENING FENTANYL: CPT | Performed by: INTERNAL MEDICINE

## 2019-01-01 PROCEDURE — 82803 BLOOD GASES ANY COMBINATION: CPT | Mod: 91

## 2019-01-01 PROCEDURE — 86900 BLOOD TYPING SEROLOGIC ABO: CPT | Performed by: INTERNAL MEDICINE

## 2019-01-01 PROCEDURE — 83615 LACTATE (LD) (LDH) ENZYME: CPT | Performed by: INTERNAL MEDICINE

## 2019-01-01 PROCEDURE — 84484 ASSAY OF TROPONIN QUANT: CPT | Performed by: CLINICAL NURSE SPECIALIST

## 2019-01-01 PROCEDURE — 82947 ASSAY GLUCOSE BLOOD QUANT: CPT | Performed by: INTERNAL MEDICINE

## 2019-01-01 PROCEDURE — 83615 LACTATE (LD) (LDH) ENZYME: CPT | Performed by: GENERAL ACUTE CARE HOSPITAL

## 2019-01-01 PROCEDURE — 25000128 H RX IP 250 OP 636

## 2019-01-01 PROCEDURE — C1751 CATH, INF, PER/CENT/MIDLINE: HCPCS | Performed by: INTERNAL MEDICINE

## 2019-01-01 PROCEDURE — 82805 BLOOD GASES W/O2 SATURATION: CPT | Performed by: STUDENT IN AN ORGANIZED HEALTH CARE EDUCATION/TRAINING PROGRAM

## 2019-01-01 PROCEDURE — 40000275 ZZH STATISTIC RCP TIME EA 10 MIN

## 2019-01-01 PROCEDURE — 40000081 ZZH STATISTIC INSERT IABP

## 2019-01-01 PROCEDURE — 25000128 H RX IP 250 OP 636: Performed by: STUDENT IN AN ORGANIZED HEALTH CARE EDUCATION/TRAINING PROGRAM

## 2019-01-01 PROCEDURE — 25000132 ZZH RX MED GY IP 250 OP 250 PS 637: Performed by: NURSE PRACTITIONER

## 2019-01-01 PROCEDURE — 82805 BLOOD GASES W/O2 SATURATION: CPT | Performed by: INTERNAL MEDICINE

## 2019-01-01 PROCEDURE — P9016 RBC LEUKOCYTES REDUCED: HCPCS | Performed by: INTERNAL MEDICINE

## 2019-01-01 PROCEDURE — 87070 CULTURE OTHR SPECIMN AEROBIC: CPT | Performed by: INTERNAL MEDICINE

## 2019-01-01 PROCEDURE — 20000004 ZZH R&B ICU UMMC

## 2019-01-01 PROCEDURE — 85652 RBC SED RATE AUTOMATED: CPT | Performed by: GENERAL ACUTE CARE HOSPITAL

## 2019-01-01 PROCEDURE — 25000125 ZZHC RX 250: Performed by: CLINICAL NURSE SPECIALIST

## 2019-01-01 PROCEDURE — 25000131 ZZH RX MED GY IP 250 OP 636 PS 637: Performed by: INTERNAL MEDICINE

## 2019-01-01 PROCEDURE — 85652 RBC SED RATE AUTOMATED: CPT | Performed by: CLINICAL NURSE SPECIALIST

## 2019-01-01 PROCEDURE — 80053 COMPREHEN METABOLIC PANEL: CPT | Performed by: INTERNAL MEDICINE

## 2019-01-01 PROCEDURE — G0463 HOSPITAL OUTPT CLINIC VISIT: HCPCS

## 2019-01-01 PROCEDURE — 86923 COMPATIBILITY TEST ELECTRIC: CPT | Performed by: INTERNAL MEDICINE

## 2019-01-01 PROCEDURE — 83051 HEMOGLOBIN PLASMA: CPT | Performed by: INTERNAL MEDICINE

## 2019-01-01 PROCEDURE — 82248 BILIRUBIN DIRECT: CPT | Performed by: CLINICAL NURSE SPECIALIST

## 2019-01-01 PROCEDURE — 99291 CRITICAL CARE FIRST HOUR: CPT | Performed by: INTERNAL MEDICINE

## 2019-01-01 PROCEDURE — 99291 CRITICAL CARE FIRST HOUR: CPT | Mod: 25 | Performed by: INTERNAL MEDICINE

## 2019-01-01 PROCEDURE — C9113 INJ PANTOPRAZOLE SODIUM, VIA: HCPCS | Performed by: INTERNAL MEDICINE

## 2019-01-01 PROCEDURE — 33949 ECMO/ECLS DAILY MGMT ARTERY: CPT | Performed by: INTERNAL MEDICINE

## 2019-01-01 PROCEDURE — 5A02210 ASSISTANCE WITH CARDIAC OUTPUT USING BALLOON PUMP, CONTINUOUS: ICD-10-PCS | Performed by: INTERNAL MEDICINE

## 2019-01-01 PROCEDURE — 87077 CULTURE AEROBIC IDENTIFY: CPT | Performed by: INTERNAL MEDICINE

## 2019-01-01 PROCEDURE — 40000076 ZZH STATISTIC IABP MONITORING

## 2019-01-01 PROCEDURE — 94002 VENT MGMT INPAT INIT DAY: CPT

## 2019-01-01 PROCEDURE — 85025 COMPLETE CBC W/AUTO DIFF WBC: CPT | Performed by: INTERNAL MEDICINE

## 2019-01-01 PROCEDURE — 36415 COLL VENOUS BLD VENIPUNCTURE: CPT

## 2019-01-01 PROCEDURE — 40000014 ZZH STATISTIC ARTERIAL MONITORING DAILY

## 2019-01-01 PROCEDURE — 25000125 ZZHC RX 250: Performed by: STUDENT IN AN ORGANIZED HEALTH CARE EDUCATION/TRAINING PROGRAM

## 2019-01-01 PROCEDURE — 25800030 ZZH RX IP 258 OP 636: Performed by: INTERNAL MEDICINE

## 2019-01-01 PROCEDURE — 84100 ASSAY OF PHOSPHORUS: CPT | Performed by: GENERAL ACUTE CARE HOSPITAL

## 2019-01-01 PROCEDURE — 84100 ASSAY OF PHOSPHORUS: CPT | Performed by: INTERNAL MEDICINE

## 2019-01-01 PROCEDURE — 83735 ASSAY OF MAGNESIUM: CPT | Performed by: GENERAL ACUTE CARE HOSPITAL

## 2019-01-01 PROCEDURE — 80048 BASIC METABOLIC PNL TOTAL CA: CPT | Performed by: INTERNAL MEDICINE

## 2019-01-01 PROCEDURE — 84484 ASSAY OF TROPONIN QUANT: CPT | Performed by: INTERNAL MEDICINE

## 2019-01-01 PROCEDURE — 25800030 ZZH RX IP 258 OP 636: Performed by: STUDENT IN AN ORGANIZED HEALTH CARE EDUCATION/TRAINING PROGRAM

## 2019-01-01 PROCEDURE — 84295 ASSAY OF SERUM SODIUM: CPT

## 2019-01-01 PROCEDURE — 87040 BLOOD CULTURE FOR BACTERIA: CPT

## 2019-01-01 PROCEDURE — 83735 ASSAY OF MAGNESIUM: CPT | Performed by: INTERNAL MEDICINE

## 2019-01-01 PROCEDURE — 85025 COMPLETE CBC W/AUTO DIFF WBC: CPT | Performed by: CLINICAL NURSE SPECIALIST

## 2019-01-01 PROCEDURE — 85300 ANTITHROMBIN III ACTIVITY: CPT | Performed by: INTERNAL MEDICINE

## 2019-01-01 PROCEDURE — 93005 ELECTROCARDIOGRAM TRACING: CPT

## 2019-01-01 PROCEDURE — 93010 ELECTROCARDIOGRAM REPORT: CPT | Performed by: INTERNAL MEDICINE

## 2019-01-01 PROCEDURE — 27210794 ZZH OR GENERAL SUPPLY STERILE: Performed by: SURGERY

## 2019-01-01 PROCEDURE — 83615 LACTATE (LD) (LDH) ENZYME: CPT | Performed by: CLINICAL NURSE SPECIALIST

## 2019-01-01 PROCEDURE — 40000497 ZZHCL STATISTIC SODIUM ED POCT

## 2019-01-01 PROCEDURE — 85520 HEPARIN ASSAY: CPT | Performed by: INTERNAL MEDICINE

## 2019-01-01 PROCEDURE — 93308 TTE F-UP OR LMTD: CPT | Mod: 26 | Performed by: INTERNAL MEDICINE

## 2019-01-01 PROCEDURE — 82330 ASSAY OF CALCIUM: CPT | Performed by: STUDENT IN AN ORGANIZED HEALTH CARE EDUCATION/TRAINING PROGRAM

## 2019-01-01 PROCEDURE — 86140 C-REACTIVE PROTEIN: CPT | Performed by: CLINICAL NURSE SPECIALIST

## 2019-01-01 PROCEDURE — 71045 X-RAY EXAM CHEST 1 VIEW: CPT

## 2019-01-01 PROCEDURE — 85384 FIBRINOGEN ACTIVITY: CPT | Performed by: INTERNAL MEDICINE

## 2019-01-01 PROCEDURE — 83605 ASSAY OF LACTIC ACID: CPT

## 2019-01-01 PROCEDURE — 027137Z DILATION OF CORONARY ARTERY, TWO ARTERIES WITH FOUR OR MORE DRUG-ELUTING INTRALUMINAL DEVICES, PERCUTANEOUS APPROACH: ICD-10-PCS | Performed by: INTERNAL MEDICINE

## 2019-01-01 PROCEDURE — 86140 C-REACTIVE PROTEIN: CPT | Performed by: INTERNAL MEDICINE

## 2019-01-01 PROCEDURE — 37000008 ZZH ANESTHESIA TECHNICAL FEE, 1ST 30 MIN: Performed by: SURGERY

## 2019-01-01 PROCEDURE — 85004 AUTOMATED DIFF WBC COUNT: CPT | Performed by: INTERNAL MEDICINE

## 2019-01-01 PROCEDURE — 33948 ECMO/ECLS DAILY MGMT-VENOUS: CPT | Performed by: INTERNAL MEDICINE

## 2019-01-01 PROCEDURE — 99153 MOD SED SAME PHYS/QHP EA: CPT | Performed by: INTERNAL MEDICINE

## 2019-01-01 PROCEDURE — 85730 THROMBOPLASTIN TIME PARTIAL: CPT | Performed by: GENERAL ACUTE CARE HOSPITAL

## 2019-01-01 PROCEDURE — 94003 VENT MGMT INPAT SUBQ DAY: CPT

## 2019-01-01 PROCEDURE — 82805 BLOOD GASES W/O2 SATURATION: CPT

## 2019-01-01 PROCEDURE — 80053 COMPREHEN METABOLIC PANEL: CPT | Performed by: GENERAL ACUTE CARE HOSPITAL

## 2019-01-01 PROCEDURE — 87075 CULTR BACTERIA EXCEPT BLOOD: CPT | Performed by: INTERNAL MEDICINE

## 2019-01-01 PROCEDURE — 85610 PROTHROMBIN TIME: CPT | Performed by: CLINICAL NURSE SPECIALIST

## 2019-01-01 PROCEDURE — 80076 HEPATIC FUNCTION PANEL: CPT | Performed by: INTERNAL MEDICINE

## 2019-01-01 PROCEDURE — C1887 CATHETER, GUIDING: HCPCS | Performed by: INTERNAL MEDICINE

## 2019-01-01 PROCEDURE — 84100 ASSAY OF PHOSPHORUS: CPT | Performed by: CLINICAL NURSE SPECIALIST

## 2019-01-01 PROCEDURE — 85610 PROTHROMBIN TIME: CPT | Performed by: INTERNAL MEDICINE

## 2019-01-01 PROCEDURE — 86850 RBC ANTIBODY SCREEN: CPT | Performed by: INTERNAL MEDICINE

## 2019-01-01 PROCEDURE — 99285 EMERGENCY DEPT VISIT HI MDM: CPT | Mod: 25 | Performed by: EMERGENCY MEDICINE

## 2019-01-01 PROCEDURE — C1725 CATH, TRANSLUMIN NON-LASER: HCPCS | Performed by: INTERNAL MEDICINE

## 2019-01-01 PROCEDURE — 40000501 ZZHCL STATISTIC HEMATOCRIT ED POCT

## 2019-01-01 PROCEDURE — 94645 CONT INHLJ TX EACH ADDL HOUR: CPT

## 2019-01-01 PROCEDURE — 84999 UNLISTED CHEMISTRY PROCEDURE: CPT | Performed by: INTERNAL MEDICINE

## 2019-01-01 PROCEDURE — 85652 RBC SED RATE AUTOMATED: CPT | Performed by: INTERNAL MEDICINE

## 2019-01-01 PROCEDURE — 82565 ASSAY OF CREATININE: CPT

## 2019-01-01 PROCEDURE — 93325 DOPPLER ECHO COLOR FLOW MAPG: CPT | Mod: 26 | Performed by: INTERNAL MEDICINE

## 2019-01-01 PROCEDURE — 04QK0ZZ REPAIR RIGHT FEMORAL ARTERY, OPEN APPROACH: ICD-10-PCS | Performed by: THORACIC SURGERY (CARDIOTHORACIC VASCULAR SURGERY)

## 2019-01-01 PROCEDURE — 83718 ASSAY OF LIPOPROTEIN: CPT

## 2019-01-01 PROCEDURE — 40000344 ZZHCL STATISTIC THAWING COMPONENT: Performed by: INTERNAL MEDICINE

## 2019-01-01 PROCEDURE — 85379 FIBRIN DEGRADATION QUANT: CPT | Performed by: GENERAL ACUTE CARE HOSPITAL

## 2019-01-01 PROCEDURE — 36415 COLL VENOUS BLD VENIPUNCTURE: CPT | Performed by: INTERNAL MEDICINE

## 2019-01-01 PROCEDURE — 37000009 ZZH ANESTHESIA TECHNICAL FEE, EACH ADDTL 15 MIN: Performed by: SURGERY

## 2019-01-01 PROCEDURE — 87106 FUNGI IDENTIFICATION YEAST: CPT | Performed by: INTERNAL MEDICINE

## 2019-01-01 PROCEDURE — 99239 HOSP IP/OBS DSCHRG MGMT >30: CPT | Mod: GC | Performed by: INTERNAL MEDICINE

## 2019-01-01 PROCEDURE — 85520 HEPARIN ASSAY: CPT | Performed by: CLINICAL NURSE SPECIALIST

## 2019-01-01 PROCEDURE — 25800030 ZZH RX IP 258 OP 636: Performed by: CLINICAL NURSE SPECIALIST

## 2019-01-01 PROCEDURE — 82248 BILIRUBIN DIRECT: CPT | Performed by: INTERNAL MEDICINE

## 2019-01-01 PROCEDURE — 06BP0ZZ EXCISION OF RIGHT SAPHENOUS VEIN, OPEN APPROACH: ICD-10-PCS | Performed by: SURGERY

## 2019-01-01 PROCEDURE — C1894 INTRO/SHEATH, NON-LASER: HCPCS | Performed by: INTERNAL MEDICINE

## 2019-01-01 PROCEDURE — 87633 RESP VIRUS 12-25 TARGETS: CPT | Performed by: STUDENT IN AN ORGANIZED HEALTH CARE EDUCATION/TRAINING PROGRAM

## 2019-01-01 PROCEDURE — C1874 STENT, COATED/COV W/DEL SYS: HCPCS | Performed by: INTERNAL MEDICINE

## 2019-01-01 PROCEDURE — 85379 FIBRIN DEGRADATION QUANT: CPT | Performed by: CLINICAL NURSE SPECIALIST

## 2019-01-01 PROCEDURE — 87186 SC STD MICRODIL/AGAR DIL: CPT | Performed by: INTERNAL MEDICINE

## 2019-01-01 PROCEDURE — 82550 ASSAY OF CK (CPK): CPT | Performed by: CLINICAL NURSE SPECIALIST

## 2019-01-01 PROCEDURE — 27210468 ZZH SENSOR SOMATIC ADULT

## 2019-01-01 PROCEDURE — 40000986 XR CHEST PORT 1 VW

## 2019-01-01 PROCEDURE — P9041 ALBUMIN (HUMAN),5%, 50ML: HCPCS

## 2019-01-01 PROCEDURE — 99291 CRITICAL CARE FIRST HOUR: CPT | Mod: GC | Performed by: INTERNAL MEDICINE

## 2019-01-01 PROCEDURE — 85300 ANTITHROMBIN III ACTIVITY: CPT | Performed by: CLINICAL NURSE SPECIALIST

## 2019-01-01 PROCEDURE — 97110 THERAPEUTIC EXERCISES: CPT | Mod: GO | Performed by: OCCUPATIONAL THERAPIST

## 2019-01-01 PROCEDURE — 99221 1ST HOSP IP/OBS SF/LOW 40: CPT | Performed by: INTERNAL MEDICINE

## 2019-01-01 PROCEDURE — 83036 HEMOGLOBIN GLYCOSYLATED A1C: CPT | Performed by: INTERNAL MEDICINE

## 2019-01-01 PROCEDURE — 85025 COMPLETE CBC W/AUTO DIFF WBC: CPT | Performed by: GENERAL ACUTE CARE HOSPITAL

## 2019-01-01 PROCEDURE — 5A1522G EXTRACORPOREAL OXYGENATION, MEMBRANE, PERIPHERAL VENO-ARTERIAL: ICD-10-PCS | Performed by: THORACIC SURGERY (CARDIOTHORACIC VASCULAR SURGERY)

## 2019-01-01 PROCEDURE — 85384 FIBRINOGEN ACTIVITY: CPT | Performed by: GENERAL ACUTE CARE HOSPITAL

## 2019-01-01 PROCEDURE — 33947 ECMO/ECLS INITIATION ARTERY: CPT

## 2019-01-01 PROCEDURE — 84075 ASSAY ALKALINE PHOSPHATASE: CPT

## 2019-01-01 PROCEDURE — 82803 BLOOD GASES ANY COMBINATION: CPT

## 2019-01-01 PROCEDURE — 84295 ASSAY OF SERUM SODIUM: CPT | Performed by: INTERNAL MEDICINE

## 2019-01-01 PROCEDURE — 74018 RADEX ABDOMEN 1 VIEW: CPT

## 2019-01-01 PROCEDURE — 82330 ASSAY OF CALCIUM: CPT

## 2019-01-01 PROCEDURE — C1769 GUIDE WIRE: HCPCS | Performed by: INTERNAL MEDICINE

## 2019-01-01 PROCEDURE — 80053 COMPREHEN METABOLIC PANEL: CPT | Performed by: CLINICAL NURSE SPECIALIST

## 2019-01-01 PROCEDURE — 25000565 ZZH ISOFLURANE, EA 15 MIN: Performed by: SURGERY

## 2019-01-01 PROCEDURE — 86140 C-REACTIVE PROTEIN: CPT | Performed by: GENERAL ACUTE CARE HOSPITAL

## 2019-01-01 PROCEDURE — 83051 HEMOGLOBIN PLASMA: CPT | Performed by: GENERAL ACUTE CARE HOSPITAL

## 2019-01-01 PROCEDURE — 93308 TTE F-UP OR LMTD: CPT

## 2019-01-01 PROCEDURE — 84132 ASSAY OF SERUM POTASSIUM: CPT | Performed by: INTERNAL MEDICINE

## 2019-01-01 PROCEDURE — 99152 MOD SED SAME PHYS/QHP 5/>YRS: CPT | Performed by: INTERNAL MEDICINE

## 2019-01-01 PROCEDURE — 027135Z DILATION OF CORONARY ARTERY, TWO ARTERIES WITH TWO DRUG-ELUTING INTRALUMINAL DEVICES, PERCUTANEOUS APPROACH: ICD-10-PCS | Performed by: INTERNAL MEDICINE

## 2019-01-01 PROCEDURE — 86316 IMMUNOASSAY TUMOR OTHER: CPT | Performed by: INTERNAL MEDICINE

## 2019-01-01 PROCEDURE — C1757 CATH, THROMBECTOMY/EMBOLECT: HCPCS | Performed by: SURGERY

## 2019-01-01 PROCEDURE — 85520 HEPARIN ASSAY: CPT | Performed by: GENERAL ACUTE CARE HOSPITAL

## 2019-01-01 PROCEDURE — 85610 PROTHROMBIN TIME: CPT | Performed by: GENERAL ACUTE CARE HOSPITAL

## 2019-01-01 PROCEDURE — 84460 ALANINE AMINO (ALT) (SGPT): CPT

## 2019-01-01 PROCEDURE — 82040 ASSAY OF SERUM ALBUMIN: CPT

## 2019-01-01 PROCEDURE — 84478 ASSAY OF TRIGLYCERIDES: CPT | Performed by: GENERAL ACUTE CARE HOSPITAL

## 2019-01-01 PROCEDURE — 84520 ASSAY OF UREA NITROGEN: CPT

## 2019-01-01 PROCEDURE — 40000809 ZZH STATISTIC NO DOCUMENTATION TO SUPPORT CHARGE

## 2019-01-01 PROCEDURE — 25800025 ZZH RX 258: Performed by: NURSE PRACTITIONER

## 2019-01-01 PROCEDURE — 80320 DRUG SCREEN QUANTALCOHOLS: CPT | Performed by: INTERNAL MEDICINE

## 2019-01-01 PROCEDURE — 25000128 H RX IP 250 OP 636: Performed by: NURSE PRACTITIONER

## 2019-01-01 PROCEDURE — 84999 UNLISTED CHEMISTRY PROCEDURE: CPT

## 2019-01-01 PROCEDURE — 95951 ZZHC EEG VIDEO < 12 HR: CPT | Mod: 52,ZF

## 2019-01-01 PROCEDURE — 84295 ASSAY OF SERUM SODIUM: CPT | Performed by: GENERAL ACUTE CARE HOSPITAL

## 2019-01-01 PROCEDURE — 87181 SC STD AGAR DILUTION PER AGT: CPT

## 2019-01-01 PROCEDURE — P9059 PLASMA, FRZ BETWEEN 8-24HOUR: HCPCS | Performed by: INTERNAL MEDICINE

## 2019-01-01 PROCEDURE — 25000125 ZZHC RX 250: Performed by: GENERAL ACUTE CARE HOSPITAL

## 2019-01-01 PROCEDURE — 82310 ASSAY OF CALCIUM: CPT

## 2019-01-01 PROCEDURE — 82330 ASSAY OF CALCIUM: CPT | Performed by: CLINICAL NURSE SPECIALIST

## 2019-01-01 PROCEDURE — 82803 BLOOD GASES ANY COMBINATION: CPT | Performed by: INTERNAL MEDICINE

## 2019-01-01 PROCEDURE — 27210136 ZZH KIT CATH ARTERIAL EXT SUPPLY

## 2019-01-01 PROCEDURE — 70450 CT HEAD/BRAIN W/O DYE: CPT

## 2019-01-01 PROCEDURE — P9037 PLATE PHERES LEUKOREDU IRRAD: HCPCS | Performed by: INTERNAL MEDICINE

## 2019-01-01 PROCEDURE — P9041 ALBUMIN (HUMAN),5%, 50ML: HCPCS | Performed by: NURSE ANESTHETIST, CERTIFIED REGISTERED

## 2019-01-01 PROCEDURE — 5A1D90Z PERFORMANCE OF URINARY FILTRATION, CONTINUOUS, GREATER THAN 18 HOURS PER DAY: ICD-10-PCS | Performed by: INTERNAL MEDICINE

## 2019-01-01 PROCEDURE — 99292 CRITICAL CARE ADDL 30 MIN: CPT | Mod: 25 | Performed by: INTERNAL MEDICINE

## 2019-01-01 PROCEDURE — P9073 PLATELETS PHERESIS PATH REDU: HCPCS | Performed by: INTERNAL MEDICINE

## 2019-01-01 PROCEDURE — 25000566 ZZH SEVOFLURANE, EA 15 MIN: Performed by: SURGERY

## 2019-01-01 PROCEDURE — 94644 CONT INHLJ TX 1ST HOUR: CPT

## 2019-01-01 PROCEDURE — 06QM0ZZ REPAIR RIGHT FEMORAL VEIN, OPEN APPROACH: ICD-10-PCS | Performed by: SURGERY

## 2019-01-01 PROCEDURE — 99291 CRITICAL CARE FIRST HOUR: CPT | Mod: Z6 | Performed by: EMERGENCY MEDICINE

## 2019-01-01 PROCEDURE — 04QK0ZZ REPAIR RIGHT FEMORAL ARTERY, OPEN APPROACH: ICD-10-PCS | Performed by: SURGERY

## 2019-01-01 PROCEDURE — 82550 ASSAY OF CK (CPK): CPT | Performed by: INTERNAL MEDICINE

## 2019-01-01 PROCEDURE — 82947 ASSAY GLUCOSE BLOOD QUANT: CPT

## 2019-01-01 PROCEDURE — 74176 CT ABD & PELVIS W/O CONTRAST: CPT

## 2019-01-01 PROCEDURE — 36000074 ZZH SURGERY LEVEL 6 1ST 30 MIN - UMMC: Performed by: SURGERY

## 2019-01-01 PROCEDURE — 85384 FIBRINOGEN ACTIVITY: CPT | Performed by: CLINICAL NURSE SPECIALIST

## 2019-01-01 PROCEDURE — 25800025 ZZH RX 258: Performed by: INTERNAL MEDICINE

## 2019-01-01 PROCEDURE — 99232 SBSQ HOSP IP/OBS MODERATE 35: CPT | Performed by: INTERNAL MEDICINE

## 2019-01-01 PROCEDURE — 82330 ASSAY OF CALCIUM: CPT | Performed by: GENERAL ACUTE CARE HOSPITAL

## 2019-01-01 PROCEDURE — 33948 ECMO/ECLS DAILY MGMT-VENOUS: CPT

## 2019-01-01 PROCEDURE — 25800030 ZZH RX IP 258 OP 636: Performed by: NURSE ANESTHETIST, CERTIFIED REGISTERED

## 2019-01-01 PROCEDURE — 25000132 ZZH RX MED GY IP 250 OP 250 PS 637: Performed by: STUDENT IN AN ORGANIZED HEALTH CARE EDUCATION/TRAINING PROGRAM

## 2019-01-01 PROCEDURE — 33967 INSERT I-AORT PERCUT DEVICE: CPT | Performed by: INTERNAL MEDICINE

## 2019-01-01 PROCEDURE — 31645 BRNCHSC W/THER ASPIR 1ST: CPT | Mod: 59 | Performed by: INTERNAL MEDICINE

## 2019-01-01 PROCEDURE — 84132 ASSAY OF SERUM POTASSIUM: CPT

## 2019-01-01 PROCEDURE — 85027 COMPLETE CBC AUTOMATED: CPT | Performed by: INTERNAL MEDICINE

## 2019-01-01 PROCEDURE — B2111ZZ FLUOROSCOPY OF MULTIPLE CORONARY ARTERIES USING LOW OSMOLAR CONTRAST: ICD-10-PCS | Performed by: INTERNAL MEDICINE

## 2019-01-01 PROCEDURE — 25000125 ZZHC RX 250: Performed by: NURSE PRACTITIONER

## 2019-01-01 PROCEDURE — 82247 BILIRUBIN TOTAL: CPT

## 2019-01-01 PROCEDURE — 93454 CORONARY ARTERY ANGIO S&I: CPT | Performed by: INTERNAL MEDICINE

## 2019-01-01 PROCEDURE — 84155 ASSAY OF PROTEIN SERUM: CPT

## 2019-01-01 PROCEDURE — 85300 ANTITHROMBIN III ACTIVITY: CPT | Performed by: GENERAL ACUTE CARE HOSPITAL

## 2019-01-01 PROCEDURE — 85730 THROMBOPLASTIN TIME PARTIAL: CPT | Performed by: CLINICAL NURSE SPECIALIST

## 2019-01-01 PROCEDURE — 71045 X-RAY EXAM CHEST 1 VIEW: CPT | Mod: 77

## 2019-01-01 PROCEDURE — P9016 RBC LEUKOCYTES REDUCED: HCPCS

## 2019-01-01 PROCEDURE — 87077 CULTURE AEROBIC IDENTIFY: CPT | Performed by: STUDENT IN AN ORGANIZED HEALTH CARE EDUCATION/TRAINING PROGRAM

## 2019-01-01 PROCEDURE — 80202 ASSAY OF VANCOMYCIN: CPT | Performed by: INTERNAL MEDICINE

## 2019-01-01 PROCEDURE — 25800030 ZZH RX IP 258 OP 636: Performed by: ANESTHESIOLOGY

## 2019-01-01 PROCEDURE — 25000125 ZZHC RX 250: Performed by: NURSE ANESTHETIST, CERTIFIED REGISTERED

## 2019-01-01 PROCEDURE — 87205 SMEAR GRAM STAIN: CPT | Performed by: INTERNAL MEDICINE

## 2019-01-01 PROCEDURE — 27211184 ZZH CARDIOHELP CIRCUIT

## 2019-01-01 PROCEDURE — 25800030 ZZH RX IP 258 OP 636: Performed by: GENERAL ACUTE CARE HOSPITAL

## 2019-01-01 PROCEDURE — 33952 ECMO/ECLS INSJ PRPH CANNULA: CPT | Performed by: INTERNAL MEDICINE

## 2019-01-01 PROCEDURE — C9600 PERC DRUG-EL COR STENT SING: HCPCS | Mod: RC | Performed by: INTERNAL MEDICINE

## 2019-01-01 PROCEDURE — 25000128 H RX IP 250 OP 636: Performed by: NURSE ANESTHETIST, CERTIFIED REGISTERED

## 2019-01-01 PROCEDURE — 87186 SC STD MICRODIL/AGAR DIL: CPT

## 2019-01-01 PROCEDURE — 97165 OT EVAL LOW COMPLEX 30 MIN: CPT | Mod: GO | Performed by: OCCUPATIONAL THERAPIST

## 2019-01-01 PROCEDURE — 99233 SBSQ HOSP IP/OBS HIGH 50: CPT | Mod: GC | Performed by: INTERNAL MEDICINE

## 2019-01-01 PROCEDURE — 31622 DX BRONCHOSCOPE/WASH: CPT

## 2019-01-01 PROCEDURE — 41000018 ZZH PER-PERFUSION 1ST 30 MIN: Performed by: SURGERY

## 2019-01-01 PROCEDURE — 25800030 ZZH RX IP 258 OP 636

## 2019-01-01 PROCEDURE — 86316 IMMUNOASSAY TUMOR OTHER: CPT

## 2019-01-01 PROCEDURE — 25000125 ZZHC RX 250: Performed by: RADIOLOGY

## 2019-01-01 PROCEDURE — 25000128 H RX IP 250 OP 636: Performed by: CLINICAL NURSE SPECIALIST

## 2019-01-01 PROCEDURE — 5A1522G EXTRACORPOREAL OXYGENATION, MEMBRANE, PERIPHERAL VENO-ARTERIAL: ICD-10-PCS | Performed by: INTERNAL MEDICINE

## 2019-01-01 PROCEDURE — 93306 TTE W/DOPPLER COMPLETE: CPT

## 2019-01-01 PROCEDURE — C9606 PERC D-E COR REVASC W AMI S: HCPCS | Performed by: INTERNAL MEDICINE

## 2019-01-01 PROCEDURE — 93306 TTE W/DOPPLER COMPLETE: CPT | Mod: 26 | Performed by: INTERNAL MEDICINE

## 2019-01-01 PROCEDURE — 87070 CULTURE OTHR SPECIMN AEROBIC: CPT | Performed by: STUDENT IN AN ORGANIZED HEALTH CARE EDUCATION/TRAINING PROGRAM

## 2019-01-01 PROCEDURE — 36569 INSJ PICC 5 YR+ W/O IMAGING: CPT

## 2019-01-01 PROCEDURE — 80307 DRUG TEST PRSMV CHEM ANLYZR: CPT | Performed by: INTERNAL MEDICINE

## 2019-01-01 PROCEDURE — 85014 HEMATOCRIT: CPT

## 2019-01-01 PROCEDURE — 33946 ECMO/ECLS INITIATION VENOUS: CPT | Mod: 79 | Performed by: INTERNAL MEDICINE

## 2019-01-01 PROCEDURE — 6A4Z0ZZ HYPOTHERMIA, SINGLE: ICD-10-PCS | Performed by: INTERNAL MEDICINE

## 2019-01-01 PROCEDURE — 85396 CLOTTING ASSAY WHOLE BLOOD: CPT | Performed by: INTERNAL MEDICINE

## 2019-01-01 PROCEDURE — 84484 ASSAY OF TROPONIN QUANT: CPT

## 2019-01-01 PROCEDURE — 87106 FUNGI IDENTIFICATION YEAST: CPT | Performed by: STUDENT IN AN ORGANIZED HEALTH CARE EDUCATION/TRAINING PROGRAM

## 2019-01-01 PROCEDURE — 36000076 ZZH SURGERY LEVEL 6 EA 15 ADDTL MIN - UMMC: Performed by: SURGERY

## 2019-01-01 PROCEDURE — 87493 C DIFF AMPLIFIED PROBE: CPT | Performed by: INTERNAL MEDICINE

## 2019-01-01 PROCEDURE — 82374 ASSAY BLOOD CARBON DIOXIDE: CPT

## 2019-01-01 PROCEDURE — 27211402 ZZH SENSOR NIRS OXIMETER, ADULT

## 2019-01-01 PROCEDURE — 86923 COMPATIBILITY TEST ELECTRIC: CPT

## 2019-01-01 PROCEDURE — 40000502 ZZHCL STATISTIC GLUCOSE ED POCT

## 2019-01-01 PROCEDURE — 99359 PROLONG SERV W/O CONTACT ADD: CPT | Performed by: NURSE PRACTITIONER

## 2019-01-01 PROCEDURE — 27210305 ZZH CATH BALLOON IABP

## 2019-01-01 PROCEDURE — 3E043XZ INTRODUCTION OF VASOPRESSOR INTO CENTRAL VEIN, PERCUTANEOUS APPROACH: ICD-10-PCS | Performed by: INTERNAL MEDICINE

## 2019-01-01 PROCEDURE — 81001 URINALYSIS AUTO W/SCOPE: CPT | Performed by: INTERNAL MEDICINE

## 2019-01-01 PROCEDURE — 80202 ASSAY OF VANCOMYCIN: CPT | Performed by: GENERAL ACUTE CARE HOSPITAL

## 2019-01-01 PROCEDURE — 27210794 ZZH OR GENERAL SUPPLY STERILE: Performed by: INTERNAL MEDICINE

## 2019-01-01 PROCEDURE — 25000128 H RX IP 250 OP 636: Performed by: SURGERY

## 2019-01-01 PROCEDURE — 99358 PROLONG SERVICE W/O CONTACT: CPT | Performed by: NURSE PRACTITIONER

## 2019-01-01 PROCEDURE — 76705 ECHO EXAM OF ABDOMEN: CPT

## 2019-01-01 PROCEDURE — 83605 ASSAY OF LACTIC ACID: CPT | Performed by: STUDENT IN AN ORGANIZED HEALTH CARE EDUCATION/TRAINING PROGRAM

## 2019-01-01 PROCEDURE — 94681 O2 UPTK CO2 OUTP % O2 XTRC: CPT

## 2019-01-01 PROCEDURE — 80202 ASSAY OF VANCOMYCIN: CPT

## 2019-01-01 PROCEDURE — 25000125 ZZHC RX 250: Performed by: SURGERY

## 2019-01-01 PROCEDURE — 04UK07Z SUPPLEMENT RIGHT FEMORAL ARTERY WITH AUTOLOGOUS TISSUE SUBSTITUTE, OPEN APPROACH: ICD-10-PCS | Performed by: SURGERY

## 2019-01-01 PROCEDURE — 92928 PRQ TCAT PLMT NTRAC ST 1 LES: CPT | Mod: LC | Performed by: INTERNAL MEDICINE

## 2019-01-01 PROCEDURE — 40000986 XR ABDOMEN PORT 1 VW

## 2019-01-01 PROCEDURE — 36620 INSERTION CATHETER ARTERY: CPT

## 2019-01-01 PROCEDURE — 44500 INTRO GASTROINTESTINAL TUBE: CPT | Performed by: DIETITIAN, REGISTERED

## 2019-01-01 PROCEDURE — 87800 DETECT AGNT MULT DNA DIREC: CPT

## 2019-01-01 PROCEDURE — 92928 PRQ TCAT PLMT NTRAC ST 1 LES: CPT | Mod: RC | Performed by: INTERNAL MEDICINE

## 2019-01-01 PROCEDURE — 86901 BLOOD TYPING SEROLOGIC RH(D): CPT

## 2019-01-01 PROCEDURE — 33952 ECMO/ECLS INSJ PRPH CANNULA: CPT | Mod: 79 | Performed by: INTERNAL MEDICINE

## 2019-01-01 PROCEDURE — 99232 SBSQ HOSP IP/OBS MODERATE 35: CPT | Mod: GC | Performed by: INTERNAL MEDICINE

## 2019-01-01 PROCEDURE — 25000125 ZZHC RX 250: Performed by: HOSPITALIST

## 2019-01-01 PROCEDURE — 33952 ECMO/ECLS INSJ PRPH CANNULA: CPT | Mod: GC | Performed by: INTERNAL MEDICINE

## 2019-01-01 PROCEDURE — 5A1955Z RESPIRATORY VENTILATION, GREATER THAN 96 CONSECUTIVE HOURS: ICD-10-PCS | Performed by: INTERNAL MEDICINE

## 2019-01-01 PROCEDURE — 25000128 H RX IP 250 OP 636: Performed by: ANESTHESIOLOGY

## 2019-01-01 PROCEDURE — 82550 ASSAY OF CK (CPK): CPT | Performed by: GENERAL ACUTE CARE HOSPITAL

## 2019-01-01 PROCEDURE — 37799 UNLISTED PX VASCULAR SURGERY: CPT | Performed by: INTERNAL MEDICINE

## 2019-01-01 PROCEDURE — 0B9G8ZX DRAINAGE OF LEFT UPPER LUNG LOBE, VIA NATURAL OR ARTIFICIAL OPENING ENDOSCOPIC, DIAGNOSTIC: ICD-10-PCS | Performed by: INTERNAL MEDICINE

## 2019-01-01 PROCEDURE — 33946 ECMO/ECLS INITIATION VENOUS: CPT | Performed by: INTERNAL MEDICINE

## 2019-01-01 PROCEDURE — C9607 PERC D-E COR REVASC CHRO SIN: HCPCS | Mod: LD | Performed by: INTERNAL MEDICINE

## 2019-01-01 PROCEDURE — 71250 CT THORAX DX C-: CPT

## 2019-01-01 PROCEDURE — 36000070 ZZH SURGERY LEVEL 5 EA 15 ADDTL MIN - UMMC: Performed by: SURGERY

## 2019-01-01 PROCEDURE — 86900 BLOOD TYPING SEROLOGIC ABO: CPT

## 2019-01-01 PROCEDURE — 04CK0ZZ EXTIRPATION OF MATTER FROM RIGHT FEMORAL ARTERY, OPEN APPROACH: ICD-10-PCS | Performed by: SURGERY

## 2019-01-01 PROCEDURE — 74174 CTA ABD&PLVS W/CONTRAST: CPT

## 2019-01-01 PROCEDURE — C9600 PERC DRUG-EL COR STENT SING: HCPCS | Performed by: INTERNAL MEDICINE

## 2019-01-01 PROCEDURE — 33947 ECMO/ECLS INITIATION ARTERY: CPT | Mod: GC | Performed by: INTERNAL MEDICINE

## 2019-01-01 PROCEDURE — 87205 SMEAR GRAM STAIN: CPT | Performed by: STUDENT IN AN ORGANIZED HEALTH CARE EDUCATION/TRAINING PROGRAM

## 2019-01-01 PROCEDURE — 27210302 ZZH CANNULA V-V

## 2019-01-01 PROCEDURE — 87106 FUNGI IDENTIFICATION YEAST: CPT

## 2019-01-01 PROCEDURE — 40000671 ZZH STATISTIC ANESTHESIA CASE

## 2019-01-01 PROCEDURE — 40000498 ZZHCL STATISTIC POTASSIUM ED POCT

## 2019-01-01 PROCEDURE — 27210995 ZZH RX 272: Performed by: SURGERY

## 2019-01-01 PROCEDURE — 25000125 ZZHC RX 250

## 2019-01-01 PROCEDURE — 92950 HEART/LUNG RESUSCITATION CPR: CPT | Performed by: EMERGENCY MEDICINE

## 2019-01-01 PROCEDURE — 25000125 ZZHC RX 250: Performed by: ANESTHESIOLOGY

## 2019-01-01 PROCEDURE — 82435 ASSAY OF BLOOD CHLORIDE: CPT

## 2019-01-01 PROCEDURE — 25000132 ZZH RX MED GY IP 250 OP 250 PS 637: Performed by: NURSE ANESTHETIST, CERTIFIED REGISTERED

## 2019-01-01 PROCEDURE — 93925 LOWER EXTREMITY STUDY: CPT

## 2019-01-01 PROCEDURE — 82140 ASSAY OF AMMONIA: CPT | Performed by: INTERNAL MEDICINE

## 2019-01-01 PROCEDURE — 36556 INSERT NON-TUNNEL CV CATH: CPT

## 2019-01-01 PROCEDURE — 93926 LOWER EXTREMITY STUDY: CPT | Mod: RT

## 2019-01-01 PROCEDURE — 86850 RBC ANTIBODY SCREEN: CPT

## 2019-01-01 PROCEDURE — 27211391 ZZ H KIT, 6 FR TL BIOFLO OPEN ENDED PICC

## 2019-01-01 PROCEDURE — 87186 SC STD MICRODIL/AGAR DIL: CPT | Performed by: STUDENT IN AN ORGANIZED HEALTH CARE EDUCATION/TRAINING PROGRAM

## 2019-01-01 PROCEDURE — 87077 CULTURE AEROBIC IDENTIFY: CPT

## 2019-01-01 PROCEDURE — 27210299 ZZH CANNULA, ARTERIAL FEMORAL

## 2019-01-01 PROCEDURE — 40000902 ZZH STATISTIC WOC PT EDUCATION, 16-30 MIN

## 2019-01-01 PROCEDURE — P9041 ALBUMIN (HUMAN),5%, 50ML: HCPCS | Performed by: INTERNAL MEDICINE

## 2019-01-01 PROCEDURE — 70470 CT HEAD/BRAIN W/O & W/DYE: CPT

## 2019-01-01 PROCEDURE — 82533 TOTAL CORTISOL: CPT | Performed by: INTERNAL MEDICINE

## 2019-01-01 PROCEDURE — 25000128 H RX IP 250 OP 636: Performed by: RADIOLOGY

## 2019-01-01 PROCEDURE — 83690 ASSAY OF LIPASE: CPT | Performed by: CLINICAL NURSE SPECIALIST

## 2019-01-01 PROCEDURE — 33966 ECMO/ECLS RMVL PRPH CANNULA: CPT

## 2019-01-01 PROCEDURE — 27211040 ZZH CONTINUOUS NEBULIZER MICRO PUMP

## 2019-01-01 PROCEDURE — 99233 SBSQ HOSP IP/OBS HIGH 50: CPT | Performed by: NURSE PRACTITIONER

## 2019-01-01 PROCEDURE — 80347 BENZODIAZEPINES 13 OR MORE: CPT | Performed by: INTERNAL MEDICINE

## 2019-01-01 PROCEDURE — 33968 REMOVE AORTIC ASSIST DEVICE: CPT

## 2019-01-01 PROCEDURE — 40000556 ZZH STATISTIC PERIPHERAL IV START W US GUIDANCE

## 2019-01-01 PROCEDURE — 93454 CORONARY ARTERY ANGIO S&I: CPT | Mod: 26 | Performed by: INTERNAL MEDICINE

## 2019-01-01 PROCEDURE — 27211355

## 2019-01-01 PROCEDURE — 87493 C DIFF AMPLIFIED PROBE: CPT | Performed by: STUDENT IN AN ORGANIZED HEALTH CARE EDUCATION/TRAINING PROGRAM

## 2019-01-01 PROCEDURE — 5A2204Z RESTORATION OF CARDIAC RHYTHM, SINGLE: ICD-10-PCS | Performed by: INTERNAL MEDICINE

## 2019-01-01 PROCEDURE — 36000068 ZZH SURGERY LEVEL 5 1ST 30 MIN - UMMC: Performed by: SURGERY

## 2019-01-01 PROCEDURE — 06QN0ZZ REPAIR LEFT FEMORAL VEIN, OPEN APPROACH: ICD-10-PCS | Performed by: THORACIC SURGERY (CARDIOTHORACIC VASCULAR SURGERY)

## 2019-01-01 PROCEDURE — 87641 MR-STAPH DNA AMP PROBE: CPT

## 2019-01-01 PROCEDURE — 87640 STAPH A DNA AMP PROBE: CPT

## 2019-01-01 DEVICE — STENT SYNERGY DRUG ELUTING 2.50X38MM  H7493926038250: Type: IMPLANTABLE DEVICE | Status: FUNCTIONAL

## 2019-01-01 DEVICE — STENT SYNERGY DRUG ELUTING 2.50X24MM  H7493926024250: Type: IMPLANTABLE DEVICE | Status: FUNCTIONAL

## 2019-01-01 DEVICE — STENT SYNERGY DRUG ELUTING 3.00X20MM  H7493926020300: Type: IMPLANTABLE DEVICE | Status: FUNCTIONAL

## 2019-01-01 DEVICE — STENT SYNERGY DRUG ELUTING 2.25X24MM  H7493926024220: Type: IMPLANTABLE DEVICE | Status: FUNCTIONAL

## 2019-01-01 DEVICE — STENT SYNERGY DRUG ELUTING 2.25X16MM  H7493926016220: Type: IMPLANTABLE DEVICE | Status: FUNCTIONAL

## 2019-01-01 DEVICE — IMPLANTABLE DEVICE: Type: IMPLANTABLE DEVICE | Status: FUNCTIONAL

## 2019-01-01 RX ORDER — FENTANYL CITRATE 50 UG/ML
INJECTION, SOLUTION INTRAMUSCULAR; INTRAVENOUS
Status: DISCONTINUED | OUTPATIENT
Start: 2019-01-01 | End: 2019-01-01 | Stop reason: HOSPADM

## 2019-01-01 RX ORDER — MIDAZOLAM (PF) 1 MG/ML IN 0.9 % SODIUM CHLORIDE INTRAVENOUS SOLUTION
1-8 CONTINUOUS
Status: DISCONTINUED | OUTPATIENT
Start: 2019-01-01 | End: 2019-01-01

## 2019-01-01 RX ORDER — HEPARIN SODIUM (PORCINE) LOCK FLUSH IV SOLN 100 UNIT/ML 100 UNIT/ML
5-10 SOLUTION INTRAVENOUS EVERY 30 MIN PRN
Status: DISCONTINUED | OUTPATIENT
Start: 2019-01-01 | End: 2019-01-01

## 2019-01-01 RX ORDER — POTASSIUM CHLORIDE 29.8 MG/ML
20 INJECTION INTRAVENOUS EVERY 6 HOURS PRN
Status: DISCONTINUED | OUTPATIENT
Start: 2019-01-01 | End: 2019-01-01

## 2019-01-01 RX ORDER — ALBUMIN, HUMAN INJ 5% 5 %
SOLUTION INTRAVENOUS
Status: COMPLETED
Start: 2019-01-01 | End: 2019-01-01

## 2019-01-01 RX ORDER — SODIUM CHLORIDE, SODIUM LACTATE, POTASSIUM CHLORIDE, CALCIUM CHLORIDE 600; 310; 30; 20 MG/100ML; MG/100ML; MG/100ML; MG/100ML
INJECTION, SOLUTION INTRAVENOUS CONTINUOUS PRN
Status: DISCONTINUED | OUTPATIENT
Start: 2019-01-01 | End: 2019-01-01

## 2019-01-01 RX ORDER — NOREPINEPHRINE BITARTRATE/D5W 16MG/250ML
.03-.4 PLASTIC BAG, INJECTION (ML) INTRAVENOUS CONTINUOUS PRN
Status: DISCONTINUED | OUTPATIENT
Start: 2019-01-01 | End: 2019-01-01

## 2019-01-01 RX ORDER — POTASSIUM CHLORIDE 29.8 MG/ML
20 INJECTION INTRAVENOUS
Status: COMPLETED | OUTPATIENT
Start: 2019-01-01 | End: 2019-01-01

## 2019-01-01 RX ORDER — HEPARIN SODIUM 1000 [USP'U]/ML
INJECTION, SOLUTION INTRAVENOUS; SUBCUTANEOUS PRN
Status: DISCONTINUED | OUTPATIENT
Start: 2019-01-01 | End: 2019-01-01

## 2019-01-01 RX ORDER — NALOXONE HYDROCHLORIDE 0.4 MG/ML
.1-.4 INJECTION, SOLUTION INTRAMUSCULAR; INTRAVENOUS; SUBCUTANEOUS
Status: DISCONTINUED | OUTPATIENT
Start: 2019-01-01 | End: 2019-01-01

## 2019-01-01 RX ORDER — HEPARIN SODIUM,PORCINE 10 UNIT/ML
5-10 VIAL (ML) INTRAVENOUS EVERY 24 HOURS
Status: DISCONTINUED | OUTPATIENT
Start: 2019-01-01 | End: 2019-01-01 | Stop reason: HOSPADM

## 2019-01-01 RX ORDER — MEROPENEM 1 G/1
1 INJECTION, POWDER, FOR SOLUTION INTRAVENOUS EVERY 8 HOURS
Status: DISCONTINUED | OUTPATIENT
Start: 2019-01-01 | End: 2019-01-01

## 2019-01-01 RX ORDER — ASPIRIN 325 MG
325 TABLET ORAL ONCE
Status: COMPLETED | OUTPATIENT
Start: 2019-01-01 | End: 2019-01-01

## 2019-01-01 RX ORDER — AMOXICILLIN 250 MG
1 CAPSULE ORAL AT BEDTIME
Status: DISCONTINUED | OUTPATIENT
Start: 2019-01-01 | End: 2019-01-01

## 2019-01-01 RX ORDER — LIDOCAINE HYDROCHLORIDE ANHYDROUS AND DEXTROSE MONOHYDRATE .8; 5 G/100ML; G/100ML
1-4 INJECTION, SOLUTION INTRAVENOUS CONTINUOUS
Status: DISCONTINUED | OUTPATIENT
Start: 2019-01-01 | End: 2019-01-01 | Stop reason: HOSPADM

## 2019-01-01 RX ORDER — HEPARIN SODIUM 1000 [USP'U]/ML
INJECTION, SOLUTION INTRAVENOUS; SUBCUTANEOUS
Status: DISCONTINUED | OUTPATIENT
Start: 2019-01-01 | End: 2019-01-01 | Stop reason: HOSPADM

## 2019-01-01 RX ORDER — PIPERACILLIN SODIUM, TAZOBACTAM SODIUM 4; .5 G/20ML; G/20ML
4.5 INJECTION, POWDER, LYOPHILIZED, FOR SOLUTION INTRAVENOUS EVERY 6 HOURS
Status: COMPLETED | OUTPATIENT
Start: 2019-01-01 | End: 2019-01-01

## 2019-01-01 RX ORDER — ERYTHROMYCIN 5 MG/G
OINTMENT OPHTHALMIC AT BEDTIME
Status: DISCONTINUED | OUTPATIENT
Start: 2019-01-01 | End: 2019-01-01

## 2019-01-01 RX ORDER — PROTAMINE SULFATE 10 MG/ML
INJECTION, SOLUTION INTRAVENOUS PRN
Status: DISCONTINUED | OUTPATIENT
Start: 2019-01-01 | End: 2019-01-01

## 2019-01-01 RX ORDER — ASPIRIN 81 MG/1
81 TABLET, CHEWABLE ORAL DAILY
Status: DISCONTINUED | OUTPATIENT
Start: 2019-01-01 | End: 2019-01-01

## 2019-01-01 RX ORDER — IOPAMIDOL 755 MG/ML
130 INJECTION, SOLUTION INTRAVASCULAR ONCE
Status: COMPLETED | OUTPATIENT
Start: 2019-01-01 | End: 2019-01-01

## 2019-01-01 RX ORDER — FLUCONAZOLE 200 MG/1
800 TABLET ORAL ONCE
Status: COMPLETED | OUTPATIENT
Start: 2019-01-01 | End: 2019-01-01

## 2019-01-01 RX ORDER — HEPARIN SODIUM 10000 [USP'U]/100ML
100-1000 INJECTION, SOLUTION INTRAVENOUS CONTINUOUS PRN
Status: DISCONTINUED | OUTPATIENT
Start: 2019-01-01 | End: 2019-01-01 | Stop reason: HOSPADM

## 2019-01-01 RX ORDER — MAGNESIUM SULFATE HEPTAHYDRATE 40 MG/ML
2 INJECTION, SOLUTION INTRAVENOUS EVERY 6 HOURS PRN
Status: DISCONTINUED | OUTPATIENT
Start: 2019-01-01 | End: 2019-01-01

## 2019-01-01 RX ORDER — NICOTINE POLACRILEX 4 MG
15-30 LOZENGE BUCCAL
Status: DISCONTINUED | OUTPATIENT
Start: 2019-01-01 | End: 2019-01-01

## 2019-01-01 RX ORDER — AMINO AC/PROTEIN HYDR/WHEY PRO 10G-100/30
1 LIQUID (ML) ORAL 2 TIMES DAILY
Status: DISCONTINUED | OUTPATIENT
Start: 2019-01-01 | End: 2019-01-01

## 2019-01-01 RX ORDER — NITROGLYCERIN 20 MG/100ML
.07-2 INJECTION INTRAVENOUS CONTINUOUS PRN
Status: DISCONTINUED | OUTPATIENT
Start: 2019-01-01 | End: 2019-01-01 | Stop reason: HOSPADM

## 2019-01-01 RX ORDER — ALBUMIN, HUMAN INJ 5% 5 %
SOLUTION INTRAVENOUS
Status: DISCONTINUED
Start: 2019-01-01 | End: 2019-01-01 | Stop reason: HOSPADM

## 2019-01-01 RX ORDER — 3% SODIUM CHLORIDE 3 G/100ML
INJECTION, SOLUTION INTRAVENOUS CONTINUOUS
Status: DISCONTINUED | OUTPATIENT
Start: 2019-01-01 | End: 2019-01-01

## 2019-01-01 RX ORDER — ARGATROBAN 1 MG/ML
350 INJECTION, SOLUTION INTRAVENOUS
Status: DISCONTINUED | OUTPATIENT
Start: 2019-01-01 | End: 2019-01-01 | Stop reason: HOSPADM

## 2019-01-01 RX ORDER — FENTANYL CITRATE 50 UG/ML
50 INJECTION, SOLUTION INTRAMUSCULAR; INTRAVENOUS EVERY 30 MIN PRN
Status: DISCONTINUED | OUTPATIENT
Start: 2019-01-01 | End: 2019-01-01 | Stop reason: HOSPADM

## 2019-01-01 RX ORDER — DOBUTAMINE HYDROCHLORIDE 200 MG/100ML
5-40 INJECTION INTRAVENOUS CONTINUOUS PRN
Status: DISCONTINUED | OUTPATIENT
Start: 2019-01-01 | End: 2019-01-01 | Stop reason: HOSPADM

## 2019-01-01 RX ORDER — CALCIUM CHLORIDE 100 MG/ML
1 INJECTION INTRAVENOUS; INTRAVENTRICULAR EVERY 10 MIN PRN
Status: DISCONTINUED | OUTPATIENT
Start: 2019-01-01 | End: 2019-01-01

## 2019-01-01 RX ORDER — LIDOCAINE 40 MG/G
CREAM TOPICAL
Status: DISCONTINUED | OUTPATIENT
Start: 2019-01-01 | End: 2019-01-01 | Stop reason: HOSPADM

## 2019-01-01 RX ORDER — PROPOFOL 10 MG/ML
5-75 INJECTION, EMULSION INTRAVENOUS CONTINUOUS
Status: DISCONTINUED | OUTPATIENT
Start: 2019-01-01 | End: 2019-01-01

## 2019-01-01 RX ORDER — ATROPINE SULFATE 0.1 MG/ML
0.5 INJECTION INTRAVENOUS EVERY 5 MIN PRN
Status: ACTIVE | OUTPATIENT
Start: 2019-01-01 | End: 2019-01-01

## 2019-01-01 RX ORDER — MIDAZOLAM (PF) 1 MG/ML IN 0.9 % SODIUM CHLORIDE INTRAVENOUS SOLUTION
1-16 CONTINUOUS
Status: DISCONTINUED | OUTPATIENT
Start: 2019-01-01 | End: 2019-01-01

## 2019-01-01 RX ORDER — IOPAMIDOL 755 MG/ML
75 INJECTION, SOLUTION INTRAVASCULAR ONCE
Status: COMPLETED | OUTPATIENT
Start: 2019-01-01 | End: 2019-01-01

## 2019-01-01 RX ORDER — EPTIFIBATIDE 2 MG/ML
1 INJECTION, SOLUTION INTRAVENOUS CONTINUOUS PRN
Status: DISCONTINUED | OUTPATIENT
Start: 2019-01-01 | End: 2019-01-01 | Stop reason: HOSPADM

## 2019-01-01 RX ORDER — SODIUM CHLORIDE, SODIUM GLUCONATE, SODIUM ACETATE, POTASSIUM CHLORIDE AND MAGNESIUM CHLORIDE 526; 502; 368; 37; 30 MG/100ML; MG/100ML; MG/100ML; MG/100ML; MG/100ML
INJECTION, SOLUTION INTRAVENOUS CONTINUOUS PRN
Status: DISCONTINUED | OUTPATIENT
Start: 2019-01-01 | End: 2019-01-01

## 2019-01-01 RX ORDER — POTASSIUM CHLORIDE 29.8 MG/ML
INJECTION INTRAVENOUS CONTINUOUS PRN
Status: COMPLETED | OUTPATIENT
Start: 2019-01-01 | End: 2019-01-01

## 2019-01-01 RX ORDER — NEOSTIGMINE METHYLSULFATE 1 MG/ML
VIAL (ML) INJECTION PRN
Status: DISCONTINUED | OUTPATIENT
Start: 2019-01-01 | End: 2019-01-01

## 2019-01-01 RX ORDER — OSELTAMIVIR PHOSPHATE 6 MG/ML
75 FOR SUSPENSION ORAL 2 TIMES DAILY
Status: DISCONTINUED | OUTPATIENT
Start: 2019-01-01 | End: 2019-01-01

## 2019-01-01 RX ORDER — ARGATROBAN 1 MG/ML
150 INJECTION, SOLUTION INTRAVENOUS
Status: DISCONTINUED | OUTPATIENT
Start: 2019-01-01 | End: 2019-01-01 | Stop reason: HOSPADM

## 2019-01-01 RX ORDER — NALOXONE HYDROCHLORIDE 0.4 MG/ML
.2-.4 INJECTION, SOLUTION INTRAMUSCULAR; INTRAVENOUS; SUBCUTANEOUS
Status: ACTIVE | OUTPATIENT
Start: 2019-01-01 | End: 2019-01-01

## 2019-01-01 RX ORDER — MAGNESIUM SULFATE HEPTAHYDRATE 40 MG/ML
4 INJECTION, SOLUTION INTRAVENOUS EVERY 4 HOURS PRN
Status: DISCONTINUED | OUTPATIENT
Start: 2019-01-01 | End: 2019-01-01

## 2019-01-01 RX ORDER — POLYETHYLENE GLYCOL 3350 17 G/17G
17 POWDER, FOR SOLUTION ORAL DAILY
Status: DISCONTINUED | OUTPATIENT
Start: 2019-01-01 | End: 2019-01-01

## 2019-01-01 RX ORDER — PROPOFOL 10 MG/ML
INJECTION, EMULSION INTRAVENOUS PRN
Status: DISCONTINUED | OUTPATIENT
Start: 2019-01-01 | End: 2019-01-01

## 2019-01-01 RX ORDER — ALBUMIN, HUMAN INJ 5% 5 %
SOLUTION INTRAVENOUS CONTINUOUS PRN
Status: DISCONTINUED | OUTPATIENT
Start: 2019-01-01 | End: 2019-01-01

## 2019-01-01 RX ORDER — CALCIUM CHLORIDE 100 MG/ML
INJECTION INTRAVENOUS; INTRAVENTRICULAR
Status: DISCONTINUED
Start: 2019-01-01 | End: 2019-01-01 | Stop reason: HOSPADM

## 2019-01-01 RX ORDER — DOPAMINE HYDROCHLORIDE 160 MG/100ML
2-20 INJECTION, SOLUTION INTRAVENOUS CONTINUOUS PRN
Status: DISCONTINUED | OUTPATIENT
Start: 2019-01-01 | End: 2019-01-01 | Stop reason: HOSPADM

## 2019-01-01 RX ORDER — AMOXICILLIN 250 MG
1 CAPSULE ORAL
Status: DISCONTINUED | OUTPATIENT
Start: 2019-01-01 | End: 2019-01-01

## 2019-01-01 RX ORDER — ALBUMIN, HUMAN INJ 5% 5 %
25 SOLUTION INTRAVENOUS ONCE
Status: COMPLETED | OUTPATIENT
Start: 2019-01-01 | End: 2019-01-01

## 2019-01-01 RX ORDER — PIPERACILLIN SODIUM, TAZOBACTAM SODIUM 4; .5 G/20ML; G/20ML
4.5 INJECTION, POWDER, LYOPHILIZED, FOR SOLUTION INTRAVENOUS EVERY 6 HOURS
Status: DISCONTINUED | OUTPATIENT
Start: 2019-01-01 | End: 2019-01-01

## 2019-01-01 RX ORDER — NOREPINEPHRINE BITARTRATE/D5W 16MG/250ML
0.03-0.4 PLASTIC BAG, INJECTION (ML) INTRAVENOUS CONTINUOUS
Status: DISCONTINUED | OUTPATIENT
Start: 2019-01-01 | End: 2019-01-01

## 2019-01-01 RX ORDER — NITROGLYCERIN 5 MG/ML
VIAL (ML) INTRAVENOUS
Status: DISCONTINUED | OUTPATIENT
Start: 2019-01-01 | End: 2019-01-01 | Stop reason: HOSPADM

## 2019-01-01 RX ORDER — FLUCONAZOLE 200 MG/1
400 TABLET ORAL DAILY
Status: DISCONTINUED | OUTPATIENT
Start: 2019-01-01 | End: 2019-01-01

## 2019-01-01 RX ORDER — ALBUMIN, HUMAN INJ 5% 5 %
500 SOLUTION INTRAVENOUS ONCE
Status: COMPLETED | OUTPATIENT
Start: 2019-01-01 | End: 2019-01-01

## 2019-01-01 RX ORDER — FENTANYL CITRATE 50 UG/ML
50 INJECTION, SOLUTION INTRAMUSCULAR; INTRAVENOUS EVERY 30 MIN PRN
Status: DISCONTINUED | OUTPATIENT
Start: 2019-01-01 | End: 2019-01-01

## 2019-01-01 RX ORDER — NITROGLYCERIN 0.4 MG/1
0.4 TABLET SUBLINGUAL EVERY 5 MIN PRN
Status: DISCONTINUED | OUTPATIENT
Start: 2019-01-01 | End: 2019-01-01

## 2019-01-01 RX ORDER — IOPAMIDOL 755 MG/ML
INJECTION, SOLUTION INTRAVASCULAR
Status: DISCONTINUED | OUTPATIENT
Start: 2019-01-01 | End: 2019-01-01 | Stop reason: HOSPADM

## 2019-01-01 RX ORDER — GUAR GUM
1 PACKET (EA) ORAL EVERY 6 HOURS
Status: DISCONTINUED | OUTPATIENT
Start: 2019-01-01 | End: 2019-01-01

## 2019-01-01 RX ORDER — DEXTROSE MONOHYDRATE 25 G/50ML
25-50 INJECTION, SOLUTION INTRAVENOUS
Status: DISCONTINUED | OUTPATIENT
Start: 2019-01-01 | End: 2019-01-01

## 2019-01-01 RX ORDER — ERYTHROMYCIN 5 MG/G
OINTMENT OPHTHALMIC 4 TIMES DAILY
Status: DISCONTINUED | OUTPATIENT
Start: 2019-01-01 | End: 2019-01-01

## 2019-01-01 RX ORDER — ALBUTEROL SULFATE 0.83 MG/ML
2.5 SOLUTION RESPIRATORY (INHALATION)
Status: DISCONTINUED | OUTPATIENT
Start: 2019-01-01 | End: 2019-01-01 | Stop reason: HOSPADM

## 2019-01-01 RX ORDER — B COMPLEX C NO.10/FOLIC ACID 900MCG/5ML
5 LIQUID (ML) ORAL DAILY
Status: DISCONTINUED | OUTPATIENT
Start: 2019-01-01 | End: 2019-01-01

## 2019-01-01 RX ORDER — FLUMAZENIL 0.1 MG/ML
0.2 INJECTION, SOLUTION INTRAVENOUS
Status: ACTIVE | OUTPATIENT
Start: 2019-01-01 | End: 2019-01-01

## 2019-01-01 RX ORDER — ACETAMINOPHEN 325 MG/1
325 TABLET ORAL EVERY 4 HOURS PRN
Status: DISCONTINUED | OUTPATIENT
Start: 2019-01-01 | End: 2019-01-01 | Stop reason: HOSPADM

## 2019-01-01 RX ORDER — CALCIUM GLUCONATE 94 MG/ML
2 INJECTION, SOLUTION INTRAVENOUS ONCE
Status: DISCONTINUED | OUTPATIENT
Start: 2019-01-01 | End: 2019-01-01

## 2019-01-01 RX ORDER — MIDAZOLAM (PF) 1 MG/ML IN 0.9 % SODIUM CHLORIDE INTRAVENOUS SOLUTION
2-16 CONTINUOUS
Status: DISPENSED | OUTPATIENT
Start: 2019-01-01 | End: 2019-01-01

## 2019-01-01 RX ORDER — SODIUM CHLORIDE 9 MG/ML
INJECTION, SOLUTION INTRAVENOUS CONTINUOUS
Status: DISCONTINUED | OUTPATIENT
Start: 2019-01-01 | End: 2019-01-01

## 2019-01-01 RX ORDER — NOREPINEPHRINE BITARTRATE/D5W 16MG/250ML
.03-.4 PLASTIC BAG, INJECTION (ML) INTRAVENOUS CONTINUOUS PRN
Status: DISCONTINUED | OUTPATIENT
Start: 2019-01-01 | End: 2019-01-01 | Stop reason: HOSPADM

## 2019-01-01 RX ORDER — MAGNESIUM SULFATE HEPTAHYDRATE 40 MG/ML
2 INJECTION, SOLUTION INTRAVENOUS DAILY PRN
Status: DISCONTINUED | OUTPATIENT
Start: 2019-01-01 | End: 2019-01-01

## 2019-01-01 RX ORDER — ASPIRIN 81 MG/1
81 TABLET ORAL DAILY
Status: DISCONTINUED | OUTPATIENT
Start: 2019-01-01 | End: 2019-01-01

## 2019-01-01 RX ORDER — CARBOXYMETHYLCELLULOSE SODIUM 5 MG/ML
2 SOLUTION/ DROPS OPHTHALMIC 4 TIMES DAILY
Status: DISCONTINUED | OUTPATIENT
Start: 2019-01-01 | End: 2019-01-01

## 2019-01-01 RX ORDER — ESMOLOL HYDROCHLORIDE 20 MG/ML
50-300 INJECTION, SOLUTION INTRAVENOUS CONTINUOUS
Status: DISCONTINUED | OUTPATIENT
Start: 2019-01-01 | End: 2019-01-01

## 2019-01-01 RX ORDER — POLYETHYLENE GLYCOL 3350 17 G/17G
17 POWDER, FOR SOLUTION ORAL DAILY PRN
Status: DISCONTINUED | OUTPATIENT
Start: 2019-01-01 | End: 2019-01-01

## 2019-01-01 RX ORDER — PIPERACILLIN SODIUM, TAZOBACTAM SODIUM 3; .375 G/15ML; G/15ML
3.38 INJECTION, POWDER, LYOPHILIZED, FOR SOLUTION INTRAVENOUS EVERY 6 HOURS
Status: DISCONTINUED | OUTPATIENT
Start: 2019-01-01 | End: 2019-01-01

## 2019-01-01 RX ORDER — HEPARIN SODIUM,PORCINE 10 UNIT/ML
2-5 VIAL (ML) INTRAVENOUS
Status: DISCONTINUED | OUTPATIENT
Start: 2019-01-01 | End: 2019-01-01 | Stop reason: HOSPADM

## 2019-01-01 RX ORDER — ALBUMIN, HUMAN INJ 5% 5 %
12.5 SOLUTION INTRAVENOUS ONCE
Status: COMPLETED | OUTPATIENT
Start: 2019-01-01 | End: 2019-01-01

## 2019-01-01 RX ORDER — GLYCOPYRROLATE 0.2 MG/ML
INJECTION, SOLUTION INTRAMUSCULAR; INTRAVENOUS PRN
Status: DISCONTINUED | OUTPATIENT
Start: 2019-01-01 | End: 2019-01-01

## 2019-01-01 RX ORDER — DEXMEDETOMIDINE HYDROCHLORIDE 4 UG/ML
0.2-0.7 INJECTION, SOLUTION INTRAVENOUS CONTINUOUS
Status: DISCONTINUED | OUTPATIENT
Start: 2019-01-01 | End: 2019-01-01

## 2019-01-01 RX ORDER — MAGNESIUM SULFATE 1 G/100ML
2 INJECTION INTRAVENOUS ONCE
Status: COMPLETED | OUTPATIENT
Start: 2019-01-01 | End: 2019-01-01

## 2019-01-01 RX ORDER — FENTANYL CITRATE 50 UG/ML
25-50 INJECTION, SOLUTION INTRAMUSCULAR; INTRAVENOUS
Status: ACTIVE | OUTPATIENT
Start: 2019-01-01 | End: 2019-01-01

## 2019-01-01 RX ORDER — ERTAPENEM 1 G/1
1 INJECTION, POWDER, LYOPHILIZED, FOR SOLUTION INTRAMUSCULAR; INTRAVENOUS EVERY 24 HOURS
Status: DISCONTINUED | OUTPATIENT
Start: 2019-01-01 | End: 2019-01-01

## 2019-01-01 RX ORDER — DOBUTAMINE HYDROCHLORIDE 200 MG/100ML
2-20 INJECTION INTRAVENOUS CONTINUOUS PRN
Status: DISCONTINUED | OUTPATIENT
Start: 2019-01-01 | End: 2019-01-01 | Stop reason: HOSPADM

## 2019-01-01 RX ORDER — HEPARIN SODIUM,PORCINE 10 UNIT/ML
5-10 VIAL (ML) INTRAVENOUS
Status: DISCONTINUED | OUTPATIENT
Start: 2019-01-01 | End: 2019-01-01 | Stop reason: HOSPADM

## 2019-01-01 RX ORDER — MINERAL OIL AND WHITE PETROLATUM 150; 830 MG/G; MG/G
OINTMENT OPHTHALMIC 4 TIMES DAILY
Status: DISCONTINUED | OUTPATIENT
Start: 2019-01-01 | End: 2019-01-01

## 2019-01-01 RX ORDER — SODIUM CHLORIDE 9 MG/ML
INJECTION, SOLUTION INTRAVENOUS CONTINUOUS PRN
Status: DISCONTINUED | OUTPATIENT
Start: 2019-01-01 | End: 2019-01-01

## 2019-01-01 RX ORDER — ALBUMIN, HUMAN INJ 5% 5 %
50 SOLUTION INTRAVENOUS ONCE
Status: COMPLETED | OUTPATIENT
Start: 2019-01-01 | End: 2019-01-01

## 2019-01-01 RX ORDER — SENNOSIDES 8.6 MG
8.6 TABLET ORAL 2 TIMES DAILY
Status: DISCONTINUED | OUTPATIENT
Start: 2019-01-01 | End: 2019-01-01

## 2019-01-01 RX ORDER — EPTIFIBATIDE 2 MG/ML
180 INJECTION, SOLUTION INTRAVENOUS EVERY 10 MIN PRN
Status: DISCONTINUED | OUTPATIENT
Start: 2019-01-01 | End: 2019-01-01 | Stop reason: HOSPADM

## 2019-01-01 RX ADMIN — CALCIUM CHLORIDE, MAGNESIUM CHLORIDE, SODIUM CHLORIDE, SODIUM BICARBONATE, POTASSIUM CHLORIDE AND SODIUM PHOSPHATE DIBASIC DIHYDRATE 12.5 ML/KG/HR: 3.68; 3.05; 6.34; 3.09; .314; .187 INJECTION INTRAVENOUS at 18:34

## 2019-01-01 RX ADMIN — CALCIUM CHLORIDE, MAGNESIUM CHLORIDE, SODIUM CHLORIDE, SODIUM BICARBONATE, POTASSIUM CHLORIDE AND SODIUM PHOSPHATE DIBASIC DIHYDRATE 12.5 ML/KG/HR: 3.68; 3.05; 6.34; 3.09; .314; .187 INJECTION INTRAVENOUS at 15:42

## 2019-01-01 RX ADMIN — CALCIUM CHLORIDE, MAGNESIUM CHLORIDE, DEXTROSE MONOHYDRATE, LACTIC ACID, SODIUM CHLORIDE, SODIUM BICARBONATE AND POTASSIUM CHLORIDE: 5.15; 2.03; 22; 5.4; 6.46; 3.09; .157 INJECTION INTRAVENOUS at 18:22

## 2019-01-01 RX ADMIN — HYDROCORTISONE SODIUM SUCCINATE 50 MG: 100 INJECTION, POWDER, FOR SOLUTION INTRAMUSCULAR; INTRAVENOUS at 06:34

## 2019-01-01 RX ADMIN — MICAFUNGIN SODIUM 150 MG: 10 INJECTION, POWDER, LYOPHILIZED, FOR SOLUTION INTRAVENOUS at 20:07

## 2019-01-01 RX ADMIN — Medication 0.05 MCG/KG/MIN: at 03:04

## 2019-01-01 RX ADMIN — NOREPINEPHRINE BITARTRATE 6.4 MCG: 1 INJECTION INTRAVENOUS at 14:33

## 2019-01-01 RX ADMIN — CALCIUM CHLORIDE, MAGNESIUM CHLORIDE, SODIUM CHLORIDE, SODIUM BICARBONATE, POTASSIUM CHLORIDE AND SODIUM PHOSPHATE DIBASIC DIHYDRATE 12.5 ML/KG/HR: 3.68; 3.05; 6.34; 3.09; .314; .187 INJECTION INTRAVENOUS at 23:24

## 2019-01-01 RX ADMIN — Medication: at 09:21

## 2019-01-01 RX ADMIN — HUMAN INSULIN 7 UNITS/HR: 100 INJECTION, SOLUTION SUBCUTANEOUS at 09:02

## 2019-01-01 RX ADMIN — FENTANYL CITRATE 100 MCG/HR: 50 INJECTION INTRAVENOUS at 22:30

## 2019-01-01 RX ADMIN — CALCIUM CHLORIDE, MAGNESIUM CHLORIDE, SODIUM CHLORIDE, SODIUM BICARBONATE, POTASSIUM CHLORIDE AND SODIUM PHOSPHATE DIBASIC DIHYDRATE 1.89 ML/KG/HR: 3.68; 3.05; 6.34; 3.09; .314; .187 INJECTION INTRAVENOUS at 18:38

## 2019-01-01 RX ADMIN — FENTANYL CITRATE 50 MCG: 50 INJECTION INTRAMUSCULAR; INTRAVENOUS at 07:26

## 2019-01-01 RX ADMIN — CALCIUM CHLORIDE, MAGNESIUM CHLORIDE, SODIUM CHLORIDE, SODIUM BICARBONATE, POTASSIUM CHLORIDE AND SODIUM PHOSPHATE DIBASIC DIHYDRATE 12.5 ML/KG/HR: 3.68; 3.05; 6.34; 3.09; .314; .187 INJECTION INTRAVENOUS at 15:31

## 2019-01-01 RX ADMIN — Medication 5 ML: at 08:14

## 2019-01-01 RX ADMIN — FENTANYL CITRATE 100 MCG/HR: 50 INJECTION INTRAVENOUS at 19:43

## 2019-01-01 RX ADMIN — Medication 1 PACKET: at 08:25

## 2019-01-01 RX ADMIN — SODIUM CHLORIDE: 3 INJECTION, SOLUTION INTRAVENOUS at 09:23

## 2019-01-01 RX ADMIN — TICAGRELOR 90 MG: 90 TABLET ORAL at 19:28

## 2019-01-01 RX ADMIN — CALCIUM CHLORIDE, MAGNESIUM CHLORIDE, DEXTROSE MONOHYDRATE, LACTIC ACID, SODIUM CHLORIDE, SODIUM BICARBONATE AND POTASSIUM CHLORIDE 12.5 ML/KG/HR: 5.15; 2.03; 22; 5.4; 6.46; 3.09; .157 INJECTION INTRAVENOUS at 19:02

## 2019-01-01 RX ADMIN — CALCIUM CHLORIDE, MAGNESIUM CHLORIDE, DEXTROSE MONOHYDRATE, LACTIC ACID, SODIUM CHLORIDE, SODIUM BICARBONATE AND POTASSIUM CHLORIDE: 5.15; 2.03; 22; 5.4; 6.46; 3.09; .157 INJECTION INTRAVENOUS at 19:19

## 2019-01-01 RX ADMIN — EPINEPHRINE 0.07 MCG/KG/MIN: 1 INJECTION PARENTERAL at 02:48

## 2019-01-01 RX ADMIN — MICAFUNGIN SODIUM 100 MG: 10 INJECTION, POWDER, LYOPHILIZED, FOR SOLUTION INTRAVENOUS at 19:28

## 2019-01-01 RX ADMIN — OSELTAMIVIR PHOSPHATE 75 MG: 6 POWDER, FOR SUSPENSION ORAL at 07:47

## 2019-01-01 RX ADMIN — PANTOPRAZOLE SODIUM 40 MG: 40 INJECTION, POWDER, FOR SOLUTION INTRAVENOUS at 15:37

## 2019-01-01 RX ADMIN — VANCOMYCIN HYDROCHLORIDE 1750 MG: 1 INJECTION, POWDER, LYOPHILIZED, FOR SOLUTION INTRAVENOUS at 06:56

## 2019-01-01 RX ADMIN — CALCIUM CHLORIDE, MAGNESIUM CHLORIDE, SODIUM CHLORIDE, SODIUM BICARBONATE, POTASSIUM CHLORIDE AND SODIUM PHOSPHATE DIBASIC DIHYDRATE 1.89 ML/KG/HR: 3.68; 3.05; 6.34; 3.09; .314; .187 INJECTION INTRAVENOUS at 10:09

## 2019-01-01 RX ADMIN — Medication 5 ML: at 08:28

## 2019-01-01 RX ADMIN — TICAGRELOR 90 MG: 90 TABLET ORAL at 07:45

## 2019-01-01 RX ADMIN — CISATRACURIUM BESYLATE 6 MG: 2 INJECTION INTRAVENOUS at 14:56

## 2019-01-01 RX ADMIN — MEROPENEM 2 G: 1 INJECTION, POWDER, FOR SOLUTION INTRAVENOUS at 13:34

## 2019-01-01 RX ADMIN — CALCIUM CHLORIDE, MAGNESIUM CHLORIDE, SODIUM CHLORIDE, SODIUM BICARBONATE, POTASSIUM CHLORIDE AND SODIUM PHOSPHATE DIBASIC DIHYDRATE 12.5 ML/KG/HR: 3.68; 3.05; 6.34; 3.09; .314; .187 INJECTION INTRAVENOUS at 19:49

## 2019-01-01 RX ADMIN — CALCIUM CHLORIDE, MAGNESIUM CHLORIDE, DEXTROSE MONOHYDRATE, LACTIC ACID, SODIUM CHLORIDE, SODIUM BICARBONATE AND POTASSIUM CHLORIDE 12.5 ML/KG/HR: 5.15; 2.03; 22; 5.4; 6.46; 3.09; .157 INJECTION INTRAVENOUS at 02:12

## 2019-01-01 RX ADMIN — PANTOPRAZOLE SODIUM 40 MG: 40 TABLET, DELAYED RELEASE ORAL at 07:45

## 2019-01-01 RX ADMIN — PIPERACILLIN AND TAZOBACTAM 4.5 G: 4; .5 INJECTION, POWDER, FOR SOLUTION INTRAVENOUS at 06:29

## 2019-01-01 RX ADMIN — AMIODARONE HYDROCHLORIDE 0.5 MG/MIN: 50 INJECTION, SOLUTION INTRAVENOUS at 20:25

## 2019-01-01 RX ADMIN — OSELTAMIVIR PHOSPHATE 75 MG: 6 POWDER, FOR SUSPENSION ORAL at 19:44

## 2019-01-01 RX ADMIN — CALCIUM CHLORIDE, MAGNESIUM CHLORIDE, SODIUM CHLORIDE, SODIUM BICARBONATE, POTASSIUM CHLORIDE AND SODIUM PHOSPHATE DIBASIC DIHYDRATE 12.5 ML/KG/HR: 3.68; 3.05; 6.34; 3.09; .314; .187 INJECTION INTRAVENOUS at 03:40

## 2019-01-01 RX ADMIN — CALCIUM CHLORIDE, MAGNESIUM CHLORIDE, SODIUM CHLORIDE, SODIUM BICARBONATE, POTASSIUM CHLORIDE AND SODIUM PHOSPHATE DIBASIC DIHYDRATE 12.5 ML/KG/HR: 3.68; 3.05; 6.34; 3.09; .314; .187 INJECTION INTRAVENOUS at 00:04

## 2019-01-01 RX ADMIN — PIPERACILLIN SODIUM AND TAZOBACTAM SODIUM 3.38 G: 3; .375 INJECTION, POWDER, LYOPHILIZED, FOR SOLUTION INTRAVENOUS at 12:30

## 2019-01-01 RX ADMIN — Medication: at 04:40

## 2019-01-01 RX ADMIN — CALCIUM CHLORIDE, MAGNESIUM CHLORIDE, DEXTROSE MONOHYDRATE, LACTIC ACID, SODIUM CHLORIDE, SODIUM BICARBONATE AND POTASSIUM CHLORIDE: 5.15; 2.03; 22; 5.4; 6.46; 3.09; .157 INJECTION INTRAVENOUS at 18:16

## 2019-01-01 RX ADMIN — CALCIUM CHLORIDE, MAGNESIUM CHLORIDE, DEXTROSE MONOHYDRATE, LACTIC ACID, SODIUM CHLORIDE, SODIUM BICARBONATE AND POTASSIUM CHLORIDE: 5.15; 2.03; 22; 5.4; 6.46; 3.09; .157 INJECTION INTRAVENOUS at 04:30

## 2019-01-01 RX ADMIN — PIPERACILLIN SODIUM AND TAZOBACTAM SODIUM 3.38 G: 3; .375 INJECTION, POWDER, LYOPHILIZED, FOR SOLUTION INTRAVENOUS at 18:26

## 2019-01-01 RX ADMIN — CALCIUM CHLORIDE, MAGNESIUM CHLORIDE, SODIUM CHLORIDE, SODIUM BICARBONATE, POTASSIUM CHLORIDE AND SODIUM PHOSPHATE DIBASIC DIHYDRATE 2.05 ML/KG/HR: 3.68; 3.05; 6.34; 3.09; .314; .187 INJECTION INTRAVENOUS at 20:22

## 2019-01-01 RX ADMIN — CALCIUM CHLORIDE, MAGNESIUM CHLORIDE, SODIUM CHLORIDE, SODIUM BICARBONATE, POTASSIUM CHLORIDE AND SODIUM PHOSPHATE DIBASIC DIHYDRATE 12.5 ML/KG/HR: 3.68; 3.05; 6.34; 3.09; .314; .187 INJECTION INTRAVENOUS at 22:37

## 2019-01-01 RX ADMIN — HEPARIN SODIUM 1000 UNITS/HR: 10000 INJECTION, SOLUTION INTRAVENOUS at 13:42

## 2019-01-01 RX ADMIN — FENTANYL CITRATE 50 MCG: 50 INJECTION INTRAMUSCULAR; INTRAVENOUS at 18:24

## 2019-01-01 RX ADMIN — Medication 0.15 MCG/KG/MIN: at 21:20

## 2019-01-01 RX ADMIN — HUMAN INSULIN 6 UNITS/HR: 100 INJECTION, SOLUTION SUBCUTANEOUS at 16:25

## 2019-01-01 RX ADMIN — HUMAN INSULIN 3 UNITS/HR: 100 INJECTION, SOLUTION SUBCUTANEOUS at 01:53

## 2019-01-01 RX ADMIN — ALBUMIN HUMAN: 0.05 INJECTION, SOLUTION INTRAVENOUS at 18:29

## 2019-01-01 RX ADMIN — TICAGRELOR 90 MG: 90 TABLET ORAL at 08:28

## 2019-01-01 RX ADMIN — HUMAN INSULIN 4 UNITS/HR: 100 INJECTION, SOLUTION SUBCUTANEOUS at 00:12

## 2019-01-01 RX ADMIN — CALCIUM CHLORIDE, MAGNESIUM CHLORIDE, SODIUM CHLORIDE, SODIUM BICARBONATE, POTASSIUM CHLORIDE AND SODIUM PHOSPHATE DIBASIC DIHYDRATE 12.5 ML/KG/HR: 3.68; 3.05; 6.34; 3.09; .314; .187 INJECTION INTRAVENOUS at 17:02

## 2019-01-01 RX ADMIN — CALCIUM CHLORIDE, MAGNESIUM CHLORIDE, SODIUM CHLORIDE, SODIUM BICARBONATE, POTASSIUM CHLORIDE AND SODIUM PHOSPHATE DIBASIC DIHYDRATE 12.5 ML/KG/HR: 3.68; 3.05; 6.34; 3.09; .314; .187 INJECTION INTRAVENOUS at 11:19

## 2019-01-01 RX ADMIN — CALCIUM CHLORIDE, MAGNESIUM CHLORIDE, DEXTROSE MONOHYDRATE, LACTIC ACID, SODIUM CHLORIDE, SODIUM BICARBONATE AND POTASSIUM CHLORIDE 12.5 ML/KG/HR: 5.15; 2.03; 22; 5.4; 6.46; 3.09; .157 INJECTION INTRAVENOUS at 01:37

## 2019-01-01 RX ADMIN — FENTANYL CITRATE 100 MCG/HR: 50 INJECTION INTRAVENOUS at 04:58

## 2019-01-01 RX ADMIN — Medication 1 PACKET: at 19:56

## 2019-01-01 RX ADMIN — NOREPINEPHRINE BITARTRATE 6.4 MCG: 1 INJECTION INTRAVENOUS at 14:35

## 2019-01-01 RX ADMIN — HEPARIN SODIUM 3000 UNITS: 1000 INJECTION, SOLUTION INTRAVENOUS; SUBCUTANEOUS at 16:22

## 2019-01-01 RX ADMIN — HEPARIN SODIUM 700 UNITS/HR: 10000 INJECTION, SOLUTION INTRAVENOUS at 23:30

## 2019-01-01 RX ADMIN — CALCIUM CHLORIDE, MAGNESIUM CHLORIDE, SODIUM CHLORIDE, SODIUM BICARBONATE, POTASSIUM CHLORIDE AND SODIUM PHOSPHATE DIBASIC DIHYDRATE 12.5 ML/KG/HR: 3.68; 3.05; 6.34; 3.09; .314; .187 INJECTION INTRAVENOUS at 13:17

## 2019-01-01 RX ADMIN — CALCIUM CHLORIDE, MAGNESIUM CHLORIDE, DEXTROSE MONOHYDRATE, LACTIC ACID, SODIUM CHLORIDE, SODIUM BICARBONATE AND POTASSIUM CHLORIDE 12.5 ML/KG/HR: 5.15; 2.03; 22; 5.4; 6.46; 3.09; .157 INJECTION INTRAVENOUS at 10:35

## 2019-01-01 RX ADMIN — Medication 1 PACKET: at 18:34

## 2019-01-01 RX ADMIN — CALCIUM CHLORIDE 2 G: 100 INJECTION, SOLUTION INTRAVENOUS at 04:58

## 2019-01-01 RX ADMIN — Medication 5 ML: at 07:30

## 2019-01-01 RX ADMIN — CALCIUM CHLORIDE, MAGNESIUM CHLORIDE, SODIUM CHLORIDE, SODIUM BICARBONATE, POTASSIUM CHLORIDE AND SODIUM PHOSPHATE DIBASIC DIHYDRATE 12.5 ML/KG/HR: 3.68; 3.05; 6.34; 3.09; .314; .187 INJECTION INTRAVENOUS at 10:42

## 2019-01-01 RX ADMIN — ASPIRIN 81 MG CHEWABLE TABLET 81 MG: 81 TABLET CHEWABLE at 08:42

## 2019-01-01 RX ADMIN — CALCIUM CHLORIDE, MAGNESIUM CHLORIDE, SODIUM CHLORIDE, SODIUM BICARBONATE, POTASSIUM CHLORIDE AND SODIUM PHOSPHATE DIBASIC DIHYDRATE 12.5 ML/KG/HR: 3.68; 3.05; 6.34; 3.09; .314; .187 INJECTION INTRAVENOUS at 03:47

## 2019-01-01 RX ADMIN — Medication 200 MG: at 07:33

## 2019-01-01 RX ADMIN — Medication 0.06 MCG/KG/MIN: at 01:29

## 2019-01-01 RX ADMIN — SODIUM CHLORIDE, POTASSIUM CHLORIDE, SODIUM LACTATE AND CALCIUM CHLORIDE 500 ML: 600; 310; 30; 20 INJECTION, SOLUTION INTRAVENOUS at 08:11

## 2019-01-01 RX ADMIN — Medication 5 ML: at 07:57

## 2019-01-01 RX ADMIN — CALCIUM CHLORIDE, MAGNESIUM CHLORIDE, DEXTROSE MONOHYDRATE, LACTIC ACID, SODIUM CHLORIDE, SODIUM BICARBONATE AND POTASSIUM CHLORIDE 12.5 ML/KG/HR: 5.15; 2.03; 22; 5.4; 6.46; 3.09; .157 INJECTION INTRAVENOUS at 20:35

## 2019-01-01 RX ADMIN — FENTANYL CITRATE 50 MCG/HR: 50 INJECTION INTRAVENOUS at 23:06

## 2019-01-01 RX ADMIN — Medication: at 15:36

## 2019-01-01 RX ADMIN — PIPERACILLIN AND TAZOBACTAM 4.5 G: 4; .5 INJECTION, POWDER, FOR SOLUTION INTRAVENOUS at 17:41

## 2019-01-01 RX ADMIN — FENTANYL CITRATE 200 MCG/HR: 50 INJECTION INTRAVENOUS at 04:07

## 2019-01-01 RX ADMIN — OSELTAMIVIR PHOSPHATE 75 MG: 6 POWDER, FOR SUSPENSION ORAL at 19:54

## 2019-01-01 RX ADMIN — SODIUM CHLORIDE: 3 INJECTION, SOLUTION INTRAVENOUS at 07:09

## 2019-01-01 RX ADMIN — EPINEPHRINE 0.02 MCG/KG/MIN: 1 INJECTION PARENTERAL at 00:24

## 2019-01-01 RX ADMIN — ERTAPENEM SODIUM 1 G: 1 INJECTION, POWDER, LYOPHILIZED, FOR SOLUTION INTRAMUSCULAR; INTRAVENOUS at 09:51

## 2019-01-01 RX ADMIN — CALCIUM CHLORIDE, MAGNESIUM CHLORIDE, DEXTROSE MONOHYDRATE, LACTIC ACID, SODIUM CHLORIDE, SODIUM BICARBONATE AND POTASSIUM CHLORIDE 12.5 ML/KG/HR: 5.15; 2.03; 22; 5.4; 6.46; 3.09; .157 INJECTION INTRAVENOUS at 22:25

## 2019-01-01 RX ADMIN — Medication: at 22:51

## 2019-01-01 RX ADMIN — PROPOFOL 40 MCG/KG/MIN: 10 INJECTION, EMULSION INTRAVENOUS at 22:12

## 2019-01-01 RX ADMIN — Medication 14 MG/HR: at 20:40

## 2019-01-01 RX ADMIN — ERYTHROMYCIN 1 G: 5 OINTMENT OPHTHALMIC at 16:28

## 2019-01-01 RX ADMIN — PIPERACILLIN AND TAZOBACTAM 4.5 G: 4; .5 INJECTION, POWDER, FOR SOLUTION INTRAVENOUS at 23:50

## 2019-01-01 RX ADMIN — ALBUMIN HUMAN 25 G: 0.05 INJECTION, SOLUTION INTRAVENOUS at 03:39

## 2019-01-01 RX ADMIN — VASOPRESSIN 3 UNITS/HR: 20 INJECTION INTRAVENOUS at 05:37

## 2019-01-01 RX ADMIN — DAPTOMYCIN 800 MG: 500 INJECTION, POWDER, LYOPHILIZED, FOR SOLUTION INTRAVENOUS at 13:22

## 2019-01-01 RX ADMIN — VASOPRESSIN 1.5 UNITS/HR: 20 INJECTION INTRAVENOUS at 16:00

## 2019-01-01 RX ADMIN — MEROPENEM 2 G: 1 INJECTION, POWDER, FOR SOLUTION INTRAVENOUS at 21:56

## 2019-01-01 RX ADMIN — CALCIUM CHLORIDE, MAGNESIUM CHLORIDE, SODIUM CHLORIDE, SODIUM BICARBONATE, POTASSIUM CHLORIDE AND SODIUM PHOSPHATE DIBASIC DIHYDRATE 2.05 ML/KG/HR: 3.68; 3.05; 6.34; 3.09; .314; .187 INJECTION INTRAVENOUS at 20:27

## 2019-01-01 RX ADMIN — TICAGRELOR 90 MG: 90 TABLET ORAL at 07:51

## 2019-01-01 RX ADMIN — TICAGRELOR 90 MG: 90 TABLET ORAL at 20:08

## 2019-01-01 RX ADMIN — ALBUMIN HUMAN 25 G: 0.05 INJECTION, SOLUTION INTRAVENOUS at 12:52

## 2019-01-01 RX ADMIN — TICAGRELOR 90 MG: 90 TABLET ORAL at 08:14

## 2019-01-01 RX ADMIN — CALCIUM CHLORIDE, MAGNESIUM CHLORIDE, DEXTROSE MONOHYDRATE, LACTIC ACID, SODIUM CHLORIDE, SODIUM BICARBONATE AND POTASSIUM CHLORIDE 12.5 ML/KG/HR: 5.15; 2.03; 22; 5.4; 6.46; 3.09; .157 INJECTION INTRAVENOUS at 19:19

## 2019-01-01 RX ADMIN — FENTANYL CITRATE 50 MCG: 50 INJECTION INTRAMUSCULAR; INTRAVENOUS at 01:02

## 2019-01-01 RX ADMIN — TICAGRELOR 90 MG: 90 TABLET ORAL at 08:11

## 2019-01-01 RX ADMIN — EPINEPHRINE 0.04 MCG/KG/MIN: 1 INJECTION PARENTERAL at 11:03

## 2019-01-01 RX ADMIN — CALCIUM CHLORIDE, MAGNESIUM CHLORIDE, SODIUM CHLORIDE, SODIUM BICARBONATE, POTASSIUM CHLORIDE AND SODIUM PHOSPHATE DIBASIC DIHYDRATE 12.5 ML/KG/HR: 3.68; 3.05; 6.34; 3.09; .314; .187 INJECTION INTRAVENOUS at 00:41

## 2019-01-01 RX ADMIN — FENTANYL CITRATE 50 MCG: 50 INJECTION INTRAMUSCULAR; INTRAVENOUS at 05:46

## 2019-01-01 RX ADMIN — POLYETHYLENE GLYCOL 400 AND PROPYLENE GLYCOL 2 DROP: 4; 3 SOLUTION/ DROPS OPHTHALMIC at 14:18

## 2019-01-01 RX ADMIN — CALCIUM CHLORIDE, MAGNESIUM CHLORIDE, SODIUM CHLORIDE, SODIUM BICARBONATE, POTASSIUM CHLORIDE AND SODIUM PHOSPHATE DIBASIC DIHYDRATE 1.89 ML/KG/HR: 3.68; 3.05; 6.34; 3.09; .314; .187 INJECTION INTRAVENOUS at 07:58

## 2019-01-01 RX ADMIN — Medication 1 PACKET: at 08:01

## 2019-01-01 RX ADMIN — CALCIUM CHLORIDE, MAGNESIUM CHLORIDE, DEXTROSE MONOHYDRATE, LACTIC ACID, SODIUM CHLORIDE, SODIUM BICARBONATE AND POTASSIUM CHLORIDE 12.5 ML/KG/HR: 5.15; 2.03; 22; 5.4; 6.46; 3.09; .157 INJECTION INTRAVENOUS at 09:33

## 2019-01-01 RX ADMIN — POLYETHYLENE GLYCOL 3350 17 G: 17 POWDER, FOR SOLUTION ORAL at 07:45

## 2019-01-01 RX ADMIN — CALCIUM CHLORIDE, MAGNESIUM CHLORIDE, DEXTROSE MONOHYDRATE, LACTIC ACID, SODIUM CHLORIDE, SODIUM BICARBONATE AND POTASSIUM CHLORIDE 12.5 ML/KG/HR: 5.15; 2.03; 22; 5.4; 6.46; 3.09; .157 INJECTION INTRAVENOUS at 20:10

## 2019-01-01 RX ADMIN — CALCIUM CHLORIDE, MAGNESIUM CHLORIDE, SODIUM CHLORIDE, SODIUM BICARBONATE, POTASSIUM CHLORIDE AND SODIUM PHOSPHATE DIBASIC DIHYDRATE 1.89 ML/KG/HR: 3.68; 3.05; 6.34; 3.09; .314; .187 INJECTION INTRAVENOUS at 15:27

## 2019-01-01 RX ADMIN — CALCIUM CHLORIDE, MAGNESIUM CHLORIDE, DEXTROSE MONOHYDRATE, LACTIC ACID, SODIUM CHLORIDE, SODIUM BICARBONATE AND POTASSIUM CHLORIDE 12.5 ML/KG/HR: 5.15; 2.03; 22; 5.4; 6.46; 3.09; .157 INJECTION INTRAVENOUS at 15:50

## 2019-01-01 RX ADMIN — TICAGRELOR 90 MG: 90 TABLET ORAL at 19:36

## 2019-01-01 RX ADMIN — PIPERACILLIN AND TAZOBACTAM 4.5 G: 4; .5 INJECTION, POWDER, FOR SOLUTION INTRAVENOUS at 17:13

## 2019-01-01 RX ADMIN — CALCIUM CHLORIDE, MAGNESIUM CHLORIDE, SODIUM CHLORIDE, SODIUM BICARBONATE, POTASSIUM CHLORIDE AND SODIUM PHOSPHATE DIBASIC DIHYDRATE 12.5 ML/KG/HR: 3.68; 3.05; 6.34; 3.09; .314; .187 INJECTION INTRAVENOUS at 18:39

## 2019-01-01 RX ADMIN — CALCIUM CHLORIDE, MAGNESIUM CHLORIDE, SODIUM CHLORIDE, SODIUM BICARBONATE, POTASSIUM CHLORIDE AND SODIUM PHOSPHATE DIBASIC DIHYDRATE 12.5 ML/KG/HR: 3.68; 3.05; 6.34; 3.09; .314; .187 INJECTION INTRAVENOUS at 11:53

## 2019-01-01 RX ADMIN — ASPIRIN 81 MG CHEWABLE TABLET 81 MG: 81 TABLET CHEWABLE at 07:45

## 2019-01-01 RX ADMIN — CALCIUM CHLORIDE, MAGNESIUM CHLORIDE, DEXTROSE MONOHYDRATE, LACTIC ACID, SODIUM CHLORIDE, SODIUM BICARBONATE AND POTASSIUM CHLORIDE 12.5 ML/KG/HR: 5.15; 2.03; 22; 5.4; 6.46; 3.09; .157 INJECTION INTRAVENOUS at 05:06

## 2019-01-01 RX ADMIN — CALCIUM CHLORIDE, MAGNESIUM CHLORIDE, SODIUM CHLORIDE, SODIUM BICARBONATE, POTASSIUM CHLORIDE AND SODIUM PHOSPHATE DIBASIC DIHYDRATE 12.5 ML/KG/HR: 3.68; 3.05; 6.34; 3.09; .314; .187 INJECTION INTRAVENOUS at 03:28

## 2019-01-01 RX ADMIN — METRONIDAZOLE 500 MG: 500 INJECTION, SOLUTION INTRAVENOUS at 07:34

## 2019-01-01 RX ADMIN — ASPIRIN 81 MG CHEWABLE TABLET 81 MG: 81 TABLET CHEWABLE at 07:44

## 2019-01-01 RX ADMIN — PANTOPRAZOLE SODIUM 40 MG: 40 INJECTION, POWDER, FOR SOLUTION INTRAVENOUS at 03:43

## 2019-01-01 RX ADMIN — PIPERACILLIN SODIUM AND TAZOBACTAM SODIUM 3.38 G: 3; .375 INJECTION, POWDER, LYOPHILIZED, FOR SOLUTION INTRAVENOUS at 06:26

## 2019-01-01 RX ADMIN — PIPERACILLIN AND TAZOBACTAM 4.5 G: 4; .5 INJECTION, POWDER, FOR SOLUTION INTRAVENOUS at 01:16

## 2019-01-01 RX ADMIN — CALCIUM CHLORIDE, MAGNESIUM CHLORIDE, SODIUM CHLORIDE, SODIUM BICARBONATE, POTASSIUM CHLORIDE AND SODIUM PHOSPHATE DIBASIC DIHYDRATE 12.5 ML/KG/HR: 3.68; 3.05; 6.34; 3.09; .314; .187 INJECTION INTRAVENOUS at 08:12

## 2019-01-01 RX ADMIN — FENTANYL CITRATE 50 MCG: 50 INJECTION INTRAMUSCULAR; INTRAVENOUS at 06:23

## 2019-01-01 RX ADMIN — MEROPENEM 2 G: 1 INJECTION, POWDER, FOR SOLUTION INTRAVENOUS at 07:43

## 2019-01-01 RX ADMIN — CALCIUM CHLORIDE, MAGNESIUM CHLORIDE, SODIUM CHLORIDE, SODIUM BICARBONATE, POTASSIUM CHLORIDE AND SODIUM PHOSPHATE DIBASIC DIHYDRATE 12.5 ML/KG/HR: 3.68; 3.05; 6.34; 3.09; .314; .187 INJECTION INTRAVENOUS at 03:06

## 2019-01-01 RX ADMIN — HEPARIN SODIUM 10000 UNITS: 1000 INJECTION, SOLUTION INTRAVENOUS; SUBCUTANEOUS at 19:38

## 2019-01-01 RX ADMIN — HUMAN INSULIN 13 UNITS/HR: 100 INJECTION, SOLUTION SUBCUTANEOUS at 00:29

## 2019-01-01 RX ADMIN — CALCIUM CHLORIDE, MAGNESIUM CHLORIDE, DEXTROSE MONOHYDRATE, LACTIC ACID, SODIUM CHLORIDE, SODIUM BICARBONATE AND POTASSIUM CHLORIDE 12.5 ML/KG/HR: 5.15; 2.03; 22; 5.4; 6.46; 3.09; .157 INJECTION INTRAVENOUS at 03:38

## 2019-01-01 RX ADMIN — HUMAN INSULIN 6 UNITS/HR: 100 INJECTION, SOLUTION SUBCUTANEOUS at 03:59

## 2019-01-01 RX ADMIN — CALCIUM CHLORIDE, MAGNESIUM CHLORIDE, SODIUM CHLORIDE, SODIUM BICARBONATE, POTASSIUM CHLORIDE AND SODIUM PHOSPHATE DIBASIC DIHYDRATE 1.89 ML/KG/HR: 3.68; 3.05; 6.34; 3.09; .314; .187 INJECTION INTRAVENOUS at 08:32

## 2019-01-01 RX ADMIN — CALCIUM CHLORIDE, MAGNESIUM CHLORIDE, SODIUM CHLORIDE, SODIUM BICARBONATE, POTASSIUM CHLORIDE AND SODIUM PHOSPHATE DIBASIC DIHYDRATE 12.5 ML/KG/HR: 3.68; 3.05; 6.34; 3.09; .314; .187 INJECTION INTRAVENOUS at 14:28

## 2019-01-01 RX ADMIN — PIPERACILLIN AND TAZOBACTAM 4.5 G: 4; .5 INJECTION, POWDER, FOR SOLUTION INTRAVENOUS at 23:46

## 2019-01-01 RX ADMIN — CALCIUM CHLORIDE, MAGNESIUM CHLORIDE, SODIUM CHLORIDE, SODIUM BICARBONATE, POTASSIUM CHLORIDE AND SODIUM PHOSPHATE DIBASIC DIHYDRATE 12.5 ML/KG/HR: 3.68; 3.05; 6.34; 3.09; .314; .187 INJECTION INTRAVENOUS at 19:35

## 2019-01-01 RX ADMIN — FENTANYL CITRATE 200 MCG/HR: 50 INJECTION INTRAVENOUS at 09:15

## 2019-01-01 RX ADMIN — CALCIUM CHLORIDE, MAGNESIUM CHLORIDE, DEXTROSE MONOHYDRATE, LACTIC ACID, SODIUM CHLORIDE, SODIUM BICARBONATE AND POTASSIUM CHLORIDE 12.5 ML/KG/HR: 5.15; 2.03; 22; 5.4; 6.46; 3.09; .157 INJECTION INTRAVENOUS at 14:51

## 2019-01-01 RX ADMIN — Medication 5 ML: at 08:32

## 2019-01-01 RX ADMIN — Medication 200 MG: at 08:28

## 2019-01-01 RX ADMIN — AMIODARONE HYDROCHLORIDE 150 MG: 50 INJECTION, SOLUTION INTRAVENOUS at 19:11

## 2019-01-01 RX ADMIN — PIPERACILLIN AND TAZOBACTAM 4.5 G: 4; .5 INJECTION, POWDER, FOR SOLUTION INTRAVENOUS at 19:26

## 2019-01-01 RX ADMIN — HUMAN INSULIN 0.5 UNITS/HR: 100 INJECTION, SOLUTION SUBCUTANEOUS at 12:12

## 2019-01-01 RX ADMIN — CALCIUM CHLORIDE, MAGNESIUM CHLORIDE, SODIUM CHLORIDE, SODIUM BICARBONATE, POTASSIUM CHLORIDE AND SODIUM PHOSPHATE DIBASIC DIHYDRATE 12.5 ML/KG/HR: 3.68; 3.05; 6.34; 3.09; .314; .187 INJECTION INTRAVENOUS at 08:31

## 2019-01-01 RX ADMIN — CALCIUM CHLORIDE, MAGNESIUM CHLORIDE, SODIUM CHLORIDE, SODIUM BICARBONATE, POTASSIUM CHLORIDE AND SODIUM PHOSPHATE DIBASIC DIHYDRATE 12.5 ML/KG/HR: 3.68; 3.05; 6.34; 3.09; .314; .187 INJECTION INTRAVENOUS at 23:43

## 2019-01-01 RX ADMIN — TICAGRELOR 90 MG: 90 TABLET ORAL at 19:34

## 2019-01-01 RX ADMIN — CALCIUM CHLORIDE, MAGNESIUM CHLORIDE, SODIUM CHLORIDE, SODIUM BICARBONATE, POTASSIUM CHLORIDE AND SODIUM PHOSPHATE DIBASIC DIHYDRATE 12.5 ML/KG/HR: 3.68; 3.05; 6.34; 3.09; .314; .187 INJECTION INTRAVENOUS at 04:26

## 2019-01-01 RX ADMIN — EPINEPHRINE 0.07 MCG/KG/MIN: 1 INJECTION PARENTERAL at 02:50

## 2019-01-01 RX ADMIN — SENNOSIDES AND DOCUSATE SODIUM 1 TABLET: 8.6; 5 TABLET ORAL at 20:08

## 2019-01-01 RX ADMIN — HEPARIN SODIUM 5000 UNITS: 1000 INJECTION, SOLUTION INTRAVENOUS; SUBCUTANEOUS at 15:56

## 2019-01-01 RX ADMIN — ERYTHROMYCIN 1 G: 5 OINTMENT OPHTHALMIC at 21:23

## 2019-01-01 RX ADMIN — CALCIUM CHLORIDE 1 G: 100 INJECTION, SOLUTION INTRAVENOUS at 07:02

## 2019-01-01 RX ADMIN — METRONIDAZOLE 500 MG: 500 INJECTION, SOLUTION INTRAVENOUS at 20:37

## 2019-01-01 RX ADMIN — INSULIN ASPART 1 UNITS: 100 INJECTION, SOLUTION INTRAVENOUS; SUBCUTANEOUS at 08:31

## 2019-01-01 RX ADMIN — CALCIUM CHLORIDE, MAGNESIUM CHLORIDE, DEXTROSE MONOHYDRATE, LACTIC ACID, SODIUM CHLORIDE, SODIUM BICARBONATE AND POTASSIUM CHLORIDE 12.5 ML/KG/HR: 5.15; 2.03; 22; 5.4; 6.46; 3.09; .157 INJECTION INTRAVENOUS at 00:36

## 2019-01-01 RX ADMIN — FENTANYL CITRATE 100 MCG/HR: 50 INJECTION INTRAVENOUS at 16:05

## 2019-01-01 RX ADMIN — CALCIUM CHLORIDE, MAGNESIUM CHLORIDE, SODIUM CHLORIDE, SODIUM BICARBONATE, POTASSIUM CHLORIDE AND SODIUM PHOSPHATE DIBASIC DIHYDRATE 12.5 ML/KG/HR: 3.68; 3.05; 6.34; 3.09; .314; .187 INJECTION INTRAVENOUS at 03:08

## 2019-01-01 RX ADMIN — Medication 14 MG/HR: at 05:31

## 2019-01-01 RX ADMIN — CALCIUM CHLORIDE, MAGNESIUM CHLORIDE, DEXTROSE MONOHYDRATE, LACTIC ACID, SODIUM CHLORIDE, SODIUM BICARBONATE AND POTASSIUM CHLORIDE 12.5 ML/KG/HR: 5.15; 2.03; 22; 5.4; 6.46; 3.09; .157 INJECTION INTRAVENOUS at 15:07

## 2019-01-01 RX ADMIN — PIPERACILLIN AND TAZOBACTAM 4.5 G: 4; .5 INJECTION, POWDER, FOR SOLUTION INTRAVENOUS at 00:14

## 2019-01-01 RX ADMIN — ERTAPENEM SODIUM 1 G: 1 INJECTION, POWDER, LYOPHILIZED, FOR SOLUTION INTRAMUSCULAR; INTRAVENOUS at 10:46

## 2019-01-01 RX ADMIN — MEROPENEM 1 G: 1 INJECTION, POWDER, FOR SOLUTION INTRAVENOUS at 08:14

## 2019-01-01 RX ADMIN — MAGNESIUM SULFATE HEPTAHYDRATE 2 G: 40 INJECTION, SOLUTION INTRAVENOUS at 19:43

## 2019-01-01 RX ADMIN — CARBOXYMETHYLCELLULOSE SODIUM 2 DROP: 5 SOLUTION/ DROPS OPHTHALMIC at 11:09

## 2019-01-01 RX ADMIN — Medication 7 MG/HR: at 08:09

## 2019-01-01 RX ADMIN — CALCIUM CHLORIDE, MAGNESIUM CHLORIDE, SODIUM CHLORIDE, SODIUM BICARBONATE, POTASSIUM CHLORIDE AND SODIUM PHOSPHATE DIBASIC DIHYDRATE 12.5 ML/KG/HR: 3.68; 3.05; 6.34; 3.09; .314; .187 INJECTION INTRAVENOUS at 02:27

## 2019-01-01 RX ADMIN — MEROPENEM 2 G: 1 INJECTION, POWDER, FOR SOLUTION INTRAVENOUS at 05:14

## 2019-01-01 RX ADMIN — MEROPENEM 2 G: 1 INJECTION, POWDER, FOR SOLUTION INTRAVENOUS at 14:06

## 2019-01-01 RX ADMIN — MEROPENEM 2 G: 1 INJECTION, POWDER, FOR SOLUTION INTRAVENOUS at 21:13

## 2019-01-01 RX ADMIN — CALCIUM CHLORIDE, MAGNESIUM CHLORIDE, SODIUM CHLORIDE, SODIUM BICARBONATE, POTASSIUM CHLORIDE AND SODIUM PHOSPHATE DIBASIC DIHYDRATE 12.5 ML/KG/HR: 3.68; 3.05; 6.34; 3.09; .314; .187 INJECTION INTRAVENOUS at 13:35

## 2019-01-01 RX ADMIN — Medication 5 ML: at 07:56

## 2019-01-01 RX ADMIN — Medication 1 PACKET: at 20:48

## 2019-01-01 RX ADMIN — CALCIUM CHLORIDE, MAGNESIUM CHLORIDE, DEXTROSE MONOHYDRATE, LACTIC ACID, SODIUM CHLORIDE, SODIUM BICARBONATE AND POTASSIUM CHLORIDE 12.5 ML/KG/HR: 5.15; 2.03; 22; 5.4; 6.46; 3.09; .157 INJECTION INTRAVENOUS at 20:32

## 2019-01-01 RX ADMIN — CALCIUM CHLORIDE, MAGNESIUM CHLORIDE, SODIUM CHLORIDE, SODIUM BICARBONATE, POTASSIUM CHLORIDE AND SODIUM PHOSPHATE DIBASIC DIHYDRATE 12.5 ML/KG/HR: 3.68; 3.05; 6.34; 3.09; .314; .187 INJECTION INTRAVENOUS at 03:43

## 2019-01-01 RX ADMIN — VANCOMYCIN HYDROCHLORIDE 2000 MG: 1 INJECTION, POWDER, LYOPHILIZED, FOR SOLUTION INTRAVENOUS at 18:34

## 2019-01-01 RX ADMIN — PROPOFOL 10 MCG/KG/MIN: 10 INJECTION, EMULSION INTRAVENOUS at 21:25

## 2019-01-01 RX ADMIN — VANCOMYCIN HYDROCHLORIDE 2000 MG: 10 INJECTION, POWDER, LYOPHILIZED, FOR SOLUTION INTRAVENOUS at 08:56

## 2019-01-01 RX ADMIN — CALCIUM CHLORIDE, MAGNESIUM CHLORIDE, DEXTROSE MONOHYDRATE, LACTIC ACID, SODIUM CHLORIDE, SODIUM BICARBONATE AND POTASSIUM CHLORIDE 12.5 ML/KG/HR: 5.15; 2.03; 22; 5.4; 6.46; 3.09; .157 INJECTION INTRAVENOUS at 03:20

## 2019-01-01 RX ADMIN — Medication 8 MG/HR: at 23:52

## 2019-01-01 RX ADMIN — CARBOXYMETHYLCELLULOSE SODIUM 2 DROP: 5 SOLUTION/ DROPS OPHTHALMIC at 16:25

## 2019-01-01 RX ADMIN — CALCIUM CHLORIDE, MAGNESIUM CHLORIDE, DEXTROSE MONOHYDRATE, LACTIC ACID, SODIUM CHLORIDE, SODIUM BICARBONATE AND POTASSIUM CHLORIDE 12.5 ML/KG/HR: 5.15; 2.03; 22; 5.4; 6.46; 3.09; .157 INJECTION INTRAVENOUS at 20:43

## 2019-01-01 RX ADMIN — ERYTHROMYCIN 1 G: 5 OINTMENT OPHTHALMIC at 19:40

## 2019-01-01 RX ADMIN — EPOPROSTENOL 20 NG/KG/MIN: 1.5 INJECTION, POWDER, LYOPHILIZED, FOR SOLUTION INTRAVENOUS at 16:40

## 2019-01-01 RX ADMIN — PANTOPRAZOLE SODIUM 40 MG: 40 TABLET, DELAYED RELEASE ORAL at 07:46

## 2019-01-01 RX ADMIN — CALCIUM CHLORIDE, MAGNESIUM CHLORIDE, SODIUM CHLORIDE, SODIUM BICARBONATE, POTASSIUM CHLORIDE AND SODIUM PHOSPHATE DIBASIC DIHYDRATE 12.5 ML/KG/HR: 3.68; 3.05; 6.34; 3.09; .314; .187 INJECTION INTRAVENOUS at 22:17

## 2019-01-01 RX ADMIN — OSELTAMIVIR PHOSPHATE 75 MG: 6 POWDER, FOR SUSPENSION ORAL at 08:23

## 2019-01-01 RX ADMIN — CALCIUM CHLORIDE, MAGNESIUM CHLORIDE, SODIUM CHLORIDE, SODIUM BICARBONATE, POTASSIUM CHLORIDE AND SODIUM PHOSPHATE DIBASIC DIHYDRATE 12.5 ML/KG/HR: 3.68; 3.05; 6.34; 3.09; .314; .187 INJECTION INTRAVENOUS at 02:20

## 2019-01-01 RX ADMIN — Medication 0.06 MCG/KG/MIN: at 00:39

## 2019-01-01 RX ADMIN — SENNOSIDES AND DOCUSATE SODIUM 1 TABLET: 8.6; 5 TABLET ORAL at 23:48

## 2019-01-01 RX ADMIN — PANTOPRAZOLE SODIUM 40 MG: 40 TABLET, DELAYED RELEASE ORAL at 07:26

## 2019-01-01 RX ADMIN — OSELTAMIVIR PHOSPHATE 75 MG: 6 POWDER, FOR SUSPENSION ORAL at 20:22

## 2019-01-01 RX ADMIN — TICAGRELOR 90 MG: 90 TABLET ORAL at 20:14

## 2019-01-01 RX ADMIN — DEXTROSE MONOHYDRATE 50 ML: 25 INJECTION, SOLUTION INTRAVENOUS at 18:37

## 2019-01-01 RX ADMIN — PIPERACILLIN AND TAZOBACTAM 4.5 G: 4; .5 INJECTION, POWDER, FOR SOLUTION INTRAVENOUS at 13:23

## 2019-01-01 RX ADMIN — PIPERACILLIN AND TAZOBACTAM 4.5 G: 4; .5 INJECTION, POWDER, FOR SOLUTION INTRAVENOUS at 07:30

## 2019-01-01 RX ADMIN — CALCIUM CHLORIDE, MAGNESIUM CHLORIDE, DEXTROSE MONOHYDRATE, LACTIC ACID, SODIUM CHLORIDE, SODIUM BICARBONATE AND POTASSIUM CHLORIDE 12.5 ML/KG/HR: 5.15; 2.03; 22; 5.4; 6.46; 3.09; .157 INJECTION INTRAVENOUS at 18:18

## 2019-01-01 RX ADMIN — HYDROCORTISONE SODIUM SUCCINATE 50 MG: 100 INJECTION, POWDER, FOR SOLUTION INTRAMUSCULAR; INTRAVENOUS at 05:51

## 2019-01-01 RX ADMIN — CALCIUM CHLORIDE, MAGNESIUM CHLORIDE, SODIUM CHLORIDE, SODIUM BICARBONATE, POTASSIUM CHLORIDE AND SODIUM PHOSPHATE DIBASIC DIHYDRATE 12.5 ML/KG/HR: 3.68; 3.05; 6.34; 3.09; .314; .187 INJECTION INTRAVENOUS at 19:36

## 2019-01-01 RX ADMIN — VANCOMYCIN HYDROCHLORIDE 2000 MG: 1 INJECTION, POWDER, LYOPHILIZED, FOR SOLUTION INTRAVENOUS at 12:59

## 2019-01-01 RX ADMIN — VASOPRESSIN 0.5 UNITS/HR: 20 INJECTION INTRAVENOUS at 15:22

## 2019-01-01 RX ADMIN — Medication: at 17:40

## 2019-01-01 RX ADMIN — HUMAN INSULIN 10 UNITS/HR: 100 INJECTION, SOLUTION SUBCUTANEOUS at 05:18

## 2019-01-01 RX ADMIN — TICAGRELOR 90 MG: 90 TABLET ORAL at 08:00

## 2019-01-01 RX ADMIN — INSULIN ASPART 1 UNITS: 100 INJECTION, SOLUTION INTRAVENOUS; SUBCUTANEOUS at 20:16

## 2019-01-01 RX ADMIN — PROPOFOL 20 MCG/KG/MIN: 10 INJECTION, EMULSION INTRAVENOUS at 13:37

## 2019-01-01 RX ADMIN — ASPIRIN 81 MG CHEWABLE TABLET 81 MG: 81 TABLET CHEWABLE at 07:26

## 2019-01-01 RX ADMIN — INSULIN ASPART 1 UNITS: 100 INJECTION, SOLUTION INTRAVENOUS; SUBCUTANEOUS at 07:57

## 2019-01-01 RX ADMIN — HUMAN INSULIN 2 UNITS/HR: 100 INJECTION, SOLUTION SUBCUTANEOUS at 02:05

## 2019-01-01 RX ADMIN — CARBOXYMETHYLCELLULOSE SODIUM 2 DROP: 5 SOLUTION/ DROPS OPHTHALMIC at 19:35

## 2019-01-01 RX ADMIN — CISATRACURIUM BESYLATE 4 MG: 2 INJECTION INTRAVENOUS at 16:15

## 2019-01-01 RX ADMIN — ERYTHROMYCIN 1 G: 5 OINTMENT OPHTHALMIC at 16:17

## 2019-01-01 RX ADMIN — HUMAN INSULIN 4 UNITS/HR: 100 INJECTION, SOLUTION SUBCUTANEOUS at 06:56

## 2019-01-01 RX ADMIN — PANTOPRAZOLE SODIUM 40 MG: 40 INJECTION, POWDER, FOR SOLUTION INTRAVENOUS at 15:41

## 2019-01-01 RX ADMIN — Medication: at 22:13

## 2019-01-01 RX ADMIN — PROPOFOL 50 MG: 10 INJECTION, EMULSION INTRAVENOUS at 02:10

## 2019-01-01 RX ADMIN — CALCIUM CHLORIDE, MAGNESIUM CHLORIDE, SODIUM CHLORIDE, SODIUM BICARBONATE, POTASSIUM CHLORIDE AND SODIUM PHOSPHATE DIBASIC DIHYDRATE 12.5 ML/KG/HR: 3.68; 3.05; 6.34; 3.09; .314; .187 INJECTION INTRAVENOUS at 04:47

## 2019-01-01 RX ADMIN — DAPTOMYCIN 800 MG: 500 INJECTION, POWDER, LYOPHILIZED, FOR SOLUTION INTRAVENOUS at 13:20

## 2019-01-01 RX ADMIN — Medication 0.08 MCG/KG/MIN: at 23:51

## 2019-01-01 RX ADMIN — ERYTHROMYCIN 1 G: 5 OINTMENT OPHTHALMIC at 19:34

## 2019-01-01 RX ADMIN — INSULIN ASPART 1 UNITS: 100 INJECTION, SOLUTION INTRAVENOUS; SUBCUTANEOUS at 05:22

## 2019-01-01 RX ADMIN — PROPOFOL 50 MCG/KG/MIN: 10 INJECTION, EMULSION INTRAVENOUS at 01:53

## 2019-01-01 RX ADMIN — CARBOXYMETHYLCELLULOSE SODIUM 2 DROP: 5 SOLUTION/ DROPS OPHTHALMIC at 07:51

## 2019-01-01 RX ADMIN — ERYTHROMYCIN 1 G: 5 OINTMENT OPHTHALMIC at 12:07

## 2019-01-01 RX ADMIN — PANTOPRAZOLE SODIUM 40 MG: 40 INJECTION, POWDER, FOR SOLUTION INTRAVENOUS at 15:57

## 2019-01-01 RX ADMIN — Medication 0.1 MCG/KG/MIN: at 21:52

## 2019-01-01 RX ADMIN — CALCIUM CHLORIDE, MAGNESIUM CHLORIDE, SODIUM CHLORIDE, SODIUM BICARBONATE, POTASSIUM CHLORIDE AND SODIUM PHOSPHATE DIBASIC DIHYDRATE 12.5 ML/KG/HR: 3.68; 3.05; 6.34; 3.09; .314; .187 INJECTION INTRAVENOUS at 23:40

## 2019-01-01 RX ADMIN — MEROPENEM 1 G: 1 INJECTION, POWDER, FOR SOLUTION INTRAVENOUS at 10:56

## 2019-01-01 RX ADMIN — CALCIUM CHLORIDE, MAGNESIUM CHLORIDE, DEXTROSE MONOHYDRATE, LACTIC ACID, SODIUM CHLORIDE, SODIUM BICARBONATE AND POTASSIUM CHLORIDE 12.5 ML/KG/HR: 5.15; 2.03; 22; 5.4; 6.46; 3.09; .157 INJECTION INTRAVENOUS at 22:49

## 2019-01-01 RX ADMIN — CALCIUM CHLORIDE, MAGNESIUM CHLORIDE, DEXTROSE MONOHYDRATE, LACTIC ACID, SODIUM CHLORIDE, SODIUM BICARBONATE AND POTASSIUM CHLORIDE 12.5 ML/KG/HR: 5.15; 2.03; 22; 5.4; 6.46; 3.09; .157 INJECTION INTRAVENOUS at 12:17

## 2019-01-01 RX ADMIN — CALCIUM CHLORIDE, MAGNESIUM CHLORIDE, SODIUM CHLORIDE, SODIUM BICARBONATE, POTASSIUM CHLORIDE AND SODIUM PHOSPHATE DIBASIC DIHYDRATE 12.5 ML/KG/HR: 3.68; 3.05; 6.34; 3.09; .314; .187 INJECTION INTRAVENOUS at 07:35

## 2019-01-01 RX ADMIN — CALCIUM CHLORIDE, MAGNESIUM CHLORIDE, SODIUM CHLORIDE, SODIUM BICARBONATE, POTASSIUM CHLORIDE AND SODIUM PHOSPHATE DIBASIC DIHYDRATE 12.5 ML/KG/HR: 3.68; 3.05; 6.34; 3.09; .314; .187 INJECTION INTRAVENOUS at 22:51

## 2019-01-01 RX ADMIN — CALCIUM CHLORIDE, MAGNESIUM CHLORIDE, DEXTROSE MONOHYDRATE, LACTIC ACID, SODIUM CHLORIDE, SODIUM BICARBONATE AND POTASSIUM CHLORIDE 12.5 ML/KG/HR: 5.15; 2.03; 22; 5.4; 6.46; 3.09; .157 INJECTION INTRAVENOUS at 16:24

## 2019-01-01 RX ADMIN — TICAGRELOR 90 MG: 90 TABLET ORAL at 21:50

## 2019-01-01 RX ADMIN — HUMAN INSULIN 12 UNITS/HR: 100 INJECTION, SOLUTION SUBCUTANEOUS at 16:55

## 2019-01-01 RX ADMIN — HYDROCORTISONE SODIUM SUCCINATE 50 MG: 100 INJECTION, POWDER, FOR SOLUTION INTRAMUSCULAR; INTRAVENOUS at 11:45

## 2019-01-01 RX ADMIN — SODIUM BICARBONATE 50 MEQ: 84 INJECTION, SOLUTION INTRAVENOUS at 16:42

## 2019-01-01 RX ADMIN — CALCIUM CHLORIDE, MAGNESIUM CHLORIDE, SODIUM CHLORIDE, SODIUM BICARBONATE, POTASSIUM CHLORIDE AND SODIUM PHOSPHATE DIBASIC DIHYDRATE 1.86 ML/KG/HR: 3.68; 3.05; 6.34; 3.09; .314; .187 INJECTION INTRAVENOUS at 16:31

## 2019-01-01 RX ADMIN — Medication 5 ML: at 07:45

## 2019-01-01 RX ADMIN — HYDROCORTISONE SODIUM SUCCINATE 50 MG: 100 INJECTION, POWDER, FOR SOLUTION INTRAMUSCULAR; INTRAVENOUS at 13:40

## 2019-01-01 RX ADMIN — TICAGRELOR 90 MG: 90 TABLET ORAL at 09:21

## 2019-01-01 RX ADMIN — VASOPRESSIN 3 UNITS/HR: 20 INJECTION INTRAVENOUS at 22:11

## 2019-01-01 RX ADMIN — Medication 1 PACKET: at 20:16

## 2019-01-01 RX ADMIN — CALCIUM CHLORIDE, MAGNESIUM CHLORIDE, SODIUM CHLORIDE, SODIUM BICARBONATE, POTASSIUM CHLORIDE AND SODIUM PHOSPHATE DIBASIC DIHYDRATE 12.5 ML/KG/HR: 3.68; 3.05; 6.34; 3.09; .314; .187 INJECTION INTRAVENOUS at 11:21

## 2019-01-01 RX ADMIN — MICAFUNGIN SODIUM 100 MG: 10 INJECTION, POWDER, LYOPHILIZED, FOR SOLUTION INTRAVENOUS at 22:29

## 2019-01-01 RX ADMIN — Medication 1 PACKET: at 07:40

## 2019-01-01 RX ADMIN — NOREPINEPHRINE BITARTRATE 6.4 MCG: 1 INJECTION INTRAVENOUS at 16:44

## 2019-01-01 RX ADMIN — CALCIUM CHLORIDE, MAGNESIUM CHLORIDE, SODIUM CHLORIDE, SODIUM BICARBONATE, POTASSIUM CHLORIDE AND SODIUM PHOSPHATE DIBASIC DIHYDRATE 12.5 ML/KG/HR: 3.68; 3.05; 6.34; 3.09; .314; .187 INJECTION INTRAVENOUS at 19:38

## 2019-01-01 RX ADMIN — Medication: at 14:30

## 2019-01-01 RX ADMIN — PIPERACILLIN AND TAZOBACTAM 4.5 G: 4; .5 INJECTION, POWDER, FOR SOLUTION INTRAVENOUS at 12:11

## 2019-01-01 RX ADMIN — TICAGRELOR 90 MG: 90 TABLET ORAL at 20:09

## 2019-01-01 RX ADMIN — EPOPROSTENOL 10 NG/KG/MIN: 1.5 INJECTION, POWDER, LYOPHILIZED, FOR SOLUTION INTRAVENOUS at 09:11

## 2019-01-01 RX ADMIN — Medication 0.03 MCG/KG/MIN: at 18:57

## 2019-01-01 RX ADMIN — CALCIUM CHLORIDE, MAGNESIUM CHLORIDE, SODIUM CHLORIDE, SODIUM BICARBONATE, POTASSIUM CHLORIDE AND SODIUM PHOSPHATE DIBASIC DIHYDRATE 12.5 ML/KG/HR: 3.68; 3.05; 6.34; 3.09; .314; .187 INJECTION INTRAVENOUS at 02:12

## 2019-01-01 RX ADMIN — INSULIN ASPART 1 UNITS: 100 INJECTION, SOLUTION INTRAVENOUS; SUBCUTANEOUS at 15:57

## 2019-01-01 RX ADMIN — SODIUM CHLORIDE: 3 INJECTION, SOLUTION INTRAVENOUS at 18:27

## 2019-01-01 RX ADMIN — EPINEPHRINE 0.1 MCG/KG/MIN: 1 INJECTION PARENTERAL at 16:00

## 2019-01-01 RX ADMIN — CALCIUM CHLORIDE, MAGNESIUM CHLORIDE, SODIUM CHLORIDE, SODIUM BICARBONATE, POTASSIUM CHLORIDE AND SODIUM PHOSPHATE DIBASIC DIHYDRATE 12.5 ML/KG/HR: 3.68; 3.05; 6.34; 3.09; .314; .187 INJECTION INTRAVENOUS at 00:18

## 2019-01-01 RX ADMIN — PANTOPRAZOLE SODIUM 40 MG: 40 TABLET, DELAYED RELEASE ORAL at 08:32

## 2019-01-01 RX ADMIN — CARBOXYMETHYLCELLULOSE SODIUM 2 DROP: 5 SOLUTION/ DROPS OPHTHALMIC at 07:55

## 2019-01-01 RX ADMIN — LIDOCAINE HYDROCHLORIDE 5 ML: 20 SOLUTION ORAL; TOPICAL at 13:43

## 2019-01-01 RX ADMIN — ERYTHROMYCIN 1 G: 5 OINTMENT OPHTHALMIC at 23:13

## 2019-01-01 RX ADMIN — ASPIRIN 81 MG CHEWABLE TABLET 81 MG: 81 TABLET CHEWABLE at 08:29

## 2019-01-01 RX ADMIN — FENTANYL CITRATE 200 MCG/HR: 50 INJECTION INTRAVENOUS at 04:14

## 2019-01-01 RX ADMIN — POTASSIUM CHLORIDE 20 MEQ: 29.8 INJECTION, SOLUTION INTRAVENOUS at 00:55

## 2019-01-01 RX ADMIN — VANCOMYCIN HYDROCHLORIDE 1750 MG: 10 INJECTION, POWDER, LYOPHILIZED, FOR SOLUTION INTRAVENOUS at 12:08

## 2019-01-01 RX ADMIN — DEXTROSE MONOHYDRATE 1 MCG/KG/MIN: 50 INJECTION, SOLUTION INTRAVENOUS at 08:32

## 2019-01-01 RX ADMIN — HYDROCORTISONE SODIUM SUCCINATE 50 MG: 100 INJECTION, POWDER, FOR SOLUTION INTRAMUSCULAR; INTRAVENOUS at 08:14

## 2019-01-01 RX ADMIN — Medication 5 ML: at 07:26

## 2019-01-01 RX ADMIN — PROTAMINE SULFATE 30 MG: 10 INJECTION, SOLUTION INTRAVENOUS at 18:00

## 2019-01-01 RX ADMIN — ASPIRIN 81 MG CHEWABLE TABLET 81 MG: 81 TABLET CHEWABLE at 08:00

## 2019-01-01 RX ADMIN — CALCIUM CHLORIDE, MAGNESIUM CHLORIDE, DEXTROSE MONOHYDRATE, LACTIC ACID, SODIUM CHLORIDE, SODIUM BICARBONATE AND POTASSIUM CHLORIDE 12.5 ML/KG/HR: 5.15; 2.03; 22; 5.4; 6.46; 3.09; .157 INJECTION INTRAVENOUS at 23:59

## 2019-01-01 RX ADMIN — PANTOPRAZOLE SODIUM 40 MG: 40 INJECTION, POWDER, FOR SOLUTION INTRAVENOUS at 16:28

## 2019-01-01 RX ADMIN — FENTANYL CITRATE 100 MCG/HR: 50 INJECTION INTRAVENOUS at 20:20

## 2019-01-01 RX ADMIN — CALCIUM CHLORIDE, MAGNESIUM CHLORIDE, SODIUM CHLORIDE, SODIUM BICARBONATE, POTASSIUM CHLORIDE AND SODIUM PHOSPHATE DIBASIC DIHYDRATE 12.5 ML/KG/HR: 3.68; 3.05; 6.34; 3.09; .314; .187 INJECTION INTRAVENOUS at 10:12

## 2019-01-01 RX ADMIN — POLYETHYLENE GLYCOL 3350 17 G: 17 POWDER, FOR SOLUTION ORAL at 08:29

## 2019-01-01 RX ADMIN — MEROPENEM 2 G: 1 INJECTION, POWDER, FOR SOLUTION INTRAVENOUS at 00:06

## 2019-01-01 RX ADMIN — Medication 4 MG/HR: at 20:55

## 2019-01-01 RX ADMIN — CALCIUM CHLORIDE, MAGNESIUM CHLORIDE, SODIUM CHLORIDE, SODIUM BICARBONATE, POTASSIUM CHLORIDE AND SODIUM PHOSPHATE DIBASIC DIHYDRATE 12.5 ML/KG/HR: 3.68; 3.05; 6.34; 3.09; .314; .187 INJECTION INTRAVENOUS at 15:57

## 2019-01-01 RX ADMIN — Medication: at 00:14

## 2019-01-01 RX ADMIN — EPOPROSTENOL 10 NG/KG/MIN: 1.5 INJECTION, POWDER, LYOPHILIZED, FOR SOLUTION INTRAVENOUS at 16:28

## 2019-01-01 RX ADMIN — CALCIUM CHLORIDE, MAGNESIUM CHLORIDE, SODIUM CHLORIDE, SODIUM BICARBONATE, POTASSIUM CHLORIDE AND SODIUM PHOSPHATE DIBASIC DIHYDRATE 12.5 ML/KG/HR: 3.68; 3.05; 6.34; 3.09; .314; .187 INJECTION INTRAVENOUS at 22:15

## 2019-01-01 RX ADMIN — OSELTAMIVIR PHOSPHATE 75 MG: 6 POWDER, FOR SUSPENSION ORAL at 07:41

## 2019-01-01 RX ADMIN — CALCIUM CHLORIDE, MAGNESIUM CHLORIDE, SODIUM CHLORIDE, SODIUM BICARBONATE, POTASSIUM CHLORIDE AND SODIUM PHOSPHATE DIBASIC DIHYDRATE 12.5 ML/KG/HR: 3.68; 3.05; 6.34; 3.09; .314; .187 INJECTION INTRAVENOUS at 23:27

## 2019-01-01 RX ADMIN — CALCIUM CHLORIDE, MAGNESIUM CHLORIDE, DEXTROSE MONOHYDRATE, LACTIC ACID, SODIUM CHLORIDE, SODIUM BICARBONATE AND POTASSIUM CHLORIDE 12.5 ML/KG/HR: 5.15; 2.03; 22; 5.4; 6.46; 3.09; .157 INJECTION INTRAVENOUS at 08:30

## 2019-01-01 RX ADMIN — CALCIUM CHLORIDE, MAGNESIUM CHLORIDE, DEXTROSE MONOHYDRATE, LACTIC ACID, SODIUM CHLORIDE, SODIUM BICARBONATE AND POTASSIUM CHLORIDE 12.5 ML/KG/HR: 5.15; 2.03; 22; 5.4; 6.46; 3.09; .157 INJECTION INTRAVENOUS at 03:07

## 2019-01-01 RX ADMIN — CALCIUM CHLORIDE, MAGNESIUM CHLORIDE, SODIUM CHLORIDE, SODIUM BICARBONATE, POTASSIUM CHLORIDE AND SODIUM PHOSPHATE DIBASIC DIHYDRATE 12.5 ML/KG/HR: 3.68; 3.05; 6.34; 3.09; .314; .187 INJECTION INTRAVENOUS at 20:43

## 2019-01-01 RX ADMIN — Medication: at 13:56

## 2019-01-01 RX ADMIN — MEROPENEM 2 G: 1 INJECTION, POWDER, FOR SOLUTION INTRAVENOUS at 16:45

## 2019-01-01 RX ADMIN — TICAGRELOR 90 MG: 90 TABLET ORAL at 07:26

## 2019-01-01 RX ADMIN — EPOPROSTENOL 20 NG/KG/MIN: 1.5 INJECTION, POWDER, LYOPHILIZED, FOR SOLUTION INTRAVENOUS at 10:51

## 2019-01-01 RX ADMIN — INSULIN ASPART 1 UNITS: 100 INJECTION, SOLUTION INTRAVENOUS; SUBCUTANEOUS at 05:31

## 2019-01-01 RX ADMIN — CALCIUM CHLORIDE, MAGNESIUM CHLORIDE, DEXTROSE MONOHYDRATE, LACTIC ACID, SODIUM CHLORIDE, SODIUM BICARBONATE AND POTASSIUM CHLORIDE 12.5 ML/KG/HR: 5.15; 2.03; 22; 5.4; 6.46; 3.09; .157 INJECTION INTRAVENOUS at 11:51

## 2019-01-01 RX ADMIN — HUMAN INSULIN 10 UNITS/HR: 100 INJECTION, SOLUTION SUBCUTANEOUS at 09:39

## 2019-01-01 RX ADMIN — Medication 1 PACKET: at 08:16

## 2019-01-01 RX ADMIN — ALBUMIN HUMAN 25 G: 0.05 INJECTION, SOLUTION INTRAVENOUS at 14:39

## 2019-01-01 RX ADMIN — HYDROCORTISONE SODIUM SUCCINATE 50 MG: 100 INJECTION, POWDER, FOR SOLUTION INTRAMUSCULAR; INTRAVENOUS at 06:20

## 2019-01-01 RX ADMIN — Medication: at 13:37

## 2019-01-01 RX ADMIN — INSULIN ASPART 1 UNITS: 100 INJECTION, SOLUTION INTRAVENOUS; SUBCUTANEOUS at 16:46

## 2019-01-01 RX ADMIN — CALCIUM CHLORIDE, MAGNESIUM CHLORIDE, SODIUM CHLORIDE, SODIUM BICARBONATE, POTASSIUM CHLORIDE AND SODIUM PHOSPHATE DIBASIC DIHYDRATE 12.5 ML/KG/HR: 3.68; 3.05; 6.34; 3.09; .314; .187 INJECTION INTRAVENOUS at 17:40

## 2019-01-01 RX ADMIN — CISATRACURIUM BESYLATE 4 MG: 2 INJECTION INTRAVENOUS at 17:23

## 2019-01-01 RX ADMIN — HUMAN INSULIN 4 UNITS/HR: 100 INJECTION, SOLUTION SUBCUTANEOUS at 14:28

## 2019-01-01 RX ADMIN — PIPERACILLIN AND TAZOBACTAM 4.5 G: 4; .5 INJECTION, POWDER, FOR SOLUTION INTRAVENOUS at 04:41

## 2019-01-01 RX ADMIN — EPOPROSTENOL 20 NG/KG/MIN: 1.5 INJECTION, POWDER, LYOPHILIZED, FOR SOLUTION INTRAVENOUS at 13:25

## 2019-01-01 RX ADMIN — MEROPENEM 1 G: 1 INJECTION, POWDER, FOR SOLUTION INTRAVENOUS at 15:38

## 2019-01-01 RX ADMIN — FENTANYL CITRATE 200 MCG/HR: 50 INJECTION INTRAVENOUS at 19:55

## 2019-01-01 RX ADMIN — Medication 1 PACKET: at 13:08

## 2019-01-01 RX ADMIN — POTASSIUM CHLORIDE 20 MEQ: 29.8 INJECTION, SOLUTION INTRAVENOUS at 23:48

## 2019-01-01 RX ADMIN — PANTOPRAZOLE SODIUM 40 MG: 40 INJECTION, POWDER, FOR SOLUTION INTRAVENOUS at 03:25

## 2019-01-01 RX ADMIN — EPINEPHRINE 10 MCG: 1 INJECTION PARENTERAL at 18:48

## 2019-01-01 RX ADMIN — CALCIUM CHLORIDE, MAGNESIUM CHLORIDE, DEXTROSE MONOHYDRATE, LACTIC ACID, SODIUM CHLORIDE, SODIUM BICARBONATE AND POTASSIUM CHLORIDE 12.5 ML/KG/HR: 5.15; 2.03; 22; 5.4; 6.46; 3.09; .157 INJECTION INTRAVENOUS at 19:37

## 2019-01-01 RX ADMIN — PIPERACILLIN AND TAZOBACTAM 4.5 G: 4; .5 INJECTION, POWDER, FOR SOLUTION INTRAVENOUS at 17:51

## 2019-01-01 RX ADMIN — CALCIUM CHLORIDE 1 G: 100 INJECTION, SOLUTION INTRAVENOUS at 02:19

## 2019-01-01 RX ADMIN — Medication 1 PACKET: at 19:54

## 2019-01-01 RX ADMIN — VANCOMYCIN HYDROCHLORIDE 1750 MG: 10 INJECTION, POWDER, LYOPHILIZED, FOR SOLUTION INTRAVENOUS at 14:07

## 2019-01-01 RX ADMIN — POTASSIUM CHLORIDE 20 MEQ: 29.8 INJECTION, SOLUTION INTRAVENOUS at 03:29

## 2019-01-01 RX ADMIN — INSULIN ASPART 1 UNITS: 100 INJECTION, SOLUTION INTRAVENOUS; SUBCUTANEOUS at 00:39

## 2019-01-01 RX ADMIN — MEROPENEM 2 G: 1 INJECTION, POWDER, FOR SOLUTION INTRAVENOUS at 16:07

## 2019-01-01 RX ADMIN — INSULIN ASPART 1 UNITS: 100 INJECTION, SOLUTION INTRAVENOUS; SUBCUTANEOUS at 04:00

## 2019-01-01 RX ADMIN — PANTOPRAZOLE SODIUM 40 MG: 40 INJECTION, POWDER, FOR SOLUTION INTRAVENOUS at 07:20

## 2019-01-01 RX ADMIN — CALCIUM CHLORIDE, MAGNESIUM CHLORIDE, SODIUM CHLORIDE, SODIUM BICARBONATE, POTASSIUM CHLORIDE AND SODIUM PHOSPHATE DIBASIC DIHYDRATE 12.5 ML/KG/HR: 3.68; 3.05; 6.34; 3.09; .314; .187 INJECTION INTRAVENOUS at 21:38

## 2019-01-01 RX ADMIN — TICAGRELOR 90 MG: 90 TABLET ORAL at 19:31

## 2019-01-01 RX ADMIN — CALCIUM CHLORIDE, MAGNESIUM CHLORIDE, SODIUM CHLORIDE, SODIUM BICARBONATE, POTASSIUM CHLORIDE AND SODIUM PHOSPHATE DIBASIC DIHYDRATE 12.5 ML/KG/HR: 3.68; 3.05; 6.34; 3.09; .314; .187 INJECTION INTRAVENOUS at 02:11

## 2019-01-01 RX ADMIN — CALCIUM CHLORIDE, MAGNESIUM CHLORIDE, DEXTROSE MONOHYDRATE, LACTIC ACID, SODIUM CHLORIDE, SODIUM BICARBONATE AND POTASSIUM CHLORIDE 12.5 ML/KG/HR: 5.15; 2.03; 22; 5.4; 6.46; 3.09; .157 INJECTION INTRAVENOUS at 02:48

## 2019-01-01 RX ADMIN — HUMAN INSULIN 12 UNITS/HR: 100 INJECTION, SOLUTION SUBCUTANEOUS at 10:52

## 2019-01-01 RX ADMIN — EPOPROSTENOL 20 NG/KG/MIN: 1.5 INJECTION, POWDER, LYOPHILIZED, FOR SOLUTION INTRAVENOUS at 05:29

## 2019-01-01 RX ADMIN — TICAGRELOR 90 MG: 90 TABLET ORAL at 20:12

## 2019-01-01 RX ADMIN — CALCIUM CHLORIDE, MAGNESIUM CHLORIDE, DEXTROSE MONOHYDRATE, LACTIC ACID, SODIUM CHLORIDE, SODIUM BICARBONATE AND POTASSIUM CHLORIDE 12.5 ML/KG/HR: 5.15; 2.03; 22; 5.4; 6.46; 3.09; .157 INJECTION INTRAVENOUS at 08:49

## 2019-01-01 RX ADMIN — FENTANYL CITRATE 100 MCG/HR: 50 INJECTION INTRAVENOUS at 07:01

## 2019-01-01 RX ADMIN — CALCIUM CHLORIDE, MAGNESIUM CHLORIDE, DEXTROSE MONOHYDRATE, LACTIC ACID, SODIUM CHLORIDE, SODIUM BICARBONATE AND POTASSIUM CHLORIDE: 5.15; 2.03; 22; 5.4; 6.46; 3.09; .157 INJECTION INTRAVENOUS at 17:17

## 2019-01-01 RX ADMIN — Medication: at 20:22

## 2019-01-01 RX ADMIN — POTASSIUM CHLORIDE 20 MEQ: 29.8 INJECTION, SOLUTION INTRAVENOUS at 05:06

## 2019-01-01 RX ADMIN — TICAGRELOR 90 MG: 90 TABLET ORAL at 19:35

## 2019-01-01 RX ADMIN — CALCIUM CHLORIDE, MAGNESIUM CHLORIDE, DEXTROSE MONOHYDRATE, LACTIC ACID, SODIUM CHLORIDE, SODIUM BICARBONATE AND POTASSIUM CHLORIDE 12.5 ML/KG/HR: 5.15; 2.03; 22; 5.4; 6.46; 3.09; .157 INJECTION INTRAVENOUS at 08:21

## 2019-01-01 RX ADMIN — INSULIN ASPART 1 UNITS: 100 INJECTION, SOLUTION INTRAVENOUS; SUBCUTANEOUS at 12:18

## 2019-01-01 RX ADMIN — HEPARIN SODIUM 24.96 UNITS/KG/HR: 10000 INJECTION, SOLUTION INTRAVENOUS at 15:53

## 2019-01-01 RX ADMIN — CALCIUM CHLORIDE, MAGNESIUM CHLORIDE, DEXTROSE MONOHYDRATE, LACTIC ACID, SODIUM CHLORIDE, SODIUM BICARBONATE AND POTASSIUM CHLORIDE 12.5 ML/KG/HR: 5.15; 2.03; 22; 5.4; 6.46; 3.09; .157 INJECTION INTRAVENOUS at 10:41

## 2019-01-01 RX ADMIN — HUMAN INSULIN 3 UNITS/HR: 100 INJECTION, SOLUTION SUBCUTANEOUS at 20:02

## 2019-01-01 RX ADMIN — INSULIN ASPART 1 UNITS: 100 INJECTION, SOLUTION INTRAVENOUS; SUBCUTANEOUS at 08:45

## 2019-01-01 RX ADMIN — EPOPROSTENOL 10 NG/KG/MIN: 1.5 INJECTION, POWDER, LYOPHILIZED, FOR SOLUTION INTRAVENOUS at 05:41

## 2019-01-01 RX ADMIN — CALCIUM CHLORIDE, MAGNESIUM CHLORIDE, DEXTROSE MONOHYDRATE, LACTIC ACID, SODIUM CHLORIDE, SODIUM BICARBONATE AND POTASSIUM CHLORIDE: 5.15; 2.03; 22; 5.4; 6.46; 3.09; .157 INJECTION INTRAVENOUS at 16:41

## 2019-01-01 RX ADMIN — ALBUMIN HUMAN 50 G: 0.05 INJECTION, SOLUTION INTRAVENOUS at 03:29

## 2019-01-01 RX ADMIN — TICAGRELOR 90 MG: 90 TABLET ORAL at 08:29

## 2019-01-01 RX ADMIN — HEPARIN SODIUM 3 ML/HR: 1000 INJECTION, SOLUTION INTRAVENOUS; SUBCUTANEOUS at 00:05

## 2019-01-01 RX ADMIN — EPINEPHRINE 0.05 MCG/KG/MIN: 1 INJECTION PARENTERAL at 16:00

## 2019-01-01 RX ADMIN — EPOPROSTENOL 20 NG/KG/MIN: 1.5 INJECTION, POWDER, LYOPHILIZED, FOR SOLUTION INTRAVENOUS at 22:18

## 2019-01-01 RX ADMIN — TICAGRELOR 90 MG: 90 TABLET ORAL at 19:26

## 2019-01-01 RX ADMIN — PROPOFOL 30 MCG/KG/MIN: 10 INJECTION, EMULSION INTRAVENOUS at 11:50

## 2019-01-01 RX ADMIN — CALCIUM CHLORIDE, MAGNESIUM CHLORIDE, DEXTROSE MONOHYDRATE, LACTIC ACID, SODIUM CHLORIDE, SODIUM BICARBONATE AND POTASSIUM CHLORIDE 12.5 ML/KG/HR: 5.15; 2.03; 22; 5.4; 6.46; 3.09; .157 INJECTION INTRAVENOUS at 00:45

## 2019-01-01 RX ADMIN — EPINEPHRINE 0.04 MCG/KG/MIN: 1 INJECTION PARENTERAL at 16:56

## 2019-01-01 RX ADMIN — HUMAN INSULIN 3 UNITS/HR: 100 INJECTION, SOLUTION SUBCUTANEOUS at 16:32

## 2019-01-01 RX ADMIN — CALCIUM CHLORIDE, MAGNESIUM CHLORIDE, SODIUM CHLORIDE, SODIUM BICARBONATE, POTASSIUM CHLORIDE AND SODIUM PHOSPHATE DIBASIC DIHYDRATE 12.5 ML/KG/HR: 3.68; 3.05; 6.34; 3.09; .314; .187 INJECTION INTRAVENOUS at 10:41

## 2019-01-01 RX ADMIN — ASPIRIN 81 MG CHEWABLE TABLET 81 MG: 81 TABLET CHEWABLE at 07:40

## 2019-01-01 RX ADMIN — INSULIN ASPART 1 UNITS: 100 INJECTION, SOLUTION INTRAVENOUS; SUBCUTANEOUS at 23:11

## 2019-01-01 RX ADMIN — CALCIUM CHLORIDE, MAGNESIUM CHLORIDE, SODIUM CHLORIDE, SODIUM BICARBONATE, POTASSIUM CHLORIDE AND SODIUM PHOSPHATE DIBASIC DIHYDRATE 12.5 ML/KG/HR: 3.68; 3.05; 6.34; 3.09; .314; .187 INJECTION INTRAVENOUS at 06:21

## 2019-01-01 RX ADMIN — INSULIN ASPART 1 UNITS: 100 INJECTION, SOLUTION INTRAVENOUS; SUBCUTANEOUS at 03:50

## 2019-01-01 RX ADMIN — IOPAMIDOL 75 ML: 755 INJECTION, SOLUTION INTRAVENOUS at 18:41

## 2019-01-01 RX ADMIN — MEROPENEM 1 G: 1 INJECTION, POWDER, FOR SOLUTION INTRAVENOUS at 23:06

## 2019-01-01 RX ADMIN — Medication 1 PACKET: at 19:31

## 2019-01-01 RX ADMIN — VANCOMYCIN HYDROCHLORIDE 2000 MG: 1 INJECTION, POWDER, LYOPHILIZED, FOR SOLUTION INTRAVENOUS at 07:04

## 2019-01-01 RX ADMIN — Medication 5 ML: at 07:55

## 2019-01-01 RX ADMIN — CALCIUM CHLORIDE, MAGNESIUM CHLORIDE, SODIUM CHLORIDE, SODIUM BICARBONATE, POTASSIUM CHLORIDE AND SODIUM PHOSPHATE DIBASIC DIHYDRATE 12.5 ML/KG/HR: 3.68; 3.05; 6.34; 3.09; .314; .187 INJECTION INTRAVENOUS at 05:08

## 2019-01-01 RX ADMIN — CALCIUM CHLORIDE, MAGNESIUM CHLORIDE, SODIUM CHLORIDE, SODIUM BICARBONATE, POTASSIUM CHLORIDE AND SODIUM PHOSPHATE DIBASIC DIHYDRATE 12.5 ML/KG/HR: 3.68; 3.05; 6.34; 3.09; .314; .187 INJECTION INTRAVENOUS at 14:47

## 2019-01-01 RX ADMIN — EPOPROSTENOL 10 NG/KG/MIN: 1.5 INJECTION, POWDER, LYOPHILIZED, FOR SOLUTION INTRAVENOUS at 21:28

## 2019-01-01 RX ADMIN — CALCIUM CHLORIDE, MAGNESIUM CHLORIDE, DEXTROSE MONOHYDRATE, LACTIC ACID, SODIUM CHLORIDE, SODIUM BICARBONATE AND POTASSIUM CHLORIDE 12.5 ML/KG/HR: 5.15; 2.03; 22; 5.4; 6.46; 3.09; .157 INJECTION INTRAVENOUS at 06:17

## 2019-01-01 RX ADMIN — CALCIUM CHLORIDE, MAGNESIUM CHLORIDE, DEXTROSE MONOHYDRATE, LACTIC ACID, SODIUM CHLORIDE, SODIUM BICARBONATE AND POTASSIUM CHLORIDE 12.5 ML/KG/HR: 5.15; 2.03; 22; 5.4; 6.46; 3.09; .157 INJECTION INTRAVENOUS at 04:28

## 2019-01-01 RX ADMIN — Medication 5 ML: at 08:19

## 2019-01-01 RX ADMIN — CALCIUM CHLORIDE, MAGNESIUM CHLORIDE, SODIUM CHLORIDE, SODIUM BICARBONATE, POTASSIUM CHLORIDE AND SODIUM PHOSPHATE DIBASIC DIHYDRATE 12.5 ML/KG/HR: 3.68; 3.05; 6.34; 3.09; .314; .187 INJECTION INTRAVENOUS at 05:10

## 2019-01-01 RX ADMIN — HYDROCORTISONE SODIUM SUCCINATE 50 MG: 100 INJECTION, POWDER, FOR SOLUTION INTRAMUSCULAR; INTRAVENOUS at 00:05

## 2019-01-01 RX ADMIN — TICAGRELOR 90 MG: 90 TABLET ORAL at 07:41

## 2019-01-01 RX ADMIN — ALBUMIN HUMAN 50 G: 50 SOLUTION INTRAVENOUS at 14:35

## 2019-01-01 RX ADMIN — TICAGRELOR 90 MG: 90 TABLET ORAL at 19:33

## 2019-01-01 RX ADMIN — PIPERACILLIN AND TAZOBACTAM 4.5 G: 4; .5 INJECTION, POWDER, FOR SOLUTION INTRAVENOUS at 12:04

## 2019-01-01 RX ADMIN — DEXTROSE MONOHYDRATE: 100 INJECTION, SOLUTION INTRAVENOUS at 00:00

## 2019-01-01 RX ADMIN — PANTOPRAZOLE SODIUM 40 MG: 40 TABLET, DELAYED RELEASE ORAL at 07:39

## 2019-01-01 RX ADMIN — CALCIUM CHLORIDE 1 G: 100 INJECTION, SOLUTION INTRAVENOUS at 23:00

## 2019-01-01 RX ADMIN — OSELTAMIVIR PHOSPHATE 75 MG: 6 POWDER, FOR SUSPENSION ORAL at 08:05

## 2019-01-01 RX ADMIN — HEPARIN SODIUM 1400 UNITS/HR: 10000 INJECTION, SOLUTION INTRAVENOUS at 11:01

## 2019-01-01 RX ADMIN — ERYTHROMYCIN 1 G: 5 OINTMENT OPHTHALMIC at 22:01

## 2019-01-01 RX ADMIN — EPINEPHRINE 0.03 MCG/KG/MIN: 1 INJECTION PARENTERAL at 20:08

## 2019-01-01 RX ADMIN — Medication 0.05 MCG/KG/MIN: at 07:57

## 2019-01-01 RX ADMIN — Medication 1 PACKET: at 22:40

## 2019-01-01 RX ADMIN — PANTOPRAZOLE SODIUM 40 MG: 40 TABLET, DELAYED RELEASE ORAL at 08:25

## 2019-01-01 RX ADMIN — HUMAN INSULIN 4 UNITS/HR: 100 INJECTION, SOLUTION SUBCUTANEOUS at 21:21

## 2019-01-01 RX ADMIN — Medication 200 MG: at 07:57

## 2019-01-01 RX ADMIN — CALCIUM CHLORIDE, MAGNESIUM CHLORIDE, SODIUM CHLORIDE, SODIUM BICARBONATE, POTASSIUM CHLORIDE AND SODIUM PHOSPHATE DIBASIC DIHYDRATE 12.5 ML/KG/HR: 3.68; 3.05; 6.34; 3.09; .314; .187 INJECTION INTRAVENOUS at 06:35

## 2019-01-01 RX ADMIN — CALCIUM CHLORIDE, MAGNESIUM CHLORIDE, DEXTROSE MONOHYDRATE, LACTIC ACID, SODIUM CHLORIDE, SODIUM BICARBONATE AND POTASSIUM CHLORIDE 12.5 ML/KG/HR: 5.15; 2.03; 22; 5.4; 6.46; 3.09; .157 INJECTION INTRAVENOUS at 01:33

## 2019-01-01 RX ADMIN — HYDROCORTISONE SODIUM SUCCINATE 50 MG: 100 INJECTION, POWDER, FOR SOLUTION INTRAMUSCULAR; INTRAVENOUS at 02:25

## 2019-01-01 RX ADMIN — CALCIUM CHLORIDE, MAGNESIUM CHLORIDE, SODIUM CHLORIDE, SODIUM BICARBONATE, POTASSIUM CHLORIDE AND SODIUM PHOSPHATE DIBASIC DIHYDRATE 12.5 ML/KG/HR: 3.68; 3.05; 6.34; 3.09; .314; .187 INJECTION INTRAVENOUS at 22:10

## 2019-01-01 RX ADMIN — Medication 1 PACKET: at 19:26

## 2019-01-01 RX ADMIN — CALCIUM CHLORIDE, MAGNESIUM CHLORIDE, SODIUM CHLORIDE, SODIUM BICARBONATE, POTASSIUM CHLORIDE AND SODIUM PHOSPHATE DIBASIC DIHYDRATE 12.5 ML/KG/HR: 3.68; 3.05; 6.34; 3.09; .314; .187 INJECTION INTRAVENOUS at 06:06

## 2019-01-01 RX ADMIN — POTASSIUM CHLORIDE 20 MEQ: 29.8 INJECTION, SOLUTION INTRAVENOUS at 22:55

## 2019-01-01 RX ADMIN — SODIUM CHLORIDE: 9 INJECTION, SOLUTION INTRAVENOUS at 15:35

## 2019-01-01 RX ADMIN — MEROPENEM 1 G: 1 INJECTION, POWDER, FOR SOLUTION INTRAVENOUS at 22:12

## 2019-01-01 RX ADMIN — HEPARIN SODIUM 1400 UNITS/HR: 10000 INJECTION, SOLUTION INTRAVENOUS at 03:12

## 2019-01-01 RX ADMIN — CALCIUM CHLORIDE, MAGNESIUM CHLORIDE, DEXTROSE MONOHYDRATE, LACTIC ACID, SODIUM CHLORIDE, SODIUM BICARBONATE AND POTASSIUM CHLORIDE 12.5 ML/KG/HR: 5.15; 2.03; 22; 5.4; 6.46; 3.09; .157 INJECTION INTRAVENOUS at 15:34

## 2019-01-01 RX ADMIN — CALCIUM CHLORIDE, MAGNESIUM CHLORIDE, SODIUM CHLORIDE, SODIUM BICARBONATE, POTASSIUM CHLORIDE AND SODIUM PHOSPHATE DIBASIC DIHYDRATE 12.5 ML/KG/HR: 3.68; 3.05; 6.34; 3.09; .314; .187 INJECTION INTRAVENOUS at 07:02

## 2019-01-01 RX ADMIN — CARBOXYMETHYLCELLULOSE SODIUM 2 DROP: 5 SOLUTION/ DROPS OPHTHALMIC at 08:27

## 2019-01-01 RX ADMIN — CALCIUM CHLORIDE, MAGNESIUM CHLORIDE, SODIUM CHLORIDE, SODIUM BICARBONATE, POTASSIUM CHLORIDE AND SODIUM PHOSPHATE DIBASIC DIHYDRATE 12.5 ML/KG/HR: 3.68; 3.05; 6.34; 3.09; .314; .187 INJECTION INTRAVENOUS at 04:46

## 2019-01-01 RX ADMIN — METRONIDAZOLE 500 MG: 500 INJECTION, SOLUTION INTRAVENOUS at 13:51

## 2019-01-01 RX ADMIN — CALCIUM CHLORIDE 1 G: 100 INJECTION, SOLUTION INTRAVENOUS at 12:30

## 2019-01-01 RX ADMIN — HUMAN INSULIN 5.5 UNITS/HR: 100 INJECTION, SOLUTION SUBCUTANEOUS at 21:20

## 2019-01-01 RX ADMIN — FENTANYL CITRATE 100 MCG/HR: 50 INJECTION INTRAVENOUS at 19:48

## 2019-01-01 RX ADMIN — CALCIUM CHLORIDE 1 G: 100 INJECTION, SOLUTION INTRAVENOUS at 20:26

## 2019-01-01 RX ADMIN — Medication 5 ML: at 07:41

## 2019-01-01 RX ADMIN — PANTOPRAZOLE SODIUM 40 MG: 40 TABLET, DELAYED RELEASE ORAL at 07:41

## 2019-01-01 RX ADMIN — CARBOXYMETHYLCELLULOSE SODIUM 2 DROP: 5 SOLUTION/ DROPS OPHTHALMIC at 11:56

## 2019-01-01 RX ADMIN — CALCIUM CHLORIDE, MAGNESIUM CHLORIDE, SODIUM CHLORIDE, SODIUM BICARBONATE, POTASSIUM CHLORIDE AND SODIUM PHOSPHATE DIBASIC DIHYDRATE 12.5 ML/KG/HR: 3.68; 3.05; 6.34; 3.09; .314; .187 INJECTION INTRAVENOUS at 11:03

## 2019-01-01 RX ADMIN — EPINEPHRINE 0.05 MCG/KG/MIN: 1 INJECTION PARENTERAL at 12:01

## 2019-01-01 RX ADMIN — POTASSIUM CHLORIDE 20 MEQ: 29.8 INJECTION, SOLUTION INTRAVENOUS at 05:42

## 2019-01-01 RX ADMIN — MEROPENEM 1 G: 1 INJECTION, POWDER, FOR SOLUTION INTRAVENOUS at 10:30

## 2019-01-01 RX ADMIN — CALCIUM CHLORIDE, MAGNESIUM CHLORIDE, DEXTROSE MONOHYDRATE, LACTIC ACID, SODIUM CHLORIDE, SODIUM BICARBONATE AND POTASSIUM CHLORIDE 12.5 ML/KG/HR: 5.15; 2.03; 22; 5.4; 6.46; 3.09; .157 INJECTION INTRAVENOUS at 05:45

## 2019-01-01 RX ADMIN — VANCOMYCIN HYDROCHLORIDE 2000 MG: 1 INJECTION, POWDER, LYOPHILIZED, FOR SOLUTION INTRAVENOUS at 17:57

## 2019-01-01 RX ADMIN — CALCIUM CHLORIDE 1 G: 100 INJECTION, SOLUTION INTRAVENOUS at 17:49

## 2019-01-01 RX ADMIN — Medication 5 ML: at 08:00

## 2019-01-01 RX ADMIN — Medication: at 15:57

## 2019-01-01 RX ADMIN — CALCIUM CHLORIDE, MAGNESIUM CHLORIDE, DEXTROSE MONOHYDRATE, LACTIC ACID, SODIUM CHLORIDE, SODIUM BICARBONATE AND POTASSIUM CHLORIDE: 5.15; 2.03; 22; 5.4; 6.46; 3.09; .157 INJECTION INTRAVENOUS at 20:34

## 2019-01-01 RX ADMIN — Medication: at 10:11

## 2019-01-01 RX ADMIN — CALCIUM CHLORIDE, MAGNESIUM CHLORIDE, DEXTROSE MONOHYDRATE, LACTIC ACID, SODIUM CHLORIDE, SODIUM BICARBONATE AND POTASSIUM CHLORIDE 12.5 ML/KG/HR: 5.15; 2.03; 22; 5.4; 6.46; 3.09; .157 INJECTION INTRAVENOUS at 23:09

## 2019-01-01 RX ADMIN — CALCIUM CHLORIDE, MAGNESIUM CHLORIDE, SODIUM CHLORIDE, SODIUM BICARBONATE, POTASSIUM CHLORIDE AND SODIUM PHOSPHATE DIBASIC DIHYDRATE 12.5 ML/KG/HR: 3.68; 3.05; 6.34; 3.09; .314; .187 INJECTION INTRAVENOUS at 19:34

## 2019-01-01 RX ADMIN — Medication 0.7 MCG/KG/HR: at 06:10

## 2019-01-01 RX ADMIN — HYDROCORTISONE SODIUM SUCCINATE 50 MG: 100 INJECTION, POWDER, FOR SOLUTION INTRAMUSCULAR; INTRAVENOUS at 08:05

## 2019-01-01 RX ADMIN — PANTOPRAZOLE SODIUM 40 MG: 40 TABLET, DELAYED RELEASE ORAL at 08:16

## 2019-01-01 RX ADMIN — ALBUMIN HUMAN 25 G: 50 SOLUTION INTRAVENOUS at 19:05

## 2019-01-01 RX ADMIN — MEROPENEM 1 G: 1 INJECTION, POWDER, FOR SOLUTION INTRAVENOUS at 01:49

## 2019-01-01 RX ADMIN — POLYETHYLENE GLYCOL 3350 17 G: 17 POWDER, FOR SOLUTION ORAL at 08:15

## 2019-01-01 RX ADMIN — CALCIUM CHLORIDE, MAGNESIUM CHLORIDE, DEXTROSE MONOHYDRATE, LACTIC ACID, SODIUM CHLORIDE, SODIUM BICARBONATE AND POTASSIUM CHLORIDE 12.5 ML/KG/HR: 5.15; 2.03; 22; 5.4; 6.46; 3.09; .157 INJECTION INTRAVENOUS at 17:27

## 2019-01-01 RX ADMIN — VASOPRESSIN 3 UNITS/HR: 20 INJECTION INTRAVENOUS at 18:48

## 2019-01-01 RX ADMIN — CALCIUM GLUCONATE 1 G: 98 INJECTION, SOLUTION INTRAVENOUS at 23:56

## 2019-01-01 RX ADMIN — CALCIUM CHLORIDE, MAGNESIUM CHLORIDE, SODIUM CHLORIDE, SODIUM BICARBONATE, POTASSIUM CHLORIDE AND SODIUM PHOSPHATE DIBASIC DIHYDRATE 12.5 ML/KG/HR: 3.68; 3.05; 6.34; 3.09; .314; .187 INJECTION INTRAVENOUS at 03:26

## 2019-01-01 RX ADMIN — ERYTHROMYCIN 1 G: 5 OINTMENT OPHTHALMIC at 07:57

## 2019-01-01 RX ADMIN — TICAGRELOR 90 MG: 90 TABLET ORAL at 19:44

## 2019-01-01 RX ADMIN — HUMAN INSULIN 1.5 UNITS/HR: 100 INJECTION, SOLUTION SUBCUTANEOUS at 14:57

## 2019-01-01 RX ADMIN — MEROPENEM 2 G: 1 INJECTION, POWDER, FOR SOLUTION INTRAVENOUS at 14:11

## 2019-01-01 RX ADMIN — Medication 1 PACKET: at 07:41

## 2019-01-01 RX ADMIN — CALCIUM CHLORIDE, MAGNESIUM CHLORIDE, DEXTROSE MONOHYDRATE, LACTIC ACID, SODIUM CHLORIDE, SODIUM BICARBONATE AND POTASSIUM CHLORIDE 12.5 ML/KG/HR: 5.15; 2.03; 22; 5.4; 6.46; 3.09; .157 INJECTION INTRAVENOUS at 07:55

## 2019-01-01 RX ADMIN — CALCIUM CHLORIDE, MAGNESIUM CHLORIDE, DEXTROSE MONOHYDRATE, LACTIC ACID, SODIUM CHLORIDE, SODIUM BICARBONATE AND POTASSIUM CHLORIDE 12.5 ML/KG/HR: 5.15; 2.03; 22; 5.4; 6.46; 3.09; .157 INJECTION INTRAVENOUS at 11:06

## 2019-01-01 RX ADMIN — CALCIUM CHLORIDE 1 G: 100 INJECTION, SOLUTION INTRAVENOUS at 07:05

## 2019-01-01 RX ADMIN — ALBUMIN HUMAN: 0.05 INJECTION, SOLUTION INTRAVENOUS at 22:46

## 2019-01-01 RX ADMIN — MEROPENEM 1 G: 1 INJECTION, POWDER, FOR SOLUTION INTRAVENOUS at 01:29

## 2019-01-01 RX ADMIN — CALCIUM CHLORIDE, MAGNESIUM CHLORIDE, DEXTROSE MONOHYDRATE, LACTIC ACID, SODIUM CHLORIDE, SODIUM BICARBONATE AND POTASSIUM CHLORIDE 12.5 ML/KG/HR: 5.15; 2.03; 22; 5.4; 6.46; 3.09; .157 INJECTION INTRAVENOUS at 04:59

## 2019-01-01 RX ADMIN — CARBOXYMETHYLCELLULOSE SODIUM 2 DROP: 5 SOLUTION/ DROPS OPHTHALMIC at 08:01

## 2019-01-01 RX ADMIN — NOREPINEPHRINE BITARTRATE 6.4 MCG: 1 INJECTION INTRAVENOUS at 14:40

## 2019-01-01 RX ADMIN — CALCIUM CHLORIDE, MAGNESIUM CHLORIDE, DEXTROSE MONOHYDRATE, LACTIC ACID, SODIUM CHLORIDE, SODIUM BICARBONATE AND POTASSIUM CHLORIDE 12.5 ML/KG/HR: 5.15; 2.03; 22; 5.4; 6.46; 3.09; .157 INJECTION INTRAVENOUS at 11:59

## 2019-01-01 RX ADMIN — EPINEPHRINE 0.07 MCG/KG/MIN: 1 INJECTION PARENTERAL at 14:33

## 2019-01-01 RX ADMIN — CARBOXYMETHYLCELLULOSE SODIUM 2 DROP: 5 SOLUTION/ DROPS OPHTHALMIC at 19:52

## 2019-01-01 RX ADMIN — EPINEPHRINE 0.04 MCG/KG/MIN: 1 INJECTION PARENTERAL at 07:06

## 2019-01-01 RX ADMIN — CALCIUM CHLORIDE, MAGNESIUM CHLORIDE, SODIUM CHLORIDE, SODIUM BICARBONATE, POTASSIUM CHLORIDE AND SODIUM PHOSPHATE DIBASIC DIHYDRATE 12.5 ML/KG/HR: 3.68; 3.05; 6.34; 3.09; .314; .187 INJECTION INTRAVENOUS at 03:46

## 2019-01-01 RX ADMIN — Medication 3 ML: at 19:01

## 2019-01-01 RX ADMIN — MEROPENEM 2 G: 1 INJECTION, POWDER, FOR SOLUTION INTRAVENOUS at 08:15

## 2019-01-01 RX ADMIN — Medication 2 MG/HR: at 16:11

## 2019-01-01 RX ADMIN — CALCIUM CHLORIDE, MAGNESIUM CHLORIDE, SODIUM CHLORIDE, SODIUM BICARBONATE, POTASSIUM CHLORIDE AND SODIUM PHOSPHATE DIBASIC DIHYDRATE 12.5 ML/KG/HR: 3.68; 3.05; 6.34; 3.09; .314; .187 INJECTION INTRAVENOUS at 08:00

## 2019-01-01 RX ADMIN — SODIUM CHLORIDE, POTASSIUM CHLORIDE, SODIUM LACTATE AND CALCIUM CHLORIDE: 600; 310; 30; 20 INJECTION, SOLUTION INTRAVENOUS at 15:40

## 2019-01-01 RX ADMIN — PANTOPRAZOLE SODIUM 40 MG: 40 INJECTION, POWDER, FOR SOLUTION INTRAVENOUS at 04:11

## 2019-01-01 RX ADMIN — FENTANYL CITRATE 50 MCG: 50 INJECTION INTRAMUSCULAR; INTRAVENOUS at 04:11

## 2019-01-01 RX ADMIN — Medication 5 ML: at 07:51

## 2019-01-01 RX ADMIN — OSELTAMIVIR PHOSPHATE 75 MG: 6 POWDER, FOR SUSPENSION ORAL at 20:05

## 2019-01-01 RX ADMIN — ERYTHROMYCIN 1 G: 5 OINTMENT OPHTHALMIC at 08:01

## 2019-01-01 RX ADMIN — CALCIUM CHLORIDE, MAGNESIUM CHLORIDE, SODIUM CHLORIDE, SODIUM BICARBONATE, POTASSIUM CHLORIDE AND SODIUM PHOSPHATE DIBASIC DIHYDRATE 12.5 ML/KG/HR: 3.68; 3.05; 6.34; 3.09; .314; .187 INJECTION INTRAVENOUS at 00:03

## 2019-01-01 RX ADMIN — PANTOPRAZOLE SODIUM 40 MG: 40 INJECTION, POWDER, FOR SOLUTION INTRAVENOUS at 07:26

## 2019-01-01 RX ADMIN — FENTANYL CITRATE 50 MCG: 50 INJECTION INTRAMUSCULAR; INTRAVENOUS at 22:27

## 2019-01-01 RX ADMIN — DEXTROSE MONOHYDRATE: 100 INJECTION, SOLUTION INTRAVENOUS at 12:00

## 2019-01-01 RX ADMIN — CALCIUM CHLORIDE, MAGNESIUM CHLORIDE, DEXTROSE MONOHYDRATE, LACTIC ACID, SODIUM CHLORIDE, SODIUM BICARBONATE AND POTASSIUM CHLORIDE 12.5 ML/KG/HR: 5.15; 2.03; 22; 5.4; 6.46; 3.09; .157 INJECTION INTRAVENOUS at 18:50

## 2019-01-01 RX ADMIN — TICAGRELOR 90 MG: 90 TABLET ORAL at 20:13

## 2019-01-01 RX ADMIN — OSELTAMIVIR PHOSPHATE 75 MG: 6 POWDER, FOR SUSPENSION ORAL at 22:40

## 2019-01-01 RX ADMIN — VASOPRESSIN 4 UNITS/HR: 20 INJECTION INTRAVENOUS at 14:09

## 2019-01-01 RX ADMIN — ERTAPENEM SODIUM 1 G: 1 INJECTION, POWDER, LYOPHILIZED, FOR SOLUTION INTRAMUSCULAR; INTRAVENOUS at 10:04

## 2019-01-01 RX ADMIN — SODIUM BICARBONATE 50 MEQ: 84 INJECTION, SOLUTION INTRAVENOUS at 15:08

## 2019-01-01 RX ADMIN — Medication: at 10:08

## 2019-01-01 RX ADMIN — CALCIUM CHLORIDE, MAGNESIUM CHLORIDE, SODIUM CHLORIDE, SODIUM BICARBONATE, POTASSIUM CHLORIDE AND SODIUM PHOSPHATE DIBASIC DIHYDRATE 12.5 ML/KG/HR: 3.68; 3.05; 6.34; 3.09; .314; .187 INJECTION INTRAVENOUS at 06:22

## 2019-01-01 RX ADMIN — ERYTHROMYCIN 1 G: 5 OINTMENT OPHTHALMIC at 11:53

## 2019-01-01 RX ADMIN — ALBUMIN HUMAN 25 G: 0.05 INJECTION, SOLUTION INTRAVENOUS at 19:05

## 2019-01-01 RX ADMIN — SODIUM CHLORIDE 80 MG: 9 INJECTION, SOLUTION INTRAVENOUS at 03:11

## 2019-01-01 RX ADMIN — CALCIUM CHLORIDE, MAGNESIUM CHLORIDE, SODIUM CHLORIDE, SODIUM BICARBONATE, POTASSIUM CHLORIDE AND SODIUM PHOSPHATE DIBASIC DIHYDRATE 12.5 ML/KG/HR: 3.68; 3.05; 6.34; 3.09; .314; .187 INJECTION INTRAVENOUS at 03:05

## 2019-01-01 RX ADMIN — PROPOFOL 40 MCG/KG/MIN: 10 INJECTION, EMULSION INTRAVENOUS at 14:35

## 2019-01-01 RX ADMIN — CALCIUM CHLORIDE, MAGNESIUM CHLORIDE, SODIUM CHLORIDE, SODIUM BICARBONATE, POTASSIUM CHLORIDE AND SODIUM PHOSPHATE DIBASIC DIHYDRATE 12.5 ML/KG/HR: 3.68; 3.05; 6.34; 3.09; .314; .187 INJECTION INTRAVENOUS at 01:40

## 2019-01-01 RX ADMIN — TICAGRELOR 90 MG: 90 TABLET ORAL at 22:40

## 2019-01-01 RX ADMIN — SODIUM CHLORIDE: 3 INJECTION, SOLUTION INTRAVENOUS at 22:51

## 2019-01-01 RX ADMIN — Medication 14 MG/HR: at 20:52

## 2019-01-01 RX ADMIN — MEROPENEM 1 G: 1 INJECTION, POWDER, FOR SOLUTION INTRAVENOUS at 23:12

## 2019-01-01 RX ADMIN — HYDROCORTISONE SODIUM SUCCINATE 50 MG: 100 INJECTION, POWDER, FOR SOLUTION INTRAMUSCULAR; INTRAVENOUS at 19:31

## 2019-01-01 RX ADMIN — CALCIUM CHLORIDE, MAGNESIUM CHLORIDE, SODIUM CHLORIDE, SODIUM BICARBONATE, POTASSIUM CHLORIDE AND SODIUM PHOSPHATE DIBASIC DIHYDRATE 12.5 ML/KG/HR: 3.68; 3.05; 6.34; 3.09; .314; .187 INJECTION INTRAVENOUS at 15:10

## 2019-01-01 RX ADMIN — PANTOPRAZOLE SODIUM 40 MG: 40 INJECTION, POWDER, FOR SOLUTION INTRAVENOUS at 07:29

## 2019-01-01 RX ADMIN — CISATRACURIUM BESYLATE 20 MG: 2 INJECTION INTRAVENOUS at 15:53

## 2019-01-01 RX ADMIN — MICAFUNGIN SODIUM 100 MG: 10 INJECTION, POWDER, LYOPHILIZED, FOR SOLUTION INTRAVENOUS at 20:06

## 2019-01-01 RX ADMIN — TICAGRELOR 90 MG: 90 TABLET ORAL at 19:54

## 2019-01-01 RX ADMIN — MEROPENEM 2 G: 1 INJECTION, POWDER, FOR SOLUTION INTRAVENOUS at 22:27

## 2019-01-01 RX ADMIN — EPOPROSTENOL 20 NG/KG/MIN: 1.5 INJECTION, POWDER, LYOPHILIZED, FOR SOLUTION INTRAVENOUS at 11:33

## 2019-01-01 RX ADMIN — INSULIN ASPART 2 UNITS: 100 INJECTION, SOLUTION INTRAVENOUS; SUBCUTANEOUS at 12:06

## 2019-01-01 RX ADMIN — Medication: at 14:43

## 2019-01-01 RX ADMIN — CALCIUM CHLORIDE, MAGNESIUM CHLORIDE, SODIUM CHLORIDE, SODIUM BICARBONATE, POTASSIUM CHLORIDE AND SODIUM PHOSPHATE DIBASIC DIHYDRATE 12.5 ML/KG/HR: 3.68; 3.05; 6.34; 3.09; .314; .187 INJECTION INTRAVENOUS at 22:59

## 2019-01-01 RX ADMIN — Medication 200 MG: at 07:55

## 2019-01-01 RX ADMIN — PROPOFOL 20 MCG/KG/MIN: 10 INJECTION, EMULSION INTRAVENOUS at 19:48

## 2019-01-01 RX ADMIN — VANCOMYCIN HYDROCHLORIDE 2000 MG: 1 INJECTION, POWDER, LYOPHILIZED, FOR SOLUTION INTRAVENOUS at 23:46

## 2019-01-01 RX ADMIN — MICAFUNGIN SODIUM 100 MG: 10 INJECTION, POWDER, LYOPHILIZED, FOR SOLUTION INTRAVENOUS at 20:15

## 2019-01-01 RX ADMIN — CALCIUM CHLORIDE, MAGNESIUM CHLORIDE, DEXTROSE MONOHYDRATE, LACTIC ACID, SODIUM CHLORIDE, SODIUM BICARBONATE AND POTASSIUM CHLORIDE 12.5 ML/KG/HR: 5.15; 2.03; 22; 5.4; 6.46; 3.09; .157 INJECTION INTRAVENOUS at 00:35

## 2019-01-01 RX ADMIN — PIPERACILLIN SODIUM AND TAZOBACTAM SODIUM 3.38 G: 3; .375 INJECTION, POWDER, LYOPHILIZED, FOR SOLUTION INTRAVENOUS at 06:44

## 2019-01-01 RX ADMIN — CALCIUM CHLORIDE 1 G: 100 INJECTION, SOLUTION INTRAVENOUS at 23:22

## 2019-01-01 RX ADMIN — CARBOXYMETHYLCELLULOSE SODIUM 2 DROP: 5 SOLUTION/ DROPS OPHTHALMIC at 15:53

## 2019-01-01 RX ADMIN — MEROPENEM 1 G: 1 INJECTION, POWDER, FOR SOLUTION INTRAVENOUS at 15:03

## 2019-01-01 RX ADMIN — SODIUM BICARBONATE 50 MEQ: 84 INJECTION, SOLUTION INTRAVENOUS at 06:44

## 2019-01-01 RX ADMIN — HUMAN INSULIN 4 UNITS/HR: 100 INJECTION, SOLUTION SUBCUTANEOUS at 03:04

## 2019-01-01 RX ADMIN — INSULIN ASPART 1 UNITS: 100 INJECTION, SOLUTION INTRAVENOUS; SUBCUTANEOUS at 20:11

## 2019-01-01 RX ADMIN — CALCIUM CHLORIDE, MAGNESIUM CHLORIDE, SODIUM CHLORIDE, SODIUM BICARBONATE, POTASSIUM CHLORIDE AND SODIUM PHOSPHATE DIBASIC DIHYDRATE 12.5 ML/KG/HR: 3.68; 3.05; 6.34; 3.09; .314; .187 INJECTION INTRAVENOUS at 15:11

## 2019-01-01 RX ADMIN — ASPIRIN 81 MG CHEWABLE TABLET 81 MG: 81 TABLET CHEWABLE at 08:16

## 2019-01-01 RX ADMIN — PIPERACILLIN AND TAZOBACTAM 4.5 G: 4; .5 INJECTION, POWDER, FOR SOLUTION INTRAVENOUS at 00:28

## 2019-01-01 RX ADMIN — FENTANYL CITRATE 50 MCG: 50 INJECTION INTRAMUSCULAR; INTRAVENOUS at 02:41

## 2019-01-01 RX ADMIN — EPOPROSTENOL 8 NG/KG/MIN: 1.5 INJECTION, POWDER, LYOPHILIZED, FOR SOLUTION INTRAVENOUS at 12:22

## 2019-01-01 RX ADMIN — DAPTOMYCIN 800 MG: 500 INJECTION, POWDER, LYOPHILIZED, FOR SOLUTION INTRAVENOUS at 14:10

## 2019-01-01 RX ADMIN — TICAGRELOR 90 MG: 90 TABLET ORAL at 07:36

## 2019-01-01 RX ADMIN — HYDROCORTISONE SODIUM SUCCINATE 50 MG: 100 INJECTION, POWDER, FOR SOLUTION INTRAMUSCULAR; INTRAVENOUS at 07:34

## 2019-01-01 RX ADMIN — TICAGRELOR 90 MG: 90 TABLET ORAL at 20:02

## 2019-01-01 RX ADMIN — CALCIUM CHLORIDE, MAGNESIUM CHLORIDE, SODIUM CHLORIDE, SODIUM BICARBONATE, POTASSIUM CHLORIDE AND SODIUM PHOSPHATE DIBASIC DIHYDRATE 12.5 ML/KG/HR: 3.68; 3.05; 6.34; 3.09; .314; .187 INJECTION INTRAVENOUS at 01:31

## 2019-01-01 RX ADMIN — HYDROCORTISONE SODIUM SUCCINATE 50 MG: 100 INJECTION, POWDER, FOR SOLUTION INTRAMUSCULAR; INTRAVENOUS at 02:30

## 2019-01-01 RX ADMIN — CALCIUM CHLORIDE, MAGNESIUM CHLORIDE, SODIUM CHLORIDE, SODIUM BICARBONATE, POTASSIUM CHLORIDE AND SODIUM PHOSPHATE DIBASIC DIHYDRATE 12.5 ML/KG/HR: 3.68; 3.05; 6.34; 3.09; .314; .187 INJECTION INTRAVENOUS at 20:46

## 2019-01-01 RX ADMIN — OSELTAMIVIR PHOSPHATE 75 MG: 6 POWDER, FOR SUSPENSION ORAL at 20:16

## 2019-01-01 RX ADMIN — CARBOXYMETHYLCELLULOSE SODIUM 2 DROP: 5 SOLUTION/ DROPS OPHTHALMIC at 11:47

## 2019-01-01 RX ADMIN — CALCIUM CHLORIDE, MAGNESIUM CHLORIDE, SODIUM CHLORIDE, SODIUM BICARBONATE, POTASSIUM CHLORIDE AND SODIUM PHOSPHATE DIBASIC DIHYDRATE 12.5 ML/KG/HR: 3.68; 3.05; 6.34; 3.09; .314; .187 INJECTION INTRAVENOUS at 13:57

## 2019-01-01 RX ADMIN — ALBUMIN HUMAN 25 G: 0.05 INJECTION, SOLUTION INTRAVENOUS at 22:24

## 2019-01-01 RX ADMIN — AMIODARONE HYDROCHLORIDE 1 MG/MIN: 50 INJECTION, SOLUTION INTRAVENOUS at 02:16

## 2019-01-01 RX ADMIN — OSELTAMIVIR PHOSPHATE 75 MG: 6 POWDER, FOR SUSPENSION ORAL at 19:47

## 2019-01-01 RX ADMIN — PROPOFOL 30 MCG/KG/MIN: 10 INJECTION, EMULSION INTRAVENOUS at 02:07

## 2019-01-01 RX ADMIN — OSELTAMIVIR PHOSPHATE 75 MG: 6 POWDER, FOR SUSPENSION ORAL at 07:35

## 2019-01-01 RX ADMIN — CALCIUM CHLORIDE, MAGNESIUM CHLORIDE, DEXTROSE MONOHYDRATE, LACTIC ACID, SODIUM CHLORIDE, SODIUM BICARBONATE AND POTASSIUM CHLORIDE 12.5 ML/KG/HR: 5.15; 2.03; 22; 5.4; 6.46; 3.09; .157 INJECTION INTRAVENOUS at 03:55

## 2019-01-01 RX ADMIN — CALCIUM CHLORIDE, MAGNESIUM CHLORIDE, SODIUM CHLORIDE, SODIUM BICARBONATE, POTASSIUM CHLORIDE AND SODIUM PHOSPHATE DIBASIC DIHYDRATE 12.5 ML/KG/HR: 3.68; 3.05; 6.34; 3.09; .314; .187 INJECTION INTRAVENOUS at 16:53

## 2019-01-01 RX ADMIN — TICAGRELOR 90 MG: 90 TABLET ORAL at 08:05

## 2019-01-01 RX ADMIN — CALCIUM CHLORIDE, MAGNESIUM CHLORIDE, SODIUM CHLORIDE, SODIUM BICARBONATE, POTASSIUM CHLORIDE AND SODIUM PHOSPHATE DIBASIC DIHYDRATE 12.5 ML/KG/HR: 3.68; 3.05; 6.34; 3.09; .314; .187 INJECTION INTRAVENOUS at 12:24

## 2019-01-01 RX ADMIN — CALCIUM CHLORIDE, MAGNESIUM CHLORIDE, SODIUM CHLORIDE, SODIUM BICARBONATE, POTASSIUM CHLORIDE AND SODIUM PHOSPHATE DIBASIC DIHYDRATE 12.5 ML/KG/HR: 3.68; 3.05; 6.34; 3.09; .314; .187 INJECTION INTRAVENOUS at 19:19

## 2019-01-01 RX ADMIN — CALCIUM CHLORIDE, MAGNESIUM CHLORIDE, SODIUM CHLORIDE, SODIUM BICARBONATE, POTASSIUM CHLORIDE AND SODIUM PHOSPHATE DIBASIC DIHYDRATE 12.5 ML/KG/HR: 3.68; 3.05; 6.34; 3.09; .314; .187 INJECTION INTRAVENOUS at 15:27

## 2019-01-01 RX ADMIN — PANTOPRAZOLE SODIUM 40 MG: 40 TABLET, DELAYED RELEASE ORAL at 08:01

## 2019-01-01 RX ADMIN — EPINEPHRINE 0.12 MCG/KG/MIN: 1 INJECTION PARENTERAL at 18:01

## 2019-01-01 RX ADMIN — PANTOPRAZOLE SODIUM 40 MG: 40 INJECTION, POWDER, FOR SOLUTION INTRAVENOUS at 16:04

## 2019-01-01 RX ADMIN — ERYTHROMYCIN 1 G: 5 OINTMENT OPHTHALMIC at 00:14

## 2019-01-01 RX ADMIN — EPINEPHRINE 0.04 MCG/KG/MIN: 1 INJECTION PARENTERAL at 05:19

## 2019-01-01 RX ADMIN — CARBOXYMETHYLCELLULOSE SODIUM 2 DROP: 5 SOLUTION/ DROPS OPHTHALMIC at 15:42

## 2019-01-01 RX ADMIN — CARBOXYMETHYLCELLULOSE SODIUM 2 DROP: 5 SOLUTION/ DROPS OPHTHALMIC at 20:13

## 2019-01-01 RX ADMIN — INSULIN ASPART 1 UNITS: 100 INJECTION, SOLUTION INTRAVENOUS; SUBCUTANEOUS at 16:07

## 2019-01-01 RX ADMIN — FENTANYL CITRATE 150 MCG/HR: 50 INJECTION INTRAVENOUS at 05:57

## 2019-01-01 RX ADMIN — Medication 1 PACKET: at 19:51

## 2019-01-01 RX ADMIN — Medication 0.06 MCG/KG/MIN: at 08:29

## 2019-01-01 RX ADMIN — CISATRACURIUM BESYLATE 10 MG: 2 INJECTION INTRAVENOUS at 19:48

## 2019-01-01 RX ADMIN — CALCIUM CHLORIDE, MAGNESIUM CHLORIDE, DEXTROSE MONOHYDRATE, LACTIC ACID, SODIUM CHLORIDE, SODIUM BICARBONATE AND POTASSIUM CHLORIDE 12.5 ML/KG/HR: 5.15; 2.03; 22; 5.4; 6.46; 3.09; .157 INJECTION INTRAVENOUS at 14:40

## 2019-01-01 RX ADMIN — EPINEPHRINE 10 MCG: 1 INJECTION PARENTERAL at 18:50

## 2019-01-01 RX ADMIN — FENTANYL CITRATE 50 MCG: 50 INJECTION INTRAMUSCULAR; INTRAVENOUS at 23:03

## 2019-01-01 RX ADMIN — HYDROCORTISONE SODIUM SUCCINATE 50 MG: 100 INJECTION, POWDER, FOR SOLUTION INTRAMUSCULAR; INTRAVENOUS at 00:31

## 2019-01-01 RX ADMIN — HYDROCORTISONE SODIUM SUCCINATE 50 MG: 100 INJECTION, POWDER, FOR SOLUTION INTRAMUSCULAR; INTRAVENOUS at 18:35

## 2019-01-01 RX ADMIN — Medication: at 22:30

## 2019-01-01 RX ADMIN — CALCIUM CHLORIDE, MAGNESIUM CHLORIDE, SODIUM CHLORIDE, SODIUM BICARBONATE, POTASSIUM CHLORIDE AND SODIUM PHOSPHATE DIBASIC DIHYDRATE 12.5 ML/KG/HR: 3.68; 3.05; 6.34; 3.09; .314; .187 INJECTION INTRAVENOUS at 09:29

## 2019-01-01 RX ADMIN — CALCIUM CHLORIDE, MAGNESIUM CHLORIDE, SODIUM CHLORIDE, SODIUM BICARBONATE, POTASSIUM CHLORIDE AND SODIUM PHOSPHATE DIBASIC DIHYDRATE 12.5 ML/KG/HR: 3.68; 3.05; 6.34; 3.09; .314; .187 INJECTION INTRAVENOUS at 11:22

## 2019-01-01 RX ADMIN — INSULIN ASPART 1 UNITS: 100 INJECTION, SOLUTION INTRAVENOUS; SUBCUTANEOUS at 11:53

## 2019-01-01 RX ADMIN — Medication 0.1 MCG/KG/MIN: at 02:11

## 2019-01-01 RX ADMIN — CARBOXYMETHYLCELLULOSE SODIUM 2 DROP: 5 SOLUTION/ DROPS OPHTHALMIC at 08:46

## 2019-01-01 RX ADMIN — Medication 200 MG: at 08:02

## 2019-01-01 RX ADMIN — CALCIUM CHLORIDE, MAGNESIUM CHLORIDE, DEXTROSE MONOHYDRATE, LACTIC ACID, SODIUM CHLORIDE, SODIUM BICARBONATE AND POTASSIUM CHLORIDE 12.5 ML/KG/HR: 5.15; 2.03; 22; 5.4; 6.46; 3.09; .157 INJECTION INTRAVENOUS at 23:25

## 2019-01-01 RX ADMIN — MEROPENEM 2 G: 1 INJECTION, POWDER, FOR SOLUTION INTRAVENOUS at 22:35

## 2019-01-01 RX ADMIN — CALCIUM CHLORIDE, MAGNESIUM CHLORIDE, DEXTROSE MONOHYDRATE, LACTIC ACID, SODIUM CHLORIDE, SODIUM BICARBONATE AND POTASSIUM CHLORIDE 12.5 ML/KG/HR: 5.15; 2.03; 22; 5.4; 6.46; 3.09; .157 INJECTION INTRAVENOUS at 15:38

## 2019-01-01 RX ADMIN — CALCIUM CHLORIDE, MAGNESIUM CHLORIDE, DEXTROSE MONOHYDRATE, LACTIC ACID, SODIUM CHLORIDE, SODIUM BICARBONATE AND POTASSIUM CHLORIDE 12.5 ML/KG/HR: 5.15; 2.03; 22; 5.4; 6.46; 3.09; .157 INJECTION INTRAVENOUS at 22:37

## 2019-01-01 RX ADMIN — PANTOPRAZOLE SODIUM 40 MG: 40 TABLET, DELAYED RELEASE ORAL at 08:43

## 2019-01-01 RX ADMIN — CALCIUM CHLORIDE, MAGNESIUM CHLORIDE, SODIUM CHLORIDE, SODIUM BICARBONATE, POTASSIUM CHLORIDE AND SODIUM PHOSPHATE DIBASIC DIHYDRATE 12.5 ML/KG/HR: 3.68; 3.05; 6.34; 3.09; .314; .187 INJECTION INTRAVENOUS at 03:07

## 2019-01-01 RX ADMIN — CARBOXYMETHYLCELLULOSE SODIUM 2 DROP: 5 SOLUTION/ DROPS OPHTHALMIC at 19:38

## 2019-01-01 RX ADMIN — CALCIUM CHLORIDE, MAGNESIUM CHLORIDE, DEXTROSE MONOHYDRATE, LACTIC ACID, SODIUM CHLORIDE, SODIUM BICARBONATE AND POTASSIUM CHLORIDE 12.5 ML/KG/HR: 5.15; 2.03; 22; 5.4; 6.46; 3.09; .157 INJECTION INTRAVENOUS at 15:35

## 2019-01-01 RX ADMIN — CALCIUM CHLORIDE, MAGNESIUM CHLORIDE, SODIUM CHLORIDE, SODIUM BICARBONATE, POTASSIUM CHLORIDE AND SODIUM PHOSPHATE DIBASIC DIHYDRATE 12.5 ML/KG/HR: 3.68; 3.05; 6.34; 3.09; .314; .187 INJECTION INTRAVENOUS at 08:39

## 2019-01-01 RX ADMIN — INSULIN ASPART 1 UNITS: 100 INJECTION, SOLUTION INTRAVENOUS; SUBCUTANEOUS at 08:18

## 2019-01-01 RX ADMIN — Medication 0.14 MCG/KG/MIN: at 19:35

## 2019-01-01 RX ADMIN — CALCIUM CHLORIDE 2 G: 100 INJECTION, SOLUTION INTRAVENOUS at 00:06

## 2019-01-01 RX ADMIN — POTASSIUM CHLORIDE 20 MEQ: 29.8 INJECTION, SOLUTION INTRAVENOUS at 12:52

## 2019-01-01 RX ADMIN — CALCIUM CHLORIDE, MAGNESIUM CHLORIDE, DEXTROSE MONOHYDRATE, LACTIC ACID, SODIUM CHLORIDE, SODIUM BICARBONATE AND POTASSIUM CHLORIDE 12.5 ML/KG/HR: 5.15; 2.03; 22; 5.4; 6.46; 3.09; .157 INJECTION INTRAVENOUS at 14:39

## 2019-01-01 RX ADMIN — Medication 0.09 MCG/KG/MIN: at 18:15

## 2019-01-01 RX ADMIN — INSULIN ASPART 1 UNITS: 100 INJECTION, SOLUTION INTRAVENOUS; SUBCUTANEOUS at 00:06

## 2019-01-01 RX ADMIN — CALCIUM CHLORIDE, MAGNESIUM CHLORIDE, SODIUM CHLORIDE, SODIUM BICARBONATE, POTASSIUM CHLORIDE AND SODIUM PHOSPHATE DIBASIC DIHYDRATE 12.5 ML/KG/HR: 3.68; 3.05; 6.34; 3.09; .314; .187 INJECTION INTRAVENOUS at 20:27

## 2019-01-01 RX ADMIN — CALCIUM CHLORIDE, MAGNESIUM CHLORIDE, SODIUM CHLORIDE, SODIUM BICARBONATE, POTASSIUM CHLORIDE AND SODIUM PHOSPHATE DIBASIC DIHYDRATE 1.89 ML/KG/HR: 3.68; 3.05; 6.34; 3.09; .314; .187 INJECTION INTRAVENOUS at 14:28

## 2019-01-01 RX ADMIN — MEROPENEM 2 G: 1 INJECTION, POWDER, FOR SOLUTION INTRAVENOUS at 00:37

## 2019-01-01 RX ADMIN — FLUCONAZOLE 400 MG: 200 TABLET ORAL at 21:14

## 2019-01-01 RX ADMIN — MEROPENEM 1 G: 1 INJECTION, POWDER, FOR SOLUTION INTRAVENOUS at 13:56

## 2019-01-01 RX ADMIN — POTASSIUM CHLORIDE 20 MEQ: 29.8 INJECTION, SOLUTION INTRAVENOUS at 07:50

## 2019-01-01 RX ADMIN — CALCIUM CHLORIDE, MAGNESIUM CHLORIDE, DEXTROSE MONOHYDRATE, LACTIC ACID, SODIUM CHLORIDE, SODIUM BICARBONATE AND POTASSIUM CHLORIDE: 5.15; 2.03; 22; 5.4; 6.46; 3.09; .157 INJECTION INTRAVENOUS at 15:10

## 2019-01-01 RX ADMIN — SODIUM CHLORIDE: 3 INJECTION, SOLUTION INTRAVENOUS at 19:45

## 2019-01-01 RX ADMIN — PANTOPRAZOLE SODIUM 40 MG: 40 INJECTION, POWDER, FOR SOLUTION INTRAVENOUS at 16:00

## 2019-01-01 RX ADMIN — CALCIUM CHLORIDE, MAGNESIUM CHLORIDE, SODIUM CHLORIDE, SODIUM BICARBONATE, POTASSIUM CHLORIDE AND SODIUM PHOSPHATE DIBASIC DIHYDRATE 12.5 ML/KG/HR: 3.68; 3.05; 6.34; 3.09; .314; .187 INJECTION INTRAVENOUS at 19:14

## 2019-01-01 RX ADMIN — TICAGRELOR 90 MG: 90 TABLET ORAL at 19:30

## 2019-01-01 RX ADMIN — Medication 5 ML: at 08:10

## 2019-01-01 RX ADMIN — CISATRACURIUM BESYLATE 10 MG: 2 INJECTION INTRAVENOUS at 17:15

## 2019-01-01 RX ADMIN — HYDROCORTISONE SODIUM SUCCINATE 50 MG: 100 INJECTION, POWDER, FOR SOLUTION INTRAMUSCULAR; INTRAVENOUS at 19:33

## 2019-01-01 RX ADMIN — Medication: at 11:35

## 2019-01-01 RX ADMIN — CALCIUM CHLORIDE, MAGNESIUM CHLORIDE, DEXTROSE MONOHYDRATE, LACTIC ACID, SODIUM CHLORIDE, SODIUM BICARBONATE AND POTASSIUM CHLORIDE: 5.15; 2.03; 22; 5.4; 6.46; 3.09; .157 INJECTION INTRAVENOUS at 19:48

## 2019-01-01 RX ADMIN — CALCIUM CHLORIDE, MAGNESIUM CHLORIDE, DEXTROSE MONOHYDRATE, LACTIC ACID, SODIUM CHLORIDE, SODIUM BICARBONATE AND POTASSIUM CHLORIDE: 5.15; 2.03; 22; 5.4; 6.46; 3.09; .157 INJECTION INTRAVENOUS at 08:41

## 2019-01-01 RX ADMIN — CARBOXYMETHYLCELLULOSE SODIUM 2 DROP: 5 SOLUTION/ DROPS OPHTHALMIC at 16:17

## 2019-01-01 RX ADMIN — Medication: at 13:28

## 2019-01-01 RX ADMIN — CALCIUM CHLORIDE, MAGNESIUM CHLORIDE, DEXTROSE MONOHYDRATE, LACTIC ACID, SODIUM CHLORIDE, SODIUM BICARBONATE AND POTASSIUM CHLORIDE 12.5 ML/KG/HR: 5.15; 2.03; 22; 5.4; 6.46; 3.09; .157 INJECTION INTRAVENOUS at 07:06

## 2019-01-01 RX ADMIN — PANTOPRAZOLE SODIUM 40 MG: 40 TABLET, DELAYED RELEASE ORAL at 08:00

## 2019-01-01 RX ADMIN — NOREPINEPHRINE BITARTRATE 6.4 MCG: 1 INJECTION INTRAVENOUS at 19:01

## 2019-01-01 RX ADMIN — CALCIUM CHLORIDE, MAGNESIUM CHLORIDE, SODIUM CHLORIDE, SODIUM BICARBONATE, POTASSIUM CHLORIDE AND SODIUM PHOSPHATE DIBASIC DIHYDRATE 12.5 ML/KG/HR: 3.68; 3.05; 6.34; 3.09; .314; .187 INJECTION INTRAVENOUS at 11:34

## 2019-01-01 RX ADMIN — TICAGRELOR 90 MG: 90 TABLET ORAL at 08:02

## 2019-01-01 RX ADMIN — CALCIUM CHLORIDE, MAGNESIUM CHLORIDE, SODIUM CHLORIDE, SODIUM BICARBONATE, POTASSIUM CHLORIDE AND SODIUM PHOSPHATE DIBASIC DIHYDRATE 12.5 ML/KG/HR: 3.68; 3.05; 6.34; 3.09; .314; .187 INJECTION INTRAVENOUS at 22:43

## 2019-01-01 RX ADMIN — CALCIUM CHLORIDE, MAGNESIUM CHLORIDE, SODIUM CHLORIDE, SODIUM BICARBONATE, POTASSIUM CHLORIDE AND SODIUM PHOSPHATE DIBASIC DIHYDRATE 12.5 ML/KG/HR: 3.68; 3.05; 6.34; 3.09; .314; .187 INJECTION INTRAVENOUS at 08:15

## 2019-01-01 RX ADMIN — INSULIN ASPART 2 UNITS: 100 INJECTION, SOLUTION INTRAVENOUS; SUBCUTANEOUS at 12:24

## 2019-01-01 RX ADMIN — CALCIUM CHLORIDE, MAGNESIUM CHLORIDE, DEXTROSE MONOHYDRATE, LACTIC ACID, SODIUM CHLORIDE, SODIUM BICARBONATE AND POTASSIUM CHLORIDE 12.5 ML/KG/HR: 5.15; 2.03; 22; 5.4; 6.46; 3.09; .157 INJECTION INTRAVENOUS at 11:03

## 2019-01-01 RX ADMIN — FENTANYL CITRATE 100 MCG/HR: 50 INJECTION INTRAVENOUS at 21:38

## 2019-01-01 RX ADMIN — CALCIUM CHLORIDE, MAGNESIUM CHLORIDE, SODIUM CHLORIDE, SODIUM BICARBONATE, POTASSIUM CHLORIDE AND SODIUM PHOSPHATE DIBASIC DIHYDRATE 12.5 ML/KG/HR: 3.68; 3.05; 6.34; 3.09; .314; .187 INJECTION INTRAVENOUS at 15:44

## 2019-01-01 RX ADMIN — PIPERACILLIN AND TAZOBACTAM 4.5 G: 4; .5 INJECTION, POWDER, FOR SOLUTION INTRAVENOUS at 05:53

## 2019-01-01 RX ADMIN — PIPERACILLIN SODIUM AND TAZOBACTAM SODIUM 3.38 G: 3; .375 INJECTION, POWDER, LYOPHILIZED, FOR SOLUTION INTRAVENOUS at 00:12

## 2019-01-01 RX ADMIN — TICAGRELOR 90 MG: 90 TABLET ORAL at 21:14

## 2019-01-01 RX ADMIN — EPOPROSTENOL 10 NG/KG/MIN: 1.5 INJECTION, POWDER, LYOPHILIZED, FOR SOLUTION INTRAVENOUS at 02:16

## 2019-01-01 RX ADMIN — INSULIN ASPART 1 UNITS: 100 INJECTION, SOLUTION INTRAVENOUS; SUBCUTANEOUS at 17:21

## 2019-01-01 RX ADMIN — CALCIUM CHLORIDE, MAGNESIUM CHLORIDE, DEXTROSE MONOHYDRATE, LACTIC ACID, SODIUM CHLORIDE, SODIUM BICARBONATE AND POTASSIUM CHLORIDE 12.5 ML/KG/HR: 5.15; 2.03; 22; 5.4; 6.46; 3.09; .157 INJECTION INTRAVENOUS at 08:14

## 2019-01-01 RX ADMIN — VASOPRESSIN 2.5 UNITS/HR: 20 INJECTION INTRAVENOUS at 19:03

## 2019-01-01 RX ADMIN — ERYTHROMYCIN 1 G: 5 OINTMENT OPHTHALMIC at 08:27

## 2019-01-01 RX ADMIN — CALCIUM CHLORIDE, MAGNESIUM CHLORIDE, DEXTROSE MONOHYDRATE, LACTIC ACID, SODIUM CHLORIDE, SODIUM BICARBONATE AND POTASSIUM CHLORIDE 12.5 ML/KG/HR: 5.15; 2.03; 22; 5.4; 6.46; 3.09; .157 INJECTION INTRAVENOUS at 13:54

## 2019-01-01 RX ADMIN — CALCIUM CHLORIDE, MAGNESIUM CHLORIDE, SODIUM CHLORIDE, SODIUM BICARBONATE, POTASSIUM CHLORIDE AND SODIUM PHOSPHATE DIBASIC DIHYDRATE 12.5 ML/KG/HR: 3.68; 3.05; 6.34; 3.09; .314; .187 INJECTION INTRAVENOUS at 21:18

## 2019-01-01 RX ADMIN — HUMAN INSULIN 10 UNITS/HR: 100 INJECTION, SOLUTION SUBCUTANEOUS at 06:22

## 2019-01-01 RX ADMIN — CARBOXYMETHYLCELLULOSE SODIUM 2 DROP: 5 SOLUTION/ DROPS OPHTHALMIC at 12:04

## 2019-01-01 RX ADMIN — FENTANYL CITRATE 200 MCG/HR: 50 INJECTION INTRAVENOUS at 13:28

## 2019-01-01 RX ADMIN — PANTOPRAZOLE SODIUM 40 MG: 40 INJECTION, POWDER, FOR SOLUTION INTRAVENOUS at 03:09

## 2019-01-01 RX ADMIN — DEXTROSE MONOHYDRATE 25 ML: 25 INJECTION, SOLUTION INTRAVENOUS at 11:11

## 2019-01-01 RX ADMIN — CALCIUM CHLORIDE, MAGNESIUM CHLORIDE, DEXTROSE MONOHYDRATE, LACTIC ACID, SODIUM CHLORIDE, SODIUM BICARBONATE AND POTASSIUM CHLORIDE 12.5 ML/KG/HR: 5.15; 2.03; 22; 5.4; 6.46; 3.09; .157 INJECTION INTRAVENOUS at 14:45

## 2019-01-01 RX ADMIN — VASOPRESSIN 3 UNITS/HR: 20 INJECTION INTRAVENOUS at 23:55

## 2019-01-01 RX ADMIN — ALBUMIN HUMAN 50 G: 0.05 INJECTION, SOLUTION INTRAVENOUS at 14:35

## 2019-01-01 RX ADMIN — HUMAN INSULIN 3 UNITS/HR: 100 INJECTION, SOLUTION SUBCUTANEOUS at 12:10

## 2019-01-01 RX ADMIN — HYDROCORTISONE SODIUM SUCCINATE 50 MG: 100 INJECTION, POWDER, FOR SOLUTION INTRAMUSCULAR; INTRAVENOUS at 20:37

## 2019-01-01 RX ADMIN — VASOPRESSIN 2.4 UNITS/HR: 20 INJECTION INTRAVENOUS at 15:37

## 2019-01-01 RX ADMIN — SODIUM CHLORIDE, SODIUM GLUCONATE, SODIUM ACETATE, POTASSIUM CHLORIDE AND MAGNESIUM CHLORIDE: 526; 502; 368; 37; 30 INJECTION, SOLUTION INTRAVENOUS at 15:40

## 2019-01-01 RX ADMIN — FENTANYL CITRATE 100 MCG/HR: 50 INJECTION INTRAVENOUS at 05:15

## 2019-01-01 RX ADMIN — CALCIUM CHLORIDE, MAGNESIUM CHLORIDE, DEXTROSE MONOHYDRATE, LACTIC ACID, SODIUM CHLORIDE, SODIUM BICARBONATE AND POTASSIUM CHLORIDE 12.5 ML/KG/HR: 5.15; 2.03; 22; 5.4; 6.46; 3.09; .157 INJECTION INTRAVENOUS at 04:23

## 2019-01-01 RX ADMIN — TICAGRELOR 90 MG: 90 TABLET ORAL at 08:15

## 2019-01-01 RX ADMIN — CALCIUM CHLORIDE, MAGNESIUM CHLORIDE, DEXTROSE MONOHYDRATE, LACTIC ACID, SODIUM CHLORIDE, SODIUM BICARBONATE AND POTASSIUM CHLORIDE 12.5 ML/KG/HR: 5.15; 2.03; 22; 5.4; 6.46; 3.09; .157 INJECTION INTRAVENOUS at 11:24

## 2019-01-01 RX ADMIN — VASOPRESSIN 1 UNITS/HR: 20 INJECTION INTRAVENOUS at 19:58

## 2019-01-01 RX ADMIN — METRONIDAZOLE 500 MG: 500 INJECTION, SOLUTION INTRAVENOUS at 08:25

## 2019-01-01 RX ADMIN — CALCIUM CHLORIDE, MAGNESIUM CHLORIDE, DEXTROSE MONOHYDRATE, LACTIC ACID, SODIUM CHLORIDE, SODIUM BICARBONATE AND POTASSIUM CHLORIDE: 5.15; 2.03; 22; 5.4; 6.46; 3.09; .157 INJECTION INTRAVENOUS at 00:45

## 2019-01-01 RX ADMIN — PIPERACILLIN AND TAZOBACTAM 4.5 G: 4; .5 INJECTION, POWDER, FOR SOLUTION INTRAVENOUS at 19:47

## 2019-01-01 RX ADMIN — INSULIN ASPART 1 UNITS: 100 INJECTION, SOLUTION INTRAVENOUS; SUBCUTANEOUS at 20:34

## 2019-01-01 RX ADMIN — INSULIN ASPART 1 UNITS: 100 INJECTION, SOLUTION INTRAVENOUS; SUBCUTANEOUS at 23:41

## 2019-01-01 RX ADMIN — MEROPENEM 1 G: 1 INJECTION, POWDER, FOR SOLUTION INTRAVENOUS at 06:17

## 2019-01-01 RX ADMIN — CALCIUM CHLORIDE, MAGNESIUM CHLORIDE, SODIUM CHLORIDE, SODIUM BICARBONATE, POTASSIUM CHLORIDE AND SODIUM PHOSPHATE DIBASIC DIHYDRATE 12.5 ML/KG/HR: 3.68; 3.05; 6.34; 3.09; .314; .187 INJECTION INTRAVENOUS at 16:51

## 2019-01-01 RX ADMIN — TICAGRELOR 90 MG: 90 TABLET ORAL at 08:12

## 2019-01-01 RX ADMIN — FENTANYL CITRATE 50 MCG: 50 INJECTION, SOLUTION INTRAMUSCULAR; INTRAVENOUS at 03:24

## 2019-01-01 RX ADMIN — CALCIUM CHLORIDE, MAGNESIUM CHLORIDE, DEXTROSE MONOHYDRATE, LACTIC ACID, SODIUM CHLORIDE, SODIUM BICARBONATE AND POTASSIUM CHLORIDE 12.5 ML/KG/HR: 5.15; 2.03; 22; 5.4; 6.46; 3.09; .157 INJECTION INTRAVENOUS at 18:52

## 2019-01-01 RX ADMIN — MICAFUNGIN SODIUM 100 MG: 10 INJECTION, POWDER, LYOPHILIZED, FOR SOLUTION INTRAVENOUS at 19:41

## 2019-01-01 RX ADMIN — DEXTROSE MONOHYDRATE: 50 INJECTION, SOLUTION INTRAVENOUS at 17:27

## 2019-01-01 RX ADMIN — VASOPRESSIN 2 UNITS/HR: 20 INJECTION INTRAVENOUS at 11:25

## 2019-01-01 RX ADMIN — HUMAN INSULIN 7 UNITS/HR: 100 INJECTION, SOLUTION SUBCUTANEOUS at 20:06

## 2019-01-01 RX ADMIN — HEPARIN SODIUM 650 UNITS/HR: 10000 INJECTION, SOLUTION INTRAVENOUS at 10:58

## 2019-01-01 RX ADMIN — CALCIUM CHLORIDE, MAGNESIUM CHLORIDE, SODIUM CHLORIDE, SODIUM BICARBONATE, POTASSIUM CHLORIDE AND SODIUM PHOSPHATE DIBASIC DIHYDRATE 12.5 ML/KG/HR: 3.68; 3.05; 6.34; 3.09; .314; .187 INJECTION INTRAVENOUS at 02:48

## 2019-01-01 RX ADMIN — CALCIUM CHLORIDE, MAGNESIUM CHLORIDE, SODIUM CHLORIDE, SODIUM BICARBONATE, POTASSIUM CHLORIDE AND SODIUM PHOSPHATE DIBASIC DIHYDRATE 12.5 ML/KG/HR: 3.68; 3.05; 6.34; 3.09; .314; .187 INJECTION INTRAVENOUS at 13:11

## 2019-01-01 RX ADMIN — CALCIUM CHLORIDE, MAGNESIUM CHLORIDE, DEXTROSE MONOHYDRATE, LACTIC ACID, SODIUM CHLORIDE, SODIUM BICARBONATE AND POTASSIUM CHLORIDE 12.5 ML/KG/HR: 5.15; 2.03; 22; 5.4; 6.46; 3.09; .157 INJECTION INTRAVENOUS at 15:05

## 2019-01-01 RX ADMIN — DAPTOMYCIN 800 MG: 500 INJECTION, POWDER, LYOPHILIZED, FOR SOLUTION INTRAVENOUS at 13:00

## 2019-01-01 RX ADMIN — TICAGRELOR 90 MG: 90 TABLET ORAL at 19:47

## 2019-01-01 RX ADMIN — CALCIUM CHLORIDE, MAGNESIUM CHLORIDE, SODIUM CHLORIDE, SODIUM BICARBONATE, POTASSIUM CHLORIDE AND SODIUM PHOSPHATE DIBASIC DIHYDRATE 12.5 ML/KG/HR: 3.68; 3.05; 6.34; 3.09; .314; .187 INJECTION INTRAVENOUS at 07:26

## 2019-01-01 RX ADMIN — Medication 0.05 MCG/KG/MIN: at 22:11

## 2019-01-01 RX ADMIN — CALCIUM CHLORIDE, MAGNESIUM CHLORIDE, SODIUM CHLORIDE, SODIUM BICARBONATE, POTASSIUM CHLORIDE AND SODIUM PHOSPHATE DIBASIC DIHYDRATE 12.5 ML/KG/HR: 3.68; 3.05; 6.34; 3.09; .314; .187 INJECTION INTRAVENOUS at 18:52

## 2019-01-01 RX ADMIN — CARBOXYMETHYLCELLULOSE SODIUM 2 DROP: 5 SOLUTION/ DROPS OPHTHALMIC at 12:05

## 2019-01-01 RX ADMIN — HUMAN INSULIN 7 UNITS/HR: 100 INJECTION, SOLUTION SUBCUTANEOUS at 12:05

## 2019-01-01 RX ADMIN — HUMAN INSULIN 12 UNITS/HR: 100 INJECTION, SOLUTION SUBCUTANEOUS at 13:49

## 2019-01-01 RX ADMIN — VASOPRESSIN 2 UNITS/HR: 20 INJECTION INTRAVENOUS at 20:54

## 2019-01-01 RX ADMIN — Medication 5 ML: at 07:46

## 2019-01-01 RX ADMIN — PANTOPRAZOLE SODIUM 40 MG: 40 INJECTION, POWDER, FOR SOLUTION INTRAVENOUS at 05:42

## 2019-01-01 RX ADMIN — FENTANYL CITRATE 50 MCG: 50 INJECTION INTRAMUSCULAR; INTRAVENOUS at 18:44

## 2019-01-01 RX ADMIN — HEPARIN SODIUM 1400 UNITS/HR: 10000 INJECTION, SOLUTION INTRAVENOUS at 21:29

## 2019-01-01 RX ADMIN — Medication 0.7 MCG/KG/HR: at 10:21

## 2019-01-01 RX ADMIN — EPOPROSTENOL 20 NG/KG/MIN: 1.5 INJECTION, POWDER, LYOPHILIZED, FOR SOLUTION INTRAVENOUS at 04:04

## 2019-01-01 RX ADMIN — PANTOPRAZOLE SODIUM 40 MG: 40 INJECTION, POWDER, FOR SOLUTION INTRAVENOUS at 05:13

## 2019-01-01 RX ADMIN — CARBOXYMETHYLCELLULOSE SODIUM 2 DROP: 5 SOLUTION/ DROPS OPHTHALMIC at 07:41

## 2019-01-01 RX ADMIN — ERYTHROMYCIN 1 G: 5 OINTMENT OPHTHALMIC at 22:13

## 2019-01-01 RX ADMIN — CALCIUM CHLORIDE, MAGNESIUM CHLORIDE, SODIUM CHLORIDE, SODIUM BICARBONATE, POTASSIUM CHLORIDE AND SODIUM PHOSPHATE DIBASIC DIHYDRATE 12.5 ML/KG/HR: 3.68; 3.05; 6.34; 3.09; .314; .187 INJECTION INTRAVENOUS at 07:30

## 2019-01-01 RX ADMIN — CALCIUM CHLORIDE, MAGNESIUM CHLORIDE, SODIUM CHLORIDE, SODIUM BICARBONATE, POTASSIUM CHLORIDE AND SODIUM PHOSPHATE DIBASIC DIHYDRATE 12.5 ML/KG/HR: 3.68; 3.05; 6.34; 3.09; .314; .187 INJECTION INTRAVENOUS at 11:37

## 2019-01-01 RX ADMIN — INSULIN ASPART 1 UNITS: 100 INJECTION, SOLUTION INTRAVENOUS; SUBCUTANEOUS at 16:13

## 2019-01-01 RX ADMIN — CALCIUM CHLORIDE, MAGNESIUM CHLORIDE, DEXTROSE MONOHYDRATE, LACTIC ACID, SODIUM CHLORIDE, SODIUM BICARBONATE AND POTASSIUM CHLORIDE 12.5 ML/KG/HR: 5.15; 2.03; 22; 5.4; 6.46; 3.09; .157 INJECTION INTRAVENOUS at 03:47

## 2019-01-01 RX ADMIN — CALCIUM CHLORIDE, MAGNESIUM CHLORIDE, SODIUM CHLORIDE, SODIUM BICARBONATE, POTASSIUM CHLORIDE AND SODIUM PHOSPHATE DIBASIC DIHYDRATE 12.5 ML/KG/HR: 3.68; 3.05; 6.34; 3.09; .314; .187 INJECTION INTRAVENOUS at 15:28

## 2019-01-01 RX ADMIN — CALCIUM CHLORIDE, MAGNESIUM CHLORIDE, SODIUM CHLORIDE, SODIUM BICARBONATE, POTASSIUM CHLORIDE AND SODIUM PHOSPHATE DIBASIC DIHYDRATE 12.5 ML/KG/HR: 3.68; 3.05; 6.34; 3.09; .314; .187 INJECTION INTRAVENOUS at 23:23

## 2019-01-01 RX ADMIN — SODIUM PHOSPHATE, MONOBASIC, MONOHYDRATE AND SODIUM PHOSPHATE, DIBASIC, ANHYDROUS 20 MMOL: 276; 142 INJECTION, SOLUTION INTRAVENOUS at 14:12

## 2019-01-01 RX ADMIN — CALCIUM CHLORIDE 1 G: 100 INJECTION, SOLUTION INTRAVENOUS at 23:50

## 2019-01-01 RX ADMIN — PROPOFOL 30 MCG/KG/MIN: 10 INJECTION, EMULSION INTRAVENOUS at 22:15

## 2019-01-01 RX ADMIN — HUMAN INSULIN 6 UNITS/HR: 100 INJECTION, SOLUTION SUBCUTANEOUS at 10:51

## 2019-01-01 RX ADMIN — CALCIUM CHLORIDE, MAGNESIUM CHLORIDE, SODIUM CHLORIDE, SODIUM BICARBONATE, POTASSIUM CHLORIDE AND SODIUM PHOSPHATE DIBASIC DIHYDRATE 1.86 ML/KG/HR: 3.68; 3.05; 6.34; 3.09; .314; .187 INJECTION INTRAVENOUS at 15:42

## 2019-01-01 RX ADMIN — ALBUMIN HUMAN: 0.05 INJECTION, SOLUTION INTRAVENOUS at 16:25

## 2019-01-01 RX ADMIN — FENTANYL CITRATE 100 MCG/HR: 50 INJECTION INTRAVENOUS at 06:13

## 2019-01-01 RX ADMIN — Medication: at 13:41

## 2019-01-01 RX ADMIN — Medication 200 MG: at 07:59

## 2019-01-01 RX ADMIN — CALCIUM CHLORIDE, MAGNESIUM CHLORIDE, SODIUM CHLORIDE, SODIUM BICARBONATE, POTASSIUM CHLORIDE AND SODIUM PHOSPHATE DIBASIC DIHYDRATE 12.5 ML/KG/HR: 3.68; 3.05; 6.34; 3.09; .314; .187 INJECTION INTRAVENOUS at 00:43

## 2019-01-01 RX ADMIN — FENTANYL CITRATE 50 MCG: 50 INJECTION INTRAMUSCULAR; INTRAVENOUS at 13:29

## 2019-01-01 RX ADMIN — ASPIRIN 81 MG CHEWABLE TABLET 81 MG: 81 TABLET CHEWABLE at 08:23

## 2019-01-01 RX ADMIN — CALCIUM CHLORIDE, MAGNESIUM CHLORIDE, SODIUM CHLORIDE, SODIUM BICARBONATE, POTASSIUM CHLORIDE AND SODIUM PHOSPHATE DIBASIC DIHYDRATE 12.5 ML/KG/HR: 3.68; 3.05; 6.34; 3.09; .314; .187 INJECTION INTRAVENOUS at 07:49

## 2019-01-01 RX ADMIN — TICAGRELOR 90 MG: 90 TABLET ORAL at 20:45

## 2019-01-01 RX ADMIN — ERYTHROMYCIN 1 G: 5 OINTMENT OPHTHALMIC at 07:30

## 2019-01-01 RX ADMIN — ERYTHROMYCIN 1 G: 5 OINTMENT OPHTHALMIC at 12:04

## 2019-01-01 RX ADMIN — HUMAN INSULIN 4 UNITS/HR: 100 INJECTION, SOLUTION SUBCUTANEOUS at 19:49

## 2019-01-01 RX ADMIN — ASPIRIN 325 MG ORAL TABLET 325 MG: 325 PILL ORAL at 07:18

## 2019-01-01 RX ADMIN — VANCOMYCIN HYDROCHLORIDE 2000 MG: 10 INJECTION, POWDER, LYOPHILIZED, FOR SOLUTION INTRAVENOUS at 08:03

## 2019-01-01 RX ADMIN — CALCIUM CHLORIDE, MAGNESIUM CHLORIDE, SODIUM CHLORIDE, SODIUM BICARBONATE, POTASSIUM CHLORIDE AND SODIUM PHOSPHATE DIBASIC DIHYDRATE 12.5 ML/KG/HR: 3.68; 3.05; 6.34; 3.09; .314; .187 INJECTION INTRAVENOUS at 18:20

## 2019-01-01 RX ADMIN — CARBOXYMETHYLCELLULOSE SODIUM 2 DROP: 5 SOLUTION/ DROPS OPHTHALMIC at 19:39

## 2019-01-01 RX ADMIN — VANCOMYCIN HYDROCHLORIDE 2000 MG: 1 INJECTION, POWDER, LYOPHILIZED, FOR SOLUTION INTRAVENOUS at 21:37

## 2019-01-01 RX ADMIN — HEPARIN SODIUM 2000 UNITS: 1000 INJECTION, SOLUTION INTRAVENOUS; SUBCUTANEOUS at 16:36

## 2019-01-01 RX ADMIN — VASOPRESSIN 4 UNITS/HR: 20 INJECTION INTRAVENOUS at 00:01

## 2019-01-01 RX ADMIN — TICAGRELOR 90 MG: 90 TABLET ORAL at 07:34

## 2019-01-01 RX ADMIN — VASOPRESSIN 0.5 UNITS/HR: 20 INJECTION INTRAVENOUS at 15:16

## 2019-01-01 RX ADMIN — Medication: at 07:30

## 2019-01-01 RX ADMIN — CALCIUM CHLORIDE, MAGNESIUM CHLORIDE, DEXTROSE MONOHYDRATE, LACTIC ACID, SODIUM CHLORIDE, SODIUM BICARBONATE AND POTASSIUM CHLORIDE 12.5 ML/KG/HR: 5.15; 2.03; 22; 5.4; 6.46; 3.09; .157 INJECTION INTRAVENOUS at 00:41

## 2019-01-01 RX ADMIN — TICAGRELOR 90 MG: 90 TABLET ORAL at 07:59

## 2019-01-01 RX ADMIN — CALCIUM CHLORIDE, MAGNESIUM CHLORIDE, SODIUM CHLORIDE, SODIUM BICARBONATE, POTASSIUM CHLORIDE AND SODIUM PHOSPHATE DIBASIC DIHYDRATE 1.86 ML/KG/HR: 3.68; 3.05; 6.34; 3.09; .314; .187 INJECTION INTRAVENOUS at 18:35

## 2019-01-01 RX ADMIN — TICAGRELOR 90 MG: 90 TABLET ORAL at 08:42

## 2019-01-01 RX ADMIN — SODIUM CHLORIDE, PRESERVATIVE FREE 320 UNITS: 5 INJECTION INTRAVENOUS at 02:14

## 2019-01-01 RX ADMIN — CALCIUM CHLORIDE, MAGNESIUM CHLORIDE, SODIUM CHLORIDE, SODIUM BICARBONATE, POTASSIUM CHLORIDE AND SODIUM PHOSPHATE DIBASIC DIHYDRATE 12.5 ML/KG/HR: 3.68; 3.05; 6.34; 3.09; .314; .187 INJECTION INTRAVENOUS at 12:18

## 2019-01-01 RX ADMIN — Medication 1 PACKET: at 19:47

## 2019-01-01 RX ADMIN — CALCIUM CHLORIDE, MAGNESIUM CHLORIDE, SODIUM CHLORIDE, SODIUM BICARBONATE, POTASSIUM CHLORIDE AND SODIUM PHOSPHATE DIBASIC DIHYDRATE 1.89 ML/KG/HR: 3.68; 3.05; 6.34; 3.09; .314; .187 INJECTION INTRAVENOUS at 16:25

## 2019-01-01 RX ADMIN — CALCIUM CHLORIDE, MAGNESIUM CHLORIDE, DEXTROSE MONOHYDRATE, LACTIC ACID, SODIUM CHLORIDE, SODIUM BICARBONATE AND POTASSIUM CHLORIDE 12.5 ML/KG/HR: 5.15; 2.03; 22; 5.4; 6.46; 3.09; .157 INJECTION INTRAVENOUS at 03:25

## 2019-01-01 RX ADMIN — MICAFUNGIN SODIUM 150 MG: 10 INJECTION, POWDER, LYOPHILIZED, FOR SOLUTION INTRAVENOUS at 03:10

## 2019-01-01 RX ADMIN — CALCIUM CHLORIDE, MAGNESIUM CHLORIDE, DEXTROSE MONOHYDRATE, LACTIC ACID, SODIUM CHLORIDE, SODIUM BICARBONATE AND POTASSIUM CHLORIDE 12.5 ML/KG/HR: 5.15; 2.03; 22; 5.4; 6.46; 3.09; .157 INJECTION INTRAVENOUS at 00:00

## 2019-01-01 RX ADMIN — HEPARIN SODIUM 1700 UNITS/HR: 10000 INJECTION, SOLUTION INTRAVENOUS at 06:06

## 2019-01-01 RX ADMIN — TICAGRELOR 90 MG: 90 TABLET ORAL at 07:30

## 2019-01-01 RX ADMIN — HUMAN INSULIN 6 UNITS/HR: 100 INJECTION, SOLUTION SUBCUTANEOUS at 17:56

## 2019-01-01 RX ADMIN — HEPARIN SODIUM 3000 UNITS: 1000 INJECTION, SOLUTION INTRAVENOUS; SUBCUTANEOUS at 02:41

## 2019-01-01 RX ADMIN — HUMAN INSULIN 4 UNITS/HR: 100 INJECTION, SOLUTION SUBCUTANEOUS at 00:14

## 2019-01-01 RX ADMIN — EPOPROSTENOL 20 NG/KG/MIN: 1.5 INJECTION, POWDER, LYOPHILIZED, FOR SOLUTION INTRAVENOUS at 03:54

## 2019-01-01 RX ADMIN — TICAGRELOR 90 MG: 90 TABLET ORAL at 08:19

## 2019-01-01 RX ADMIN — OSELTAMIVIR PHOSPHATE 75 MG: 6 POWDER, FOR SUSPENSION ORAL at 08:26

## 2019-01-01 RX ADMIN — VANCOMYCIN HYDROCHLORIDE 2000 MG: 1 INJECTION, POWDER, LYOPHILIZED, FOR SOLUTION INTRAVENOUS at 00:36

## 2019-01-01 RX ADMIN — CALCIUM CHLORIDE, MAGNESIUM CHLORIDE, DEXTROSE MONOHYDRATE, LACTIC ACID, SODIUM CHLORIDE, SODIUM BICARBONATE AND POTASSIUM CHLORIDE 12.5 ML/KG/HR: 5.15; 2.03; 22; 5.4; 6.46; 3.09; .157 INJECTION INTRAVENOUS at 06:33

## 2019-01-01 RX ADMIN — MEROPENEM 1 G: 1 INJECTION, POWDER, FOR SOLUTION INTRAVENOUS at 15:18

## 2019-01-01 RX ADMIN — CALCIUM CHLORIDE, MAGNESIUM CHLORIDE, DEXTROSE MONOHYDRATE, LACTIC ACID, SODIUM CHLORIDE, SODIUM BICARBONATE AND POTASSIUM CHLORIDE 12.5 ML/KG/HR: 5.15; 2.03; 22; 5.4; 6.46; 3.09; .157 INJECTION INTRAVENOUS at 22:15

## 2019-01-01 RX ADMIN — CALCIUM CHLORIDE, MAGNESIUM CHLORIDE, DEXTROSE MONOHYDRATE, LACTIC ACID, SODIUM CHLORIDE, SODIUM BICARBONATE AND POTASSIUM CHLORIDE: 5.15; 2.03; 22; 5.4; 6.46; 3.09; .157 INJECTION INTRAVENOUS at 03:18

## 2019-01-01 RX ADMIN — MEROPENEM 1 G: 1 INJECTION, POWDER, FOR SOLUTION INTRAVENOUS at 06:13

## 2019-01-01 RX ADMIN — CALCIUM CHLORIDE, MAGNESIUM CHLORIDE, DEXTROSE MONOHYDRATE, LACTIC ACID, SODIUM CHLORIDE, SODIUM BICARBONATE AND POTASSIUM CHLORIDE: 5.15; 2.03; 22; 5.4; 6.46; 3.09; .157 INJECTION INTRAVENOUS at 15:50

## 2019-01-01 RX ADMIN — ALBUMIN HUMAN 25 G: 50 SOLUTION INTRAVENOUS at 12:52

## 2019-01-01 RX ADMIN — VANCOMYCIN HYDROCHLORIDE 2000 MG: 1 INJECTION, POWDER, LYOPHILIZED, FOR SOLUTION INTRAVENOUS at 18:05

## 2019-01-01 RX ADMIN — PANTOPRAZOLE SODIUM 40 MG: 40 INJECTION, POWDER, FOR SOLUTION INTRAVENOUS at 03:52

## 2019-01-01 RX ADMIN — FENTANYL CITRATE 50 MCG: 50 INJECTION INTRAMUSCULAR; INTRAVENOUS at 03:19

## 2019-01-01 RX ADMIN — Medication 0.04 MCG/KG/MIN: at 20:54

## 2019-01-01 RX ADMIN — PROPOFOL 40 MCG/KG/MIN: 10 INJECTION, EMULSION INTRAVENOUS at 11:01

## 2019-01-01 RX ADMIN — SODIUM CHLORIDE, PRESERVATIVE FREE 640 UNITS: 5 INJECTION INTRAVENOUS at 22:11

## 2019-01-01 RX ADMIN — PANTOPRAZOLE SODIUM 40 MG: 40 INJECTION, POWDER, FOR SOLUTION INTRAVENOUS at 08:00

## 2019-01-01 RX ADMIN — PANTOPRAZOLE SODIUM 40 MG: 40 INJECTION, POWDER, FOR SOLUTION INTRAVENOUS at 16:25

## 2019-01-01 RX ADMIN — HUMAN INSULIN 13 UNITS/HR: 100 INJECTION, SOLUTION SUBCUTANEOUS at 19:45

## 2019-01-01 RX ADMIN — INSULIN ASPART 1 UNITS: 100 INJECTION, SOLUTION INTRAVENOUS; SUBCUTANEOUS at 23:46

## 2019-01-01 RX ADMIN — TICAGRELOR 90 MG: 90 TABLET ORAL at 19:56

## 2019-01-01 RX ADMIN — CALCIUM CHLORIDE, MAGNESIUM CHLORIDE, DEXTROSE MONOHYDRATE, LACTIC ACID, SODIUM CHLORIDE, SODIUM BICARBONATE AND POTASSIUM CHLORIDE 12.5 ML/KG/HR: 5.15; 2.03; 22; 5.4; 6.46; 3.09; .157 INJECTION INTRAVENOUS at 15:12

## 2019-01-01 RX ADMIN — TICAGRELOR 90 MG: 90 TABLET ORAL at 19:46

## 2019-01-01 RX ADMIN — PROPOFOL 50 MCG/KG/MIN: 10 INJECTION, EMULSION INTRAVENOUS at 04:25

## 2019-01-01 RX ADMIN — VASOPRESSIN 2.4 UNITS/HR: 20 INJECTION INTRAVENOUS at 14:29

## 2019-01-01 RX ADMIN — EPOPROSTENOL 20 NG/KG/MIN: 1.5 INJECTION, POWDER, LYOPHILIZED, FOR SOLUTION INTRAVENOUS at 06:35

## 2019-01-01 RX ADMIN — INSULIN ASPART 1 UNITS: 100 INJECTION, SOLUTION INTRAVENOUS; SUBCUTANEOUS at 16:06

## 2019-01-01 RX ADMIN — CALCIUM CHLORIDE, MAGNESIUM CHLORIDE, SODIUM CHLORIDE, SODIUM BICARBONATE, POTASSIUM CHLORIDE AND SODIUM PHOSPHATE DIBASIC DIHYDRATE 2.05 ML/KG/HR: 3.68; 3.05; 6.34; 3.09; .314; .187 INJECTION INTRAVENOUS at 21:58

## 2019-01-01 RX ADMIN — HEPARIN SODIUM 1400 UNITS/HR: 10000 INJECTION, SOLUTION INTRAVENOUS at 21:05

## 2019-01-01 RX ADMIN — TICAGRELOR 90 MG: 90 TABLET ORAL at 20:01

## 2019-01-01 RX ADMIN — Medication 5 ML: at 08:02

## 2019-01-01 RX ADMIN — MAGNESIUM SULFATE IN DEXTROSE 2 G: 10 INJECTION, SOLUTION INTRAVENOUS at 10:41

## 2019-01-01 RX ADMIN — Medication 5 ML: at 07:59

## 2019-01-01 RX ADMIN — TICAGRELOR 90 MG: 90 TABLET ORAL at 08:01

## 2019-01-01 RX ADMIN — EPINEPHRINE 0.05 MCG/KG/MIN: 1 INJECTION PARENTERAL at 21:02

## 2019-01-01 RX ADMIN — MEROPENEM 1 G: 1 INJECTION, POWDER, FOR SOLUTION INTRAVENOUS at 22:03

## 2019-01-01 RX ADMIN — FENTANYL CITRATE 100 MCG/HR: 50 INJECTION INTRAVENOUS at 00:35

## 2019-01-01 RX ADMIN — CALCIUM CHLORIDE, MAGNESIUM CHLORIDE, SODIUM CHLORIDE, SODIUM BICARBONATE, POTASSIUM CHLORIDE AND SODIUM PHOSPHATE DIBASIC DIHYDRATE 1.89 ML/KG/HR: 3.68; 3.05; 6.34; 3.09; .314; .187 INJECTION INTRAVENOUS at 13:30

## 2019-01-01 RX ADMIN — ERYTHROMYCIN 1 G: 5 OINTMENT OPHTHALMIC at 07:52

## 2019-01-01 RX ADMIN — SODIUM CHLORIDE: 3 INJECTION, SOLUTION INTRAVENOUS at 22:14

## 2019-01-01 RX ADMIN — MEROPENEM 1 G: 1 INJECTION, POWDER, FOR SOLUTION INTRAVENOUS at 18:22

## 2019-01-01 RX ADMIN — CALCIUM CHLORIDE, MAGNESIUM CHLORIDE, SODIUM CHLORIDE, SODIUM BICARBONATE, POTASSIUM CHLORIDE AND SODIUM PHOSPHATE DIBASIC DIHYDRATE 12.5 ML/KG/HR: 3.68; 3.05; 6.34; 3.09; .314; .187 INJECTION INTRAVENOUS at 15:07

## 2019-01-01 RX ADMIN — TICAGRELOR 90 MG: 90 TABLET ORAL at 19:39

## 2019-01-01 RX ADMIN — HYDROCORTISONE SODIUM SUCCINATE 50 MG: 100 INJECTION, POWDER, FOR SOLUTION INTRAMUSCULAR; INTRAVENOUS at 08:17

## 2019-01-01 RX ADMIN — INSULIN ASPART 1 UNITS: 100 INJECTION, SOLUTION INTRAVENOUS; SUBCUTANEOUS at 20:10

## 2019-01-01 RX ADMIN — HUMAN INSULIN 6 UNITS/HR: 100 INJECTION, SOLUTION SUBCUTANEOUS at 14:07

## 2019-01-01 RX ADMIN — PIPERACILLIN SODIUM AND TAZOBACTAM SODIUM 3.38 G: 3; .375 INJECTION, POWDER, LYOPHILIZED, FOR SOLUTION INTRAVENOUS at 13:00

## 2019-01-01 RX ADMIN — DEXTROSE MONOHYDRATE: 50 INJECTION, SOLUTION INTRAVENOUS at 09:35

## 2019-01-01 RX ADMIN — MEROPENEM 1 G: 1 INJECTION, POWDER, FOR SOLUTION INTRAVENOUS at 15:26

## 2019-01-01 RX ADMIN — PIPERACILLIN AND TAZOBACTAM 4.5 G: 4; .5 INJECTION, POWDER, FOR SOLUTION INTRAVENOUS at 11:38

## 2019-01-01 RX ADMIN — TICAGRELOR 90 MG: 90 TABLET ORAL at 08:23

## 2019-01-01 RX ADMIN — POTASSIUM CHLORIDE 20 MEQ: 29.8 INJECTION, SOLUTION INTRAVENOUS at 04:05

## 2019-01-01 RX ADMIN — HYDROCORTISONE SODIUM SUCCINATE 50 MG: 100 INJECTION, POWDER, FOR SOLUTION INTRAMUSCULAR; INTRAVENOUS at 20:02

## 2019-01-01 RX ADMIN — AMIODARONE HYDROCHLORIDE 0.5 MG/MIN: 50 INJECTION, SOLUTION INTRAVENOUS at 00:25

## 2019-01-01 RX ADMIN — CALCIUM CHLORIDE, MAGNESIUM CHLORIDE, SODIUM CHLORIDE, SODIUM BICARBONATE, POTASSIUM CHLORIDE AND SODIUM PHOSPHATE DIBASIC DIHYDRATE 12.5 ML/KG/HR: 3.68; 3.05; 6.34; 3.09; .314; .187 INJECTION INTRAVENOUS at 03:39

## 2019-01-01 RX ADMIN — CALCIUM CHLORIDE, MAGNESIUM CHLORIDE, DEXTROSE MONOHYDRATE, LACTIC ACID, SODIUM CHLORIDE, SODIUM BICARBONATE AND POTASSIUM CHLORIDE 12.5 ML/KG/HR: 5.15; 2.03; 22; 5.4; 6.46; 3.09; .157 INJECTION INTRAVENOUS at 11:23

## 2019-01-01 RX ADMIN — ALBUMIN HUMAN 500 ML: 0.05 INJECTION, SOLUTION INTRAVENOUS at 03:39

## 2019-01-01 RX ADMIN — CALCIUM CHLORIDE, MAGNESIUM CHLORIDE, SODIUM CHLORIDE, SODIUM BICARBONATE, POTASSIUM CHLORIDE AND SODIUM PHOSPHATE DIBASIC DIHYDRATE 12.5 ML/KG/HR: 3.68; 3.05; 6.34; 3.09; .314; .187 INJECTION INTRAVENOUS at 07:55

## 2019-01-01 RX ADMIN — AMIODARONE HYDROCHLORIDE 150 MG: 1.5 INJECTION, SOLUTION INTRAVENOUS at 05:07

## 2019-01-01 RX ADMIN — Medication 200 MG: at 08:40

## 2019-01-01 RX ADMIN — TICAGRELOR 90 MG: 90 TABLET ORAL at 08:17

## 2019-01-01 RX ADMIN — INSULIN ASPART 1 UNITS: 100 INJECTION, SOLUTION INTRAVENOUS; SUBCUTANEOUS at 12:33

## 2019-01-01 RX ADMIN — CARBOXYMETHYLCELLULOSE SODIUM 2 DROP: 5 SOLUTION/ DROPS OPHTHALMIC at 12:07

## 2019-01-01 RX ADMIN — CALCIUM CHLORIDE, MAGNESIUM CHLORIDE, DEXTROSE MONOHYDRATE, LACTIC ACID, SODIUM CHLORIDE, SODIUM BICARBONATE AND POTASSIUM CHLORIDE: 5.15; 2.03; 22; 5.4; 6.46; 3.09; .157 INJECTION INTRAVENOUS at 19:36

## 2019-01-01 RX ADMIN — ERYTHROMYCIN 1 G: 5 OINTMENT OPHTHALMIC at 08:45

## 2019-01-01 RX ADMIN — PROPOFOL 20 MCG/KG/MIN: 10 INJECTION, EMULSION INTRAVENOUS at 01:13

## 2019-01-01 RX ADMIN — HEPARIN SODIUM 1400 UNITS/HR: 10000 INJECTION, SOLUTION INTRAVENOUS at 15:30

## 2019-01-01 RX ADMIN — PANTOPRAZOLE SODIUM 40 MG: 40 INJECTION, POWDER, FOR SOLUTION INTRAVENOUS at 16:07

## 2019-01-01 RX ADMIN — TICAGRELOR 90 MG: 90 TABLET ORAL at 19:41

## 2019-01-01 RX ADMIN — INSULIN ASPART 1 UNITS: 100 INJECTION, SOLUTION INTRAVENOUS; SUBCUTANEOUS at 12:20

## 2019-01-01 RX ADMIN — CALCIUM CHLORIDE, MAGNESIUM CHLORIDE, SODIUM CHLORIDE, SODIUM BICARBONATE, POTASSIUM CHLORIDE AND SODIUM PHOSPHATE DIBASIC DIHYDRATE 12.5 ML/KG/HR: 3.68; 3.05; 6.34; 3.09; .314; .187 INJECTION INTRAVENOUS at 14:13

## 2019-01-01 RX ADMIN — CALCIUM CHLORIDE, MAGNESIUM CHLORIDE, DEXTROSE MONOHYDRATE, LACTIC ACID, SODIUM CHLORIDE, SODIUM BICARBONATE AND POTASSIUM CHLORIDE 12.5 ML/KG/HR: 5.15; 2.03; 22; 5.4; 6.46; 3.09; .157 INJECTION INTRAVENOUS at 09:58

## 2019-01-01 RX ADMIN — ERYTHROMYCIN 1 G: 5 OINTMENT OPHTHALMIC at 00:15

## 2019-01-01 RX ADMIN — SODIUM CHLORIDE, PRESERVATIVE FREE 93 ML: 5 INJECTION INTRAVENOUS at 15:23

## 2019-01-01 RX ADMIN — PANTOPRAZOLE SODIUM 40 MG: 40 INJECTION, POWDER, FOR SOLUTION INTRAVENOUS at 03:59

## 2019-01-01 RX ADMIN — Medication 0.06 MCG/KG/MIN: at 15:17

## 2019-01-01 RX ADMIN — HUMAN INSULIN 4 UNITS/HR: 100 INJECTION, SOLUTION SUBCUTANEOUS at 08:39

## 2019-01-01 RX ADMIN — PIPERACILLIN SODIUM AND TAZOBACTAM SODIUM 3.38 G: 3; .375 INJECTION, POWDER, LYOPHILIZED, FOR SOLUTION INTRAVENOUS at 12:47

## 2019-01-01 RX ADMIN — MEROPENEM 2 G: 1 INJECTION, POWDER, FOR SOLUTION INTRAVENOUS at 06:24

## 2019-01-01 RX ADMIN — CALCIUM CHLORIDE, MAGNESIUM CHLORIDE, SODIUM CHLORIDE, SODIUM BICARBONATE, POTASSIUM CHLORIDE AND SODIUM PHOSPHATE DIBASIC DIHYDRATE 12.5 ML/KG/HR: 3.68; 3.05; 6.34; 3.09; .314; .187 INJECTION INTRAVENOUS at 17:49

## 2019-01-01 RX ADMIN — Medication 0.08 MCG/KG/MIN: at 18:02

## 2019-01-01 RX ADMIN — CISATRACURIUM BESYLATE 8 MG: 2 INJECTION INTRAVENOUS at 14:18

## 2019-01-01 RX ADMIN — PANTOPRAZOLE SODIUM 40 MG: 40 INJECTION, POWDER, FOR SOLUTION INTRAVENOUS at 05:31

## 2019-01-01 RX ADMIN — Medication: at 22:32

## 2019-01-01 RX ADMIN — EPOPROSTENOL 10 NG/KG/MIN: 1.5 INJECTION, POWDER, LYOPHILIZED, FOR SOLUTION INTRAVENOUS at 10:02

## 2019-01-01 RX ADMIN — INSULIN ASPART 1 UNITS: 100 INJECTION, SOLUTION INTRAVENOUS; SUBCUTANEOUS at 02:35

## 2019-01-01 RX ADMIN — FENTANYL CITRATE 50 MCG/HR: 50 INJECTION, SOLUTION INTRAMUSCULAR; INTRAVENOUS at 15:13

## 2019-01-01 RX ADMIN — CALCIUM CHLORIDE, MAGNESIUM CHLORIDE, DEXTROSE MONOHYDRATE, LACTIC ACID, SODIUM CHLORIDE, SODIUM BICARBONATE AND POTASSIUM CHLORIDE 12.5 ML/KG/HR: 5.15; 2.03; 22; 5.4; 6.46; 3.09; .157 INJECTION INTRAVENOUS at 12:24

## 2019-01-01 RX ADMIN — DEXTROSE MONOHYDRATE 50 ML: 25 INJECTION, SOLUTION INTRAVENOUS at 03:51

## 2019-01-01 RX ADMIN — CALCIUM CHLORIDE, MAGNESIUM CHLORIDE, DEXTROSE MONOHYDRATE, LACTIC ACID, SODIUM CHLORIDE, SODIUM BICARBONATE AND POTASSIUM CHLORIDE: 5.15; 2.03; 22; 5.4; 6.46; 3.09; .157 INJECTION INTRAVENOUS at 03:37

## 2019-01-01 RX ADMIN — CALCIUM CHLORIDE, MAGNESIUM CHLORIDE, DEXTROSE MONOHYDRATE, LACTIC ACID, SODIUM CHLORIDE, SODIUM BICARBONATE AND POTASSIUM CHLORIDE 12.5 ML/KG/HR: 5.15; 2.03; 22; 5.4; 6.46; 3.09; .157 INJECTION INTRAVENOUS at 16:41

## 2019-01-01 RX ADMIN — CALCIUM CHLORIDE, MAGNESIUM CHLORIDE, SODIUM CHLORIDE, SODIUM BICARBONATE, POTASSIUM CHLORIDE AND SODIUM PHOSPHATE DIBASIC DIHYDRATE 12.5 ML/KG/HR: 3.68; 3.05; 6.34; 3.09; .314; .187 INJECTION INTRAVENOUS at 03:38

## 2019-01-01 RX ADMIN — CALCIUM CHLORIDE, MAGNESIUM CHLORIDE, SODIUM CHLORIDE, SODIUM BICARBONATE, POTASSIUM CHLORIDE AND SODIUM PHOSPHATE DIBASIC DIHYDRATE 12.5 ML/KG/HR: 3.68; 3.05; 6.34; 3.09; .314; .187 INJECTION INTRAVENOUS at 07:50

## 2019-01-01 RX ADMIN — SENNOSIDES 8.6 MG: 8.6 TABLET, FILM COATED ORAL at 20:10

## 2019-01-01 RX ADMIN — MIDAZOLAM 2 MG: 1 INJECTION INTRAMUSCULAR; INTRAVENOUS at 14:09

## 2019-01-01 RX ADMIN — PIPERACILLIN AND TAZOBACTAM 4.5 G: 4; .5 INJECTION, POWDER, FOR SOLUTION INTRAVENOUS at 11:53

## 2019-01-01 RX ADMIN — CALCIUM CHLORIDE, MAGNESIUM CHLORIDE, SODIUM CHLORIDE, SODIUM BICARBONATE, POTASSIUM CHLORIDE AND SODIUM PHOSPHATE DIBASIC DIHYDRATE 12.5 ML/KG/HR: 3.68; 3.05; 6.34; 3.09; .314; .187 INJECTION INTRAVENOUS at 06:51

## 2019-01-01 RX ADMIN — PANTOPRAZOLE SODIUM 40 MG: 40 INJECTION, POWDER, FOR SOLUTION INTRAVENOUS at 15:55

## 2019-01-01 RX ADMIN — CALCIUM CHLORIDE, MAGNESIUM CHLORIDE, DEXTROSE MONOHYDRATE, LACTIC ACID, SODIUM CHLORIDE, SODIUM BICARBONATE AND POTASSIUM CHLORIDE 12.5 ML/KG/HR: 5.15; 2.03; 22; 5.4; 6.46; 3.09; .157 INJECTION INTRAVENOUS at 07:29

## 2019-01-01 RX ADMIN — HUMAN INSULIN 12 UNITS/HR: 100 INJECTION, SOLUTION SUBCUTANEOUS at 14:48

## 2019-01-01 RX ADMIN — CALCIUM CHLORIDE, MAGNESIUM CHLORIDE, DEXTROSE MONOHYDRATE, LACTIC ACID, SODIUM CHLORIDE, SODIUM BICARBONATE AND POTASSIUM CHLORIDE 12.5 ML/KG/HR: 5.15; 2.03; 22; 5.4; 6.46; 3.09; .157 INJECTION INTRAVENOUS at 18:12

## 2019-01-01 RX ADMIN — CALCIUM CHLORIDE, MAGNESIUM CHLORIDE, DEXTROSE MONOHYDRATE, LACTIC ACID, SODIUM CHLORIDE, SODIUM BICARBONATE AND POTASSIUM CHLORIDE 12.5 ML/KG/HR: 5.15; 2.03; 22; 5.4; 6.46; 3.09; .157 INJECTION INTRAVENOUS at 00:42

## 2019-01-01 RX ADMIN — POTASSIUM CHLORIDE 20 MEQ: 29.8 INJECTION, SOLUTION INTRAVENOUS at 06:25

## 2019-01-01 RX ADMIN — ASPIRIN 81 MG CHEWABLE TABLET 81 MG: 81 TABLET CHEWABLE at 07:39

## 2019-01-01 RX ADMIN — CARBOXYMETHYLCELLULOSE SODIUM 2 DROP: 5 SOLUTION/ DROPS OPHTHALMIC at 11:40

## 2019-01-01 RX ADMIN — CALCIUM CHLORIDE, MAGNESIUM CHLORIDE, SODIUM CHLORIDE, SODIUM BICARBONATE, POTASSIUM CHLORIDE AND SODIUM PHOSPHATE DIBASIC DIHYDRATE 12.5 ML/KG/HR: 3.68; 3.05; 6.34; 3.09; .314; .187 INJECTION INTRAVENOUS at 21:39

## 2019-01-01 RX ADMIN — Medication: at 17:58

## 2019-01-01 RX ADMIN — EPINEPHRINE 0.05 MCG/KG/MIN: 1 INJECTION PARENTERAL at 11:16

## 2019-01-01 RX ADMIN — CALCIUM CHLORIDE, MAGNESIUM CHLORIDE, SODIUM CHLORIDE, SODIUM BICARBONATE, POTASSIUM CHLORIDE AND SODIUM PHOSPHATE DIBASIC DIHYDRATE 12.5 ML/KG/HR: 3.68; 3.05; 6.34; 3.09; .314; .187 INJECTION INTRAVENOUS at 10:51

## 2019-01-01 RX ADMIN — VASOPRESSIN 2 UNITS/HR: 20 INJECTION INTRAVENOUS at 16:00

## 2019-01-01 RX ADMIN — CALCIUM CHLORIDE, MAGNESIUM CHLORIDE, SODIUM CHLORIDE, SODIUM BICARBONATE, POTASSIUM CHLORIDE AND SODIUM PHOSPHATE DIBASIC DIHYDRATE 1.86 ML/KG/HR: 3.68; 3.05; 6.34; 3.09; .314; .187 INJECTION INTRAVENOUS at 17:51

## 2019-01-01 RX ADMIN — TICAGRELOR 90 MG: 90 TABLET ORAL at 20:16

## 2019-01-01 RX ADMIN — METRONIDAZOLE 500 MG: 500 INJECTION, SOLUTION INTRAVENOUS at 03:51

## 2019-01-01 RX ADMIN — CALCIUM CHLORIDE, MAGNESIUM CHLORIDE, DEXTROSE MONOHYDRATE, LACTIC ACID, SODIUM CHLORIDE, SODIUM BICARBONATE AND POTASSIUM CHLORIDE: 5.15; 2.03; 22; 5.4; 6.46; 3.09; .157 INJECTION INTRAVENOUS at 17:30

## 2019-01-01 RX ADMIN — METRONIDAZOLE 500 MG: 500 INJECTION, SOLUTION INTRAVENOUS at 13:40

## 2019-01-01 RX ADMIN — HUMAN INSULIN 6 UNITS/HR: 100 INJECTION, SOLUTION SUBCUTANEOUS at 12:36

## 2019-01-01 RX ADMIN — Medication: at 23:14

## 2019-01-01 RX ADMIN — CALCIUM CHLORIDE, MAGNESIUM CHLORIDE, DEXTROSE MONOHYDRATE, LACTIC ACID, SODIUM CHLORIDE, SODIUM BICARBONATE AND POTASSIUM CHLORIDE 12.5 ML/KG/HR: 5.15; 2.03; 22; 5.4; 6.46; 3.09; .157 INJECTION INTRAVENOUS at 11:02

## 2019-01-01 RX ADMIN — PIPERACILLIN AND TAZOBACTAM 4.5 G: 4; .5 INJECTION, POWDER, FOR SOLUTION INTRAVENOUS at 12:08

## 2019-01-01 RX ADMIN — SODIUM CHLORIDE: 3 INJECTION, SOLUTION INTRAVENOUS at 08:17

## 2019-01-01 RX ADMIN — INSULIN ASPART 1 UNITS: 100 INJECTION, SOLUTION INTRAVENOUS; SUBCUTANEOUS at 12:48

## 2019-01-01 RX ADMIN — CALCIUM CHLORIDE, MAGNESIUM CHLORIDE, SODIUM CHLORIDE, SODIUM BICARBONATE, POTASSIUM CHLORIDE AND SODIUM PHOSPHATE DIBASIC DIHYDRATE 12.5 ML/KG/HR: 3.68; 3.05; 6.34; 3.09; .314; .187 INJECTION INTRAVENOUS at 17:34

## 2019-01-01 RX ADMIN — PROPOFOL 20 MCG/KG/MIN: 10 INJECTION, EMULSION INTRAVENOUS at 07:45

## 2019-01-01 RX ADMIN — CALCIUM CHLORIDE, MAGNESIUM CHLORIDE, DEXTROSE MONOHYDRATE, LACTIC ACID, SODIUM CHLORIDE, SODIUM BICARBONATE AND POTASSIUM CHLORIDE 12.5 ML/KG/HR: 5.15; 2.03; 22; 5.4; 6.46; 3.09; .157 INJECTION INTRAVENOUS at 16:23

## 2019-01-01 RX ADMIN — EPINEPHRINE 0.07 MCG/KG/MIN: 1 INJECTION PARENTERAL at 15:00

## 2019-01-01 RX ADMIN — Medication: at 04:39

## 2019-01-01 RX ADMIN — CALCIUM CHLORIDE, MAGNESIUM CHLORIDE, SODIUM CHLORIDE, SODIUM BICARBONATE, POTASSIUM CHLORIDE AND SODIUM PHOSPHATE DIBASIC DIHYDRATE 12.5 ML/KG/HR: 3.68; 3.05; 6.34; 3.09; .314; .187 INJECTION INTRAVENOUS at 05:45

## 2019-01-01 RX ADMIN — Medication 14 MG/HR: at 14:36

## 2019-01-01 RX ADMIN — CALCIUM CHLORIDE, MAGNESIUM CHLORIDE, DEXTROSE MONOHYDRATE, LACTIC ACID, SODIUM CHLORIDE, SODIUM BICARBONATE AND POTASSIUM CHLORIDE 12.5 ML/KG/HR: 5.15; 2.03; 22; 5.4; 6.46; 3.09; .157 INJECTION INTRAVENOUS at 03:17

## 2019-01-01 RX ADMIN — INSULIN ASPART 1 UNITS: 100 INJECTION, SOLUTION INTRAVENOUS; SUBCUTANEOUS at 08:02

## 2019-01-01 RX ADMIN — EPOPROSTENOL 20 NG/KG/MIN: 1.5 INJECTION, POWDER, LYOPHILIZED, FOR SOLUTION INTRAVENOUS at 21:48

## 2019-01-01 RX ADMIN — ACETAMINOPHEN 325 MG: 325 TABLET, FILM COATED ORAL at 20:54

## 2019-01-01 RX ADMIN — Medication 5 ML: at 08:17

## 2019-01-01 RX ADMIN — CALCIUM CHLORIDE, MAGNESIUM CHLORIDE, DEXTROSE MONOHYDRATE, LACTIC ACID, SODIUM CHLORIDE, SODIUM BICARBONATE AND POTASSIUM CHLORIDE 12.5 ML/KG/HR: 5.15; 2.03; 22; 5.4; 6.46; 3.09; .157 INJECTION INTRAVENOUS at 07:57

## 2019-01-01 RX ADMIN — INSULIN ASPART 1 UNITS: 100 INJECTION, SOLUTION INTRAVENOUS; SUBCUTANEOUS at 20:01

## 2019-01-01 RX ADMIN — HYDROCORTISONE SODIUM SUCCINATE 50 MG: 100 INJECTION, POWDER, FOR SOLUTION INTRAMUSCULAR; INTRAVENOUS at 11:59

## 2019-01-01 RX ADMIN — HYDROCORTISONE SODIUM SUCCINATE 50 MG: 100 INJECTION, POWDER, FOR SOLUTION INTRAMUSCULAR; INTRAVENOUS at 17:59

## 2019-01-01 RX ADMIN — Medication: at 04:15

## 2019-01-01 RX ADMIN — PANTOPRAZOLE SODIUM 40 MG: 40 INJECTION, POWDER, FOR SOLUTION INTRAVENOUS at 03:47

## 2019-01-01 RX ADMIN — PIPERACILLIN AND TAZOBACTAM 4.5 G: 4; .5 INJECTION, POWDER, FOR SOLUTION INTRAVENOUS at 17:46

## 2019-01-01 RX ADMIN — Medication 0.1 MCG/KG/MIN: at 05:53

## 2019-01-01 RX ADMIN — Medication: at 05:51

## 2019-01-01 RX ADMIN — PROPOFOL 30 MCG/KG/MIN: 10 INJECTION, EMULSION INTRAVENOUS at 06:34

## 2019-01-01 RX ADMIN — Medication: at 20:40

## 2019-01-01 RX ADMIN — CALCIUM CHLORIDE, MAGNESIUM CHLORIDE, DEXTROSE MONOHYDRATE, LACTIC ACID, SODIUM CHLORIDE, SODIUM BICARBONATE AND POTASSIUM CHLORIDE 12.5 ML/KG/HR: 5.15; 2.03; 22; 5.4; 6.46; 3.09; .157 INJECTION INTRAVENOUS at 10:02

## 2019-01-01 RX ADMIN — PROPOFOL 30 MCG/KG/MIN: 10 INJECTION, EMULSION INTRAVENOUS at 17:45

## 2019-01-01 RX ADMIN — ERYTHROMYCIN 1 G: 5 OINTMENT OPHTHALMIC at 11:56

## 2019-01-01 RX ADMIN — PANTOPRAZOLE SODIUM 40 MG: 40 INJECTION, POWDER, FOR SOLUTION INTRAVENOUS at 15:43

## 2019-01-01 RX ADMIN — MAGNESIUM SULFATE IN WATER 2 G: 40 INJECTION, SOLUTION INTRAVENOUS at 05:31

## 2019-01-01 RX ADMIN — ERYTHROMYCIN 1 G: 5 OINTMENT OPHTHALMIC at 15:23

## 2019-01-01 RX ADMIN — CALCIUM CHLORIDE, MAGNESIUM CHLORIDE, SODIUM CHLORIDE, SODIUM BICARBONATE, POTASSIUM CHLORIDE AND SODIUM PHOSPHATE DIBASIC DIHYDRATE 12.5 ML/KG/HR: 3.68; 3.05; 6.34; 3.09; .314; .187 INJECTION INTRAVENOUS at 06:50

## 2019-01-01 RX ADMIN — HUMAN INSULIN 7 UNITS/HR: 100 INJECTION, SOLUTION SUBCUTANEOUS at 23:54

## 2019-01-01 RX ADMIN — CISATRACURIUM BESYLATE 6 MG: 2 INJECTION INTRAVENOUS at 17:37

## 2019-01-01 RX ADMIN — PANTOPRAZOLE SODIUM 40 MG: 40 INJECTION, POWDER, FOR SOLUTION INTRAVENOUS at 04:45

## 2019-01-01 RX ADMIN — EPOPROSTENOL 10 NG/KG/MIN: 1.5 INJECTION, POWDER, LYOPHILIZED, FOR SOLUTION INTRAVENOUS at 06:14

## 2019-01-01 RX ADMIN — Medication 0.4 MCG/KG/MIN: at 12:57

## 2019-01-01 RX ADMIN — CALCIUM CHLORIDE, MAGNESIUM CHLORIDE, SODIUM CHLORIDE, SODIUM BICARBONATE, POTASSIUM CHLORIDE AND SODIUM PHOSPHATE DIBASIC DIHYDRATE 12.5 ML/KG/HR: 3.68; 3.05; 6.34; 3.09; .314; .187 INJECTION INTRAVENOUS at 20:00

## 2019-01-01 RX ADMIN — FENTANYL CITRATE 50 MCG: 50 INJECTION INTRAMUSCULAR; INTRAVENOUS at 07:49

## 2019-01-01 RX ADMIN — METRONIDAZOLE 500 MG: 500 INJECTION, SOLUTION INTRAVENOUS at 02:30

## 2019-01-01 RX ADMIN — Medication: at 20:19

## 2019-01-01 RX ADMIN — PIPERACILLIN SODIUM AND TAZOBACTAM SODIUM 3.38 G: 3; .375 INJECTION, POWDER, LYOPHILIZED, FOR SOLUTION INTRAVENOUS at 23:56

## 2019-01-01 RX ADMIN — MICAFUNGIN SODIUM 100 MG: 10 INJECTION, POWDER, LYOPHILIZED, FOR SOLUTION INTRAVENOUS at 19:36

## 2019-01-01 RX ADMIN — CALCIUM CHLORIDE, MAGNESIUM CHLORIDE, DEXTROSE MONOHYDRATE, LACTIC ACID, SODIUM CHLORIDE, SODIUM BICARBONATE AND POTASSIUM CHLORIDE 12.5 ML/KG/HR: 5.15; 2.03; 22; 5.4; 6.46; 3.09; .157 INJECTION INTRAVENOUS at 03:26

## 2019-01-01 RX ADMIN — CALCIUM CHLORIDE, MAGNESIUM CHLORIDE, DEXTROSE MONOHYDRATE, LACTIC ACID, SODIUM CHLORIDE, SODIUM BICARBONATE AND POTASSIUM CHLORIDE 12.5 ML/KG/HR: 5.15; 2.03; 22; 5.4; 6.46; 3.09; .157 INJECTION INTRAVENOUS at 00:18

## 2019-01-01 RX ADMIN — VASOPRESSIN 1 UNITS/HR: 20 INJECTION INTRAVENOUS at 16:08

## 2019-01-01 RX ADMIN — CISATRACURIUM BESYLATE 10 MG: 2 INJECTION INTRAVENOUS at 18:53

## 2019-01-01 RX ADMIN — SODIUM CHLORIDE: 3 INJECTION, SOLUTION INTRAVENOUS at 04:35

## 2019-01-01 RX ADMIN — SODIUM BICARBONATE 25 MEQ: 84 INJECTION INTRAVENOUS at 12:59

## 2019-01-01 RX ADMIN — NOREPINEPHRINE BITARTRATE 6.4 MCG: 1 INJECTION INTRAVENOUS at 15:20

## 2019-01-01 RX ADMIN — TICAGRELOR 90 MG: 90 TABLET ORAL at 07:39

## 2019-01-01 RX ADMIN — Medication: at 20:12

## 2019-01-01 RX ADMIN — CALCIUM CHLORIDE 1 G: 100 INJECTION, SOLUTION INTRAVENOUS at 05:39

## 2019-01-01 RX ADMIN — VASOPRESSIN 1.5 UNITS/HR: 20 INJECTION INTRAVENOUS at 07:42

## 2019-01-01 RX ADMIN — CARBOXYMETHYLCELLULOSE SODIUM 2 DROP: 5 SOLUTION/ DROPS OPHTHALMIC at 15:23

## 2019-01-01 RX ADMIN — Medication: at 13:29

## 2019-01-01 RX ADMIN — CALCIUM CHLORIDE, MAGNESIUM CHLORIDE, SODIUM CHLORIDE, SODIUM BICARBONATE, POTASSIUM CHLORIDE AND SODIUM PHOSPHATE DIBASIC DIHYDRATE 12.5 ML/KG/HR: 3.68; 3.05; 6.34; 3.09; .314; .187 INJECTION INTRAVENOUS at 13:56

## 2019-01-01 RX ADMIN — VASOPRESSIN 4 UNITS/HR: 20 INJECTION INTRAVENOUS at 04:09

## 2019-01-01 RX ADMIN — EPINEPHRINE 0.1 MCG/KG/MIN: 1 INJECTION PARENTERAL at 15:22

## 2019-01-01 RX ADMIN — TICAGRELOR 90 MG: 90 TABLET ORAL at 08:34

## 2019-01-01 RX ADMIN — CALCIUM CHLORIDE, MAGNESIUM CHLORIDE, SODIUM CHLORIDE, SODIUM BICARBONATE, POTASSIUM CHLORIDE AND SODIUM PHOSPHATE DIBASIC DIHYDRATE 12.5 ML/KG/HR: 3.68; 3.05; 6.34; 3.09; .314; .187 INJECTION INTRAVENOUS at 04:08

## 2019-01-01 RX ADMIN — OSELTAMIVIR PHOSPHATE 75 MG: 6 POWDER, FOR SUSPENSION ORAL at 20:37

## 2019-01-01 RX ADMIN — HUMAN INSULIN 3 UNITS/HR: 100 INJECTION, SOLUTION SUBCUTANEOUS at 06:17

## 2019-01-01 RX ADMIN — HUMAN INSULIN 6 UNITS/HR: 100 INJECTION, SOLUTION SUBCUTANEOUS at 04:29

## 2019-01-01 RX ADMIN — INSULIN ASPART 1 UNITS: 100 INJECTION, SOLUTION INTRAVENOUS; SUBCUTANEOUS at 04:36

## 2019-01-01 RX ADMIN — MEROPENEM 2 G: 1 INJECTION, POWDER, FOR SOLUTION INTRAVENOUS at 05:58

## 2019-01-01 RX ADMIN — CALCIUM CHLORIDE, MAGNESIUM CHLORIDE, SODIUM CHLORIDE, SODIUM BICARBONATE, POTASSIUM CHLORIDE AND SODIUM PHOSPHATE DIBASIC DIHYDRATE 12.5 ML/KG/HR: 3.68; 3.05; 6.34; 3.09; .314; .187 INJECTION INTRAVENOUS at 04:48

## 2019-01-01 RX ADMIN — CALCIUM CHLORIDE, MAGNESIUM CHLORIDE, SODIUM CHLORIDE, SODIUM BICARBONATE, POTASSIUM CHLORIDE AND SODIUM PHOSPHATE DIBASIC DIHYDRATE 12.5 ML/KG/HR: 3.68; 3.05; 6.34; 3.09; .314; .187 INJECTION INTRAVENOUS at 07:53

## 2019-01-01 RX ADMIN — FENTANYL CITRATE 25 MCG/HR: 50 INJECTION INTRAVENOUS at 02:56

## 2019-01-01 RX ADMIN — PIPERACILLIN AND TAZOBACTAM 4.5 G: 4; .5 INJECTION, POWDER, FOR SOLUTION INTRAVENOUS at 06:03

## 2019-01-01 RX ADMIN — EPINEPHRINE 0.05 MCG/KG/MIN: 1 INJECTION PARENTERAL at 03:50

## 2019-01-01 RX ADMIN — PIPERACILLIN AND TAZOBACTAM 4.5 G: 4; .5 INJECTION, POWDER, FOR SOLUTION INTRAVENOUS at 00:49

## 2019-01-01 RX ADMIN — Medication 1 PACKET: at 07:45

## 2019-01-01 RX ADMIN — OSELTAMIVIR PHOSPHATE 75 MG: 6 POWDER, FOR SUSPENSION ORAL at 19:32

## 2019-01-01 RX ADMIN — CALCIUM CHLORIDE, MAGNESIUM CHLORIDE, SODIUM CHLORIDE, SODIUM BICARBONATE, POTASSIUM CHLORIDE AND SODIUM PHOSPHATE DIBASIC DIHYDRATE 12.5 ML/KG/HR: 3.68; 3.05; 6.34; 3.09; .314; .187 INJECTION INTRAVENOUS at 19:37

## 2019-01-01 RX ADMIN — INSULIN ASPART 1 UNITS: 100 INJECTION, SOLUTION INTRAVENOUS; SUBCUTANEOUS at 05:35

## 2019-01-01 RX ADMIN — EPOPROSTENOL 20 NG/KG/MIN: 1.5 INJECTION, POWDER, LYOPHILIZED, FOR SOLUTION INTRAVENOUS at 15:59

## 2019-01-01 RX ADMIN — MEROPENEM 2 G: 1 INJECTION, POWDER, FOR SOLUTION INTRAVENOUS at 01:11

## 2019-01-01 RX ADMIN — HUMAN INSULIN 2 UNITS/HR: 100 INJECTION, SOLUTION SUBCUTANEOUS at 15:18

## 2019-01-01 RX ADMIN — Medication 0.13 MCG/KG/MIN: at 21:25

## 2019-01-01 RX ADMIN — CALCIUM CHLORIDE, MAGNESIUM CHLORIDE, DEXTROSE MONOHYDRATE, LACTIC ACID, SODIUM CHLORIDE, SODIUM BICARBONATE AND POTASSIUM CHLORIDE 12.5 ML/KG/HR: 5.15; 2.03; 22; 5.4; 6.46; 3.09; .157 INJECTION INTRAVENOUS at 21:48

## 2019-01-01 RX ADMIN — HEPARIN SODIUM 1300 UNITS/HR: 10000 INJECTION, SOLUTION INTRAVENOUS at 04:26

## 2019-01-01 RX ADMIN — PIPERACILLIN SODIUM AND TAZOBACTAM SODIUM 3.38 G: 3; .375 INJECTION, POWDER, LYOPHILIZED, FOR SOLUTION INTRAVENOUS at 06:28

## 2019-01-01 RX ADMIN — VASOPRESSIN 4 UNITS/HR: 20 INJECTION INTRAVENOUS at 18:36

## 2019-01-01 RX ADMIN — ERYTHROMYCIN 1 G: 5 OINTMENT OPHTHALMIC at 11:09

## 2019-01-01 RX ADMIN — INSULIN ASPART 2 UNITS: 100 INJECTION, SOLUTION INTRAVENOUS; SUBCUTANEOUS at 19:46

## 2019-01-01 RX ADMIN — FENTANYL CITRATE 100 MCG/HR: 50 INJECTION INTRAVENOUS at 12:26

## 2019-01-01 RX ADMIN — TICAGRELOR 90 MG: 90 TABLET ORAL at 07:49

## 2019-01-01 RX ADMIN — SODIUM BICARBONATE 50 MEQ: 84 INJECTION, SOLUTION INTRAVENOUS at 00:53

## 2019-01-01 RX ADMIN — OSELTAMIVIR PHOSPHATE 75 MG: 6 POWDER, FOR SUSPENSION ORAL at 19:39

## 2019-01-01 RX ADMIN — CALCIUM CHLORIDE, MAGNESIUM CHLORIDE, SODIUM CHLORIDE, SODIUM BICARBONATE, POTASSIUM CHLORIDE AND SODIUM PHOSPHATE DIBASIC DIHYDRATE 12.5 ML/KG/HR: 3.68; 3.05; 6.34; 3.09; .314; .187 INJECTION INTRAVENOUS at 03:27

## 2019-01-01 RX ADMIN — INSULIN ASPART 1 UNITS: 100 INJECTION, SOLUTION INTRAVENOUS; SUBCUTANEOUS at 04:05

## 2019-01-01 RX ADMIN — CALCIUM CHLORIDE, MAGNESIUM CHLORIDE, DEXTROSE MONOHYDRATE, LACTIC ACID, SODIUM CHLORIDE, SODIUM BICARBONATE AND POTASSIUM CHLORIDE 12.5 ML/KG/HR: 5.15; 2.03; 22; 5.4; 6.46; 3.09; .157 INJECTION INTRAVENOUS at 11:48

## 2019-01-01 RX ADMIN — FENTANYL CITRATE 25 MCG/HR: 50 INJECTION INTRAVENOUS at 21:49

## 2019-01-01 RX ADMIN — CALCIUM CHLORIDE, MAGNESIUM CHLORIDE, SODIUM CHLORIDE, SODIUM BICARBONATE, POTASSIUM CHLORIDE AND SODIUM PHOSPHATE DIBASIC DIHYDRATE 12.5 ML/KG/HR: 3.68; 3.05; 6.34; 3.09; .314; .187 INJECTION INTRAVENOUS at 00:13

## 2019-01-01 RX ADMIN — CALCIUM CHLORIDE, MAGNESIUM CHLORIDE, SODIUM CHLORIDE, SODIUM BICARBONATE, POTASSIUM CHLORIDE AND SODIUM PHOSPHATE DIBASIC DIHYDRATE 12.5 ML/KG/HR: 3.68; 3.05; 6.34; 3.09; .314; .187 INJECTION INTRAVENOUS at 08:11

## 2019-01-01 RX ADMIN — NOREPINEPHRINE BITARTRATE 6.4 MCG: 1 INJECTION INTRAVENOUS at 14:52

## 2019-01-01 RX ADMIN — FENTANYL CITRATE 150 MCG/HR: 50 INJECTION INTRAVENOUS at 03:39

## 2019-01-01 RX ADMIN — EPINEPHRINE 0.05 MCG/KG/MIN: 1 INJECTION PARENTERAL at 13:08

## 2019-01-01 RX ADMIN — AMIODARONE HYDROCHLORIDE 1 MG/MIN: 50 INJECTION, SOLUTION INTRAVENOUS at 02:12

## 2019-01-01 RX ADMIN — Medication: at 09:25

## 2019-01-01 RX ADMIN — VASOPRESSIN 2.4 UNITS/HR: 20 INJECTION INTRAVENOUS at 08:45

## 2019-01-01 RX ADMIN — ERTAPENEM SODIUM 1 G: 1 INJECTION, POWDER, LYOPHILIZED, FOR SOLUTION INTRAMUSCULAR; INTRAVENOUS at 09:47

## 2019-01-01 RX ADMIN — Medication 5 ML: at 08:23

## 2019-01-01 RX ADMIN — Medication 0.06 MCG/KG/MIN: at 04:26

## 2019-01-01 RX ADMIN — CARBOXYMETHYLCELLULOSE SODIUM 2 DROP: 5 SOLUTION/ DROPS OPHTHALMIC at 16:00

## 2019-01-01 RX ADMIN — SENNOSIDES 8.6 MG: 8.6 TABLET, FILM COATED ORAL at 07:41

## 2019-01-01 RX ADMIN — MEROPENEM 2 G: 1 INJECTION, POWDER, FOR SOLUTION INTRAVENOUS at 06:26

## 2019-01-01 RX ADMIN — CALCIUM CHLORIDE, MAGNESIUM CHLORIDE, DEXTROSE MONOHYDRATE, LACTIC ACID, SODIUM CHLORIDE, SODIUM BICARBONATE AND POTASSIUM CHLORIDE 12.5 ML/KG/HR: 5.15; 2.03; 22; 5.4; 6.46; 3.09; .157 INJECTION INTRAVENOUS at 06:01

## 2019-01-01 RX ADMIN — VASOPRESSIN 0.5 UNITS/HR: 20 INJECTION INTRAVENOUS at 12:26

## 2019-01-01 RX ADMIN — Medication: at 21:24

## 2019-01-01 RX ADMIN — CALCIUM CHLORIDE, MAGNESIUM CHLORIDE, SODIUM CHLORIDE, SODIUM BICARBONATE, POTASSIUM CHLORIDE AND SODIUM PHOSPHATE DIBASIC DIHYDRATE 12.5 ML/KG/HR: 3.68; 3.05; 6.34; 3.09; .314; .187 INJECTION INTRAVENOUS at 11:24

## 2019-01-01 RX ADMIN — CALCIUM CHLORIDE, MAGNESIUM CHLORIDE, DEXTROSE MONOHYDRATE, LACTIC ACID, SODIUM CHLORIDE, SODIUM BICARBONATE AND POTASSIUM CHLORIDE 12.5 ML/KG/HR: 5.15; 2.03; 22; 5.4; 6.46; 3.09; .157 INJECTION INTRAVENOUS at 04:24

## 2019-01-01 RX ADMIN — CALCIUM CHLORIDE, MAGNESIUM CHLORIDE, SODIUM CHLORIDE, SODIUM BICARBONATE, POTASSIUM CHLORIDE AND SODIUM PHOSPHATE DIBASIC DIHYDRATE 12.5 ML/KG/HR: 3.68; 3.05; 6.34; 3.09; .314; .187 INJECTION INTRAVENOUS at 07:06

## 2019-01-01 RX ADMIN — Medication 2 MG/HR: at 15:34

## 2019-01-01 RX ADMIN — ALBUMIN HUMAN: 0.05 INJECTION, SOLUTION INTRAVENOUS at 16:43

## 2019-01-01 RX ADMIN — CALCIUM CHLORIDE, MAGNESIUM CHLORIDE, SODIUM CHLORIDE, SODIUM BICARBONATE, POTASSIUM CHLORIDE AND SODIUM PHOSPHATE DIBASIC DIHYDRATE 12.5 ML/KG/HR: 3.68; 3.05; 6.34; 3.09; .314; .187 INJECTION INTRAVENOUS at 16:42

## 2019-01-01 RX ADMIN — DEXTROSE MONOHYDRATE 50 ML: 25 INJECTION, SOLUTION INTRAVENOUS at 23:33

## 2019-01-01 RX ADMIN — CALCIUM CHLORIDE, MAGNESIUM CHLORIDE, DEXTROSE MONOHYDRATE, LACTIC ACID, SODIUM CHLORIDE, SODIUM BICARBONATE AND POTASSIUM CHLORIDE 12.5 ML/KG/HR: 5.15; 2.03; 22; 5.4; 6.46; 3.09; .157 INJECTION INTRAVENOUS at 04:00

## 2019-01-01 RX ADMIN — PIPERACILLIN AND TAZOBACTAM 4.5 G: 4; .5 INJECTION, POWDER, FOR SOLUTION INTRAVENOUS at 05:13

## 2019-01-01 RX ADMIN — ERTAPENEM SODIUM 1 G: 1 INJECTION, POWDER, LYOPHILIZED, FOR SOLUTION INTRAMUSCULAR; INTRAVENOUS at 10:12

## 2019-01-01 RX ADMIN — Medication 2 MG/HR: at 18:00

## 2019-01-01 RX ADMIN — PANTOPRAZOLE SODIUM 40 MG: 40 INJECTION, POWDER, FOR SOLUTION INTRAVENOUS at 16:01

## 2019-01-01 RX ADMIN — CALCIUM CHLORIDE, MAGNESIUM CHLORIDE, SODIUM CHLORIDE, SODIUM BICARBONATE, POTASSIUM CHLORIDE AND SODIUM PHOSPHATE DIBASIC DIHYDRATE 12.5 ML/KG/HR: 3.68; 3.05; 6.34; 3.09; .314; .187 INJECTION INTRAVENOUS at 17:33

## 2019-01-01 RX ADMIN — OSELTAMIVIR PHOSPHATE 75 MG: 6 POWDER, FOR SUSPENSION ORAL at 07:46

## 2019-01-01 RX ADMIN — HUMAN INSULIN 13 UNITS/HR: 100 INJECTION, SOLUTION SUBCUTANEOUS at 22:01

## 2019-01-01 RX ADMIN — TICAGRELOR 90 MG: 90 TABLET ORAL at 07:55

## 2019-01-01 RX ADMIN — HEPARIN SODIUM 1500 UNITS/HR: 10000 INJECTION, SOLUTION INTRAVENOUS at 10:23

## 2019-01-01 RX ADMIN — HEPARIN SODIUM 21.84 UNITS/KG/HR: 10000 INJECTION, SOLUTION INTRAVENOUS at 08:16

## 2019-01-01 RX ADMIN — Medication: at 14:06

## 2019-01-01 RX ADMIN — TICAGRELOR 90 MG: 90 TABLET ORAL at 08:09

## 2019-01-01 RX ADMIN — PANTOPRAZOLE SODIUM 40 MG: 40 INJECTION, POWDER, FOR SOLUTION INTRAVENOUS at 15:22

## 2019-01-01 RX ADMIN — MEROPENEM 1 G: 1 INJECTION, POWDER, FOR SOLUTION INTRAVENOUS at 22:06

## 2019-01-01 RX ADMIN — DOCUSATE SODIUM AND SENNOSIDES 1 TABLET: 50; 8.6 TABLET ORAL at 22:51

## 2019-01-01 RX ADMIN — VASOPRESSIN 3 UNITS/HR: 20 INJECTION INTRAVENOUS at 04:28

## 2019-01-01 RX ADMIN — OSELTAMIVIR PHOSPHATE 75 MG: 6 POWDER, FOR SUSPENSION ORAL at 08:16

## 2019-01-01 RX ADMIN — CALCIUM CHLORIDE, MAGNESIUM CHLORIDE, SODIUM CHLORIDE, SODIUM BICARBONATE, POTASSIUM CHLORIDE AND SODIUM PHOSPHATE DIBASIC DIHYDRATE 12.5 ML/KG/HR: 3.68; 3.05; 6.34; 3.09; .314; .187 INJECTION INTRAVENOUS at 15:51

## 2019-01-01 RX ADMIN — AMIODARONE HYDROCHLORIDE 1 MG/MIN: 50 INJECTION, SOLUTION INTRAVENOUS at 04:31

## 2019-01-01 RX ADMIN — Medication 1 PACKET: at 19:45

## 2019-01-01 RX ADMIN — GLYCOPYRROLATE 0.4 MG: 0.2 INJECTION, SOLUTION INTRAMUSCULAR; INTRAVENOUS at 18:05

## 2019-01-01 RX ADMIN — AMIODARONE HYDROCHLORIDE 0.5 MG/MIN: 50 INJECTION, SOLUTION INTRAVENOUS at 21:22

## 2019-01-01 RX ADMIN — FENTANYL CITRATE 50 MCG: 50 INJECTION INTRAMUSCULAR; INTRAVENOUS at 19:37

## 2019-01-01 RX ADMIN — HUMAN INSULIN 6 UNITS/HR: 100 INJECTION, SOLUTION SUBCUTANEOUS at 09:28

## 2019-01-01 RX ADMIN — CALCIUM CHLORIDE, MAGNESIUM CHLORIDE, SODIUM CHLORIDE, SODIUM BICARBONATE, POTASSIUM CHLORIDE AND SODIUM PHOSPHATE DIBASIC DIHYDRATE 12.5 ML/KG/HR: 3.68; 3.05; 6.34; 3.09; .314; .187 INJECTION INTRAVENOUS at 07:23

## 2019-01-01 RX ADMIN — DAPTOMYCIN 800 MG: 500 INJECTION, POWDER, LYOPHILIZED, FOR SOLUTION INTRAVENOUS at 13:19

## 2019-01-01 RX ADMIN — CARBOXYMETHYLCELLULOSE SODIUM 2 DROP: 5 SOLUTION/ DROPS OPHTHALMIC at 16:28

## 2019-01-01 RX ADMIN — CALCIUM CHLORIDE, MAGNESIUM CHLORIDE, SODIUM CHLORIDE, SODIUM BICARBONATE, POTASSIUM CHLORIDE AND SODIUM PHOSPHATE DIBASIC DIHYDRATE 1.86 ML/KG/HR: 3.68; 3.05; 6.34; 3.09; .314; .187 INJECTION INTRAVENOUS at 15:54

## 2019-01-01 RX ADMIN — EPOPROSTENOL 10 NG/KG/MIN: 1.5 INJECTION, POWDER, LYOPHILIZED, FOR SOLUTION INTRAVENOUS at 00:36

## 2019-01-01 RX ADMIN — CALCIUM CHLORIDE, MAGNESIUM CHLORIDE, SODIUM CHLORIDE, SODIUM BICARBONATE, POTASSIUM CHLORIDE AND SODIUM PHOSPHATE DIBASIC DIHYDRATE 12.5 ML/KG/HR: 3.68; 3.05; 6.34; 3.09; .314; .187 INJECTION INTRAVENOUS at 23:37

## 2019-01-01 RX ADMIN — HUMAN INSULIN 9 UNITS/HR: 100 INJECTION, SOLUTION SUBCUTANEOUS at 03:27

## 2019-01-01 RX ADMIN — TICAGRELOR 90 MG: 90 TABLET ORAL at 07:29

## 2019-01-01 RX ADMIN — HEPARIN SODIUM 3 ML/HR: 1000 INJECTION, SOLUTION INTRAVENOUS; SUBCUTANEOUS at 07:15

## 2019-01-01 RX ADMIN — INSULIN ASPART 2 UNITS: 100 INJECTION, SOLUTION INTRAVENOUS; SUBCUTANEOUS at 23:40

## 2019-01-01 RX ADMIN — CALCIUM CHLORIDE, MAGNESIUM CHLORIDE, SODIUM CHLORIDE, SODIUM BICARBONATE, POTASSIUM CHLORIDE AND SODIUM PHOSPHATE DIBASIC DIHYDRATE 12.5 ML/KG/HR: 3.68; 3.05; 6.34; 3.09; .314; .187 INJECTION INTRAVENOUS at 09:27

## 2019-01-01 RX ADMIN — HUMAN INSULIN 8 UNITS/HR: 100 INJECTION, SOLUTION SUBCUTANEOUS at 00:07

## 2019-01-01 RX ADMIN — PIPERACILLIN AND TAZOBACTAM 4.5 G: 4; .5 INJECTION, POWDER, FOR SOLUTION INTRAVENOUS at 23:30

## 2019-01-01 RX ADMIN — HYDROCORTISONE SODIUM SUCCINATE 50 MG: 100 INJECTION, POWDER, FOR SOLUTION INTRAMUSCULAR; INTRAVENOUS at 18:05

## 2019-01-01 RX ADMIN — CALCIUM CHLORIDE, MAGNESIUM CHLORIDE, DEXTROSE MONOHYDRATE, LACTIC ACID, SODIUM CHLORIDE, SODIUM BICARBONATE AND POTASSIUM CHLORIDE 12.5 ML/KG/HR: 5.15; 2.03; 22; 5.4; 6.46; 3.09; .157 INJECTION INTRAVENOUS at 01:41

## 2019-01-01 RX ADMIN — EPOPROSTENOL 10 NG/KG/MIN: 1.5 INJECTION, POWDER, LYOPHILIZED, FOR SOLUTION INTRAVENOUS at 23:27

## 2019-01-01 RX ADMIN — EPOPROSTENOL 20 NG/KG/MIN: 1.5 INJECTION, POWDER, LYOPHILIZED, FOR SOLUTION INTRAVENOUS at 15:36

## 2019-01-01 RX ADMIN — METRONIDAZOLE 500 MG: 500 INJECTION, SOLUTION INTRAVENOUS at 08:20

## 2019-01-01 RX ADMIN — Medication: at 05:25

## 2019-01-01 RX ADMIN — CALCIUM CHLORIDE 1 G: 100 INJECTION, SOLUTION INTRAVENOUS at 00:13

## 2019-01-01 RX ADMIN — CALCIUM CHLORIDE, MAGNESIUM CHLORIDE, SODIUM CHLORIDE, SODIUM BICARBONATE, POTASSIUM CHLORIDE AND SODIUM PHOSPHATE DIBASIC DIHYDRATE 12.5 ML/KG/HR: 3.68; 3.05; 6.34; 3.09; .314; .187 INJECTION INTRAVENOUS at 17:55

## 2019-01-01 RX ADMIN — CALCIUM GLUCONATE 1 G: 98 INJECTION, SOLUTION INTRAVENOUS at 05:09

## 2019-01-01 RX ADMIN — ALBUMIN HUMAN: 0.05 INJECTION, SOLUTION INTRAVENOUS at 15:54

## 2019-01-01 RX ADMIN — MEROPENEM 1 G: 1 INJECTION, POWDER, FOR SOLUTION INTRAVENOUS at 06:02

## 2019-01-01 RX ADMIN — POLYETHYLENE GLYCOL 3350 17 G: 17 POWDER, FOR SOLUTION ORAL at 10:39

## 2019-01-01 RX ADMIN — EPOPROSTENOL 20 NG/KG/MIN: 1.5 INJECTION, POWDER, LYOPHILIZED, FOR SOLUTION INTRAVENOUS at 10:20

## 2019-01-01 RX ADMIN — HUMAN INSULIN 4 UNITS/HR: 100 INJECTION, SOLUTION SUBCUTANEOUS at 11:37

## 2019-01-01 RX ADMIN — CALCIUM CHLORIDE, MAGNESIUM CHLORIDE, SODIUM CHLORIDE, SODIUM BICARBONATE, POTASSIUM CHLORIDE AND SODIUM PHOSPHATE DIBASIC DIHYDRATE 12.5 ML/KG/HR: 3.68; 3.05; 6.34; 3.09; .314; .187 INJECTION INTRAVENOUS at 05:25

## 2019-01-01 RX ADMIN — CALCIUM CHLORIDE, MAGNESIUM CHLORIDE, SODIUM CHLORIDE, SODIUM BICARBONATE, POTASSIUM CHLORIDE AND SODIUM PHOSPHATE DIBASIC DIHYDRATE 12.5 ML/KG/HR: 3.68; 3.05; 6.34; 3.09; .314; .187 INJECTION INTRAVENOUS at 13:26

## 2019-01-01 RX ADMIN — ASPIRIN 81 MG CHEWABLE TABLET 81 MG: 81 TABLET CHEWABLE at 08:33

## 2019-01-01 RX ADMIN — PIPERACILLIN AND TAZOBACTAM 4.5 G: 4; .5 INJECTION, POWDER, FOR SOLUTION INTRAVENOUS at 17:40

## 2019-01-01 RX ADMIN — Medication: at 21:47

## 2019-01-01 RX ADMIN — Medication 1 PACKET: at 08:23

## 2019-01-01 RX ADMIN — VASOPRESSIN 3 UNITS/HR: 20 INJECTION INTRAVENOUS at 20:26

## 2019-01-01 RX ADMIN — HUMAN INSULIN 4 UNITS/HR: 100 INJECTION, SOLUTION SUBCUTANEOUS at 21:47

## 2019-01-01 RX ADMIN — DAPTOMYCIN 800 MG: 500 INJECTION, POWDER, LYOPHILIZED, FOR SOLUTION INTRAVENOUS at 13:45

## 2019-01-01 RX ADMIN — CALCIUM CHLORIDE, MAGNESIUM CHLORIDE, DEXTROSE MONOHYDRATE, LACTIC ACID, SODIUM CHLORIDE, SODIUM BICARBONATE AND POTASSIUM CHLORIDE 12.5 ML/KG/HR: 5.15; 2.03; 22; 5.4; 6.46; 3.09; .157 INJECTION INTRAVENOUS at 17:26

## 2019-01-01 RX ADMIN — EPINEPHRINE 0.18 MCG/KG/MIN: 1 INJECTION PARENTERAL at 06:32

## 2019-01-01 RX ADMIN — Medication: at 06:44

## 2019-01-01 RX ADMIN — FENTANYL CITRATE 100 MCG/HR: 50 INJECTION INTRAVENOUS at 19:01

## 2019-01-01 RX ADMIN — ERYTHROMYCIN 1 G: 5 OINTMENT OPHTHALMIC at 19:55

## 2019-01-01 RX ADMIN — HEPARIN SODIUM 1100 UNITS/HR: 10000 INJECTION, SOLUTION INTRAVENOUS at 13:37

## 2019-01-01 RX ADMIN — CISATRACURIUM BESYLATE 6 MG: 2 INJECTION INTRAVENOUS at 16:03

## 2019-01-01 RX ADMIN — PANTOPRAZOLE SODIUM 40 MG: 40 INJECTION, POWDER, FOR SOLUTION INTRAVENOUS at 16:45

## 2019-01-01 RX ADMIN — CALCIUM CHLORIDE, MAGNESIUM CHLORIDE, DEXTROSE MONOHYDRATE, LACTIC ACID, SODIUM CHLORIDE, SODIUM BICARBONATE AND POTASSIUM CHLORIDE 12.5 ML/KG/HR: 5.15; 2.03; 22; 5.4; 6.46; 3.09; .157 INJECTION INTRAVENOUS at 08:41

## 2019-01-01 RX ADMIN — FENTANYL CITRATE 150 MCG/HR: 50 INJECTION INTRAVENOUS at 17:43

## 2019-01-01 RX ADMIN — TICAGRELOR 90 MG: 90 TABLET ORAL at 19:51

## 2019-01-01 RX ADMIN — FENTANYL CITRATE 100 MCG/HR: 50 INJECTION INTRAVENOUS at 19:42

## 2019-01-01 RX ADMIN — CALCIUM CHLORIDE, MAGNESIUM CHLORIDE, SODIUM CHLORIDE, SODIUM BICARBONATE, POTASSIUM CHLORIDE AND SODIUM PHOSPHATE DIBASIC DIHYDRATE 12.5 ML/KG/HR: 3.68; 3.05; 6.34; 3.09; .314; .187 INJECTION INTRAVENOUS at 19:32

## 2019-01-01 RX ADMIN — CALCIUM CHLORIDE, MAGNESIUM CHLORIDE, DEXTROSE MONOHYDRATE, LACTIC ACID, SODIUM CHLORIDE, SODIUM BICARBONATE AND POTASSIUM CHLORIDE 12.5 ML/KG/HR: 5.15; 2.03; 22; 5.4; 6.46; 3.09; .157 INJECTION INTRAVENOUS at 00:12

## 2019-01-01 RX ADMIN — DESMOPRESSIN ACETATE 30 MCG: 4 SOLUTION INTRAVENOUS at 09:15

## 2019-01-01 RX ADMIN — VASOPRESSIN 1 UNITS/HR: 20 INJECTION INTRAVENOUS at 08:19

## 2019-01-01 RX ADMIN — HUMAN INSULIN 10 UNITS/HR: 100 INJECTION, SOLUTION SUBCUTANEOUS at 00:35

## 2019-01-01 RX ADMIN — NOREPINEPHRINE BITARTRATE 6.4 MCG: 1 INJECTION INTRAVENOUS at 14:39

## 2019-01-01 RX ADMIN — POLYETHYLENE GLYCOL 3350 17 G: 17 POWDER, FOR SOLUTION ORAL at 20:08

## 2019-01-01 RX ADMIN — ERYTHROMYCIN 1 G: 5 OINTMENT OPHTHALMIC at 20:13

## 2019-01-01 RX ADMIN — Medication 1 PACKET: at 08:32

## 2019-01-01 RX ADMIN — Medication 5 ML: at 07:35

## 2019-01-01 RX ADMIN — ALBUMIN HUMAN 12.5 G: 50 SOLUTION INTRAVENOUS at 00:50

## 2019-01-01 RX ADMIN — PANTOPRAZOLE SODIUM 40 MG: 40 INJECTION, POWDER, FOR SOLUTION INTRAVENOUS at 03:54

## 2019-01-01 RX ADMIN — Medication: at 22:52

## 2019-01-01 RX ADMIN — PIPERACILLIN AND TAZOBACTAM 4.5 G: 4; .5 INJECTION, POWDER, FOR SOLUTION INTRAVENOUS at 13:37

## 2019-01-01 RX ADMIN — FENTANYL CITRATE 100 MCG/HR: 50 INJECTION, SOLUTION INTRAMUSCULAR; INTRAVENOUS at 06:18

## 2019-01-01 RX ADMIN — ERYTHROMYCIN 1 G: 5 OINTMENT OPHTHALMIC at 22:32

## 2019-01-01 RX ADMIN — ERTAPENEM SODIUM 1 G: 1 INJECTION, POWDER, LYOPHILIZED, FOR SOLUTION INTRAMUSCULAR; INTRAVENOUS at 10:16

## 2019-01-01 RX ADMIN — Medication 10 MG/HR: at 03:43

## 2019-01-01 RX ADMIN — PIPERACILLIN AND TAZOBACTAM 4.5 G: 4; .5 INJECTION, POWDER, FOR SOLUTION INTRAVENOUS at 06:44

## 2019-01-01 RX ADMIN — SODIUM CHLORIDE: 3 INJECTION, SOLUTION INTRAVENOUS at 09:05

## 2019-01-01 RX ADMIN — FLUCONAZOLE 800 MG: 200 TABLET ORAL at 19:34

## 2019-01-01 RX ADMIN — INSULIN ASPART 2 UNITS: 100 INJECTION, SOLUTION INTRAVENOUS; SUBCUTANEOUS at 08:19

## 2019-01-01 RX ADMIN — CALCIUM CHLORIDE, MAGNESIUM CHLORIDE, SODIUM CHLORIDE, SODIUM BICARBONATE, POTASSIUM CHLORIDE AND SODIUM PHOSPHATE DIBASIC DIHYDRATE 12.5 ML/KG/HR: 3.68; 3.05; 6.34; 3.09; .314; .187 INJECTION INTRAVENOUS at 10:10

## 2019-01-01 RX ADMIN — EPINEPHRINE 0.3 MCG/KG/MIN: 1 INJECTION PARENTERAL at 09:21

## 2019-01-01 RX ADMIN — CARBOXYMETHYLCELLULOSE SODIUM 2 DROP: 5 SOLUTION/ DROPS OPHTHALMIC at 15:41

## 2019-01-01 RX ADMIN — CALCIUM CHLORIDE, MAGNESIUM CHLORIDE, DEXTROSE MONOHYDRATE, LACTIC ACID, SODIUM CHLORIDE, SODIUM BICARBONATE AND POTASSIUM CHLORIDE 12.5 ML/KG/HR: 5.15; 2.03; 22; 5.4; 6.46; 3.09; .157 INJECTION INTRAVENOUS at 19:35

## 2019-01-01 RX ADMIN — CALCIUM CHLORIDE, MAGNESIUM CHLORIDE, DEXTROSE MONOHYDRATE, LACTIC ACID, SODIUM CHLORIDE, SODIUM BICARBONATE AND POTASSIUM CHLORIDE 12.5 ML/KG/HR: 5.15; 2.03; 22; 5.4; 6.46; 3.09; .157 INJECTION INTRAVENOUS at 04:01

## 2019-01-01 RX ADMIN — INSULIN ASPART 1 UNITS: 100 INJECTION, SOLUTION INTRAVENOUS; SUBCUTANEOUS at 00:05

## 2019-01-01 RX ADMIN — CALCIUM CHLORIDE, MAGNESIUM CHLORIDE, SODIUM CHLORIDE, SODIUM BICARBONATE, POTASSIUM CHLORIDE AND SODIUM PHOSPHATE DIBASIC DIHYDRATE 12.5 ML/KG/HR: 3.68; 3.05; 6.34; 3.09; .314; .187 INJECTION INTRAVENOUS at 23:26

## 2019-01-01 RX ADMIN — Medication: at 21:59

## 2019-01-01 RX ADMIN — HUMAN INSULIN 3 UNITS/HR: 100 INJECTION, SOLUTION SUBCUTANEOUS at 14:00

## 2019-01-01 RX ADMIN — CALCIUM CHLORIDE, MAGNESIUM CHLORIDE, SODIUM CHLORIDE, SODIUM BICARBONATE, POTASSIUM CHLORIDE AND SODIUM PHOSPHATE DIBASIC DIHYDRATE 12.5 ML/KG/HR: 3.68; 3.05; 6.34; 3.09; .314; .187 INJECTION INTRAVENOUS at 04:05

## 2019-01-01 RX ADMIN — Medication 6 MG/HR: at 07:00

## 2019-01-01 RX ADMIN — MICAFUNGIN SODIUM 150 MG: 10 INJECTION, POWDER, LYOPHILIZED, FOR SOLUTION INTRAVENOUS at 04:13

## 2019-01-01 RX ADMIN — HEPARIN SODIUM 18.72 UNITS/KG/HR: 10000 INJECTION, SOLUTION INTRAVENOUS at 07:44

## 2019-01-01 RX ADMIN — HYDROCORTISONE SODIUM SUCCINATE 50 MG: 100 INJECTION, POWDER, FOR SOLUTION INTRAMUSCULAR; INTRAVENOUS at 23:51

## 2019-01-01 RX ADMIN — TICAGRELOR 90 MG: 90 TABLET ORAL at 20:10

## 2019-01-01 RX ADMIN — DEXTROSE MONOHYDRATE 1 MCG/KG/MIN: 50 INJECTION, SOLUTION INTRAVENOUS at 00:13

## 2019-01-01 RX ADMIN — PIPERACILLIN SODIUM AND TAZOBACTAM SODIUM 3.38 G: 3; .375 INJECTION, POWDER, LYOPHILIZED, FOR SOLUTION INTRAVENOUS at 19:04

## 2019-01-01 RX ADMIN — CARBOXYMETHYLCELLULOSE SODIUM 2 DROP: 5 SOLUTION/ DROPS OPHTHALMIC at 19:50

## 2019-01-01 RX ADMIN — CALCIUM CHLORIDE, MAGNESIUM CHLORIDE, DEXTROSE MONOHYDRATE, LACTIC ACID, SODIUM CHLORIDE, SODIUM BICARBONATE AND POTASSIUM CHLORIDE: 5.15; 2.03; 22; 5.4; 6.46; 3.09; .157 INJECTION INTRAVENOUS at 20:10

## 2019-01-01 RX ADMIN — INSULIN ASPART 1 UNITS: 100 INJECTION, SOLUTION INTRAVENOUS; SUBCUTANEOUS at 16:08

## 2019-01-01 RX ADMIN — CALCIUM CHLORIDE, MAGNESIUM CHLORIDE, SODIUM CHLORIDE, SODIUM BICARBONATE, POTASSIUM CHLORIDE AND SODIUM PHOSPHATE DIBASIC DIHYDRATE 12.5 ML/KG/HR: 3.68; 3.05; 6.34; 3.09; .314; .187 INJECTION INTRAVENOUS at 06:01

## 2019-01-01 RX ADMIN — INSULIN ASPART 1 UNITS: 100 INJECTION, SOLUTION INTRAVENOUS; SUBCUTANEOUS at 22:35

## 2019-01-01 RX ADMIN — SODIUM CHLORIDE, POTASSIUM CHLORIDE, SODIUM LACTATE AND CALCIUM CHLORIDE: 600; 310; 30; 20 INJECTION, SOLUTION INTRAVENOUS at 14:09

## 2019-01-01 RX ADMIN — ERTAPENEM SODIUM 1 G: 1 INJECTION, POWDER, LYOPHILIZED, FOR SOLUTION INTRAMUSCULAR; INTRAVENOUS at 10:27

## 2019-01-01 RX ADMIN — FENTANYL CITRATE 100 MCG/HR: 50 INJECTION INTRAVENOUS at 16:51

## 2019-01-01 RX ADMIN — NEOSTIGMINE METHYLSULFATE 3 MG: 1 INJECTION, SOLUTION INTRAVENOUS at 18:05

## 2019-01-01 RX ADMIN — Medication 1 PACKET: at 19:40

## 2019-01-01 RX ADMIN — CALCIUM CHLORIDE, MAGNESIUM CHLORIDE, SODIUM CHLORIDE, SODIUM BICARBONATE, POTASSIUM CHLORIDE AND SODIUM PHOSPHATE DIBASIC DIHYDRATE 12.5 ML/KG/HR: 3.68; 3.05; 6.34; 3.09; .314; .187 INJECTION INTRAVENOUS at 06:45

## 2019-01-01 RX ADMIN — CALCIUM CHLORIDE, MAGNESIUM CHLORIDE, DEXTROSE MONOHYDRATE, LACTIC ACID, SODIUM CHLORIDE, SODIUM BICARBONATE AND POTASSIUM CHLORIDE 12.5 ML/KG/HR: 5.15; 2.03; 22; 5.4; 6.46; 3.09; .157 INJECTION INTRAVENOUS at 12:33

## 2019-01-01 RX ADMIN — Medication 5 ML: at 08:43

## 2019-01-01 RX ADMIN — EPOPROSTENOL 10 NG/KG/MIN: 1.5 INJECTION, POWDER, LYOPHILIZED, FOR SOLUTION INTRAVENOUS at 13:59

## 2019-01-01 RX ADMIN — HUMAN INSULIN 10 UNITS/HR: 100 INJECTION, SOLUTION SUBCUTANEOUS at 07:44

## 2019-01-01 RX ADMIN — CALCIUM CHLORIDE 1 G: 100 INJECTION, SOLUTION INTRAVENOUS at 17:45

## 2019-01-01 RX ADMIN — CALCIUM CHLORIDE, MAGNESIUM CHLORIDE, SODIUM CHLORIDE, SODIUM BICARBONATE, POTASSIUM CHLORIDE AND SODIUM PHOSPHATE DIBASIC DIHYDRATE 12.5 ML/KG/HR: 3.68; 3.05; 6.34; 3.09; .314; .187 INJECTION INTRAVENOUS at 14:46

## 2019-01-01 RX ADMIN — HUMAN INSULIN 6 UNITS/HR: 100 INJECTION, SOLUTION SUBCUTANEOUS at 15:25

## 2019-01-01 RX ADMIN — CALCIUM CHLORIDE, MAGNESIUM CHLORIDE, DEXTROSE MONOHYDRATE, LACTIC ACID, SODIUM CHLORIDE, SODIUM BICARBONATE AND POTASSIUM CHLORIDE 12.5 ML/KG/HR: 5.15; 2.03; 22; 5.4; 6.46; 3.09; .157 INJECTION INTRAVENOUS at 18:22

## 2019-01-01 RX ADMIN — CALCIUM CHLORIDE 1 G: 100 INJECTION, SOLUTION INTRAVENOUS at 07:20

## 2019-01-01 RX ADMIN — HUMAN INSULIN 4 UNITS/HR: 100 INJECTION, SOLUTION SUBCUTANEOUS at 04:04

## 2019-01-01 RX ADMIN — TICAGRELOR 90 MG: 90 TABLET ORAL at 20:59

## 2019-01-01 RX ADMIN — HEPARIN SODIUM 4000 UNITS: 1000 INJECTION, SOLUTION INTRAVENOUS; SUBCUTANEOUS at 02:21

## 2019-01-01 RX ADMIN — FENTANYL CITRATE 50 MCG: 50 INJECTION INTRAMUSCULAR; INTRAVENOUS at 16:04

## 2019-01-01 RX ADMIN — ERTAPENEM SODIUM 1 G: 1 INJECTION, POWDER, LYOPHILIZED, FOR SOLUTION INTRAMUSCULAR; INTRAVENOUS at 10:02

## 2019-01-01 RX ADMIN — Medication: at 22:00

## 2019-01-01 RX ADMIN — DEXTROSE MONOHYDRATE 3 MCG/KG/MIN: 50 INJECTION, SOLUTION INTRAVENOUS at 10:44

## 2019-01-01 RX ADMIN — PIPERACILLIN SODIUM AND TAZOBACTAM SODIUM 3.38 G: 3; .375 INJECTION, POWDER, LYOPHILIZED, FOR SOLUTION INTRAVENOUS at 17:58

## 2019-01-01 RX ADMIN — HUMAN INSULIN 6 UNITS/HR: 100 INJECTION, SOLUTION SUBCUTANEOUS at 21:28

## 2019-01-01 RX ADMIN — Medication: at 13:21

## 2019-01-01 RX ADMIN — CALCIUM CHLORIDE, MAGNESIUM CHLORIDE, SODIUM CHLORIDE, SODIUM BICARBONATE, POTASSIUM CHLORIDE AND SODIUM PHOSPHATE DIBASIC DIHYDRATE 12.5 ML/KG/HR: 3.68; 3.05; 6.34; 3.09; .314; .187 INJECTION INTRAVENOUS at 23:09

## 2019-01-01 RX ADMIN — CALCIUM CHLORIDE, MAGNESIUM CHLORIDE, SODIUM CHLORIDE, SODIUM BICARBONATE, POTASSIUM CHLORIDE AND SODIUM PHOSPHATE DIBASIC DIHYDRATE 1.89 ML/KG/HR: 3.68; 3.05; 6.34; 3.09; .314; .187 INJECTION INTRAVENOUS at 11:09

## 2019-01-01 RX ADMIN — PANTOPRAZOLE SODIUM 40 MG: 40 INJECTION, POWDER, FOR SOLUTION INTRAVENOUS at 15:02

## 2019-01-01 RX ADMIN — TICAGRELOR 90 MG: 90 TABLET ORAL at 20:11

## 2019-01-01 RX ADMIN — CALCIUM CHLORIDE, MAGNESIUM CHLORIDE, DEXTROSE MONOHYDRATE, LACTIC ACID, SODIUM CHLORIDE, SODIUM BICARBONATE AND POTASSIUM CHLORIDE 12.5 ML/KG/HR: 5.15; 2.03; 22; 5.4; 6.46; 3.09; .157 INJECTION INTRAVENOUS at 17:02

## 2019-01-01 RX ADMIN — CALCIUM CHLORIDE, MAGNESIUM CHLORIDE, SODIUM CHLORIDE, SODIUM BICARBONATE, POTASSIUM CHLORIDE AND SODIUM PHOSPHATE DIBASIC DIHYDRATE 12.5 ML/KG/HR: 3.68; 3.05; 6.34; 3.09; .314; .187 INJECTION INTRAVENOUS at 11:52

## 2019-01-01 RX ADMIN — ALBUMIN HUMAN 25 G: 0.05 INJECTION, SOLUTION INTRAVENOUS at 20:03

## 2019-01-01 RX ADMIN — PANTOPRAZOLE SODIUM 40 MG: 40 INJECTION, POWDER, FOR SOLUTION INTRAVENOUS at 15:29

## 2019-01-01 RX ADMIN — ASPIRIN 81 MG CHEWABLE TABLET 81 MG: 81 TABLET CHEWABLE at 07:29

## 2019-01-01 RX ADMIN — Medication 5 ML: at 08:15

## 2019-01-01 RX ADMIN — SENNOSIDES 8.6 MG: 8.6 TABLET, FILM COATED ORAL at 08:17

## 2019-01-01 RX ADMIN — MEROPENEM 1 G: 1 INJECTION, POWDER, FOR SOLUTION INTRAVENOUS at 05:56

## 2019-01-01 RX ADMIN — Medication: at 18:10

## 2019-01-01 RX ADMIN — PANTOPRAZOLE SODIUM 40 MG: 40 INJECTION, POWDER, FOR SOLUTION INTRAVENOUS at 17:21

## 2019-01-01 RX ADMIN — MEROPENEM 1 G: 1 INJECTION, POWDER, FOR SOLUTION INTRAVENOUS at 16:25

## 2019-01-01 RX ADMIN — PANTOPRAZOLE SODIUM 40 MG: 40 INJECTION, POWDER, FOR SOLUTION INTRAVENOUS at 15:18

## 2019-01-01 RX ADMIN — Medication 5 ML: at 07:36

## 2019-01-01 RX ADMIN — PIPERACILLIN AND TAZOBACTAM 4.5 G: 4; .5 INJECTION, POWDER, FOR SOLUTION INTRAVENOUS at 14:35

## 2019-01-01 RX ADMIN — ACETAMINOPHEN 325 MG: 325 TABLET, FILM COATED ORAL at 16:13

## 2019-01-01 RX ADMIN — OSELTAMIVIR PHOSPHATE 75 MG: 6 POWDER, FOR SUSPENSION ORAL at 07:39

## 2019-01-01 RX ADMIN — Medication 5 ML: at 07:33

## 2019-01-01 RX ADMIN — FENTANYL CITRATE 100 MCG/HR: 50 INJECTION INTRAVENOUS at 23:48

## 2019-01-01 RX ADMIN — HUMAN INSULIN 10 UNITS/HR: 100 INJECTION, SOLUTION SUBCUTANEOUS at 08:20

## 2019-01-01 RX ADMIN — HUMAN INSULIN 2 UNITS/HR: 100 INJECTION, SOLUTION SUBCUTANEOUS at 12:58

## 2019-01-01 RX ADMIN — POLYETHYLENE GLYCOL 3350 17 G: 17 POWDER, FOR SOLUTION ORAL at 07:41

## 2019-01-01 RX ADMIN — CALCIUM CHLORIDE, MAGNESIUM CHLORIDE, DEXTROSE MONOHYDRATE, LACTIC ACID, SODIUM CHLORIDE, SODIUM BICARBONATE AND POTASSIUM CHLORIDE 12.5 ML/KG/HR: 5.15; 2.03; 22; 5.4; 6.46; 3.09; .157 INJECTION INTRAVENOUS at 19:36

## 2019-01-01 RX ADMIN — Medication 8 MG/HR: at 19:03

## 2019-01-01 RX ADMIN — ERYTHROMYCIN 1 G: 5 OINTMENT OPHTHALMIC at 16:00

## 2019-01-01 RX ADMIN — PIPERACILLIN AND TAZOBACTAM 4.5 G: 4; .5 INJECTION, POWDER, FOR SOLUTION INTRAVENOUS at 00:22

## 2019-01-01 RX ADMIN — Medication 200 MG: at 08:19

## 2019-01-01 RX ADMIN — ALBUMIN HUMAN 12.5 G: 0.05 INJECTION, SOLUTION INTRAVENOUS at 20:13

## 2019-01-01 RX ADMIN — HUMAN INSULIN 5.5 UNITS/HR: 100 INJECTION, SOLUTION SUBCUTANEOUS at 13:40

## 2019-01-01 RX ADMIN — Medication 5 ML: at 08:40

## 2019-01-01 RX ADMIN — Medication: at 23:04

## 2019-01-01 RX ADMIN — DAPTOMYCIN 800 MG: 500 INJECTION, POWDER, LYOPHILIZED, FOR SOLUTION INTRAVENOUS at 15:12

## 2019-01-01 RX ADMIN — Medication 1 PACKET: at 19:48

## 2019-01-01 RX ADMIN — Medication 5 ML: at 08:25

## 2019-01-01 RX ADMIN — Medication 0.03 MCG/KG/MIN: at 01:18

## 2019-01-01 RX ADMIN — CALCIUM CHLORIDE 1 G: 100 INJECTION, SOLUTION INTRAVENOUS at 05:20

## 2019-01-01 RX ADMIN — CARBOXYMETHYLCELLULOSE SODIUM 2 DROP: 5 SOLUTION/ DROPS OPHTHALMIC at 09:00

## 2019-01-01 RX ADMIN — Medication 0.2 MCG/KG/MIN: at 05:00

## 2019-01-01 RX ADMIN — CALCIUM CHLORIDE, MAGNESIUM CHLORIDE, DEXTROSE MONOHYDRATE, LACTIC ACID, SODIUM CHLORIDE, SODIUM BICARBONATE AND POTASSIUM CHLORIDE 12.5 ML/KG/HR: 5.15; 2.03; 22; 5.4; 6.46; 3.09; .157 INJECTION INTRAVENOUS at 06:26

## 2019-01-01 RX ADMIN — OSELTAMIVIR PHOSPHATE 75 MG: 6 POWDER, FOR SUSPENSION ORAL at 07:45

## 2019-01-01 RX ADMIN — METRONIDAZOLE 500 MG: 500 INJECTION, SOLUTION INTRAVENOUS at 19:31

## 2019-01-01 RX ADMIN — Medication: at 10:44

## 2019-01-01 RX ADMIN — VANCOMYCIN HYDROCHLORIDE 1750 MG: 10 INJECTION, POWDER, LYOPHILIZED, FOR SOLUTION INTRAVENOUS at 13:00

## 2019-01-01 RX ADMIN — CARBOXYMETHYLCELLULOSE SODIUM 2 DROP: 5 SOLUTION/ DROPS OPHTHALMIC at 07:57

## 2019-01-01 RX ADMIN — Medication 6 MG/HR: at 22:59

## 2019-01-01 RX ADMIN — VASOPRESSIN 2.5 UNITS/HR: 20 INJECTION INTRAVENOUS at 18:53

## 2019-01-01 RX ADMIN — CALCIUM CHLORIDE, MAGNESIUM CHLORIDE, DEXTROSE MONOHYDRATE, LACTIC ACID, SODIUM CHLORIDE, SODIUM BICARBONATE AND POTASSIUM CHLORIDE 12.5 ML/KG/HR: 5.15; 2.03; 22; 5.4; 6.46; 3.09; .157 INJECTION INTRAVENOUS at 04:05

## 2019-01-01 RX ADMIN — CALCIUM CHLORIDE, MAGNESIUM CHLORIDE, DEXTROSE MONOHYDRATE, LACTIC ACID, SODIUM CHLORIDE, SODIUM BICARBONATE AND POTASSIUM CHLORIDE 12.5 ML/KG/HR: 5.15; 2.03; 22; 5.4; 6.46; 3.09; .157 INJECTION INTRAVENOUS at 15:44

## 2019-01-01 RX ADMIN — CALCIUM CHLORIDE, MAGNESIUM CHLORIDE, SODIUM CHLORIDE, SODIUM BICARBONATE, POTASSIUM CHLORIDE AND SODIUM PHOSPHATE DIBASIC DIHYDRATE 12.5 ML/KG/HR: 3.68; 3.05; 6.34; 3.09; .314; .187 INJECTION INTRAVENOUS at 11:48

## 2019-01-01 RX ADMIN — Medication 1 PACKET: at 07:28

## 2019-01-01 RX ADMIN — ASPIRIN 81 MG CHEWABLE TABLET 81 MG: 81 TABLET CHEWABLE at 08:01

## 2019-01-01 RX ADMIN — SENNOSIDES 8.6 MG: 8.6 TABLET, FILM COATED ORAL at 20:59

## 2019-01-01 RX ADMIN — CALCIUM CHLORIDE, MAGNESIUM CHLORIDE, SODIUM CHLORIDE, SODIUM BICARBONATE, POTASSIUM CHLORIDE AND SODIUM PHOSPHATE DIBASIC DIHYDRATE 12.5 ML/KG/HR: 3.68; 3.05; 6.34; 3.09; .314; .187 INJECTION INTRAVENOUS at 18:38

## 2019-01-01 RX ADMIN — FENTANYL CITRATE 50 MCG: 50 INJECTION INTRAMUSCULAR; INTRAVENOUS at 14:25

## 2019-01-01 RX ADMIN — ERYTHROMYCIN 1 G: 5 OINTMENT OPHTHALMIC at 16:25

## 2019-01-01 RX ADMIN — EPINEPHRINE 0.06 MCG/KG/MIN: 1 INJECTION PARENTERAL at 23:50

## 2019-01-01 RX ADMIN — FENTANYL CITRATE 200 MCG/HR: 50 INJECTION INTRAVENOUS at 22:33

## 2019-01-01 RX ADMIN — VASOPRESSIN 2.4 UNITS/HR: 20 INJECTION INTRAVENOUS at 19:46

## 2019-01-01 RX ADMIN — CALCIUM CHLORIDE, MAGNESIUM CHLORIDE, DEXTROSE MONOHYDRATE, LACTIC ACID, SODIUM CHLORIDE, SODIUM BICARBONATE AND POTASSIUM CHLORIDE: 5.15; 2.03; 22; 5.4; 6.46; 3.09; .157 INJECTION INTRAVENOUS at 01:12

## 2019-01-01 RX ADMIN — CALCIUM CHLORIDE, MAGNESIUM CHLORIDE, DEXTROSE MONOHYDRATE, LACTIC ACID, SODIUM CHLORIDE, SODIUM BICARBONATE AND POTASSIUM CHLORIDE 12.5 ML/KG/HR: 5.15; 2.03; 22; 5.4; 6.46; 3.09; .157 INJECTION INTRAVENOUS at 18:13

## 2019-01-01 RX ADMIN — MEROPENEM 2 G: 1 INJECTION, POWDER, FOR SOLUTION INTRAVENOUS at 15:36

## 2019-01-01 RX ADMIN — PIPERACILLIN AND TAZOBACTAM 4.5 G: 4; .5 INJECTION, POWDER, FOR SOLUTION INTRAVENOUS at 17:11

## 2019-01-01 RX ADMIN — Medication 1 PACKET: at 08:44

## 2019-01-01 RX ADMIN — CALCIUM CHLORIDE, MAGNESIUM CHLORIDE, SODIUM CHLORIDE, SODIUM BICARBONATE, POTASSIUM CHLORIDE AND SODIUM PHOSPHATE DIBASIC DIHYDRATE 12.5 ML/KG/HR: 3.68; 3.05; 6.34; 3.09; .314; .187 INJECTION INTRAVENOUS at 15:37

## 2019-01-01 RX ADMIN — CARBOXYMETHYLCELLULOSE SODIUM 2 DROP: 5 SOLUTION/ DROPS OPHTHALMIC at 12:59

## 2019-01-01 RX ADMIN — PIPERACILLIN AND TAZOBACTAM 4.5 G: 4; .5 INJECTION, POWDER, FOR SOLUTION INTRAVENOUS at 01:41

## 2019-01-01 RX ADMIN — Medication 5 ML: at 15:39

## 2019-01-01 RX ADMIN — TICAGRELOR 90 MG: 90 TABLET ORAL at 07:33

## 2019-01-01 RX ADMIN — Medication: at 17:50

## 2019-01-01 RX ADMIN — EPOPROSTENOL 20 NG/KG/MIN: 1.5 INJECTION, POWDER, LYOPHILIZED, FOR SOLUTION INTRAVENOUS at 00:08

## 2019-01-01 RX ADMIN — PIPERACILLIN AND TAZOBACTAM 4.5 G: 4; .5 INJECTION, POWDER, FOR SOLUTION INTRAVENOUS at 08:20

## 2019-01-01 RX ADMIN — PANTOPRAZOLE SODIUM 40 MG: 40 INJECTION, POWDER, FOR SOLUTION INTRAVENOUS at 04:00

## 2019-01-01 RX ADMIN — CALCIUM CHLORIDE, MAGNESIUM CHLORIDE, DEXTROSE MONOHYDRATE, LACTIC ACID, SODIUM CHLORIDE, SODIUM BICARBONATE AND POTASSIUM CHLORIDE 12.5 ML/KG/HR: 5.15; 2.03; 22; 5.4; 6.46; 3.09; .157 INJECTION INTRAVENOUS at 06:19

## 2019-01-01 RX ADMIN — CALCIUM CHLORIDE, MAGNESIUM CHLORIDE, DEXTROSE MONOHYDRATE, LACTIC ACID, SODIUM CHLORIDE, SODIUM BICARBONATE AND POTASSIUM CHLORIDE 12.5 ML/KG/HR: 5.15; 2.03; 22; 5.4; 6.46; 3.09; .157 INJECTION INTRAVENOUS at 07:58

## 2019-01-01 RX ADMIN — ERYTHROMYCIN 1 G: 5 OINTMENT OPHTHALMIC at 19:38

## 2019-01-01 RX ADMIN — CARBOXYMETHYLCELLULOSE SODIUM 2 DROP: 5 SOLUTION/ DROPS OPHTHALMIC at 11:53

## 2019-01-01 RX ADMIN — HUMAN INSULIN 1 UNITS/HR: 100 INJECTION, SOLUTION SUBCUTANEOUS at 05:38

## 2019-01-01 RX ADMIN — FENTANYL CITRATE 50 MCG: 50 INJECTION INTRAMUSCULAR; INTRAVENOUS at 05:15

## 2019-01-01 RX ADMIN — CALCIUM CHLORIDE 1 G: 100 INJECTION, SOLUTION INTRAVENOUS at 06:34

## 2019-01-01 RX ADMIN — NOREPINEPHRINE BITARTRATE 6.4 MCG: 1 INJECTION INTRAVENOUS at 15:23

## 2019-01-01 RX ADMIN — CALCIUM CHLORIDE, MAGNESIUM CHLORIDE, DEXTROSE MONOHYDRATE, LACTIC ACID, SODIUM CHLORIDE, SODIUM BICARBONATE AND POTASSIUM CHLORIDE 12.5 ML/KG/HR: 5.15; 2.03; 22; 5.4; 6.46; 3.09; .157 INJECTION INTRAVENOUS at 20:48

## 2019-01-01 RX ADMIN — Medication 1 PACKET: at 19:34

## 2019-01-01 RX ADMIN — HYDROCORTISONE SODIUM SUCCINATE 50 MG: 100 INJECTION, POWDER, FOR SOLUTION INTRAMUSCULAR; INTRAVENOUS at 13:51

## 2019-01-01 RX ADMIN — CALCIUM CHLORIDE, MAGNESIUM CHLORIDE, SODIUM CHLORIDE, SODIUM BICARBONATE, POTASSIUM CHLORIDE AND SODIUM PHOSPHATE DIBASIC DIHYDRATE 12.5 ML/KG/HR: 3.68; 3.05; 6.34; 3.09; .314; .187 INJECTION INTRAVENOUS at 06:34

## 2019-01-01 RX ADMIN — HUMAN INSULIN 4 UNITS/HR: 100 INJECTION, SOLUTION SUBCUTANEOUS at 18:34

## 2019-01-01 RX ADMIN — CARBOXYMETHYLCELLULOSE SODIUM 2 DROP: 5 SOLUTION/ DROPS OPHTHALMIC at 20:16

## 2019-01-01 RX ADMIN — MEROPENEM 1 G: 1 INJECTION, POWDER, FOR SOLUTION INTRAVENOUS at 06:01

## 2019-01-01 RX ADMIN — ALBUMIN HUMAN 12.5 G: 0.05 INJECTION, SOLUTION INTRAVENOUS at 14:59

## 2019-01-01 RX ADMIN — EPOPROSTENOL 20 NG/KG/MIN: 1.5 INJECTION, POWDER, LYOPHILIZED, FOR SOLUTION INTRAVENOUS at 21:15

## 2019-01-01 RX ADMIN — Medication: at 22:11

## 2019-01-01 RX ADMIN — EPOPROSTENOL 10 NG/KG/MIN: 1.5 INJECTION, POWDER, LYOPHILIZED, FOR SOLUTION INTRAVENOUS at 14:20

## 2019-01-01 RX ADMIN — CALCIUM CHLORIDE, MAGNESIUM CHLORIDE, DEXTROSE MONOHYDRATE, LACTIC ACID, SODIUM CHLORIDE, SODIUM BICARBONATE AND POTASSIUM CHLORIDE: 5.15; 2.03; 22; 5.4; 6.46; 3.09; .157 INJECTION INTRAVENOUS at 18:12

## 2019-01-01 RX ADMIN — HUMAN INSULIN 10 UNITS/HR: 100 INJECTION, SOLUTION SUBCUTANEOUS at 04:39

## 2019-01-01 RX ADMIN — HUMAN INSULIN 12 UNITS/HR: 100 INJECTION, SOLUTION SUBCUTANEOUS at 17:20

## 2019-01-01 RX ADMIN — PANTOPRAZOLE SODIUM 40 MG: 40 INJECTION, POWDER, FOR SOLUTION INTRAVENOUS at 04:52

## 2019-01-01 RX ADMIN — ROCURONIUM BROMIDE 50 MG: 10 INJECTION INTRAVENOUS at 02:10

## 2019-01-01 RX ADMIN — INSULIN ASPART 1 UNITS: 100 INJECTION, SOLUTION INTRAVENOUS; SUBCUTANEOUS at 05:16

## 2019-01-01 RX ADMIN — POTASSIUM CHLORIDE 20 MEQ: 29.8 INJECTION, SOLUTION INTRAVENOUS at 17:01

## 2019-01-01 RX ADMIN — Medication 5 ML: at 08:05

## 2019-01-01 RX ADMIN — DEXTROSE MONOHYDRATE 50 ML: 25 INJECTION, SOLUTION INTRAVENOUS at 06:05

## 2019-01-01 RX ADMIN — ALBUMIN HUMAN 12.5 G: 0.05 INJECTION, SOLUTION INTRAVENOUS at 00:50

## 2019-01-01 RX ADMIN — VASOPRESSIN 4 UNITS/HR: 20 INJECTION INTRAVENOUS at 09:47

## 2019-01-01 RX ADMIN — CALCIUM CHLORIDE, MAGNESIUM CHLORIDE, DEXTROSE MONOHYDRATE, LACTIC ACID, SODIUM CHLORIDE, SODIUM BICARBONATE AND POTASSIUM CHLORIDE 12.5 ML/KG/HR: 5.15; 2.03; 22; 5.4; 6.46; 3.09; .157 INJECTION INTRAVENOUS at 07:23

## 2019-01-01 RX ADMIN — ALBUMIN HUMAN 25 G: 0.05 INJECTION, SOLUTION INTRAVENOUS at 23:43

## 2019-01-01 RX ADMIN — SENNOSIDES 8.6 MG: 8.6 TABLET, FILM COATED ORAL at 10:39

## 2019-01-01 RX ADMIN — PANTOPRAZOLE SODIUM 40 MG: 40 INJECTION, POWDER, FOR SOLUTION INTRAVENOUS at 03:51

## 2019-01-01 RX ADMIN — DAPTOMYCIN 800 MG: 500 INJECTION, POWDER, LYOPHILIZED, FOR SOLUTION INTRAVENOUS at 13:39

## 2019-01-01 RX ADMIN — AMIODARONE HYDROCHLORIDE 0.5 MG/MIN: 50 INJECTION, SOLUTION INTRAVENOUS at 00:39

## 2019-01-01 RX ADMIN — Medication 200 MG: at 07:56

## 2019-01-01 RX ADMIN — Medication 14 MG/HR: at 13:57

## 2019-01-01 RX ADMIN — CALCIUM CHLORIDE, MAGNESIUM CHLORIDE, DEXTROSE MONOHYDRATE, LACTIC ACID, SODIUM CHLORIDE, SODIUM BICARBONATE AND POTASSIUM CHLORIDE 12.5 ML/KG/HR: 5.15; 2.03; 22; 5.4; 6.46; 3.09; .157 INJECTION INTRAVENOUS at 01:34

## 2019-01-01 RX ADMIN — CALCIUM CHLORIDE, MAGNESIUM CHLORIDE, DEXTROSE MONOHYDRATE, LACTIC ACID, SODIUM CHLORIDE, SODIUM BICARBONATE AND POTASSIUM CHLORIDE: 5.15; 2.03; 22; 5.4; 6.46; 3.09; .157 INJECTION INTRAVENOUS at 02:12

## 2019-01-01 RX ADMIN — HUMAN INSULIN 10 UNITS/HR: 100 INJECTION, SOLUTION SUBCUTANEOUS at 02:14

## 2019-01-01 RX ADMIN — INSULIN ASPART 1 UNITS: 100 INJECTION, SOLUTION INTRAVENOUS; SUBCUTANEOUS at 08:16

## 2019-01-01 RX ADMIN — EPOPROSTENOL 10 NG/KG/MIN: 1.5 INJECTION, POWDER, LYOPHILIZED, FOR SOLUTION INTRAVENOUS at 20:04

## 2019-01-01 RX ADMIN — MEROPENEM 1 G: 1 INJECTION, POWDER, FOR SOLUTION INTRAVENOUS at 17:33

## 2019-01-01 RX ADMIN — Medication 1 PACKET: at 07:46

## 2019-01-01 RX ADMIN — Medication 6 MG/HR: at 23:56

## 2019-01-01 RX ADMIN — Medication 5 ML: at 08:20

## 2019-01-01 RX ADMIN — ERYTHROMYCIN 1 G: 5 OINTMENT OPHTHALMIC at 11:40

## 2019-01-01 RX ADMIN — PANTOPRAZOLE SODIUM 40 MG: 40 INJECTION, POWDER, FOR SOLUTION INTRAVENOUS at 15:32

## 2019-01-01 RX ADMIN — EPINEPHRINE 0.03 MCG/KG/MIN: 1 INJECTION PARENTERAL at 19:30

## 2019-01-01 RX ADMIN — Medication 1 PACKET: at 07:26

## 2019-01-01 RX ADMIN — PANTOPRAZOLE SODIUM 40 MG: 40 TABLET, DELAYED RELEASE ORAL at 08:23

## 2019-01-01 RX ADMIN — MEROPENEM 2 G: 1 INJECTION, POWDER, FOR SOLUTION INTRAVENOUS at 08:53

## 2019-01-01 RX ADMIN — VASOPRESSIN 2.4 UNITS/HR: 20 INJECTION INTRAVENOUS at 03:59

## 2019-01-01 RX ADMIN — CALCIUM CHLORIDE, MAGNESIUM CHLORIDE, SODIUM CHLORIDE, SODIUM BICARBONATE, POTASSIUM CHLORIDE AND SODIUM PHOSPHATE DIBASIC DIHYDRATE 12.5 ML/KG/HR: 3.68; 3.05; 6.34; 3.09; .314; .187 INJECTION INTRAVENOUS at 20:22

## 2019-01-01 RX ADMIN — CALCIUM CHLORIDE, MAGNESIUM CHLORIDE, DEXTROSE MONOHYDRATE, LACTIC ACID, SODIUM CHLORIDE, SODIUM BICARBONATE AND POTASSIUM CHLORIDE 12.5 ML/KG/HR: 5.15; 2.03; 22; 5.4; 6.46; 3.09; .157 INJECTION INTRAVENOUS at 15:55

## 2019-01-01 RX ADMIN — CALCIUM CHLORIDE, MAGNESIUM CHLORIDE, DEXTROSE MONOHYDRATE, LACTIC ACID, SODIUM CHLORIDE, SODIUM BICARBONATE AND POTASSIUM CHLORIDE: 5.15; 2.03; 22; 5.4; 6.46; 3.09; .157 INJECTION INTRAVENOUS at 06:26

## 2019-01-01 RX ADMIN — Medication 4 MG/HR: at 08:13

## 2019-01-01 RX ADMIN — MEROPENEM 1 G: 1 INJECTION, POWDER, FOR SOLUTION INTRAVENOUS at 07:09

## 2019-01-01 RX ADMIN — IOPAMIDOL 130 ML: 755 INJECTION, SOLUTION INTRAVENOUS at 15:23

## 2019-01-01 RX ADMIN — Medication 200 MG: at 07:51

## 2019-01-01 RX ADMIN — MEROPENEM 2 G: 1 INJECTION, POWDER, FOR SOLUTION INTRAVENOUS at 14:47

## 2019-01-01 RX ADMIN — EPINEPHRINE 0.05 MCG/KG/MIN: 1 INJECTION PARENTERAL at 04:50

## 2019-01-01 RX ADMIN — MEROPENEM 2 G: 1 INJECTION, POWDER, FOR SOLUTION INTRAVENOUS at 21:52

## 2019-01-01 RX ADMIN — CALCIUM CHLORIDE, MAGNESIUM CHLORIDE, DEXTROSE MONOHYDRATE, LACTIC ACID, SODIUM CHLORIDE, SODIUM BICARBONATE AND POTASSIUM CHLORIDE 12.5 ML/KG/HR: 5.15; 2.03; 22; 5.4; 6.46; 3.09; .157 INJECTION INTRAVENOUS at 07:35

## 2019-01-01 RX ADMIN — Medication 5 ML: at 08:16

## 2019-01-01 RX ADMIN — Medication: at 13:44

## 2019-01-01 RX ADMIN — CALCIUM CHLORIDE, MAGNESIUM CHLORIDE, SODIUM CHLORIDE, SODIUM BICARBONATE, POTASSIUM CHLORIDE AND SODIUM PHOSPHATE DIBASIC DIHYDRATE 12.5 ML/KG/HR: 3.68; 3.05; 6.34; 3.09; .314; .187 INJECTION INTRAVENOUS at 20:21

## 2019-01-01 RX ADMIN — Medication 5 ML: at 07:39

## 2019-01-01 RX ADMIN — POLYETHYLENE GLYCOL 3350 17 G: 17 POWDER, FOR SOLUTION ORAL at 12:08

## 2019-01-01 RX ADMIN — HUMAN INSULIN 12 UNITS/HR: 100 INJECTION, SOLUTION SUBCUTANEOUS at 12:17

## 2019-01-01 RX ADMIN — FENTANYL CITRATE 50 MCG: 50 INJECTION INTRAMUSCULAR; INTRAVENOUS at 23:31

## 2019-01-01 RX ADMIN — SODIUM CHLORIDE, PRESERVATIVE FREE 640 UNITS: 5 INJECTION INTRAVENOUS at 12:45

## 2019-01-01 RX ADMIN — Medication: at 17:36

## 2019-01-01 RX ADMIN — EPOPROSTENOL 4 NG/KG/MIN: 1.5 INJECTION, POWDER, LYOPHILIZED, FOR SOLUTION INTRAVENOUS at 10:16

## 2019-01-01 RX ADMIN — ACETAMINOPHEN 325 MG: 325 TABLET, FILM COATED ORAL at 15:30

## 2019-01-01 RX ADMIN — VANCOMYCIN HYDROCHLORIDE 2000 MG: 1 INJECTION, POWDER, LYOPHILIZED, FOR SOLUTION INTRAVENOUS at 07:18

## 2019-01-01 RX ADMIN — CISATRACURIUM BESYLATE 6 MG: 2 INJECTION INTRAVENOUS at 15:28

## 2019-01-01 RX ADMIN — TICAGRELOR 90 MG: 90 TABLET ORAL at 20:37

## 2019-01-01 RX ADMIN — SODIUM CHLORIDE: 3 INJECTION, SOLUTION INTRAVENOUS at 00:14

## 2019-01-01 RX ADMIN — Medication 200 MG: at 08:20

## 2019-01-01 RX ADMIN — MICAFUNGIN SODIUM 100 MG: 10 INJECTION, POWDER, LYOPHILIZED, FOR SOLUTION INTRAVENOUS at 20:03

## 2019-01-01 RX ADMIN — HYDROCORTISONE SODIUM SUCCINATE 50 MG: 100 INJECTION, POWDER, FOR SOLUTION INTRAMUSCULAR; INTRAVENOUS at 12:03

## 2019-01-01 RX ADMIN — CALCIUM GLUCONATE 2 G: 98 INJECTION, SOLUTION INTRAVENOUS at 07:24

## 2019-01-01 RX ADMIN — AMIODARONE HYDROCHLORIDE 1 MG/MIN: 50 INJECTION, SOLUTION INTRAVENOUS at 05:01

## 2019-01-01 RX ADMIN — MEROPENEM 1 G: 1 INJECTION, POWDER, FOR SOLUTION INTRAVENOUS at 02:30

## 2019-01-01 RX ADMIN — CISATRACURIUM BESYLATE 10 MG: 2 INJECTION INTRAVENOUS at 16:38

## 2019-01-01 RX ADMIN — FENTANYL CITRATE 50 MCG/HR: 50 INJECTION INTRAVENOUS at 11:00

## 2019-01-01 RX ADMIN — VASOPRESSIN 3 UNITS/HR: 20 INJECTION INTRAVENOUS at 14:49

## 2019-01-01 RX ADMIN — DEXTROSE MONOHYDRATE 25 ML: 25 INJECTION, SOLUTION INTRAVENOUS at 09:34

## 2019-01-01 RX ADMIN — HUMAN INSULIN 10 UNITS/HR: 100 INJECTION, SOLUTION SUBCUTANEOUS at 20:05

## 2019-01-01 ASSESSMENT — ACTIVITIES OF DAILY LIVING (ADL)
ADLS_ACUITY_SCORE: 20
RETIRED_COMMUNICATION: 0-->UNDERSTANDS/COMMUNICATES WITHOUT DIFFICULTY
ADLS_ACUITY_SCORE: 20
AMBULATION: 0-->INDEPENDENT
ADLS_ACUITY_SCORE: 20
RETIRED_EATING: 0-->INDEPENDENT
ADLS_ACUITY_SCORE: 20
DRESS: 0-->INDEPENDENT
ADLS_ACUITY_SCORE: 20
TRANSFERRING: 0-->INDEPENDENT
ADLS_ACUITY_SCORE: 20
ADLS_ACUITY_SCORE: 16
ADLS_ACUITY_SCORE: 20
ADLS_ACUITY_SCORE: 16
ADLS_ACUITY_SCORE: 20
BATHING: 0-->INDEPENDENT
ADLS_ACUITY_SCORE: 20
FALL_HISTORY_WITHIN_LAST_SIX_MONTHS: NO
ADLS_ACUITY_SCORE: 20
SWALLOWING: 0-->SWALLOWS FOODS/LIQUIDS WITHOUT DIFFICULTY
ADLS_ACUITY_SCORE: 20
ADLS_ACUITY_SCORE: 19
ADLS_ACUITY_SCORE: 20
TOILETING: 0-->INDEPENDENT
ADLS_ACUITY_SCORE: 16
ADLS_ACUITY_SCORE: 20
COGNITION: 0 - NO COGNITION ISSUES REPORTED
ADLS_ACUITY_SCORE: 20
ADLS_ACUITY_SCORE: 26

## 2019-01-01 ASSESSMENT — MIFFLIN-ST. JEOR
SCORE: 1910
SCORE: 1806
SCORE: 1796
SCORE: 1808
SCORE: 1824
SCORE: 1948
SCORE: 1762
SCORE: 1896
SCORE: 1927
SCORE: 1807
SCORE: 1756
SCORE: 1790
SCORE: 1777
SCORE: 1819
SCORE: 1710
SCORE: 1764
SCORE: 1813
SCORE: 1744
SCORE: 1857
SCORE: 1801
SCORE: 1825
SCORE: 1744
SCORE: 1854
SCORE: 1910
SCORE: 1776
SCORE: 1773
SCORE: 1249
SCORE: 1739
SCORE: 1825
SCORE: 1830
SCORE: 1823
SCORE: 1834
SCORE: 1756
SCORE: 1249
SCORE: 1853
SCORE: 1829

## 2019-01-01 ASSESSMENT — VISUAL ACUITY
OU: NOT TESTABLE
OU: NOT TESTABLE

## 2019-01-01 ASSESSMENT — ENCOUNTER SYMPTOMS: DYSRHYTHMIAS: 1

## 2019-02-23 PROBLEM — I46.9 CARDIAC ARREST (H): Status: ACTIVE | Noted: 2019-01-01

## 2019-02-23 NOTE — Clinical Note
The first balloon was inserted into the right coronary artery and middle right coronary artery.  Max pressure = 16 jesenia. Total duration = 4 seconds.

## 2019-02-23 NOTE — Clinical Note
Potential access sites were evaluated for patency using ultrasound.   The left internal jugular vein was selected. Access was obtained under with Sonosite guidance using a standard 18 guage needle with direct visualization of needle entry.

## 2019-02-23 NOTE — PROGRESS NOTES
VEEG monitoring preliminary results:    VEEG has been running for over one hour.  EEG showed severe generalized slowing with low amplitude of the background activities. These are consistent with severe diffuse encephalopathy.  No clinical or electrographic seizures were observed.    Cindy Stapleton MD  Neurology

## 2019-02-23 NOTE — Clinical Note
The first balloon was inserted into the left anterior descending and proximal left anterior descending.    Balloon reinflated a second time:  Balloon reinflated a third time:

## 2019-02-23 NOTE — Clinical Note
The first balloon was inserted into the right coronary artery and middle right coronary artery.  Max pressure = 20 jesenia. Total duration = 4 seconds.

## 2019-02-23 NOTE — Clinical Note
Stent deployed in the middle right coronary artery. Max pressure = 11 jesenia. Total duration = 10 seconds.

## 2019-02-23 NOTE — PROGRESS NOTES
Patient achieved cooling goal temperature of 33 at 1050 am on 02/23/2019.   Goal temperature achieved with Arctic Sun, cooling blanket and ice packs.

## 2019-02-23 NOTE — Clinical Note
The first balloon was inserted into the circumflex and middle circumflex.  Max pressure = 10 jesenia. Total duration = 4 seconds.

## 2019-02-23 NOTE — Clinical Note
Potential access sites were evaluated for patency using ultrasound.   All access vessels selected.   Access was obtained under ultrasound guidance with direct visualization of needle entry.

## 2019-02-23 NOTE — Clinical Note
Balloon inserted to left anterior descending, proximal left anterior descending and middle left anterior descending.

## 2019-02-23 NOTE — Clinical Note
IABP inserted in the left femoral artery. IABP inserted with 50 cc balloon volume Lot number is: [id number]

## 2019-02-23 NOTE — ED TRIAGE NOTES
Pt was found down at home. His last known normal was approximately 1 hr ago. His initial rhythm was PEA, he was shocked 3 times and given 1.5 mg of epi from EMS. He then got a pulse back. An Igel is in place and the pt is being bagged for air.

## 2019-02-23 NOTE — Clinical Note
The first balloon was inserted into the left anterior descending and proximal left anterior descending.

## 2019-02-23 NOTE — ANESTHESIA PROCEDURE NOTES
ANESTHESIOLOGY RESIDENT/CRNA INTUBATION NOTE  Indication for intubation: cardiac arrest.  Provider Ordering Intubation: Abraham Baer MD  History regarding the most recent potassium obtained: Yes  History regarding renal failure obtained: Yes  History of presence or absence of CVA/stroke was obtained: Yes  History of presence or absence of NM disorder obtained: Yes  Post Intubation:  No apparent complications, Sedation to be ordered by primary/ICU team, Primary/ICU team to review CXR, Vent settings by primary/ICU team, ETT secured and Report given to primary nurse and/or team  ECMO VT/VF Arrest, Igel exchange to ETT, RN at bedside, RT at bedside, ETT secured +BBS, conformation with EZ CAP

## 2019-02-23 NOTE — PROGRESS NOTES
ECMO Shift Summary:    Patient remains on VA ECMO, all equipment is functioning and alarms are appropriately set. RPM's 3780 with flow range 3.9-4 L/min. Sweep gas is at 7 LPM and FiO2 100%. Circuit remains free of air, clot and fibrin. Cannulas are secure with no bleeding from site. Extremities are perfused. Suctioned ETT for copious pink frothy secretions.    Significant Shift Events: None    Vent settings:  Ventilation Mode: CMV/AC  (Continuous Mandatory Ventilation/ Assist Control)  Rate Set (breaths/minute): 12 breaths/min  Tidal Volume Set (mL): 250 mL  PEEP (cm H2O): 8 cmH2O  Oxygen Concentration (%): (S) 40 %  .    Heparin is not running, ACT range 233.    Blood loss was minimal. No product was given.      Intake/Output Summary (Last 24 hours) at 2/23/2019 0626  Last data filed at 2/23/2019 0600  Gross per 24 hour   Intake 8 ml   Output --   Net 8 ml       ECHO:  No results found for this or any previous visit.No results found for this or any previous visit.    CXR:  Recent Results (from the past 24 hour(s))   CT Chest Abdomen Pelvis w/o Contrast    Narrative    PRELIMINARY REPORT - The following report is a preliminary  interpretation.      Impression    IMPRESSION:   1. Extensive confluent opacities in the bilateral posterior lungs,  likely atelectasis. Intralobular septal thickening and groundglass  opacities throughout the aerated portions of the lung, alveolar  hemorrhage versus pulmonary edema.   2. Lines and tubes as described in findings.  3. Bilateral nondisplaced rib fractures.  4. Small amount of gas in the right groin and inguinal canal, likely  postprocedural during line placement.  5. 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  6. Soft tissue mass in the left inguinal canal, possibly partially  retracted testicle. Evaluated on physical exam.       Labs:  Recent Labs   Lab 02/23/19  0559 02/23/19  0540 02/23/19  0238 02/23/19  0211   PH 7.17* 7.14* 7.17* 7.20*   PCO2 50* 52* 54* 39   PO2 344* 161* 469*  184*   HCO3 18* 18* 20* 15*   O2PER 80 60  --   --        Lab Results   Component Value Date    HGB 14.6 02/23/2019     02/23/2019         Plan is to continue VA ECMO.      Min Sanders, RRT  2/23/2019 6:26 AM

## 2019-02-23 NOTE — Clinical Note
Arrived in CCL,  LFA IABP d/c'd per MD    mivf at tko 5cc/hr  norepi 0.02 mcg/kg/min  Vaso 3mg/hr  fent 100mcg/hr  Heparin 1400u/hr

## 2019-02-23 NOTE — PROGRESS NOTES
ECMO Shift Summary:    Patient remains on VA ECMO, all equipment is functioning and alarms are appropriately set. RPM's 3500 with flow range 4.01-4.30 L/min. Sweep gas is at 6 LPM and FiO2 50%. Circuit remains free of air, clot and fibrin. Cannulas are secure with little bleeding from site. Extremities are cooled to the touch. Suctioned ETT for pink frothy edema.      Vent settings:  Ventilation Mode: PCV PLUS  (Pressure Control Ventilation Plus (added secondary Mode)  FiO2 (%): 40 %  Rate Set (breaths/minute): 12 breaths/min  Tidal Volume Set (mL): 250 mL  PEEP (cm H2O): 10 cmH2O  Oxygen Concentration (%): 40 %  Peak Inspiratory Pressure (cm H2O) ([place] Mary): 28  Resp: 12  .    Heparin is running at 800 u/kg/hr, ACT range 180/200.    Product given included 500 Albumin.      Intake/Output Summary (Last 24 hours) at 2/23/2019 1610  Last data filed at 2/23/2019 1600  Gross per 24 hour   Intake 416.6 ml   Output 1525 ml   Net -1108.4 ml       ECHO:  No results found for this or any previous visit.No results found for this or any previous visit.    CXR:  Recent Results (from the past 24 hour(s))   CT Head w/o Contrast    Narrative    CT HEAD W/O CONTRAST 2/23/2019 4:36 AM    Provided History: Intraop and postop complications of circulatory  system    Comparison: None available.    Technique: Using multidetector thin collimation helical acquisition  technique, axial, coronal and sagittal CT images from the skull base  to the vertex were obtained without intravenous contrast.     Findings:    Diffuse increased density of the vascular structures consistent with  recent catheter angiography. No intracranial hemorrhage, mass effect,  or midline shift. Mild cerebral volume loss. The ventricles are  proportionate to the cerebral sulci. The gray to white matter  differentiation of the cerebral hemispheres is preserved. The basal  cisterns are patent. There is opacification of the cerebral arteries.    Mild paranasal sinus  mucosal thickening. The mastoid air cells are  hypoplastic.      Impression    Impression:   No acute intracranial pathology.    I have personally reviewed the examination and initial interpretation  and I agree with the findings.    MONIKA VALENTIN MD   CT Chest Abdomen Pelvis w/o Contrast    Narrative    PRELIMINARY REPORT - The following report is a preliminary  interpretation.      Impression    IMPRESSION:   1. Extensive confluent opacities in the bilateral posterior lungs,  likely atelectasis. Intralobular septal thickening and groundglass  opacities throughout the aerated portions of the lung, alveolar  hemorrhage versus pulmonary edema.   2. Lines and tubes as described in findings.  3. Bilateral nondisplaced rib fractures.  4. Small amount of gas in the right groin and inguinal canal, likely  postprocedural during line placement.  5. 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  6. Soft tissue mass in the left inguinal canal, possibly partially  retracted testicle. Evaluated on physical exam.   US Lower Extremity Arterial Duplex Bilateral    Narrative    Exam: Duplex ultrasound of bilateral lower extremity arteries dated  2/23/2019 9:21 AM     Clinical information: Cardiac Arrest     Comparison: None    Technique: Includes Gray Scale images, color Doppler, spectral Doppler  waveforms and velocities with appropriate angles of 60 degrees or  less.    Findings: ECMO in right groin, balloon pump in left groin    Right lower extremity:     Mid SFA: 24 cm/sec.  Distal SFA: 16 cm/sec.  Popliteal artery: 10 cm/sec.  PTA ankle: 6 cm/sec.  MAILE ankle: 5 cm/sec.    Waveforms: Monophasic tardus parvus waveforms throughout    Left lower extremity:    Proximal SFA: 87 cm/sec.  Mid SFA: 57 cm/sec.  Distal SFA: 71 cm/sec.  Popliteal artery: Unable to evaluate secondary to cooling device  PTA ankle: 26 cm/sec.  MAILE ankle: 31 cm/sec.    Waveforms: Triphasic waveforms throughout with blunted upstroke      Impression    Impression:     1.  Right leg: Monophasic tardus parvus waveforms throughout, likely  secondary to right femoral ECMO.  2. Left leg: Triphasic waveforms throughout with blunted upstroke,  likely secondary to IABP.     Guidelines:  [facility] duplex criteria for lower limb arterial  occlusive disease  -Percent stenosis- Normal (1-19%): Peak systolic velocity (cm/s):  <150, End-diastolic velocity (cm/s): <40, Velocity ration (Vr): <1.5,  Distal arterial waveform: Triphasic  -Percent stenosis- 20-49%: Peak systolic velocity (cm/s): 150-200,  End-diastolic velocity (cm/s): <40, Velocity ration (Vr): 1.5-2.0,  Distal arterial waveform: Triphasic  -Percent stenosis- 50-75%: Peak systolic velocity (cm/s): 200-300,  End-diastolic velocity (cm/s): <90, Velocity ration (Vr): 2.0-3.9,  Distal arterial waveform: Poststenotic turbulence distal to stenosis,  monophasic distal waveform  -Percent stenosis- >75%: Peak systolic velocity (cm/s): >300,  End-diastolic velocity (cm/s): <90, Velocity ration (Vr): >4.0, Distal  arterial waveform: Dampened distal waveform and low PSV/EDV* in the  stenosis  -Percent stenosis- Occlusion: Absent flow by color Doppler/pulsed  Doppler spectral analysis; length of occlusion estimated from distance  between exit and reentry collateral arteries  *PSV = peak systolic velocity, EDV = end-diastolic velocity  http://link.[employer].com/chapter/10.1007/[email or web address]/fulltext  html    I have personally reviewed the examination and initial interpretation  and I agree with the findings.    AYSE LEROY MD   XR Chest Port 1 View    Narrative    Exam: XR CHEST PORT 1 VW, 2/23/2019 2:32 PM    Indication: ETT repositioned    Comparison: Radiograph on 12/23/2019 at 0627    Findings:   Supine frontal view of chest.  The tip of ETT projects 4 cm above the tereso. Unchanged position of  ECMO cannula. An enteric tube courses below the left hemidiaphragm and  out of field of view. The metallic marker of  IABP unchanged in  position.    Interval moderate improvement in aeration of the left lung. No  significant change in diffuse airspace opacity of about the right  lung. No appreciable pneumothorax.      Impression    Impression:   1. The tip of ETT projects 4 cm above the tereso.  2. Interval moderate improvement of left lung aeration.  3. No significant change in diffuse right lung airspace opacities.    I have personally reviewed the examination and initial interpretation  and I agree with the findings.    MIKKI DAI MD   XR Abdomen Port 1 View    Narrative    Abdominal radiograph one view  2/23/2019 2:35 PM      HISTORY: Evaluate for ischemic bowel    COMPARISON: CT earlier today    FINDINGS: Gastric tube tip and sidehole project over the stomach. Left  femoral line at the atriocaval junction. Right femoral ECMO partially  seen. Nonobstructive bowel gas pattern with no pneumatosis or portal  venous gas. Persistent bilateral nephrogram, right greater than left  from recent CT of the brain with contrast.      Impression    IMPRESSION: Nonobstructive bowel gas pattern with no pneumatosis or  portal venous gas.    I have personally reviewed the examination and initial interpretation  and I agree with the findings.    MIKKI DAI MD       Labs:  Recent Labs   Lab 02/23/19  1348 02/23/19  1228 02/23/19  1012 02/23/19  0750   PH 7.24* 7.22* 7.24* 7.21*   PCO2 33* 32* 38 46*   PO2 118* 126* 253* 358*   HCO3 14* 13* 16* 18*   O2PER 40 40 40 80       Lab Results   Component Value Date    HGB 15.6 02/23/2019    PHGB 70 (H) 02/23/2019     02/23/2019    FIBR 230 02/23/2019    INR 1.33 (H) 02/23/2019    PTT 69 (H) 02/23/2019    DD 17.3 (H) 02/23/2019    AXA 0.18 02/23/2019    ANTCH 57 (L) 02/23/2019         Plan is to re-warm at 11am tomorrow.      Maximo Hale, RRT  2/23/2019 4:10 PM

## 2019-02-23 NOTE — PROGRESS NOTES
Patient intubated in cath lab with a 8.0 endotracheal tube and placed at 25 at the teeth. Bilateral breaths auscultated and positive end tidal CO2 detected. Chest x-ray pending.    Intra-aortic balloon pump also placed in cath lab by Dr. Baer at 03:12. Site: left femoral artery, with sheath, 50 ml balloon and 8 Armenian catheter, indication: cardiac arrest, not a difficult insertion, position verified with fluroscopy in cath lab.    Note co-author: Alo Carty, RRT

## 2019-02-23 NOTE — Clinical Note
The first balloon was inserted into the left anterior descending and proximal left anterior descending.    Balloon reinflated a second time:

## 2019-02-23 NOTE — PROGRESS NOTES
Cardiology Progress Note    Overnight/subj:   - Admitted early this am for refractory VF.  - Possible 30min down time   - Angiogram revealed diffuse 3v disease s/p MITA to mRCA and mLcx  - Thermopguard catheter  placed but no machine available     VS reviewed: Notable for temp: AF, HR 77-96, MAP , oxygenating on 100%  Tele: No events, sinus bradycardia  IABP via LFA, 1:1, triggered by ECG, augmented MAPs 100, no pulsatility while iabp off    ECMO: RPM 3780, Flow - 3.96, sweep 7    Wt: on admit 107.9    Drips:   Ovpiokaig411ftk/hr  Midazolam 6mg/hr    Hep gtt   Insulin gtt    PO Cardiac Meds: ASA 81mg daily, ticagrelor 90mg BID  Other meds: Zosyn 3.375g q6h,     Radiology Imaging  CXR  (My interpretation) ETT is 7cm above jalen. IABP is 2.8cm above tereso. Venous ECMO cannula is at SVC-RA juncture. Right lung is white out and appears collapse. Diffuse intestinal infiltrate of the left lung concerning ARDS, severe pulm edema or PAH.     CT head:  Findings:    Diffuse increased density of the vascular structures consistent with  recent catheter angiography. No intracranial hemorrhage, mass effect,  or midline shift. Mild cerebral volume loss. The ventricles are  proportionate to the cerebral sulci. The gray to white matter  differentiation of the cerebral hemispheres is preserved. The basal  cisterns are patent. There is opacification of the cerebral arteries.    CT Chest/Abdomen/Pelvis  IMPRESSION:   1. Extensive confluent opacities in the bilateral posterior lungs,  likely atelectasis. Intralobular septal thickening and groundglass  opacities throughout the aerated portions of the lung, alveolar  hemorrhage versus pulmonary edema.   2. Lines and tubes as described in findings.  3. Bilateral nondisplaced rib fractures.  4. Small amount of gas in the right groin and inguinal canal, likely  postprocedural during line placement.  5. 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  6. Soft tissue mass in the left inguinal canal,  possibly partially  retracted testicle. Evaluated on physical exam.    Cardiology Studies:  No new studies    Labs: Reviewed in [facility]  LActate down 4.9  Na 141 Cr 1.9  Hgb 16,  Wbc 17    PH 7.2 pCO2 50 pO2 344    Phys exam:  GEN: NAD  Pulm: diminish breath sounds b/l, course breath sounds b/l  Cardiac: iabp inflation heard  Vascular: warm, NIFS  Noted    GI: distended and moderately rigid     ASSESSMENT/PLAN:  Mr. Riddle is a 40yo M with HLD and Fhx CAD/MI who presented with refractory VF OHCA brought directly to the CCL now s/p peripheral VA ECMO placement. Found to have severe 3v disease s/p MITA to mRCA and mLcx. Also with IABP placement for presumed pulmonary edema. Being managed as part post VF protocol.      Neurology: - Intubated, sedated, paralyzed.   - EEG today   - Na 155-160 for brain edema ppx  - Thermoguard machine not available, will attemp to cool via ArticSun goal temp 34 degree C  --continue versed, fentanyl, and cis as needed to maintain paralysis - RASS goal -4 to -5       Cardiovascular / Hemodynamics: Refractory VF arrest.  Successful deployment of drug eluting stents to the Mid RCA and Mid Cx      Peripheral V-A ECMO inserted.    TTE: pending  EKG: SR  --wean pressors/inotropes as able  --echo today, no turn down  --continue ASA 81mg and ticagrelor 90mg BID  --hold temp at 34  --ACT goal 180-200  --hold lipitor for now given likely hepatic injury during arrest  --hold ACE/ARB for now given likely reduced renal fxn after arrest  --holding beta blocker given shock    Pulmonary:  # Large b/l opacities c/f early ARDS, pulm edema vs PAH   # Resp acidosis  -- Advance ETT   --Q2h ABGs for now  --consider scheduled duonebs if signs of lung dz, currently PRN  -- Continue to monitor lung opacities  -- No CT for now   -- Oxygenating well        GI and Nutrition: No known medical hx.    --monitor BID LFTs  --NPO while cooled - nutrition consult pending feeding tube placement once he is warmed   --bowel regimen -  on hold for now due to hypothermia  --GI Prophylaxis: PPI    Renal, Fluid and Electrolytes:  # Anion gap metabolic acidosis (AG 21) likely combination of renal and lactic acidosi s. If persist will send serum osms  -monitor urine output  --maintain K>3 and Mg>2    Infectious Disease: No signs of infection. Leukocytosis c/w arrest. Blood cultures collected.   --vancomycin/zosyn x5 days for ECMO  --daily blood cultures  --monitor for signs of infection given cooling, lines, and leukocytosis   Hematology and Oncology: Receiving heparin for ECMO and ASA/ticagrelor for MITA.   --cryo PRN fibrinogen < 200; FFP for INR >2  --Transfuse for Hgb<10  --heparin gtt for ECMO with ACT goal 160-180  --US LE w/ arterial duplex per ECMO protocol   --DVT PPX: Heparin as above   Endocrinology: No known medical history. BG elevated.  --insulin gtt  --f/u HgbA1c    Lines: R femoral arterial and venous ECMO cannulae February 23, 2019  L femoral arterial line February 23, 2019  R radial arterial line February 23, 2019  ETT February 23, 2019  Pisano catheter February 23, 2019  OG tube February 23, 2019  Restraint: needed    Current lines are required for patient management       Code status FULL    To be discussed with Dr. Baer.     Theron Hebert MD   CSI Service   *[phone]

## 2019-02-23 NOTE — Clinical Note
The first balloon was inserted into the right coronary artery and middle right coronary artery.  Max pressure = 10 jesenia. Total duration = 4 seconds.

## 2019-02-23 NOTE — CONSULTS
[facility]  Palliative Care Consultation Note    Patient: Hellen Riddle  Date of Admission:  2/23/2019    Requesting Clinician / Team: CSI  Reason for consult: Patient and family support, ECMO    Recommendations:  Large family, appears to be supporting each other well  They decline spiritual support currently  We will continue to follow as course unfolds    These recommendations have been discussed with bedside RN.    Thank you for the opportunity to participate in the care of this patient and family. Our team will follow 2-3x/week. Please feel free to contact the on-call Palliative provider with any urgent needs.    Total time spent was 40 minutes,  >50% of time was spent counseling and/or coordination of care regarding family support.    Moraima Acosta MD / Palliative Medicine / Pager [phone] / [facility] Inpatient Team Consult Pager [phone] (answered 8am-430pm M-F) - ok to text page via [employer] / After-Hours Answering Service [phone] / Palliative Clinic in the [facility] at the [facility] [place] - [phone] (scheduling); [phone] (triage).    Assessments:  Hellen Riddle is a 39 year old male with hx of dyslipidemia admitted 2/23 after cardiac arrest at home, now on ECMO for refractory VFib.  He received 2 MITA to RCA and LCx, with chronic occlusion of LAD found. He is currently being cooled, with persistent lactic acidosis and evidence of acute kidney and liver injury.      Met with wife, oldest sister, sister in law, and nieces. I introduced the Palliative Care team model and services we provide including symptom management, assistance navigating illness and decisions for treatment, counseling, psychosocial and spiritual support.     They describe Hellen as a positive, caring man who put the needs of his family over his own needs.   Wife understandingly overwhelmed, speaks little during the visit, deferring to her sister to give history. Many family members present to comfort  her, encouraging her to eat and rest.  His children witnessed resuscitation efforts at home and have visited in the hospital.  The family is taking shifts at the hospital and his children are also staying with family.  They appear well-supported.  The family is Restorationism, and wife says this was important to Hellen as well.  They decline spiritual support at this time.     Prognosis, Goals, & Planning:   Discussion about disease understanding, prognosis, care goals: Recently met with cardiology team, discussed his lactic acidosis, kidney injury, and cooling process. Have been told will know more in the next 2-3 days.    Decision making capacity: Unable  Preferred way of decision making: As family  Health care directive: Has not completed  Health care agent: NOK would be wife if they are legally     Coping, Meaning, & Spirituality: Restorationism        Social:   Living situation: Lives at home with wife, 14yo son and 8yo daughter  Support system/Family system: Hellen is 2nd youngest of 9 children, large extended family  Functional status: Previously working, independent  Substance use disorder (past / present): No known  Occupation/Past-times: Works as a     History of Present Illness:   Sources of History:electronic health record and family    Hellen Riddle is a 37yo M w dyslipidemia admitted 2/23/19 after cardiac arrest.  He was at home when wife witnessed agonal breathing and seizure-like activity after going to bed.  Per family, had attempted CPR.  Initial rhythm was VF with 4 shocks given and intermittent ROSC.  Was in refractory VF upon arrival to [facility].  On cardiac cath found 3 vessel CAD with chronic total occlusion of proximal LAD w collaterals, and received drug-eluting stents to mid-RCA and mid-circumflex.  VA-ECMO and balloon pump were placed and he is currently being cooled.      ROS:  Unable to assess 2/2 intubation     Past Medical History:  Past Medical History:   Diagnosis Date      Dyslipidemia      Past Surgical History:  No past surgical history on file.      Family History:  No family history on file. +FH of CHF and CAD     Allergies:  No Known Allergies     Medications:  I have reviewed this patient's medication profile and medications from this hospitalization.     Noted scheduled meds are:  Cisatracurium  Fentanyl 100 mcg/hr  Midazolam 6 mg/hr  Pantoprazole 40 mg daily    Physical Exam:  Vital Signs: Temp: (!) 90.7  F (32.6  C) Temp src: Esophageal     Heart Rate: (!) 48 Resp: 12 SpO2: 90 % O2 Device: Mechanical Ventilator    Weight: 237 lbs 14.02 oz     Constitutional: Intubated, sedated  HEENT: Eyes closed, pupils pinpoint,  ET tube in place  CV:  ECMO and IABP in place, warm hands and feet  Respiratory:  CTAB, no wheeze  GI:  Soft, hypoactive BS, nondistended  Musculoskeletal: + lower extremity edema   Neuro: Sedated and paralyzed  Skin: Warm, no rash    Data reviewed:  Recent imaging reviewed, comments on pertinents:   CT Head 2/23/19  Findings:    Diffuse increased density of the vascular structures consistent with  recent catheter angiography. No intracranial hemorrhage, mass effect,  or midline shift. Mild cerebral volume loss. The ventricles are  proportionate to the cerebral sulci. The gray to white matter  differentiation of the cerebral hemispheres is preserved. The basal  cisterns are patent. There is opacification of the cerebral arteries.    Mild paranasal sinus mucosal thickening. The mastoid air cells are  hypoplastic.                                                               Impression:   No acute intracranial pathology.    CT CAP 2/23/19 - Prelim read  IMPRESSION:   1. Extensive confluent opacities in the bilateral posterior lungs,  likely atelectasis. Intralobular septal thickening and groundglass  opacities throughout the aerated portions of the lung, alveolar  hemorrhage versus pulmonary edema.   2. Lines and tubes as described in findings.  3. Bilateral  nondisplaced rib fractures.  4. Small amount of gas in the right groin and inguinal canal, likely  postprocedural during line placement.  5. 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  6. Soft tissue mass in the left inguinal canal, possibly partially  retracted testicle. Evaluated on physical exam.    Recent lab data reviewed, comments on pertinents:   Cre 2.30, CO2 16  Alb 2.3, , AST 1083  Lactic Acid 8.3 (uptrending from 5.7)  Trop 143  WBC 17.4, Hb 15.6, Plt 150

## 2019-02-23 NOTE — Clinical Note
The first balloon was inserted into the circumflex and middle circumflex.  Max pressure = 12 jesenia. Total duration = 4 seconds.

## 2019-02-23 NOTE — Clinical Note
The first balloon was inserted into the right coronary artery and middle right coronary artery.  Max pressure = 12 jesenia. Total duration = 6 seconds.

## 2019-02-23 NOTE — LETTER
[facility]  [address] [place] [address]    02/28/19      TO WHOM IT MAY CONCERN:    Hellen Riddle was hospitalized from 2/23/2019 to present for medical illness.     Please excuse his family member from school/work.      Please contact me if there are any questions or concerns.      Sincerely,    Theron Hebert MD  [phone]

## 2019-02-23 NOTE — Clinical Note
Intersept lot number [id number]  Protek duo dialator set lot number [id number]  ProteEnergate dual lumen VV cannula set lot number [id number]

## 2019-02-23 NOTE — Clinical Note
Cannula being sutured in and tegaderm placed.  Vein ECMO in RFV was also pulled out just a little bit.

## 2019-02-23 NOTE — PROGRESS NOTES
ECLS Cannulation Note:    Date on: 2/23/2019  Time on: 01:46  Surgeon: Buffy    Arterial Cannula: 17 Fr. In the Right Femoral Artery      Venous Cannula: 25 Fr. In the Right Femoral Vein      ECMO components include CardioHelp Circuit Lot # [id number]    Cannulation was performed in the Cath Lab, placement was verified by fluoroscopy, cannulas are in good position.  ISTAT and blood cooler are at bedside. Patient's blood type is A, positive.    Note co-author: Alo Carty, CALLIE Sanders, CALLIE  2/23/2019 5:25 AM

## 2019-02-23 NOTE — PROCEDURES
CARDIAC CATH REPORT:   PROCEDURES PERFORMED:    -- Insertion of Peripheral V-A Extracorporeal Membrane Oxygenation - Cardiohelp  -- Selective left and right coronary angiography.  -- Percutaneous coronary intervention of the RCA and mid Cx.  -- Insertion of thermogard cooling catheter  -- Insertion of intra-aortic balloon pump  -- Insertion of right radial arterial line    PHYSICIANS:  -- Attending Interventional Cardiology Staff: Abraham Baer MD, PhD  -- Interventional Cardiology Fellow: ALTON Ruvalcaba MD, PhD     INDICATION:  The patient is a 39 year old male with unknown hx was transported to the cardiac cath lab emergently with ongoing MANNY CPR after out-of-hospital refractory VT/VF cardiac arrest. He was discovered in bed apneic and family called 911. He was brought to the ED by [facility], and arrived about 60 minutes from 911 call.    Witnessed arest (y/n): Yes  Home or public location (y/n): home  Bystander CPR (y/n): No  Time of 911 call: ?  Initial rhythm: VF- AED shock  Did they have intermittent ROSC (y/n): yes  # of shocks: 4  Epi:  2mg  Amio:  0mg  Bicarb:  ?amps  EtCO2 en route: ?  O2 sat en route: ?    Initial rhythm in the cath lab: Sinus  First AB.9    DESCRIPTION:  --Emergent Consent.  --Sterile prep and procedure.  Local anesthetic with lidocaine.  A standard (18 g) needle with ultrasound guidance was used to establish vascular access using a modified Seldinger technique.  --Access/Lines:    ECMO Cannulas: 17Fr arterial cannula in RFA, 25Fr venous cannula in RFV, 8F distal antegrade flow cannula in the R SFA   Additional Lines: 9F sheath in LFA with IABP, 9F thermogard catheter in the LFV, arterial line in R radial artery.    --Catheters: 6Fr 3DRC, 6Fr JL-4, 6Fr  JR4, XB3.5 guide  --Fluoroscopy time of 22.4 min.  --Estimated blood loss of 100 mL.  --See below for procedure details.    MEDICATIONS:  --Contrast 200 mL IV.  --Sedation: versed/fentanyl.  The patient was unresponsive to  stimuli.  --Heparin administered to achieve a goal ACT > 250 sec.    --Epi and bicarb boluses as needed  --temporary norpinephrine gtt  --Ticagrelor 180 mg po  --ASA 325mg    The procedure was performed under conscious sedation for 150 minutes from 0130 to 1600.  The patient was assessed immediately before the first sedation medication was administered.  Midazolam 4 mg + 4mg/hr gtt and Fentanyl 100 mcg were administered.  Heart rate, BP, respiration, oxygen saturation and patient responses were monitored throughout the procedure with the assistance of the RN under my supervision.    HEMODYNAMICS:  Initial rhythm in the cath lab: sinus, VF after on ECMO, then returned to sinus spontaneously    CORONARY ANGIOGRAM:    --Both coronary arteries arise from their respective cusps.  --right dominant.  --LM is without angiographic evidence of disease.   --LAD is a type 3 vessel giving rise to septal perforators, D1 and D2.  Proximal LAD - fills retrograde via R-L collaterals. There appears to be mild diffuse disease via retrograde flow.  --LCX is a moderate sized vessel giving rise to 2 large OMs and 2 small OMs, and 4 small to moderate LPLs. The mid Cx has a 90% stenosis. The AV groove Cx into the 4th LPL has severe diffuse disease with sequential 80-90% stenoses and BERNICE 2 flow distally.  --RCA is a dominant vessel giving rise to RPDA and RPL branches. The mid RCA has 20% disease. The distal RCA has a 70-80% stenosis. There is mild disease in the RPDA. This vessel provides R-L collaterals that retrograde fill the LAD.    EXTRACORPOREAL MEMBRANE OXYGENATION CANNULATION:  A 17Fr arterial ECMO cannula was inserted into the right common femoral artery and a 25Fr venous cannula was inserted into the right femoral vein.  The arterial cannula was positioned in the iliac artery and the venous canula was positioned across the RA.  The ECMO circuit was primed, closely inspected to ensure no bubbles present and ECMO was  initiated.  Right lower extremity perfusion was assessed by doppler and ultrasound.  An anterograde perfusion cannula was deemed necessary and placed.    PERCUTANEOUS CORONARY INTERVENTION:  Heparin was administered to achieve a goal ACT > 250 sec.     Mid RCA Lesion:  A 6Fr JR4 guide catheter was positioned at the ostium of the RCA.  A runthrough wire was advanced across the mid/distal RCA lesion and positioned in the RPL. A 2.5x20mm balloon was used to pre-dilate the lesion. A 2.5x38mm Synergy drug eluting stent was successfully deployed across the lesion with inflation to 12atm. We post dilated the distal portion of the stent with 3.0x20 NC balloon and the proximal portion with a 4.0x15mm NC balloon. Final angiography showed no evidence of perforation or dissection with residual stenosis of 0% and BERNICE 3 flow. No complications.    Mid LCx:  XB 3.5 guide  A runthrough wire was used to cross the mid Cx lesion and positioned in the distal large OM branch. We predilated with a 2.5x15mm NC balloon. A 2.5x24mm Synergy MITA was successfully deployed with inflation with inflation to 12 jesenia. We postdilated the distal portion of the stent with a 2.5x15mm NC balloon and the proximal portion with a 3.0x15mm NC Emerge. Final angiography showed no evidence of perforation or dissection with residual stenosis of 0% and BERNICE 3 flow. No complications.    COMPLICATIONS:  None    SUMMARY:    --Refractory VF cardiac arrest  --Successful insertion of Peripheral V-A Extracorporeal Membrane Oxygenation - Cardiohelp - in the right femoral artery and vein  --Successful insertion of a distal antegrade flow cannula in the R SFA  --3 vessel coronary artery disease,  of the proximal LAD  --Successful deployment of drug eluting stents to the Mid RCA and Mid Cx  --Successful insertion of thermogard cooling catheter in the left femoral vein  --Successful insertion of intra-aortic balloon pump  --Successful insertion of right radial arterial  line for hemodynamic monitoring    PLAN:    --Aspirin 81 mg po daily lifelong.  --Ticagrelor 90 mg po bid for at least 1 year uninterrupted.  --Bedrest for duration of ECMO therapy  --Targeted temperature management x 24hrs  --Continued ECMO and heparin as needed for ACT goal 180-200  --Norepi to maintain MAP>60  --Admit to the ICU for continued evaluation and management    The attending interventional cardiologist was present for the entire procedure.    See CVIS report for final draft.    ALTON Ruvalcaba MD, PhD  Interventional Cardiology Fellow      ATTENDING ATTESTATION: I was present and supervised the cardiology fellow throughout the procedure and reviewed the findings with the fellow at the completion of the procedure.  I agree with the documentation above.    Abraham Baer MD, PhD  Interventional/Critical Care Cardiology  [phone]    February 23, 2019

## 2019-02-23 NOTE — H&P
[facility]  Interventional Cardiology History & Physical  2019          H&P:   Ciara Winslow is a 38 year old male who was admitted on 2019. Wife noticed agonal breathing and seizure like activity. Called EMS, who came and started chest compressions. No history of HTN, DM. Does have history of dyslipidemia. Non smoker. Went to bed at 11:30, wife found him foaming at mouth around midnight. Called EMS, did not get significant chest compressions. EMS arrived 5 minutes later. Taken to [facility].    Family history of CAD and CHF.     Per cath report:    SUMMARY:    --Refractory VF cardiac arrest  --Successful insertion of Peripheral V-A Extracorporeal Membrane Oxygenation - Cardiohelp - in the right femoral artery and vein  --Successful insertion of a distal antegrade flow cannula in the R SFA  --3 vessel coronary artery disease,  of the proximal LAD  --Successful deployment of drug eluting stents to the Mid RCA and Mid Cx  --Successful insertion of thermogard cooling catheter in the left femoral vein  --Successful insertion of intra-aortic balloon pump  --Successful insertion of right radial arterial line for hemodynamic monitoring        Witnessed arest (y/n): yes  Home or public location (y/n): yes (home)  Bystander CPR (y/n): unknown  Time of 911 call:   Initial rhythm: VF  Did they have intermittent ROSC (y/n): yes  # of shocks: 4  Epi: 1.5 mg  Amio: 0 mg  Bicarb: Unknown amps  EtCO2 en route: 40s    Initial rhythm in the cath lab: Wide complex  First AB.9/90/48           Medications:   I have reviewed this patient's current medications    No past medical history on file.    No family history on file.  Social History     Socioeconomic History     Marital status: Not on file     Spouse name: Not on file     Number of children: Not on file     Years of education: Not on file     Highest education level: Not on file   Occupational History     Not on file    Social Needs     Financial resource strain: Not on file     Food insecurity:     Worry: Not on file     Inability: Not on file     Transportation needs:     Medical: Not on file     Non-medical: Not on file   Tobacco Use     Smoking status: Not on file   Substance and Sexual Activity     Alcohol use: Not on file     Drug use: Not on file     Sexual activity: Not on file   Lifestyle     Physical activity:     Days per week: Not on file     Minutes per session: Not on file     Stress: Not on file   Relationships     Social connections:     Talks on phone: Not on file     Gets together: Not on file     Attends Cheondoism service: Not on file     Active member of club or organization: Not on file     Attends meetings of clubs or organizations: Not on file     Relationship status: Not on file     Intimate partner violence:     Fear of current or ex partner: Not on file     Emotionally abused: Not on file     Physically abused: Not on file     Forced sexual activity: Not on file   Other Topics Concern     Not on file   Social History Narrative     Not on file            Review of Systems:   Constitutional: negative  Skin: negative  Musculoskeletal: negative  Eyes: negative  ENT: negative  Endocrine: negative  Respiratory: negative  Cardiovascular: negative  Heme: negative  Lymph: negative  GI: negative  : negative  Neuro: as above  Psych: as above            Physical Exam:   @Vitals: There were no vitals taken for this visit.  BMI= There is no height or weight on file to calculate BMI.   GENERAL APPEARANCE: Intubated, sedated. NAD.HEENT: JVP 7. No icterus, PERRL 2 mm, ETT in place, OG tube in place  CARDIOVASCULAR: regular rate and rhythm, normal S1 and S2, no S3 or S4 and no murmur, click or rub. Normal PMI. Pulses dopplerable.  RESP: Coarse bilaterally. Mechanical ventilation.    GASTRO: Soft, bowel sounds hypoactive but present  GENITOURINARY: Pisano in place.  EXTREMITIES: Cool, 1+ edema, pulses dopplered, as above.  VA ECMO cannulas in right groin, ThermoGard and IO in place right Tibia  NEURO: Sedated and intubated, Pupils equal and reactive. Fent and Versed for sedation, as below.  INTEGUMENTARY: No rashes. Cannula/Line sites CDI  LINES/TUBES/DRAINS: (noted below) V-A ECMO Cannulas R groin, arterial sheath and ThermoGard in L groin. R radial Arterial line. ETT. OG. Pisano Catheter.    Arterial Blood Gas: No lab results found in last 7 days.  CXR: pending   There were no vitals filed for this visit.No intake/output data recorded.  No lab results found in last 7 days.  No components found for: URINE   No lab results found in last 7 days.     No Data Recorded   No lab results found in last 7 days.    Invalid input(s): NEUT, LYM, EOS  No lab results found in last 7 days.  No lab results found in last 7 days.    Lines:           Data:   All lab results reviewed past 24 hours    No results found for this or any previous visit (from the past 24 hour(s)).           Assessment and Plan:   Ciara Winslow is a 89 year old male who was admitted on 2/23/2019.    Neurology: Intubated, sedated, paralyzed. Cooled to 34 degrees.  --continue versed, fentanyl, and cis as needed to maintain paralysis - RASS goal -4 to -5      Cardiovascular / Hemodynamics: Refractory VF arrest.  Successful deployment of drug eluting stents to the Mid RCA and Mid Cx     Peripheral V-A ECMO inserted. LA 15.   TTE: pending  EKG: SR  --wean pressors/inotropes as able  --echo tomorrow with ECMO turndown  --continue ASA 81mg and ticagrelor 90mg BID  --hold temp at 34  --ACT goal 180-200  --hold lipitor for now given likely hepatic injury during arrest  --hold ACE/ARB for now given likely reduced renal fxn after arrest  --holding beta blocker given shock    Pulmonary:   Vent Settings:  ETT.  Now weaning vent requirements.  CXR: Lines in stable position.   --wean vent as able  --daily CXR  Chest CT  --Q2h ABGs for now  --consider scheduled duonebs if signs of lung dz,  currently PRN     GI and Nutrition: No known medical hx.    --monitor BID LFTs  --NPO while cooled - nutrition consult pending feeding tube placement once he is warmed   --bowel regimen - on hold for now due to hypothermia  --GI Prophylaxis: PPI    Renal, Fluid and Electrolytes: --monitor urine output  --maintain K>3 and Mg>2    Infectious Disease: No signs of infection. Leukocytosis c/w arrest. Blood cultures collected.   --vancomycin/zosyn x5 days for ECMO  --daily blood cultures  --monitor for signs of infection given cooling, lines, and leukocytosis   Hematology and Oncology: Receiving heparin for ECMO and ASA/ticagrelor for MITA.   --cryo PRN fibrinogen < 200; FFP for INR >2  --Transfuse for Hgb<10  --heparin gtt for ECMO with ACT goal 160-180  --US LE w/ arterial duplex per ECMO protocol   --DVT PPX: Heparin as above   Endocrinology: No known medical history. BG elevated.  --insulin gtt  --f/u HgbA1c    Lines: PA catheter February 23, 2019  R femoral arterial and venous ECMO cannulae February 23, 2019  L femoral arterial line February 23, 2019  R radial arterial line February 23, 2019  ETT February 23, 2019  Pisano catheter February 23, 2019  OG tube February 23, 2019  Restraint: needed    Current lines are required for patient management       Family update by me today: Yes     Code Status:    The pt was discussed and evaluated with Dr. Baer, attending physician, who agrees with the assessment and plan above.     Kim Barba MD  [facility]  Cardiology Fellow, PGY-4  [phone]

## 2019-02-23 NOTE — Clinical Note
R-Band utilized for closure of sight.  Mechanical pressure applied applied by scrub person; hemostasis achieved.

## 2019-02-23 NOTE — Clinical Note
The first balloon was inserted into the circumflex and middle circumflex.  Max pressure = 14 jesenia. Total duration = 4 seconds.

## 2019-02-23 NOTE — Clinical Note
29 fr cannula trying to be inserted in the RIJV but not going.  Going to dialaote with a 14 fr cook oseas check introducer

## 2019-02-23 NOTE — Clinical Note
The first balloon was inserted into the left anterior descending and distal left anterior descending.

## 2019-02-23 NOTE — Clinical Note
The first balloon was inserted into the circumflex and middle circumflex.  Max pressure = 12 jesenia. Total duration = 4 seconds.     Max pressure = 12 jesenia. Total duration = 4 seconds.    Balloon reinflated a second time: Max pressure = 12 jesenia. Total duration = 4 seconds.

## 2019-02-23 NOTE — ED PROVIDER NOTES
[place] EMERGENCY DEPARTMENT ([facility])  2/23/19   History     Chief Complaint   Patient presents with     Cardiac Arrest     The history is provided by the EMS personnel.     Hellen Riddle is a 39 year old male with a medical history significant for hypertension and family reports he has a history of seizure disorder as well who presents to the Emergency Department via EMS from his home for cardiac arrest.  Patient's last known well was at approximately 12 AM; the patient's family found the patient down on his bed.  They moved the patient to the ground and started CPR and called 911.  When EMS arrived to the house, patient's initial rhythm was PEA.  A total of 1.5 of epi was given and they began giving amiodarone; they report that immediately after starting this the patient had a rhythm change and they stopped the amiodarone.  The patient went into V. fib and he was shocked a total of 4 times.  The patient's last, the rhythm appeared to be wide-complex V. tach.  The patient did ROSC with a pulse on the last check prior to arrival.  The patient's blood glucose level was 116.  He was given a total of 1 L of IV fluids.  The family reported to EMS that the patient has no recent alcohol use or drug use.    I have reviewed the Medications, Allergies, Past Medical and Surgical History, and Social History in the Epic system.    No past medical history on file.    No past surgical history on file.    No family history on file.    Social History     Tobacco Use     Smoking status: Never Smoker   Substance Use Topics     Alcohol use: Not on file       No current facility-administered medications for this encounter.      Current Outpatient Medications   Medication     losartan-hydrochlorothiazide (HYZAAR) 100-25 MG per tablet      No Known Allergies      Review of Systems   Unable to perform ROS: Patient unresponsive       Physical Exam   BP: (!) 144/114  Pulse: 114  Temp: 96.7  F (35.9  C)  Weight: 114.5 kg (252  lb 6.4 oz)  SpO2: 90 %      Physical Exam   Constitutional:   Ill appearing, lying on stretcher with lucus device on his chest, LMA in place, bagging   HENT:   Head: Normocephalic and atraumatic.   Mouth/Throat: Oropharynx is clear and moist.   Eyes: Conjunctivae are normal.   Neck: Normal range of motion. Neck supple.   Cardiovascular: Normal heart sounds.   Tachycardic 114 bpm   Pulmonary/Chest: Breath sounds normal. No respiratory distress.   LMA in place, bagging   Abdominal: Soft. He exhibits distension. There is no tenderness.   Musculoskeletal: He exhibits no deformity.   Neurological:   unresponsive   Skin: No rash noted.   Cool extermities   Psychiatric: He has a normal mood and affect. His behavior is normal.       ED Course   1:25 AM  The patient was seen and examined by Carole Mujica MD in Room ED01.        Procedures            Critical Care time:  . CRITICAL CARE: 30 minutes exclusive of procedures but including obtaining history, bedside examination, supervision of care, record review and collateral history from other parties, documentation, review/interpretation of imaging and lab results, discussion with patient, family, nursing, ancillary staff, consultants, and admitting service provider.   This patient presented with cardiac arrest with ROSC requiring immediate bedside evaluation and intervention to prevent sudden, clinically significant, or life threatening deterioration in the patient's condition.              Assessments & Plan (with Medical Decision Making)   Hellen Riddle is a 39 year old male with a medical history significant for hypertension who presents to the Emergency Department via EMS from his home for cardiac arrest.  Patient was found down at home around 12 midnight this evening.  Upon EMS arrival patient was noted to be in PEA, CPR began, and patient was noted to be in V. fib and was shocked a total of 4 times.  Patient received 1.5 mg of epinephrine and had ROSC.  Upon arrival to  the emergency department patient with a wide-complex tachycardia with a heart rate of 114, blood pressure 144/114, pulse present, LMA in place and currently being bagged.  Cath lab was activated in route and at this time patient will be transferred to the cardiac catheterization lab for further evaluation and treatment of PEA cardiac arrest. Patient remains in critical condition.     I have reviewed the nursing notes.    I have reviewed the findings, diagnosis, plan and need for follow up with the patient.       Medication List      There are no discharge medications for this visit.         Final diagnoses:   Cardiac arrest (H)     ISalvatore, am serving as a trained medical scribe to document services personally performed by Carole Mujica MD, based on the provider's statements to me.   ICarole MD, was physically present and have reviewed and verified the accuracy of this note documented by Salvatore Caballero.    2/23/2019   [facility], [place], EMERGENCY DEPARTMENT     Carole Mujica MD  02/23/19 0609

## 2019-02-24 NOTE — PLAN OF CARE
Assessment: VA ECMO.  Neuro: PAO orientation as patient is intubated/paralyzed/sedated. Cis titrated down d/t 1/4 twitches on TOF. BIS ranging 40-45.  Versed/Fentanyl gtt infusing. Pupils not equal; R approximately 2.5 mm, L 2 mm, reactive to light. Pt on hypothermic protocol, kept at 33 C overnight. 3% NaCl infusing.  Cardiac: SB with prolonged QT, HR 40s-50s. Pt had x2 episodes of Torsades/V Fib, shocked with 200J each episode returning to SB. All pulses heard via doppler. K 60 meq, Mg 2g, NaPhos 25 mmol replaced.   ECMO: Flows 4-4.5 LPM, Speed 3500, Sweep gas 6 LPM. Albumin 250 ml, 1U PLT given.   IABP: 1:1, augmentation pressures 90s-100s. No pulsatility when placed on standby.  Respiratory: LS coarse; SpO2 90s on PCV, RR 12//PEEP 10/PIP 28/FiO2 40%.  GI: +BS/IABP sounds present. OG output 200 ml. Insulin gtt infusing.  : -250 ml/hr.

## 2019-02-24 NOTE — PROCEDURES
Procedure Date: 2019      EEG #-1       DATE OF RECORDING/SERVICE DATE:  2019       SOURCE FILE DURATION:  11 hours and 0 minutes.  Day #1 of video EEG monitoring.        CLINICAL HISTORY:  This patient is a 38-year-old male who presented with cardiac arrest outside the hospital.  EEG was requested for further evaluation for seizures versus encephalopathy.      CURRENT MEDICATIONS:  Fentanyl, Versed, propofol, Nimbex, Zemuron.      TECHNICAL SUMMARY: This continuous video- EEG monitoring procedure was performed with 23 scalp electrodes in 10-20 electrode system placements, and additional scalp, precordial and other surface electrodes used for electrical referencing and artifact detection.  Video monitoring was utilized and periodically reviewed by EEG technologists and the physician for electroclinical correlations.     BACKGROUND ACTIVITIES:  The background activities of this EEG were symmetric and consisted of severe generalized slowing with all delta activities, with low amplitude throughout the recording.  Hyperventilation and photic stimulation were not performed.      No clear sleep-waking cycles were observed during this recording.  There was no reactivity to external stimuli.      No interictal epileptiform activities were observed.      ICTAL ACTIVITIES:  None.      SUMMARY OF DAY #1 OF VIDEO-EEG MONITORING:  This EEG is markedly abnormal due to the presence of severe generalized slowing of the background activities.  No epileptiform or seizures were recorded.         DEANDRA RODRIGUEZ MD             D: 2019   T: 2019   MT: EMILI      Name:     LIONEL CARTY   MRN:      [id number]        Account:        [id number]   :      1980           Procedure Date: 2019      Document: [id number]

## 2019-02-24 NOTE — PROCEDURES
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     PROCEDURE:    Successful defibrillation with 200J biphasic shock to sinus bradycardia (HR high 40s).    PLAN:  - Will replete K (2.4) and Phos (0.8)      Jayda Conley MD, PhD  Cardiology Fellow  [phone]

## 2019-02-24 NOTE — PROGRESS NOTES
ECMO Shift Summary:    Patient remains on VA ECMO, all equipment is functioning and alarms are appropriately set. RPM's 3500 with flow range 4.03-4.22 L/min. Sweep gas is at 6 LPM and FiO2 50%. Circuit remains free of air, clot and fibrin. Cannulas are secure with some oozing from the site. Extremities are cool to touch. Suctioned ETT for a moderate amount of thin, pink secretions.    Significant Shift Events: Patient had two separate events of Torsades/VF overnight, both of which were resolved after one shock (200 joules). 250 ml of albumin given for circuit chugging. One unit of platelets was given.    Vent settings:  Ventilation Mode: PCV Plus assist  (Pressure Control Ventilation/ Assist Control)  FiO2 (%): 40 %  Rate Set (breaths/minute): 12 breaths/min  Tidal Volume Set (mL): 250 mL  PEEP (cm H2O): 10 cmH2O  Oxygen Concentration (%): 40 %  Peak Inspiratory Pressure (cm H2O) (Drager Mary): 28  Resp: 12  .    Heparin is running at 400 u/hr, ACT range 196-200.    Urine output is about 200 ml/hr, blood loss was a moderate amount from oozing at the cannulation site. Product given included one unit of platelets.      Intake/Output Summary (Last 24 hours) at 2/24/2019 0623  Last data filed at 2/24/2019 0500  Gross per 24 hour   Intake 1663.65 ml   Output 5275 ml   Net -3611.35 ml       ECHO:  No results found for this or any previous visit.No results found for this or any previous visit.    CXR:  Recent Results (from the past 24 hour(s))   US Lower Extremity Arterial Duplex Bilateral    Narrative    Exam: Duplex ultrasound of bilateral lower extremity arteries dated  2/23/2019 9:21 AM     Clinical information: Cardiac Arrest     Comparison: None    Technique: Includes Gray Scale images, color Doppler, spectral Doppler  waveforms and velocities with appropriate angles of 60 degrees or  less.    Findings: ECMO in right groin, balloon pump in left groin    Right lower extremity:     Mid SFA: 24 cm/sec.  Distal SFA: 16  cm/sec.  Popliteal artery: 10 cm/sec.  PTA ankle: 6 cm/sec.  MAILE ankle: 5 cm/sec.    Waveforms: Monophasic tardus parvus waveforms throughout    Left lower extremity:    Proximal SFA: 87 cm/sec.  Mid SFA: 57 cm/sec.  Distal SFA: 71 cm/sec.  Popliteal artery: Unable to evaluate secondary to cooling device  PTA ankle: 26 cm/sec.  MAILE ankle: 31 cm/sec.    Waveforms: Triphasic waveforms throughout with blunted upstroke      Impression    Impression:     1. Right leg: Monophasic tardus parvus waveforms throughout, likely  secondary to right femoral ECMO.  2. Left leg: Triphasic waveforms throughout with blunted upstroke,  likely secondary to IABP.     Guidelines:  [facility] duplex criteria for lower limb arterial  occlusive disease  -Percent stenosis- Normal (1-19%): Peak systolic velocity (cm/s):  <150, End-diastolic velocity (cm/s): <40, Velocity ration (Vr): <1.5,  Distal arterial waveform: Triphasic  -Percent stenosis- 20-49%: Peak systolic velocity (cm/s): 150-200,  End-diastolic velocity (cm/s): <40, Velocity ration (Vr): 1.5-2.0,  Distal arterial waveform: Triphasic  -Percent stenosis- 50-75%: Peak systolic velocity (cm/s): 200-300,  End-diastolic velocity (cm/s): <90, Velocity ration (Vr): 2.0-3.9,  Distal arterial waveform: Poststenotic turbulence distal to stenosis,  monophasic distal waveform  -Percent stenosis- >75%: Peak systolic velocity (cm/s): >300,  End-diastolic velocity (cm/s): <90, Velocity ration (Vr): >4.0, Distal  arterial waveform: Dampened distal waveform and low PSV/EDV* in the  stenosis  -Percent stenosis- Occlusion: Absent flow by color Doppler/pulsed  Doppler spectral analysis; length of occlusion estimated from distance  between exit and reentry collateral arteries  *PSV = peak systolic velocity, EDV = end-diastolic velocity  http://link.[employer].com/chapter/10.1007/[email or web address]_23/fulltext  html    I have personally reviewed the examination and initial  interpretation  and I agree with the findings.    AYSE LEROY MD   XR Chest Port 1 View    Narrative    Exam: XR CHEST PORT 1 VW, 2/23/2019 2:32 PM    Indication: ETT repositioned    Comparison: Radiograph on 12/23/2019 at 0627    Findings:   Supine frontal view of chest.  The tip of ETT projects 4 cm above the tereso. Unchanged position of  ECMO cannula. An enteric tube courses below the left hemidiaphragm and  out of field of view. The metallic marker of IABP unchanged in  position.    Interval moderate improvement in aeration of the left lung. No  significant change in diffuse airspace opacity of about the right  lung. No appreciable pneumothorax.      Impression    Impression:   1. The tip of ETT projects 4 cm above the tereso.  2. Interval moderate improvement of left lung aeration.  3. No significant change in diffuse right lung airspace opacities.    I have personally reviewed the examination and initial interpretation  and I agree with the findings.    MIKKI DAI MD   XR Abdomen Port 1 View    Narrative    Abdominal radiograph one view  2/23/2019 2:35 PM      HISTORY: Evaluate for ischemic bowel    COMPARISON: CT earlier today    FINDINGS: Gastric tube tip and sidehole project over the stomach. Left  femoral line at the atriocaval junction. Right femoral ECMO partially  seen. Nonobstructive bowel gas pattern with no pneumatosis or portal  venous gas. Persistent bilateral nephrogram, right greater than left  from recent CT of the brain with contrast.      Impression    IMPRESSION: Nonobstructive bowel gas pattern with no pneumatosis or  portal venous gas.    I have personally reviewed the examination and initial interpretation  and I agree with the findings.    MIKKI DAI MD       Labs:  Recent Labs   Lab 02/24/19  0328 02/24/19  0030 02/23/19  2226 02/23/19  2034   PH 7.33* 7.33* 7.31* 7.30*   PCO2 33* 33* 35 34*   PO2 162* 141* 128* 130*   HCO3 17* 17* 17* 17*   O2PER 40.0 40.0 40 40        Lab Results   Component Value Date    HGB 13.8 02/24/2019    PHGB <30 02/24/2019     (L) 02/24/2019    FIBR 365 02/24/2019    INR 1.36 (H) 02/24/2019     (HH) 02/24/2019    DD 3.1 (H) 02/24/2019    AXA 0.36 02/24/2019    ANTCH 57 (L) 02/23/2019         Plan is to re-warm around 10 AM. Will continue VA ECMO at this time.      Alo Carty, RRT  2/24/2019 6:23 AM

## 2019-02-24 NOTE — PROGRESS NOTES
ECMO Shift Summary:    Patient remains on V/A ECMO, all equipment is functioning and alarms are appropriately set. RPM's 3500 with flow range 4.12-4.30 L/min. Sweep gas is at 6 LPM and FiO2 50%. Circuit remains free of air, clot and fibrin. Cannulas are secure with no bleeding from site. Extremities are COOL. Suctioned ETT for large amounts of frothy pink secretions.    Significant Shift Events: N/A    Vent settings:  Ventilation Mode: PCV PLUS  (Pressure Control Ventilation Plus (added secondary Mode)  FiO2 (%): 40 %  Rate Set (breaths/minute): 12 breaths/min  Tidal Volume Set (mL): 250 mL  PEEP (cm H2O): 10 cmH2O  Oxygen Concentration (%): 40 %  Peak Inspiratory Pressure (cm H2O) (Drager Mary): 28  Resp: 12  .    Heparin is running at 700 u/hr, ACT range 172-196.    Urine output is as charted, blood loss was as charted. Product given included NONE.      Intake/Output Summary (Last 24 hours) at 2/23/2019 2246  Last data filed at 2/23/2019 2200  Gross per 24 hour   Intake 964.5 ml   Output 3475 ml   Net -2510.5 ml       ECHO:  No results found for this or any previous visit.No results found for this or any previous visit.    CXR:  Recent Results (from the past 24 hour(s))   CT Head w/o Contrast    Narrative    CT HEAD W/O CONTRAST 2/23/2019 4:36 AM    Provided History: Intraop and postop complications of circulatory  system    Comparison: None available.    Technique: Using multidetector thin collimation helical acquisition  technique, axial, coronal and sagittal CT images from the skull base  to the vertex were obtained without intravenous contrast.     Findings:    Diffuse increased density of the vascular structures consistent with  recent catheter angiography. No intracranial hemorrhage, mass effect,  or midline shift. Mild cerebral volume loss. The ventricles are  proportionate to the cerebral sulci. The gray to white matter  differentiation of the cerebral hemispheres is preserved. The basal  cisterns are  patent. There is opacification of the cerebral arteries.    Mild paranasal sinus mucosal thickening. The mastoid air cells are  hypoplastic.      Impression    Impression:   No acute intracranial pathology.    I have personally reviewed the examination and initial interpretation  and I agree with the findings.    MONIKA VALENTIN MD   CT Chest Abdomen Pelvis w/o Contrast    Narrative    PRELIMINARY REPORT - The following report is a preliminary  interpretation.      Impression    IMPRESSION:   1. Extensive confluent opacities in the bilateral posterior lungs,  likely atelectasis. Intralobular septal thickening and groundglass  opacities throughout the aerated portions of the lung, alveolar  hemorrhage versus pulmonary edema.   2. Lines and tubes as described in findings.  3. Bilateral nondisplaced rib fractures.  4. Small amount of gas in the right groin and inguinal canal, likely  postprocedural during line placement.  5. 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  6. Soft tissue mass in the left inguinal canal, possibly partially  retracted testicle. Evaluated on physical exam.   US Lower Extremity Arterial Duplex Bilateral    Narrative    Exam: Duplex ultrasound of bilateral lower extremity arteries dated  2/23/2019 9:21 AM     Clinical information: Cardiac Arrest     Comparison: None    Technique: Includes Gray Scale images, color Doppler, spectral Doppler  waveforms and velocities with appropriate angles of 60 degrees or  less.    Findings: ECMO in right groin, balloon pump in left groin    Right lower extremity:     Mid SFA: 24 cm/sec.  Distal SFA: 16 cm/sec.  Popliteal artery: 10 cm/sec.  PTA ankle: 6 cm/sec.  MAILE ankle: 5 cm/sec.    Waveforms: Monophasic tardus parvus waveforms throughout    Left lower extremity:    Proximal SFA: 87 cm/sec.  Mid SFA: 57 cm/sec.  Distal SFA: 71 cm/sec.  Popliteal artery: Unable to evaluate secondary to cooling device  PTA ankle: 26 cm/sec.  MAILE ankle: 31 cm/sec.    Waveforms: Triphasic  waveforms throughout with blunted upstroke      Impression    Impression:     1. Right leg: Monophasic tardus parvus waveforms throughout, likely  secondary to right femoral ECMO.  2. Left leg: Triphasic waveforms throughout with blunted upstroke,  likely secondary to IABP.     Guidelines:  [facility] duplex criteria for lower limb arterial  occlusive disease  -Percent stenosis- Normal (1-19%): Peak systolic velocity (cm/s):  <150, End-diastolic velocity (cm/s): <40, Velocity ration (Vr): <1.5,  Distal arterial waveform: Triphasic  -Percent stenosis- 20-49%: Peak systolic velocity (cm/s): 150-200,  End-diastolic velocity (cm/s): <40, Velocity ration (Vr): 1.5-2.0,  Distal arterial waveform: Triphasic  -Percent stenosis- 50-75%: Peak systolic velocity (cm/s): 200-300,  End-diastolic velocity (cm/s): <90, Velocity ration (Vr): 2.0-3.9,  Distal arterial waveform: Poststenotic turbulence distal to stenosis,  monophasic distal waveform  -Percent stenosis- >75%: Peak systolic velocity (cm/s): >300,  End-diastolic velocity (cm/s): <90, Velocity ration (Vr): >4.0, Distal  arterial waveform: Dampened distal waveform and low PSV/EDV* in the  stenosis  -Percent stenosis- Occlusion: Absent flow by color Doppler/pulsed  Doppler spectral analysis; length of occlusion estimated from distance  between exit and reentry collateral arteries  *PSV = peak systolic velocity, EDV = end-diastolic velocity  http://link.[employer].com/chapter/10.1007/[email or web address]_23/fulltext  html    I have personally reviewed the examination and initial interpretation  and I agree with the findings.    AYSE LEROY MD   XR Chest Port 1 View    Narrative    Exam: XR CHEST PORT 1 VW, 2/23/2019 2:32 PM    Indication: ETT repositioned    Comparison: Radiograph on 12/23/2019 at 0627    Findings:   Supine frontal view of chest.  The tip of ETT projects 4 cm above the tereso. Unchanged position of  ECMO cannula. An enteric tube courses  below the left hemidiaphragm and  out of field of view. The metallic marker of IABP unchanged in  position.    Interval moderate improvement in aeration of the left lung. No  significant change in diffuse airspace opacity of about the right  lung. No appreciable pneumothorax.      Impression    Impression:   1. The tip of ETT projects 4 cm above the tereso.  2. Interval moderate improvement of left lung aeration.  3. No significant change in diffuse right lung airspace opacities.    I have personally reviewed the examination and initial interpretation  and I agree with the findings.    MIKKI DAI MD   XR Abdomen Port 1 View    Narrative    Abdominal radiograph one view  2/23/2019 2:35 PM      HISTORY: Evaluate for ischemic bowel    COMPARISON: CT earlier today    FINDINGS: Gastric tube tip and sidehole project over the stomach. Left  femoral line at the atriocaval junction. Right femoral ECMO partially  seen. Nonobstructive bowel gas pattern with no pneumatosis or portal  venous gas. Persistent bilateral nephrogram, right greater than left  from recent CT of the brain with contrast.      Impression    IMPRESSION: Nonobstructive bowel gas pattern with no pneumatosis or  portal venous gas.    I have personally reviewed the examination and initial interpretation  and I agree with the findings.    MIKKI DAI MD       Labs:  Recent Labs   Lab 02/23/19  2226 02/23/19  2034 02/23/19  1800 02/23/19  1722 02/23/19  1611   PH 7.31* 7.30* 7.27*  --  7.22*   PCO2 35 34* 36  --  39   PO2 128* 130* 166*  --  90   HCO3 17* 17* 16*  --  16*   O2PER 40 40 40 100 40       Lab Results   Component Value Date    HGB 14.3 02/23/2019    PHGB 70 (H) 02/23/2019     (L) 02/23/2019    FIBR 270 02/23/2019    INR 1.35 (H) 02/23/2019    PTT 92 (H) 02/23/2019    DD 5.2 (H) 02/23/2019    AXA 0.28 02/23/2019    ANTCH 57 (L) 02/23/2019         Plan is continue on V/A ECMO.      Jimena Albert, RRT  2/23/2019 10:46 PM

## 2019-02-24 NOTE — PROCEDURES
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     PROCEDURE:    Successful defibrillation with 200J biphasic shock to sinus bradycardia     PLAN:  - Will continue to monitor electrolytes        Jayda Conley MD, PhD  Cardiology Fellow  [phone]

## 2019-02-24 NOTE — PLAN OF CARE
"Patient remains in CV ICU with VA ECMO support post VF arrest with PCI intervention.    Neuro: Pupils equal at 2+ sluggish to react. EEG monitoring in place. Sedated with Midazolam, Fentanyl, and Cisatracurium. BIS monitoring in place. 3% hypertonic saline infusing.  CV: Remains hemodynamically stable in a SB - SR off pressors with VA ECMO support. Please see ECMO Tech note / flow sheet for details. Q2 hour ABGs obtained, critical values reported to CSI team. Utilizing Arctic Sun for targeted temperature management. Goal temperature of 33 degrees C met at 1050 am on 2/23/19. IABP in 1:1 augmenting 100%. No pulsatility noted when IABP in standby.   Pulm: Lung sounds coarse. Vent settings: PC-PSV, fiO2: 40% Peep: 10, Rate: 12. Copious amounts of frothy pink tinged secretion returned from ETT.   GI: Hyperactive bowel sounds. Large loose bowel movement noted. OGT to LIS draining tan/brown colored returns.  : Pisano draining appropriate amounts of urine.   Skin: Intact. Preventative mepilex on coccyx. Preventative optifoam and meplilite dressings in use. Patient on isolibrium bed.  Psychosocial: Significant other, Kalicthong \"Manolo\" and family supportive at bedside throughout shift.    Plan: Continue to support and re warm at 10:50 am on 2/24/19.   "

## 2019-02-24 NOTE — PROGRESS NOTES
Cardiology Progress Note    Overnight/subj:   - Went to VF x2 overnight, converted with 200J shock each time   - maintaining good flows  - Rewarming today     VS reviewed: Notable for temp: 32.6, HR 48-57, MAP 76-80, oxygenating on 98%   Vent: PCV 28/10  Tele: torsade and VF overnight total 2 episodes    IABP via LFA, 1:1, triggered by ECG, augmented MAPs 103, pulses NIRS stable   ECMO: 4-4.5 L of flow    Wt: on admit 107.9, yday 107.9, today 106  I/O: 989 in / 1.4 out yday. Since MN 1.4 in / 1.6 out    Drips:   Cis 0.5  Fentanyl 100  Mida 2  Hep 400  Insulin 3    PO Cardiac Meds: ASA 81mg daily, ticagrelor 90mg BID,   Other meds: pip/tazo, vanco, senna, miralax, ppi    Radiology Imaging  CXR- Lines are stable (IABP, ETT, Venous cannula) tip of thermoguard not visualized. Diffuse intestinal infiltrate. Left side appears better today    Cardiology Studies:  ECG:    Labs: Reviewed in epic  Lactic acid: 8.9>>>>3.9  CTnI: > 200    AST peaked at 1602  Cr 2.5  Na 152    Hgb 13.8  plt 101    ABG (02/24/19 0610) 7.33/33/141    Phys exam:  GEN: NAD  Pulm: course breath sounds but improved air movement  Cardiac: IABP inflation heard, no pulsatility without iabp  Vascular: NIRS stable   GI: soft, non distended    ASSESSMENT/PLAN:  Mr. Riddle is a 38yo M with HLD and Fhx CAD/MI who presented with refractory VF OHCA brought directly to the CCL now s/p peripheral VA ECMO placement. Found to have severe 3v disease s/p MITA to mRCA and mLcx. Also with IABP placement for presumed pulmonary edema. Being managed as part post VF protocol.       Neurology: - EEG showing no seizure  - Intubated, sedated, paralyzed  - Na 155-160 for brain edema ppx  - Rewarm today at 0.25*C/hr  --continue versed, fentanyl, and cis as needed to maintain paralysis - RASS goal -4 to -5       Cardiovascular / Hemodynamics:  # Refractory VF arrest.    # Successful deployment of drug eluting stents to the Mid RCA and Mid Cx  # Recurrent VF (Residual ?LM/LAD disease)   Peripheral V-A ECMO inserted.    TTE: tomorrow  EKG: Qtc 634  Mg>2  - not requiring pressors at this time   --continue ASA 81mg and ticagrelor 90mg BID  --ACT goal 180-200  --hold lipitor for now given likely hepatic injury during arrest  --hold ACE/ARB for now given likely reduced renal fxn after arrest  --holding beta blocker given shock    Pulmonary:  # Large b/l opacities c/f early ARDS, pulm edema vs PAH   # Resp acidosis (currected)  -- ETT stable   --Q2h ABGs for now  --consider scheduled duonebs if signs of lung dz, currently PRN  -- Continue to monitor lung opacities  -- No CT for now   -- Oxygenating well        GI and Nutrition: No known medical hx.    --monitor BID LFTs  --Start feeds likely tomorrow  --bowel regimen - on hold for now due to hypothermia  --GI Prophylaxis: PPI    Renal, Fluid and Electrolytes:  # Anion gap metabolic acidosis (AG 21) (improved) --monitor urine output  --maintain K>3 and Mg>2    Infectious Disease: No signs of infection. Leukocytosis c/w arrest. Blood cultures collected.   --vancomycin/zosyn x5 days for ECMO  --daily blood cultures  --monitor for signs of infection given cooling, lines, and leukocytosis   Hematology and Oncology: Receiving heparin for ECMO and ASA/ticagrelor for MITA.   --cryo PRN fibrinogen < 200; FFP for INR >2  --Transfuse for Hgb<10  --heparin gtt for ECMO with ACT goal 160-180  --US LE w/ arterial duplex per ECMO protocol   --DVT PPX: Heparin as above   Endocrinology: No known medical history. BG elevated.  --insulin gtt  --f/u HgbA1c    Lines: R femoral arterial and venous ECMO cannulae February 23, 2019  L femoral arterial line February 23, 2019  R radial arterial line February 23, 2019  ETT February 23, 2019  Pisano catheter February 23, 2019  OG tube February 23, 2019  Restraint: needed    Current lines are required for patient management         Code status FULL     To be discussed with Dr. Baer.      Theron Hebert MD   CSI Service    *[phone]

## 2019-02-24 NOTE — PROGRESS NOTES
ECMO Shift Summary:    Patient remains on VA ECMO, all equipment is functioning and alarms are appropriately set. RPM's 3500 with flow range 4.31-4.51 L/min. Sweep gas is at 7 LPM and FiO2 50%. Circuit remains free of air, clot and fibrin. Cannulas are secure with some bleeding from site. Extremities are warm and well perfused. Suctioned ETT for scant secretions.    Significant Shift Events: Started rewarming patient at 1050.      Vent settings:  Ventilation Mode: APRV  (Airway Pressure Release Ventilation)  FiO2 (%): 40 %  Rate Set (breaths/minute): 12 breaths/min  Tidal Volume Set (mL): 250 mL  PEEP (cm H2O): 10 cmH2O  Oxygen Concentration (%): 40 %  Peak Inspiratory Pressure (cm H2O) (Drager Mary): 28  Resp: 14  .    Heparin is running at 400 unit(s)/hr, ACT range 180-200.    Urine output is 1.5 ml/kg/hour, blood loss was minimal. Product given included none.      Intake/Output Summary (Last 24 hours) at 2/24/2019 1412  Last data filed at 2/24/2019 1300  Gross per 24 hour   Intake 2159.97 ml   Output 5075 ml   Net -2915.03 ml       ECHO:  No results found for this or any previous visit.No results found for this or any previous visit.    CXR:  Recent Results (from the past 24 hour(s))   XR Chest Port 1 View    Narrative    Exam: XR CHEST PORT 1 VW, 2/23/2019 2:32 PM    Indication: ETT repositioned    Comparison: Radiograph on 12/23/2019 at 0627    Findings:   Supine frontal view of chest.  The tip of ETT projects 4 cm above the tereso. Unchanged position of  ECMO cannula. An enteric tube courses below the left hemidiaphragm and  out of field of view. The metallic marker of IABP unchanged in  position.    Interval moderate improvement in aeration of the left lung. No  significant change in diffuse airspace opacity of about the right  lung. No appreciable pneumothorax.      Impression    Impression:   1. The tip of ETT projects 4 cm above the tereso.  2. Interval moderate improvement of left lung aeration.  3. No  significant change in diffuse right lung airspace opacities.    I have personally reviewed the examination and initial interpretation  and I agree with the findings.    MIKKI DAI MD   XR Abdomen Port 1 View    Narrative    Abdominal radiograph one view  2/23/2019 2:35 PM      HISTORY: Evaluate for ischemic bowel    COMPARISON: CT earlier today    FINDINGS: Gastric tube tip and sidehole project over the stomach. Left  femoral line at the atriocaval junction. Right femoral ECMO partially  seen. Nonobstructive bowel gas pattern with no pneumatosis or portal  venous gas. Persistent bilateral nephrogram, right greater than left  from recent CT of the brain with contrast.      Impression    IMPRESSION: Nonobstructive bowel gas pattern with no pneumatosis or  portal venous gas.    I have personally reviewed the examination and initial interpretation  and I agree with the findings.    MIKKI DAI MD   XR Chest Port 1 View    Narrative    Exam: XR CHEST PORT 1 VW, 2/24/2019 2:55 AM    Indication: arrest    Comparison: 2/23/2019    Findings: Single AP chest radiograph. ET tube projects approximately 3  cm above the tereso. Enteric tube projects over the esophagus, tip not  visualized. ECMO catheter in unchanged position. Intra-aortic balloon  pump marker in stable position, projecting at the level of the tereso.    Cardiomediastinal silhouette is obscured by diffuse alveolar airspace  opacities. Slight aeration of the upper left lung, not significantly  changed from prior. No appreciable pneumothorax.      Impression    Impression:   1. Endotracheal tube approximately 3 cm above the tereso. Additional  support devices in stable position.  2. Diffuse bilateral alveolar airspace opacities, right worse than  left but overall stable.    I have personally reviewed the examination and initial interpretation  and I agree with the findings.    MIKKI DAI MD       Labs:  Recent Labs   Lab 02/24/19  1358 02/24/19  6452  02/24/19  0947 02/24/19  0812   PH 7.38 7.37 7.37 7.32*   PCO2 30* 31* 31* 36   PO2 126* 127* 146* 109*   HCO3 18* 17* 18* 18*   O2PER 40 40 40 40.0       Lab Results   Component Value Date    HGB 12.1 (L) 02/24/2019    PHGB <30 02/24/2019     (L) 02/24/2019    FIBR 365 02/24/2019    INR 1.47 (H) 02/24/2019    PTT 94 (H) 02/24/2019    DD 3.1 (H) 02/24/2019    AXA 0.36 02/24/2019    ANTCH 52 (L) 02/24/2019         Plan is support patient.  Rewarming, will assess neuro's when rewarmed.      Esteban Yee, RRT  2/24/2019 2:12 PM              `

## 2019-02-25 NOTE — PROCEDURES
Bridle Placement:   Reason for bridle placement: securement of FT   Medicine delivered during procedure: lubricating jelly  Procedure: Successful  Location of top of clip on FT: @ 96 cm marker   Condition of nose/skin at time of bridle placement: Unremarkable   Face to Face time with patient: <5 minutes.      Sudha Winters RD, LD, [facility]  CV[facility] Dietitian  Pager: [phone]

## 2019-02-25 NOTE — PROGRESS NOTES
Cardiology Progress Note    Overnight/subj:   - Increasing pressors overnight   - Decrease UOP overnight     VS reviewed: Notable for temp: AF, HR 77-91, MAP 59-78, oxygenating on 96% pm 28/10 fio2: 40  Tele: No events  IABP via LFA, 1:1, triggered by ECG, augmented MAPs 103, pulses NIRS stable   ECMO: 4-4.5 L of flow    Wt: on admit 107.9, yday 106, today 107.7  I/O: 3.4 in / 3.0 out yday. Since  in / 35 out    Drips:   NE 0.1  Vaso 4    Fentanyl 100  Mida 8  Insulin 3.5    PO Cardiac Meds: ASA 81mg daily, ticagrelor 90mg BID,   Other meds: pip/tazo, vanco, senna, miralax, ppi    Radiology Imaging  CXR: IABP appears in good position. Venous cannula may migrated down vs image acquisition. Increase consolidation of both lungs.     Cardiology Studies:  Sinus rhythm with lateral TWI. Qtc 522    Labs: Reviewed in epic    Phys exam:  GEN: sedated, RASS-4  Eyes: R pupil larger than left   Pulm: minimal air movement  Cardiac: IABP inflation heard, no pulsatility without iabp  Vascular: NIRS stable   GI: soft, non distended     ASSESSMENT/PLAN:  Mr. Riddle is a 40yo M with HLD and Fhx CAD/MI who presented with refractory VF OHCA brought directly to the CCL now s/p peripheral VA ECMO placement. Found to have severe 3v disease s/p MITA to mRCA and mLcx. Also with IABP placement for presumed pulmonary edema. Being managed as part post VF protocol.       Neurology: - EEG showing decrease activity  - CT head today  - Intubated, sedated   - discontinue paralyzed  - Na 155-160 for brain edema ppx  -continue versed, fentanyl. Decrease sedation       Cardiovascular / Hemodynamics:  # Refractory VF arrest.    # Successful deployment of drug eluting stents to the Mid RCA and Mid Cx  # Recurrent VF (Residual ?LM/LAD disease)  Peripheral V-A ECMO inserted.    TTE: turndown today  EKG: Qtc 522 (improved)  Mg>2  - continue vaso and Ne as needed  --continue ASA 81mg and ticagrelor 90mg BID  --ACT goal 180-200  --hold lipitor for now given  likely hepatic injury during arrest  --hold ACE/ARB for now given likely reduced renal fxn after arrest  --holding beta blocker given shock    Pulmonary:  # Large b/l opacities c/f early ARDS, pulm edema vs PAH   # Resp acidosis (currected)  -- ETT stable   --Q2h ABGs for now  --consider scheduled duonebs if signs of lung dz, currently PRN  -- Continue to monitor lung opacities  -- Oxygenating well        GI and Nutrition: No known medical hx.    --monitor BID LFTs  --Nutrition consult for feeds  --bowel regimen   --GI Prophylaxis: PPI    Renal, Fluid and Electrolytes:  # FERNANDA with anuria -- Renal consult for CRRT (indication volume removal)  --monitor urine output  --maintain K>3 and Mg>2    Infectious Disease: No signs of infection. Leukocytosis c/w arrest. Blood cultures collected.   --vancomycin/zosyn x5 days for ECMO  --daily blood cultures  --monitor for signs of infection given cooling, lines, and leukocytosis   Hematology and Oncology: Receiving heparin for ECMO and ASA/ticagrelor for MITA.   --cryo PRN fibrinogen < 200; FFP for INR >2  --Transfuse for Hgb<10  --heparin gtt for ECMO with ACT goal 160-180  --US LE w/ arterial duplex per ECMO protocol   --DVT PPX: Heparin as above   Endocrinology: No known medical history. BG elevated.  --insulin gtt PRN  --f/u HgbA1c    Lines: R femoral arterial and venous ECMO cannulae February 23, 2019  L femoral arterial line February 23, 2019  R radial arterial line February 23, 2019  ETT February 23, 2019  Pisano catheter February 23, 2019  OG tube February 23, 2019  Restraint: needed    Current lines are required for patient management         Code status FULL     To be discussed with Dr. Baer.      Theron Hebert MD   CSI Service   *[phone]

## 2019-02-25 NOTE — PROGRESS NOTES
ECMO Shift Summary:    Patient remains on VA ECMO, all equipment is functioning and alarms are appropriately set. RPM's 3900 with flow range 4.4-4.6 L/min. Sweep gas is at 8 LPM and FiO2 100%. Small spots of clot/fibrin remain at 2 corners of oxygenator. Cannulas are secure with no bleeding from site. Extremities are warm to touch. Suctioned ETT for moderate pink frothy secretions.    Significant Shift Events: Pt taken for head CT @ 1000.      Vent settings:  Ventilation Mode: APRV  (Airway Pressure Release Ventilation)  FiO2 (%): 60 %  Rate Set (breaths/minute): 12 breaths/min  PEEP (cm H2O): 10 cmH2O  Oxygen Concentration (%): 50 %  Peak Inspiratory Pressure (cm H2O) (Drager Mary): 28  Resp: 15  .    Heparin is off, ACT range 180s.    Urine output is low (pt needs CRRT), blood loss was minimal from cannula site. No blood product given.      Intake/Output Summary (Last 24 hours) at 2/25/2019 1354  Last data filed at 2/25/2019 1300  Gross per 24 hour   Intake 3182.73 ml   Output 887 ml   Net 2295.73 ml       ECHO:  No results found for this or any previous visit.No results found for this or any previous visit.    CXR:  Recent Results (from the past 24 hour(s))   XR Chest Port 1 View    Narrative    Exam: XR CHEST PORT 1 VW, 2/25/2019 1:45 AM    Indication: arrest    Comparison: Chest radiograph dated 2/24/2019.    Findings:   Single AP view of the chest. Endotracheal tube in place with the tip  projecting at the level of the lower thoracic trachea. Enteric tube in  place the distal end extending beyond the field-of-view. ECMO cannula  stable in position. Intra-aortic balloon pump in place with the marker  stable in position near the level of the tereso. Additional lines and  hardware presumed exterior to the patient.    Worsening bilateral airspace opacities compared to prior exam. There  is now near complete consolidation of the right hemithorax and the  left lower lung. Cardiac silhouette not well visualized.       Impression    Impression:   1. Worsening airspace opacities with near complete consolidation of  the right hemithorax and the left lower lung.  2. Stable support devices as detailed above.    I have personally reviewed the examination and initial interpretation  and I agree with the findings.    ROSELYN CASH MD   CT Head w/o Contrast    Narrative    CT HEAD W/O CONTRAST 2/25/2019 10:25 AM    Provided History: Neuro deficit(s), subacute  ICD-10:    Comparison: 2/23/2019.    Technique: Using multidetector thin collimation helical acquisition  technique, axial, coronal and sagittal CT images from the skull base  to the vertex were obtained without intravenous contrast.     Findings:    No intracranial hemorrhage, mass effect, or midline shift. The  ventricles are proportionate to the cerebral sulci. Cerebral atrophy.  The gray to white matter differentiation of the cerebral hemispheres  is preserved. The basal cisterns are patent. Mild bilateral maxillary  mucosal thickening.    The visualized paranasal sinuses are clear. The mastoid air cells are  clear.       Impression    Impression: No acute intracranial pathology. No definite hypoxic  ischemic injury. Repeat CT could be considered, as clinically  indicated.    I have personally reviewed the examination and initial interpretation  and I agree with the findings.    JOEL GARNETT MD       Labs:  Recent Labs   Lab 02/25/19  1208 02/25/19  1001 02/25/19  0754 02/25/19  0620   PH 7.30* 7.28* 7.28* 7.27*   PCO2 39 41 40 42   PO2 71* 73* 69* 75*   HCO3 19* 19* 19* 19*   O2PER 50.0 50.0 40.0 40.0       Lab Results   Component Value Date    HGB 11.2 (L) 02/25/2019    PHGB <30 02/25/2019     (L) 02/25/2019    FIBR 521 (H) 02/25/2019    INR 1.90 (H) 02/25/2019    PTT 46 (H) 02/25/2019    DD 2.8 (H) 02/25/2019    AXA <0.10 02/25/2019    ANTCH 41 (L) 02/25/2019         Plan is to remain stable on VA ECMO.  Will start CRRT this afternoon.      Jovani Cortez,  RRT  2/25/2019 1:54 PM

## 2019-02-25 NOTE — PROGRESS NOTES
"CLINICAL NUTRITION SERVICES - ASSESSMENT NOTE     Nutrition Prescription    RECOMMENDATIONS FOR MDs/PROVIDERS TO ORDER:  Fluids/additional free water flushes per team pending desired Na level, CRRT.     Malnutrition Status:    Unable to determine     Recommendations already ordered by Registered Dietitian (RD):  1. Begin Impact Peptide @ 10 mL/hr and advance 10 mL q8h to goal 50 ml/hr (1200 ml/day) to provide 1800 kcals, 113 g PRO, 924 ml free H2O, 77 g Fat (50% from MCTs), 168 g CHO and no Fiber daily.    2. ProSource 1 pkt BID (80 kcal, 44 g PRO) for total 1880 kcal (25 kcal/kg) and 135 g PRO (1.8 g/kg)     3. Free water flushes 30 mL q4h for patency.    4. Nephronex to ensure adequate micronutrients in case of slow TF adv, EN interruptions, hypermetabolic needs.    5. Obtain baseline and weekly CRP while on Impact Peptide to assess ongoing approp of immune-modulating TF therapy vs ability to change to standard formula.    Future/Additional Recommendations:  If needs lower K+ formula, recommend Nepro @ goal 40 ml/hr (960 ml/day) to provide 1728 kcals, 78 g PRO, 701 ml free H2O, 155 g CHO and 12 g Fiber daily. Add 1 pkt ProSource QID (160 kcal, 44 g PRO) for total 1888 kcal (25 kcal/kg) and 122 g PRO (1.6 g/kg)     REASON FOR ASSESSMENT  Hellen Riddle is a/an 39 year old male assessed by the dietitian for Provider Order - Registered Dietitian to Assess and Order TF per Medical Nutrition Therapy Protocol    NUTRITION HISTORY  Pt admitted s/p cardiac arrest, on VA ECMO + IABP.     CURRENT NUTRITION ORDERS  Diet: NPO, intubated  Intake/Tolerance: no TF started yet as pt was cooled, now rewarmed.     LABS  Labs reviewed  Na 158 (on hypertonic saline)    Lactic acid 5.2  K+ 4.1, phos 5.6, Cr 3.65 - UOPs decreasing, plan for CRRT today    MEDICATIONS  Medications reviewed  Vaso, Norepi    ANTHROPOMETRICS  Height: 168 cm (5' 6.142\")  Most Recent Weight: 107.7 kg (237 lb 7 oz)    IBW: 64.5 kg  BMI: Obesity Grade II BMI " 35-39.9  Weight History:   Wt Readings from Last 10 Encounters:   02/25/19 107.7 kg (237 lb 7 oz)     Dosing Weight: 75 kg (adjusted from lowest wt 106 kg)    ASSESSED NUTRITION NEEDS  Estimated Energy Needs: 1500 - 1875 kcals/day (20 - 25 kcals/kg)  Justification: Obese and Vented  Estimated Protein Needs: 113 - 150 grams protein/day (1.5 - 2 grams of pro/kg)  Justification: Hypercatabolism with critical illness and Obesity guidelines  Estimated Fluid Needs: Per provider pending fluid status, Na trends    PHYSICAL FINDINGS  See malnutrition section below.    MALNUTRITION  % Intake: Unable to assess  % Weight Loss: Unable to assess  Subcutaneous Fat Loss: None observed  Muscle Loss: None observed  Fluid Accumulation/Edema: None noted  Malnutrition Diagnosis: Unable to determine due to lack of nutrition and wt hx    NUTRITION DIAGNOSIS  Inadequate protein-energy intake related to NPO on ventilator as evidenced by no TF started yet, approaching 48 hours on ventilator      INTERVENTIONS  Implementation  Nutrition Education: Not appropriate at this time due to patient condition   Collaboration with other providers - Discussed w/ CSI fellow, will place FT and start/advance feeds  Enteral Nutrition - Initiate above, expect non-renal formula will be appropriate as lytes are OK and starting CRRT today  Feeding tube flush - patency flushes  Multivitamin/mineral supplement therapy - nephronex    Goals   Total avg nutritional intake to meet a minimum of 20 kcal/kg and 1.5 g PRO/kg daily (per dosing wt 75 kg).     Monitoring/Evaluation  Progress toward goals will be monitored and evaluated per protocol.    Sudha Winters, RD, LD, CNSC  CVICU Dietitian  Pager: [phone]

## 2019-02-25 NOTE — CONSULTS
Nephrology Initial Consult  February 25, 2019      Hellen Riddle MRN:[id number] YOB: 1980  Date of Admission:2/23/2019  Primary care provider: No primary care provider on file.  Requesting physician: Invasive, Cardiologist, *    Today Recommendations:  - Start CRRT, no fluid removal, no pre-filter,   - Na goal 155-160, so start with 3% NaCl @ 40 ml  - Serum Na level Q4h, to titrate 3% NaCl as needed.   - Strict I/Os, avoid nephrotoxic agents.   - Monitor Vancomycin levels      ASSESSMENT AND RECOMMENDATIONS:   Mr. Riddle is 39 year old male with PMHx of familial hyperlipidemia, no history of renal disease or nephrolithiasis who was brought to our hospital with cardiogenic shock S/P cardiac arrest with unclear time down. Patient found to have CAD icluding 3 vessels, PCI was done emergently. We were consulted for FERNANDA and fluid management.      # Anuric FERNANDA on unknown baseline kidney function:  - Patient was admitted with Crea: 1.65 post acardiac arrest, no previous Crea level in our EMR.  - FERNANDA etiology: likley due to cardiogenic shock leading to hypotension and low renal perfusion, we gave 1 liter of fluid with 5% Albumin with no improvement of renal output, likely patient has ATN now due to ischemic injury due to low renal perfusion during the cardiac arrest in addition to rhabdomyolysis, elevated CK. Also other risks for his FERNANDA is contrast induced nephropathy and vancomycin renal toxicity.  - Start CRRT, no fluid removal, no pre-filter,   - Strict I/Os, avoid nephrotoxic agents.   - To switch Vancomycin to less nephrotoxic antibiotic if possible.     # Hypotension, Volume status:  - Patient is on 2 pressors today with BP: 80s/50s  - Patient appears euvolemic, 1 liter of fluid was given to challenge his kidney function with no improvement.     #Cardiogenic Shock due to VF due to severe CAD secondary to familial hypelipidemia:  - Patient had another VF episode on Saturday night, PCI was done with stents  placement.  - Patient on ECMO,   - Managed by cardiology, primary team.    # Severe encephalopathy:  - EEG shows severe generalized slowing with low amplitude which consist with severe diffuse encephalopathy. No seizure.   - Per primary team recommendation to keep Na level: 155-160 so plan to run 3% NaCl with CRRT and titrate it to achieve this range.   - Na level q4h    Recommendations were communicated to primary team via note and call.     Seen and discussed with Dr. Satinder Munoz MD   Nephrology Fellow  [phone]      REASON FOR CONSULT: FERNANDA and fluid management.    HISTORY OF PRESENT ILLNESS:  Admitting provider and nursing notes reviewed  Mr. Riddle is 39 year old male with PMHx of familial hyperlipidemia, no history of renal disease or nephrolithiasis who was brought to our hospital with cardiogenic shock S/P cardiac arrest with unclear time down. Patient found to have CAD icluding 3 vessels, PCI was done emergently. Patient has no kidney diseases per his family, patient has had multifactors to affect his kidney function, cardiac arrest, hypotension, Contrast during the cardiac cath, vancomycin toxicity and rhabdomyolysis. Kidneys did not respond to IV fluid challenge so decision to start with CRRT and to keep his sodium level between 155 and 160. Electrocardiogram was done earlier showed severe diffuse encephalopathy. We will continue CRRT and monitoring sodium level as above for now.      PAST MEDICAL HISTORY:  Reviewed with patient's family on 02/25/2019     Past Medical History:   Diagnosis Date     Dyslipidemia        No past surgical history on file.     MEDICATIONS:  PTA Meds  Prior to Admission medications    Not on File      Current Meds    artificial tears   Both Eyes Q8H     aspirin  81 mg Oral Daily     B and C vitamin Complex with folic acid  5 mL Per Feeding Tube Daily     pantoprazole (PROTONIX) IV  40 mg Intravenous Daily     piperacillin-tazobactam  3.375 g Intravenous Q6H      polyethylene glycol  17 g Oral Daily     protein modular  1 packet Per Feeding Tube BID     senna-docusate  1 tablet Oral At Bedtime     sodium chloride (PF)  3 mL Intracatheter Q8H     ticagrelor  90 mg Oral BID     vancomycin place blandon - receiving intermittent dosing  1 each Does not apply See Admin Instructions     Infusion Meds    IV fluid REPLACEMENT ONLY       IV fluid REPLACEMENT ONLY       CRRT replacement solution 12.5 mL/kg/hr (02/25/19 1749)     fentaNYL 100 mcg/hr (02/25/19 1700)     heparin 2 unit/mL in 0.9% NaCl 3 mL/hr (02/25/19 1700)     HEParin Stopped (02/25/19 0310)     insulin (regular) 1.5 Units/hr (02/25/19 1700)     midazolam 4 mg/hr (02/25/19 1700)     - MEDICATION INSTRUCTIONS -       - MEDICATION INSTRUCTIONS -       norepinephrine 0.1 mcg/kg/min (02/25/19 1700)     CRRT replacement solution 1.857 mL/kg/hr (02/25/19 1751)     sodium chloride 3%       vasopressin (PITRESSIN) infusion ADULT (40 mL) 4 Units/hr (02/25/19 1700)       ALLERGIES:    No Known Allergies    REVIEW OF SYSTEMS:  A comprehensive of systems was negative except as noted above.    SOCIAL HISTORY:   Social History     Socioeconomic History     Marital status: Single     Spouse name: Not on file     Number of children: Not on file     Years of education: Not on file     Highest education level: Not on file   Occupational History     Not on file   Social Needs     Financial resource strain: Not on file     Food insecurity:     Worry: Not on file     Inability: Not on file     Transportation needs:     Medical: Not on file     Non-medical: Not on file   Tobacco Use     Smoking status: Not on file   Substance and Sexual Activity     Alcohol use: Not on file     Drug use: Not on file     Sexual activity: Not on file   Lifestyle     Physical activity:     Days per week: Not on file     Minutes per session: Not on file     Stress: Not on file   Relationships     Social connections:     Talks on phone: Not on file     Gets  "together: Not on file     Attends [employer] witness service: Not on file     Active member of club or organization: Not on file     Attends meetings of clubs or organizations: Not on file     Relationship status: Not on file     Intimate partner violence:     Fear of current or ex partner: Not on file     Emotionally abused: Not on file     Physically abused: Not on file     Forced sexual activity: Not on file   Other Topics Concern     Not on file   Social History Narrative     Not on file     Reviewed with patient's family  His wife and sister-in-law were accompany Hellen Riddle in hospital room    FAMILY MEDICAL HISTORY:   Hyperlipidemia  Reviewed with patient's family    PHYSICAL EXAM:   Temp  Av.6  F (34.8  C)  Min: 90.5  F (32.5  C)  Max: 98.4  F (36.9  C)  Arterial Line BP  Min: 8/8  Max: 175/76  Arterial Line MAP (mmHg)  Av.6 mmHg  Min: 8 mmHg  Max: 120 mmHg      No Data Recorded Resp  Av.9  Min: 12  Max: 16  FiO2 (%)  Av.4 %  Min: 40 %  Max: 100 %  SpO2  Av %  Min: 81 %  Max: 100 %       Temp 97.3  F (36.3  C)   Resp 16   Ht 1.68 m (5' 6.14\")   Wt 107.7 kg (237 lb 7 oz)   SpO2 98%   BMI 38.16 kg/m     Date 19 0700 - 19 0659   Shift 8403-0865 9503-7473 5453-6858 24 Hour Total   INTAKE   I.V. 491.75 87.2  578.95   Colloid 250 750  1000   Enteral  10  10   Blood Components 350   350   Shift Total(mL/kg) 1091.75(10.14) 847.2(7.87)  1938.95(18)   OUTPUT   Urine 12 10  22   Stool 350   350   Shift Total(mL/kg) 362(3.36) 10(0.09)  372(3.45)   Weight (kg) 107.7 107.7 107.7 107.7      Admit Weight: 107.9 kg (237 lb 14 oz)     GENERAL APPEARANCE: Sedated, intubated  EYES: No scleral icterus, pupils equal  Endo: no goiter, no moon facies  Lymphatics: no cervical or supraclavicular LAD  Pulmonary: lungs clear to auscultation anteriorly.   CV: Irregular rhythm, normal rate, no rub   - JVD: not able to evaluate, patient in supine position.    - Edema no  GI: soft,  normal bowel sounds  MS: " no evidence of inflammation in joints, no muscle tenderness  : turk in place  SKIN: no rash, warm, dry, no cyanosis  NEURO: Sedated, intubated.     LABS:     I personally reviewed his labs.     CMP  Recent Labs   Lab 02/25/19  1546 02/25/19  1001 02/25/19  0620 02/25/19  0605 02/25/19  0347  02/24/19  2218  02/24/19  0320 02/23/19  2226   * 161* 158* Results questioned - new specimen has been requested 158*   < > 156*   < > 151* 148*   POTASSIUM 4.1 4.3  --   --  4.1  --  4.1   < > 2.2* 2.4*   CHLORIDE 126* 128*  --   --  127*  --  125*   < > 120* 119*   CO2 20 20  --   --  18*  --  18*   < > 17* 17*   ANIONGAP 13 13  --   --  14  --  13   < > 14 12   * 104*  109*  --   --  117*  118*  --  119*  127*   < > 124* 110*  117*   BUN 62* 55*  --   --  48*  --  42*   < > 30 31*   CR 4.86* 4.23*  --   --  3.64*  --  2.94*   < > 2.52* 2.41*   GFRESTIMATED 14* 16*  --   --  20*  --  25*   < > 30* 32*   GFRESTBLACK 16* 19*  --   --  23*  --  29*   < > 35* 37*   ALEKSANDER 7.1* 7.2*  --   --  7.2*  --  6.8*   < > 7.4* 7.4*   MAG 2.3 2.3  --   --  2.4*  --  2.4*   < > 2.2 2.5*   PHOS  --   --   --   --  5.6*  --  4.6*  --  1.9* 0.8*   PROTTOTAL 5.0* 4.8*  --   --  4.9*  --  5.1*   < > 5.4* 5.6*   ALBUMIN 2.6* 2.3*  --   --  2.3*  --  2.6*   < > 2.7* 2.6*   BILITOTAL 1.1 1.1  --   --  1.2  --  1.3   < > 0.9 0.8   ALKPHOS 42 38*  --   --  34*  --  35*   < > 38* 44   * 854*  --   --  958*  --  1,092*   < > 1,588* 1,602*   * 198*  --   --  206*  --  228*   < > 313* 331*    < > = values in this interval not displayed.     CBC  Recent Labs   Lab 02/25/19  1546 02/25/19  1001 02/25/19  0347 02/24/19  2218   HGB 10.4* 11.2* 11.9* 12.9*   WBC 24.5* 24.9* 22.0* 21.8*   RBC 3.38* 3.63* 3.85* 4.13*   HCT 31.1* 33.1* 34.6* 36.7*   MCV 92 91 90 89   MCH 30.8 30.9 30.9 31.2   MCHC 33.4 33.8 34.4 35.1   RDW 15.2* 14.9 14.5 14.2   PLT 85* 100* 75* 96*     INR  Recent Labs   Lab 02/25/19  1546 02/25/19  1001  02/25/19  0347 02/24/19  2218   INR 1.94* 1.90* 1.86* 1.83*   PTT 45* 46* 48* 64*     ABG  Recent Labs   Lab 02/25/19  1546 02/25/19  1400 02/25/19  1208 02/25/19  1001   PH 7.27* 7.29* 7.30* 7.28*   PCO2 41 39 39 41   PO2 65* 70* 71* 73*   HCO3 19* 19* 19* 19*   O2PER 60.0 60.0 50.0 50.0      URINE STUDIES  Recent Labs   Lab Test 02/23/19  0802   COLOR Yellow   APPEARANCE Slightly Cloudy   URINEGLC 300*   URINEBILI Negative   URINEKETONE 5*   SG 1.031   UBLD Moderate*   URINEPH 5.5   PROTEIN 100*   NITRITE Negative   LEUKEST Negative   RBCU >182*   WBCU 7*     No lab results found.  PTH  No lab results found.  IRON STUDIES  No lab results found.    IMAGING:  All imaging studies reviewed by me.     Natalia Munoz MD  Nephrology Fellow  Pager: [phone]    I was present with the fellow during the history and exam.  I discussed the case with the fellow and agree with the findings as documented in the assessment and plan.  FERNANAD, high CK and UOP decreased significantly. Gave small volume challenge without improvement in UOP, CRRT started this afternoon  Thuy Robert Rajan

## 2019-02-25 NOTE — PROCEDURES
VEEG REPORT: DAY 2   EEG #-2     DATE OF RECORDING/SERVICE DATE:  02/24/2019.   SOURCE FILE DURATION:  23 hours 48 minutes 08 seconds.      HISTORY:  This is day #2 of video EEG monitoring in a 38-year-old male admitted on 02/23/2019, after a refractory VF cardiac arrest.  The patient was placed under hypothermia protocol with intubation and sedation.  Video EEG was for the evaluation of seizure.      MEDICATIONS:  Rewarming was started on this day at 14:00.  Fentanyl  mcg per hour.  Versed 2 mg per hour.     TECHNICAL SUMMARY: This continuous video- EEG monitoring procedure was performed with 23 scalp electrodes in 10-20 electrode system placement, and additional scalp, precordial and other surface electrodes used for electrical referencing and artifact detection.  Video monitoring was utilized and periodically reviewed by EEG technologists and the physician for electroclinical correlations.     BACKGROUND:  Throughout the recording there is no well-defined parietooccipital rhythm or EEG changes associated with drowsiness, stage II sleep, or deeper states of sleep.  There is near continuous generalized theta slowing, with maximum amplitude in the bifrontal regions periodically.  There are brief (1-3 second) periods of generalized attenuation seen up until 20:00.  There is no focal slowing.  Following 20:00, there is increase in myogenic activity in the bifrontal and temporal regions, and increase in duration of generalized attenuations up to 5 seconds.     ACTIVATION PROCEDURES:  At 13:11, ICU stimulations (physical and verbal stimuli) are performed.  There is no clinical response or electrographic changes noted on this day.      EPILEPTIFORM ACTIVITIES:  None.      ICTAL ACTIVITIES:  None.      IMPRESSION:  This is an abnormal day #2 video EEG due to the presence of generalized theta slowing and generalized periods of attenuation.  Findings are consistent with a moderate diffuse encephalopathy.  No  epileptiform activities, electrographic seizures or paroxysmal events are noted.  The use of medications employed may alter the EEG by suppressing background frequency, and clinical correlation is advised.          This report is dictated by Dr. Ulises Galvan DO.  CNP fellow.     I personally reviewed the video EEG and agree with the reported findings.    Aiyana Campbell MD.                D: 2019   T: 2019   MT: GRACY      Name:     LIONEL CARTY   MRN:      [id number]        Account:        [id number]   :      1980           Procedure Date: 2019      Document: [id number]

## 2019-02-25 NOTE — PROGRESS NOTES
ECMO Shift Summary:    Patient remains on VA ECMO, all equipment is functioning and alarms are appropriately set. RPM's 3900 with flow range 4.5-4.65 L/min. Sweep gas is at 7 LPM and FiO2 100%. There are small spots of fibrin/clot at two corners of the oxygenator. Cannulas are secure with some oozing from the site. Extremities are warm to touch. Suctioned ETT for moderate to large amount of thin, frothy, pink secretions.    Significant Shift Events: Patient became more hypotensive overnight, so ECMO flows were increased, 1 liter of albumin was given, and levophed and vasopressin were started. Heparin was stopped overnight. Two units of platelets were given. Patient switched from APRV to pressure control mode on the ventilator while the paralytic was on. Kept patient on pressure control due to high PIPs. Patient reached normothermia overnight too.    Vent settings:  Ventilation Mode: PCV Plus assist  (Pressure Control Ventilation/ Assist Control)  FiO2 (%): 40 %  Rate Set (breaths/minute): 12 breaths/min  PEEP (cm H2O): 10 cmH2O  Oxygen Concentration (%): 40 %  Peak Inspiratory Pressure (cm H2O) (Drager Mary): 28  Resp: 15  .    Heparin is off. ACT range 184-192.    Urine output is 5-10 ml/hr, blood loss was a small amount from oozing at the cannulation site. Product given included two unit of platelets. 1 liter of 5% albumin was given.      Intake/Output Summary (Last 24 hours) at 2/25/2019 0624  Last data filed at 2/25/2019 0500  Gross per 24 hour   Intake 3112.88 ml   Output 1650 ml   Net 1462.88 ml       ECHO:  No results found for this or any previous visit.No results found for this or any previous visit.    CXR:  Recent Results (from the past 24 hour(s))   XR Chest Port 1 View    Impression    Impression:   1. Worsening airspace opacities with near complete consolidation of  the right hemithorax and the left lower lung.  2. Stable support devices as detailed above.       Labs:  Recent Labs   Lab 02/25/19  0620  02/25/19  0605 02/25/19  0347 02/25/19  0008   PH 7.27* Results questioned - new specimen has been requested 7.31* 7.32*   PCO2 42 Results questioned - new specimen has been requested 36 35   PO2 75* Results questioned - new specimen has been requested 160* 184*   HCO3 19* Results questioned - new specimen has been requested 18* 18*   O2PER 40.0 40.0 40.0 40.0       Lab Results   Component Value Date    HGB 11.9 (L) 02/25/2019    PHGB <30 02/25/2019    PLT 75 (L) 02/25/2019    FIBR 521 (H) 02/25/2019    INR 1.86 (H) 02/25/2019    PTT 48 (H) 02/25/2019    DD 2.8 (H) 02/25/2019    AXA <0.10 02/25/2019    ANTCH 52 (L) 02/24/2019         Plan is to work to improve the patient's hypoxia. CRRT likely to be started today. Will continue VA ECMO at this time.      Alo Carty, RRT  2/25/2019 6:24 AM

## 2019-02-25 NOTE — PLAN OF CARE
"Patient remains in CV ICU with VA ECMO support post VF arrest with PCI intervention.     Neuro: Pupils unequal with right at 3+ and left at 2+ sluggish to react. EEG monitoring in place. Sedated with Midazolam, Fentanyl, and Cisatracurium. BIS monitoring in place. 3% hypertonic saline infusing.  CV: Remains hemodynamically stable in a SB - SR. Vaso and norepinephrine infusion started this shift . Please see ECMO Tech note / flow sheet for details. Rewarming via thermo guard for targeted temperature management. IABP in 1:1 augmenting 100%. No pulsatility noted when IABP in standby.   Pulm: Lung sounds coarse. Vent settings: APRV. Small amounts of frothy pink tinged secretion returned from ETT.   GI: Hyperactive bowel sounds. Large loose bowel movement noted. OGT to LIS draining tan/brown colored returns.  : Pisano draining appropriate amounts of urine.   Skin: Intact. Preventative mepilex on coccyx. Preventative optifoam and meplilite dressings in use. Patient on isolibrium bed.  Psychosocial: Significant other, Kalicthong \"Manolo\" and family supportive at bedside throughout shift.     Plan: Continue to support and rewarming started at 10:50 am on 2/24/19.     "

## 2019-02-25 NOTE — PROGRESS NOTES
[facility], [facility] Nurse Inpatient Adult Pressure Injury Prevention Assessment: ECMO  Initial     Positioning Tolerance: Good  Date of ECMO cannulation: 2/23 Right Groin  Presence of Ischemia: No  Location of ischemia: NA    Pressure Injury Prevention Interventions In Place:  Z flow Positioner under head, Pillows for repositioning, TAPs Wedge Positioners in use, Heel off-loading boots, Pillows under calves for heel suspension, Mepilex Sacral Dressing and Dressing to sacrum for prevention   Current support surface: Standard  Low air loss mattress       Pressure Injury Prevention Interventions Added:  Optifoam Dressing under ECMO Cannula        Plan of Care for Positioning and Pressure Injury Prevention  Reposition patient every 1-2 hours using TAP Wedges  Position head on Z flow positioner, mold indentation at areas of pressure points.  Pad ECMO IJ cannula with Optifoam (#[id number]) along face and scalp between skin and cannula and under Coban head wraps    Pad ECMO groin and chest cannula under rigid connectors with Optifoam or Soft cloth  Heel off-loading Boots at all times  Sacral Mepilex for Prevention, change every 5 days and prn  Low Air loss mattress    Patient History:   According to medical record: Ciara Winslow is a 38 year old male who was admitted on 2/23/2019. Wife noticed agonal breathing and seizure like activity. Called EMS, who came and started chest compressions. No history of HTN, DM. Does have history of dyslipidemia. Non smoker. Went to bed at 11:30, wife found him foaming at mouth around midnight. Called EMS, did not get significant chest compressions. EMS arrived 5 minutes later. Taken to [facility].    Current Diet / Nutrition:     Orders Placed This Encounter      NPO for Medical/Clinical Reasons Except for: No Exceptions    Output:    I/O last 3 completed shifts:  In: 3168.88 [I.V.:1711.88; NG/GT:170]  Out: 1650 [Urine:1250; Emesis/NG output:100;  Stool:300]  Containment: of urine/stool: Rectal Pouch and Urinary Catheter    Risk Assessment:   Sensory Perception: 1-->completely limited  Moisture: 4-->rarely moist  Activity: 1-->bedfast  Mobility: 1-->completely immobile  Nutrition: 2-->probably inadequate  Friction and Shear: 2-->potential problem  Kirby Score: 11    Labs:    Recent Labs   Lab 02/25/19  0347  02/23/19  0539   ALBUMIN 2.3*   < > 2.5*   HGB 11.9*   < > 16.3   INR 1.86*   < >  --    WBC 22.0*   < > 17.0*   A1C  --   --  6.3*   .0*   < > 16.0*    < > = values in this interval not displayed.     Focused Assessment:  Right groin ECMO cannula and Heels, occiput, mouth    Pressure Injury Present::No    Education provided to: nurse  Discussed importance of:their role in pressure injury prevention  Discussed plan of care with Nurse  WOC Nurse follow-up plan:weekly    Violeta Grossman RN CWCN

## 2019-02-25 NOTE — PROGRESS NOTES
ECMO Attending Progress Note  2/25/2019    Hellen Riddle is a 39 year old male who was cannulated for ECMO 2/23/19 due to refractory VF arrest    Interval events: Required pressors overnight.  Otherwise stable.    Physical Exam:  Temp:  [90.5  F (32.5  C)-98.4  F (36.9  C)] 98.2  F (36.8  C)  Heart Rate:  [55-91] 82  Resp:  [12-15] 12  MAP:  [55 mmHg-99 mmHg] 62 mmHg  Arterial Line BP: ()/(41-68) 74/48  FiO2 (%):  [40 %] 40 %  SpO2:  [81 %-100 %] 94 %    Intake/Output Summary (Last 24 hours) at 2/25/2019 1025  Last data filed at 2/25/2019 0900  Gross per 24 hour   Intake 3253.73 ml   Output 1080 ml   Net 2173.73 ml      Ventilation Mode: PCV Plus assist  (Pressure Control Ventilation/ Assist Control)  FiO2 (%): 40 %  Rate Set (breaths/minute): 12 breaths/min  PEEP (cm H2O): 10 cmH2O  Oxygen Concentration (%): 40 %  Peak Inspiratory Pressure (cm H2O) (Drager Mary): 28  Resp: 12     General: no acute distress, intubated and sedated  HEENT: PERRLA, conjunctiva clear, without icterus or pallor, oropharyx clear  Cardiac: RRR nl S1S2   Lungs: clear to auscultation and percussion bilaterally anterior  Abdomen: soft, nontender, non distended, no hepatosplenomegaly or masses  Extremities: without edema or cyanosis; pulses are dopplerable;   Skin: normal skin appearance without worrisome lesions.   Neurologic:intubated and sedated    Labs:  Recent Labs   Lab 02/25/19  1001 02/25/19  0754 02/25/19  0620 02/25/19  0605   PH 7.28* 7.28* 7.27* Results questioned - new specimen has been requested   PCO2 41 40 42 Results questioned - new specimen has been requested   PO2 73* 69* 75* Results questioned - new specimen has been requested   HCO3 19* 19* 19* Results questioned - new specimen has been requested   O2PER 50.0 40.0 40.0 40.0      Recent Labs   Lab 02/25/19  0347 02/24/19  2218 02/24/19  1554 02/24/19  0947   WBC 22.0* 21.8* 16.6* 15.4*   HGB 11.9* 12.9* 12.4* 12.1*     Creatinine   Date Value Ref Range Status    02/25/2019 3.64 (H) 0.66 - 1.25 mg/dL Final   02/24/2019 2.94 (H) 0.66 - 1.25 mg/dL Final   02/24/2019 2.60 (H) 0.66 - 1.25 mg/dL Final   02/24/2019 2.50 (H) 0.66 - 1.25 mg/dL Final       ECMO Issues including assessments and plan on DOS 2/25/2019:  Neuro: Sedated for mechanical ventilation and ECMO.  NIRS stable b/l  RASS goal: -3  CV: Cardiogenic shock.  Hemodynamically stable on norepi and vaso  Pulm: Flows unchanged at 4.5, Sweep unchanged at 7, Keep vent settings at rest settings  FEN/Renal: Electrolytes stable w/ replacement protocols in place, Cr stable, UOP stable  Heme: ACT goal: 180-200, Hemoglobin stable .  Goals: if O2 sat >85% Hgb >8.  If O2 Sat <85% keep Hgb 10-12.  Minimal oozing around the ECMO cannulas.  ID: Receiving empiric antibiotics  Cannulae: Position is acceptable on exam and the available imaging.  Distal perfusion cannula is in place and patent.     I have personally reviewed the ECMO flows, oxygenation and CO2 clearance, anticoagulation, and cannula position.  I have also personally assessed the patient's systemic response with hemodynamics, oxygenation, ventilation, and bleeding.       The patient requires continued ECMO support and management in the ICU.  I have discussed patient care and treatment plan with the primary team.      Abraham Baer MD, PhD  Interventional/Critical Care Cardiology  [phone]    February 25, 2019

## 2019-02-25 NOTE — PROGRESS NOTES
ECMO Shift Summary:    Patient remains on VA ECMO, all equipment is functioning and alarms are appropriately set. RPM's 3500 with flow range 4.5 L/min. Sweep gas is at 7 LPM and FiO2 50%. Circuit remains free of air, clot and fibrin. Cannulas are secure with no bleeding from site. Extremities are warm. Suctioned ETT for small amount.    Significant Shift Events: none    Vent settings:  Ventilation Mode: APRV  (Airway Pressure Release Ventilation)  FiO2 (%): 40 %  Rate Set (breaths/minute): 12 breaths/min  Tidal Volume Set (mL): 250 mL  PEEP (cm H2O): 10 cmH2O  Oxygen Concentration (%): 40 %  Peak Inspiratory Pressure (cm H2O) (Drager Mary): 28  Resp: 15  .    Heparin is running at 400 u/hr, ACT range 180-200.    Urine output is as charted, blood loss was minimal oozing. Product given included 1 unit(s) PLTs.      Intake/Output Summary (Last 24 hours) at 2/24/2019 1814  Last data filed at 2/24/2019 1800  Gross per 24 hour   Intake 2231.37 ml   Output 4075 ml   Net -1843.63 ml       ECHO:  No results found for this or any previous visit.No results found for this or any previous visit.    CXR:  Recent Results (from the past 24 hour(s))   XR Chest Port 1 View    Narrative    Exam: XR CHEST PORT 1 , 2/24/2019 2:55 AM    Indication: arrest    Comparison: 2/23/2019    Findings: Single AP chest radiograph. ET tube projects approximately 3  cm above the tereso. Enteric tube projects over the esophagus, tip not  visualized. ECMO catheter in unchanged position. Intra-aortic balloon  pump marker in stable position, projecting at the level of the tereso.    Cardiomediastinal silhouette is obscured by diffuse alveolar airspace  opacities. Slight aeration of the upper left lung, not significantly  changed from prior. No appreciable pneumothorax.      Impression    Impression:   1. Endotracheal tube approximately 3 cm above the tereso. Additional  support devices in stable position.  2. Diffuse bilateral alveolar airspace  opacities, right worse than  left but overall stable.    I have personally reviewed the examination and initial interpretation  and I agree with the findings.    MIKKI DAI MD       Labs:  Recent Labs   Lab 02/24/19  1555 02/24/19  1554 02/24/19  1358 02/24/19  1159 02/24/19  0947   PH  --  7.37 7.38 7.37 7.37   PCO2  --  30* 30* 31* 31*   PO2  --  116* 126* 127* 146*   HCO3  --  18* 18* 17* 18*   O2PER 50  40 40 40 40 40       Lab Results   Component Value Date    HGB 12.4 (L) 02/24/2019    PHGB <30 02/24/2019    PLT 94 (L) 02/24/2019    FIBR 451 (H) 02/24/2019    INR 1.56 (H) 02/24/2019    PTT 84 (H) 02/24/2019    DD 2.1 (H) 02/24/2019    AXA 0.11 02/24/2019    ANTCH 52 (L) 02/24/2019         Plan is continue to provide VA ECMO supoprt.      Greer Gillis, RRT  2/24/2019 6:14 PM

## 2019-02-25 NOTE — PLAN OF CARE
Assessment: VA ECMO.  Neuro: PAO orientation as patient is intubated/sedated. Versed/Fentanyl gtt infusing. Pupils not equal; R approximately 3 mm, L 2 mm, reactive to light (R pupil is still reactive but less than previous assessments). Pt on hypothermic protocol, warmed to target temp at 0030 on 2/25. 3% NaCl infusing.  Cardiac: SR with some PVCs, HR 80-90s. No episodes of ventricular arrhythmias overnight. Pulses heard via doppler only. Ca gluc 1g x2 given.  ECMO: Flows 4-4.5 LPM, Speed 3900, Sweep gas 7 LPM. Albumin 500 ml, 2U PLT given.   IABP: 1:1, augmentation pressures 70s-80s. No pulsatility when placed on standby.  Respiratory: LS coarse; SpO2 90s on PCV, RR 12//PEEP 10/FiO2 40%. TV persistently in double digits. Significant decrease in pO2 from 9813-8505 am ABG; MD notified. Pulmonary edema present via ETT.  GI: +BS/IABP sounds present. OG output 100 ml, stool output 300 ml. Insulin gtt infusing.  : UOP initially around 20-25 ml/hr, tapered to 0-7 ml/hr towards end of shift. MD made aware of increase in creatinine/decrease in UOP.

## 2019-02-25 NOTE — PHARMACY-VANCOMYCIN DOSING SERVICE
Pharmacy Vancomycin Note  Date of Service 2019  Patient's  1980   39 year old, male    Indication: Aspiration Pneumonia  Goal Trough Level: 15-20 mg/L  Day of Therapy: 3  Current Vancomycin regimen:  Intermittent based on levels    Current estimated CrCl = Estimated Creatinine Clearance: 26.5 mL/min (A) (based on SCr of 4.23 mg/dL (H)).    Creatinine for last 3 days  2019:  2:00 AM Creatinine 1.65 mg/dL;  5:39 AM Creatinine 1.93 mg/dL; 10:12 AM Creatinine 2.30 mg/dL;  4:11 PM Creatinine 2.37 mg/dL; 10:26 PM Creatinine 2.41 mg/dL  2019:  3:20 AM Creatinine 2.52 mg/dL;  9:47 AM Creatinine 2.50 mg/dL;  3:54 PM Creatinine 2.60 mg/dL; 10:18 PM Creatinine 2.94 mg/dL  2019:  3:47 AM Creatinine 3.64 mg/dL; 10:01 AM Creatinine 4.23 mg/dL    Recent Vancomycin Levels (past 3 days)  2019:  6:05 AM Vancomycin Level 26.3 mg/L    Vancomycin IV Administrations (past 72 hours)                   vancomycin (VANCOCIN) 2,000 mg in sodium chloride 0.9 % 500 mL intermittent infusion (mg) 2,000 mg New Bag 19 0704     2,000 mg New Bag 19 0718                Nephrotoxins and other renal medications (From now, onward)    Start     Dose/Rate Route Frequency Ordered Stop    19 0735  vancomycin place blandon - receiving intermittent dosing      1 each Does not apply SEE ADMIN INSTRUCTIONS 19 0735      19 1900  norepinephrine (LEVOPHED) 16 mg in D5W 250 mL infusion      0.03-0.4 mcg/kg/min × 106 kg  3-39.8 mL/hr  Intravenous CONTINUOUS 19 1849      19 1515  vasopressin (VASOSTRICT) 40 Units in D5W 40 mL infusion      0.5-4 Units/hr  0.5-4 mL/hr  Intravenous CONTINUOUS 19 1504      19 0630  piperacillin-tazobactam (ZOSYN) 3.375 g vial to attach to  mL bag      3.375 g  over 30 Minutes Intravenous EVERY 6 HOURS 19 0535 19 0629             Contrast Orders - past 72 hours (72h ago, onward)    Start     Dose/Rate Route Frequency Ordered  Stop    02/23/19 0331  iopamidol (ISOVUE-370) solution  Status:  Discontinued        ONCE PRN 02/23/19 0331 02/23/19 0434          Interpretation of levels and current regimen:  Trough level is  Supratherapeutic    Has serum creatinine changed > 50% in last 72 hours: Yes    Urine output:  diminished urine output    Renal Function: Worsening--> plan for CRRT    Plan:  1.  Schedule 2000 mg IV q24h when CRRT initiated.  2.  Pharmacy will check trough levels as appropriate in 1-3 Days.    3. Serum creatinine levels will be ordered daily for the first week of therapy and at least twice weekly for subsequent weeks.      Lori Ocampo, PharmD  Pager [phone]        .       awake,alert,tolerating fluids.  D/c home as per peds ASU protocol

## 2019-02-25 NOTE — PROCEDURES
Small Bowel Feeding Tube Placement Assessment  Reason for Feeding Tube Placement: post-pyloric access for feeding  Cortrak Start Time: 13:25   Cortrak End Time: 13:34  Medicine Delivered During Procedure: lidocaine gel  Placement Successful: Presume post-pyloric (pending AXR confirmation).    Procedure Complications: none  Final Placement Eric at exit of nare 95 cm  Face to Face time with patient: 15 mins    Sudha Winters RD, LD, CNSC  CVICU Dietitian  Pager: [phone]

## 2019-02-25 NOTE — PROGRESS NOTES
ECMO Attending Progress Note  2/24/2019    Hellen Riddle is a 39 year old male who was cannulated for ECMO 2/23/19 due to refractory VF arrest    Interval events: Began rewarming today, off pressors    Physical Exam:  Temp:  [90.5  F (32.5  C)-97.2  F (36.2  C)] 97.2  F (36.2  C)  Heart Rate:  [44-99] 82  Resp:  [12-15] 12  MAP:  [56 mmHg-99 mmHg] 67 mmHg  Arterial Line BP: ()/(41-68) 83/51  FiO2 (%):  [40 %] 40 %  SpO2:  [81 %-100 %] 99 %    Gross per 24 hour   Intake 3306.99 ml   Output 2680 ml   Net 626.99 ml    Ventilation Mode: PCV Plus assist  (Pressure Control Ventilation/ Assist Control)  FiO2 (%): 40 %  Rate Set (breaths/minute): 12 breaths/min  PEEP (cm H2O): 10 cmH2O  Oxygen Concentration (%): 40 %  Peak Inspiratory Pressure (cm H2O) (Drager Mary): 28  Resp: 12     General: no acute distress, intubated and sedated  HEENT: PERRLA, conjunctiva clear, without icterus or pallor, oropharyx clear  Cardiac: RRR nl S1S2   Lungs: clear to auscultation and percussion bilaterally anterior  Abdomen: soft, nontender, non distended, no hepatosplenomegaly or masses  Extremities: without edema or cyanosis; pulses are dopplerable;   Skin: normal skin appearance without worrisome lesions.   Neurologic:intubated and sedated    Labs:  Recent Labs   Lab 02/25/19  0008 02/24/19 2218 02/24/19  2037 02/24/19  1806   PH 7.32* 7.32* 7.31* 7.36   PCO2 35 35 36 32*   PO2 184* 215* 107* 90   HCO3 18* 18* 18* 18*   O2PER 40.0 60 40 40      Recent Labs   Lab 02/24/19 2218 02/24/19  1554 02/24/19  0947 02/24/19  0320   WBC 21.8* 16.6* 15.4* 13.2*   HGB 12.9* 12.4* 12.1* 13.8     Creatinine   Date Value Ref Range Status   02/24/2019 2.94 (H) 0.66 - 1.25 mg/dL Final   02/24/2019 2.60 (H) 0.66 - 1.25 mg/dL Final   02/24/2019 2.50 (H) 0.66 - 1.25 mg/dL Final   02/24/2019 2.52 (H) 0.66 - 1.25 mg/dL Final       ECMO Issues including assessments and plan on DOS 2/24/2019:  Neuro: Sedated for mechanical ventilation and ECMO.  NIRS stable  b/l  RASS goal: -3  CV: Cardiogenic shock.  Hemodynamically stable on no pressors  Pulm: Flows unchanged at 4.5, Sweep unchanged at 7, Keep vent settings at rest settings  FEN/Renal: Electrolytes stable w/ replacement protocols in place, Cr stable, UOP stable  Heme: ACT goal: 180-200, Hemoglobin stable .  Goals: if O2 sat >85% Hgb >8.  If O2 Sat <85% keep Hgb 10-12.  Minimal oozing around the ECMO cannulas.  ID: Receiving empiric antibiotics  Cannulae: Position is acceptable on exam and the available imaging.  Distal perfusion cannula is in place and patent.     I have personally reviewed the ECMO flows, oxygenation and CO2 clearance, anticoagulation, and cannula position.  I have also personally assessed the patient's systemic response with hemodynamics, oxygenation, ventilation, and bleeding.       The patient requires continued ECMO support and management in the ICU.  I have discussed patient care and treatment plan with the primary team.      Abraham Baer MD, PhD  Interventional/Critical Care Cardiology  [phone]    February 24, 2019

## 2019-02-25 NOTE — PROGRESS NOTES
"SPIRITUAL HEALTH SERVICES  PALLIATIVE SPIRITUAL ASSESSMENT   [facility] ([place]) 4E    PRIMARY FOCUS:     Emotional/spiritual/[employer] distress    Support for coping    Visit with patient Hellen \"Ting\" Do's wife Manolo, sister-in-law Raissa, and nine year old daughter.    Distress: Manolo said that family support is vital to her and children's (10 yo daughter and 15 year old son) coping as they process their shock and fear related to Raiza's cardiac arrest. They are spending \"as much time as we can\" at the hospital and hoping that he will recover.    Coping/Meaning Making: Raiza and his family are Laotian; he is Restorationism, wife and family are Anglican. They are finding meaning in traditional, Anglican, and Restorationism practices, and have performed rituals for Ting both at home and here in the hospital. Wife Manolo and children have another family member staying with them at the hospital, and others caring for their home. Manolo is also encouraging other family members to visit. They are appreciative of emotional and spiritual support.    Intervention: Reviewed documentation. Offered reflective listening and spiritual support.    PLAN: I will follow for spiritual support while Palliative Care is consulted.    Yue Hensley  Palliative Care Consult Service   Pager [phone]  [facility] Inpatient Team Consult pager [phone] (M-F 8-4:30)  After-hours Answering Service [phone]       "

## 2019-02-26 NOTE — PHARMACY
Pharmacy Tube Feeding Consult    Medication reviewed for administration by feeding tube and for potential food/drug interactions.    Recommendation: No changes are needed at this time.     Pharmacy will continue to follow as new medications are ordered.    Stephanie Paris, VinayD

## 2019-02-26 NOTE — PROGRESS NOTES
CRRT STATUS NOTE    DATA:  Time:  5:45 PM  Pressures WNL: yes  Filter Status: wdl  Problems Reported/Alarms Noted:  no    Supplies Present:  yes    ASSESSMENT:  Patient Net Fluid Balance:  Neg 504 ml since midnight  Vital Signs: T 97.3, HR 70, /73 (78), RR 16, Sat 100%  Labs: K 3.7, Hgb 9.5, Plat 88  Goals of Therapy: Neg  ml/hr    INTERVENTIONS:   None    PLAN:  Continue to removed 50-100ml/hr if BP tolerates. Call CRRT at [phone] with any questions/concerns

## 2019-02-26 NOTE — PROGRESS NOTES
ECMO Shift Summary:    Patient remains on VA ECMO, all equipment is functioning and alarms are appropriately set. RPM's 3620 with flow range 4.6 L/min. Sweep gas is at 10 LPM and FiO2 90%. Circuit remains free of air, clot and fibrin. Cannulas are secure with no bleeding from site. Extremities are warm to the touch. Suctioned ETT for moderate amount of frothy secretions.    Significant Shift Events: Desaturation into low 80s    Vent settings:  Ventilation Mode: APRV  (Airway Pressure Release Ventilation)  FiO2 (%): 40 %  Rate Set (breaths/minute): 12 breaths/min  PEEP (cm H2O): 10 cmH2O  Oxygen Concentration (%): 40 %  Peak Inspiratory Pressure (cm H2O) (Drager Mary): 28  Resp: 16  .    No heparin running, ACT range 188.    Urine output is minimal, patient started on CRRT, no blood loss. Products given included 3 units of platelets.      Intake/Output Summary (Last 24 hours) at 2/26/2019 0718  Last data filed at 2/26/2019 0700  Gross per 24 hour   Intake 4025.25 ml   Output 2879 ml   Net 1146.25 ml       ECHO:  No results found for this or any previous visit.No results found for this or any previous visit.    CXR:  Recent Results (from the past 24 hour(s))   CT Head w/o Contrast    Narrative    CT HEAD W/O CONTRAST 2/25/2019 10:25 AM    Provided History: Neuro deficit(s), subacute  ICD-10:    Comparison: 2/23/2019.    Technique: Using multidetector thin collimation helical acquisition  technique, axial, coronal and sagittal CT images from the skull base  to the vertex were obtained without intravenous contrast.     Findings:    No intracranial hemorrhage, mass effect, or midline shift. The  ventricles are proportionate to the cerebral sulci. Cerebral atrophy.  The gray to white matter differentiation of the cerebral hemispheres  is preserved. The basal cisterns are patent. Mild bilateral maxillary  mucosal thickening.    The visualized paranasal sinuses are clear. The mastoid air cells are  clear.       Impression     Impression: No acute intracranial pathology. No definite hypoxic  ischemic injury. Repeat CT could be considered, as clinically  indicated.    I have personally reviewed the examination and initial interpretation  and I agree with the findings.    JOEL GARNETT MD   XR Abdomen Port 1 View    Narrative    EXAM: XR ABDOMEN PORT 1 VW  2/25/2019 3:59 PM      HISTORY: TF placement    COMPARISON: 2/23/2019    FINDINGS: Single supine radiograph of the abdomen. Feeding tube tip  projects over the expected location of the fourth portion of the  duodenum. Multiple cardiac leads visualized. Gastric tube tip and  sidehole project over the expected location of the stomach. Right  femoral ECMO tip terminating at the mid superior vena cava. Inferior  intra-aortic balloon pump in place with marker in stable position.  Left femoral approach venous catheter in stable position. Additional  lines and hardware presumed exterior to the patient.  Partially  visualized opacification and air bronchograms in the lung bases.   Cutaneous pacer pads.    Paucity of air filled bowel gas pattern. No pneumatosis. No portal  venous gas.       Impression    IMPRESSION:  1. Feeding tube tip projects over the expected location of the fourth  portion of the duodenum.  2. Stable support device positioning.  3. Paucity of air filled bowel.  4.  Partially visualized opacification of lungs.    I have personally reviewed the examination and initial interpretation  and I agree with the findings.    LANDRY YI MD       Labs:  Recent Labs   Lab 02/26/19  0554 02/26/19  0353 02/26/19  0149 02/25/19  2342   PH 7.42 7.32* 7.36 7.37   PCO2 32* 41 38 35   PO2 121* 56* 210* 273*   HCO3 21 22 22 20*   O2PER 40.0 40.0 40.0 40.0       Lab Results   Component Value Date    HGB 9.7 (L) 02/26/2019    PHGB 30 (H) 02/26/2019    PLT 80 (L) 02/26/2019    FIBR 651 (H) 02/26/2019    INR 1.60 (H) 02/26/2019    PTT 51 (H) 02/26/2019    DD 4.0 (H) 02/26/2019    AXA <0.10  02/26/2019    [facility] 41 (L) 02/25/2019         Plan is to continue managing patient on ECMO.      Guillermo Menjivar, RRT  2/26/2019 7:18 AM

## 2019-02-26 NOTE — PROGRESS NOTES
"SPIRITUAL HEALTH SERVICES  SPIRITUAL ASSESSMENT Progress Note (Palliative Focus)  [facility] ([place]) 4E    REFERRAL SOURCE: Palliative care follow up.    Brief supportive visit with patient Foong \"Ting\" Do's sister-in-law Raissa while wife Manolo slept. Raissa feels moderately hopeful and endorses the same for the rest of his family. They are appreciative of spiritual support while having no acute needs.     Plan: I will follow for spiritual support while Palliative Care is consulted.    Yue Hensley  Palliative   Pager [phone]  [facility] Inpatient Team Consult pager [phone] (M-F 8-4:30)  After-hours Answering Service [phone]    "

## 2019-02-26 NOTE — PROCEDURES
VEEG REPORT: DAY 3  EEG #-3     DATE OF RECORDING/SERVICE DATE:  02/25/2019     SOURCE FILE DURATION:  23:56:13       HISTORY:   This is day #3 of video EEG monitoring in a 38-year-old male admitted on 02/23 following refractory VF cardiac arrest.  The patient was placed under hypothermia protocol after being intubated and sedated and is now rewarmed.  Video EEG was started for evaluation of seizure.      MEDICATIONS:  Fentanyl 100 mcg per hour.  Versed 2-8 mg per hour.      TECHNICAL SUMMARY: This continuous video- EEG monitoring procedure was performed with 23 scalp electrodes in 10-20 electrode system placement, and additional scalp, precordial and other surface electrodes used for electrical referencing and artifact detection.  Video monitoring was utilized and periodically reviewed by EEG technologists and the physician for electroclinical correlations.    BACKGROUND:  Throughout the first 2 hours of recording, the patient has noncontinuous generalized theta slowing intermixed with 1-2 second periods of generalized attenuations.  This background slowing changes between 0145 and 0223 with electrocerebral activity becoming suppressed at sensitivities of 3 microvolts per mm.  Generalized theta frequencies are still present but intermittent and best seen in the central regions with voltages ranging between 8 and 10 microvolts.  There is no clear focal slowing seen.  There is no well-defined parietooccipital rhythm or any EEG changes associated with drowsiness, stage II of sleep, or deeper states of sleep.      ACTIVATION PROCEDURES:  ICU stimulations (physical and verbal stimuli) are performed at 0855 with no clear clinical or electrographic changes being seen.      EPILEPTIFORM ACTIVITIES:  None.       ICTAL ACTIVITIES:  None.        IMPRESSION:  This day #3 video EEG is abnormal due to generalized theta slowing which becomes more attenuated between 0200 and 0300.  Findings are consistent with a severe  diffuse encephalopathy.  Considering the use of medications employed remained relatively constant throughout recording, it would not necessarily contribute to low attenuation seen.  No clear electrographic seizures, or epileptiform activities are seen on this day.  Clinical correlation is advised.        This report is dictated by Ulises Galvan DO.  CNP fellow.      I personally reviewed the video EEG and agree with the reported findings.    Aiyana Campbell MD                    D: 2019   T: 2019   MT: MANSOOR      Name:     LIONEL CARTY   MRN:      [id number]        Account:        [id number]   :      1980           Procedure Date: 2019      Document: [id number]

## 2019-02-26 NOTE — PLAN OF CARE
Pt remains sedated on Fentanyl and Versed. No movement of extremities noted, not following commands. Pupils unchanged. SR with occasional PVC's. IABP: 1:1, 100%, Levo and vaso titrated for MAP goal > 60. ECMO circuit 90%, sweep: 10. 2  PLT given per ECMO orders. APRV at 40%, LS diminished. Frothy pink/tan sputum from ETT. TF increased to 20 mL/hr per order. Norris bustillos'd. CRRT I=O, see flowsheets. Will continue to monitor and notify team with concerns.

## 2019-02-26 NOTE — PROGRESS NOTES
CRRT INITIATION NOTE    Consent for CRRT Completed:  yes  Patient s Vascular Access: Catheter Placement Confirmed: connected via ECMO    DATA:  Procedure:  CVVHDF  Start Time:  1838  Machine#:  7  Filter:  M150  Blood Flow:  180 ML/min  Pre-Replacement Solution:  Normal Saline  Post-Replacement Solution:  Phoxillum 4/2.5  Dialysate Solution:  Phoxillum 4/2.5  Pre-Replacement Solution Rate:  0 mL/hr  Post-Replacement Solution Rate:  200 mL/hr  Dialysate Flow Rate:  1300 mL/hr   Patient Removal Rate:  20 mL/hr  Anticoagulation Type and Rate:  n/a    ASSESSMENT:  How Patient Tolerated Initiation:   Vital Signs:  HR 81 SR, BP 89/53(71) via alexandra, O2 sats 100%, RR 15 via vent  Initial Pressures:  Access:  117  Filter:  126  Return: 84  TMP:  46  Change in Filter Pressure:  24      INTERVENTIONS:  None    PLAN:  Change circuit q 72 hrs and prn.  Meets goals of therapy.  Call Resource R @[phone] with questions and/or concerns.

## 2019-02-26 NOTE — PROGRESS NOTES
Nephrology Progress Note  02/26/2019             Today Recommendations:  - Continue with CRRT, net negative fluid removal  ml/hr, goal 1-2 liters net negative over 24 hrs, no pre-filter.  - Na goal 155-160, continue with 3% NaCl gtt and titrate it to achieve the goal.   - Serum Na level Q4h, to titrate 3% NaCl as needed.   - Strict I/Os, avoid nephrotoxic agents.   - Monitor Vancomycin levels       ASSESSMENT AND RECOMMENDATIONS:   Mr. Riddle is 39 year old male with PMHx of familial hyperlipidemia, no history of renal disease or nephrolithiasis who was brought to our hospital with cardiogenic shock S/P cardiac arrest with unclear time down. Patient found to have CAD icluding 3 vessels, PCI was done emergently. We were consulted for FERNANDA and fluid management.       # Anuric FERNANDA on unknown baseline kidney function:  - Patient was admitted with Crea: 1.65 post acardiac arrest, no previous Crea level in our EMR.  - FERNANDA etiology: likley due to cardiogenic shock leading to hypotension and low renal perfusion, we gave 1 liter of fluid with 5% Albumin with no improvement of renal output, likely patient has ATN now due to ischemic injury due to low renal perfusion during the cardiac arrest in addition to rhabdomyolysis, elevated CK. Also other risks for his FERNANDA is contrast induced nephropathy and vancomycin renal toxicity.  - Continue with CRRT, net negative fluid removal  ml/hr, goal 1-2 liters net negative over 24 hrs, no pre-filter.  - Na goal 155-160, continue with 3% NaCl gtt and titrate it to achieve the goal.   - Serum Na level Q4h, to titrate 3% NaCl as needed.   - Strict I/Os, avoid nephrotoxic agents.   - Monitor Vancomycin levels      # Hypotension, Volume status:  - Patient is on 2 pressors today with BP: 90s/50s  - Pulmonary edema, CRRT with fluid removal as above.      #Cardiogenic Shock due to VF due to severe CAD secondary to familial hypelipidemia:  - Patient had another VF episode on Saturday  "night, PCI was done with stents placement.  - Patient on ECMO,   - Managed by cardiology, primary team.     # Severe encephalopathy:  - EEG shows severe generalized slowing with low amplitude which consist with severe diffuse encephalopathy. No seizure.   - Per primary team recommendation to keep Na level: 155-160 so plan to run 3% NaCl with CRRT and titrate it to achieve this range.   - Na level q4h  - Repeated CT/Head on 2/25 shows No acute intracranial pathology. No definite hypoxic ischemic injury.     Recommendations were communicated to primary team via note and verbally.      Seen and discussed with Dr. Satinder Munoz MD   Nephrology Fellow  [phone]    Interval History :   Nursing and provider notes from last 24 hours reviewed.  Patient was seen and examined at bedside this am. Patient is still sedated, intubated, on 2 pressors, on ECMO and CRRT, fluid removal plan as above. Repeated CT head shows no acute intracranial pathology, to continue monitoring. Closely monitoring sodium level to keep it in range 155-160.      Review of Systems:   Not able to obtain, patient is sedated, intubated.     Physical Exam:   I/O last 3 completed shifts:  In: 3917.2 [I.V.:1617.2; NG/GT:120]  Out: 2694 [Urine:47; Emesis/NG output:450; Other:1572; Stool:625]   Temp 98.1  F (36.7  C)   Resp 16   Ht 1.68 m (5' 6.14\")   Wt 109.8 kg (242 lb 1 oz)   SpO2 98%   BMI 38.90 kg/m       GENERAL APPEARANCE: Sedated, intubated  EYES: No scleral icterus, pupils equal  Endo: no goiter, no moon facies  Lymphatics: no cervical or supraclavicular LAD  Pulmonary: lungs clear to auscultation anteriorly.   CV: Irregular rhythm, normal rate, no rub   - JVD: not able to evaluate, patient in supine position.    - Edema no  GI: soft,  normal bowel sounds  MS: no evidence of inflammation in joints, no muscle tenderness  : turk in place  SKIN: no rash, warm, dry, no cyanosis  NEURO: Sedated, intubated.      LABS:      I " personally reviewed his labs.       Electrolytes/Renal -   Recent Labs   Lab Test 02/26/19  0841 02/26/19  0353 02/25/19  2342 02/25/19 2145  02/25/19  1802 02/25/19  1546  02/25/19  0347   * 155* 159* 158*   < >  --  159*   < > 158*   POTASSIUM  --  4.0  --  3.9  --   --  4.1   < > 4.1   CHLORIDE  --  122*  --  126*  --   --  126*   < > 127*   CO2  --  20  --  20  --   --  20   < > 18*   BUN  --  62*  --  63*  --   --  62*   < > 48*   CR  --  4.93*  --  4.93*  --   --  4.86*   < > 3.64*   GLC  --  132*  128*  --  109*  122*  --  137* 108*   < > 117*  118*   ALEKSANDER  --  7.6*  --  7.9*  --   --  7.1*   < > 7.2*   MAG  --  2.5*  --  2.3  --   --  2.3   < > 2.4*   PHOS  --  7.0*  --  6.2*  --   --   --   --  5.6*    < > = values in this interval not displayed.       CBC -   Recent Labs   Lab Test 02/26/19 0353 02/25/19 2145 02/25/19  1546   WBC 19.4* 21.2* 24.5*   HGB 9.7* 10.1* 10.4*   PLT 80* 75* 85*       LFTs -   Recent Labs   Lab Test 02/26/19 0353 02/25/19 2145 02/25/19  1546   ALKPHOS 40 40 42   BILITOTAL 1.3 1.3 1.1   * 176* 177*   * 686* 722*   PROTTOTAL 5.2* 5.2* 5.0*   ALBUMIN 2.6* 2.6* 2.6*       Iron Panel - No lab results found.      Imaging:  All imaging studies reviewed by me.     Current Medications:    artificial tears   Both Eyes Q8H     aspirin  81 mg Oral or Feeding Tube Daily     B and C vitamin Complex with folic acid  5 mL Per Feeding Tube Daily     [START ON 2/27/2019] pantoprazole  40 mg Oral or Feeding Tube QAM     piperacillin-tazobactam  4.5 g Intravenous Q6H     polyethylene glycol  17 g Oral or Feeding Tube Daily     protein modular  1 packet Per Feeding Tube BID     senna-docusate  1 tablet Oral or Feeding Tube At Bedtime     sodium chloride (PF)  3 mL Intracatheter Q8H     ticagrelor  90 mg Oral or Feeding Tube BID     vancomycin (VANCOCIN) IV  2,000 mg (central catheter) Intravenous Q24H       IV fluid REPLACEMENT ONLY       IV fluid REPLACEMENT ONLY       CRRT  replacement solution 12.5 mL/kg/hr (02/26/19 0606)     fentaNYL 100 mcg/hr (02/26/19 0900)     heparin 2 unit/mL in 0.9% NaCl 3 mL/hr (02/26/19 0900)     HEParin Stopped (02/25/19 0310)     insulin (regular) 2 Units/hr (02/26/19 0900)     midazolam Stopped (02/26/19 0830)     - MEDICATION INSTRUCTIONS -       - MEDICATION INSTRUCTIONS -       norepinephrine 0.1 mcg/kg/min (02/26/19 0900)     CRRT replacement solution 1.857 mL/kg/hr (02/25/19 1751)     sodium chloride 3% 15 mL/hr at 02/26/19 0900     vasopressin (PITRESSIN) infusion ADULT (40 mL) 4 Units/hr (02/26/19 0900)     Natalia Munoz MD   Nephrology Fellow  Pager: [phone]    I was present with the fellow during the history and exam.  I discussed the case with the fellow and agree with the findings as documented in the assessment and plan.  Thuy Rajan

## 2019-02-26 NOTE — PROGRESS NOTES
Cardiology Progress Note    Overnight/subj:   No major events    VS reviewed: Notable for temp: Tm 37.2, HR 78-85, MAP 62-72, oxygenating on % AAPRV 25/5 VTH 3.7, VTL 0.4, mairway pressure: 24, fio2 40%  Tele: No major events  IABP via LFA, 1:1, triggered by ECG, augmented MAPs 103, pulses NIRS stable   ECMO: 4-4.5 L of flow    Wt: on admit 107.9, yday 107.7, today 109.8  I/O: 3.3 in / 1.5 out yday. Since MN 1.4 in / 1.4 out    Drips:   NE 0.07  Vaso 4     Fentanyl 100  Mida 4  Insulin 4     PO Cardiac Meds: ASA 81mg daily, ticagrelor 90mg BID,   Other meds: pip/tazo, vanco, senna, miralax, ppi    Radiology Imaging  Findings:   Single AP view of the chest. Endotracheal tube in place with the tip  projecting at the level of the lower thoracic trachea. ECMO cannula in  place with the tip projected at approximately the expected location of  the right atrium. Intra-aortic balloon pump with the marker projecting  at the level of the tereso. Extrathoracic defibrillator pad additional  lines presumed exterior to the patient.     Worsening airspace opacities with complete opacification of both lungs  with air bronchograms. The cardiac silhouette is obscured. Visualized  portion of the abdomen is unremarkable.    Cardiology Studies:  ECG reviewed    Labs: Reviewed in epic  Na 155  LFTs down trending  Lactic acid wnl  Leukocytosis downtredning  hgb 16>>>9.7  cTnI still >200    Phys exam:  GEN: sedated, RASS-4  Eyes: R pupil larger than left   Pulm: minimal air movement  Cardiac: IABP inflation heard, no pulsatility without iabp  Vascular: NIRS stable   GI: soft, non distended     ASSESSMENT/PLAN:  Mr. Riddle is a 40yo M with HLD and Fhx CAD/MI who presented with refractory VF OHCA brought directly to the CCL now s/p peripheral VA ECMO placement. Found to have severe 3v disease s/p MITA to mRCA and mLcx. Also with IABP placement for presumed pulmonary edema. Being managed as part post VF protocol.       Neurology:  # CT head  day3: minimal edema - Continue EEG   - Intubated, sedated   - Na 155-160 for brain edema ppx  -continue versed, fentanyl. Decrease sedation       Cardiovascular / Hemodynamics:  # Refractory VF arrest.    #CAD s/p MITA  mRCA and mCx  # Torsades (likely relate Qtc >600, corrected)  # Cardiogenic Shock requiring ECMO support  TTE: turndown today  EKG: Qtc 476 (improved)  Mg>2  - continue vaso and NE as needed  --continue ASA 81mg and ticagrelor 90mg BID  --ACT goal 180-200  --hold lipitor for now given likely hepatic injury during arrest  --hold ACE/ARB for now given likely reduced renal fxn after arrest  --holding beta blocker given shock    Pulmonary:  # Complete opacification of both lungs c/f ARDS, severe pulm edema vs less likely PAH   # Resp acidosis (currected) -- Worsening hypoxia due to cardiac recovery. May need conversion to VAV or VV ECMO  -- ETT stable   --Q2h ABGs for now  --consider scheduled duonebs if signs of lung dz, currently PRN  -- Continue to monitor lung opacities       GI and Nutrition:   Shock Liver (recovering) No known medical hx.    --Nutrition consulted, appreciate their help  -- Post pyloric feeds started  --bowel regimen   --GI Prophylaxis: PPI    Renal, Fluid and Electrolytes:  # FERNANDA with anuria -- Renal consult   -- on CRRT, plan for volume removal given worsening hypoxia  --monitor urine output  --maintain K>3 and Mg>2    Infectious Disease:  # No active Issues --vancomycin/zosyn x5 days for ECMO  --daily blood cultures     Hematology: Coagulopathy related to ECMO Receiving heparin for ECMO and ASA/ticagrelor for MITA.   --cryo PRN fibrinogen < 200; FFP for INR >2  --Transfuse for Hgb<10  --heparin gtt for ECMO with ACT goal 160-180  --DVT PPX: Heparin as above   Endocrinology: No known medical history. BG elevated.  --insulin gtt PRN   Lines: R femoral arterial and venous ECMO cannulae February 23, 2019  L femoral arterial line February 23, 2019  R radial arterial line February 23,  2019  ETT February 23, 2019  Pisano catheter February 23, 2019  OG tube February 23, 2019  Restraint: PRN  Current lines are required for patient management         Code status FULL     To be discussed with Dr. Baer.      Theron Hebert MD   CSI Service   *[phone]

## 2019-02-26 NOTE — PLAN OF CARE
Pt remains on VA ECMO. Sedated with versed and on fentanyl for pain. Vent settings: APRV 100%. Pt having times of desaturation down to 80%. Increased fio2 to 100% and put his right lung up per CSI MD for better ventilation. Since then sats have been 100%. Pt on levo and vaso to keep maps >60. ECMO circuit at 100% and sweep of 9. IABP 1:1, augmenting 100%. Pt having less than 5cc/hr out of urine so CRRT just started. Goal is I=O.TF started at 10cc/hr, goal rate is 50cc. Ins gtt being titrated according to algorithm 3. Went down for head CT this AM. Plan for turn down tomorrow. Will continue to monitor and update as needed. Refer to flowsheets for further documentation.

## 2019-02-27 NOTE — PROGRESS NOTES
ECMO Shift Summary:    Patient remains on VA ECMO, all equipment is functioning and alarms are appropriately set. RPM's 3620 with flow range 4.42-4.65 L/min. Sweep gas is at 8 LPM and FiO2 100%. There remains small bits of clot/fibrin at each corner of the oxygenator. Cannulas are secure with a small amount of oozing from the site. Extremities are warm to touch. Suctioned ETT for a small amount of thin, tan, pink-tinged secretions.    Significant Shift Events: Patient had a few brief episodes of a-fib. Heparin restarted to get ACT values 180-200.    Vent settings:  Ventilation Mode: APRV  (Airway Pressure Release Ventilation)  FiO2 (%): 40 %  Oxygen Concentration (%): 40 %  Resp: 15  .    Heparin is running at 500 u/hr, ACT range 164-180.    Patient is on CRRT, blood loss was a small amount from oozing at the cannulation site. No product was given in the last 8 hours.      Intake/Output Summary (Last 24 hours) at 2/27/2019 0521  Last data filed at 2/27/2019 0500  Gross per 24 hour   Intake 3237.38 ml   Output 5234 ml   Net -1996.62 ml       ECHO:  No results found for this or any previous visit.No results found for this or any previous visit.    CXR:  No results found for this or any previous visit (from the past 24 hour(s)).    Labs:  Recent Labs   Lab 02/27/19  0350 02/27/19  0141 02/26/19  2354 02/26/19  2155   PH 7.48* 7.44 7.41 7.39   PCO2 31* 35 38 39   PO2 73* 86 179* 134*   HCO3 23 23 24 24   O2PER 40% 40 40 40       Lab Results   Component Value Date    HGB 9.1 (L) 02/27/2019    PHGB 40 (H) 02/27/2019    PLT 65 (L) 02/27/2019    FIBR 696 (H) 02/27/2019    INR 1.26 (H) 02/27/2019    PTT 51 (H) 02/27/2019    DD 7.8 (H) 02/27/2019    AXA <0.10 02/27/2019    ANTCH 38 (L) 02/26/2019         Plan is to continue VA ECMO at this time.      Alo Carty, RRT  2/27/2019 5:21 AM

## 2019-02-27 NOTE — PROGRESS NOTES
Cardiology Progress Note    Overnight/subj:   - Vaso wean off  - continue to remove volume with crrt  - failed turndown yesterday due to hypoxia  - minimal contractility on echo yesterday    VS reviewed: Notable for temp: AF, HR 73-75, MAP 64-76, oxygenating on 98%  Vent: APRV- TV  Tele: 1.4s pause, no major events  IABP via LFA, 1:1, triggered by ECG, augmented MAPs 103, pulses NIRS stable   ECMO: 4-4.6 L of flow    Wt: on admit 107.9, yday 109.8, today 106  I/O: 3.4 in / 4.9 out yday. Since MN 1.6 in / 2.2 out    Drips:   NE 0.06  Vaso off     Fentanyl 100  Mida off  Insulin 4  Hep 500     PO Cardiac Meds: ASA 81mg daily, ticagrelor 90mg BID,   Other meds: pip/tazo, vanco, senna, miralax, ppi        Radiology Imaging  CXR-   Impression:   1. Unchanged near complete opacification of the lungs with air  bronchograms.  2. Support devices as detailed above.  Cardiology Studies:  ECG qtc 465  tte reviewed     Labs: Reviewed in epic    Phys exam:  GEN: sedated, RASS-4  Eyes: R pupil larger than left unchanged  Pulm: minimal air movement but improved compared to yesterday  Cardiac: IABP inflation heard, s1, s2   Vascular: NIRS stable   GI: soft, non distended      ASSESSMENT/PLAN:  Mr. Riddle is a 40yo M with HLD and Fhx CAD/MI who presented with refractory VF OHCA brought directly to the CCL now s/p peripheral VA ECMO placement. Found to have severe 3v disease s/p MITA to mRCA and mLcx. Also with IABP placement for presumed pulmonary edema. Being managed as part post VF protocol.  Day 4 post arrest.      Neurology:  # CT head day3: minimal edema - Continue EEG   - Intubated, sedated   - Na 155-160 for brain edema ppx  -continue versed, fentanyl. Decrease sedation       Cardiovascular / Hemodynamics:  # Refractory VF arrest.    #CAD s/p MITA  mRCA and mCx  # Torsades (likely relate Qtc >600, corrected)  # Cardiogenic Shock requiring ECMO support  # Sponteneous smoke in LV c/f low contractility, at increase risk for LV thrombus  formation **Failed turndown on 02/26 due to hypoxia (dropped to 70s)  - Keep current ecmo setting would like to decrease flows to allow for more native contractility but limited by hypoxia.     TTE: repeat turndown today  EKG: Qtc 476 (improved)Mg>2  - continue vaso and NE as needed  --continue ASA 81mg and ticagrelor 90mg BID  --ACT goal 180-200  --hold lipitor for now given likely hepatic injury during arrest  --hold ACE/ARB for now given likely reduced renal fxn after arrest  --holding beta blocker given shock    Pulmonary:  # Complete opacification of both lungs c/f ARDS, severe pulm edema vs less likely PAH   # Resp acidosis (currected) -- Worsening hypoxia due to cardiac recovery. May need conversion to VAV or VV ECMO  -- ETT stable   --Q2h ABGs for now  --consider scheduled duonebs if signs of lung dz, currently PRN  -- Continue to monitor lung opacities       GI and Nutrition:   Shock Liver (recovering) No known medical hx.    --Nutrition consulted, appreciate their help  -- Post pyloric feeds started  --bowel regimen   --GI Prophylaxis: PPI    Renal, Fluid and Electrolytes:  # FERNANDA with anuria -- Renal consult   -- on CRRT, continue volume removal   --monitor urine output  --maintain K>3 and Mg>2    Infectious Disease:  # No active Issues --vancomycin/zosyn x5 days for ECMO  --daily blood cultures      Hematology: Coagulopathy related to ECMO Receiving heparin for ECMO and ASA/ticagrelor for MITA.   --cryo PRN fibrinogen < 200; FFP for INR >2  --Transfuse for Hgb<10  --heparin gtt for ECMO with ACT goal 160-180  --DVT PPX: Heparin as above   Endocrinology: No known medical history. BG elevated.  --insulin gtt PRN   Lines: R femoral arterial and venous ECMO cannulae February 23, 2019  L femoral arterial line February 23, 2019  R radial arterial line February 23, 2019  ETT February 23, 2019  Pisano catheter February 23, 2019  OG tube February 23, 2019  Restraint: PRN  Current lines are required for patient  management         Code status FULL     To be discussed with Dr. Baer.      Theron Hebert MD   CSI Service   *[phone]

## 2019-02-27 NOTE — PROGRESS NOTES
CRRT RESTART NOTE    Reason for Restart:  Filter Clotted  Error Code:  N/A  Patient s Vascular Access: ECMO Catheter                   Placement Confirmed:  YES    DATA:  Procedure:  CVVHDF  Start Time:  1545  Machine#:  7  Filter:  M150  Blood Flow:   180 mL/min  Pre-Replacement Solution:  N/A  Post-Replacement Solution:   Phoxillum BK 4/2.5  Dialysate Solution:  Phoxillum BK 4/2.5  Pre-Replacement Solution Rate:  0mL/hr  Post-Replacement Solution Rate:  200mL/hr  Dialysate Flow Rate:  1300mL/hr  Patient Removal Rate:  200 mL/hr per bedside RN   Anticoagulation Type and Rate:  N/A    ASSESSMENT:  How Patient Tolerated Restart:  unchanged  Vital Signs: On Levo at 0.06; vaso recently turned off. HR 80; /60 (82), Spo2 100% on 100%  APRV, RR 16.  VA ECMO, IABP. T 97.2Esophageal.    Initial Pressures:  Access:  59  Filter:  158  Return:  105  TMP:  39  Change in Filter Pressure:  20    INTERVENTIONS:  Returned blood and primed new CRRT circuit. Hooked up with RT assist to ECMO circuit.     PLAN:  Continue CRRT to meet goals of therapy; - ml/hr, goal 1-2 liters net negative over 24 hrs. Contact CRRT RN [phone] with concerns.

## 2019-02-27 NOTE — PROGRESS NOTES
ECMO Shift Summary:    Patient remains on VA ECMO, all equipment is functioning and alarms are appropriately set. RPM's 3620 with flow range 4.33-4.68 L/min. Sweep gas is at 9 LPM and FiO2 90%. Circuit remains free of air, clot and fibrin. Cannulas are secure with minimal oozing from site. Extremities are warm to the touch. Suctioned ETT for small amount of thin tan and some pink secretions.    Significant Shift Events:     Echo with turndown was done this morning.  ECMO flow was decreased to 3 L/m and patient's SpO2 dropped to the low to mid 80's.    Vent settings:  Ventilation Mode: APRV  (Airway Pressure Release Ventilation)  FiO2 (%): 40 %  Rate Set (breaths/minute): 12 breaths/min  PEEP (cm H2O): 10 cmH2O  Oxygen Concentration (%): 40 %  Peak Inspiratory Pressure (cm H2O) ([place] Mary): 28  Resp: 16  .    There is no heparin running, ACT range 180-200.    Patient is anuric and on CRRT, blood loss was limited to oozing at cannula site. Product given included 1 unit of platelets.      Intake/Output Summary (Last 24 hours) at 2/26/2019 2252  Last data filed at 2/26/2019 2200  Gross per 24 hour   Intake 3359.3 ml   Output 4812 ml   Net -1452.7 ml       ECHO:  No results found for this or any previous visit.No results found for this or any previous visit.    CXR:  Recent Results (from the past 24 hour(s))   XR Chest Port 1 View    Narrative    Exam: XR CHEST PORT 1 VW, 2/26/2019 1:46 AM    Indication: arrest    Comparison: Chest radiograph dated 2/25/2019.    Findings:   Single AP view of the chest. Endotracheal tube in place with the tip  projecting at the level of the lower thoracic trachea. ECMO cannula in  place with the tip projected at approximately the expected location of  the right atrium. Intra-aortic balloon pump with the marker projecting  at the level of the tereso. Extrathoracic defibrillator pad additional  lines presumed exterior to the patient.    Worsening airspace opacities with complete  opacification of both lungs  with air bronchograms. The cardiac silhouette is obscured. Visualized  portion of the abdomen is unremarkable.      Impression    Impression:   1. Worsening airspace opacities with complete opacification of both  lungs.  2. Support devices as detailed above.    I have personally reviewed the examination and initial interpretation  and I agree with the findings.    ROSELYN CASH MD       Labs:  Recent Labs   Lab 02/26/19 2155 02/26/19 1956 02/26/19  1809 02/26/19  1614   PH 7.39 7.39 7.45 7.44   PCO2 39 38 34* 34*   PO2 134* 156* 293* 254*   HCO3 24 23 24 23   O2PER 40 40 40 40       Lab Results   Component Value Date    HGB 9.5 (L) 02/26/2019    PHGB 30 (H) 02/26/2019    PLT 88 (L) 02/26/2019    FIBR 717 (H) 02/26/2019    INR 1.28 (H) 02/26/2019    PTT 45 (H) 02/26/2019    DD 5.8 (H) 02/26/2019    AXA <0.10 02/26/2019    ANTCH 38 (L) 02/26/2019         Plan is to remain stable on VA ECMO and wean when able.      Getachew Garvin, RRT  2/26/2019 10:52 PM

## 2019-02-27 NOTE — PROGRESS NOTES
ECMO [place]    Vasoactive med: Vaso 0.5, NE 0.04  IABP: Y    At baseline:  ECMO Flows: 4.5L  BP: 104/55/78mmHg  HR: 77 bpm  SpO2: 99%  LVEF 5-10%    At 3.5L  ECMO Flows: 3.5L  BP: 92/57/75mmHg  HR: 102 bpm  SpO2: 92%  LVEF 10%    At 3L flow patient became hypoxic to the mid 70s.    Theron Hebert MD  Cardiology Fellow

## 2019-02-27 NOTE — PHARMACY-VANCOMYCIN DOSING SERVICE
Pharmacy Vancomycin Note  Date of Service 2019  Patient's  1980   39 year old, male    Indication: Aspiration Pneumonia  Goal Trough Level: 15-20 mg/L  Day of Therapy: 4  Current Vancomycin regimen:  2000 mg IV q24h    Current estimated CrCl = Estimated Creatinine Clearance: 24.1 mL/min (A) (based on SCr of 4.71 mg/dL (H)).    Creatinine for last 3 days  2019:  3:20 AM Creatinine 2.52 mg/dL;  9:47 AM Creatinine 2.50 mg/dL;  3:54 PM Creatinine 2.60 mg/dL; 10:18 PM Creatinine 2.94 mg/dL  2019:  3:47 AM Creatinine 3.64 mg/dL; 10:01 AM Creatinine 4.23 mg/dL;  3:46 PM Creatinine 4.86 mg/dL;  9:45 PM Creatinine 4.93 mg/dL  2019:  3:53 AM Creatinine 4.93 mg/dL;  9:44 AM Creatinine 4.76 mg/dL;  4:14 PM Creatinine 4.58 mg/dL;  9:55 PM Creatinine 4.71 mg/dL    Recent Vancomycin Levels (past 3 days)  2019:  6:05 AM Vancomycin Level 26.3 mg/L  2019:  9:55 PM Vancomycin Level 24.5 mg/L    Vancomycin IV Administrations (past 72 hours)                   vancomycin (VANCOCIN) 2,000 mg in sodium chloride 0.9 % 250 mL intermittent infusion (mg) 2,000 mg New Bag 19 2137    vancomycin (VANCOCIN) 2,000 mg in sodium chloride 0.9 % 500 mL intermittent infusion (mg) 2,000 mg New Bag 19 0704                Nephrotoxins and other renal medications (From now, onward)    Start     Dose/Rate Route Frequency Ordered Stop    19 0600  vancomycin (VANCOCIN) 1,750 mg in sodium chloride 0.9 % 250 mL intermittent infusion      1,750 mg (central catheter)  over 60 Minutes Intravenous ONCE 19 2343  vancomycin place blandon - receiving intermittent dosing      1 each Intravenous SEE ADMIN INSTRUCTIONS 19 0030  piperacillin-tazobactam (ZOSYN) 4.5 g vial to attach to  mL bag      4.5 g  over 30 Minutes Intravenous EVERY 6 HOURS 19 21019 0629    19 1900  norepinephrine (LEVOPHED) 16 mg in D5W 250 mL infusion       0.03-0.4 mcg/kg/min × 106 kg  3-39.8 mL/hr  Intravenous CONTINUOUS 02/24/19 1849      02/24/19 1515  vasopressin (VASOSTRICT) 40 Units in D5W 40 mL infusion      0.5-4 Units/hr  0.5-4 mL/hr  Intravenous CONTINUOUS 02/24/19 1504               Contrast Orders - past 72 hours (72h ago, onward)    None          Interpretation of levels and current regimen:  Trough level is  Supratherapeutic    Has serum creatinine changed > 50% in last 72 hours: Yes    Urine output:  diminished urine output/anuric most of the day    Renal Function: Stable    Plan:  1.  Give vancomycin 1750 mg once on 2/27 (~32 hours after last dose), then intermittently based on levels. If next level is therapeutic can consider q24h dosing while on CRRT  2.  Pharmacy will check trough levels as appropriate in 1-3 Days.    3. Serum creatinine levels will be ordered daily for the first week of therapy and at least twice weekly for subsequent weeks.      Francine Rogers, PharmD  Pager [phone]            .

## 2019-02-27 NOTE — PLAN OF CARE
Neuro: pupils remain unchanged. Withdraws to pain in LE's bilat. Does not withdraw/move bilat UE's. Does not follow commands. Weak cough with stimulation. 3% increased to 50 mL/hr. Fentanyl for pain/sedation.   CV: SR with occasional brief runs of A-fib 's, approximately q 4 hrs. Vaso weaned off remains on Levo to keep MAPs > 60. IABP 1:1, 100%. ECMO circuit Fio2: 100%, sweep: 8.   Pulm: LS coarse/dim. Scant tan secretions from ETT. APRV @60%.   GI: TF increased to goal of 50 mL/hr. OG to LIS, Rectal pouch in placed. Insulin per orders.   : CRRT continues, goal /hr for goal of 1 - 2 L net negative over 24 hour period.     Will continue to monitor and notify team with concerns.

## 2019-02-27 NOTE — PROCEDURES
VEEG REPORT: DAY 4  EEG #-4    DATE OF RECORDING/SERVICE DATE:  02/26/2019     SOURCE FILE DURATION:  23:49:37.      HISTORY:  This is day #4 of video EEG monitoring in a 39-year-old male admitted on 02/23/2019 with refractory VF cardiac arrest who underwent hypothermia protocol and is now rewarmed while intubated and sedated.  Video EEG was started for the evaluation of seizure.      MEDICATIONS:  Fentanyl 100 mcg per hour, Versed 4 mg per hour, stopped at 0830.     TECHNICAL SUMMARY: This continuous video- EEG monitoring procedure was performed with 23 scalp electrodes in 10-20 electrode system placement, and additional scalp, precordial and other surface electrodes used for electrical referencing and artifact detection.  Video monitoring was utilized and periodically reviewed by EEG technologists and the physician for electroclinical correlations.     BACKGROUND:  There is no well-defined parietal occipital rhythm or any EEG changes associated with drowsiness, stage II of sleep, or deeper states of sleep.  There is severe attenuation of all generalized activities with reading sensitivities of 3 uV/mm.  There is low amplitude generalized delta and theta frequencies seen intermittently, with voltages ranging between 5 and 15 uV.  No focal slowing is seen.      ACTIVATION PROCEDURES:  ICU stimulations are performed at 0902 consisting of physical and verbal stimuli.  No clinical or electrographic response is noted.      EPILEPTIFORM ACTIVITIES:  None.      ICTAL ACTIVITIES:  None.      IMPRESSION:  This day #4 video EEG is abnormal due to severe attenuation of generalized activities with low amplitude generalized delta and theta seen infrequently.  Findings are consistent with a severe diffuse encephalopathy.  The use of medications employed may alter the EEG and clinical correlations as advised.  No electrographic seizures or epileptiform activities are seen on this day.        This report is dictated by  Ulises Galvan DO, CNP fellow.      I personally reviewed the video EEG and agree with the reported findings.    Aiyana Campbell MD.                  D: 2019   T: 2019   MT: ABBY      Name:     LIONEL CARTY   MRN:      [id number]        Account:        [id number]   :      1980           Procedure Date: 2019      Document: [id number]

## 2019-02-27 NOTE — PLAN OF CARE
"Patient remains in CV ICU with [facility] ECMO support on CRRT.     Neuro: Right pupil 3+ and left pupil 2+ sluggish to react. EEG monitoring in place. Midazolam held at 0830 on 2/26. Patient does not wake or withdraw to pain. Sedated with Fentanyl. 3% hypertonic saline infusing.  CV:Remains in a SR on vasopressin and norepinephrine infusion with VA ECMO support. Turndown completed today. Please see ECMO Tech note / flow sheet for details.  IABP in 1:1 augmenting 100%.   Pulm: Lung sounds coarse. Vent settings: APRV.  GI: OGT to LIS. NJ tube feeds titrated towards goal. Rectal pouch in place.   : CRRT continues, goal of 1 - 2 L net negative over 24 hour period  Skin: Intact. Preventative mepilex on coccyx. Preventative optifoam and meplilite dressings in use. Patient on isolibrium bed.  Psychosocial: Significant other, Kalicthong \"Manolo\" and family supportive at bedside throughout shift.     Plan: Continue to monitor and support in CV ICU.    "

## 2019-02-27 NOTE — PROGRESS NOTES
Nephrology Progress Note  02/27/2019             Today Recommendations:  - Continue with CRRT, net negative fluid removal  ml/hr, goal 1-2 liters net negative over 24 hrs, no pre-filter as goal to keep serum Na level in 155-160.  - Na goal 155-160, continue with 3% NaCl gtt and titrate it to achieve the goal.   - Serum Na level Q4h, to titrate 3% NaCl as needed.   - Strict I/Os, avoid nephrotoxic agents.   - Monitor Vancomycin level  - To repeat CK level (Ordered)     ASSESSMENT AND RECOMMENDATIONS:   Mr. Riddle is 39 year old male with PMHx of familial hyperlipidemia, no history of renal disease or nephrolithiasis who was brought to our hospital with cardiogenic shock S/P cardiac arrest with unclear time down. Patient found to have CAD icluding 3 vessels, PCI was done emergently. We were consulted for FERNANDA and fluid management.       # Anuric FERNANDA on unknown baseline kidney function:  - Patient was admitted with Crea: 1.65 post acardiac arrest, no previous Crea level in our EMR.  - FERNANDA etiology: likley due to cardiogenic shock leading to hypotension and low renal perfusion, we gave 1 liter of fluid with 5% Albumin with no improvement of renal output, likely patient has ATN now due to ischemic injury due to low renal perfusion during the cardiac arrest in addition to rhabdomyolysis, elevated CK. Also other risks for his FERNANDA is contrast induced nephropathy and vancomycin renal toxicity.  - CK total >3100, to repeat it today. m  - Continue with CRRT, net negative fluid removal  ml/hr, goal 1-2 liters net negative over 24 hrs, no pre-filter as goal to keep serum Na level in 155-160.  - Na goal 155-160, continue with 3% NaCl gtt and titrate it to achieve the goal.   - Serum Na level Q4h, to titrate 3% NaCl as needed.   - Strict I/Os, avoid nephrotoxic agents.   - Monitor Vancomycin levels      # Hypotension, Volume status:  - Patient is on 2 pressors today with BP: 90s/50s  - Pulmonary edema is improving with  "fluid removal, net negative 1.5 liter yesterday, patient required less FiO2 today (40%). CRRT with fluid removal as above.      #Cardiogenic Shock due to VF due to severe CAD secondary to familial hypelipidemia:  - Patient had another VF episode on 2/23, PCI was done with stents placement.  - Patient on ECMO,   - Managed by cardiology, primary team.     # Severe encephalopathy:  - EEG shows severe generalized slowing with low amplitude which consist with severe diffuse encephalopathy. No seizure.   - Per primary team recommendation to keep Na level: 155-160 so plan to run 3% NaCl with CRRT and titrate it to achieve this range.   - Na level q4h  - Repeated CT/Head on 2/25 shows No acute intracranial pathology. No definite hypoxic ischemic injury.     Recommendations were communicated to primary team via note and verbally.      Seen and discussed with Dr. Satinder Munoz MD   Nephrology Fellow  [phone]    Interval History :   Nursing and provider notes from last 24 hours reviewed.  Patient was seen and examined at bedside this am. Patient is still sedated, intubated, on 2 pressors, on ECMO and CRRT, fluid removal plan as above. Continue monitoring for kidney recover. Closely monitoring sodium level to keep it in range 155-160.      Review of Systems:   Not able to obtain, patient is sedated, intubated.     Physical Exam:   I/O last 3 completed shifts:  In: 3257.21 [I.V.:1649.21; NG/GT:270]  Out: 5624 [Emesis/NG output:450; Other:5074; Stool:100]   Temp 97.5  F (36.4  C)   Resp 16   Ht 1.68 m (5' 6.14\")   Wt 106 kg (233 lb 11 oz)   SpO2 98%   BMI 37.56 kg/m       GENERAL APPEARANCE: Sedated, intubated  EYES: No scleral icterus, pupils equal  Endo: no goiter, no moon facies  Lymphatics: no cervical or supraclavicular LAD  Pulmonary: lungs clear to auscultation anteriorly.   CV: Irregular rhythm, normal rate, no rub   - JVD: not able to evaluate, patient in supine position.    - Edema no  GI: " soft,  normal bowel sounds  MS: no evidence of inflammation in joints, no muscle tenderness  : turk in place  SKIN: no rash, warm, dry, no cyanosis  NEURO: Sedated, intubated.      LABS:      I personally reviewed his labs.       Electrolytes/Renal -   Recent Labs   Lab Test 02/27/19  0832 02/27/19  0610 02/27/19  0350 02/27/19 0349 02/26/19 2155 02/26/19 1959 02/26/19 1956 02/26/19  1614   * 153*  --  150*   < > 151* 153*  --  153*   POTASSIUM  --   --   --  3.8  --  3.9  --  3.8 3.7   CHLORIDE  --   --   --  117*  --  118*  --   --  119*   CO2  --   --   --  22  --  22  --   --  23   BUN  --   --   --  68*  --  65*  --   --  62*   CR  --   --   --  4.60*  --  4.71*  --   --  4.58*   GLC  --   --  149* 152*  --  155*  157*  --   --  153*  155*   ALEKSADNER  --   --   --  8.1*  --  7.9*  --   --  7.8*   MAG  --   --   --  2.5*  --  2.5* 2.5*  --  2.5*   PHOS  --   --   --  4.9*  --  5.6*  --   --  5.7*    < > = values in this interval not displayed.       CBC -   Recent Labs   Lab Test 02/27/19 0349 02/26/19 2155 02/26/19  1614   WBC 15.0* 17.4* 17.9*   HGB 9.1* 9.4* 9.5*   PLT 65* 78* 88*       LFTs -   Recent Labs   Lab Test 02/27/19 0349 02/26/19 2155 02/26/19  1614   ALKPHOS 52 54 53   BILITOTAL 1.9* 1.7* 1.7*   * 164* 172*   * 530* 569*   PROTTOTAL 5.6* 5.8* 5.9*   ALBUMIN 2.5* 2.6* 2.6*       Iron Panel - No lab results found.      Imaging:  All imaging studies reviewed by me.     Current Medications:    artificial tears   Both Eyes Q8H     aspirin  81 mg Oral or Feeding Tube Daily     B and C vitamin Complex with folic acid  5 mL Per Feeding Tube Daily     pantoprazole  40 mg Oral or Feeding Tube QAM     piperacillin-tazobactam  4.5 g Intravenous Q6H     polyethylene glycol  17 g Oral or Feeding Tube Daily     protein modular  1 packet Per Feeding Tube BID     senna-docusate  1 tablet Oral or Feeding Tube At Bedtime     sodium chloride (PF)  3 mL Intracatheter Q8H     ticagrelor  90  mg Oral or Feeding Tube BID     vancomycin place blandon - receiving intermittent dosing  1 each Intravenous See Admin Instructions       IV fluid REPLACEMENT ONLY       IV fluid REPLACEMENT ONLY       CRRT replacement solution 12.5 mL/kg/hr (02/27/19 0525)     fentaNYL 100 mcg/hr (02/27/19 0900)     heparin 2 unit/mL in 0.9% NaCl 3 mL/hr (02/27/19 0900)     HEParin 500 Units/hr (02/27/19 0900)     insulin (regular) 4 Units/hr (02/27/19 0900)     midazolam Stopped (02/26/19 0830)     - MEDICATION INSTRUCTIONS -       - MEDICATION INSTRUCTIONS -       norepinephrine 0.06 mcg/kg/min (02/27/19 0900)     CRRT replacement solution 1.857 mL/kg/hr (02/25/19 1751)     sodium chloride 3% 50 mL/hr at 02/27/19 0800     vasopressin (PITRESSIN) infusion ADULT (40 mL) 0.5 Units/hr (02/27/19 0900)     Natalia Munoz MD   Nephrology Fellow  Pager: [phone]    I was present with the fellow during the history and exam.  I discussed the case with the fellow and agree with the findings as documented in the assessment and plan.  Thuy Rajan

## 2019-02-27 NOTE — PROGRESS NOTES
[facility], [facility] Nurse Inpatient Adult Pressure Injury Prevention Assessment: ECMO  Follow up    Positioning Tolerance: Good  Date of ECMO cannulation: 2/23 Right Groin VA ECMO  Presence of Ischemia: No  Location of ischemia: NA    Pressure Injury Prevention Interventions In Place:  Optifoam Dressing under ECMO Cannula, Z flow Positioner under head, Pillows for repositioning, TAPs Wedge Positioners in use, Heel off-loading boots, Pillows under calves for heel suspension, Mepilex Sacral Dressing and Dressing to sacrum for prevention   Current support surface: Standard  Low air loss mattress       Pressure Injury Prevention Interventions Added:  None        Plan of Care for Positioning and Pressure Injury Prevention  Reposition patient every 1-2 hours using TAP Wedges  Position head on Z flow positioner, mold indentation at areas of pressure points.  Pad ECMO IJ cannula with Optifoam (#530400) along face and scalp between skin and cannula and under Coban head wraps    Pad ECMO groin and chest cannula under rigid connectors with Optifoam or Soft cloth  Heel off-loading Boots at all times  Sacral Mepilex for Prevention, change every 5 days and prn  Low Air loss mattress    Patient History:   According to medical record: Ciara Winslow is a 38 year old male who was admitted on 2/23/2019. Wife noticed agonal breathing and seizure like activity. Called EMS, who came and started chest compressions. No history of HTN, DM. Does have history of dyslipidemia. Non smoker. Went to bed at 11:30, wife found him foaming at mouth around midnight. Called EMS, did not get significant chest compressions. EMS arrived 5 minutes later. Taken to [facility].    Current Diet / Nutrition:     Orders Placed This Encounter      NPO for Medical/Clinical Reasons Except for: No Exceptions    Output:    I/O last 3 completed shifts:  In: 3465.91 [I.V.:2085.91; NG/GT:300]  Out: 6359 [Emesis/NG output:450; Other:5809;  Stool:100]  Containment: of urine/stool: Rectal Pouch and Urinary Catheter    Risk Assessment:   Sensory Perception: 1-->completely limited  Moisture: 3-->occasionally moist  Activity: 1-->bedfast  Mobility: 1-->completely immobile  Nutrition: 2-->probably inadequate  Friction and Shear: 2-->potential problem  Kirby Score: 10    Labs:    Recent Labs   Lab 02/27/19  0954 02/27/19  0349  02/23/19  0539   ALBUMIN 2.5* 2.5*   < > 2.5*   HGB 9.5* 9.1*   < > 16.3   INR 1.21* 1.26*   < >  --    WBC 16.2* 15.0*   < > 17.0*   A1C  --   --   --  6.3*   CRP  --  280.0*   < > 16.0*    < > = values in this interval not displayed.     Focused Assessment:  Right groin ECMO cannula and Heels, occiput, mouth- not seen today    Pressure Injury Present::Yes      ASSESSMENT  Pressure Injury: under EEG leads , hospital acquired ,   This is a Medical Device Related Pressure Injury (MDRPI) due to EEG and NIRs with surglist to hold it in place.   Pressure Injury is Stage Deep Tissue Pressure Injury (DTPI)   Contributing factor of the pressure injury: pressure and immobility  Status: initial assessment  Recommend provider order: NA     TREATMENT PLAN  Bilateral Forehead wounds: BID and PRN cleanse with saline and pat dry. Apply thin layer of Vaseline. Avoid placing NIRs directly over EEG leads. Avoid placing EEG leads directly on wound.   Orders Written  WOC Nurse follow-up plan:twice weekly  Nursing to notify the Provider(s) and re-consult the WOC Nurse if wound(s) deteriorates or new skin concern.    Physical Exam    Wound Location:  Bilateral Forehead    Date of last Photo 2/27  Wound History: Pt had NIRs in place over EEG held in place with Surgilast netting.   Measurements (length x width x depth, in cm) 0.5 cm x 0.5 cm  x  0 cm (both)  Wound Base:  100 % non-blanchable erythema and deep purple/ brown  Tunneling N/A  Undermining N/A  Palpation of the wound bed: normal   Periwound skin: intact  Color: normal and consistent with  surrounding tissue  Temperature: normal   Drainage:, none  Description of drainage: none  Odor: none  Pain: no grimacing or signs of discomfort and unable to assess due to  sedation , insensate    Education provided to: nurse  Discussed importance of:their role in pressure injury prevention  Discussed plan of care with Nurse  WOC Nurse follow-up plan:weekly    Violeta Grossman RN CWCN

## 2019-02-27 NOTE — PROGRESS NOTES
CRRT STATUS NOTE    DATA:  Time:  1600   Pressures WNL:  YES    Filter Status:  WDL  Problems Reported/Alarms Noted:  None reported / none noted   Supplies Present:  YES    ASSESSMENT:  Patient Net Fluid Balance:  Yesterday: -1.5L Today thus far: -1.4L  Vital Signs: On Levo at 0.06; vaso recently turned off. HR 80; /60 (82), Spo2 100% on 100%  APRV, RR 16.  VA ECMO, IABP. T 97.2Esophageal.  Labs:  Na goal 155-160; today 153,152,152. Cr 4.43. WBC 16.   Goals of Therapy:  -50-100mL/hr to meet -1-2/day.  Meeting goals.     INTERVENTIONS:   Restarted CRRT at 1600 after returning blood when filter pressure became too high.      PLAN:  Continue CRRT to meet goals of therapy.  Contact CRRT RN [phone] with concerns.

## 2019-02-28 NOTE — PROGRESS NOTES
Nephrology Progress Note  02/28/2019             Today Recommendations:  - Continue with CRRT, net negative fluid removal 0-100 ml/hr, goal 0-2 liters net negative over 24 hrs   - We added pre-filter to the CRRT today for better clearance.  - Na goal 150-155, continue with 3% NaCl gtt and titrate it to achieve the goal.   - Serum Na level Q4h, to titrate 3% NaCl as needed.   - Strict I/Os, avoid nephrotoxic agents.   - Monitor Vancomycin level     ASSESSMENT AND RECOMMENDATIONS:   Mr. Riddle is 39 year old male with PMHx of familial hyperlipidemia, no history of renal disease or nephrolithiasis who was brought to our hospital with cardiogenic shock S/P cardiac arrest with unclear time down. Patient found to have CAD icluding 3 vessels, PCI was done emergently. We were consulted for FERNANDA and fluid management.       # Anuric FERNANDA on unknown baseline kidney function:  - Patient was admitted with Crea: 1.65 post acardiac arrest, no previous Crea level in our EMR.  - FERNANDA etiology: likley due to cardiogenic shock leading to hypotension and low renal perfusion, we gave 1 liter of fluid with 5% Albumin with no improvement of renal output, likely patient has ATN now due to ischemic injury due to low renal perfusion during the cardiac arrest in addition to rhabdomyolysis, elevated CK. Also other risks for his FERNANDA is contrast induced nephropathy and vancomycin renal toxicity.  - CK total >3100, repeated: 3676, CK continues high as patient in FERNANDA and our CRRT with no pre-filter, we added pre-filter to the CRRT today for better clearance.   - Continue with CRRT, net negative fluid removal 0-100 ml/hr, goal 0-2 liters net negative over 24 hrs,   - Na goal 150-155, continue with 3% NaCl gtt and titrate it to achieve the goal.   - Serum Na level Q4h, to titrate 3% NaCl as needed.   - Strict I/Os, avoid nephrotoxic agents.   - Monitor Vancomycin levels      # Hypotension, Volume status:  - Patient is on 2 pressors today with BP:  "90s/50s  - Pulmonary edema almost resolved, so we believe that patient does not need fluid removal with the same rate he has been, decrease fluid removal rate as above.   - net negative 1.8 liter yesterday, FiO2 40%.      #Cardiogenic Shock due to VF due to severe CAD secondary to familial hypelipidemia:  - Patient had another VF episode on 2/23, PCI was done with stents placement.  - Patient on ECMO,   - Managed by cardiology, primary team.     # Severe encephalopathy:  - EEG shows severe generalized slowing with low amplitude which consist with severe diffuse encephalopathy. No seizure.   - Per primary team recommendation to keep Na level: 155-160 so plan to run 3% NaCl with CRRT and titrate it to achieve this range.   - Na level q4h  - Repeated CT/Head on 2/25 shows No acute intracranial pathology. No definite hypoxic ischemic injury.  - Managed by primary team.      Recommendations were communicated to primary team via note and verbally.      Seen and discussed with Dr. Satinder Munoz MD   Nephrology Fellow  [phone]    Interval History :   Nursing and provider notes from last 24 hours reviewed.  Patient was seen and examined at bedside this am. Patient is still sedated, intubated, on 2 pressors, on ECMO and CRRT, fluid removal plan as above. Continue monitoring for kidney recover. Closely monitoring sodium level to keep it in range 150-155.      Review of Systems:   Not able to obtain, patient is sedated, intubated.     Physical Exam:   I/O last 3 completed shifts:  In: 4436.78 [I.V.:2589.78; NG/GT:330]  Out: 6191 [Emesis/NG output:600; Other:5191; Stool:400]   Temp 97.3  F (36.3  C) (Esophageal)   Resp 15   Ht 1.68 m (5' 6.14\")   Wt 104.6 kg (230 lb 9.6 oz)   SpO2 100%   BMI 37.06 kg/m       GENERAL APPEARANCE: Sedated, intubated  EYES: No scleral icterus, pupils equal  Endo: no goiter, no moon facies  Lymphatics: no cervical or supraclavicular LAD  Pulmonary: lungs clear to " auscultation anteriorly.   CV: Irregular rhythm, normal rate, no rub   - JVD: not able to evaluate, patient in supine position.    - Edema no  GI: soft,  normal bowel sounds  MS: no evidence of inflammation in joints, no muscle tenderness  : turk in place  SKIN: no rash, warm, dry, no cyanosis  NEURO: Sedated, intubated.      LABS:      I personally reviewed his labs.       Electrolytes/Renal -   Recent Labs   Lab Test 02/28/19  0402 02/28/19  0150 02/27/19 2205 02/27/19  1601   * 155* 154*   < > 152*   POTASSIUM 3.9  --  4.3  --  3.8   CHLORIDE 120*  --  121*  --  120*   CO2 25  --  24  --  23   BUN 74*  --  74*  --  70*   CR 4.30*  --  4.43*  --  4.48*   *  127*  --  142*  148*  --  145*  Canceled, Test credited  147*   ALEKSANDER 8.0*  --  7.8*  --  7.9*   MAG 2.7*  --  2.7*  --  2.6*   PHOS 4.7*  --  5.8*  --  4.6*    < > = values in this interval not displayed.       CBC -   Recent Labs   Lab Test 02/28/19  0402 02/27/19 2205 02/27/19  1601   WBC 10.0 12.4* 13.6*   HGB 8.9* 9.4* 8.8*   PLT 70* 49* 52*       LFTs -   Recent Labs   Lab Test 02/28/19  0402 02/27/19  2205 02/27/19  1601   ALKPHOS 69 69 67   BILITOTAL 1.6* 1.5* 1.7*   * 145* 150*   * 389* 412*   PROTTOTAL 6.4* 6.2* 6.0*   ALBUMIN 2.5* 2.5* 2.4*       Iron Panel - No lab results found.      Imaging:  All imaging studies reviewed by me.     Current Medications:    artificial tears   Both Eyes Q8H     aspirin  81 mg Oral or Feeding Tube Daily     B and C vitamin Complex with folic acid  5 mL Per Feeding Tube Daily     pantoprazole  40 mg Oral or Feeding Tube QAM     polyethylene glycol  17 g Oral or Feeding Tube Daily     protein modular  1 packet Per Feeding Tube BID     senna-docusate  1 tablet Oral or Feeding Tube At Bedtime     sodium chloride (PF)  3 mL Intracatheter Q8H     ticagrelor  90 mg Oral or Feeding Tube BID       IV fluid REPLACEMENT ONLY       IV fluid REPLACEMENT ONLY       CRRT replacement solution 12.5  mL/kg/hr (02/28/19 0408)     fentaNYL 150 mcg/hr (02/28/19 0800)     heparin 2 unit/mL in 0.9% NaCl 3 mL/hr (02/28/19 0800)     HEParin 800 Units/hr (02/28/19 0800)     insulin (regular) 6 Units/hr (02/28/19 0800)     midazolam Stopped (02/27/19 2200)     - MEDICATION INSTRUCTIONS -       - MEDICATION INSTRUCTIONS -       norepinephrine 0.06 mcg/kg/min (02/28/19 0800)     CRRT replacement solution 1.857 mL/kg/hr (02/27/19 1542)     propofol (DIPRIVAN) infusion 30 mcg/kg/min (02/28/19 0800)     sodium chloride 3% 50 mL/hr at 02/28/19 0800     vasopressin (PITRESSIN) infusion ADULT (40 mL) 1 Units/hr (02/28/19 0800)     Natalia Munoz MD   Nephrology Fellow  Pager: [phone]    I was present with the fellow during the history and exam.  I discussed the case with the fellow and agree with the findings as documented in the assessment and plan.  Thuy Rajan

## 2019-02-28 NOTE — PROGRESS NOTES
ECMO Shift Summary:    Patient remains on V/A ECMO, all equipment is functioning and alarms are appropriately set. RPM's 3380 with flow range 4.10-4.3 L/min. Sweep gas is at 6 LPM and FiO2 100%. Circuit remains free of air, clot and fibrin. Cannulas are secure with no bleeding from site. Extremities are WARM. Suctioned ETT for .    Significant Shift Events: NONE    Vent settings:  Ventilation Mode: APRV  (Airway Pressure Release Ventilation)  FiO2 (%): 100 %  Oxygen Concentration (%): 40 %  Resp: 15  .    Heparin is running at 500 u/kg/hr, ACT range 172-176.    Urine output is as charted, blood loss was as charted. Product given included none.      Intake/Output Summary (Last 24 hours) at 2/27/2019 2258  Last data filed at 2/27/2019 2200  Gross per 24 hour   Intake 3915.51 ml   Output 5853 ml   Net -1937.49 ml       ECHO:  No results found for this or any previous visit.No results found for this or any previous visit.    CXR:  Recent Results (from the past 24 hour(s))   XR Chest Port 1 View    Narrative    Exam: XR CHEST PORT 1 VW, 2/27/2019 1:55 AM    Indication: arrest    Comparison: Chest radiograph dated 2/26/2018.    Findings:   Single AP view of the chest. Endotracheal tube in place with the tip  in the mid thoracic trachea. Enteric tubes in place with the distal  end extending beyond the field-of-view. The gastric tube has a loop in  the stomach. Intra-aortic balloon pump marker at the level of the  tereso. Defibrillator pad has been removed.    Near complete opacification of both lungs with air bronchograms  similar to prior exam. Cardiac silhouette is obscured.      Impression    Impression:   1. Unchanged near complete opacification of the lungs with air  bronchograms.  2. Support devices as detailed above.    I have personally reviewed the examination and initial interpretation  and I agree with the findings.    ROSELYN CASH MD   XR Chest Port 1 View    Narrative    Exam: XR CHEST PORT 1 VW, 2/27/2019  5:20 PM    Indication: hypoxia, lines    Comparison: 2/27/2019    Findings: AP chest radiograph. Endotracheal tube is 2.9 cm above the  tereso. Enteric and nasogastric tube projecting over the esophagus,  tips not visualized and field-of-view. Inferior approach ECMO cannula  terminating over the inferior cavoatrial junction. Intra-aortic  balloon pump in stable position, at the level immediately above the  tereso. Cardiomediastinal silhouette is obscured. Near complete  opacification of the bilateral lungs, right greater than left, with  air bronchograms. No pneumothorax. No acute osseous abnormalities.      Impression    Impression:   1. Support devices in stable position.  2. Unchanged near complete opacification of the bilateral lungs with  hilar atelectasis.    I have personally reviewed the examination and initial interpretation  and I agree with the findings.    ASHLEIGH CHAN MD       Labs:  Recent Labs   Lab 02/27/19 2205 02/27/19 1958 02/27/19  1840 02/27/19  1759   PH 7.36 7.38 7.28* 7.32*   PCO2 41 40 53* 47*   PO2 113* 211* 61* 60*   HCO3 23 24 25 25   O2PER 80 100 100 100       Lab Results   Component Value Date    HGB 9.4 (L) 02/27/2019    PHGB 40 (H) 02/27/2019    PLT 49 (LL) 02/27/2019    FIBR 727 (H) 02/27/2019    INR 1.16 (H) 02/27/2019    PTT 49 (H) 02/27/2019    DD 11.0 (H) 02/27/2019    AXA <0.10 02/27/2019    ANTCH 51 (L) 02/27/2019         Plan is continue on V/A ECMO and.      Jimena Albert, RRT  2/27/2019 10:58 PM

## 2019-02-28 NOTE — PLAN OF CARE
"Patient remains in CV ICU with VA ECMO support on CRRT.     Neuro: Right pupil 3+ and left pupil 2+ sluggish to react. EEG monitoring in place. Patient does not wake. Withdraws to pain in lowers but not uppers.Fentanyl and Midazolam restarted in PM hours to aide in oxygenation. 3% hypertonic saline infusing.  CV:Remains in a SR on vasopressin and norepinephrine infusion with VA ECMO support. Rapid rate noted periodically throughout shift. Second turndown completed today. Please see ECMO Tech note / flow sheet for details.  IABP in 1:1 augmenting 100%.   Pulm:Oxygenation / ventilation. Vent settings: APRV and pressure control.  GI: OGT to LIS. NJ tube feeds at goal. Rectal pouch in place draining liquid bm.  : CRRT continues, goal of 1 - 2 L net negative over 24 hour period  Skin: Two bilateral abrasions noted on forehead from EEG leads. Per [facility] apply Vaseline to areas. Preventative mepilex on coccyx. Preventative optifoam and meplilite dressings in use. Patient on isolibrium bed.  Psychosocial: Significant other, Kalicthong \"Manolo\" and family supportive at bedside throughout shift.     Plan: Continue to monitor and support in CV ICU.    "

## 2019-02-28 NOTE — PROCEDURES
ECMO [place]     Vasoactive med: Vaso 1.0, NE 0.06  IABP: Y     At baseline:  ECMO Flows: 4.3L  BP: 101/56/77mmHg  HR: 83 bpm  SpO2: 100%  LVEF 5-10%     At 3.5L  ECMO Flows: 3.5L  BP: 106/59/77mmHg  IABP: 84/74/85 mmHg  HR: 93 bpm  SpO2: 88% on 60% FiO2  LVEF 5-10%    At ECMO Flows: 3.0L  BP: 86/51/66mmHg  IABP: 76/51/66 mmHg  HR: 94 bpm  SpO2: 88% on 100% FiO2  LVEF:10-15%    At ECMO Flows: 2.0L  BP: 79/48/61mmHg  IABP: 72/48/65 mmHg  HR: 100 bpm  SpO2: 80% on 100% FiO2  LVEF:20%     Theron Hebert MD  Cardiology Fellow

## 2019-02-28 NOTE — PROGRESS NOTES
ECMO Shift Summary:    Patient remains on VA ECMO, all equipment is functioning and alarms are appropriately set. RPM's 3380 with flow range 4.0-4.65 L/min. Sweep gas is at 6 LPM and FiO2 100%. Extremities are warmed.         Vent settings:  Ventilation Mode: APRV  (Airway Pressure Release Ventilation)  FiO2 (%): 100 %  Oxygen Concentration (%): 60 %  Resp: 18  .    Heparin is running at 500 u/kg/hr, ACT range 180-200.        Intake/Output Summary (Last 24 hours) at 2/27/2019 1910  Last data filed at 2/27/2019 1900  Gross per 24 hour   Intake 3725.91 ml   Output 6094 ml   Net -2368.09 ml       ECHO:  No results found for this or any previous visit.No results found for this or any previous visit.    CXR:  Recent Results (from the past 24 hour(s))   XR Chest Port 1 View    Narrative    Exam: XR CHEST PORT 1 , 2/27/2019 1:55 AM    Indication: arrest    Comparison: Chest radiograph dated 2/26/2018.    Findings:   Single AP view of the chest. Endotracheal tube in place with the tip  in the mid thoracic trachea. Enteric tubes in place with the distal  end extending beyond the field-of-view. The gastric tube has a loop in  the stomach. Intra-aortic balloon pump marker at the level of the  tereso. Defibrillator pad has been removed.    Near complete opacification of both lungs with air bronchograms  similar to prior exam. Cardiac silhouette is obscured.      Impression    Impression:   1. Unchanged near complete opacification of the lungs with air  bronchograms.  2. Support devices as detailed above.    I have personally reviewed the examination and initial interpretation  and I agree with the findings.    ROSELYN CASH MD   XR Chest Port 1 View    Narrative    Exam: XR CHEST PORT 1 VW, 2/27/2019 5:20 PM    Indication: hypoxia, lines    Comparison: 2/27/2019    Findings: AP chest radiograph. Endotracheal tube is 2.9 cm above the  tereso. Enteric and nasogastric tube projecting over the esophagus,  tips not visualized and  field-of-view. Inferior approach ECMO cannula  terminating over the inferior cavoatrial junction. Intra-aortic  balloon pump in stable position, at the level immediately above the  tereso. Cardiomediastinal silhouette is obscured. Near complete  opacification of the bilateral lungs, right greater than left, with  air bronchograms. No pneumothorax. No acute osseous abnormalities.      Impression    Impression:   1. Support devices in stable position.  2. Unchanged near complete opacification of the bilateral lungs with  hilar atelectasis.    I have personally reviewed the examination and initial interpretation  and I agree with the findings.    ASHLEIGH CHAN MD       Labs:  Recent Labs   Lab 02/27/19  1840 02/27/19  1759 02/27/19  1601 02/27/19  1424   PH 7.28* 7.32* Canceled, Test credited  7.44 7.43   PCO2 53* 47* Canceled, Test credited  34* 35   PO2 61* 60* Canceled, Test credited  69* 87   HCO3 25 25 Canceled, Test credited  23 24   O2PER 100 100 Canceled, Test credited  Canceled, Test credited  60 60.0       Lab Results   Component Value Date    HGB 8.8 (L) 02/27/2019    PHGB 40 (H) 02/27/2019    PLT 52 (L) 02/27/2019    FIBR 727 (H) 02/27/2019    INR 1.19 (H) 02/27/2019    PTT 47 (H) 02/27/2019    DD 11.0 (H) 02/27/2019    AXA <0.10 02/27/2019    ANTCH 51 (L) 02/27/2019         Plan is continue on ECMO.      Maximo Hale, RRT  2/27/2019 7:10 PM

## 2019-02-28 NOTE — PROGRESS NOTES
ECMO Attending Progress Note  2/27/2019    Hellen Riddle is a 39 year old male who was cannulated for ECMO 2/23/19 due to refractory VF arrest    Interval events: Pressor requirement stable to improving, stable on CRRT, improved contractility today but still minimal    Physical Exam:  Temp:  [96.8  F (36  C)-97.7  F (36.5  C)] 97.5  F (36.4  C)  Heart Rate:  [] 82  Resp:  [15-32] 15  MAP:  [55 mmHg-133 mmHg] 76 mmHg  Arterial Line BP: ()/(12-62) 106/54  FiO2 (%):  [40 %-100 %] 100 %  SpO2:  [81 %-100 %] 99 %    Intake/Output Summary (Last 24 hours) at 2/27/2019 2227  Last data filed at 2/27/2019 2200  Gross per 24 hour   Intake 3889.91 ml   Output 5853 ml   Net -1963.09 ml      Ventilation Mode: APRV  (Airway Pressure Release Ventilation)  FiO2 (%): 100 %  Oxygen Concentration (%): 60 %  Resp: 15     General: no acute distress, intubated and sedated  HEENT: PERRLA, conjunctiva clear, without icterus or pallor, oropharyx clear  Cardiac: RRR nl S1S2   Lungs: clear to auscultation and percussion bilaterally anterior  Abdomen: soft, nontender, non distended, no hepatosplenomegaly or masses  Extremities: without edema or cyanosis; pulses are dopplerable;   Skin: normal skin appearance without worrisome lesions.   Neurologic:intubated and sedated    Labs:  Recent Labs   Lab 02/27/19 2205 02/27/19  1958 02/27/19  1840 02/27/19  1759   PH 7.36 7.38 7.28* 7.32*   PCO2 41 40 53* 47*   PO2 113* 211* 61* 60*   HCO3 23 24 25 25   O2PER 80 100 100 100      Recent Labs   Lab 02/27/19  1601 02/27/19  0954 02/27/19  0349 02/26/19  2155   WBC 13.6* 16.2* 15.0* 17.4*   HGB 8.8* 9.5* 9.1* 9.4*     Creatinine   Date Value Ref Range Status   02/27/2019 4.48 (H) 0.66 - 1.25 mg/dL Final   02/27/2019 4.43 (H) 0.66 - 1.25 mg/dL Final   02/27/2019 4.60 (H) 0.66 - 1.25 mg/dL Final   02/26/2019 4.71 (H) 0.66 - 1.25 mg/dL Final       ECMO Issues including assessments and plan on DOS 2/27/2019:  Neuro: Sedated for mechanical ventilation  and ECMO.  NIRS stable b/l  RASS goal: -3  CV: Cardiogenic shock.  Hemodynamically stable on norepi and vaso  Pulm: Flows unchanged at 4.5, Sweep unchanged at 10, Keep vent settings at rest settings  FEN/Renal: Electrolytes stable w/ replacement protocols in place, Cr worsening now on CRRT, UOP stable  Heme: ACT goal: 180-200, Hemoglobin stable .  Goals: if O2 sat >85% Hgb >8.  If O2 Sat <85% keep Hgb 10-12.  Minimal oozing around the ECMO cannulas.  ID: Receiving empiric antibiotics  Cannulae: Position is acceptable on exam and the available imaging.  Distal perfusion cannula is in place and patent.     I have personally reviewed the ECMO flows, oxygenation and CO2 clearance, anticoagulation, and cannula position.  I have also personally assessed the patient's systemic response with hemodynamics, oxygenation, ventilation, and bleeding.       The patient requires continued ECMO support and management in the ICU.  I have discussed patient care and treatment plan with the primary team.      Abraham Baer MD, PhD  Interventional/Critical Care Cardiology  [phone]    February 27, 2019

## 2019-02-28 NOTE — PROGRESS NOTES
ECMO Shift Summary:    Patient remains on VA ECMO, all equipment is functioning and alarms are appropriately set. RPM's 3380 with flow range 4.2-4.36 L/min. Sweep gas is at 4 LPM and FiO2 90%. There remains small bits of clot/fibrin at each corner of the oxygenator. Cannulas are secure with some oozing from the site. Extremities are warm to touch. Suctioned ETT for a small amount of thin, paul secretions.    Significant Shift Events: Decreased both ventilator and ECMO FiO2. Decreased sweep.      Vent settings:  Ventilation Mode: APRV  (Airway Pressure Release Ventilation)  FiO2 (%): 60 %  Oxygen Concentration (%): 40 %  Resp: 15  .    Heparin is running at 800 u/hr, ACT range 164-172.    Patient is on CRRT, blood loss was a small amount from oozing at the cannulation site. Product given included one unit of platelets.      Intake/Output Summary (Last 24 hours) at 2/28/2019 0526  Last data filed at 2/28/2019 0500  Gross per 24 hour   Intake 4532.58 ml   Output 5882 ml   Net -1349.42 ml       ECHO:  No results found for this or any previous visit.No results found for this or any previous visit.    CXR:  Recent Results (from the past 24 hour(s))   XR Chest Port 1 View    Narrative    Exam: XR CHEST PORT 1 VW, 2/27/2019 5:20 PM    Indication: hypoxia, lines    Comparison: 2/27/2019    Findings: AP chest radiograph. Endotracheal tube is 2.9 cm above the  tereso. Enteric and nasogastric tube projecting over the esophagus,  tips not visualized and field-of-view. Inferior approach ECMO cannula  terminating over the inferior cavoatrial junction. Intra-aortic  balloon pump in stable position, at the level immediately above the  tereso. Cardiomediastinal silhouette is obscured. Near complete  opacification of the bilateral lungs, right greater than left, with  air bronchograms. No pneumothorax. No acute osseous abnormalities.      Impression    Impression:   1. Support devices in stable position.  2. Unchanged near complete  opacification of the bilateral lungs with  hilar atelectasis.    I have personally reviewed the examination and initial interpretation  and I agree with the findings.    ASHLEIGH CHAN MD       Labs:  Recent Labs   Lab 02/28/19  0402 02/28/19  0150 02/28/19  0013 02/27/19  2205   PH 7.42 7.42 7.42 7.36   PCO2 37 36 35 41   PO2 302* 294* 175* 113*   HCO3 23 23 23 23   O2PER 40.0 60.0 80.0 80       Lab Results   Component Value Date    HGB 8.9 (L) 02/28/2019    PHGB 40 (H) 02/27/2019    PLT 70 (L) 02/28/2019    FIBR 717 (H) 02/28/2019    INR 1.18 (H) 02/28/2019    PTT 47 (H) 02/28/2019    DD 13.7 (H) 02/28/2019    AXA <0.10 02/28/2019    ANTCH 51 (L) 02/27/2019         Plan is to continue VA ECMO at this time.      Alo Carty, RRT  2/28/2019 5:26 AM

## 2019-02-28 NOTE — PROGRESS NOTES
Cardiology Progress Note    Overnight/subj:   - Improved oxygenation  - No acute events overnight     VS reviewed: Notable for temp: AF, HR 77-79, MAP 63-78, oxygenating 100% on Fo2 40  APRV:    Tele: isolated pvcs rare    IABP via LFA, 1:1, triggered by ECG, augmented MAPs 103, pulses NIRS stable     Wt: on admit 107.9, yday 107.7, today 104.6  I/O: 3.9 in / 5.8 out yday. Since MN 1.8 in / 2.7 out    Drips:   NE 0.06  Vaso 1    Fentanyl 150  Hep 800  Insulin 6  Propofol 30    PO Cardiac Meds: ASA 81mg daily, ticagrelor 90mg BID,   Other meds: pip/tazo, vanco, senna, miralax, ppi    Radiology Imaging  CXR  Findings:   AP view of the chest. Endotracheal tube with the distal end in the  lower thoracic trachea. Intra-aortic balloon pump marker at the level  of the tereso. Enteric tubes extending beyond the field-of-view. ECMO  cannula tip projecting over the right atrium.     Near complete opacification of both lungs similar to prior exam.  Cardiac silhouette remains obscured. No pneumothorax. Visualized  portion of the upper abdomen is unremarkable.    Cardiology Studies:  TTE- reviewed     Labs: Reviewed in epic    Phys exam:  GEN: sedated, RASS-4  Eyes: R pupil larger than left   Pulm: increase air movement auscultated, course breath sounds  Cardiac: IABP inflation heard, improved pulsatility   Vascular: NIRS stable   GI: soft, non distended    ASSESSMENT/PLAN:  Mr. Riddle is a 40yo M with HLD and Fhx CAD/MI who presented with refractory VF OHCA brought directly to the CCL now s/p peripheral VA ECMO placement. Found to have severe 3v disease s/p MITA to mRCA and mLcx. Also with IABP placement for presumed pulmonary edema. Being managed as part post VF protocol.  Post arrest day 5.     Neurology:  # CT head day3: minimal edema - Continue EEG- no e/o seizures   - Intubated, sedated   - Na 155-160 for brain edema ppx  -continue fentanyl  - Propofol for sedation      Cardiovascular / Hemodynamics:  # Refractory VF  arrest.    #CAD s/p MITA  mRCA and mCx  # Torsades (likely relate Qtc >600, corrected)  # Cardiogenic Shock requiring ECMO support  TTE: repeat turndown today  Mg>2  - continue vaso and NE as needed  --continue ASA 81mg and ticagrelor 90mg BID  --ACT goal 180-200  --hold lipitor for now given likely hepatic injury during arrest  --hold ACE/ARB for now given likely reduced renal fxn after arrest  --holding beta blocker given shock    Pulmonary:  # Complete opacification of both lungs c/f ARDS, severe pulm edema vs less likely PAH   # Resp acidosis (currected) -- Worsening hypoxia due to cardiac recovery. May need conversion to VAV or VV ECMO  -- Continue APRV- better oxygenation  -- ETT stable   -- Q2h ABGs for now  -- consider scheduled duonebs if signs of lung dz, currently PRN  -- Continue to monitor lung opacities       GI and Nutrition:   Shock Liver (recovering) No known medical hx.    --Nutrition consulted, appreciate their help  -- Post pyloric feeds started  --bowel regimen   --GI Prophylaxis: PPI    Renal, Fluid and Electrolytes:  # FERNANDA with anuria -- Renal consult   -- on CRRT, plan for volume removal given worsening hypoxia  --monitor urine output  --maintain K>3 and Mg>2    Infectious Disease:  # No active Issues --vancomycin/zosyn x5 days for ECMO (Completed 02/28/19)  --daily blood cultures      Hematology: Coagulopathy related to ECMO Receiving heparin for ECMO and ASA/ticagrelor for MITA.   --cryo PRN fibrinogen < 200; FFP for INR >2  --Transfuse for Hgb<10  --heparin gtt for ECMO with ACT goal 160-180  --DVT PPX: Heparin as above   Endocrinology: No known medical history. BG elevated.  --insulin gtt PRN   Lines: R femoral arterial and venous ECMO cannulae February 23, 2019  L femoral arterial line February 23, 2019  R radial arterial line February 23, 2019  ETT February 23, 2019  Pisano catheter February 23, 2019  OG tube February 23, 2019  Restraint: PRN  Current lines are required for patient  management         Code status FULL     To be discussed with Dr. Baer.      Theron Hebert MD   [facility]   *[phone]

## 2019-02-28 NOTE — PROCEDURES
VEEG REPORT: DAY 5  EEG #-5   DATE OF RECORDING/SERVICE DATE:  02/27/2019    SOURCE FILE DURATION:  19:19:04       CLINICAL HISTORY:  This is day #5 of video EEG monitoring in a 39-year-old male admitted 02/23/2019 with refractory VF cardiac arrest and was placed under hypothermia protocol following intubation and sedation.  The patient is off of hypothermia protocol.  Video EEG was started for evaluation of seizure.      MEDICATIONS:  Fentanyl  mcg per hour.        TECHNICAL SUMMARY: This continuous video- EEG monitoring procedure was performed with 23 scalp electrodes in 10-20 electrode system placement, and additional scalp, precordial and other surface electrodes used for electrical referencing and artifact detection.  Video monitoring was utilized and periodically reviewed by EEG technologists and the physician for electroclinical correlations.    BACKGROUND:  There is no well-defined parieto-occipital rhythm or any EEG changes associated with drowsiness, stage II of sleep, or deeper states of sleep.  There is low-amplitude generalized delta slowing with maximum amplitude in the bifrontal region (10-15 microvolt) intermixed with low-amplitude superimposed faster frequencies of alpha and beta seen in the central and paracentral regions at times.  There is no focal slowing.      ACTIVATION PROCEDURES:  At 21:26, ICU stimulations are performed (physical and verbal stimuli).  No electrographic or clinical change is noted.      EPILEPTIFORM ACTIVITIES:  None.      ICTAL ACTIVITIES:  None.      IMPRESSION:  This day #5 video EEG is abnormal due to low-amplitude generalized delta slowing with superimposed faster frequencies.  Findings are consistent with a severe diffuse encephalopathy.  No electrographic seizures or epileptiform activities are seen on this day.  Clinical correlation advised.         This report is dictated by Ulises Galvan DO, CNP Fellow.       I personally reviewed the video EEG  and agree with the reported findings.    Aiyana Campbell MD                D: 2019   T: 2019   MT: LAUREN      Name:     LIONEL CARTY   MRN:      [id number]        Account:        [id number]   :      1980           Procedure Date: 2019      Document: [id number]

## 2019-02-28 NOTE — PLAN OF CARE
Pt remains sedated, neuros unchanged. Versed switched to Propofol. SR 70-80's. Vaso and Levo titrated for MAP > 60. IABP 1:1, 100%. ECMO circuit FiO2 100%, Sweep: 4. 1 PLT per ECMO orders. LS coarse/dim, scant secretions. APRV @ 40%. TF at goal, OG to LIS, rectal pouch intact. Insulin gtt titrated per BG. CRRT goal  mL net negative/hr. See flowsheets for I/O. Will continue to monitor and notify team with concerns.

## 2019-02-28 NOTE — PROGRESS NOTES
[facility] [place]; [id number]  [facility]/DAY BERGMAN; [place]

## 2019-03-01 NOTE — PROGRESS NOTES
ECMO Shift Summary:    Patient remains on VA ECMO, all equipment is functioning and alarms are appropriately set. RPM's 3380 with flow range 4.11-4.39 L/min. Sweep gas is at 4 LPM and FiO2 100%. Circuit remains free of air, with clot on the right corner of the oxygenator. Cannulas are secure with no bleeding from site. Extremities are warm. Suctioned ETT for small amount of tan secretions.    Significant Shift Events:     Echo with turndown was performed today.  ECMO flow was decreased to 2 L/m, ventilator FiO2 increased to 100% and SpO2 fell to 80%.  Returned to previous settings as soon as pictures were obtained.    Vent settings:  Ventilation Mode: APRV  (Airway Pressure Release Ventilation)  FiO2 (%): 40 %  Oxygen Concentration (%): 40 %  Resp: (!) 156  .    Heparin is running at 1000 u/hr, ACT range 168-180.    Pat is anuric and on CRRT, there blood was no loss. No product was given.      Intake/Output Summary (Last 24 hours) at 2/28/2019 1829  Last data filed at 2/28/2019 1800  Gross per 24 hour   Intake 4227.98 ml   Output 6609 ml   Net -2381.02 ml       ECHO:  No results found for this or any previous visit.No results found for this or any previous visit.    CXR:  Recent Results (from the past 24 hour(s))   XR Chest Port 1 View    Narrative    Exam: XR CHEST PORT 1 VW, 2/28/2019 1:40 AM    Indication: arrest    Comparison: Radiograph on 2/27/2019    Findings:   AP view of the chest. Endotracheal tube with the distal end in the  lower thoracic trachea. Intra-aortic balloon pump marker at the level  of the tereso. Enteric tubes extending beyond the field-of-view. ECMO  cannula tip projecting over the right atrium.    Near complete opacification of both lungs similar to prior exam.  Cardiac silhouette remains obscured. No pneumothorax. Visualized  portion of the upper abdomen is unremarkable.      Impression    Impression:   1. Unchanged near complete opacification of both lungs similar to  prior exam. Likely  edema and/or hemorrhage.  2. Support devices as detailed above.    I have personally reviewed the examination and initial interpretation  and I agree with the findings.    ROSELYN CASH MD       Labs:  Recent Labs   Lab 02/28/19  1814 02/28/19  1537 02/28/19  1402 02/28/19  1224   PH 7.36 7.36 7.35 7.38   PCO2 42 42 42 38   PO2 273* 157* 174* 121*   HCO3 24 24 23 23   O2PER 40 40  100  40 60 40       Lab Results   Component Value Date    HGB Test canceled - Lab  error 02/28/2019    HGB 9.0 (L) 02/28/2019    PHGB 70 (H) 02/28/2019    PLT Test canceled - Lab  error 02/28/2019    PLT 58 (L) 02/28/2019    FIBR 717 (H) 02/28/2019    INR 1.17 (H) 02/28/2019    PTT 51 (H) 02/28/2019    DD 13.7 (H) 02/28/2019    AXA <0.10 02/28/2019    ANTCH 95 02/28/2019         Plan is to continue management on VA ECMO and continue with daily turndowns.      Getachew Garvin, RRT  2/28/2019 6:29 PM

## 2019-03-01 NOTE — PHARMACY-CONSULT NOTE
Pharmacy Vancomycin Initial Note  Date of Service 2019  Patient's  1980  39 year old, male    Indication: Bacteremia    Current estimated CrCl = Estimated Creatinine Clearance: 33.4 mL/min (A) (based on SCr of 3.31 mg/dL (H)).    Creatinine for last 3 days  2019:  9:44 AM Creatinine 4.76 mg/dL;  4:14 PM Creatinine 4.58 mg/dL;  9:55 PM Creatinine 4.71 mg/dL  2019:  3:49 AM Creatinine 4.60 mg/dL;  9:54 AM Creatinine 4.43 mg/dL;  4:01 PM Creatinine 4.48 mg/dL; 10:05 PM Creatinine 4.43 mg/dL  2019:  4:02 AM Creatinine 4.30 mg/dL;  9:53 AM Creatinine 4.26 mg/dL;  3:37 PM Creatinine 3.91 mg/dL;  9:45 PM Creatinine 3.51 mg/dL  3/1/2019:  4:01 AM Creatinine 3.31 mg/dL    Recent Vancomycin Level(s) for last 3 days  2019:  9:55 PM Vancomycin Level 24.5 mg/L      Vancomycin IV Administrations (past 72 hours)                   vancomycin (VANCOCIN) 1,750 mg in sodium chloride 0.9 % 250 mL intermittent infusion (mg) 1,750 mg New Bag 19 0656                Nephrotoxins and other renal medications (From now, onward)    Start     Dose/Rate Route Frequency Ordered Stop    19 0800  vancomycin (VANCOCIN) 2,000 mg in sodium chloride 0.9 % 250 mL intermittent infusion      2,000 mg (central catheter)  over 60 Minutes Intravenous EVERY 24 HOURS 19 0746      19 0745  piperacillin-tazobactam (ZOSYN) 4.5 g vial to attach to  mL bag      4.5 g  over 30 Minutes Intravenous EVERY 6 HOURS 19 0739      19 1900  norepinephrine (LEVOPHED) 16 mg in D5W 250 mL infusion      0.03-0.4 mcg/kg/min × 106 kg  3-39.8 mL/hr  Intravenous CONTINUOUS 19 1849      19 1515  vasopressin (VASOSTRICT) 40 Units in D5W 40 mL infusion      0.5-4 Units/hr  0.5-4 mL/hr  Intravenous CONTINUOUS 19 1504            Contrast Orders - past 72 hours (72h ago, onward)    None                Plan:  1.  Start vancomycin  2000 mg IV q24h.   2.  Goal Trough Level: 15-20 mg/L   3.   Pharmacy will check trough levels as appropriate in 1-3 Days.    4. Serum creatinine levels will be ordered daily for the first week of therapy and at least twice weekly for subsequent weeks.    5. [place] method utilized to dose vancomycin therapy: Method 2/previous dosing    Wale Segal, PharmD, BCPS

## 2019-03-01 NOTE — PROGRESS NOTES
ECMO Attending Progress Note  3/1/2019    Hellen Riddle is a 39 year old male who was cannulated for ECMO 2/23/19 due to refractory VF arrest    Interval events: Pressor requirement stable to improving, stable on CRRT, improved contractility today     Physical Exam:  Temp:  [97  F (36.1  C)-97.5  F (36.4  C)] 97.5  F (36.4  C)  Heart Rate:  [70-87] 83  Resp:  [15] 15  MAP:  [58 mmHg-83 mmHg] 65 mmHg  Arterial Line BP: ()/(39-62) 90/48  FiO2 (%):  [40 %] 40 %  SpO2:  [86 %-100 %] 98 %    Intake/Output Summary (Last 24 hours) at 3/1/2019 1619  Last data filed at 3/1/2019 1500  Gross per 24 hour   Intake 3975.03 ml   Output 6067 ml   Net -2091.97 ml      Ventilation Mode: APRV  (Airway Pressure Release Ventilation)  FiO2 (%): 40 %  Pressure Support (cm H2O): 40 cmH2O  Oxygen Concentration (%): 40 %  Resp: 15     General: no acute distress, intubated and sedated  HEENT: PERRLA, conjunctiva clear, without icterus or pallor, oropharyx clear  Cardiac: RRR nl S1S2   Lungs: improving aeration  Abdomen: soft, nontender, non distended, no hepatosplenomegaly or masses  Extremities: without edema or cyanosis; pulses are dopplerable;   Skin: normal skin appearance without worrisome lesions.   Neurologic:intubated and sedated    Labs:  Recent Labs   Lab 03/01/19  1552 03/01/19  1403 03/01/19  1211 03/01/19  1002   PH 7.37 7.38 7.39 7.41   PCO2 41 39 39 36   PO2 99 121* 118* 93   HCO3 23 23 23 23   O2PER 40 40 40 40      Recent Labs   Lab 03/01/19  1002 03/01/19  0401 02/28/19  2145 02/28/19  1537   WBC 9.8 9.4 9.7 8.9  Test canceled - Lab  error   HGB 8.7* 8.4* 9.1* 9.0*  Test canceled - Lab  error     Creatinine   Date Value Ref Range Status   03/01/2019 3.05 (H) 0.66 - 1.25 mg/dL Final   03/01/2019 3.31 (H) 0.66 - 1.25 mg/dL Final   02/28/2019 3.51 (H) 0.66 - 1.25 mg/dL Final   02/28/2019 3.91 (H) 0.66 - 1.25 mg/dL Final       ECMO Issues including assessments and plan on DOS 3/1/2019:  Neuro: Sedated  for mechanical ventilation and ECMO.  NIRS stable b/l  RASS goal: -3  CV: Cardiogenic shock.  Hemodynamically stable on norepi and vaso  Pulm: Flows unchanged at 4.25, Sweep unchanged at 4, Keep vent settings at rest settings  FEN/Renal: Electrolytes stable w/ replacement protocols in place, Cr worsening now on CRRT, UOP stable  Heme: ACT goal: 180-200, Hemoglobin stable .  Goals: if O2 sat >85% Hgb >8.  If O2 Sat <85% keep Hgb 10-12.  Minimal oozing around the ECMO cannulas.  ID: Receiving empiric antibiotics  Cannulae: Position is acceptable on exam and the available imaging.  Distal perfusion cannula is in place and patent.     I have personally reviewed the ECMO flows, oxygenation and CO2 clearance, anticoagulation, and cannula position.  I have also personally assessed the patient's systemic response with hemodynamics, oxygenation, ventilation, and bleeding.       The patient requires continued ECMO support and management in the ICU.  I have discussed patient care and treatment plan with the primary team.      Abraham Baer MD, PhD  Interventional/Critical Care Cardiology  [phone]    March 1, 2019

## 2019-03-01 NOTE — PROGRESS NOTES
Cardiology Progress Note    Overnight/subj:   No major events overnight   Growing GPC in 1/2 Bcx from yesterday    VS reviewed: Notable for temp: AF, HR 71-78, MAP 68-82, oxygenating on 100%  Vent: APRV      IABP via LFA, 1:1, triggered by ECG, augmented MAPs 103, pulses NIRS stable   ECMO- Flow ~ 4.2-4.5L, no issues,   Wt: on admit 107.9, yday 107.7, today 104.6  I/O: 4.3 in / 6.3 out yday. Since MN 1.2 in / 1.8 out    Drips:   NE 0.04  Vaso 2     Fentanyl 100  Hep 1100  Insulin 3  Propofol 20  3% Na at 40cc/hr     PO Cardiac Meds: ASA 81mg daily, ticagrelor 90mg BID,   Other meds: pip/tazo, vanco, senna, miralax, ppi    Radiology Imaging  CXR- lines in stable position. Improving L lung opacity    Cardiology Studies:  TTE- reviewed      Labs: Reviewed in epic     Phys exam:  GEN: sedated, RASS-4  Eyes: R pupil larger than left   Pulm: increase air movement auscultated, course breath sounds  Cardiac: IABP inflation heard, improved pulsatility   Vascular: NIRS stable   GI: soft, non distended      ASSESSMENT/PLAN:  Mr. Riddle is a 38yo M with HLD and Fhx CAD/MI who presented with refractory VF OHCA brought directly to the CCL now s/p peripheral VA ECMO placement. Found to have severe 3v disease s/p MITA to mRCA and mLcx. Also with IABP placement for presumed pulmonary edema. Being managed as part post VF protocol.  Post arrest day 5.     Neurology:  # CT head day3: minimal edema - Continue EEG- no e/o seizures   - Intubated, sedated   - Na 150 for brain edema ppx  -continue fentanyl  - Propofol for sedation      Cardiovascular / Hemodynamics:  # Refractory VF arrest.    #CAD s/p MITA  mRCA and mCx  # Torsades (likely relate Qtc >600, corrected)  # Cardiogenic Shock requiring ECMO support  TTE: repeat turndown today  Mg>2  - continue vaso and NE as needed  --continue ASA 81mg and ticagrelor 90mg BID  --ACT goal 180-200  --hold lipitor for now given likely hepatic injury during arrest  --hold ACE/ARB for now given likely  reduced renal fxn after arrest  --holding beta blocker given shock    Pulmonary:  # Complete opacification of both lungs c/f ARDS, severe pulm edema vs less likely PAH   # Resp acidosis (currected) -- Worsening hypoxia due to cardiac recovery. May need conversion to VAV or VV ECMO  -- Continue APRV- better oxygenation  -- ETT stable   -- Q2h ABGs for now  -- consider scheduled duonebs if signs of lung dz, currently PRN  -- Continue to monitor lung opacities       GI and Nutrition:   Shock Liver (recovering) No known medical hx.    --Nutrition consulted, appreciate their help  -- Post pyloric feeds started  --bowel regimen   --GI Prophylaxis: PPI    Renal, Fluid and Electrolytes:  # FERNANDA with anuria -- Renal consult   -- on CRRT, plan for volume removal given worsening hypoxia  --monitor urine output  --maintain K>3 and Mg>2    Infectious Disease:  # Bcx 02/28 growing GPCs 1/2 --vancomycin/zosyn x5 days for ECMO (Completed 02/28/19)  -- continue vanc/zosyn for now given + Bcx  --daily blood cultures      Hematology: Coagulopathy related to ECMO Receiving heparin for ECMO and ASA/ticagrelor for MITA.   --cryo PRN fibrinogen < 200; FFP for INR >2  --Transfuse for Hgb<10  --heparin gtt for ECMO with ACT goal 160-180  --DVT PPX: Heparin as above   Endocrinology: No known medical history. BG elevated.  --insulin gtt PRN   Lines: R femoral arterial and venous ECMO cannulae February 23, 2019  L femoral arterial line February 23, 2019  R radial arterial line February 23, 2019  ETT February 23, 2019  Pisano catheter February 23, 2019  OG tube February 23, 2019  Restraint: PRN  Current lines are required for patient management         Code status FULL     To be discussed with Dr. Baer.      Theron Hebert MD   [facility] Service   *[phone]

## 2019-03-01 NOTE — PROGRESS NOTES
ECMO Attending Progress Note  2/28/2019    Hellen Riddle is a 39 year old male who was cannulated for ECMO 2/23/19 due to refractory VF arrest    Interval events: Pressor requirement stable to improving, stable on CRRT, improved contractility today     Physical Exam:  Temp:  [97  F (36.1  C)-97.3  F (36.3  C)] 97.2  F (36.2  C)  Heart Rate:  [] 76  Resp:  [15] 15  MAP:  [54 mmHg-92 mmHg] 80 mmHg  Arterial Line BP: ()/(42-70) 113/62  FiO2 (%):  [40 %] 40 %  SpO2:  [88 %-100 %] 100 %    Intake/Output Summary (Last 24 hours) at 3/1/2019 0026  Last data filed at 3/1/2019 0000  Gross per 24 hour   Intake 4064.88 ml   Output 6039 ml   Net -1974.12 ml      Ventilation Mode: APRV  (Airway Pressure Release Ventilation)  FiO2 (%): 40 %  Oxygen Concentration (%): 40 %  Resp: 15     General: no acute distress, intubated and sedated  HEENT: PERRLA, conjunctiva clear, without icterus or pallor, oropharyx clear  Cardiac: RRR nl S1S2   Lungs: clear to auscultation and percussion bilaterally anterior  Abdomen: soft, nontender, non distended, no hepatosplenomegaly or masses  Extremities: without edema or cyanosis; pulses are dopplerable;   Skin: normal skin appearance without worrisome lesions.   Neurologic:intubated and sedated    Labs:  Recent Labs   Lab 03/01/19  0002 02/28/19 2145 02/28/19 2007 02/28/19  1814   PH 7.41 7.34* 7.37 7.36   PCO2 36 42 41 42   PO2 145* 122* 163* 273*   HCO3 23 23 23 24   O2PER 40.0 40 40 40      Recent Labs   Lab 02/28/19 2145 02/28/19  1537 02/28/19  0953 02/28/19  0402   WBC 9.7 8.9  Test canceled - Lab  error 10.0 10.0   HGB 9.1* 9.0*  Test canceled - Lab  error 8.8* 8.9*     Creatinine   Date Value Ref Range Status   02/28/2019 3.51 (H) 0.66 - 1.25 mg/dL Final   02/28/2019 3.91 (H) 0.66 - 1.25 mg/dL Final   02/28/2019 4.26 (H) 0.66 - 1.25 mg/dL Final   02/28/2019 4.30 (H) 0.66 - 1.25 mg/dL Final       ECMO Issues including assessments and plan on DOS  2/28/2019:  Neuro: Sedated for mechanical ventilation and ECMO.  NIRS stable b/l  RASS goal: -3  CV: Cardiogenic shock.  Hemodynamically stable on norepi and vaso  Pulm: Flows unchanged at 4.25, Sweep unchanged at 4, Keep vent settings at rest settings  FEN/Renal: Electrolytes stable w/ replacement protocols in place, Cr worsening now on CRRT, UOP stable  Heme: ACT goal: 180-200, Hemoglobin stable .  Goals: if O2 sat >85% Hgb >8.  If O2 Sat <85% keep Hgb 10-12.  Minimal oozing around the ECMO cannulas.  ID: Receiving empiric antibiotics  Cannulae: Position is acceptable on exam and the available imaging.  Distal perfusion cannula is in place and patent.     I have personally reviewed the ECMO flows, oxygenation and CO2 clearance, anticoagulation, and cannula position.  I have also personally assessed the patient's systemic response with hemodynamics, oxygenation, ventilation, and bleeding.       The patient requires continued ECMO support and management in the ICU.  I have discussed patient care and treatment plan with the primary team.      Abraham Baer MD, PhD  Interventional/Critical Care Cardiology  [phone]    February 28, 2019

## 2019-03-01 NOTE — PROGRESS NOTES
Nephrology Progress Note  03/01/2019             Today Recommendations:  - Continue with CRRT, net negative fluid removal 0-100 ml/hr, goal 0-2 liters net negative over 24 hrs   - Strict I/Os, avoid nephrotoxic agents.   - Monitor Vancomycin level     ASSESSMENT AND RECOMMENDATIONS:   Mr. Riddle is 39 year old male with PMHx of familial hyperlipidemia, no history of renal disease or nephrolithiasis who was brought to our hospital with cardiogenic shock S/P cardiac arrest with unclear time down. Patient found to have CAD icluding 3 vessels, PCI was done emergently. We were consulted for FERNANDA and fluid management.       # Anuric FERNANDA on unknown baseline kidney function:  - Patient was admitted with Crea: 1.65 post acardiac arrest, no previous Crea level in our EMR.  - FERNANDA etiology: likley due to cardiogenic shock leading to hypotension and low renal perfusion, we gave 1 liter of fluid with 5% Albumin with no improvement of renal output, likely patient has ATN now due to ischemic injury due to low renal perfusion during the cardiac arrest in addition to rhabdomyolysis, elevated CK. Also other risks for his FERNANDA is contrast induced nephropathy and vancomycin renal toxicity.  - CK total >3100, repeated: 3676,  - We added Pre-Filter to the CRRT on 2/28.  NaCl 3% was discounted by primary team and they are ok if Na level normalized as we use Na bath 138. Patient completed 6 days with high serum sodium level.     - Continue with CRRT, net negative fluid removal 0-100 ml/hr, goal 0-2 liters net negative over 24 hrs.  - Strict I/Os, avoid nephrotoxic agents.   - Monitor Vancomycin levels      # Hypotension, Volume status:  - Patient is on 2 pressors today with BP: 90s/50s  - Pulmonary edema is improving, CXR shows improving in the pulmonary opacity. ,  - net negative~2 liters yesterday, FiO2 40%.      #Cardiogenic Shock due to VF due to severe CAD secondary to familial hypelipidemia:  - Patient had another VF episode on 2/23, PCI  "was done with stents placement.  - Patient on ECMO,   - Managed by cardiology, primary team.     # Severe encephalopathy:  - EEG today still shows severe generalized slowing with low amplitude which consist with severe diffuse encephalopathy. No seizure.   - Discontinue NaCl 3% today (Completed 6 days)   - Repeated CT/Head on 2/25 shows No acute intracranial pathology. No definite hypoxic ischemic injury.  - Managed by primary team.      Recommendations were communicated to primary team via note and verbally.      Seen and discussed with Dr. Satinder Munoz MD   Nephrology Fellow  [phone]    Interval History :   Nursing and provider notes from last 24 hours reviewed.  Patient was seen and examined at bedside this am. Patient is still sedated, intubated, on 2 pressors, on ECMO and CRRT, fluid removal plan as above. EEG shows severe diffused encephalopathy. Continue monitoring for kidney recover.     Review of Systems:   Not able to obtain, patient is sedated, intubated.     Physical Exam:   I/O last 3 completed shifts:  In: 3858.81 [I.V.:2388.81; NG/GT:270]  Out: 5882 [Emesis/NG output:600; Other:4982; Stool:300]   Temp 97.3  F (36.3  C)   Resp 15   Ht 1.68 m (5' 6.14\")   Wt 104.6 kg (230 lb 9.6 oz)   SpO2 90%   BMI 37.06 kg/m       GENERAL APPEARANCE: Sedated, intubated  EYES: No scleral icterus, pupils equal  Endo: no goiter, no moon facies  Lymphatics: no cervical or supraclavicular LAD  Pulmonary: lungs clear to auscultation anteriorly.   CV: Irregular rhythm, normal rate, no rub   - JVD: not able to evaluate, patient in supine position.    - Edema 1+  GI: soft,  normal bowel sounds  MS: no evidence of inflammation in joints, no muscle tenderness  : turk in place  SKIN: no rash, warm, dry, no cyanosis  NEURO: Sedated, intubated.      LABS:      I personally reviewed his labs.       Electrolytes/Renal -   Recent Labs   Lab Test 03/01/19  0824 03/01/19  0401 03/01/19  0002 02/28/19  8695 "  02/28/19  1537   * 147* 148* 147*   < > 150*   POTASSIUM  --  4.6  --  4.6  --  4.3   CHLORIDE  --  113*  --  115*  --  116*   CO2  --  22  --  23  --  24   BUN  --  62*  --  66*  --  69*   CR  --  3.31*  --  3.51*  --  3.91*   GLC  --  130*  --  127*  131*  --  111*  112*   ALEKSANDER  --  8.5  --  8.2*  --  8.2*   MAG  --  2.9*  --  2.6*  --  2.7*   PHOS  --  5.5*  --  5.7*  --  5.7*    < > = values in this interval not displayed.       CBC -   Recent Labs   Lab Test 03/01/19 0401 02/28/19 2145 02/28/19  1537   WBC 9.4 9.7 8.9  Test canceled - Lab  error   HGB 8.4* 9.1* 9.0*  Test canceled - Lab  error   PLT 54* 55* 58*  Test canceled - Lab  error       LFTs -   Recent Labs   Lab Test 03/01/19  0401 02/28/19 2145 02/28/19  1537   ALKPHOS 73 78 74   BILITOTAL 2.4* 2.0* 1.9*   * 125* 129*   * 273* 287*   PROTTOTAL 6.7* 7.0 6.6*   ALBUMIN 2.6* 2.6* 2.6*       Iron Panel - No lab results found.      Imaging:  All imaging studies reviewed by me.     Current Medications:    artificial tears   Both Eyes Q8H     aspirin  81 mg Oral or Feeding Tube Daily     B and C vitamin Complex with folic acid  5 mL Per Feeding Tube Daily     pantoprazole  40 mg Oral or Feeding Tube QAM     piperacillin-tazobactam  4.5 g Intravenous Q6H     polyethylene glycol  17 g Oral or Feeding Tube Daily     protein modular  1 packet Per Feeding Tube BID     senna-docusate  1 tablet Oral or Feeding Tube At Bedtime     sodium chloride (PF)  3 mL Intracatheter Q8H     ticagrelor  90 mg Oral or Feeding Tube BID     vancomycin (VANCOCIN) IV  2,000 mg (central catheter) Intravenous Q24H       IV fluid REPLACEMENT ONLY       IV fluid REPLACEMENT ONLY       CRRT replacement solution 12.5 mL/kg/hr (03/01/19 0800)     fentaNYL 100 mcg/hr (03/01/19 0900)     heparin 2 unit/mL in 0.9% NaCl 3 mL/hr (03/01/19 0900)     HEParin 1,100 Units/hr (03/01/19 0900)     insulin (regular) 5 Units/hr (03/01/19 0900)      midazolam Stopped (02/27/19 2200)     - MEDICATION INSTRUCTIONS -       - MEDICATION INSTRUCTIONS -       norepinephrine 0.05 mcg/kg/min (03/01/19 0900)     CRRT replacement solution 1.857 mL/kg/hr (02/28/19 1631)     CRRT replacement solution 12.5 mL/kg/hr (03/01/19 0800)     propofol (DIPRIVAN) infusion 20 mcg/kg/min (03/01/19 0900)     sodium chloride 3% 50 mL/hr at 03/01/19 0900     vasopressin (PITRESSIN) infusion ADULT (40 mL) 2 Units/hr (03/01/19 0900)     Natalia Munoz MD   Nephrology Fellow  Pager: [phone]    I was present with the fellow during the history and exam.  I discussed the case with the fellow and agree with the findings as documented in the assessment and plan.  Thuy Rajan

## 2019-03-01 NOTE — PROGRESS NOTES
"General acute hospital, [place]  Palliative Care Progress Note    Patient: Hellen Riddle  Date of Admission:  2/23/2019      Palliative Care is following for additional support and assistance with goals of care, in the setting of V-A ECMO 2/2 cardiogenic shock after cardiac arrest out of the hospital. Yue Hensley, Palliative , and I met with \"Ting's\" sister-in-law, Raissa, while his wife, Manolo, was sleeping. She endorsed that the family was coping well and are preparing for a long hospital course.     Palliative Care will continue to follow peripherally (1-2 x's/week). Please page if closer involvement is needed.     ISRAEL Sosa CNP  Palliative Care Consult Team  Pager: [phone]    [facility] Inpatient Team Consult pager [phone] (M-F 8-4:30)  After-hours Answering Service [phone]   Palliative Clinic: [phone]     15 minutes spent with patient, with >50% counseling and in care coordination.              "

## 2019-03-01 NOTE — PLAN OF CARE
No acute events overnight. Afebrile, no response to stimuli throughout most of shift.  Lightened sedation, sedation holiday at 0415, pt did have weak cough to suction, however did begin desatting to mid 80s, no visible distress, oxygenation decreased.  Notified cross-cover, decided to sedate back to achieve sats >90%, prop gtt at 20, fentanyl gtt reduced to 100.  No changes to vent, see results for serial ABGs, LS coarse, diminished bilaterally, AM CXR done. SR 70s, maps >60, dopplering pulses throughout, Levo gtt to 0.04, vaso gtt to 2.  VA ECMO no alarms overnight. NO products given, Heparin gtt to 1100. IABP remains in place, 1:1, 100% augmentation ekg trigger, augmenting .  CRRT pulling 50-100ml/hr net negative, -663ml this AM. Will continue to update MD with any changes in condition per protocol.     Notified Team to critical result from lab:  POSITIVE BLOOD CULTURE FROM 2/28  0308 FOR GRAM + COCCI IN CLUSTERS

## 2019-03-01 NOTE — PROGRESS NOTES
[facility], [facility] Nurse Inpatient Adult Pressure Injury Prevention Assessment: ECMO  Follow up    Positioning Tolerance: Good  Date of ECMO cannulation: 2/23 Right Groin VA ECMO  Presence of Ischemia: No  Location of ischemia: NA    Pressure Injury Prevention Interventions In Place:  Optifoam Dressing under ECMO Cannula, Z flow Positioner under head, Pillows for repositioning, TAPs Wedge Positioners in use, Heel off-loading boots, Pillows under calves for heel suspension, Mepilex Sacral Dressing and Dressing to sacrum for prevention   Current support surface: Standard  Low air loss mattress       Pressure Injury Prevention Interventions Added:  None        Plan of Care for Positioning and Pressure Injury Prevention  Reposition patient every 1-2 hours using TAP Wedges  Position head on Z flow positioner, mold indentation at areas of pressure points.  Pad ECMO IJ cannula with Optifoam (#[id number]) along face and scalp between skin and cannula and under Coban head wraps    Pad ECMO groin and chest cannula under rigid connectors with Optifoam or Soft cloth  Heel off-loading Boots at all times  Sacral Mepilex for Prevention, change every 5 days and prn  Low Air loss mattress    Patient History:   According to medical record: Ciara Winslow is a 38 year old male who was admitted on 2/23/2019. Wife noticed agonal breathing and seizure like activity. Called EMS, who came and started chest compressions. No history of HTN, DM. Does have history of dyslipidemia. Non smoker. Went to bed at 11:30, wife found him foaming at mouth around midnight. Called EMS, did not get significant chest compressions. EMS arrived 5 minutes later. [facility] to [facility].    Current Diet / Nutrition:     Orders Placed This Encounter      NPO for Medical/Clinical Reasons Except for: No Exceptions    Output:    I/O last 3 completed shifts:  In: 3858.81 [I.V.:2388.81; NG/GT:270]  Out: 5882 [Emesis/NG output:600; Other:4982;  Stool:300]  Containment: of urine/stool: Rectal Pouch and Anuric    Risk Assessment:   Sensory Perception: 1-->completely limited  Moisture: 3-->occasionally moist  Activity: 1-->bedfast  Mobility: 1-->completely immobile  Nutrition: 3-->adequate  Friction and Shear: 2-->potential problem  Kirby Score: 11    Labs:    Recent Labs   Lab 03/01/19  0401  02/23/19  0539   ALBUMIN 2.6*   < > 2.5*   HGB 8.4*   < > 16.3   INR 1.11   < >  --    WBC 9.4   < > 17.0*   A1C  --   --  6.3*   .0*   < > 16.0*    < > = values in this interval not displayed.     Focused Assessment:  Right groin ECMO cannula and Heels, occiput, mouth    Pressure Injury Present::Yes      ASSESSMENT  Pressure Injury: under EEG leads , hospital acquired ,   This is a Medical Device Related Pressure Injury (MDRPI) due to EEG and NIRs with surglist to hold it in place.   Pressure Injury is Stage Deep Tissue Pressure Injury (DTPI)   Contributing factor of the pressure injury: pressure and immobility, cannot rule out burn from NIRs being placed over EEG.   Status: Follow up assessment, Stable   Recommend provider order: NA     TREATMENT PLAN  Bilateral Forehead wounds: BID and PRN cleanse with saline and pat dry. Apply thin layer of Vaseline. Avoid placing NIRs directly over EEG leads. Avoid placing EEG leads directly on wound.   Orders reviewed   WOC Nurse follow-up plan:twice weekly  Nursing to notify the Provider(s) and re-consult the WOC Nurse if wound(s) deteriorates or new skin concern.    Physical Exam    Wound Location:  Bilateral Forehead      Date of last Photo 3/4  Wound History: Pt had NIRs in place over EEG held in place with Surgilast netting.   Measurements (length x width x depth, in cm) 0.6 cm x 0.6 cm  x  0 cm (both)  Wound Base:  100 % non-blanchable erythema and deep purple/ brown  Tunneling N/A  Undermining N/A  Palpation of the wound bed: normal   Periwound skin: intact  Color: normal and consistent with surrounding  tissue  Temperature: normal   Drainage:, none  Description of drainage: none  Odor: none  Pain: no grimacing or signs of discomfort and unable to assess due to  sedation , insensate    Education provided to: nurse  Discussed importance of:their role in pressure injury prevention  Discussed plan of care with Nurse  WOC Nurse follow-up plan:twice weekly    Violeta Grossman RN CWCN

## 2019-03-01 NOTE — PLAN OF CARE
Patient remains in CV ICU with VA ECMO support on CRRT.     Neuro: Right pupil 3+ and left pupil 2+ sluggish to react. EEG monitoring in place. Patient does not wake. Withdraws to pain in lowers but not uppers. Sedated with Fentanyl and Propofol infusions. 3% hypertonic saline infusing, trending NA. Plan to discontinue 3% on 3/1.  CV:Remains in a SR on vasopressin and norepinephrine infusion with VA ECMO support. Third turndown completed today, please see note for detail. Please see ECMO Tech note / flow sheet for details regarding VA ECMO.  IABP in 1:1 augmenting 100%.   Pulm:Oxygenation / ventilation. Vent settings: APRV, pulling TVs in the 300s - 400s.  GI: OGT to LIS. NJ tube feeds at goal. Rectal pouch in place draining liquid bm.  : CRRT continues, goal of 1 - 2 L net negative over 24 hour period  Skin: Two bilateral abrasions noted on forehead from EEG leads. Per [facility]OK apply Vaseline to areas. Preventative mepilex on coccyx. Preventative optifoam and meplilite dressings in use. Patient on isolibrium bed.  Psychosocial: Family supportive at bedside throughout shift.     Plan: Continue to monitor and support in CV ICU.

## 2019-03-01 NOTE — PROCEDURES
VEEG REPORT: DAY 6  EEG #:  -6    DATE OF RECORDING/SERVICE DATE:  02/28/2019    SOURCE FILE DURATION:  23:55:03      HISTORY:  This is day #6 of video EEG monitoring for a 38-year-old male admitted on 02/23 after suffering cardiac arrest with subsequent intubation sedation and hypothermia protocol initiated.  The patient is now off of hypothermia protocol, now normothermic.  Video EEG was started for evaluation of seizure.      MEDICATIONS:  Fentanyl 150 microgram per hour.  Propofol 25-30 mcg per kilogram per minute.      TECHNICAL SUMMARY: This continuous video- EEG monitoring procedure was performed with 23 scalp electrodes in 10-20 electrode system placement, and additional scalp, precordial and other surface electrodes used for electrical referencing and artifact detection.  Video monitoring was utilized and periodically reviewed by EEG technologists and the physician for electroclinical correlations.    BACKGROUND:  There is no well-defined parietal occipital rhythm or any EEG changes associated with drowsiness, stage II of sleep or deeper states of sleep.  Throughout the recording, there is severe generalized attenuation of all background activities with only intermittent diffuse delta slowing(10-15 microvolt).  There are superimposed faster frequencies of alpha and beta seen towards the later few hours of recording in the paracentral and central regions.  No focal slowing is seen.      ACTIVATION PROCEDURES:  At 09:14, ICU stimulations are performed (physical and verbal stimuli) with no clinical or electrographic changes noted.      EPILEPTIFORM ACTIVITIES:  None.      ICTAL ACTIVITIES:  None.      IMPRESSION:  This day 6 video EEG is abnormal due to severe attenuation of electrographic activity and low amplitude generalized deltaactivities.  Findings are consistent with a severe diffuse encephalopathy.  The use of medications employed may be altering the EEG by suppressing background activity, and  clinical correlation is advised.  No electrographic seizures or epileptiform activities were seen on this day.        This report is dictated by Dr. Ulises Galvan DO.  CNP fellow.    I personally reviewed the video EEG and agree with the reported findings.    Dr. Aiyana Campbell MD.                  D: 2019   T: 2019   MT:       Name:     LIONEL CARTY   MRN:      [id number]        Account:        [id number]   :      1980           Procedure Date: 2019      Document: [id number]

## 2019-03-02 NOTE — PROCEDURES
Procedure Date: 03/01/2019      EEG #-7       DATE OF RECORDING/SERVICE DATE:  03/01/2019       SOURCE FILE DURATION:  23 hours, 52 minutes, 22 seconds.      This is day 7 of 24-hour video EEG       CLINICAL SUMMARY:  The patient is a 39-year-old male who was admitted after cardiac arrest with subsequent intubation and sedation and being placed on hypothermia protocol.  EEG was performed to evaluate for seizures and part of hypothermia protocol.  The patient is reportedly receiving fentanyl drip 250 mcg per hour and propofol drip 20-25 mcg per kg per minute.     TECHNICAL SUMMARY: This continuous video- EEG monitoring procedure was performed with 23 scalp electrodes in 10-20 electrode system placement, and additional scalp, precordial and other surface electrodes used for electrical referencing and artifact detection.  Video monitoring was utilized and periodically reviewed by EEG technologists and the physician for electroclinical correlations.     INTERICTAL ACTIVITY:  No posterior dominant rhythm was appreciated.  Throughout the recording there was burst suppression pattern with bursts of very low amplitude delta-theta slowing with occasional superimposed faster activities with amplitude of activities typically less than 15 microvolts.  Bursts of activity lasted 2-5 seconds with inter-burst intervals of generalized severe suppression of electrocerebral activity lasting 2-10 seconds.  No epileptiform discharges were present.  No sleep-wake cycling was appreciated.      CLINICAL/ICTAL EVENTS:  No electrographic or clinical seizures were recorded.      IMPRESSION:  This is a markedly abnormal video EEG due to the presence of burst suppression pattern consistent with severe global cerebral dysfunction likely due to hypoxic ischemic injury. No epileptiform discharges were present.  No electrographic seizures or periodic discharges were present.         INA ALEXANDER MD             D: 03/02/2019   T:  2019   MT: JONAS      Name:     LIONEL CARTY   MRN:      [id number]        Account:        [id number]   :      1980           Procedure Date: 2019      Document: [id number]

## 2019-03-02 NOTE — PLAN OF CARE
D: Sedated/intubated. Not follow commands this shift; cough w/ sxn. Hemodynamically stable, on VA ECMO, IABP, and vaso/levo gtt's. Oozing from right groin ECMO cannulas sites, manual pressure applied & dressing changed. Suboptimal oxygenation, FiO2 now 60, still on APRV. CRRT fluid removal in goal range. Feeding at goal. Rectal pouch in place.  I: As above. Hygiene care provided. Family updated re: status.  A: Stable but critical.  P: Continue to monitor. Notify MD of changes/concerns. Obtain blood consent at earliest convenience. Clarify Na+ level goal.

## 2019-03-02 NOTE — PROGRESS NOTES
Nephrology Progress Note  03/02/2019             Today Recommendations:  - Continue with CRRT, I = O given more hypotension and increased lactate, ?euvolemic to hypovolemic  -      ASSESSMENT AND RECOMMENDATIONS:   Mr. Riddle is 39 year old male with PMHx of familial hyperlipidemia, no history of renal disease or nephrolithiasis who was brought to our hospital with cardiogenic shock S/P cardiac arrest with unclear time down. Patient found to have CAD icluding 3 vessels, PCI was done emergently. We were consulted for FERNANDA and fluid management.       # Anuric FERNANDA on unknown baseline kidney function:  - Patient was admitted with Crea: 1.65 post cardiac arrest, no previous Crea level in our EMR.  - FERNANDA etiology: likley due to cardiogenic shock leading to hypotension and low renal perfusion, contrast and high CK noted.  - recheck CK today- now with higher clearance, hope to see it is improving  - We added Pre-Filter to the CRRT on 2/28.   -continue CRRT , I = O    # Hypotension, Volume status:  - Patient is on 2 pressors today with BP lower today 80s/50s  - Pulmonary edema is improving, CXR shows improving in the pulmonary opacity. ,  - has been negative for many days, weight is down 7kg from admission  - will change to I=O     #Cardiogenic Shock due to VF due to severe CAD secondary to familial hypelipidemia:  - Patient had another VF episode on 2/23, PCI was done with stents placement.  - Patient on ECMO,   - Managed by cardiology, primary team.  Lactate up, ? Cardiac or due to volume down- UF goal changed to 1= O     # Severe encephalopathy:  - EEG shows severe generalized slowing with low amplitude which consistent with severe diffuse encephalopathy. No seizure.   -hypernatermia reversed, decreased by 7 Eq or less per day  - Repeated CT/Head on 2/25 shows No acute intracranial pathology. No definite hypoxic ischemic injury.  - Managed by primary team.      Recommendations were communicated to primary team via  "note       Interval History :   Nursing and provider notes from last 24 hours reviewed.  Patient was seen and examined at bedside this am. Patient is still sedated, intubated, on pressors, on ECMO and CRRT, ECMO turn down this morning did not go well, tachycardia and hypoxia. Lactate up slightly. Phos still high     Review of Systems:   Not able to obtain, patient is sedated, intubated.     Physical Exam:   I/O last 3 completed shifts:  In: 3834.22 [I.V.:2096.22; Other:28; NG/GT:510]  Out: 5110 [Emesis/NG output:1050; Other:4060]   Temp 98.4  F (36.9  C)   Resp 15   Ht 1.68 m (5' 6.14\")   Wt 100.7 kg (222 lb 0.1 oz)   SpO2 95%   BMI 35.68 kg/m       GENERAL APPEARANCE: Sedated, intubated  Pulmonary: lungs clear to auscultation anteriorly.   CV: Irregular rhythm, normal rate, no rub   - JVD: not able to evaluate, patient in supine position.    - Edema trace  GI: soft,  normal bowel sounds  MS: no evidence of inflammation in joints, no muscle tenderness  : turk in place  SKIN: no rash, warm, dry, no cyanosis  NEURO: Sedated, intubated.      LABS:      I personally reviewed his labs.       Electrolytes/Renal -   Recent Labs   Lab Test 03/02/19  1215 03/02/19  1003 03/02/19  0430 03/02/19  0357 03/01/19  2145   NA  --  139  --  140 141   POTASSIUM  --  5.2  --  4.8 5.0   CHLORIDE  --  106  --  108 109   CO2  --  22  --  23 21   BUN  --  63*  --  61* 60*   CR  --  2.68*  --  2.69* 2.79*   * 149*  154* 143* 134* 135*  140*   ALEKSANDER  --  8.5  --  8.5 8.2*   MAG  --  2.9*  --  2.9* 2.6*   PHOS  --  7.3*  --  6.1* 5.9*       CBC -   Recent Labs   Lab Test 03/02/19  1003 03/02/19  0357 03/01/19  2145   WBC 15.2* 12.7* 11.7*   HGB 9.4* 9.3* 8.7*   PLT 82* 73* 66*       LFTs -   Recent Labs   Lab Test 03/02/19  1003 03/02/19  0357 03/01/19  2145   ALKPHOS 95 85 82   BILITOTAL 2.9* 2.9* 2.9*   ALT 98* 102* 105*   * 199* 204*   PROTTOTAL 7.4 7.3 7.2   ALBUMIN 2.6* 2.6* 2.6*       Iron Panel - No lab results " found.      Imaging:  All imaging studies reviewed by me.     Current Medications:    artificial tears   Both Eyes Q8H     aspirin  81 mg Oral or Feeding Tube Daily     B and C vitamin Complex with folic acid  5 mL Per Feeding Tube Daily     pantoprazole  40 mg Oral or Feeding Tube QAM     piperacillin-tazobactam  4.5 g Intravenous Q6H     polyethylene glycol  17 g Oral or Feeding Tube Daily     protein modular  1 packet Per Feeding Tube BID     senna-docusate  1 tablet Oral or Feeding Tube At Bedtime     sodium chloride (PF)  3 mL Intracatheter Q8H     ticagrelor  90 mg Oral or Feeding Tube BID     vancomycin (VANCOCIN) IV  2,000 mg (central catheter) Intravenous Q24H       IV fluid REPLACEMENT ONLY       CRRT replacement solution 12.5 mL/kg/hr (03/02/19 1121)     fentaNYL 100 mcg/hr (03/02/19 1226)     heparin 2 unit/mL in 0.9% NaCl 3 mL/hr (03/02/19 1100)     HEParin 1,400 Units/hr (03/02/19 1101)     insulin (regular) 3 Units/hr (03/02/19 1100)     midazolam Stopped (02/27/19 2200)     - MEDICATION INSTRUCTIONS -       norepinephrine 0.07 mcg/kg/min (03/02/19 1130)     CRRT replacement solution 1.857 mL/kg/hr (03/01/19 1835)     CRRT replacement solution 12.5 mL/kg/hr (03/02/19 1122)     propofol (DIPRIVAN) infusion 40 mcg/kg/min (03/02/19 1101)     vasopressin (PITRESSIN) infusion ADULT (40 mL) 0.5 Units/hr (03/02/19 1226)     Thuy Rajan MD   [phone]

## 2019-03-02 NOTE — PROGRESS NOTES
ECMO Shift Summary:    Patient remains on VA ECMO, all equipment is functioning and alarms are appropriately set. RPM's 3380 with flow range 4.1 L/min. Sweep gas is at 4 LPM and FiO2 100%. Circuit remains free of air, clot and fibrin. Cannulas are secure with little bleeding from site. Extremities are warm to the touch. Suctioned ETT for small amount of yellow secretions.    Significant Shift Events: No significant events over night    Vent settings:  Ventilation Mode: APRV  (Airway Pressure Release Ventilation)  FiO2 (%): 60 %  Pressure Support (cm H2O): 40 cmH2O  Oxygen Concentration (%): 40 %  Resp: 15  .    Heparin is running at 1200 u/kg/hr, ACT range 180.    Patient remains anuric on CRRT, minimal blood loss from cannulation site, no blood products given.      Intake/Output Summary (Last 24 hours) at 3/2/2019 0713  Last data filed at 3/2/2019 0700  Gross per 24 hour   Intake 3767.22 ml   Output 5156 ml   Net -1388.78 ml       ECHO:  No results found for this or any previous visit.No results found for this or any previous visit.    CXR:  No results found for this or any previous visit (from the past 24 hour(s)).    Labs:  Recent Labs   Lab 03/02/19  0618 03/02/19  0430 03/01/19  2145 03/01/19  2033   PH 7.43 7.44 7.44 7.38   PCO2 33* 32* 32* 38   PO2 57* 63* 109* 131*   HCO3 22 22 22 23   O2PER 40.0 40.0 40 40       Lab Results   Component Value Date    HGB 9.3 (L) 03/02/2019    PHGB 80 (H) 03/02/2019    PLT 73 (L) 03/02/2019    FIBR 824 (H) 03/02/2019    INR 1.30 (H) 03/02/2019    PTT 62 (H) 03/02/2019    DD 12.7 (H) 03/02/2019    AXA 0.17 03/02/2019    ANTCH 61 (L) 03/01/2019         Plan is to continue managing patient on ECMO.      Guillermo Menjivar, RRT  3/2/2019 7:13 AM

## 2019-03-02 NOTE — PROGRESS NOTES
Cardiology Progress Note    Overnight/subj:   No major events overnight   This morning he was hypoxic when turned on his left side, however improved with repositioning and increasing sedation/flow    VS reviewed: Notable for temp: AF, HR 69-78, MAP 63-86, oxygenating on 60% FiO2  Vent: APRV      IABP via LFA, 1:1, triggered by ECG, augmented MAPs 103, pulses NIRS stable   ECMO- Flow ~ 4.2-4.5L, no issues,   Wt: on admit 107.9, yday 107.7, today 104.6  I/O: 4.3 in / 6.3 out yday. Since MN 1.2 in / 1.8 out    Drips:   NE 0.04  Vaso 0.5     Fentanyl 100  Hep 1200  Insulin 3  Propofol 15     PO Cardiac Meds: ASA 81mg daily, ticagrelor 90mg BID,   Other meds: pip/tazo, vanco, senna, miralax, ppi    Radiology Imaging  CXR- lines in stable position. Improving L lung opacity    Cardiology Studies:  TTE- reviewed      Labs: Reviewed in epic     Phys exam:  GEN: sedated, RASS-4  Eyes: R pupil larger than left   Pulm: increase air movement auscultated, course breath sounds  Cardiac: IABP inflation heard, improved pulsatility   Vascular: NIRS stable   GI: soft, non distended      ASSESSMENT/PLAN:  Mr. Riddle is a 38yo M with HLD and Fhx CAD/MI who presented with refractory VF OHCA brought directly to the CCL now s/p peripheral VA ECMO placement. Found to have severe 3v disease s/p MITA to mRCA and mLcx. Also with IABP placement for presumed pulmonary edema. Being managed as part post VF protocol.  Post arrest day 8     Neurology:  # CT head day3: minimal edema - Continue EEG- no e/o seizures   - Intubated, sedated   -continue fentanyl  - Propofol for sedation  - No longer on hypertonic saline, targeting normal serum Na   Cardiovascular / Hemodynamics:  # Refractory VF arrest.    #CAD s/p MITA  mRCA and mCx  # Torsades (likely relate Qtc >600, corrected)  # Cardiogenic Shock requiring ECMO support  TTE: repeat turndown today  Mg>2  - continue vaso and NE as needed  --continue ASA 81mg and ticagrelor 90mg BID  --ACT goal  180-200  --hold lipitor for now given likely hepatic injury during arrest  --hold ACE/ARB for now given likely reduced renal fxn after arrest  --holding beta blocker given shock    Pulmonary:  # Complete opacification of both lungs c/f ARDS, severe pulm edema vs less likely PAH   # Resp acidosis (currected) -- Worsening hypoxia due to cardiac recovery. May need conversion to VAV or VV ECMO  -- Continue APRV- better oxygenation  -- ETT stable   -- Q2h ABGs for now  -- consider scheduled duonebs if signs of lung dz, currently PRN  -- Continue to monitor lung opacities       GI and Nutrition:   Shock Liver (recovering) No known medical hx.    --Nutrition consulted, appreciate their help  -- Post pyloric feeds started  --bowel regimen   --GI Prophylaxis: PPI    Renal, Fluid and Electrolytes:  # FERNANDA with anuria -- Renal consult   -- on CRRT, plan for volume removal given worsening hypoxia  --monitor urine output  --maintain K>3 and Mg>2    Infectious Disease:  # Bcx 02/28 growing GPCs 1/2 --vancomycin/zosyn x5 days for ECMO (Completed 02/28/19)  -- continue vanc/zosyn for now given + Bcx  --daily blood cultures      Hematology: Coagulopathy related to ECMO Receiving heparin for ECMO and ASA/ticagrelor for MITA.   --cryo PRN fibrinogen < 200; FFP for INR >2  -- Transfuse plat <50  --Transfuse for Hgb<10  --heparin gtt for ECMO with ACT goal 160-180  --DVT PPX: Heparin as above   Endocrinology: No known medical history. BG elevated.  --insulin gtt PRN   Lines: R femoral arterial and venous ECMO cannulae February 23, 2019  L femoral arterial line February 23, 2019  R radial arterial line February 23, 2019  ETT February 23, 2019  Pisano catheter February 23, 2019  OG tube February 23, 2019  Restraint: PRN  Current lines are required for patient management         Code status FULL     To be discussed with Dr. Baer.      Joshua Garcia MD   CSI Service   *[phone]

## 2019-03-02 NOTE — PLAN OF CARE
NEURO: Remains on Propofol and Fentanyl gtts. Pupils round, equal, sluggish.   CARDIAC: Sinus Rhythm. IABP 100% Augmented. AV ECMO.   PULM: APRV. Coarse lung sounds.  GI: TF at goal.   : Anuric. CRRT pulling at goal.   SKIN. See doc flowsheet for full details. Mepilex on coccyx.    PLAN: Update MD as needed, progress as tolerated, and continue to monitor.

## 2019-03-02 NOTE — PROGRESS NOTES
ECMO Shift Summary:    Patient remains on VA ECMO, all equipment is functioning and alarms are appropriately set. RPM's 3380 with flow range 4.2 L/min. Sweep gas is at 4 LPM and FiO2 100%. Circuit remains free of air, clot and fibrin. Cannulas are secure with no bleeding from site. Extremities are warm. Suctioned ETT for small amount.    Significant Shift Events: none    Vent settings:  Ventilation Mode: APRV  (Airway Pressure Release Ventilation)  FiO2 (%): 40 %  Pressure Support (cm H2O): 40 cmH2O  Oxygen Concentration (%): 40 %  Resp: 15    Heparin is running at 1100 u/hr, ACT range 180-200.    Urine output is as charted, blood loss was none. Product given included none.      Intake/Output Summary (Last 24 hours) at 3/1/2019 1818  Last data filed at 3/1/2019 1800  Gross per 24 hour   Intake 3976.23 ml   Output 6073 ml   Net -2096.77 ml       ECHO:  No results found for this or any previous visit.No results found for this or any previous visit.    CXR:  Recent Results (from the past 24 hour(s))   XR Chest Port 1 View    Narrative    Exam: XR CHEST PORT 1 VW, 3/1/2019 1:45 AM    Indication: arrest    Comparison: 2/28/2019    Findings:   AP view of the chest. Endotracheal tube with the tip in the lower  thoracic trachea. Intra-aortic balloon pump marker at the level of the  tereso. Enteric tubes with the end extending beyond the field-of-view.  Unchanged loop seen within the stomach. ECMO cannula with the tip  projecting over the upper right atrium.    Bilateral opacification of the lungs similar to prior exam. Right  greater than left pleural effusions. Cardiac silhouette remains  obscured. Upper abdomen is unremarkable.      Impression    Impression:   1. Near complete opacification of both lungs similar to prior exam.  Findings likely representing edema and/or hemorrhage.  2. Support devices as detailed above.    I have personally reviewed the examination and initial interpretation  and I agree with the  findings.    ROSELYN CASH MD       Labs:  Recent Labs   Lab 03/01/19  1753 03/01/19  1552 03/01/19  1403 03/01/19  1211   PH 7.38 7.37 7.38 7.39   PCO2 40 41 39 39   PO2 88 99 121* 118*   HCO3 24 23 23 23   O2PER 40 40 40 40       Lab Results   Component Value Date    HGB 8.9 (L) 03/01/2019    PHGB 80 (H) 03/01/2019    PLT 66 (L) 03/01/2019    FIBR 824 (H) 03/01/2019    INR 1.12 03/01/2019    PTT 63 (H) 03/01/2019    DD 11.7 (H) 03/01/2019    AXA 0.16 03/01/2019    ANTCH 61 (L) 03/01/2019         Plan is continue to provide support.      Greer Gillis, RRT  3/1/2019 6:18 PM

## 2019-03-02 NOTE — PROGRESS NOTES
"CRRT RESTART NOTE    Reason for Restart:  Time limit reached    Patient s Vascular Access: ECMO                   }      DATA:  Procedure:  CVVHDF  Start Time:  1629  Machine#:  7  Filter:  M150  Blood Flow:   180 mL/min  Pre-Replacement Solution:  Phoxillum BK 4/2.5  Post-Replacement Solution:  Phoxillum BK 4/2.5  Dialysate Solution:  Phoxillum BK 4/2.5  Pre-Replacement Solution Rate:  1300 mL/hr  Post-Replacement Solution Rate:  200 mL/hr  Dialysate Flow Rate:  1300 mL/hr  Patient Removal Rate:  170 mL/hr  Anticoagulation Type and Rate: N/A    ASSESSMENT:  How Patient Tolerated Restart:  well  Vital Signs:  Temp 98.6  F (37  C)   Resp 14   Ht 1.68 m (5' 6.14\")   Wt 100.7 kg (222 lb 0.1 oz)   SpO2 95%   BMI 35.68 kg/m    Initial Pressures:  Access:  70  Filter:  133  Return:  87  TMP:  20  Change in Filter Pressure:  61    INTERVENTIONS:  Restart CRRT after filter timed out.    PLAN:  Continue plan of care.    Pao Sow  3/2/2019          "

## 2019-03-02 NOTE — PROGRESS NOTES
CRRT STATUS NOTE    DATA:  Time:  5:28 PM  Pressures WNL:  YES  Filter Status:  WDL    Problems Reported/Alarms Noted: Occasional flow alarms.  Rubber bands on/machine on mat. CRRT running via ECMO circuit.     Supplies Present: YES    ASSESSMENT:  Patient Net Fluid Balance:  Net -829cc this shift. Weight down approx 7 kg from admission 2/23/19.  Vital Signs: T 99 (E), NSR/ST 80-90's, MAP 70-80's. Vented APRV 60% with O2 sats 94-99%. Pressors Norepi and Vaso. CRRT accessed via  ECMO circuit.   Labs: K+5.2/5.4, Cr. 2.81, Lactate 2.1, WBC 18.3.   Goals of Therapy: Goals of care changed to I=O today.     INTERVENTIONS:   Bedside RN reported increasing K+ levels for last 2 lab draws. Solutions changed to PrismaSol BGK 2/3.5.      PLAN:  Continue with plan of care and notify CRRT of concerns.

## 2019-03-02 NOTE — PROGRESS NOTES
ECMO [place] STUDY    Inotropes/Pressors: None    Flow  BP MAP HR SpO2 SvO2 IABP  4L 95/57 75 99 89 70 1:1  2L 146/79 112 120 85 55 1:1        LVEF improved some, ~25-30% on 4L, up to 30-35% on 2L    Turndown terminated due to tachycardia and hypoxia     Joshua Garcia MD  Cardiology Fellow  [phone]    March 2, 2019

## 2019-03-03 NOTE — PROGRESS NOTES
ECMO Shift Summary:    Patient remains on VA ECMO, all equipment is functioning and alarms are appropriately set. RPM's 9019-6539 with flow range 2.98-4.52 L/min. Sweep gas is at 6 LPM and FiO2 100%. Circuit remains free of air, clot and fibrin. Cannulas are secure with no bleeding from site. Extremities are warm. Suctioned ETT for yellow secretions.    Significant Shift Events: Pt was bronched today and samples sent. Added flolan to ventilator.    Vent settings:  Ventilation Mode: APRV  (Airway Pressure Release Ventilation)  FiO2 (%): 100 %  Oxygen Concentration (%): 100 %  Resp: 15  .    Heparin is running at 500 u/hr, ACT range 180-200.    Urine output is as charted, blood loss was none. Product given included 2 unit(s) PRBCs.      Intake/Output Summary (Last 24 hours) at 3/3/2019 1745  Last data filed at 3/3/2019 1700  Gross per 24 hour   Intake 4589.48 ml   Output 3779 ml   Net 810.48 ml       ECHO:  No results found for this or any previous visit.No results found for this or any previous visit.    CXR:  Recent Results (from the past 24 hour(s))   XR Chest Port 1 View    Narrative    Exam: XR CHEST PORT 1 VW 3/3/2019 10:03 AM    History: ECMO, intubated    Comparison: 3/2/2019    Findings: Single AP view of the chest. Inferior approach ECMO cannula  projects over the right atrium. Endotracheal tube is in appropriate  position, stable. Feeding tube and gastric tubes extend below the  diaphragm, tip not included the field-of-view. Stable position of the  intra-aortic balloon pump superior metallic marker. Cardia mediastinal  silhouette is stable, slightly obscured. Lung volumes are normal.  Diffuse pulmonary opacities, slightly improved since prior. Aeration  in both lungs, especially pronounced superiorly. Small area pleural  effusions, unchanged. No pneumothorax. Visualized upper abdomen is  unremarkable. No acute bony abnormality.      Impression    Impression:   1. Inferior approach ECMO cannula projects over  the right atrium,  stable. Additional lines and tubes as above.  2. Diffuse pulmonary opacities, slightly improved since prior.  Improving aeration, especially pronounced in the upper lungs.  3. Small layering pleural effusions are again seen.    ASHLEIGH CHAN MD       Labs:  Recent Labs   Lab 03/03/19  1549 03/03/19  1424 03/03/19  1211 03/03/19  1110   PH 7.39 7.40 7.38 7.31*   PCO2 40 37 37 50*   PO2 49* 77* 79* 49*   HCO3 24 23 22 25   O2PER 100 100 100 100.0       Lab Results   Component Value Date    HGB 10.4 (L) 03/03/2019    PHGB 160 (H) 03/03/2019     (L) 03/03/2019    FIBR 785 (H) 03/03/2019    INR 1.14 03/03/2019    PTT 54 (H) 03/03/2019    DD 8.5 (H) 03/03/2019    AXA 0.26 03/03/2019    ANTCH 64 (L) 03/03/2019         Plan is continue to provide VA ECMO support.      Greer Gillis, RRT  3/3/2019 5:45 PM

## 2019-03-03 NOTE — PLAN OF CARE
D: Poor oxygenation persisted w/ SpO2 80 to low 90s, and pO2 5's overnight (FiO2 100% on vent and ECMO) & MAPs >. Overbreathed the vent, affecting ECMO circuit. ECMO chugging also noted. Albumin x250 cc/NS x 240 ml given, chugging resolved. CRRT fluid removal rate adjusted accordingly. Propofol and fentanyl dose increased. Precedex added late in the A.M., propofol dose reduced concerning elevated triglycerides as pt has hx of familial HLD.   I: As above. Hygiene care provided. Labs/status d/w MD.  A: Critical.  P: Continue to monitor. Suggest bronch (thick secretions from ETT), pulm consult. Paralyze pt, after optimizing sedation. Discuss elevated triglycerides w/ team; reconsider versed/precedex combo?

## 2019-03-03 NOTE — PROGRESS NOTES
Cardiology Progress Note    Overnight/subj:   Required 12.5mg of albumin for ECMO chugging  Agitated, fighting vent -> increased fentanyl, started Precedex    VS reviewed: Notable for temp: AF, HR , MAP 57-96, oxygenating on 60% FiO2  Vent: APRV      IABP via LFA, 1:1, triggered by ECG, augmented MAPs 103, pulses NIRS stable   ECMO- Flow ~ 4.2-4.5L, chugging overnight, improved with fluid  Wt: on admit 107.9, yday 104.6, today 100.4  I/O: 4.3 in / 4.2 out yday. Since MN 1.6 in / 1.0 out    Drips:   NE 0.04  Vaso 0     Fentanyl 200  Propofol 30  Precedex 0.7  Hep 1700  Insulin 3     PO Cardiac Meds: ASA 81mg daily, ticagrelor 90mg BID,   Other meds: vanco, senna, miralax, ppi    Radiology Imaging  CXR- lines in stable position. Improving L lung opacity    Cardiology Studies:  TTE- reviewed      Labs: Reviewed in epic     Phys exam:  GEN: sedated, RASS-4  Eyes: R pupil larger than left   Pulm: increase air movement auscultated, course breath sounds  Cardiac: IABP inflation heard, improved pulsatility   Vascular: NIRS stable   GI: soft, non distended      ASSESSMENT/PLAN:  Mr. Riddle is a 40yo M with HLD and Fhx CAD/MI who presented with refractory VF OHCA brought directly to the CCL now s/p peripheral VA ECMO placement. Found to have severe 3v disease s/p MITA to mRCA and mLcx. Also with IABP placement for presumed pulmonary edema. Being managed as part post VF protocol.  Post arrest day 8     Neurology:  # CT head day3: minimal edema - Continue EEG- no e/o seizures   - Intubated, sedated   -continue fentanyl  - Started Precedex, wean propofol as TGs are increasing  - No longer on hypertonic saline, targeting normal serum Na   Cardiovascular / Hemodynamics:  # Refractory VF arrest.    #CAD s/p MITA  mRCA and mCx  # Torsades (likely relate Qtc >600, corrected)  # Cardiogenic Shock requiring ECMO support  TTE: repeat turndown today  Mg>2  - continue vaso and NE as needed  --continue ASA 81mg and ticagrelor 90mg  BID  --ACT goal 180-200  --hold lipitor for now given likely hepatic injury during arrest  --hold ACE/ARB for now given likely reduced renal fxn after arrest  --holding beta blocker given shock    Pulmonary:  # Complete opacification of both lungs c/f ARDS, severe pulm edema vs less likely PAH   # Resp acidosis (currected) -- Worsening hypoxia due to cardiac recovery. May need conversion to VAV or VV ECMO  -- Continue APRV- better oxygenation  -- required paralytics today for worsening oxygenation  -- Favor decreasing ECMO circuit to prioritize CRRT volume removal  -- May need to consider VVA cannulation as heart continues to improve.  - bronch today with samples sent for culture  -- ETT stable   -- Q2h ABGs for now  -- consider scheduled duonebs if signs of lung dz, currently PRN  -- Continue to monitor lung opacities       GI and Nutrition:   Shock Liver (recovering) No known medical hx.    --Nutrition consulted, appreciate their help  -- Post pyloric feeds started  --bowel regimen   --GI Prophylaxis: PPI    Renal, Fluid and Electrolytes:  # FERNANDA with anuria -- Renal consult   -- on CRRT, plan for volume removal given worsening hypoxia  --monitor urine output  --maintain K>3 and Mg>2    Infectious Disease:  # Bcx 02/28 growing GPCs 1/2 --vancomycin/zosyn x5 days for ECMO (Completed 02/28/19)  -- Blood culture with Coag neg staff in 1 vial, likely contaminate  - Rare Gram neg rods on sputum gram stain  -- Cont to hold on antibiotics pending culture/speciation  --- low threshold to restart zosyn  --daily blood cultures      Hematology: Coagulopathy related to ECMO Receiving heparin for ECMO and ASA/ticagrelor for MITA.   --cryo PRN fibrinogen < 200; FFP for INR >2  -- Transfuse plat <50  --Transfuse for Hgb<10  --heparin gtt for ECMO with ACT goal 160-180  --DVT PPX: Heparin as above   Endocrinology: No known medical history. BG elevated.  --insulin gtt PRN   Lines: R femoral arterial and venous ECMO cannulae February  23, 2019  L femoral arterial line February 23, 2019  R radial arterial line February 23, 2019  ETT February 23, 2019  Pisano catheter February 23, 2019  OG tube February 23, 2019  Restraint: PRN  Current lines are required for patient management         Code status FULL     To be discussed with Dr. Baer.      Joshua Garcia MD   CSI Service   *[phone]

## 2019-03-03 NOTE — PROCEDURES
Cardiac ICU Procedure Note  Procedure:  Bronchoscopy  Indication:  Acutely worsening hypoxia  Attending Provider:  Dr. Abraham Baer  Medications:   Versed, precedex, and cisatracurium  Time Out:  Performed    The patient's medical record has been reviewed.  The necessary history and physical examination was performed.  Emergent consent was obtained.  The details of the procedure were discussed with the patient's family after the procedure.  The proposed procedure and the patient's identification were verified prior to the procedure by the physician and the nurse.      The patient was assessed for the adequacy for the procedure and to receive medications.   Mental Status:  Intubated and sedated  Airway examination: Not visible.  No obstruction around the ETT  Pulmonary:  Diminished breath sounds  CV:  RRR  ASA thGthrthathdtheth:th th6th After clinical evaluation and reviewing the indication, risks, alternatives and benefits of the procedure the patient was deemed to be in satisfactory condition to undergo the procedure.  However, the patient was too unstable to move to a negative pressure room for the procedure.  The bronchoscopy was performed at the bedside.     Immediately before administration of medications the patient was re-assessed for adequacy to receive sedatives including the heart rate, respiratory rate, mental status, oxygen saturation, blood pressure and adequacy of pulmonary ventilation. These same parameters were continuously monitored throughout the procedure.     Maneuvers / Procedure:   The bronchoscope was inserted through the mouth via ETT.  The cords were anesthetized with lidocaine. Upper airway structures, vocal cords (anatomy and function) appear to be normal.  A complete airway examination was performed from the distal trachea to the subsegmental level in each lobe of both lungs. There were no endobronchial lesions, there were moderate amounts of thick secretions in the left lung fields, and the mucosa was  inflamed.      Aspirate samples were collected for gram stain and culture.    Abraham Baer MD, PhD  Interventional/Critical Care Cardiology  [phone]    March 3, 2019

## 2019-03-03 NOTE — PROGRESS NOTES
ECMO Shift Summary:    Patient remains on VA ECMO, all equipment is functioning and alarms are appropriately set. RPM's 3725 with flow range 4.65-4.8 L/min. Sweep gas is at 4 LPM and FiO2 100%. Circuit remains free of air, clot and fibrin. Cannulas are secure with no bleeding from site. Extremities are warm. Suctioned ETT for no secretions.    Significant Shift Events: Pt had a turndown to 2 lpm with bedside ECHO and did not tolerate. SATS low-mid 80s, flow returned to previous. SEE Fellow note for additional info.    Vent settings:  Ventilation Mode: APRV  (Airway Pressure Release Ventilation)  FiO2 (%): 60 %  Pressure Support (cm H2O): 40 cmH2O  Oxygen Concentration (%): 60 %  Resp: 15  .    Heparin is running at 1700 u/hr, ACT range 164-176.    Urine output is none, blood loss was minimal. Product given included none.      Intake/Output Summary (Last 24 hours) at 3/2/2019 1816  Last data filed at 3/2/2019 1800  Gross per 24 hour   Intake 3657.16 ml   Output 3881 ml   Net -223.84 ml       ECHO:  No results found for this or any previous visit.No results found for this or any previous visit.    CXR:  Recent Results (from the past 24 hour(s))   XR Chest Port 1 View    Narrative    Exam: XR CHEST PORT 1 VW, 3/2/2019 1:55 AM    Indication: arrest    Comparison: 3/1/2019    Findings:   AP view of the chest. Endotracheal tube tip in the lower thoracic  trachea. Intra-aortic balloon pump marker at the level of the tereso.  Unchanged ECMO cannula. Gastric and feeding tubes remain in place. The  distal end extending beyond the field-of-view.    Bilateral airspace opacities are again seen. There is been slight  interval in treatment in aeration of both upper lungs. Right greater  than left pleural effusions, unchanged. No pneumothorax. Cardiac  silhouette is unchanged. Upper abdomen is unremarkable.      Impression    Impression:   1. Bilateral airspace opacities consistent with pulmonary edema and/or  hemorrhage are again  seen. There is been slight interval improvement  in aeration of the upper lungs.  2. Right greater than left pleural effusions, unchanged.  3. Support devices as detailed above.    I have personally reviewed the examination and initial interpretation  and I agree with the findings.    AYSE ALVAREZ       Labs:  Recent Labs   Lab 03/02/19  1601 03/02/19  1419 03/02/19  1215 03/02/19  1003   PH 7.36 7.37 7.39 7.34*   PCO2 40 39 36 41   PO2 80 82 80 69*   HCO3 22 23 22 22   O2PER 60 60 70 70       Lab Results   Component Value Date    HGB 9.2 (L) 03/02/2019    PHGB 80 (H) 03/02/2019    PLT 81 (L) 03/02/2019    FIBR 785 (H) 03/02/2019    INR 1.12 03/02/2019    PTT 57 (H) 03/02/2019    DD 12.7 (H) 03/02/2019    AXA 0.24 03/02/2019    ANTCH 65 (L) 03/02/2019         Plan is to remain stable on VA ECMO.      Krista Hannon, RRT  3/2/2019 6:16 PM

## 2019-03-03 NOTE — PROGRESS NOTES
Nephrology Progress Note  03/03/2019             Today Recommendations:  - Continue with CRRT, I = O given more hypotension and increased lactate, ? Suspect he is third spacing  -      ASSESSMENT AND RECOMMENDATIONS:   Mr. Riddle is 39 year old male with PMHx of familial hyperlipidemia, no history of renal disease or nephrolithiasis who was brought to our hospital with cardiogenic shock S/P cardiac arrest with unclear time down. Patient found to have CAD icluding 3 vessels, PCI was done emergently. We were consulted for FERNANDA and fluid management.       # Anuric FERNANDA on unknown baseline kidney function:  - Patient was admitted with Crea: 1.65 post cardiac arrest, no previous Crea level in our EMR.  - FERNANDA etiology: likley due to cardiogenic shock leading to hypotension and low renal perfusion, contrast and high CK noted.  - recheck CK today- now with higher clearance, hope to see it is improving  - We added Pre-Filter to the CRRT on 2/28.   -continue CRRT , I = O  - increasing lactic acidosis, ? New infection / sepsis related.  Not tolerating volume removal, and oxygenation worsened.   - Potassium high normal due to lactic acidosis- he is on 2 K bath, continue for now  Notify us if K >5.1    # Hypotension, Volume status:  - Patient continues on pressores  - Pulmonary edema was improving, but now oxygenation worsening  - overall weight down, aim for I=O if tolerated     #Cardiogenic Shock due to VF due to severe CAD secondary to familial hypelipidemia:  - Patient had another VF episode on 2/23, PCI was done with stents placement.  - Patient on ECMO,   - Managed by cardiology, primary team.  Lactate up, venous oxyhemoglobin 60-70 , WBC up, concerned for infection     # Severe encephalopathy:  - EEG shows severe generalized slowing with low amplitude which consistent with severe diffuse encephalopathy. No seizure.   -hypernatermia reversed, decreased by 7 Eq or less per day  - Repeated CT/Head on 2/25 shows No acute  "intracranial pathology. No definite hypoxic ischemic injury.  - Managed by primary team.      Recommendations were communicated to primary team via note       Interval History :   Nursing and provider notes from last 24 hours reviewed.  Patient was seen and examined at bedside this am. Patient is still sedated, intubated, on pressors, on ECMO and CRRT, ECMO turn down this morning did not go well, tachycardia and hypoxia. Lactate up slightly. Phos still high     Review of Systems:   Not able to obtain, patient is sedated, intubated.     Physical Exam:   I/O last 3 completed shifts:  In: 4224.46 [I.V.:2324.46; NG/GT:450]  Out: 3505 [Emesis/NG output:875; Other:2630]   Temp 98.6  F (37  C)   Resp 21   Ht 1.68 m (5' 6.14\")   Wt 100.4 kg (221 lb 5.5 oz)   SpO2 (!) 80%   BMI 35.57 kg/m       GENERAL APPEARANCE: Sedated, intubated  Pulmonary: lungs clear to auscultation anteriorly.   CV: Irregular rhythm, normal rate, no rub   - JVD: not able to evaluate, patient in supine position.    - Edema trace  GI: soft,  normal bowel sounds  MS: no evidence of inflammation in joints, no muscle tenderness  : turk in place  SKIN: no rash, warm, dry, no cyanosis  NEURO: Sedated, intubated.      LABS:      I personally reviewed his labs.       Electrolytes/Renal -   Recent Labs   Lab Test 03/03/19  0944 03/03/19  0806 03/03/19  0355 03/03/19  0341 03/02/19  2236 03/02/19  1932 03/02/19  1601   NA  --   --   --  137 138  --  138   POTASSIUM  --   --   --  4.8 4.8 4.9 5.4*   CHLORIDE  --   --   --  103 103  --  104   CO2  --   --   --  20 19*  --  20   BUN  --   --   --  61* 63*  --  61*   CR  --   --   --  2.63* 2.76*  --  2.81*   * 159* 143* 136* 157*  155*  --  148*  148*   ALEKSANDER  --   --   --  9.3 8.7  --  8.4*   MAG  --   --   --  2.6* 2.8*  --  2.9*   PHOS  --   --   --  5.9* 6.5*  --  7.1*       CBC -   Recent Labs   Lab Test 03/03/19  0341 03/02/19  4556 03/02/19  1601   WBC 17.9* 17.6* 18.3*   HGB 8.6* 9.3* 9.2* "   * 96* 81*       LFTs -   Recent Labs   Lab Test 03/03/19  0341 03/02/19  2236 03/02/19  1601   ALKPHOS 86 88 89   BILITOTAL 3.1* 2.8* 3.0*   ALT 78* 86* 93*   * 153* 173*   PROTTOTAL 6.9 7.1 7.2   ALBUMIN 2.5* 2.5* 2.6*       Iron Panel - No lab results found.      Imaging:  All imaging studies reviewed by me.     Current Medications:    aspirin  81 mg Oral or Feeding Tube Daily     B and C vitamin Complex with folic acid  5 mL Per Feeding Tube Daily     pantoprazole  40 mg Oral or Feeding Tube QAM     polyethylene glycol  17 g Oral or Feeding Tube Daily     protein modular  1 packet Per Feeding Tube BID     senna-docusate  1 tablet Oral or Feeding Tube At Bedtime     sodium chloride (PF)  3 mL Intracatheter Q8H     ticagrelor  90 mg Oral or Feeding Tube BID       cisatracurium (NIMBEX) infusion ADULT 3 mcg/kg/min (03/03/19 1044)     dexmedetomidine 0.7 mcg/kg/hr (03/03/19 1021)     IV fluid REPLACEMENT ONLY       CRRT replacement solution 12.5 mL/kg/hr (03/03/19 0958)     fentaNYL 200 mcg/hr (03/03/19 1000)     heparin 2 unit/mL in 0.9% NaCl 3 mL/hr (03/03/19 1000)     HEParin 26.521 Units/kg/hr (03/03/19 1000)     insulin (regular) 3 Units/hr (03/03/19 1000)     midazolam 12 mg/hr (03/03/19 1000)     - MEDICATION INSTRUCTIONS -       norepinephrine 0.02 mcg/kg/min (03/03/19 1000)     CRRT replacement solution 200 mL/hr at 03/02/19 1816     CRRT replacement solution 12.5 mL/kg/hr (03/03/19 0958)     propofol (DIPRIVAN) infusion Stopped (03/03/19 0830)     vasopressin (PITRESSIN) infusion ADULT (40 mL) 0.5 Units/hr (03/03/19 1000)     Thuy Rajan MD   [phone]

## 2019-03-03 NOTE — PROGRESS NOTES
"Bronchoscopy Risk Assessment Guidelines      A. Patient symptoms to consider when assessing pulmonary TB risk are:    I. Cough greater than 3 weeks; and fever, hemoptysis, pleuritic chest    pain, weight loss greater than 10 lbs, night sweats, fatigue, infiltrates on    upper lobes or superior segments of lower lobes, cavitation on chest    x-ray.   B. Patient risk factors to consider when assessing pulmonary TB risk are:    I. Exposure to known TB case, foreign-born persons (within 5 years of    arrival to [place]), residence in a crowded setting (correctional facility,     long-term care center, etc.), persons with HIV or immunosuppression.    Patients with symptoms and risk factors should generally be considered \"suspect risk\" and bronchoscopies should be performed in airborne precautions.    This patient has NO KNOWN RISK of Tuberculosis (proceed with bronchoscopy)    Specimens sent: yes  Complications: None  Scope used: #[id number] Adult  Attending Physician: Dr. Buffy REICH, RT on 3/3/2019 at 12:23 PM  "

## 2019-03-03 NOTE — PROGRESS NOTES
ECMO Attending Progress Note  3/2/2019    Hellen Riddle is a 39 year old male who was cannulated for ECMO 2/23/19 due to refractory VF arrest    Interval events: No neuro recovery yet.  Respiratory status improving    Physical Exam:  Temp:  [98.1  F (36.7  C)-99.3  F (37.4  C)] 98.8  F (37.1  C)  Heart Rate:  [] 83  Resp:  [14-51] 15  MAP:  [57 mmHg-153 mmHg] 96 mmHg  Arterial Line BP: ()/() 137/61  FiO2 (%):  [40 %-100 %] 100 %  SpO2:  [85 %-100 %] 92 %    Gross per 24 hour   Intake 4196.66 ml   Output 3863 ml   Net 333.66 ml      Ventilation Mode: APRV  (Airway Pressure Release Ventilation)  FiO2 (%): 100 %  Oxygen Concentration (%): 60 %  Resp: 15     General: no acute distress, intubated and sedated  HEENT: PERRLA, conjunctiva clear, without icterus or pallor, oropharyx clear  Cardiac: RRR nl S1S2   Lungs: improving aeration  Abdomen: soft, nontender, non distended, no hepatosplenomegaly or masses  Extremities: without edema or cyanosis; pulses are dopplerable;   Skin: normal skin appearance without worrisome lesions.   Neurologic:intubated and sedated    Labs:  Recent Labs   Lab 03/03/19  0129 03/02/19 2236 03/02/19  1932 03/02/19  1601   PH 7.41 7.36 7.40 7.36   PCO2 36 40 36 40   PO2 108* 81 106* 80   HCO3 22 23 22 22   O2PER 100 60 60 60      Recent Labs   Lab 03/02/19 2236 03/02/19  1601 03/02/19  1003 03/02/19  0357   WBC 17.6* 18.3* 15.2* 12.7*   HGB 9.3* 9.2* 9.4* 9.3*     Creatinine   Date Value Ref Range Status   03/02/2019 2.76 (H) 0.66 - 1.25 mg/dL Final   03/02/2019 2.81 (H) 0.66 - 1.25 mg/dL Final   03/02/2019 2.68 (H) 0.66 - 1.25 mg/dL Final   03/02/2019 2.69 (H) 0.66 - 1.25 mg/dL Final       ECMO Issues including assessments and plan on DOS 3/2/2019:  Neuro: Sedated for mechanical ventilation and ECMO.  NIRS stable b/l  RASS goal: -3  CV: Cardiogenic shock.  Hemodynamically stable on norepi and vaso  Pulm: Flows unchanged at 4.25, Sweep unchanged at 4, Keep vent settings at rest  settings  FEN/Renal: Electrolytes stable w/ replacement protocols in place, Cr worsening now on CRRT, UOP stable  Heme: ACT goal: 180-200, Hemoglobin stable .  Goals: if O2 sat >85% Hgb >8.  If O2 Sat <85% keep Hgb 10-12.  Minimal oozing around the ECMO cannulas.  ID: Receiving empiric antibiotics  Cannulae: Position is acceptable on exam and the available imaging.  Distal perfusion cannula is in place and patent.     I have personally reviewed the ECMO flows, oxygenation and CO2 clearance, anticoagulation, and cannula position.  I have also personally assessed the patient's systemic response with hemodynamics, oxygenation, ventilation, and bleeding.       The patient requires continued ECMO support and management in the ICU.  I have discussed patient care and treatment plan with the primary team.      Abraham Baer MD, PhD  Interventional/Critical Care Cardiology  [phone]    March 2, 2019

## 2019-03-03 NOTE — PLAN OF CARE
Poor oxygenation with SpO2 80 to low 90, with pO2 49 (FiO2 100% on vent and ECMO) & MAPs >, -160 with non-sustained VT and SVT. Overbreathed the vent with rate 40-50's, affecting ECMO circuit. ECMO chugging also noted. Pt paralyzed to control respiratory rate. Flolan started, bronchoscopy @ bedside, 2 units PRBCs given chugging resolved. Propofol d/c'd due to elevated triglycerides, versed started. Pt's wife and family updated by Dr Baer.   Per Dr Baer CRRT goal net negative 100-200 ml/hr. Goal map  with ideal map of 90. Goal Sat >88%, if Sats sustained @ 87 or less call Dr Baer.    Pao Sow  3/3/2019

## 2019-03-03 NOTE — PROGRESS NOTES
ECMO [place] STUDY    Inotropes/Pressors: Norepi 0.04    Flow  BP MAP HR SpO2 SvO2 IABP  4.3L 68/50 66 69 99 75 1:1  3L 100/70 94 80 91 79 1:1  2L 135/86 110 91 82 70 1:1        LVEF improved some, ~20-25% on 4.3L, up to 30-35% on 2L    Turndown terminated due to hypoxia     Joshua Garcia MD  Cardiology Fellow  [phone]    March 3, 2019

## 2019-03-03 NOTE — PROCEDURES
Procedure Date: 03/02/2019      A 24-HOUR VIDEO ELECTROENCEPHALOGRAM      EEG #-8       DATE OF RECORDING/SERVICE DATE:  03/02/2019; this is day 8 of 24-hour video EEG.       SOURCE FILE DURATION:  23 hours 51 minutes 41 seconds.     CLINICAL SUMMARY:  The patient is a 39-year-old male who was admitted after cardiac arrest with subsequent intubation and sedation and being placed on hypothermia protocol.  EEG was performed to evaluate for seizures and part of hypothermia protocol.       MEDICATIONS:  Fentanyl drip 100 mcg per hour, propofol drip 20-40 mcg/kg.     TECHNICAL SUMMARY: This continuous video- EEG monitoring procedure was performed with 23 scalp electrodes in 10-20 electrode system placement, and additional scalp, precordial and other surface electrodes used for electrical referencing and artifact detection.  Video monitoring was utilized and periodically reviewed by EEG technologists and the physician for electroclinical correlations.     INTERICTAL ACTIVITY:  No posterior dominant rhythm was appreciated.  No sleep-wake cycling was appreciated.  There was significant decrease in voltages of electrocerebral activities.  There was diffuse very low-amplitude theta-delta slowing with amplitudes up to 5 microvolts and occasionally up to 10 microvolts.  There were also intermittent periods of severe generalized suppression.  Stimulation was performed with auditory, tactile, and painful stimulation, which did not induce any clinical response or electrographic changes.  No epileptiform discharges were present.       CLINICAL/ICTAL EVENTS:  No electrographic or clinical seizures were recorded.      IMPRESSION:  This is an abnormal video EEG due to the presence of severe global cerebral dysfunction.  Sedative medications can somewhat contribute to this pattern.  No epileptiform discharges, electrographic seizures, or paroxysmal behavioral events were recorded.         INA ALEXANDER MD             D:  2019   T: 2019   MT: LAUREN      Name:     LIONEL CARTY   MRN:      [id number]        Account:        [id number]   :      1980           Procedure Date: 2019      Document: [id number]

## 2019-03-03 NOTE — PLAN OF CARE
D: PaO2 in 50's with prolonged sats in the 80's; MD's notified ECMO flows increased to 4.5, FiO2 increased and sedation increased. Not follow commands; cough w/ sxn.  VA ECMO, IABP. Vaso/levo gtt's titrated to map >60.  CRRT goal changed to I=O due to increase in lactate to 2.7.  I: As above. Hygiene care provided. Family updated re: status.  A: Stable but critical.  P: Continue to monitor. Notify MD of changes/concerns.     Pao Sow  3/2/2019

## 2019-03-03 NOTE — PROGRESS NOTES
CRRT STATUS NOTE    DATA:  Time:  4:26 AM    Pressures WNL:  YES  Filter Status:  WNL    Problems Reported/Alarms Noted:  None (mat is under CRRT machine and hair ties are being used on the CRRT solutions).    Supplies Present:  YES    ASSESSMENT:  Patient Net Fluid Balance:  03/02/19 I/O= -588.59cc (NG zpctvq=061xp)    Vital Signs: 0400 temp=98.8 (esophageal), HR=72, RR=15, QL=428/75 (MAP=90), SPO2=94% on FIO2 80% via vent.      Labs:  0400 Cf=743, K=4.8 (pt is on a 2K bath), Cr=2.663 (trending down) , Mg=2.6, Phos=5.9, I Ca=5.0, LA=2.8, WBC=17.9 (trending up from 12.7 from yesterday)    Goals of Therapy: I=O    INTERVENTIONS:   Patient is on ECMO  Levophed gtt running and vasopressin gtt weaned off    PLAN:  Continue to monitor.  Changed CRRT set q72hrs and PRN.  Call CRRT RN at [phone] with questions.  See flowsheets for details.

## 2019-03-03 NOTE — PROGRESS NOTES
ECMO Shift Summary:    Patient remains on VA ECMO, all equipment is functioning and alarms are appropriately set. RPM's 3360 with flow range 4.0 L/min. Sweep gas is at 4 LPM and FiO2 100%. Circuit remains free of air, clot and fibrin. Cannulas are secure with no bleeding from site. Extremities are warm to the touch. Suctioned ETT for small amount of thick yellow secretions.    Significant Shift Events: No significant events over night    Vent settings:  Ventilation Mode: APRV  (Airway Pressure Release Ventilation)  FiO2 (%): 80 %  Oxygen Concentration (%): 100 %  Resp: 15  .    Heparin is running at 1700 u/kg/hr, ACT range 180.    Urine output is minimal, patient remains on CRRT, no blood loss. Products given included 250 of albumin.      Intake/Output Summary (Last 24 hours) at 3/3/2019 0636  Last data filed at 3/3/2019 0600  Gross per 24 hour   Intake 4167.66 ml   Output 2767 ml   Net 1400.66 ml       ECHO:  No results found for this or any previous visit.No results found for this or any previous visit.    CXR:  No results found for this or any previous visit (from the past 24 hour(s)).    Labs:  Recent Labs   Lab 03/03/19  0355 03/03/19  0129 03/02/19  2236 03/02/19  1932   PH 7.40 7.41 7.36 7.40   PCO2 37 36 40 36   PO2 59* 108* 81 106*   HCO3 23 22 23 22   O2PER 80 100 60 60       Lab Results   Component Value Date    HGB 8.6 (L) 03/03/2019    PHGB 160 (H) 03/03/2019     (L) 03/03/2019    FIBR 772 (H) 03/03/2019    INR 1.11 03/03/2019    PTT 80 (H) 03/03/2019    DD 8.7 (H) 03/03/2019    AXA 0.36 03/03/2019    ANTCH 65 (L) 03/02/2019         Plan is to continue managing patient on ECMO.      Guillermo Menjivar, RRT  3/3/2019 6:36 AM

## 2019-03-03 NOTE — PROGRESS NOTES
ECMO Attending Progress Note  3/3/2019    Hellen Riddle is a 39 year old male who was cannulated for ECMO 2/23/19 due to refractory VF arrest    Interval events: Increasingly hypoxic with improved cardiac recovery    Physical Exam:  Temp:  [98.2  F (36.8  C)-99.3  F (37.4  C)] 99.3  F (37.4  C)  Heart Rate:  [] 105  Resp:  [15-44] 15  MAP:  [57 mmHg-106 mmHg] 70 mmHg  Arterial Line BP: ()/(41-75) 91/52  FiO2 (%):  [60 %-100 %] 100 %  SpO2:  [80 %-100 %] 88 %    Gross per 24 hour   Intake 4196.66 ml   Output 3863 ml   Net 333.66 ml      Ventilation Mode: APRV  (Airway Pressure Release Ventilation)  FiO2 (%): 100 %  Oxygen Concentration (%): 100 %  Resp: 15     General: no acute distress, intubated and sedated  HEENT: PERRLA, conjunctiva clear, without icterus or pallor, oropharyx clear  Cardiac: RRR nl S1S2   Lungs: improving aeration  Abdomen: soft, nontender, non distended, no hepatosplenomegaly or masses  Extremities: without edema or cyanosis; pulses are dopplerable;   Skin: normal skin appearance without worrisome lesions.   Neurologic:intubated and sedated    Labs:  Recent Labs   Lab 03/03/19  1424 03/03/19  1211 03/03/19  1110 03/03/19  0944   PH 7.40 7.38 7.31* 7.35   PCO2 37 37 50* 43   PO2 77* 79* 49* 56*   HCO3 23 22 25 24   O2PER 100 100 100.0 100      Recent Labs   Lab 03/03/19  1211 03/03/19  0341 03/02/19  2236 03/02/19  1601   WBC 23.0* 17.9* 17.6* 18.3*   HGB 9.2* 8.6* 9.3* 9.2*     Creatinine   Date Value Ref Range Status   03/03/2019 2.48 (H) 0.66 - 1.25 mg/dL Final   03/03/2019 2.63 (H) 0.66 - 1.25 mg/dL Final   03/02/2019 2.76 (H) 0.66 - 1.25 mg/dL Final   03/02/2019 2.81 (H) 0.66 - 1.25 mg/dL Final       ECMO Issues including assessments and plan on DOS 3/3/2019:  Neuro: Sedated for mechanical ventilation and ECMO.  NIRS stable b/l  RASS goal: -3  CV: Cardiogenic shock.  Hemodynamically stable on norepi and vaso  Pulm: Flows unchanged at 4, Sweep unchanged at 4, Keep vent settings at  rest settings  FEN/Renal: Electrolytes stable w/ replacement protocols in place, Cr worsening now on CRRT, UOP stable  Heme: ACT goal: 180-200, Hemoglobin stable .  Goals: if O2 sat >85% Hgb >8.  If O2 Sat <85% keep Hgb 10-12.  Minimal oozing around the ECMO cannulas.  ID: Receiving empiric antibiotics  Cannulae: Position is acceptable on exam and the available imaging.  Distal perfusion cannula is in place and patent.     I have personally reviewed the ECMO flows, oxygenation and CO2 clearance, anticoagulation, and cannula position.  I have also personally assessed the patient's systemic response with hemodynamics, oxygenation, ventilation, and bleeding.       The patient requires continued ECMO support and management in the ICU.  I have discussed patient care and treatment plan with the primary team.      Abraham Baer MD, PhD  Interventional/Critical Care Cardiology  [phone]    March 3, 2019

## 2019-03-04 NOTE — PLAN OF CARE
No blood products. VA ECMO with flows ~3, sweep decreased to 3 from 6, 100% oxygen, no changes to speed, femoral site CDI, ABG oxygenation trending slowly upward. IABP 1:1. Patient continued on CRRT fluid removal within ordered goal 100-200 net negative per hour.  Febrile, on CRRT, ECMO warmer decreased to 36. BIS monitor readings with strong signal 38-61, versed, fentanyl, and nimbex continued.EEG in place.  Doppler pulses. MAPs , ideal goal ~90 per orders and report, levophed continued. Remains 100% on both ventilator and ECMO, SPO2 dips to mid 80 with activity, rebounds to low 90's, ABG's pO2's trending to almost normal range. Abdomen distended, loose stool 300 overnight, tube feeds as ordered, 350 out of OG. Heparin titrated per ACT's. Resumed zosyn overnight. Patient will continue to be monitored and assessed. Please see MAR, flow sheets, and remainder of chart for further details. .

## 2019-03-04 NOTE — PROGRESS NOTES
Nephrology Progress Note  03/04/2019             Today Recommendations:  - Per primary team, to continue fluid removal net negative~100 ml/hour.    - Potassium high normal due to lactic acidosis, he is on K2 bath, continue for now, please Notify us if K >5.1     ASSESSMENT AND RECOMMENDATIONS:   Mr. Riddle is 39 year old male with PMHx of familial hyperlipidemia, no history of renal disease or nephrolithiasis who was brought to our hospital with cardiogenic shock S/P cardiac arrest with unclear time down. Patient found to have CAD icluding 3 vessels, PCI was done emergently. We were consulted for FERNANDA and fluid management.       # Anuric FERNANAD on unknown baseline kidney function:  - Patient was admitted with Crea: 1.65 post cardiac arrest, no previous Crea level in our EMR.  - FERNANDA etiology: likley due to cardiogenic shock leading to hypotension and low renal perfusion, contrast and high CK noted.  - We added Pre-Filter to the CRRT on 2/28.   -continue CRRT, fluid removal net negative~100 ml/hour per primary team request. Patient on one pressor, elevated lactic acid so we prefer to keep the patient I=O.   - increasing lactic acidosis, possible due to New infection / sepsis related vs Not tolerating volume removal, and oxygenation worsened.   - Potassium high normal due to lactic acidosis, he is on K2 bath, continue for now, please Notify us if K >5.1    # Hypotension, Volume status:  - Patient continues on pressor, this morning was on Levophed.   - Pulmonary edema was improving, but now oxygenation worsening  - overall weight down, we recommend for I=O if tolerated     #Cardiogenic Shock due to VF due to severe CAD secondary to familial hypelipidemia:  - Patient had another VF episode on 2/23, PCI was done with stents placement.  - Patient on ECMO,   - Managed by cardiology, primary team.  - Lactate up, venous oxyhemoglobin 60-70 , WBC up, concerned for infection       # Severe encephalopathy:  - EEG shows severe  "generalized slowing with low amplitude which consistent with severe diffuse encephalopathy. No seizure.   -hypernatermia reversed, decreased by 7 Eq or less per day  - Repeated CT/Head on 2/25 shows No acute intracranial pathology. No definite hypoxic ischemic injury.  - Managed by primary team.      Recommendations were communicated to primary team via note    Patient was Seen and discussed with Dr. Norris Munoz M.D.  Nephrology Fellow  Pager: [phone]    Interval History :   Nursing and provider notes from last 24 hours reviewed.  Patient was seen and examined at bedside this am. Patient is still sedated, intubated, on pressors, on ECMO and CRRT, paralyzed. ECMO and sedation turned down this morning did not go well, tachycardia and hypoxia. Lactate is up, acidosis is getting worse. Phos still high. Poor prognosis.      Review of Systems:   Not able to obtain, patient is sedated, intubated.     Physical Exam:   I/O last 3 completed shifts:  In: 4047.74 [I.V.:1467.74; Other:30; NG/GT:450]  Out: 5825 [Emesis/NG output:600; Other:4925; Stool:300]   Temp 98.2  F (36.8  C)   Resp 15   Ht 1.68 m (5' 6.14\")   Wt 98.4 kg (216 lb 14.9 oz)   SpO2 98%   BMI 34.86 kg/m       GENERAL APPEARANCE: Sedated, intubated  Pulmonary: lungs clear to auscultation anteriorly.   CV: Irregular rhythm, normal rate, no rub   - JVD: not able to evaluate, patient in supine position.    - Edema trace  GI: soft,  normal bowel sounds  MS: no evidence of inflammation in joints, no muscle tenderness  : turk in place  SKIN: no rash, warm, dry, no cyanosis  NEURO: Sedated, intubated.      LABS:      I personally reviewed his labs.       Electrolytes/Renal -   Recent Labs   Lab Test 03/04/19  0357 03/03/19  2151 03/03/19  1816 03/03/19  1549    134  --  136   POTASSIUM 4.6 4.8  --  4.8   CHLORIDE 98 99  --  101   CO2 18* 22  --  23   BUN 66* 61*  --  60*   CR 2.59* 2.42*  --  2.42*   *  153* 148*  151* 140* 149* "  154*   ALEKSANDER 9.6 9.4  --  9.3   MAG 2.7* 2.7*  --  2.6*   PHOS 4.8* 4.7*  --  5.0*       CBC -   Recent Labs   Lab Test 03/04/19  0357 03/03/19  2151 03/03/19  1549   WBC 28.0* 27.4* 26.0*   HGB 10.9* 10.9* 10.4*    140* 116*       LFTs -   Recent Labs   Lab Test 03/04/19  0357 03/03/19  2151 03/03/19  1549   ALKPHOS 107 103 100   BILITOTAL 3.5* 3.2* 3.1*   ALT 68 76* 76*   * 116* 121*   PROTTOTAL 7.5 7.8 7.3   ALBUMIN 2.6* 2.6* 2.5*       Iron Panel - No lab results found.      Imaging:  All imaging studies reviewed by me.     Current Medications:    artificial tears   Both Eyes Q8H     aspirin  81 mg Oral or Feeding Tube Daily     B and C vitamin Complex with folic acid  5 mL Per Feeding Tube Daily     pantoprazole  40 mg Oral or Feeding Tube QAM     piperacillin-tazobactam  4.5 g Intravenous Q6H     polyethylene glycol  17 g Oral or Feeding Tube Daily     protein modular  1 packet Per Feeding Tube BID     senna-docusate  1 tablet Oral or Feeding Tube At Bedtime     sodium chloride (PF)  3 mL Intracatheter Q8H     ticagrelor  90 mg Oral or Feeding Tube BID       cisatracurium (NIMBEX) infusion ADULT 1 mcg/kg/min (03/04/19 0600)     dexmedetomidine Stopped (03/03/19 1600)     IV fluid REPLACEMENT ONLY       CRRT replacement solution 12.5 mL/kg/hr (03/04/19 0617)     epoprostenol (VELETRI) 20 mcg/mL in sterile water inhalation solution 20 ng/kg/min (03/04/19 0757)     fentaNYL 200 mcg/hr (03/04/19 0600)     heparin 2 unit/mL in 0.9% NaCl 3 mL/hr (03/04/19 0600)     HEParin 1,300 Units/hr (03/04/19 0800)     insulin (regular) 4 Units/hr (03/04/19 0600)     midazolam 14 mg/hr (03/04/19 0600)     - MEDICATION INSTRUCTIONS -       norepinephrine 0.05 mcg/kg/min (03/04/19 0600)     CRRT replacement solution 200 mL/hr at 03/04/19 0212     CRRT replacement solution 12.5 mL/kg/hr (03/04/19 0617)     vasopressin (PITRESSIN) infusion ADULT (40 mL) 0.5 Units/hr (03/03/19 1600)     Natalia Munoz MD    Nephrology Fellow  Pager: [phone]      I, Dante Pisano, saw this patient on 03/04/2019 with Dr. Bibi Feliz and agree with the findings and plan of care as documented in the note.

## 2019-03-04 NOTE — PROGRESS NOTES
CRRT RESTART NOTE    Reason for Restart:  Fluid loss/gain limit met   Error Code:  NA    Patient s Vascular Access: via ECMO circuit     DATA:  Procedure:  CVVHDF  Start Time: 0235  Machine#:  7  Filter: M150  Blood Flow:   180 mL/min  Pre-Replacement Solution:  PrismaSol BGK 2/3.5  Post-Replacement Solution:  PrismaSol BGK 2/3.5  Dialysate Solution:  PrismaSol BGK 2/3.5  Pre-Replacement Solution Rate: 1300 mL/hr  Post-Replacement Solution Rate:  200 mL/hr  Dialysate Flow Rate:  1300 mL/hr  Patient Removal Rate:  200  ML/hr as discussed with bedside RN  Anticoagulation Type and Rate:  NA    ASSESSMENT:  How Patient Tolerated Restart:  well  Vital Signs:  Vitals reviewed.  ECMO, IABP, levophed drip.  T 100.2 (esophageal), HR 98 and SR with IABP, /59 with MAP 89, O2 sats 96% on FiO2 100% on APRV vent settings  Initial Pressures:  Access:  -10  Filter:  205  Return:  139  TMP:  86  Change in Filter Pressure:  28        INTERVENTIONS:  Therapy via ECMO circuit    PLAN:  Continue fluid removal as tolerated per plan of care.  Restart every 72 hours and PRN.  Please contact CRRT resource RN at [phone] with questions and/or concerns.

## 2019-03-04 NOTE — PROGRESS NOTES
CLINICAL NUTRITION SERVICES - REASSESSMENT NOTE     Nutrition Prescription    Malnutrition Status:    Pt does not meet criteria    Recommendations already ordered by Registered Dietitian (RD):  1. Continue Impact Peptide @ 50 ml/hr (1200 ml/day) to provide 1800 kcals, 113 g PRO, 924 ml free H2O, 77 g Fat (50% from MCTs), 168 g CHO and no Fiber daily.     2. Continue ProSource 1 pkt BID (80 kcal, 44 g PRO) for total 1880 kcal (26 kcal/kg) and 135 g PRO (1.8 g/kg)     Future/Additional Recommendations:  If/when able to come off ECMO support and meets criteria for indirect calorimetry, recommend obtain metabolic cart study to assess approp of energy provisions to avoid over/under feeding.     EVALUATION OF THE PROGRESS TOWARD GOALS   Diet: NPO  Enteral Access: NDT placed 2/25 by RD + bridle  Nutrition Support: Impact Peptide 1.5 @ 50 mL/hr +  ProSource 1 pkt BID for total 1880 kcal (25 kcal/kg) and 135 g PRO (1.8 g/kg)   Intake: TF started 2/25 and reached goal rate on 2/27. Pt received goal TFs x past 4 days.      NEW FINDINGS   -Resp/CV: remains on VA ECMO  -Wt: Wt trended down to 98.4 kg from 107.9 kg on admit (down 8.8%). New dosing wt 73 kg.   -GI: Having BMs, x 1 yesterday, x 2 on 3/2, x7 on 3/1. On bowel regimen (has been held by RN per MAR), however pt did receive miralax this AM  -Skin: Kirby 10. Bridle position inspected. Appears appropriate with no pressure to nare, no skin breakdown present. DTPI under EEG leads on forehead per WOC 3/1.   -Labs: Lactic acid 4.9 (increasing);   -Renal: CRRT, K+ 4.6 and phos 4.8.  On nephronex    Updated ASSESSED NUTRITION NEEDS per 73 kg dosing wt  Estimated Energy Needs: 1460 - 1825 kcals/day (20 - 25 kcals/kg)  Justification: Obese and Vented  Estimated Protein Needs: 110 - 146 grams protein/day (1.5 - 2 grams of pro/kg)  Justification: Hypercatabolism with critical illness and Obesity guidelines    MALNUTRITION  % Intake: No decreased intake noted  % Weight Loss:  > 2% in 1 week (severe)  Subcutaneous Fat Loss: None observed  Muscle Loss: None observed  Fluid Accumulation/Edema: None noted  Malnutrition Diagnosis: Patient does not meet two of the above criteria necessary for diagnosing malnutrition    Previous Goals   Total avg nutritional intake to meet a minimum of 20 kcal/kg and 1.5 g PRO/kg daily (per dosing wt 75 kg).  Evaluation: Met    Previous Nutrition Diagnosis  Inadequate protein-energy intake related to NPO on ventilator as evidenced by no TF started yet, approaching 48 hours on ventilator    Evaluation: Resolved    CURRENT NUTRITION DIAGNOSIS  Predicted inadequate nutrient intake (kcal/PRO) related to reliance on TF to meet needs, unable to obtain metabolic cart study as evidenced by potential for TF interruptions, changes to metabolic needs      INTERVENTIONS  Implementation  Enteral Nutrition - No change    Goals  Total avg nutritional intake to meet a minimum of 20 kcal/kg and 1.5 g PRO/kg daily (per dosing wt 73 kg).    Monitoring/Evaluation  Progress toward goals will be monitored and evaluated per protocol.    Sudha Winters, RD, LD, CNSC  CVICU Dietitian  Pager: [phone]

## 2019-03-04 NOTE — PROGRESS NOTES
CRRT STATUS NOTE    DATA:  Time:  5:52 PM  Pressures WNL:  YES  Filter Status:  WDL    Problems Reported/Alarms Noted:  none    Supplies Present:  YES    ASSESSMENT:  Patient Net Fluid Balance:  Net negative 2L  Vital Signs:  97.3, 93, 16, 86/49, 96%  Labs:  K 5.5, ICA 4.8, Hgb 11.2, Plt 199  Goals of Therapy:  100-200/hr net negative if BP tolerates, if MAPs<60 or CVP <8, please match I=O    INTERVENTIONS:   None needed.  Pt will need to be restarted tonight after returns from procedure.    PLAN:  Continue to pull as able.  Restart after procedure.

## 2019-03-04 NOTE — PROCEDURES
[facility] EEG #-9 (Day 9 of Video-EEG Monitoring)    DATE OF RECORDIN2019    DURATION OF RECORDIN hours, 51 minutes.    CLINICAL SUMMARY: This diagnostic video-EEG monitoring procedure was performed in evaluation of encephalopathy following cardiac arrest in [facility].  The patient was reported to be receiving infusions of fentanyl, midazolam, dexmedetomidine and propofol on this day of monitoring.    TECHNICAL SUMMARY:  This continuous EEG monitoring procedure was performed with 23 scalp electrodes in 10-20 system placements, and additional scalp, precordial and other surface electrodes used for electrical referencing and artifact detection.  Video monitoring was utilized and periodically reviewed by EEG technologists and the physician for electroclinical correlation.     EEG ACTIVITIES DURING COMA:  During ongoing sedated coma, there was no evidence of wake-sleep cycling behaviorally or electrographically. There was ongoing low amplitude generalized delta slowing intermixed with superimposed faster theta and alpha frequencies (maximum amplitudes in the paracentral and central regions). There was no focal slowing.  At 1019 cutaneous and verbal stimulation did not yield any electrographic or behavioral response.      There were no interictal epileptiform abnormalities and no electrographic seizures.     SUMMARY OF DAY 9 OF VIDEO-EEG MONITORING:    The interictal EEG recording during sedated coma was abnormal due to generalized low amplitude delta slowing intermixed with faster frequencies.    No interictal epileptiform abnormalities and no electrographic seizures were seen.    These findings indicate severe diffuse encephalopathy.  Clinical correlation is recommended.  Dictated By: Ulises Galvan DO, Clinical Neurophysiology Fellow   I agree with the findings as reported.  I personally reviewed this video-EEG recording.    Jose Roberto Li M.D., Professor of Neurology       D: 2019    T: 2019   MT: [place]      Name:     LIONEL CARTY   MRN:      [id number]        Account:        [id number]   :      1980           Procedure Date: 2019      Document: [id number]

## 2019-03-04 NOTE — PROGRESS NOTES
ECMO Shift Summary:    Patient remains on VA ECMO, all equipment is functioning and alarms are appropriately set. RPM's 2900 with flow range 3.0 L/min. Sweep gas is at 3 LPM and FiO2 100%. Circuit remains free of air, clot and fibrin. Cannulas are secure with no bleeding from site. Extremities are warm to the touch. Suctioned ETT for small amount of secretions.    Significant Shift Events: No significant events over night    Vent settings:  Ventilation Mode: APRV  (Airway Pressure Release Ventilation)  FiO2 (%): 100 %  Oxygen Concentration (%): 100 %  Resp: 15  .    Heparin is running at 1200 u/kg/hr, ACT range 160.    Patient remains anuric on CRRT, no blood loss, no products given.      Intake/Output Summary (Last 24 hours) at 3/4/2019 0646  Last data filed at 3/4/2019 0600  Gross per 24 hour   Intake 4047.74 ml   Output 5825 ml   Net -1777.26 ml       ECHO:  No results found for this or any previous visit.No results found for this or any previous visit.    CXR:  Recent Results (from the past 24 hour(s))   XR Chest Port 1 View    Narrative    Exam: XR CHEST PORT 1 VW 3/3/2019 10:03 AM    History: ECMO, intubated    Comparison: 3/2/2019    Findings: Single AP view of the chest. Inferior approach ECMO cannula  projects over the right atrium. Endotracheal tube is in appropriate  position, stable. Feeding tube and gastric tubes extend below the  diaphragm, tip not included the field-of-view. Stable position of the  intra-aortic balloon pump superior metallic marker. Cardia mediastinal  silhouette is stable, slightly obscured. Lung volumes are normal.  Diffuse pulmonary opacities, slightly improved since prior. Aeration  in both lungs, especially pronounced superiorly. Small area pleural  effusions, unchanged. No pneumothorax. Visualized upper abdomen is  unremarkable. No acute bony abnormality.      Impression    Impression:   1. Inferior approach ECMO cannula projects over the right atrium,  stable. Additional lines  and tubes as above.  2. Diffuse pulmonary opacities, slightly improved since prior.  Improving aeration, especially pronounced in the upper lungs.  3. Small layering pleural effusions are again seen.    ASHLEIGH CHAN MD       Labs:  Recent Labs   Lab 03/04/19  0605 03/04/19  0357 03/04/19  0236 03/03/19  2351   PH 7.42 7.42 7.42 7.44   PCO2 34* 34* 34* 33*   PO2 70* 75* 69* 60*   HCO3 22 22 22 22   O2PER 100.0 100.0 100.0 100.0       Lab Results   Component Value Date    HGB 10.9 (L) 03/04/2019    PHGB 70 (H) 03/04/2019     03/04/2019    FIBR 867 (H) 03/04/2019    INR 1.13 03/04/2019    PTT 48 (H) 03/04/2019    DD 13.9 (H) 03/04/2019    AXA 0.21 03/04/2019    ANTCH 64 (L) 03/03/2019         Plan is to continue managing patient on ECMO.      Guillermo Menjivar, RRT  3/4/2019 6:46 AM

## 2019-03-04 NOTE — PROGRESS NOTES
Cardiology Progress Note    Overnight/subj:   - Bronch yesterday   - Improved oxygenation overnight (92-94%)  - Rising lactic acid     VS reviewed: Notable for temp: 99.9, HR , MAP 77-94, oxygenating on 88-97%  Vent: APRV, 100% fio2    IABP via LFA, 1:1, triggered by ECG, augmented MAPs 102-122, pulses NIRS stable     Wt: on admit 107.9, yday 100, today 98.4  I/O: 4.7 in / 4.8 out yday. Since MN 1.0 in / 2.1 out    Drips:   NE 0.05    Dex  Cis 1.0  Mida 14  Fent 200    Hep 1200    PO Cardiac Meds: ASA 81mg daily, ticagrwelor 90mg BID  Other meds: MVI, zosny, senna, miralax    Radiology Imaging  CXR: R>L interstial opacities (improved compared to last week). Lines stable    Cardiology Studies:  TTE reviewed     Labs: Reviewed in epic  LAtic acid 4.9  Marked leukocytosis at 28 stable     Phys exam:  GEN: NAD  Pulm: improved aeration b/l  Cardiac: distant heart sounds  Vascular: NIRS stable   GI: soft, non distended    ASSESSMENT/PLAN:  Mr. Riddle is a 38yo M with HLD and Fhx CAD/MI who presented with refractory VF OHCA brought directly to the CCL now s/p peripheral VA ECMO placement. Found to have severe 3v disease s/p MITA to mRCA and mLcx. Also with IABP placement for presumed pulmonary edema. Being managed as part post VF protocol.  Post arrest day 9.     Neurology:  # CT head day3: minimal edema - Continue EEG- no e/o seizures   - Intubated, sedated   -continue fentanyl  - Started Precedex, wean propofol as TGs are increasing  - No longer on hypertonic saline, targeting normal serum Na   Cardiovascular / Hemodynamics:  # Refractory VF arrest.    #CAD s/p MITA  mRCA and mCx  # Torsades (likely relate Qtc >600, corrected)  # Cardiogenic Shock requiring ECMO support - continue vaso and NE as needed  --continue ASA 81mg and ticagrelor 90mg BID  --ACT goal 180-200  --hold lipitor for now given likely hepatic injury during arrest  --hold ACE/ARB for now given likely reduced renal fxn after arrest  --holding beta blocker  given shock    Pulmonary:  # Complete opacification of both lungs c/f ARDS, severe pulm edema vs less likely PAH   # Resp acidosis (currected) -- Worsening hypoxia due to cardiac recovery. May need conversion to VAV or VV ECMO  -- Continue APRV- better oxygenation  -- Paralytics prn   -- Favor decreasing ECMO circuit to prioritize CRRT volume removal  -- ETT stable   -- Q2h ABGs for now  -- consider scheduled duonebs if signs of lung dz, currently PRN  -- Continue to monitor lung opacities       GI and Nutrition:   Shock Liver (recovering) No known medical hx.    --Nutrition consulted, appreciate their help  -- Post pyloric feeds started  --bowel regimen   --GI Prophylaxis: PPI    Renal, Fluid and Electrolytes:  # FERNANDA with anuria -- Renal consult   -- on CRRT, plan for volume removal given worsening hypoxia  --monitor urine output  --maintain K>3 and Mg>2    Infectious Disease:  # Marked Leukocytosis   # Scx: E. Coli (sensative to zosyn)  # Bcx 02/28 growing GPCs 1/2 (likely contaminant) --vancomycin/zosyn x5 days for ECMO (Completed 02/28/19)  -- continue zosyn  -- daily blood cultures      Hematology: Coagulopathy related to ECMO Receiving heparin for ECMO and ASA/ticagrelor for MITA.   --cryo PRN fibrinogen < 200; FFP for INR >2  -- Transfuse plat <50  --Transfuse for Hgb<10  --heparin gtt for ECMO with ACT goal 160-180  --DVT PPX: Heparin as above   Endocrinology: No known medical history. BG elevated.  --insulin gtt PRN   Lines: R femoral arterial and venous ECMO cannulae February 23, 2019  L femoral arterial line February 23, 2019  R radial arterial line February 23, 2019  ETT February 23, 2019  Pisano catheter February 23, 2019  OG tube February 23, 2019  Restraint: PRN  Current lines are required for patient management         Code status FULL     To be discussed with Dr. Humphreys.     Theron Hebert MD  Cardiology Fellow- I Service  *[phone]    Critical Care Services Progress Note:     Hellen Meza  critically ill with hypotension and shock     I personally examined and evaluated the patient today.   The patient s prognosis today is grave  I have evaluated all laboratory values and imaging studies from the past 24 hours.  Key findings and decisions made today included continue with hemodynamic support  I personally managed the ventilator, sedation, pain control and analgesia, nutritional status and vasoactive medications.   Consults ongoing and ordered are none  Procedures that will happen today are: none  All treatments were placed at my direction.  I formulated today s plan with the house staff team or resident(s) and agree with the findings and plan in the associated note.       The above plans and care have been discussed with no one and all questions and concerns were addressed.     I spent a total of 45 minutes (excluding procedure time) personally providing and directing critical care services at the bedside and on the critical care unit for Hellen Do. Oleksandr Humphreys

## 2019-03-04 NOTE — PROGRESS NOTES
CRRT STATUS NOTE    DATA:  Time:  6:51 AM  Pressures WNL: YES  Filter Status:  WDL    Problems Reported/Alarms Noted: none    Supplies Present: YES    ASSESSMENT:  Patient Net Fluid Balance:  Net negative 124 3/3, net negative 1020 since midnight. Net negative 9344 since admission  Vital Signs:  Vitals reviewed.  T 98-99, RR 15 on vent support, O2 sats 80-90% on Fi O2 100%,   Labs:  Labs reviewed.  W 28, hbg 10.9,   Goals of Therapy: 100-200 ml/hr net negative if bp tolerates. MAP ,60 and/or cvp <8 please match I=O    INTERVENTIONS:   Restart     PLAN:  Fluid removal as tolerated per plan of care.  Restart every 72 hours and PRN.  Please call CRRT resource RN at [phone] with questions and/or concerns

## 2019-03-04 NOTE — CONSULTS
[facility] ID SERVICE: NEW CONSULTATION  Patient:  Hellen Riddle, Date of birth 1980, Medical record number [id number]  Date of Admission: 2/23/2019  Date of Visit:  3/4/2019  Requesting Provider: Abraham Baer         Assessment and Recommendations:   Problem List:  1. Severe leukocytosis with increase from 12 on 3/2--> 41 on 3/4  2. Pulmonary edema vs. pneumonia - sputum with E coli, repeat pending  3. Recent out of hospital cardiac arrest, now on ECMO with IABP in place  4. FERNANDA on CRRT  5. Shock liver - improving  6. Hypotension and elevated lactate (now up to 7)  7. New diarrhea    Discussion:  Mr. Riddle has had a rapidly worsening leukocytosis over the last 2 days. In the setting of loose stools, we will check C diff. No other obvious new source of infection but if his C diff is negative, we will likely broaden antibiotics in the setting of increasing lactate and pending sputum cultures.     Recommendations:  Check C diff -  results anticipated around 6:30 PM. I will follow these up and call team with additional recs pending results.     Recommendations discussed with cardiology team.    Thank you for this consult. The [facility] ID team will continue to follow this patient. Please feel free to call with any questions.     Johanna Silva MD  Infectious Diseases  [phone]        History of Present Illness:   Hellen Riddle  is a 38 yo man with hyperlipidemia who had a witnessed out of hospital cardiac arrest and was admitted on 2/23/19. He is now on ECMO for refractory V fib as well as CRRT. He also had possible ARDS and has been on 100% FiO2 with recent slight improvement in oxygenation.  He has ahd persistently elevated CRP and WBC since admission, but appeared to be improving around 2/28 when his WBC was 9.7 and lactate was 1.5.     However, then on 3/1 his WBC started to rise steadily from 11.7 to 17 to 27 to 41 today. He also has had increasingly elevated lactates with increase from 1.5 to 7.0 today. At the  "same time, nursing has noted more loose stools in the last few days.          Review of Systems:   Intubated and sedated - unable to obtain.        Past Medical History:     Past Medical History:   Diagnosis Date     Dyslipidemia        Allergies:    No Known Allergies        Recent Antimicrobials::   Pip/tazo 2/23-present  Vancomycin 2/23-3/2       Family History:   Reviewed and non-contributory.        Social History:     Social History     Socioeconomic History     Marital status: Single     Spouse name: Not on file     Number of children: Not on file     Years of education: Not on file     Highest education level: Not on file   Occupational History     Not on file   Social Needs     Financial resource strain: Not on file     Food insecurity:     Worry: Not on file     Inability: Not on file     Transportation needs:     Medical: Not on file     Non-medical: Not on file   Tobacco Use     Smoking status: Not on file   Substance and Sexual Activity     Alcohol use: Not on file     Drug use: Not on file     Sexual activity: Not on file   Lifestyle     Physical activity:     Days per week: Not on file     Minutes per session: Not on file     Stress: Not on file   Relationships     Social connections:     Talks on phone: Not on file     Gets together: Not on file     Attends Anabaptism service: Not on file     Active member of club or organization: Not on file     Attends meetings of clubs or organizations: Not on file     Relationship status: Not on file     Intimate partner violence:     Fear of current or ex partner: Not on file     Emotionally abused: Not on file     Physically abused: Not on file     Forced sexual activity: Not on file   Other Topics Concern     Not on file   Social History Narrative     Not on file              Physical Exam:   Temp 97.7  F (36.5  C)   Resp 15   Ht 1.68 m (5' 6.14\")   Wt 98.4 kg (216 lb 14.9 oz)   SpO2 99%   BMI 34.86 kg/m     Exam:  GENERAL:  Intubated and sedated.   ENT:  " Head is normocephalic, atraumatic. Anterior oropharynx is moist without exudates or ulcers.  EYES:  Eyes have anicteric sclerae.    NECK:  Supple.  LUNGS:  Coarse breath sounds bilaterally.   CARDIOVASCULAR: Balloon pump audible.   ABDOMEN:  Normal bowel sounds, soft, nontender.  EXT: Extremities warm and without edema. ECMO and balloon pump entrance sites without erythema or disharge  SKIN:  No acute rashes.  Line is in place without any surrounding erythema.  NEUROLOGIC:  Grossly nonfocal.         Laboratory Data:     Creatinine   Date Value Ref Range Status   03/04/2019 2.59 (H) 0.66 - 1.25 mg/dL Final   03/03/2019 2.42 (H) 0.66 - 1.25 mg/dL Final   03/03/2019 2.42 (H) 0.66 - 1.25 mg/dL Final   03/03/2019 2.48 (H) 0.66 - 1.25 mg/dL Final   03/03/2019 2.63 (H) 0.66 - 1.25 mg/dL Final     WBC   Date Value Ref Range Status   03/04/2019 34.2 (H) 4.0 - 11.0 10e9/L Final   03/04/2019 28.0 (H) 4.0 - 11.0 10e9/L Final   03/03/2019 27.4 (H) 4.0 - 11.0 10e9/L Final   03/03/2019 26.0 (H) 4.0 - 11.0 10e9/L Final   03/03/2019 23.0 (H) 4.0 - 11.0 10e9/L Final     Hemoglobin   Date Value Ref Range Status   03/04/2019 11.5 (L) 13.3 - 17.7 g/dL Final     Platelet Count   Date Value Ref Range Status   03/04/2019 167 150 - 450 10e9/L Final     Lab Results   Component Value Date     03/04/2019    BUN 66 (H) 03/04/2019    CO2 18 (L) 03/04/2019     CRP Inflammation   Date Value Ref Range Status   03/04/2019 340.0 (H) 0.0 - 8.0 mg/L Final   03/03/2019 260.0 (H) 0.0 - 8.0 mg/L Final   03/02/2019 270.0 (H) 0.0 - 8.0 mg/L Final   03/01/2019 260.0 (H) 0.0 - 8.0 mg/L Final   02/28/2019 230.0 (H) 0.0 - 8.0 mg/L Final           Pertinent Recent Microbiology Data:     Recent Labs   Lab 03/04/19  0357 03/04/19  0300 03/03/19  1205 03/03/19  0439 03/03/19  0359 03/02/19  0426 03/02/19  0417 03/01/19  0442 03/01/19  0302 02/28/19  0308 02/27/19  0448 02/26/19  0457 02/26/19  0405   CULT PENDING No growth after 9 hours Moderate  growth  Lactose fermenting gram negative rods  *  Culture in progress No growth after 1 day Moderate growth  Escherichia coli  Susceptibility testing done on previous specimen  *  Culture in progress Heavy growth  Escherichia coli  *  Light growth  Candida albicans / dubliniensis  Candida albicans and Candida dubliniensis are not routinely speciated  Susceptibility testing not routinely done  * No growth after 2 days Light growth  Candida albicans / dubliniensis  Candida albicans and Candida dubliniensis are not routinely speciated  Susceptibility testing not routinely done  * No growth after 3 days Cultured on the 1st day of incubation:  Staphylococcus hominis  *  Critical Value/Significant Value, preliminary result only, called to and read back by  SURESH DE LEON RN (4E).  03.01.19  0518 GJS    (Note)  POSITIVE for Staphylococci other than S.aureus, S.epidermidis and  S.lugdunensis, by BoardEvalsigene multiplex nucleic acid test.  Coagulase-negative staphylococci are the most common venipuncture or  collection associated skin CONTAMINANTS grown in blood cultures.  Final identification and antimicrobial susceptibility testing will be  verified by standard methods.    Specimen tested with Verigene multiplex, gram-positive blood culture  nucleic acid test for the following targets: Staph aureus, Staph  epidermidis, Staph lugdunensis, other Staph species, Enterococcus  faecalis, Enterococcus faecium, Streptococcus species, S. agalactiae,  S. anginosus grp., S. pneumoniae, S. pyogenes, Listeria sp., mecA  (methicillin resistance) and Marlon/B (vancomycin resistance).    Critical Value/Significant Value called to and read back by ra rodriguez rn @0809 3/1/19. ct   No growth after 5 days No growth No growth   SDES Sputum Endotracheal Blood Left Arm Bronchoalveolar Lavage Left upper lobe  Bronchoalveolar Lavage Left upper lobe Blood Left Hand Sputum Sputum Blood Left Arm Sputum Endotracheal Blood Left Arm Blood Left Arm  Blood Left Hand Blood Left Hand Sputum            Imaging:     Recent Results (from the past 48 hour(s))   XR Chest Port 1 View    Narrative    Exam: XR CHEST PORT 1 VW 3/3/2019 10:03 AM    History: ECMO, intubated    Comparison: 3/2/2019    Findings: Single AP view of the chest. Inferior approach ECMO cannula  projects over the right atrium. Endotracheal tube is in appropriate  position, stable. Feeding tube and gastric tubes extend below the  diaphragm, tip not included the field-of-view. Stable position of the  intra-aortic balloon pump superior metallic marker. Cardia mediastinal  silhouette is stable, slightly obscured. Lung volumes are normal.  Diffuse pulmonary opacities, slightly improved since prior. Aeration  in both lungs, especially pronounced superiorly. Small area pleural  effusions, unchanged. No pneumothorax. Visualized upper abdomen is  unremarkable. No acute bony abnormality.      Impression    Impression:   1. Inferior approach ECMO cannula projects over the right atrium,  stable. Additional lines and tubes as above.  2. Diffuse pulmonary opacities, slightly improved since prior.  Improving aeration, especially pronounced in the upper lungs.  3. Small layering pleural effusions are again seen.    ASHLEIGH CHAN MD   XR Chest Port 1 View    Narrative    EXAM: XR CHEST PORT 1 VW  3/4/2019 1:53 AM      HISTORY: Cardiac arrest    COMPARISON: Radiograph 3/30/2018, 2/27/2019    FINDINGS: AP radiograph of the chest. Intra-aortic balloon pump tip  projects at the level of the tereso. ECMO cannula projects over the  right atrium. Endotracheal tube projects over the midthoracic trachea.  Feeding tube passes below the diaphragm. Small right pleural effusion.  Diffuse patchy airspace opacities, stable. No pneumothorax.      Impression    IMPRESSION:   1. Intra-aortic balloon pump tip projects at the level of the tereso.   2. ECMO cannula projects over the right atrium.   3. Endotracheal tube projects over the  midthoracic trachea.   4. Diffuse patchy airspace opacities, stable, which may indicate  pulmonary edema versus severe infection.    I have personally reviewed the examination and initial interpretation  and I agree with the findings.    ROSELYN CASH MD

## 2019-03-05 NOTE — PROGRESS NOTES
ECMO Shift Summary:    Patient remains on VA ECMO, all equipment is functioning and alarms are appropriately set. RPM's 3860 with flow range 2.67-3.94 L/min. Sweep gas is at 6 LPM and FiO2 100%. Circuit remains free of air, clot and fibrin. Cannulas are secure with no bleeding from site. Extremities are warm. Suctioned ETT for scant amounts of secretions.    Significant Shift Events: flow turndown to 1 LPM with bedside ECHO. Sat drop to low 80's.     Vent settings:  Ventilation Mode: (S) CMV/AC  (Continuous Mandatory Ventilation/ Assist Control)  FiO2 (%): 100 %  Rate Set (breaths/minute): 12 breaths/min  Tidal Volume Set (mL): 530 mL  PEEP (cm H2O): 10 cmH2O  Oxygen Concentration (%): 100 %  Resp: 16  .    Heparin is running at 1400 u/kg/hr, ACT range 176    Urine output is adequate, no blood loss. Product given included none      Intake/Output Summary (Last 24 hours) at 3/4/2019 1833  Last data filed at 3/4/2019 1800  Gross per 24 hour   Intake 3015.62 ml   Output 5828 ml   Net -2812.38 ml       ECHO:  No results found for this or any previous visit.No results found for this or any previous visit.    CXR:  Recent Results (from the past 24 hour(s))   XR Chest Port 1 View    Narrative    EXAM: XR CHEST PORT 1 VW  3/4/2019 1:53 AM      HISTORY: Cardiac arrest    COMPARISON: Radiograph 3/30/2018, 2/27/2019    FINDINGS: AP radiograph of the chest. Intra-aortic balloon pump tip  projects at the level of the tereso. ECMO cannula projects over the  right atrium. Endotracheal tube projects over the midthoracic trachea.  Feeding tube passes below the diaphragm. Small right pleural effusion.  Diffuse patchy airspace opacities, stable. No pneumothorax.      Impression    IMPRESSION:   1. Intra-aortic balloon pump tip projects at the level of the tereso.   2. ECMO cannula projects over the right atrium.   3. Endotracheal tube projects over the midthoracic trachea.   4. Diffuse patchy airspace opacities, stable, which may  indicate  pulmonary edema versus severe infection.    I have personally reviewed the examination and initial interpretation  and I agree with the findings.    ROSELYN CASH MD       Labs:  Recent Labs   Lab 03/04/19  1815 03/04/19  1616 03/04/19  1452 03/04/19  1232   PH 7.20* 7.22* 7.19* 7.23*   PCO2 53* 48* 49* 46*   PO2 75* 66* 76* 72*   HCO3 21 20* 19* 19*   O2PER 100 100  100  100 100 100       Lab Results   Component Value Date    HGB 11.2 (L) 03/04/2019    PHGB 70 (H) 03/04/2019     03/04/2019    FIBR 981 (H) 03/04/2019    INR 1.20 (H) 03/04/2019    PTT 66 (H) 03/04/2019    DD 16.1 (H) 03/04/2019    AXA 0.37 03/04/2019    ANTCH 78 (L) 03/04/2019         Plan is remain on VA ECMO.      Sylvia Clemens, RRT  3/4/2019 6:33 PM

## 2019-03-05 NOTE — PROCEDURES
[facility] EEG #-10 (Day 10 of Video-EEG Monitoring)    DATE OF RECORDIN2019    DURATION OF RECORDIN hours, 7 minutes.    CLINICAL SUMMARY: This video-EEG monitoring procedure was performed in diagnostic evaluation of encephalopathy following cardiac arrest in [facility].  The patient was reported to be receiving infusions of fentanyl and midazolam on this day of monitoring.    TECHNICAL SUMMARY:  This continuous EEG monitoring procedure was performed with 23 scalp electrodes in 10-20 system placements, and additional scalp, precordial and other surface electrodes used for electrical referencing and artifact detection.  Video monitoring was utilized and periodically reviewed by EEG technologists and the physician for electroclinical correlation.     EEG ACTIVITIES DURING COMA:  During continuously sedated coma, there was no evidence of wake-sleep cycling behaviorally or electrographically. There was ongoing low amplitude (up to 5-10-microvolt) generalized delta slowing intermixed with superimposed faster theta and alpha frequencies (maximum amplitudes in the paracentral and central regions). There was no focal slowing.  At 09:44 cutaneous and verbal stimulation did not induce any electrographic or behavioral response.      There were no interictal epileptiform abnormalities and no electrographic seizures.     SUMMARY OF DAY 10 OF VIDEO-EEG MONITORING:    The interictal EEG recording during sedated coma was abnormal due to generalized low amplitude delta slowing intermixed with faster frequencies.    No interictal epileptiform abnormalities and no electrographic seizures were seen.    These findings indicate severe diffuse encephalopathy.  Clinical correlation is recommended.  Dictated By: Ulises Galvan DO, Clinical Neurophysiology Fellow   I agree with the findings as reported.  I personally reviewed this video-EEG recording.    Jose Roberto Li M.D., Professor of Neurology        D:  2019   T: 2019   MT: ABBY      Name:     LIONEL CARTY   MRN:      [id number]        Account:        [id number]   :      1980           Procedure Date: 2019      Document: [id number]

## 2019-03-05 NOTE — PROGRESS NOTES
ECMO Shift Summary:    Patient remains on VAV ECMO, all equipment is functioning and alarms are appropriately set. RPM's 3700 with flow range 4.25-5.67 total flow and 1.84-2.06 VV flow L/min. Sweep gas is at 10 LPM and FiO2 100%. Circuit remains free of air, clot and fibrin. Cannulas are secure with no bleeding from site. Extremities are warm to touch. Suctioned ETT for scant secretions.    Significant Shift Events: Pt was taken to cardiac cath lab and converted to VAV ECMO without apparent complication    Vent settings:  Ventilation Mode: CMV/AC  (Continuous Mandatory Ventilation/ Assist Control)  FiO2 (%): 100 %  Rate Set (breaths/minute): 12 breaths/min  Tidal Volume Set (mL): 530 mL  PEEP (cm H2O): 10 cmH2O  Oxygen Concentration (%): 100 %  Resp: 12  .    Heparin is running at 1400 u/hr, ACT range 164-196.    Urine output is as charted, blood loss was a small amount during the cannula placement. No product given.      Intake/Output Summary (Last 24 hours) at 3/5/2019 0547  Last data filed at 3/5/2019 0500  Gross per 24 hour   Intake 3495.49 ml   Output 4369 ml   Net -873.51 ml       ECHO:  No results found for this or any previous visit.No results found for this or any previous visit.    CXR:  No results found for this or any previous visit (from the past 24 hour(s)).    Labs:  Recent Labs   Lab 03/05/19  0351 03/05/19  0211 03/05/19  0006 03/04/19  2112   PH 7.35 7.36 7.33* 7.23*   PCO2 36 34* 30* 49*   PO2 145* 237* 222* 63*   HCO3 20* 19* 16* 20*   O2PER 100.0 100.0 100.0 100       Lab Results   Component Value Date    HGB 9.8 (L) 03/05/2019    PHGB 60 (H) 03/05/2019     (L) 03/05/2019    FIBR 772 (H) 03/05/2019    INR 1.26 (H) 03/05/2019    PTT 59 (H) 03/05/2019    DD >20.0 (H) 03/05/2019    AXA 0.32 03/05/2019    ANTCH 78 (L) 03/04/2019         Plan is to remain on ECMO support at this time.      Jong Cavazos, RRT  3/5/2019 5:47 AM

## 2019-03-05 NOTE — PHARMACY-VANCOMYCIN DOSING SERVICE
Pharmacy Vancomycin Initial Note  Date of Service 2019  Patient's  1980  39 year old, male    Indication: Sepsis    Current estimated CrCl = Estimated Creatinine Clearance: 37.1 mL/min (A) (based on SCr of 2.88 mg/dL (H)).    Creatinine for last 3 days  3/2/2019:  3:57 AM Creatinine 2.69 mg/dL; 10:03 AM Creatinine 2.68 mg/dL;  4:01 PM Creatinine 2.81 mg/dL; 10:36 PM Creatinine 2.76 mg/dL  3/3/2019:  3:41 AM Creatinine 2.63 mg/dL; 12:11 PM Creatinine 2.48 mg/dL;  3:49 PM Creatinine 2.42 mg/dL;  9:51 PM Creatinine 2.42 mg/dL  3/4/2019:  3:57 AM Creatinine 2.59 mg/dL; 10:08 AM Creatinine 2.52 mg/dL;  4:16 PM Creatinine 2.62 mg/dL;  9:12 PM Creatinine 2.88 mg/dL    Recent Vancomycin Level(s) for last 3 days  No results found for requested labs within last 72 hours.      Vancomycin IV Administrations (past 72 hours)                   vancomycin (VANCOCIN) 2,000 mg in sodium chloride 0.9 % 250 mL intermittent infusion (mg) 2,000 mg New Bag 19 0803                Nephrotoxins and other renal medications (From now, onward)    Start     Dose/Rate Route Frequency Ordered Stop    19 2315  vancomycin (VANCOCIN) 2,000 mg in sodium chloride 0.9 % 250 mL intermittent infusion      2,000 mg (central catheter)  over 60 Minutes Intravenous EVERY 24 HOURS 19 2232      19 1900  norepinephrine (LEVOPHED) 16 mg in D5W 250 mL infusion      0.03-0.4 mcg/kg/min × 106 kg  3-39.8 mL/hr  Intravenous CONTINUOUS 19 1849      19 1515  vasopressin (VASOSTRICT) 40 Units in D5W 40 mL infusion      0.5-4 Units/hr  0.5-4 mL/hr  Intravenous CONTINUOUS 19 1504            Contrast Orders - past 72 hours (72h ago, onward)    None                Plan:  1.  Start vancomycin  2000 mg IV q24h.   2.  Goal Trough Level: 15-20 mg/L   3.  Pharmacy will check trough levels as appropriate in 1-3 Days.    4. Serum creatinine levels will be ordered daily for the first week of therapy and at least twice weekly  for subsequent weeks.    5. [place] method utilized to dose vancomycin therapy: Method 2    Lion Camarena Pharm.D.

## 2019-03-05 NOTE — PROGRESS NOTES
CRRT RESTART NOTE    Reason for Restart:  Procedure  Error Code:  None    Patient s Vascular Access: ECMO                     Placement Confirmed:  YES  Manufacture:  -  Model:  -  Length/[place] Size:  -  Flush Volume:  -    DATA:  Procedure:  CVVHDF  Start Time:  0055  Machine#:  7  Filter:  M150  Blood Flow:   180 mL/min  Pre-Replacement Solution:  PrismaSol 2/3.5  Post-Replacement Solution:  PrismaSol 2/3.5  Dialysate Solution:  PrismaSol 2/3.5  Pre-Replacement Solution Rate:  1300mL/hr  Post-Replacement Solution Rate:  200mL/hr  Dialysate Flow Rate:  1300mL/hr  Patient Removal Rate:  120 mL/hr  Anticoagulation Type and Rate:  0    ASSESSMENT:  How Patient Tolerated Restart:  well  Vital Signs:  HR 98, /57, MAP 79 (VAV ECMO and balloon pump)  Initial Pressures:  Access:  38  Filter:  138  Return:  125  TMP:  68  Change in Filter Pressure:  42    INTERVENTIONS:  Restarted after pt returned from cath lab. Lines attached to ECMO circuit.. Mat #1 in use and hair ties on Pre- replacement and Dialysate.    PLAN:  Continue to monitor circuit daily and change set q72 hours or PRN for clotting/clogging. Please call CRRT RN with any questions/problems.

## 2019-03-05 NOTE — PROGRESS NOTES
Nephrology Progress Note  03/05/2019             Today Recommendations:  - Continue with CRRT, fluid removal 0-100 ml/h net negative if blood pressure tolerates. If MAP<60 and/or CVP<8 please match I=O     ASSESSMENT AND RECOMMENDATIONS:   Mr. Riddle is 39 year old male with PMHx of familial hyperlipidemia, no history of renal disease or nephrolithiasis who was brought to our hospital with cardiogenic shock S/P cardiac arrest with unclear time down. Patient found to have CAD icluding 3 vessels, PCI was done emergently. We were consulted for FERNANDA and fluid management.       # Anuric FERNANDA on unknown baseline kidney function:  - Patient was admitted with Crea: 1.65 post cardiac arrest, no previous Crea level in our EMR.  - FERNANDA etiology: likley due to cardiogenic shock leading to hypotension and low renal perfusion, contrast and high CK noted.  - We added Pre-Filter to the CRRT on 2/28.   - Continue with CRRT, fluid removal 0-100 ml/h net negative if blood pressure tolerates. If MAP<60 and/or CVP<8 please match I=O   - Lactic acidosis resolved, likely due to oxygenation worsened, VAV ECMO was placed, oxygenation has improved.   - Continue monitoring closely.     # Hypotension, Volume status:  - Patient continues on 2 pressors today, this morning was on Levophed and Vasopressin.    - Pulmonary edema was improving, oxygenation has improved with adding VAV ECMO  - overall weight down, we recommend for I=O if tolerated     #Cardiogenic Shock due to VF due to severe CAD secondary to familial hypelipidemia:  - Patient had another VF episode on 2/23, PCI was done with stents placement.  - Patient on ECMO,   - Managed by cardiology, primary team.  - Lactic acidosis resolved, VAV ECMO was placed.        # Severe encephalopathy:  - EEG shows severe generalized slowing with low amplitude which consistent with severe diffuse encephalopathy. No seizure.   -hypernatermia reversed, decreased by 7 Eq or less per day  - Repeated CT/Head on  "2/25 shows No acute intracranial pathology. No definite hypoxic ischemic injury.  - Managed by primary team.      Recommendations were communicated to primary team via note    Patient was Seen and discussed with Dr. Norris Munoz M.D.  Nephrology Fellow  Pager: [phone]    Interval History :   Nursing and provider notes from last 24 hours reviewed.  Patient was seen and examined at bedside this am. Patient is still sedated, intubated, on pressors, on ECMO and CRRT, VAV ECMO was placed with better oxygenation, Poor prognosis.      Review of Systems:   Not able to obtain, patient is sedated, intubated.     Physical Exam:   I/O last 3 completed shifts:  In: 3499.32 [I.V.:2072.32; Other:2; NG/GT:375]  Out: 4961 [Emesis/NG output:600; Other:2661; Stool:1700]   Temp 97.5  F (36.4  C)   Resp 12   Ht 1.68 m (5' 6.14\")   Wt 97.5 kg (214 lb 15.2 oz)   SpO2 99%   BMI 34.54 kg/m       GENERAL APPEARANCE: Sedated, intubated  Pulmonary: lungs clear to auscultation anteriorly.   CV: Irregular rhythm, normal rate, no rub   - JVD: not able to evaluate, patient in supine position.    - Edema trace  GI: soft,  normal bowel sounds  MS: no evidence of inflammation in joints, no muscle tenderness  : turk in place  SKIN: no rash, warm, dry, no cyanosis  NEURO: Sedated, intubated.      LABS:      I personally reviewed his labs.       Electrolytes/Renal -   Recent Labs   Lab Test 03/05/19 0351 03/04/19 2112 03/04/19  1616    133 134   POTASSIUM 4.6 4.8 5.5*   CHLORIDE 101 95 97   CO2 20 19* 20   BUN 86* 80* 65*   CR 2.79* 2.88* 2.62*   *  157* 164*  181* 119*  128*   ALEKSANDER 8.8 9.2 9.6   MAG 2.9* 3.0* 2.9*   PHOS 6.1* 7.9* 8.5*       CBC -   Recent Labs   Lab Test 03/05/19 0351 03/04/19 2112 03/04/19  1616   WBC 34.7* 37.1* 41.1*   HGB 9.8* 11.1* 11.2*   * 166 199       LFTs -   Recent Labs   Lab Test 03/05/19  0351 03/04/19 2112 03/04/19  1616   ALKPHOS 138 155* 139   BILITOTAL 4.5* 4.3* " 3.9*   ALT 99* 89* 70   * 180* 109*   PROTTOTAL 7.0 8.0 8.1   ALBUMIN 2.1* 2.4* 2.5*       Iron Panel - No lab results found.      Imaging:  All imaging studies reviewed by me.     Current Medications:    artificial tears   Both Eyes Q8H     aspirin  81 mg Oral or Feeding Tube Daily     B and C vitamin Complex with folic acid  5 mL Per Feeding Tube Daily     meropenem  1 g Intravenous Q8H     micafungin  150 mg Intravenous Q24H     oseltamivir  75 mg Oral BID     pantoprazole  40 mg Oral or Feeding Tube QAM     polyethylene glycol  17 g Oral or Feeding Tube Daily     protein modular  1 packet Per Feeding Tube BID     senna-docusate  1 tablet Oral or Feeding Tube At Bedtime     sodium chloride (PF)  3 mL Intracatheter Q8H     ticagrelor  90 mg Oral or Feeding Tube BID     vancomycin (VANCOCIN) IV  2,000 mg (central catheter) Intravenous Q24H       cisatracurium (NIMBEX) infusion ADULT 2.5 mcg/kg/min (03/05/19 0800)     dexmedetomidine Stopped (03/03/19 1600)     IV fluid REPLACEMENT ONLY       CRRT replacement solution 12.5 mL/kg/hr (03/05/19 0459)     epoprostenol (VELETRI) 20 mcg/mL in sterile water inhalation solution 20 ng/kg/min (03/05/19 0729)     fentaNYL 200 mcg/hr (03/05/19 0800)     heparin 2 unit/mL in 0.9% NaCl 3 mL/hr (03/05/19 0800)     HEParin 1,400 Units/hr (03/05/19 0800)     insulin (regular) 6 Units/hr (03/05/19 0800)     midazolam 14 mg/hr (03/05/19 0800)     - MEDICATION INSTRUCTIONS -       norepinephrine 0.15 mcg/kg/min (03/05/19 0800)     CRRT replacement solution 200 mL/hr at 03/05/19 0045     CRRT replacement solution 12.5 mL/kg/hr (03/05/19 0459)     vasopressin (PITRESSIN) infusion ADULT (40 mL) 2.4 Units/hr (03/05/19 0800)     Natalia Munoz MD   Nephrology Fellow  Pager: [phone]    STALIN, Dante Pisano, saw this patient on 03/05/2019 with Dr. Bibi Feliz and agree with the findings and plan of care as documented in the note.

## 2019-03-05 NOTE — PROCEDURES
PRELIMINARY CARDIAC CATH REPORT:     PROCEDURES PERFORMED:   1. Conversion to VAV ECMO.     PHYSICIANS:  1. Attending Interventional Cardiology Staff: Dr. Juliann MD  2. Cardiology Fellow: Kim Barba MD    INDICATION:  39 year old male with unknown hx was transported to the cardiac cath lab emergently with ongoing MANNY CPR after out-of-hospital refractory VT/VF cardiac arrest. He was discovered in bed apneic and family called 911. He was brought to the ED by [facility], and arrived about 60 minutes from 911 call. Positive for flu, worsening hypoxia, conversion to VAV ECMO.     Consent obtained with discussion of risks.  All questions were answered. Sterile prep and procedure.    DESCRIPTION:  1. Location: Right internal jugular vein.  2. Access: Local anesthetic with lidocaine.  A standard (18 g) with ultrasound guidance was used to establish vascular access using a modified Seldinger technique. Multiple dilators were used.  3. Sheath: 24 F, 26 Fr, INTRO SHTH  VASC IMPELLA 14R 1  4. Catheters: VAV  5. Fluoroscopy time of 12.2 min.  6. Estimated blood loss of <100 mL.  7. See below for procedure details.    MEDICATIONS:  1. Contrast 0 mL IV.  2. Conscious sedation with fentanyl and midazolam as directed by the attending cardiologist.  3. Heparin administered to achieve a goal ACT > 250 sec.     EXTRACORPOREAL MEMBRANE OXYGENATION CANNULATION:  A 26 Fr ECMO cannula was inserted into the right internal jugular vein (two separate ports). Was hooked up to VAV after splicing. The ECMO circuit was primed, closely inspected to ensure no bubbles present and ECMO was initiated.      COMPLICATIONS:  1. None    SUMMARY:   1. Conversion of VA ECMO to VAV ECMO.     PLAN:   1. Return to the primary inpatient team for further evaluation and management.  2. Continued medical management and lifestyle modification for cardiovascular risk factor optimization.     The Attending Interventional Cardiologist was present for  the entire procedure.      Please refer to the CVIS report for the final draft.    Kim Barba MD  General Cardiology Fellow  Pager [phone]    This is a preliminary report. Please see CVIS for the final report.   I was present for the entire procedure.     Oleksandr Humphreys MD.  Interventional Cardiology

## 2019-03-05 NOTE — PLAN OF CARE
Patient returned from cath lab on VAV ECMO at midnight. Cis at 3. Fentanyl and versed unchanged. Pupils 3 mm, equal. IABP 1:1. Levo 0.15, vaso remains at 2.4. ABGs improved. FiO2 60% on the vent. Net negative 600 mL 2/2 high stool output. C dif negative. TF at goal. Insulin 3-6 units.

## 2019-03-05 NOTE — PROGRESS NOTES
[facility] ID SERVICE: FOLLOW UP  Patient:  Hellen Riddle, Date of birth 1980, Medical record number [id number]         Assessment and Recommendations:   Recommendations:  - continue current antimicrobials: IV vancomycin, IV meropenem and IV micafungin for now  - continue oseltamivir 75mg BID  - ID will continue to follow     Problem List:  # Septic shock and severe leukocytosis, improving somewhat   # Influenza infection   # ARDS requiring VV ECMO: Possible bacterial pneumonia in addition to Influenza - sputum growing E coli   # Recent out of hospital cardiac arrest, currently on VA ECMO with IABP in place  # FERNANDA on CRRT  # Shock liver - improving    Discussion:  ID consulted on 3/4 for the above issues, particularly the upward trend of leukocytosis and worsening lactic acidosis. C. Diff was considered however returned negative.     His oxygenation worsened such that he required conversion to VAV late evening 3/4.   Thankfully his leukocytosis has improved somewhat on 3/5, his lactic acidosis also stablizing somewhat. He was diagnosed with Influenza A on 3/4 and this could certainly be the main  of his decompensation (he had reported sick contact visitor while in hospital). His sputum is also growing E. Coli and we can continue systemic antibiotic coverage of this organism as well. He is at risk of MRSA infection with multiple lines / balloon pump and we can also continue the IV vancomycin for now. We can continue the IV micafungin for now for empiric fungal coverage for now however if no sign of fungal infection (Candida in sputum cultures likely represented oropharyngeal colonization rather than infection) in upcoming days will consider discontinuing.      Thank you for this consult. The [facility] ID team will continue to follow this patient. Please feel free to call with any questions.     Glenna Nesbitt MD  Infectious Diseases  [phone]    Interval history:   Patient was converted to VAV ECMO late  "yesterday evening due to worsening hypoxia. His Influenza test has returned positive and he was appropriately started on on oseltamivir.   He remains intubated, sedated, paralyzed and thus unable to obtain ROS.        Recent Antimicrobials::   Meropenem 3/5-present  Vancomycin 2/23-present  Micafungin 3/5-present    Prior antibiotics:  Pip/tazo 2/23-3/4           Physical Exam:   Temp 97.5  F (36.4  C)   Resp 12   Ht 1.68 m (5' 6.14\")   Wt 97.5 kg (214 lb 15.2 oz)   SpO2 100%   BMI 34.54 kg/m     Exam:  GENERAL:  Intubated and sedated and paralyzed  ENT:  Head is normocephalic, atraumatic. Anterior oropharynx is moist without exudates or ulcers.  EYES:  Eyes have anicteric sclerae.    NECK:  Supple.  LUNGS:  Coarse breath sounds bilaterally.   CARDIOVASCULAR: Balloon pump audible.   ABDOMEN:  Normal bowel sounds, soft, nontender.  EXT: Extremities warm and without edema. ECMO and balloon pump entrance sites without erythema or disharge. Has L femoral line as well without surrounding erythema  SKIN:  No acute rashes.    NEUROLOGIC: Sedated, paralyzed         Laboratory Data:     Creatinine   Date Value Ref Range Status   03/05/2019 2.53 (H) 0.66 - 1.25 mg/dL Final   03/05/2019 2.61 (H) 0.66 - 1.25 mg/dL Final   03/05/2019 2.79 (H) 0.66 - 1.25 mg/dL Final   03/04/2019 2.88 (H) 0.66 - 1.25 mg/dL Final   03/04/2019 2.62 (H) 0.66 - 1.25 mg/dL Final     WBC   Date Value Ref Range Status   03/05/2019 28.3 (H) 4.0 - 11.0 10e9/L Final   03/05/2019 29.2 (H) 4.0 - 11.0 10e9/L Final   03/05/2019 34.7 (H) 4.0 - 11.0 10e9/L Final   03/04/2019 37.1 (H) 4.0 - 11.0 10e9/L Final   03/04/2019 41.1 (H) 4.0 - 11.0 10e9/L Final     Hemoglobin   Date Value Ref Range Status   03/05/2019 10.1 (L) 13.3 - 17.7 g/dL Final     Platelet Count   Date Value Ref Range Status   03/05/2019 127 (L) 150 - 450 10e9/L Final     Lab Results   Component Value Date     03/05/2019    BUN 73 (H) 03/05/2019    CO2 20 03/05/2019     CRP Inflammation "   Date Value Ref Range Status   03/05/2019 300.0 (H) 0.0 - 8.0 mg/L Final   03/04/2019 340.0 (H) 0.0 - 8.0 mg/L Final   03/03/2019 260.0 (H) 0.0 - 8.0 mg/L Final   03/02/2019 270.0 (H) 0.0 - 8.0 mg/L Final   03/01/2019 260.0 (H) 0.0 - 8.0 mg/L Final           Pertinent Recent Microbiology Data:     Recent Labs   Lab 03/05/19  1408 03/05/19  0427 03/04/19  1513 03/04/19  0357 03/04/19  0300 03/03/19  1205 03/03/19  0439 03/03/19  0359 03/02/19  0426 03/02/19  0417 03/01/19  0442 03/01/19  0302 02/28/19  0308 02/27/19  0448   CULT PENDING No growth after 9 hours  --  Light growth  Lactose fermenting gram negative rods  *  Culture in progress No growth after 1 day Moderate growth  Escherichia coli  Susceptibility testing in progress  *  Moderate growth  Strain 2  Escherichia coli  Susceptibility testing in progress  *  Light growth  Candida albicans / dubliniensis  Candida albicans and Candida dubliniensis are not routinely speciated  Susceptibility testing not routinely done  * No growth after 2 days Moderate growth  Escherichia coli  Susceptibility testing done on previous specimen  *  Moderate growth  Candida albicans / dubliniensis  Candida albicans and Candida dubliniensis are not routinely speciated  Susceptibility testing not routinely done  * Heavy growth  Escherichia coli  *  Light growth  Candida albicans / dubliniensis  Candida albicans and Candida dubliniensis are not routinely speciated  Susceptibility testing not routinely done  * No growth after 3 days Light growth  Candida albicans / dubliniensis  Candida albicans and Candida dubliniensis are not routinely speciated  Susceptibility testing not routinely done  * No growth after 4 days Cultured on the 1st day of incubation:  Staphylococcus hominis  *  Critical Value/Significant Value, preliminary result only, called to and read back by  SURESH DE LEON RN (4E).  03.01.19  0518 GJS    (Note)  POSITIVE for Staphylococci other than S.aureus,  S.epidermidis and  S.lugdunensis, by [employer] multiplex nucleic acid test.  Coagulase-negative staphylococci are the most common venipuncture or  collection associated skin CONTAMINANTS grown in blood cultures.  Final identification and antimicrobial susceptibility testing will be  verified by standard methods.    Specimen tested with Verigene multiplex, gram-positive blood culture  nucleic acid test for the following targets: Staph aureus, Staph  epidermidis, Staph lugdunensis, other Staph species, Enterococcus  faecalis, Enterococcus faecium, Streptococcus species, S. agalactiae,  S. anginosus grp., S. pneumoniae, S. pyogenes, Listeria sp., mecA  (methicillin resistance) and Marlon/B (vancomycin resistance).    Critical Value/Significant Value called to and read back by ra rodriguez rn @0808 3/1/19. ct   No growth   SDES Sputum Endotracheal Blood Left Hand Feces Sputum Endotracheal Blood Left Arm Bronchoalveolar Lavage Left upper lobe  Bronchoalveolar Lavage Left upper lobe Blood Left Hand Sputum Sputum Blood Left Arm Sputum Endotracheal Blood Left Arm Blood Left Arm Blood Left Hand            Imaging:   3/5/19 CXR:  IMPRESSION:   1. Right IJ ECMO catheter tip projects over the right pulmonary  artery. Lower approach ECMO catheter projects over the right atrium.    2. Intra-aortic balloon pump tip projects at the level of the tereso.   3. Diffuse interstitial opacities, right greater than left, slightly  improved. Likely pulmonary edema.  4. Stable retrocardiac opacity, likely atelectasis.

## 2019-03-05 NOTE — PROGRESS NOTES
"Cardiology Progress Note    Overnight/subj:   Persistent hypoxemia yesterday  Rising lactate and pressor requirements  Conversion to VAV ECMO late last night     VS reviewed: Notable for temp: AF, HR 90-97, MAP 63-95, oxygenating on 100%  On 12/530/10/100  Tele: No events     IABP via LFA, 1:1, triggered by ECG, augmented MAPs , pulses NIRS stable    ECMO:  \"Patient remains on VAV ECMO, all equipment is functioning and alarms are appropriately set. RPM's 3700 with flow range 4.25-5.67 total flow and 1.84-2.06 VV flow L/min. Sweep gas is at 10 LPM and FiO2 100%. Circuit remains free of air, clot and fibrin. Cannulas are secure with no bleeding from site. Extremities are warm to touch. Suctioned ETT for scant secretions.\"    Wt: on admit 107.9, yday 98.4, today 97.5  I/O: 3.0 in / 4.9 out yday. Since MN 1.5 in / 2.1 out    Drips:   NE 0.15  Vaso 2.4    Cis 3  Fentanyl 200  Mida 14  Hep 1400  Insulin 6    PO Cardiac Meds: ASA 81mg daily, ticagrwelor 90mg BID  Other meds: MVI, senna, miralax    Abx: meropenem 03/04-**, vanco 03/04- **, micafungin 03/04-**, tamiflu 03/04-**    Radiology Imaging  CXR: Venous ECMO cannula in the SCS-RA, ProtectDuo in the MPA, IABP and ETT in good position. Improving airway opacities    Cardiology Studies:  TTE reviewedd    Labs: Reviewed in epic    Phys exam:  GEN: NAD  Pulm: course breath sounds b/l, stable airmovement  Cardiac:  Distant heart sounds, no murmurs  Vascular: mild IRISH and NIRS stable   GI: soft, non distended    ASSESSMENT/PLAN:  Mr. Riddle is a 38yo M with HLD and Fhx CAD/MI who presented with refractory VF OHCA brought directly to the CCL now s/p peripheral VA ECMO placement. Found to have severe 3v disease s/p MITA to mRCA and mLcx. Also with IABP placement for presumed pulmonary edema. Being managed as part post VF protocol.  Post arrest day 10.     Neurology:  # CT head day3: minimal edema - Continue EEG- no e/o seizures   - Intubated, sedated   -continue " fentanyl  - Versedd  - No longer on hypertonic saline, targeting normal serum Na   Cardiovascular / Hemodynamics:  # Refractory VF arrest.    #CAD s/p MITA  mRCA and mCx  # Torsades (likely relate Qtc >600, corrected)  # Cardiogenic Shock requiring ECMO support - continue vaso and NE as needed  -- TTE turndown today  -- decannulate to VV ECMO, CVTS consult  --continue ASA 81mg and ticagrelor 90mg BID  --ACT goal 180-200  --hold lipitor for now given likely hepatic injury during arrest  --hold ACE/ARB for now given likely reduced renal fxn after arrest  --holding beta blocker given shock    Pulmonary:  # Refractory Hypoxemia 2/2 pulmonary edema + Flu now VAV  # Resp acidosis (currected) -- Improved hypoxemia  -- Continue VC- wean PEEP as able.  Lung protected ventalation  -- wean paralytics prn   -- Fluid removal as able   -- ETT stable   -- Q2h ABGs for now  -- consider scheduled duonebs if signs of lung dz, currently PRN         GI and Nutrition:   Shock Liver (recovering) No known medical hx.    --Nutrition consulted, appreciate their help  -- Post pyloric feeds started  --bowel regimen   --GI Prophylaxis: PPI    Renal, Fluid and Electrolytes:  # FERNANDA with anuria -- Renal consult   -- on CRRT, plan for volume removal given worsening hypoxia  --monitor urine output  --maintain K>3 and Mg>2    Infectious Disease:  # Marked Leukocytosis (improving)  # Bronch cx: E. Coli, staph hominus and Influenza  # Bcx 02/28 growing GPCs 1/2 (likely contaminant) -- Appreciate ID help  -- continue meropenem, vancomycin and micafungin for now  -- Continue tamiflu   --vancomycin/zosyn x5 days for ECMO (Completed 02/28/19)  -- daily blood cultures      Hematology: Coagulopathy related to ECMO Receiving heparin for ECMO and ASA/ticagrelor for MITA.   --cryo PRN fibrinogen < 200; FFP for INR >2  -- Transfuse plat <50  --Transfuse for Hgb<10  --heparin gtt for ECMO with ACT goal 160-180  --DVT PPX: Heparin as above   Endocrinology: No known  medical history. BG elevated.  --insulin gtt PRN   Lines: R femoral arterial and venous ECMO cannulae February 23, 2019  L femoral arterial line February 23, 2019  R radial arterial line February 23, 2019  ETT February 23, 2019  Pisano catheter February 23, 2019  RIJ-MPA 26Fr venous cannula  OG tube February 23, 2019  Restraint: PRN  Current lines are required for patient management         Code status FULL     Discussed with Dr. Chatterjee.      Theron Hebert MD  Cardiology Fellow- [facility] Service  *[phone]        I have seen and examined the patient with the [facility] team. I agree with the assessment and plan of the note above.I have reviewed pertinent labs.     Caleb Chatterjee MD  Interventional Cardiology  Pager: [phone]

## 2019-03-05 NOTE — PROGRESS NOTES
Pt continues on IABP 1:1, CRRT net neg 2.294 ml at the end of this shift.  Paralytic weaned off this am with decreased O2 sats and MAP.  Re paralyzed pt.  Norepi 0.13 mcgs, vaso 2.4 units added, fentanyl 200 mcgs and versed 14 mg with insulin 1-4 units.  Pt positive for influenza A and Cdiff sent per ID recs.  Family updated via telephone.  Plan this evening to place pt on VVA ECMO.  Will continue to monitor.

## 2019-03-06 NOTE — PROGRESS NOTES
ECMO Shift Summary:    Patient remains on VAV ECMO, all equipment is functioning and alarms are appropriately set. RPM's 3200 with flow range 4.4 L/min. Sweep gas is at 8 LPM and FiO2 100%. Circuit remains free of air, clot and fibrin. Cannulas are secure with no bleeding from site. Extremities are warm to the touch. Suctioned ETT for small amount of tan secretions.    Significant Shift Events: No significant events over night    Vent settings:  Ventilation Mode: CMV/AC  (Continuous Mandatory Ventilation/ Assist Control)  FiO2 (%): 60 %  Rate Set (breaths/minute): 12 breaths/min  Tidal Volume Set (mL): 530 mL  PEEP (cm H2O): 10 cmH2O  Oxygen Concentration (%): 60 %  Resp: 12  .    Heparin is running at 1500 u/kg/hr, ACT range 188.    Patient remains anuric on CRRT, no blood loss, no producs given.      Intake/Output Summary (Last 24 hours) at 3/6/2019 0623  Last data filed at 3/6/2019 0600  Gross per 24 hour   Intake 3702.65 ml   Output 5955 ml   Net -2252.35 ml       ECHO:  No results found for this or any previous visit.No results found for this or any previous visit.    CXR:  No results found for this or any previous visit (from the past 24 hour(s)).    Labs:  Recent Labs   Lab 03/06/19  0601 03/06/19  0359 03/06/19  0154 03/06/19  0012   PH 7.44 7.39 7.40 7.40   PCO2 33* 37 37 37   PO2 82 91 113* 82   HCO3 23 23 23 23   O2PER 60.0 60.0 60.0 60.0       Lab Results   Component Value Date    HGB 9.8 (L) 03/06/2019    PHGB 60 (H) 03/05/2019     (L) 03/06/2019    FIBR 772 (H) 03/05/2019    INR 1.20 (H) 03/06/2019    PTT 67 (H) 03/06/2019    DD >20.0 (H) 03/05/2019    AXA 0.42 03/06/2019    ANTCH 55 (L) 03/05/2019         Plan is for conversion to VV ECMO      Guillermo Menjivar, RRT  3/6/2019 6:23 AM

## 2019-03-06 NOTE — PROGRESS NOTES
ECMO Attending Progress Note  3/6/2019    Mr. Riddle is a 38yo M with HLD and Fhx CAD/MI who presented with refractory VF OHCA brought directly to the CCL now s/p peripheral VA ECMO placement. Found to have severe 3v disease s/p MITA to mRCA and mLcx. Also with IABP placement for presumed pulmonary edema. Being managed as part post VF protocol.  Post arrest day 10. He is currently on VVA ECMO for hypoxemia and pneumonia         Interval events: Improved cardiac function ready for VA decanulation and maintenance of VV ECMO compnent    Physical Exam:  Temp:  [93.9  F (34.4  C)-97.9  F (36.6  C)] 93.9  F (34.4  C)  Heart Rate:  [] 94  Resp:  [12] 12  MAP:  [60 mmHg-90 mmHg] 62 mmHg  Arterial Line BP: ()/(37-69) 89/39  FiO2 (%):  [60 %] 60 %  SpO2:  [97 %-100 %] 98 %    Intake/Output Summary (Last 24 hours) at 3/6/2019 0958  Last data filed at 3/6/2019 0900  Gross per 24 hour   Intake 3973.53 ml   Output 6134 ml   Net -2160.47 ml    Ventilation Mode: CMV/AC  (Continuous Mandatory Ventilation/ Assist Control)  FiO2 (%): 60 %  Rate Set (breaths/minute): 12 breaths/min  Tidal Volume Set (mL): 530 mL  PEEP (cm H2O): 10 cmH2O  Oxygen Concentration (%): 60 %  Resp: 12     General: no acute distress, intubated and sedated  HEENT: PERRLA, conjunctiva clear, without icterus or pallor, oropharyx clear  Cardiac: RRR nl S1S2   Lungs: clear to auscultation and percussion bilaterally anterior  Abdomen: soft, nontender, non distended, no hepatosplenomegaly or masses  Extremities: without edema or cyanosis; pulses are palpable;   Skin: normal skin appearance without worrisome lesions.   Neurologic:intubated and sedated,     Labs:  Recent Labs   Lab 03/06/19  0759 03/06/19  0601 03/06/19  0359 03/06/19  0154   PH 7.39 7.44 7.39 7.40   PCO2 36 33* 37 37   PO2 88 82 91 113*   HCO3 22 23 23 23   O2PER 60.0 60.0 60.0 60.0      Recent Labs   Lab 03/06/19  0414 03/05/19  2202 03/05/19  1602 03/05/19  1007   WBC 26.4* 26.8* 28.3* 29.2*    HGB 9.8* 10.2* 10.1* 9.5*     Creatinine   Date Value Ref Range Status   03/06/2019 2.33 (H) 0.66 - 1.25 mg/dL Final   03/05/2019 2.42 (H) 0.66 - 1.25 mg/dL Final   03/05/2019 2.53 (H) 0.66 - 1.25 mg/dL Final   03/05/2019 2.61 (H) 0.66 - 1.25 mg/dL Final       ECMO Issues including assessments and plan on DOS 3/6/2019:  Neuro: Sedated for mechanical ventilation and ECMO.  NIRS stable   CV: Cardiogenic shock.  Hemodynamically stable on VA ECMO and IABP  Pulm: Flows unchanged at 4.5L Sweep unchanged at 10 , Keep vent settings at rest settings: CMV rate 10, PEEP 8, Vt 450  FEN/Renal: Electrolytes stable w/ replacement protocols in place, Cr stable, UOP stable  Heme: ACT goal: 160, Hemoglobin   ID: Receiving empiric antibiotics  Cannulae: Position is acceptable on exam and the available imaging.  Distal perfusion cannula is in place and patent.     I have personally reviewed the ECMO flows, oxygenation and CO2 clearance, anticoagulation, and cannula position.  I have also personally assessed the patient's systemic response with hemodynamics, oxygenation, ventilation, and bleeding.       The patient requires continued ECMO support and management in the ICU.  I have discussed patient care and treatment plan with the primary team.      I have seen and examined the patient.I have reviewed pertinent labs.     Caleb Chatterjee MD  Interventional Cardiology  Pager: [phone]      March 6, 2019

## 2019-03-06 NOTE — ANESTHESIA PREPROCEDURE EVALUATION
Anesthesia Pre-Procedure Evaluation    Patient: Hellen Riddle   MRN:     [id number] Gender:   male   Age:    39 year old :      1980        Preoperative Diagnosis: Respiratory Failure   Procedure(s):  EXTRACORPORAL MEMBRANE OXYGENATOR DecANNULATION     Past Medical History:   Diagnosis Date     Dyslipidemia       No past surgical history on file.       Anesthesia Evaluation     .             ROS/MED HX    ENT/Pulmonary:       Neurologic: Comment: Sedated and intubated      Cardiovascular: Comment: H/o VF arrest, found to have multi-vessel disease s/p PCI, placed on ECMO, recent oxygenation issues            METS/Exercise Tolerance:     Hematologic:         Musculoskeletal:         GI/Hepatic:         Renal/Genitourinary: Comment: On CRRT    (+) chronic renal disease, type: ARF, Pt requires dialysis, type: Hemodialysis,       Endo:         Psychiatric:         Infectious Disease:         Malignancy:         Other:                         PHYSICAL EXAM:   Mental Status/Neuro:   Sedation: Continuous Medical Sedation   Airway: Facies: Feasible  Mallampati: Not Assessed  Mouth/Opening: Not Assessed  TM distance: Not Assessed  Neck ROM: Not Assessed  Device in place: ETT   Respiratory:    CV:    Comments:                    Lab Results   Component Value Date    WBC 23.6 (H) 2019    HGB 10.0 (L) 2019    HCT 32.2 (L) 2019     (L) 2019    .0 (H) 2019     (H) 2019     2019    POTASSIUM 4.3 2019    CHLORIDE 102 2019    CO2 20 2019    BUN 70 (H) 2019    CR 2.36 (H) 2019     (H) 2019    ALEKSANDER 8.3 (L) 2019    PHOS 4.5 2019    MAG 2.8 (H) 2019    ALBUMIN 1.9 (L) 2019    PROTTOTAL 6.9 2019    ALT 90 (H) 2019     (H) 2019    ALKPHOS 139 2019    BILITOTAL 6.8 (H) 2019    PTT 75 (H) 2019    INR 1.23 (H) 2019    FIBR 772 (H) 2019       Preop  "Vitals  BP Readings from Last 3 Encounters:   No data found for BP    Pulse Readings from Last 3 Encounters:   No data found for Pulse      Resp Readings from Last 3 Encounters:   03/06/19 12    SpO2 Readings from Last 3 Encounters:   03/06/19 97%      Temp Readings from Last 1 Encounters:   03/06/19 38.9  C (102  F)    Ht Readings from Last 1 Encounters:   02/23/19 1.68 m (5' 6.14\")      Wt Readings from Last 1 Encounters:   03/06/19 95.7 kg (210 lb 15.7 oz)    Estimated body mass index is 33.91 kg/m  as calculated from the following:    Height as of this encounter: 1.68 m (5' 6.14\").    Weight as of this encounter: 95.7 kg (210 lb 15.7 oz).     LDA:  Peripheral IV 02/23/19 Right (Active)   Site Assessment WDL 3/6/2019 12:00 PM   Line Status Saline locked 3/6/2019 12:00 PM   Phlebitis Scale 0-->no symptoms 3/6/2019 12:00 PM   Infiltration Scale 0 3/6/2019 12:00 PM   Extravasation? No 3/6/2019 12:00 PM   Number of days: 11       Arterial Line Radial (Active)   Site Assessment WDL 3/6/2019 12:00 PM   Line Status Pulsatile blood flow 3/6/2019 12:00 PM   Art Line Waveform Appropriate 3/6/2019 12:00 PM   Art Line Interventions Leveled;Connections checked and tightened 3/6/2019 12:00 PM   Color/Movement/Sensation Capillary refill less than 3 sec 3/6/2019 12:00 PM   Dressing Type Transparent 3/6/2019 12:00 PM   Dressing Status Clean, dry, intact 3/6/2019 12:00 PM   Dressing Intervention Dressing changed/new dressing 3/3/2019 10:00 PM   Dressing Change Due 03/10/19 3/6/2019  4:00 AM   Number of days:        CVC Triple Lumen Left Femoral (Active)   Site Assessment WDL 3/6/2019 12:00 PM   Dressing Intervention Chlorhexidine sponge;Transparent 3/6/2019 12:00 PM   Dressing Change Due 03/10/19 3/6/2019  4:00 AM   CVC Comment thermogard 3/6/2019 12:00 PM   Lumen A - Color WHITE 3/6/2019 12:00 PM   Lumen A - Status infusing 3/6/2019 12:00 PM   Lumen A - Cap Change Due 03/08/19 3/6/2019  4:00 AM   Lumen B - Color BLUE 3/6/2019 " 12:00 PM   Lumen B - Status infusing 3/6/2019 12:00 PM   Lumen B - Cap Change Due 03/08/19 3/6/2019  4:00 AM   Lumen C - Color BROWN 3/6/2019 12:00 PM   Lumen C - Status infusing 3/6/2019 12:00 PM   Lumen C - Cap Change Due 03/08/19 3/6/2019  4:00 AM   Extravasation? No 3/6/2019 12:00 PM   Number of days:        Arterial Sheath  (Active)   Specific Qualities Sutured 3/6/2019 12:00 PM   Site Assessment WDL 3/6/2019 12:00 PM   Dressing Type Transparent 3/6/2019 12:00 PM   Dressing Intervention Dressing changed/new dressing 3/2/2019  4:00 AM   Arterial Sheath Comment ECMO 3/6/2019 12:00 PM   Number of days: 11       Arterial Sheath  (Active)   Specific Qualities Stat Locked;Sutured 3/6/2019 12:00 PM   Site Assessment WDL 3/6/2019 12:00 PM   Dressing Type Transparent 3/6/2019 12:00 PM   Dressing Intervention Dressing changed/new dressing 3/2/2019  4:00 AM   Arterial Sheath Comment IABP 3/6/2019 12:00 PM   Number of days: 11       Arterial Sheath  (Active)   Specific Qualities Sutured 3/6/2019 12:00 PM   Site Assessment WDL 3/6/2019 12:00 PM   Dressing Type Transparent 3/6/2019 12:00 PM   Dressing Intervention Dressing changed/new dressing 3/2/2019  4:00 AM   Arterial Sheath Comment reperfusion cath 3/6/2019 12:00 PM   Number of days:        Venous Sheath (Active)   Specific Qualities Sutured 3/6/2019 12:00 PM   Site Assessment WDL 3/6/2019 12:00 PM   Dressing Type Transparent 3/6/2019 12:00 PM   Dressing Intervention Dressing changed/new dressing 3/2/2019  4:00 AM   Venous Sheath Comment ECMO 3/6/2019 12:00 PM   Number of days: 11       Venous Sheath (Active)   Specific Qualities Sutured 3/6/2019 12:00 PM   Site Assessment WDL 3/6/2019 12:00 PM   Dressing Type Transparent 3/6/2019 12:00 PM   Dressing Intervention Dressing changed/new dressing 3/4/2019 10:44 PM   Venous Sheath Comment ECMO 3/6/2019 12:00 PM   Number of days: 2       ETT (adult) (Active)   Secured By Commercial tube blandon 3/6/2019 12:00 PM   Site  Appearance Clean and dry 3/6/2019 12:00 PM   Secured at (cm) to lip 28 cm 3/6/2019 12:00 PM   Site of ET Tube Securement At lip 3/6/2019 12:00 PM   Cuff Pressure - Type minimal leak technique 3/6/2019 12:00 PM   Tube Care/Reposition repositioned tube right side of mouth 3/6/2019 11:53 AM   Bite Block Secure and Patent 3/6/2019 12:00 PM   Safety Measures manual resuscitator/mask/valve in room 3/6/2019 12:00 PM   ETT Cuff Deflation Leak Test No Leak 2/27/2019  4:00 PM   Number of days: 11       NG/OG Tube Orogastric 16 fr Center mouth (Active)   Site Description [facility] 3/6/2019 12:00 PM   Status Suction-low intermittent 3/6/2019 12:00 PM   Drainage Appearance Bile;Yellow 3/6/2019 12:00 PM   Placement Check Pyatt unchanged 3/6/2019 12:00 PM   Pyatt (cm marking) at nare/mouth 76 cm 3/6/2019 12:00 PM   Intake (ml) 30 ml 2/27/2019  8:00 AM   Flush/Free Water (mL) 30 mL 3/6/2019 12:00 PM   Output (ml) 50 ml 3/6/2019  6:00 AM   Number of days: 11       NG/OG Tube 10 fr Left nostril (Active)   Site Description [facility] 3/6/2019 12:00 PM   Status Enteral Feedings 3/6/2019 12:00 PM   Placement Check Pyatt unchanged 3/6/2019 12:00 PM   Pyatt (cm marking) at nare/mouth 95 cm 3/6/2019 12:00 PM   Intake (ml) 100 ml 3/6/2019  8:00 AM   Flush/Free Water (mL) 30 mL 3/6/2019 12:00 PM   Output (ml) 600 ml 3/2/2019  5:00 AM   Number of days: 9       Rectal Tube (Active)   Stool Amount Small 3/6/2019 12:00 PM   Rectal Tube Output 200 ml 3/6/2019  6:00 AM   Number of days: 1            Assessment:   ASA SCORE: 4    NPO Status: > 6 hours since completed Solid Foods   Documentation: H&P complete; Preop Testing complete; Consents complete   Proceeding: Proceed without further delay  Tobacco Use:  NO Active use of Tobacco/UNKNOWN Tobacco use status     Plan:   Anes. Type:  General      Induction:  IV (Standard)   Airway: Oral ETT   Access/Monitoring: PIV (arterial line, central line in situ)   Maintenance: Balanced   Emergence: Procedure  Site   Logistics: Same Day Surgery     Postop Pain/Sedation Strategy:  Standard-Options: Opioids PRN     PONV Management:  Adult Risk Factors:, Non-Smoker, Postop Opioids                         Violeta Newby MD

## 2019-03-06 NOTE — PLAN OF CARE
Patient remains intubated, sedated, on VAV ECMO support. Minimal changes overnight. IABP 1:1. Able to decrease sweep and levo. Net negative ~2L yesterday. High stool output. Plan to transition to VV today.

## 2019-03-06 NOTE — PROGRESS NOTES
ECMO Shift Summary:    Patient remains on VAV ECMO, all equipment is functioning and alarms are appropriately set. RPM's 3220 with flow range 4.29 - 4.99 L/min and VV flow range 1.80 - 2.05 L/min. Sweep gas is at 10 LPM and FiO2 100%. Circuit remains free of air, clot and fibrin. Cannulas are secure with no bleeding from site. Extremities are warm to touch. Suctioned ETT for small white secretions.    Significant Shift Events: VA ECMO clamped for 1 minutes per MD. No significant complications.     Vent settings:  Ventilation Mode: CMV/AC  (Continuous Mandatory Ventilation/ Assist Control)  FiO2 (%): 60 %  Rate Set (breaths/minute): 12 breaths/min  Tidal Volume Set (mL): 530 mL  PEEP (cm H2O): 10 cmH2O  Oxygen Concentration (%): 60 %  Resp: 12  .    Heparin is running at 1400 u/kg/hr, ACT range 184 - 196    Urine output is as charted , blood loss was none. Product given included 1 unit PRBC      Intake/Output Summary (Last 24 hours) at 3/5/2019 1803  Last data filed at 3/5/2019 1800  Gross per 24 hour   Intake 3358.26 ml   Output 4573 ml   Net -1214.74 ml       ECHO:  No results found for this or any previous visit.No results found for this or any previous visit.    CXR:  Recent Results (from the past 24 hour(s))   XR Chest Port 1 View    Narrative    EXAM: XR CHEST PORT 1 VW  3/5/2019 1:35 AM      HISTORY: Cardiac arrest, ECMO    COMPARISON: Radiograph 3/4/2019    FINDINGS: AP radiograph of the chest. Right IJ ECMO catheter tip  projects over the right pulmonary artery. Lower approach ECMO catheter  projects over the right atrium. Intra-aortic balloon pump tip projects  at the level of the tereso. Endotracheal tube tip projects over the  midthoracic trachea. Feeding tube and gastric tube pass below the  diaphragm, collimated off the field-of-view.    Small pleural effusions are stable. Diffuse interstitial opacities,  right greater than left, slightly improved. Stable retrocardiac  opacity.       Impression     IMPRESSION:   1. Right IJ ECMO catheter tip projects over the right pulmonary  artery. Lower approach ECMO catheter projects over the right atrium.    2. Intra-aortic balloon pump tip projects at the level of the tereso.   3. Diffuse interstitial opacities, right greater than left, slightly  improved. Likely pulmonary edema.  4. Stable retrocardiac opacity, likely atelectasis.    I have personally reviewed the examination and initial interpretation  and I agree with the findings.    ROSELYN CASH MD       Labs:  Recent Labs   Lab 03/05/19  1601 03/05/19  1407 03/05/19  1205 03/05/19  1007   PH 7.40 7.38 7.39 7.37   PCO2 36 36 33* 35   PO2 94 80 101 130*   HCO3 22 21 20* 20*   O2PER 60 60.0 60.0 60.0       Lab Results   Component Value Date    HGB 10.1 (L) 03/05/2019    PHGB 60 (H) 03/05/2019     (L) 03/05/2019    FIBR 772 (H) 03/05/2019    INR 1.23 (H) 03/05/2019    PTT 74 (H) 03/05/2019    DD >20.0 (H) 03/05/2019    AXA 0.45 03/05/2019    ANTCH 55 (L) 03/05/2019         Plan to remain on VAV ECMO      Sylvia Clemens, CALLIE  3/5/2019 6:03 PM

## 2019-03-06 NOTE — PROGRESS NOTES
Wean paralytic to off this shift per Dr Hebert.  Also norepi weaned to 0.13 to keep MAPs > 60.  Bedside TTE VA ECMO turndown.  Plan to decannulate VA ECMO and convert to VV ECMO 3/6/19.  CRRT continues with negative 1,546 ml at time of this note.  1 unit PRBC given this shift to keep Hgb > 10.  Family updated on POC. Will continue to monitor.

## 2019-03-06 NOTE — PROGRESS NOTES
CRRT STATUS NOTE    DATA:  Time:  6:07 PM  Pressures WNL:  YES  Filter Status:  WDL    Problems Reported/Alarms Noted:  Flow alarms - improved with use of hair binders and floor mat.    Supplies Present:  YES    ASSESSMENT:  Patient Net Fluid Balance:  -1633 mL ()  Vital Signs:  Tmax 97.5, HR 81-96, MAPs 64-75 on Levophed and Vasopressin drips, RR 12, SpO2 % on vent support (60% FIO2, PEEP 10), inhaled epoprostenol, and V-A-V ECMO.  Labs:  Troponin 17.6 (trending down), WBC 28.3 (trending down), Lactic acid 3.8 (trending down).  Goals of Therapy:  0-100 mL/hr net negative if BP tolerates, if MAP <60 or CVP <8 then match I=O.  Currently achieving goals of therapy, ~96 mL/hr net negative.    INTERVENTIONS:   No acute interventions.    PLAN:  Continue fluid removal as tolerated per goals of therapy.  Check filter daily, change filter q 72 hrs and PRN.  Please contact the CRRT resource RN at [phone] with any questions/concerns.

## 2019-03-06 NOTE — ANESTHESIA PROCEDURE NOTES
Perioperative JOSLYN Report  Anesthesia Information  JOSLYN probe placed and report generated by: : Violeta Newby MD Lanigan, Megan Jane, MD  Images for this study have been archived.         Preanesthesia Checklist:  Patient identified, IV assessed, risks and benefits discussed, monitors and equipment assessed, procedure being performed at surgeon's request and anesthesia consent obtained.  General Procedure Information  Modalities:  2D and Color flow mapping  Diagnostic indications for JOSLYN:   Cardiomyopathy, other                          .    Echocardiographic and Doppler Measurements  Right Ventricle:  Cavity size normal.   Hypertrophy not present.   Thrombus not present.      Left Ventricle:  Cavity size normal.   Hypertrophy not present.   Thrombus not present.   Ejection Fraction 35%.      Ventricular Regional Function:  1- Basal Anteroseptal:  hypokinetic  2- Basal Anterior:  hypokinetic  3- Basal Anterolateral:  hypokinetic  4- Basal Inferolateral:  hypokinetic  5- Basal Inferior:  hypokinetic  6- Basal Inferoseptal:  hypokinetic  7- Mid Anteroseptal:  hypokinetic  8- Mid Anterior:  hypokinetic  9- Mid Anterolateral:  hypokinetic  10- Mid Inferolateral:  hypokinetic  11- Mid Inferior:  hypokinetic  12- Mid Inferoseptal:  hypokinetic  13- Apical Anterior:  hypokinetic  14- Apical Lateral:  hypokinetic  15- Apical Inferior:  hypokinetic  16- Apical Septal:  hypokinetic  17- [place]:  hypokinetic    Valves  Aortic Valve: Annulus normal.  Stenosis not present.  Regurgitation absent.  Leaflets normal.  Leaflet motions normal.    Mitral Valve: Annulus normal.  Stenosis not present.  Regurgitation +1.  Leaflets normal.  Leaflet motions normal.    Tricuspid Valve: Annulus normal.  Stenosis not present.  Regurgitation +1.  Leaflets normal.  Leaflet motions normal.    Pulmonic Valve: Annulus normal.  Stenosis not present.  Regurgitation absent.      Aorta: Ascending Aorta: Size normal.  Dissection not present.   Plaque thickness less than 3 mm.  Mobile plaque not present.    Aortic Arch: Size normal.   Dissection not present.   Plaque thickness less than 3 mm.   Mobile plaque not present.    Descending Aorta: Size normal.   Dissection not present.   Plaque thickness less than 3 mm.   Mobile plaque not present.        Right Atrium:  Size normal.   Spontaneous echo contrast not present.   Thrombus not present.   Tumor not present.   Device present.   Left Atrium: Size normal.  Spontaneous echo contrast not present.  Thrombus not present.  Tumor not present.  Device not present.    Left atrial appendage normal.         Other Findings:   Pericardium:  pericardial effusion.  Pleural Effusion:  none. .  .  No coronary sinus catheter present.  .  .  Post Intervention Findings  . .   Regional wall motion:   Surgeon(s) notified of all postintervention findings:  Yes  .  .  .   .  .  .  .  .  .  .    Limited exam performed for ECMO decannulation, EF 30-35% on 1 L, unchanged post decannulation    Echocardiogram Comments

## 2019-03-06 NOTE — PROGRESS NOTES
"Nephrology Progress Note  03/06/2019       Mr Riddle is a 39 yom w/HLP who had cardiac arrest on 2/23/19 with unclear down-time.  PCI done emergently, Nephrology consulted for management of FERNANDA, started CRRT on 2/25.      Interval History :   Mr Riddle continues on CRRT via ECMO circuit, was -1.9L needing 2.6L of UF to achieve and is still needing significant mechanical support.  No major change in Nephrology plan, continuing to pull 0-100cc/hr to optimize volume status, making some improvements in ECMO requirements, still heavily sedated so neuro status unclear currently.      Assessment & Recommendations:   FERNANDA-Baseline Cr unclear, admitted with Cr of 1.7 post arrest.  FERNANDA due to hemodynamic injury, also received contrast and had elevated CK.  Started CRRT on 2/25, has been anuric since starting CRRT, ~48h after arrest.  Continuing CRRT with no signs of recovery, still needing extensive mechanical support.  Has been net negative the past week, on track to again be 1-2L net negative today.     -Access is ECMO circuit.   -CRRT, 50-100cc/hr net negative, 4k baths.     Volume status-Net negative the past 6 days, down 12kg from admission, continuing to pull 1-2L daily to optimize as able, no chugging on ECMO.     Electrolytes/pH-No acute issues on CRRT, K 3.8, bicarb 20.     Ca/phos/pth-Ca 8.3, Mg/Phos reasonable.     Anemia-Hgb 10.3, needing intermittent PRBC's with ECMO.    Nutrition-Impact TF.      Seen and discussed with Dr Caban.    Recommendations were communicated to primary team via verbal communication.    Darren Vidal  Clinical Nurse Specialist  [phone]    Review of Systems:   I reviewed the following systems:  ROS not done due to vent/sedation.     Physical Exam:   I/O last 3 completed shifts:  In: 4002.65 [I.V.:2067.65; NG/GT:435]  Out: 5905 [Emesis/NG output:450; Other:3455; Stool:2000]   Temp 96.8  F (36  C)   Resp 12   Ht 1.68 m (5' 6.14\")   Wt 95.7 kg (210 lb 15.7 oz)   SpO2 98%   BMI 33.91 kg/m   "     GENERAL APPEARANCE: Intubated and sedated, on ECMO  EYES:  No scleral icterus, pupils equal  HENT: mouth without ulcers or lesions  PULM: lungs clear to auscultation, equal air movement, no cyanosis or clubbing  CV: regular rhythm, normal rate, no rub     -edema +1 LE  GI: soft, non-tender, non-distended, bowel sounds are +  MS: no evidence of inflammation in joints, no muscle tenderness  NEURO:  Intubated and sedated    Labs:   All labs reviewed by me  Electrolytes/Renal -   Recent Labs   Lab Test 03/06/19  1000 03/06/19  0414 03/06/19  0359 03/05/19 2202 03/05/19  1602    136  --  136 134   POTASSIUM 3.8 4.0  --  3.9 4.0   CHLORIDE 102 103  --  101 101   CO2 20 19*  --  21 20   BUN 70* 64*  --  69* 73*   CR 2.36* 2.33*  --  2.42* 2.53*   *  146* 129* 131* 141*  151* 157*   ALEKSANDER 8.3* 8.8  --  8.8 9.1   MAG  --  2.8*  --  2.7* 2.7*   PHOS  --  4.9*  --  4.3 4.1       CBC -   Recent Labs   Lab Test 03/06/19  1000 03/06/19  0414 03/05/19  2202   WBC 23.6* 26.4* 26.8*   HGB 10.3* 9.8* 10.2*   * 137* 138*       LFTs -   Recent Labs   Lab Test 03/06/19  1000 03/06/19  0414 03/05/19  2202   ALKPHOS PENDING 142 140   BILITOTAL PENDING 5.8* 5.2*   ALT PENDING 95* 103*   AST PENDING 162* 189*   PROTTOTAL PENDING 7.0 7.3   ALBUMIN PENDING 2.0* 2.0*       Iron Panel - No lab results found.        Current Medications:    artificial tears   Both Eyes Q8H     aspirin  81 mg Oral or Feeding Tube Daily     B and C vitamin Complex with folic acid  5 mL Per Feeding Tube Daily     meropenem  1 g Intravenous Q8H     micafungin  150 mg Intravenous Q24H     oseltamivir  75 mg Oral BID     pantoprazole  40 mg Oral or Feeding Tube QAM     polyethylene glycol  17 g Oral or Feeding Tube Daily     protein modular  1 packet Per Feeding Tube BID     senna-docusate  1 tablet Oral or Feeding Tube At Bedtime     sodium chloride (PF)  3 mL Intracatheter Q8H     ticagrelor  90 mg Oral or Feeding Tube BID     vancomycin  (VANCOCIN) IV  2,000 mg (central catheter) Intravenous Q24H       cisatracurium (NIMBEX) infusion ADULT Stopped (03/05/19 1300)     dexmedetomidine Stopped (03/03/19 1600)     IV fluid REPLACEMENT ONLY       CRRT replacement solution 12.5 mL/kg/hr (03/06/19 0929)     epoprostenol (VELETRI) 20 mcg/mL in sterile water inhalation solution 20 ng/kg/min (03/06/19 0759)     fentaNYL 100 mcg/hr (03/06/19 0900)     heparin 2 unit/mL in 0.9% NaCl 3 mL/hr (03/06/19 0900)     HEParin 1,600 Units/hr (03/06/19 1000)     insulin (regular) 7 Units/hr (03/06/19 0902)     midazolam 10 mg/hr (03/06/19 0900)     - MEDICATION INSTRUCTIONS -       norepinephrine 0.1 mcg/kg/min (03/06/19 0900)     CRRT replacement solution 2.051 mL/kg/hr (03/05/19 2027)     CRRT replacement solution 12.5 mL/kg/hr (03/06/19 0929)     vasopressin (PITRESSIN) infusion ADULT (40 mL) 2.4 Units/hr (03/06/19 0900)

## 2019-03-06 NOTE — ANESTHESIA PROCEDURE NOTES
JOSLYN Probe Insertion Note:    Inserted by:  Violeta Newby MD (Responsible Anesthesiologist)    Probe Status PRE Insertion: NO obvious damage  Probe type:  Adult 3D    Bite block used:   Yes  Insertion Technique: Jaw Lift  Insertion complications: None obvious    Billing Report:JOSLYN report by Anesthesiologist (See Separate Report note)    Probe Status POST Removal: NO obvious damage

## 2019-03-06 NOTE — PROGRESS NOTES
ECMO Attending Progress Note  3/6/2019    Mr. Riddle is a 40yo M with HLD and Fhx CAD/MI who presented with refractory VF OHCA brought directly to the CCL now s/p peripheral VA ECMO placement. Found to have severe 3v disease s/p MITA to mRCA and mLcx. Also with IABP placement for presumed pulmonary edema. Being managed as part post VF protocol.  Post arrest day 10. He is currently on VVA ECMO for hypoxemia and pneumonia         Interval events: Improved cardiac function ready for VA decanulation and maintenance of VV ECMO compnent    Physical Exam:  Temp:  [93.9  F (34.4  C)-97.9  F (36.6  C)] 93.9  F (34.4  C)  Heart Rate:  [] 94  Resp:  [12] 12  MAP:  [60 mmHg-90 mmHg] 62 mmHg  Arterial Line BP: ()/(37-69) 89/39  FiO2 (%):  [60 %] 60 %  SpO2:  [97 %-100 %] 98 %    Intake/Output Summary (Last 24 hours) at 3/6/2019 0958  Last data filed at 3/6/2019 0900  Gross per 24 hour   Intake 3973.53 ml   Output 6134 ml   Net -2160.47 ml    Ventilation Mode: CMV/AC  (Continuous Mandatory Ventilation/ Assist Control)  FiO2 (%): 60 %  Rate Set (breaths/minute): 12 breaths/min  Tidal Volume Set (mL): 530 mL  PEEP (cm H2O): 10 cmH2O  Oxygen Concentration (%): 60 %  Resp: 12     General: no acute distress, intubated and sedated  HEENT: PERRLA, conjunctiva clear, without icterus or pallor, oropharyx clear  Cardiac: RRR nl S1S2   Lungs: clear to auscultation and percussion bilaterally anterior  Abdomen: soft, nontender, non distended, no hepatosplenomegaly or masses  Extremities: without edema or cyanosis; pulses are palpable;   Skin: normal skin appearance without worrisome lesions.   Neurologic:intubated and sedated,     Labs:  Recent Labs   Lab 03/06/19  0759 03/06/19  0601 03/06/19  0359 03/06/19  0154   PH 7.39 7.44 7.39 7.40   PCO2 36 33* 37 37   PO2 88 82 91 113*   HCO3 22 23 23 23   O2PER 60.0 60.0 60.0 60.0      Recent Labs   Lab 03/06/19  0414 03/05/19  2202 03/05/19  1602 03/05/19  1007   WBC 26.4* 26.8* 28.3* 29.2*    HGB 9.8* 10.2* 10.1* 9.5*     Creatinine   Date Value Ref Range Status   03/06/2019 2.33 (H) 0.66 - 1.25 mg/dL Final   03/05/2019 2.42 (H) 0.66 - 1.25 mg/dL Final   03/05/2019 2.53 (H) 0.66 - 1.25 mg/dL Final   03/05/2019 2.61 (H) 0.66 - 1.25 mg/dL Final       ECMO Issues including assessments and plan on DOS 3/6/2019:  Neuro: Sedated for mechanical ventilation and ECMO.  NIRS stable   CV: Cardiogenic shock.  Hemodynamically stable on VA ECMO and IABP  Pulm: Flows unchanged at 4.5L Sweep unchanged at 10 , Keep vent settings at rest settings: CMV rate 10, PEEP 8, Vt 450  FEN/Renal: Electrolytes stable w/ replacement protocols in place, Cr stable, UOP stable  Heme: ACT goal: 160, Hemoglobin   ID: Receiving empiric antibiotics  Cannulae: Position is acceptable on exam and the available imaging.  Distal perfusion cannula is in place and patent.     I have personally reviewed the ECMO flows, oxygenation and CO2 clearance, anticoagulation, and cannula position.  I have also personally assessed the patient's systemic response with hemodynamics, oxygenation, ventilation, and bleeding.       The patient requires continued ECMO support and management in the ICU.  I have discussed patient care and treatment plan with the primary team.      I have seen and examined the patient.I have reviewed pertinent labs.     Caleb Chatterjee MD  Interventional Cardiology  Pager: [phone]      March 6, 2019

## 2019-03-06 NOTE — PROGRESS NOTES
Potassium trend down will change to K 4 bath , same UF goal       Patricia Feng  Nephrology Fellow  Pager: [phone]

## 2019-03-06 NOTE — PROGRESS NOTES
"Cardiology Progress Note    Overnight/subj:   - Great turndown yesterday  - Improving hypoxia on VV ECMO  - Improving lactate and hemodynamics    VS reviewed: Notable for temp: AF, HR 76-85, MAP 73-85, oxygenating on 100%   Vent 12/530/10/60      IABP via LFA, 1:1, triggered by ECG, augmented MAPs , pulses NIRS stable    ECMO  \"Patient remains on VAV ECMO, all equipment is functioning and alarms are appropriately set. RPM's 3200 with flow range 4.4 L/min. Sweep gas is at 8 LPM and FiO2 100%. Circuit remains free of air, clot and fibrin. Cannulas are secure with no bleeding from site. Extremities are warm to the touch. Suctioned ETT for small amount of tan secretions.\"    Wt: on admit 107.9, yday 97.5, today 95.7  I/O: 3.8 in / 5.7 out yday. Since MN 1.4 in / 2.3 out    Drips:   NE 0.08  Vaso 2.4    Fentanyl 200  Versed 14  Hep 1500  Insulin 7    PO Cardiac Meds: ASA 81mg daily, ticagrelor 90mg BID  Other meds: MVI, senna, miralax     Abx: meropenem 03/04-**, vanco 03/04- **, micafungin 03/04-**, tamiflu 03/04-**    Radiology Imaging  CXR- stable lines, improving lung opacities    Cardiology Studies:  TTE reviewedd    Labs: Reviewed in epic    Phys exam:  GEN: NAD  Pulm: course breath sounds b/l, stable airmovement  Cardiac:  Distant heart sounds, no murmurs  Vascular: mild IRISH and NIRS stable   GI: soft, non distended     ASSESSMENT/PLAN:  Mr. Riddle is a 40yo M with HLD and Fhx CAD/MI who presented with refractory VF OHCA brought directly to the CCL now s/p peripheral VA ECMO placement. Found to have severe 3v disease s/p MITA to mRCA and mLcx. Also with IABP placement for presumed pulmonary edema. Being managed as part post VF protocol.  Post arrest day 11.     Neurology:  # CT head day3: minimal edema - Continue EEG- no e/o seizures   - Intubated, sedated   -continue fentanyl  - Versedd  - No longer on hypertonic saline, targeting normal serum Na   Cardiovascular / Hemodynamics:  # Refractory VF arrest.    #CAD " s/p MITA  mRCA and mCx  # Torsades (likely relate Qtc >600, corrected)  # Cardiogenic Shock requiring ECMO support - continue vaso and NE as needed  -- decannulate of VA to VV ECMO & IABP, CVTS consult  --continue ASA 81mg and ticagrelor 90mg BID  --hold lipitor for now given likely hepatic injury during arrest  --hold ACE/ARB for now given likely reduced renal fxn after arrest  --holding beta blocker given shock    Pulmonary:  # Refractory Hypoxemia 2/2 pulmonary edema + Flu now VAV  # Resp acidosis (currected) -- Improved hypoxemia  -- Continue VC- wean PEEP as able.  Lung protected ventalation  -- off  paralytics for now  -- Fluid removal as able   -- ETT stable   -- Q2h ABGs for now  -- consider scheduled duonebs if signs of lung dz, currently PRN          GI and Nutrition:   Shock Liver (recovering) No known medical hx.    --Nutrition consulted, appreciate their help  -- Post pyloric feeds started  --bowel regimen   --GI Prophylaxis: PPI    Renal, Fluid and Electrolytes:  # FERNANDA with anuria -- Renal consult   -- on CRRT, plan for volume removal given worsening hypoxia  --monitor urine output  --maintain K>3 and Mg>2    Infectious Disease:  # Marked Leukocytosis (improving)  # Bronch cx: E. Coli, staph hominus and Influenza  # Bcx 02/28 growing GPCs 1/2 (likely contaminant) -- Appreciate ID help  -- continue meropenem, vancomycin and micafungin for now  -- Continue tamiflu   --vancomycin/zosyn x5 days for ECMO (Completed 02/28/19)  -- daily blood cultures      Hematology: Coagulopathy related to ECMO Receiving heparin for ECMO and ASA/ticagrelor for MITA.   --cryo PRN fibrinogen < 200; FFP for INR >2  -- Transfuse plat <50  --Transfuse for Hgb<10  --heparin gtt for ECMO with ACT goal 160-180  --DVT PPX: Heparin as above   Endocrinology: No known medical history. BG elevated.  --insulin gtt PRN   Lines: R femoral arterial and venous ECMO cannulae February 23, 2019  L femoral arterial line February 23, 2019  R radial  arterial line February 23, 2019  ETT February 23, 2019  Pisano catheter February 23, 2019  RIJ-MPA 26Fr venous cannula  OG tube February 23, 2019  Restraint: PRN  Current lines are required for patient management         Code status FULL     Discussed with Dr. Chatterjee.      Theron Hebert MD  Cardiology Fellow- [facility]  *[phone]       I have seen and examined the patient with the [facility] team. I agree with the assessment and plan of the note above.I have reviewed pertinent labs.     Caleb Chatterjee MD  Interventional Cardiology  Pager: [phone]

## 2019-03-06 NOTE — PROGRESS NOTES
CRRT STATUS NOTE    DATA:  Time:  6:02 AM  Pressures WNL:  YES  Filter Status:  WDL    Problems Reported/Alarms Noted:  NA    Supplies Present:  YES    ASSESSMENT:  Patient Net Fluid Balance:  3/5: -1.9L, 3/6: -460 ml  Vital Signs:  Vital signs:  Temp: 97.3  F (36.3  C) Temp src: Esophageal     Heart Rate: 79 Resp: 12 SpO2: 100 % O2 Device: Mechanical Ventilator /48(77)  Labs:  Cr 2.33, Mag 2.8, Phos 4.9, Trop 13, WBC 26.4  Goals of Therapy:  Net negative 0-100 ml/hr as BP tolerates    INTERVENTIONS:   Checked in with bedside nurse. Patient switched to 4K bath in the evening.    PLAN:  Continue CVVHDF per renal orders. Change set q72h and PRN. Contact CRRT RN with questions or concerns at [phone].

## 2019-03-06 NOTE — PROCEDURES
[facility] EEG #-11 (Day 11 of Video-EEG Monitoring)    DATE OF RECORDIN2019    DURATION OF RECORDING:  10 hours, 37 minutes.    CLINICAL SUMMARY: This video-EEG monitoring procedure was performed in evaluation of encephalopathy following cardiac arrest in [facility].  The patient was reported to be receiving infusions of fentanyl and midazolam on this day of monitoring.    TECHNICAL SUMMARY:  This continuous EEG monitoring procedure was performed with 23 scalp electrodes in 10-20 system placements, and additional scalp, precordial and other surface electrodes used for electrical referencing and artifact detection.  Video monitoring was utilized and periodically reviewed by EEG technologists and the physician for electroclinical correlation.     EEG ACTIVITIES DURING COMA:  During sedated coma, there was no evidence of wake-sleep cycling behaviorally or electrographically. There was ongoing low amplitude (up to 5-10-microvolt) generalized delta slowing intermixed with superimposed faster theta and alpha frequencies (maximum amplitudes in the paracentral and central regions). There was no focal slowing.  At 09:44 cutaneous and verbal stimulation did not induce any electrographic or behavioral response.      There were no interictal epileptiform abnormalities and no electrographic seizures.     SUMMARY OF DAY 11 OF VIDEO-EEG MONITORING:    The interictal EEG recording during sedated coma was abnormal due to generalized low amplitude delta slowing intermixed with faster frequencies.    No interictal epileptiform abnormalities and no electrographic seizures were seen.      SUMMARY OF 11 DAYS OF VIDEO-EEG MONITORING:    The video-EEG findings evolved over the course of monitoring.  On Days 1-2, there was generalized delta-theta slowing.  On Day 3, there was a change from delta and theta slowing to severe generalized attenuation between the hours of 0200 and 0300.  Following this change, this pattern remained  through the end of Day 11.  There were no interictal epileptiform abnormalities and no electrographic seizures seen throughout the recording.    These findings indicate of a moderate-severe diffuse encephalopathy evolving to a severe diffuse encephalopathy following cardiac arrest, likely due to anoxic brain injury with a poor prognosis for neurological recovery.     Clinical correlation is recommended.  Dictated By: Ulises Galvan DO, Clinical Neurophysiology Fellow   I agree with the findings as reported.  I personally reviewed this video-EEG recording.    Jose Roberto Li M.D., Professor of Neurology       D: 2019   T: 2019   MT: LORRIE      Name:     LINOEL CARTY   MRN:      [id number]        Account:        [id number]   :      1980           Procedure Date: 2019      Document: [id number]

## 2019-03-06 NOTE — PROGRESS NOTES
[facility] ID Service: Follow Up Note      Patient:  Hellen Riddle, Date of birth 1980, Medical record number [id number]           Assessment and Recommendations:   Recommendations:  - discontinue IV meropenem  - start IV ertapenem 1g every 24 hrs (would defer adjustment in dosing for CRRT per pharmacy)  - discontinue IV vancomycin   - discontinue IV micafungin   - continue oseltamivir at current dosing  - ID will continue to follow daily     Problem List:  # Hypoxic respiratory failure - on VV ECMO  # Influenza A infection   # Possible co-infection with E. Coli   # Candida in sputum (oropharyngeal colonization)  # Extreme leukocytosis, improving   # Septic shock, improving   # S/P out of hospital cardiac arrest, s/p VA ECMO 2/23/19-3/6/19    Discussion:  38 yo male admitted 2/23/19 with refractory VF OHCA s/p VA ECMO on admission, found to have 3v CAD. ID consulted 3/4/19 for increasing leukocytosis, hypoxia, lactic acidosis.     We have now found that he is positive for Influenza A, which could certainly explain the worsening hypoxia, lactic acidosis. He is also growing E. Coli from the sputum, one strain of which is resistant to pip-tazo (which he was receiving at the time of culture) and we will treat him for superimposed bacterial as well. As we have no evidence of MRSA / Enteroccal infection we can discontinue IV vancomycin. As we have no evidence of Pseudomonal infection we can discontinue meropenem and treat with IV ertapenem instead. We are finding Candida in his sputum however this is not likely to be pathogenic and likely represents colonization. We have no sign of Candidemia or other pulmonary fungal infection (as well as alternate explanation for his decompensation), thus we recommend discontinuing micafungin.     Patient seen and discussed with attending physician Dr. Ricardo Nesbitt MD  PGY-5 Infectious Diseases Fellow  [facility] [phone]          Interval History:   Pt is off paralytics,  remained heavily sedated.   No major events overnight.   Able to decrease ECMO sweep substantially.   Not making urine.          Current Antimicrobials   Meropenem 3/5-present  Vancomycin 2/23-present  Micafungin 3/5-present  Oseltamivir 3/4-present     Prior antibiotics:  Pip/tazo 2/23-3/4         Physical Exam:   Ranges for vital signs:  Temp:  [93.9  F (34.4  C)-97.9  F (36.6  C)] 94.6  F (34.8  C)  Heart Rate:  [] 91  Resp:  [12] 12  MAP:  [60 mmHg-90 mmHg] 70 mmHg  Arterial Line BP: ()/(37-69) 95/43  FiO2 (%):  [60 %] 60 %  SpO2:  [89 %-100 %] 100 %    Intake/Output Summary (Last 24 hours) at 3/6/2019 1353  Last data filed at 3/6/2019 1200  Gross per 24 hour   Intake 3838.03 ml   Output 5115 ml   Net -1276.97 ml     Exam:  GENERAL:  Intubated and sedated and   ENT:  Head is normocephalic, atraumatic. Anterior oropharynx is moist without exudates or ulcers.  EYES:  Eyes have anicteric sclerae.    NECK:  Supple.  LUNGS:  Coarse breath sounds bilaterally.   CARDIOVASCULAR: ECMO hum  ABDOMEN:  Normal bowel sounds, soft, nontender.  EXT: Extremities warm and without edema. ECMO and balloon pump entrance sites without erythema or disharge. Has L femoral line as well without surrounding erythema  SKIN:  No acute rashes.    NEUROLOGIC: Sedated         Laboratory Data:   Microbiology:         Culture Micro   Date Value Ref Range Status   03/06/2019 No growth after 2 hours  Preliminary   03/06/2019 PENDING  Preliminary   03/05/2019 Culture in progress  Preliminary   03/05/2019 No growth after 1 day  Preliminary   03/04/2019 Light growth  Escherichia coli   (A)  Final   03/04/2019 Light growth  Strain 2  Escherichia coli   (A)  Final   03/04/2019 (A)  Final    Light growth  Candida albicans / dubliniensis  Candida albicans and Candida dubliniensis are not routinely speciated  Susceptibility testing not routinely done     03/04/2019 Susceptibility testing done on previous specimen  Final   03/04/2019 No growth  after 2 days  Preliminary   03/03/2019 Moderate growth  Escherichia coli   (A)  Final   03/03/2019 Moderate growth  Strain 2  Escherichia coli   (A)  Final   03/03/2019 (A)  Final    Light growth  Candida albicans / dubliniensis  Candida albicans and Candida dubliniensis are not routinely speciated  Susceptibility testing not routinely done     03/03/2019 No growth after 3 days  Preliminary   03/03/2019 (A)  Final    Moderate growth  Escherichia coli  Susceptibility testing done on previous specimen     03/03/2019 (A)  Final    Moderate growth  Candida albicans / dubliniensis  Candida albicans and Candida dubliniensis are not routinely speciated  Susceptibility testing not routinely done     03/02/2019 Heavy growth  Escherichia coli   (A)  Final   03/02/2019 (A)  Final    Light growth  Candida albicans / dubliniensis  Candida albicans and Candida dubliniensis are not routinely speciated  Susceptibility testing not routinely done     03/02/2019 No growth after 4 days  Preliminary   03/01/2019 (A)  Final    Light growth  Candida albicans / dubliniensis  Candida albicans and Candida dubliniensis are not routinely speciated  Susceptibility testing not routinely done     03/01/2019 No growth after 5 days  Preliminary   02/28/2019 (A)  Final    Cultured on the 1st day of incubation:  Staphylococcus hominis     02/28/2019   Final    Critical Value/Significant Value, preliminary result only, called to and read back by  SURESH DE LEON RN (4E).  03.01.19  0518 GJS     02/28/2019   Final    (Note)  POSITIVE for Staphylococci other than S.aureus, S.epidermidis and  S.lugdunensis, by sourceasy multiplex nucleic acid test.  Coagulase-negative staphylococci are the most common venipuncture or  collection associated skin CONTAMINANTS grown in blood cultures.  Final identification and antimicrobial susceptibility testing will be  verified by standard methods.    Specimen tested with [employer] multiplex, gram-positive blood  culture  nucleic acid test for the following targets: Staph aureus, Staph  epidermidis, Staph lugdunensis, other Staph species, Enterococcus  faecalis, Enterococcus faecium, Streptococcus species, S. agalactiae,  S. anginosus grp., S. pneumoniae, S. pyogenes, Listeria sp., mecA  (methicillin resistance) and Marlon/B (vancomycin resistance).    Critical Value/Significant Value called to and read back by ra rodriguez rn @0809 3/1/19. ct     02/27/2019 No growth  Final   02/26/2019 No growth  Final   02/26/2019 No growth  Final   02/25/2019 No growth  Final   02/25/2019 No growth  Final   02/24/2019 Canceled, Test credited  Final   02/24/2019 Cancelled by lab - Specimen never received.  Final   02/24/2019 Canceled, Test credited  Final   02/24/2019 Cancelled by lab - Specimen never received.  Final   02/24/2019 Light growth  Normal marianna    Final   02/24/2019 No growth  Final   02/23/2019 Light growth  Normal marianna    Final           Other Laboratory Data:    Urine Studies    Recent Labs   Lab Test 02/23/19  0802   LEUKEST Negative   WBCU 7*       Inflammatory Markers    Recent Labs   Lab Test 03/06/19  0414 03/05/19  0351 03/04/19  0357 03/03/19  0341 03/02/19  0357 03/01/19  0401 02/28/19  0402 02/27/19  0349   * 93* 97* 140* >140* >140* 140* 140*   .0* 300.0* 340.0* 260.0* 270.0* 260.0* 230.0* 280.0*       Hematology Studies    Recent Labs   Lab Test 03/06/19  1000 03/06/19  0414 03/05/19  2202 03/05/19  1602 03/05/19  1007 03/05/19  0351   WBC 23.6* 26.4* 26.8* 28.3* 29.2* 34.7*   ANEU 19.5* 22.6* 22.5* 22.9* 23.8* 31.6*   AEOS 0.8* 0.5 0.9* 1.7* 1.3* 0.6   HGB 10.3* 9.8* 10.2* 10.1* 9.5* 9.8*   MCV 93 93 93 93 95 95   * 137* 138* 127* 148* 147*     Metabolic Studies     Recent Labs   Lab Test 03/06/19  1000 03/06/19  0414 03/05/19  2202 03/05/19  1602 03/05/19  1007    136 136 134 135   POTASSIUM 3.8 4.0 3.9 4.0 4.4   CHLORIDE 102 103 101 101 100   CO2 20 19* 21 20 17*   BUN 70* 64* 69*  73* 78*   CR 2.36* 2.33* 2.42* 2.53* 2.61*   GFRESTIMATED 33* 33* 32* 30* 29*       Hepatic Studies    Recent Labs   Lab Test 03/06/19  1000 03/06/19  0414 03/05/19  2202 03/05/19  1602 03/05/19  1007 03/05/19  0351   BILITOTAL 6.8* 5.8* 5.2* 4.7* 4.2* 4.5*   ALKPHOS 139 142 140 144 141 138   ALBUMIN 1.9* 2.0* 2.0* 2.0* 2.0* 2.1*   * 162* 189* 208* 229* 254*   ALT 90* 95* 103* 105* 104* 99*     Vancomycin Levels    Recent Labs   Lab Test 02/26/19 2155 02/25/19  0605   VANCOMYCIN 24.5 26.3*

## 2019-03-06 NOTE — PROGRESS NOTES
CRRT STATUS NOTE    DATA:  Time:  4:00 PM  Pressures WNL:  YES  Filter Status:  CRRT Filter Status:WDL    Problems Reported/Alarms Noted:  Air chamber alarms noted by bedside RN, machine advised shutdown. Exchanged machine# 7 for #2 and placed service request.    Supplies Present:  YES    ASSESSMENT:  Patient Net Fluid Balance:  Net negative 413 since midnight.  Vital Signs: T93.9  RR12 Art BP96/53 MAP91  Labs: K3.8 Mg 2.8 ICa4.3 Lactic 3 Trop 10.55  Goals of Therapy:  0-100 net negative as BP allows    INTERVENTIONS:   Pt in OR at this time for conversion of VA/VV ECMO to single therapy.     PLAN:  Will restart upon return from OR.

## 2019-03-07 NOTE — PROGRESS NOTES
"CRRT RESTART NOTE    Reason for Restart:  OR  Error Code:  none    Patient s Vascular Access: ECMO    Procedure:  CVVHDF  Start Time:  2215  Machine#:  2  Filter:  M150  Blood Flow:   180 mL/min  Pre-Replacement Solution:  PrismaSol 2/3.5  Post-Replacement Solution:  PrismaSol 2/3.5  Dialysate Solution:  PrismaSol 2/3.5  Pre-Replacement Solution Rate:  1200mL/hr  Post-Replacement Solution Rate:  200mL/hr  Dialysate Flow Rate:  1200mL/hr  Patient Removal Rate:  0 mL/hr  Anticoagulation Type and Rate:  0           ASSESSMENT:  How Patient Tolerated Restart:  well  Vital Signs:  Temp 97.5  F (36.4  C)   Resp 12   Ht 1.68 m (5' 6.14\")   Wt 95.7 kg (210 lb 15.7 oz)   SpO2 100%   BMI 33.91 kg/m    Initial Pressures:  Access:  -47  Filter:  57  Return:  21  TMP:  71  Change in Filter Pressure:  18    INTERVENTIONS:  Restarted CRRT after OR.  Lines connected to ECMO.  Mat under machine, no hair ties since patient in different room    PLAN:  Continue to monitor circuit daily and change set q72 hours or PRN for clotting/clogging. Please call CRRT RN with any questions/problems.                "

## 2019-03-07 NOTE — PROGRESS NOTES
Weaned sedation this shift versed 14 mg to 8 mg and fentanyl 200 mcgs to 100 mcgs.  Pt with stronger cough with thick tan blood tinged secretions.  Pt not breathing over the vent (rate set at 12)  Net positive on CRRT~136.  Machine alarmed air chamber red alarm and advised shut down.  Pt to OR to go from VAV ECMO to VV ECMO.  IABP 1:1 continues and CRRT to be restarted.  Family at the bedside and updated on POC.  Will continue to monitor.

## 2019-03-07 NOTE — PROGRESS NOTES
[facility] ID Service: Follow Up Note      Patient:  Hellen Riddle, Date of birth 1980, Medical record number [id number]           Assessment and Recommendations:   Recommendations:  - cont ertapenem   - continue oseltamivir   - ID will continue to follow daily     Problem List:  # Hypoxic respiratory failure - on VV ECMO  # Influenza A infection   # E. Coli pneumonia    # Candida in sputum (oropharyngeal colonization)  # Extreme leukocytosis, improving   # Septic shock, improving   # S/P out of hospital cardiac arrest, s/p VA ECMO 2/23/19-3/6/19    Discussion:  38 yo male admitted 2/23/19 with refractory VF OHCA s/p VA ECMO on admission, found to have 3v CAD. ID consulted 3/4/19 for increasing leukocytosis, hypoxia, lactic acidosis.     We have now found that he is positive for Influenza A, which could certainly explain the worsening hypoxia, lactic acidosis. He is also growing E. Coli from the sputum, one strain of which is resistant to pip-tazo (which he was receiving at the time of culture) and we will treat him for bacterial pulmonary infection as well with IV ertapenem.  We are finding Candida in his sputum however this is not likely to be pathogenic and likely represents colonization. We have no sign of Candidemia or other pulmonary fungal infection (as well as alternate explanation for his decompensation) at this point. He continues to have multiple devices / lines in place and remains at risk for development of gram positive infection.     Patient seen and discussed with attending physician Dr. Ricardo Nesbitt MD  PGY-5 Infectious Diseases Fellow  [facility] [phone]          Interval History:   Pt developed VT after VA decannulation requiring multiple defibrillations and re cannulation of VA ECMO.  Oxygen requirements increased slightly.   No making urine.    Stool output mildy blood tinged this morning.  Intubated and sedated.          Current Antimicrobials   Ertapenem 3/7-current  Oseltamivir  3/4-current    Meropenem 3/5-3/7  Vancomycin 2/23-3/6  Micafungin 3/5-3/6    Prior antibiotics:  Pip/tazo 2/23-3/4         Physical Exam:   Ranges for vital signs:  Temp:  [93.9  F (34.4  C)-102.7  F (39.3  C)] 97.2  F (36.2  C)  Heart Rate:  [] 73  Resp:  [12] (P) 12  MAP:  [63 mmHg-102 mmHg] 67 mmHg  Arterial Line BP: ()/(45-85) 77/54  FiO2 (%):  [70 %-80 %] 80 %  SpO2:  [91 %-100 %] 100 %    Intake/Output Summary (Last 24 hours) at 3/6/2019 1353  Last data filed at 3/6/2019 1200  Gross per 24 hour   Intake 3838.03 ml   Output 5115 ml   Net -1276.97 ml     Exam:  GENERAL:  Intubated and sedated and   ENT:  Head is normocephalic, atraumatic. Sclera are somewhat injected.    LUNGS:  on ventilator  CARDIOVASCULAR: ECMO hum  ABDOMEN:  Normal bowel sounds, soft, nontender.  EXT: Extremities warm and without edema. ECMO and balloon pump entrance sites without erythema or disharge. Has L femoral line as well without surrounding erythema  SKIN:  No acute rashes.    NEUROLOGIC: Sedated         Laboratory Data:   Microbiology:   Culture Micro   Date Value Ref Range Status   03/07/2019 PENDING  Preliminary   03/07/2019 PENDING  Preliminary   03/06/2019 No growth after 21 hours  Preliminary   03/06/2019 Culture in progress  Preliminary   03/06/2019 (A)  Preliminary    Light growth  Lactose fermenting gram negative rods     03/05/2019 Light growth  Escherichia coli   (A)  Preliminary   03/05/2019 Light growth  Strain 2  Escherichia coli   (A)  Preliminary   03/05/2019 Susceptibility testing done on previous specimen  Preliminary   03/05/2019 Culture in progress  Preliminary   03/05/2019 No growth after 2 days  Preliminary   03/04/2019 Light growth  Escherichia coli   (A)  Final   03/04/2019 Light growth  Strain 2  Escherichia coli   (A)  Final   03/04/2019 (A)  Final    Light growth  Candida albicans / dubliniensis  Candida albicans and Candida dubliniensis are not routinely speciated  Susceptibility testing not  routinely done     03/04/2019 Susceptibility testing done on previous specimen  Final   03/04/2019 No growth after 3 days  Preliminary   03/03/2019 Moderate growth  Escherichia coli   (A)  Final   03/03/2019 Moderate growth  Strain 2  Escherichia coli   (A)  Final   03/03/2019 (A)  Final    Light growth  Candida albicans / dubliniensis  Candida albicans and Candida dubliniensis are not routinely speciated  Susceptibility testing not routinely done     03/03/2019 No growth after 4 days  Preliminary   03/03/2019 (A)  Final    Moderate growth  Escherichia coli  Susceptibility testing done on previous specimen     03/03/2019 (A)  Final    Moderate growth  Candida albicans / dubliniensis  Candida albicans and Candida dubliniensis are not routinely speciated  Susceptibility testing not routinely done     03/02/2019 Heavy growth  Escherichia coli   (A)  Final   03/02/2019 (A)  Final    Light growth  Candida albicans / dubliniensis  Candida albicans and Candida dubliniensis are not routinely speciated  Susceptibility testing not routinely done     03/02/2019 No growth after 5 days  Preliminary   03/01/2019 (A)  Final    Light growth  Candida albicans / dubliniensis  Candida albicans and Candida dubliniensis are not routinely speciated  Susceptibility testing not routinely done     03/01/2019 No growth  Final   02/28/2019 (A)  Final    Cultured on the 1st day of incubation:  Staphylococcus hominis     02/28/2019   Final    Critical Value/Significant Value, preliminary result only, called to and read back by  SURESH DE LEON RN ([id number]).  03.01.19  0518 S     02/28/2019   Final    (Note)  POSITIVE for Staphylococci other than S.aureus, S.epidermidis and  S.lugdunensis, by [employer] multiplex nucleic acid test.  Coagulase-negative staphylococci are the most common venipuncture or  collection associated skin CONTAMINANTS grown in blood cultures.  Final identification and antimicrobial susceptibility testing will be  verified by  standard methods.    Specimen tested with [employer] multiplex, gram-positive blood culture  nucleic acid test for the following targets: Staph aureus, Staph  epidermidis, Staph lugdunensis, other Staph species, Enterococcus  faecalis, Enterococcus faecium, Streptococcus species, S. agalactiae,  S. anginosus grp., S. pneumoniae, S. pyogenes, Listeria sp., mecA  (methicillin resistance) and Marlon/B (vancomycin resistance).    Critical Value/Significant Value called to and read back by ra rodriguez rn @0809 3/1/19. ct     02/27/2019 No growth  Final   02/26/2019 No growth  Final   02/26/2019 No growth  Final   02/25/2019 No growth  Final   02/25/2019 No growth  Final   02/24/2019 Canceled, Test credited  Final   02/24/2019 Cancelled by lab - Specimen never received.  Final   02/24/2019 Canceled, Test credited  Final   02/24/2019 Cancelled by lab - Specimen never received.  Final   02/24/2019 Light growth  Normal marianna    Final   02/24/2019 No growth  Final   02/23/2019 Light growth  Normal marianna    Final           Other Laboratory Data:    Urine Studies    Recent Labs   Lab Test 02/23/19  0802   LEUKEST Negative   WBCU 7*       Inflammatory Markers    Recent Labs   Lab Test 03/07/19  0416 03/06/19  0414 03/05/19  0351 03/04/19  0357 03/03/19  0341 03/02/19  0357 03/01/19  0401 02/28/19  0402   SED 83* 104* 93* 97* 140* >140* >140* 140*   .0* 320.0* 300.0* 340.0* 260.0* 270.0* 260.0* 230.0*       Hematology Studies    Recent Labs   Lab Test 03/07/19  0940 03/07/19  0416 03/06/19  2119 03/06/19  2027 03/06/19  1848 03/06/19  1755  03/06/19  1000 03/06/19  0414 03/05/19  2202   WBC 21.1* 21.5* 28.6*  --   --   --   --  23.6* 26.4* 26.8*   ANEU 18.7* 18.3* 25.1*  --   --   --   --  19.5* 22.6* 22.5*   AEOS 0.0 0.2 0.0  --   --   --   --  0.8* 0.5 0.9*   HGB 9.0* 9.1* 7.9* 8.5* 9.2* 9.5*   < > 10.3* 9.8* 10.2*   MCV 91 91 96  --   --   --   --  93 93 93   * 111* 121*  --   --   --   --  123* 137* 138*    < > =  values in this interval not displayed.     Metabolic Studies     Recent Labs   Lab Test 03/07/19  0940 03/07/19  0416 03/06/19 2119 03/06/19 2027 03/06/19  1848  03/06/19  1000 03/06/19  0414    136 136 138 138   < > 136 136   POTASSIUM 4.8 4.9 4.5 4.2 4.6   < > 3.8 4.0   CHLORIDE 104 103 102  --   --   --  102 103   CO2 20 17* 19*  --   --   --  20 19*   BUN 70* 75* 86*  --   --   --  70* 64*   CR 2.70* 2.79* 3.28*  --   --   --  2.36* 2.33*   GFRESTIMATED 28* 27* 22*  --   --   --  33* 33*    < > = values in this interval not displayed.       Hepatic Studies    Recent Labs   Lab Test 03/07/19  0940 03/07/19  0416 03/06/19 2119 03/06/19  1000 03/06/19  0414 03/05/19  2202   BILITOTAL 10.8* 10.7* 9.3* 6.8* 5.8* 5.2*   ALKPHOS 95 98 107 139 142 140   ALBUMIN 2.0* 2.0* 2.1* 1.9* 2.0* 2.0*   * 118* 115* 148* 162* 189*   ALT 65 63 68 90* 95* 103*     Vancomycin Levels    Recent Labs   Lab Test 02/26/19  2155 02/25/19  0605   VANCOMYCIN 24.5 26.3*

## 2019-03-07 NOTE — ANESTHESIA POSTPROCEDURE EVALUATION
Anesthesia POST Procedure Evaluation    Patient: Hellen Riddle   MRN:     [id number] Gender:   male   Age:    39 year old :      1980        Preoperative Diagnosis: Respiratory Failure   Procedure(s):  EXTRACORPORAL MEMBRANE OXYGENATOR REMOVAL FEMORAL CANNULAS repair right femoral vessels  Emergent Insert VA extracorporal membrane oxygenator right groin   Postop Comments: No value filed.       Anesthesia Type:  General    Reportable Event: YES     PAIN: Uncomplicated   Sign Out status: Comfortable, Well controlled pain     PONV: No PONV   Sign Out status:  No Nausea or Vomiting     Neuro/Psych: Uneventful perioperative course   Sign Out Status: Preoperative baseline; Age appropriate mentation     Airway/Resp.: Uneventful perioperative course   Sign Out Status: Airway Device present     CV: Reportable or Non-Standard event     Events: Dysrrhythmia (multiple runs of VT requiring defibrillation and re-initiation of VA ECMO)   Sign Out status: CV Support within EXPECTED Parameters     Disposition:   Sign Out in:  ICU  Disposition:  ICU  Recovery Course: Recovery in ICU  Follow-Up: Not required     Comments/Narrative:  Patient transported directly from the OR to the ICU sedated and intubated with flolan in line and BMV.  On epi 0.15, NE 0.15, vaso 3 and VA ECMO + protecduo cannula.  Continues to have intermittent arrhythmias. Report given to ICU.            Last Anesthesia Record Vitals:  CRNA VITALS  3/6/2019 1846 - 3/6/2019 1946      3/6/2019             Pulse:  184    ART BP:  108/73    ART Mean:  88    Ht Rate:  86    SpO2:  94 %    Resp Rate (observed):  12    Resp Rate (set):  12      CRNA VITALS  3/6/2019 2028 - 3/6/2019 2124      3/6/2019             ART BP:  106/70    ART Mean:  75    Ht Rate:  88          Last PACU/Preop Vitals:  Vitals:    19 2110 19   Resp: 12     Temp:      SpO2: 96% 100% 100%         Electronically Signed By: Violeta Newby MD, 2019, 9:24  PM

## 2019-03-07 NOTE — PLAN OF CARE
Patient remains intubated and sedated. Unsuccessful transition to VV ECMO. Returned from OR at 2130 on VAV. Groin site oozing. 2 units RBCs given. Persistent bigem with nonsustained runs of VT. Weaned off epi. Levo to 0.03 and vaso 3. Rhythm issues resolved. IABP 1:1. PIP 40s. Bloody secretions. FiO2 increased to 80. CRRT: net neg ~300 since midnight. TF on hold overnight d/t NJ pulled out in OR. D10 started. Family updated after procedure.

## 2019-03-07 NOTE — PROCEDURES
Small Bowel Feeding Tube Reposition  Reason for Feeding Tube Reposition: FT slightly coiled out of pt's nose from JOSLYN last night  Cortrak Start Time: 09:44   Cortrak End Time: 09:45  Medicine Delivered During Procedure: None  Placement Successful: FT tip remains post-pyloric per Cortrak tracing after retraction of coil. Discussed w/ MD, ok w/ deferring AXR given FT remains in same position and unstable w/ turning at times. FT also was imaged last night after return from OR, was post-pyloric.   Procedure Complications: none  Final Placement Eric at exit of nare 88 cm  Face to Face time with patient: 15 mins    Sudha Winters, BJORN, LD, CNSC  CVICU Dietitian  Pager: [phone]

## 2019-03-07 NOTE — PROGRESS NOTES
ECMO Shift Summary:    Patient remains on VAV ECMO, all equipment is functioning and alarms are appropriately set. RPM's 3200 with flow range 4.3 -4.45  L/min and VV flow range 1.83 -1.91 L/min Sweep gas is at 7  LPM and FiO2 100%. Circuit remains free of air, clot and fibrin. Cannulas are secure with no bleeding from site. Extremities are warm to touch. Suctioned ETT for moderate amounts of white secretions.     Significant Shift Events: Pt is in OR for VV ECMO conversion.    Vent settings:  Ventilation Mode: CMV/AC  (Continuous Mandatory Ventilation/ Assist Control)  FiO2 (%): 60 %  Rate Set (breaths/minute): 12 breaths/min  Tidal Volume Set (mL): 530 mL  PEEP (cm H2O): 10 cmH2O  Oxygen Concentration (%): 60 %  Resp: 12  .    Heparin is running at 1600 u/kg/hr, ACT range 171-176    Urine output is as charted, blood loss was none. Product given included 1 unit of PRBC.      Intake/Output Summary (Last 24 hours) at 3/6/2019 1819  Last data filed at 3/6/2019 1640  Gross per 24 hour   Intake 3927.33 ml   Output 4190 ml   Net -262.67 ml       ECHO:  No results found for this or any previous visit.No results found for this or any previous visit.    CXR:  Recent Results (from the past 24 hour(s))   XR Chest Port 1 View    Narrative    EXAM: XR CHEST PORT 1 VW  3/6/2019 2:00 AM      HISTORY: Cardiac arrest    COMPARISON: Radiograph 3/5/2018    FINDINGS: AP radiograph of the chest. Intrahepatic balloon pump tip  projects above the level of the tereso, stable. ECMO cannula project  over the right pulmonary artery and right atrium, stable. Endotracheal  tube tip projects over the midthoracic trachea, stable. Feeding tube  and gastric tube pass below the diaphragm, collimated off the  field-of-view.    Low lung volumes. Diffuse interstitial opacities are stable, likely  representing pulmonary edema. Bibasilar atelectasis is stable. No  pneumothorax. Small pleural effusions.      Impression    IMPRESSION:   1. Stable lines and  tubes.  2. Stable mild pulmonary edema.  3. Stable bibasilar atelectasis and small pleural effusions.    I have personally reviewed the examination and initial interpretation  and I agree with the findings.    ROSELYN CASH MD       Labs:  Recent Labs   Lab 03/06/19  1755 03/06/19  1637 03/06/19  1625 03/06/19  1426   PH 7.33* 7.33* 7.30* 7.32*   PCO2 40 42 47* 44   PO2 93 108* 69* 62*   HCO3 21 22 23 23   O2PER 100.0 100.0 100.0 60.0       Lab Results   Component Value Date    HGB 9.5 (L) 03/06/2019    PHGB 60 (H) 03/06/2019     (L) 03/06/2019    FIBR 772 (H) 03/05/2019    INR 1.23 (H) 03/06/2019    PTT 75 (H) 03/06/2019    DD >20.0 (H) 03/05/2019    AXA 0.47 03/06/2019    ANTCH 61 (L) 03/06/2019         Plan is conversion to VV ECMO      Sylvia Clemens, RRT  3/6/2019 6:19 PM

## 2019-03-07 NOTE — OP NOTE
OPERATIVE DATE: 3/6/2019    PRE-OPERATIVE DIAGNOSIS:  1) Persistent VT  2) VT cardiac arrest  3) Coronary artery disease  4) S/P VA ECMO decannulation    POST-OPERATIVE DIAGNOSIS:  1) Persistent VT  2) VT cardiac arrest  3) Coronary artery disease  4) S/P VA ECMO decannulation    PROCEDURE:  1) Emergent VA ECMO cannulation  2) Placement of distal perfusion catheter    SURGEON: Reggie Perez MD    ASSISTANT: Andrews Covarrubias MD    ANESTHESIA: GETA    ESTIMATED BLOOD LOSS: 50cc    INDICATIONS:  Mr. LIONEL CARTY is a 39 year old male admitted with VT cardiac arrest. I was asked to evaluate for ECMO decannulation.  After VA ecmo decannulation the patient developed non-perfusing VT rhythm requiring multiple cardioversions.  I decided to put the patient back on VA ECMO.  This was done emergently without consent due to the risk of death.     OPERATIVE REPORT:  The patient was transferred to the operating table. The patients chest, abdomen and bilateral lower extremities were clipped, prepped and draped in sterile fashion expeditiously.  A pre-procedure time-out was performed confirming the correct patient, correct site and correct procedure.    The previous skin incision was reopened.  The artery and vein were cannulated with 17F and 25F cannulae via prolene pursestrings.  The cannulae were connected into the VV ECMO circuit.  Flow on the circuit was resumed.  The patient's rhythm and hemodynamics improved.    A 9F MAC distal perfusion catheter was placed.  This was connected to the arterial side of the circuit.      The wound was made hemostatic.  The incision was closed with one prolene suture.  The tubing was secured with multiple 0 ethibond sutures.    The patient was then transferred from the operating bed to an ICU bed and transferred to the ICU in critical, but stable, condition.    All needle, sponge and instrument counts were correct at the end of the case.    Reggie Perez  Cardiothoracic Surgery  Pager:  [phone]

## 2019-03-07 NOTE — PROGRESS NOTES
"Nephrology Progress Note  03/07/2019       Mr Riddle is a 39 yom w/HLP who had cardiac arrest on 2/23/19 with unclear down-time.  PCI done emergently, Nephrology consulted for management of FERNANDA, started CRRT on 2/25.       Interval History :   Mr Riddle continues on CRRT, had attempted ECMO decannulation yesterday but did not tolerate (Had VT) so was re-cannulated in OR.  Continuing on CRRT, was net positive yesterday with down-time but now able to pull fluid, planning 0-50cc/hr.       Assessment & Recommendations:   FERNANDA-Baseline Cr unclear, admitted with Cr of 1.7 post arrest.  FERNANDA due to hemodynamic injury, also received contrast and had elevated CK.  Started CRRT on 2/25, has been anuric since starting CRRT, ~48h after arrest.  Continuing CRRT with no signs of recovery, still needing extensive mechanical support.  Has been net negative the past week, on track to again be 1-2L net negative today.                 -Access is ECMO circuit.               -CRRT, 0-50cc/hr net negative, 4k baths.      Volume status-Net positive yesterday with attempted ECMO decannulation but able to be net negative today.  No major changes in plan, will try to be 0-50cc/hr net negative to optimize volume status.      Electrolytes/pH-No acute issues on CRRT, K 4.8, bicarb 20.      Ca/phos/pth-Ca 8.7, Mg 2.9, phos 6.0 with some down-time yesterday.      Anemia-Hgb 9.0, needing intermittent PRBC's with ECMO.     Nutrition-Impact TF.       Seen and discussed with Dr Caban.     Recommendations were communicated to primary team via verbal communication.      Darren Vidal  Clinical Nurse Specialist  [phone]    Review of Systems:   I reviewed the following systems:  ROS not done due to vent/sedation.     Physical Exam:   I/O last 3 completed shifts:  In: 5262.38 [I.V.:2002.38; NG/GT:310]  Out: 2097 [Emesis/NG output:50; Other:1747; Stool:300]   Temp 97.2  F (36.2  C)   Resp 12   Ht 1.68 m (5' 6.14\")   Wt 97.5 kg (214 lb 15.2 oz)   SpO2 98%  "  BMI 34.54 kg/m       GENERAL APPEARANCE: Intubated and sedated, on ECMO  EYES:  No scleral icterus, pupils equal  HENT: mouth without ulcers or lesions  PULM: lungs clear to auscultation, equal air movement, no cyanosis or clubbing  CV: regular rhythm, normal rate, no rub     -edema +1 LE  GI: soft, non-tender, non-distended, bowel sounds are +  MS: no evidence of inflammation in joints, no muscle tenderness  NEURO:  Intubated and sedated    Labs:   All labs reviewed by me  Electrolytes/Renal -   Recent Labs   Lab Test 03/07/19  0940 03/07/19  0416 03/06/19 2119    136 136   POTASSIUM 4.8 4.9 4.5   CHLORIDE 104 103 102   CO2 20 17* 19*   BUN 70* 75* 86*   CR 2.70* 2.79* 3.28*   *  140* 149* 146*  151*   ALEKSANDER 8.7 8.4* 7.5*   MAG 2.9* 2.7* 2.9*   PHOS 6.0* 6.4* 9.0*       CBC -   Recent Labs   Lab Test 03/07/19  0940 03/07/19  0416 03/06/19  2119   WBC 21.1* 21.5* 28.6*   HGB 9.0* 9.1* 7.9*   * 111* 121*       LFTs -   Recent Labs   Lab Test 03/07/19  0940 03/07/19  0416 03/06/19  2119   ALKPHOS 95 98 107   BILITOTAL 10.8* 10.7* 9.3*   ALT 65 63 68   * 118* 115*   PROTTOTAL 6.0* 5.9* 6.1*   ALBUMIN 2.0* 2.0* 2.1*       Iron Panel - No lab results found.        Current Medications:    artificial tears   Both Eyes Q8H     aspirin  81 mg Oral or Feeding Tube Daily     B and C vitamin Complex with folic acid  5 mL Per Feeding Tube Daily     ertapenem (INVanz) IV  1 g Intravenous Q24H     oseltamivir  75 mg Oral BID     pantoprazole  40 mg Oral or Feeding Tube QAM     polyethylene glycol  17 g Oral or Feeding Tube Daily     protein modular  1 packet Per Feeding Tube BID     senna-docusate  1 tablet Oral or Feeding Tube At Bedtime     sodium chloride (PF)  3 mL Intracatheter Q8H     ticagrelor  90 mg Oral or Feeding Tube BID       IV fluid REPLACEMENT ONLY 30 mL/hr at 03/07/19 1000     CRRT replacement solution 12.5 mL/kg/hr (03/07/19 1041)     epoprostenol (VELETRI) 20 mcg/mL in sterile water  inhalation solution 10 ng/kg/min (03/07/19 1038)     fentaNYL 100 mcg/hr (03/07/19 1000)     heparin 2 unit/mL in 0.9% NaCl 3 mL/hr (03/07/19 1000)     HEParin 1,500 Units/hr (03/07/19 1100)     insulin (regular) Stopped (03/07/19 0000)     midazolam 8 mg/hr (03/07/19 1000)     - MEDICATION INSTRUCTIONS -       norepinephrine 0.05 mcg/kg/min (03/07/19 1000)     CRRT replacement solution 2.051 mL/kg/hr (03/05/19 2027)     CRRT replacement solution 12.5 mL/kg/hr (03/07/19 1042)     vasopressin (PITRESSIN) infusion ADULT (40 mL) 3 Units/hr (03/07/19 1000)

## 2019-03-07 NOTE — BRIEF OP NOTE
[facility], [place]    Brief Operative Note    Pre-operative diagnosis: Respiratory Failure  Post-operative diagnosis Respiratory Failure  Procedure: ECMO turndown under JOSLYN guidance, Removal of V-A femoral ECMO cannulas, repair of right femoral artery and vein  [patient remains of V-V ECMO via a right internal jugular Protek Duo; left femoral balloon pump remains]   Surgeon: Surgeon(s) and Role:     * Craig Berry MD - Primary     * Andrews Covarrubias MD - Resident - Assisting     * Parris Mccall MD - Resident - Assisting  Anesthesia: General   Estimated blood loss: 200mL  Drains: right groin wound VAC  Specimens: none  Findings:   Stable hemodynamics off VA ECMO  Complications: none  Implants: none

## 2019-03-07 NOTE — PROGRESS NOTES
ECMO Shift Summary:    Patient remains on VAV ECMO, all equipment is functioning and alarms are appropriately set. RPM's 3480 with flow range 5.0 L/min. Sweep gas is at 7 LPM and FiO2 100%. Circuit remains free of air, clot and fibrin. Cannulas are secure with no bleeding from site. Extremities are warm to the touch. Suctioned ETT for moderate amount blood tinged secretions.    Significant Shift Events: No significant events over night    Vent settings:  Ventilation Mode: CMV/AC  (Continuous Mandatory Ventilation/ Assist Control)  FiO2 (%): 80 %  Rate Set (breaths/minute): 12 breaths/min  Tidal Volume Set (mL): 530 mL  PEEP (cm H2O): 10 cmH2O  Oxygen Concentration (%): 60 %  Resp: 12  .    Heparin is running at 1500 u/kg/hr, ACT range 196.    Patient remains anuric on CRRT, some blood loss from cannulation site. Products given included 2 units of PRBC.      Intake/Output Summary (Last 24 hours) at 3/7/2019 0625  Last data filed at 3/7/2019 0600  Gross per 24 hour   Intake 5262.38 ml   Output 2097 ml   Net 3165.38 ml       ECHO:  No results found for this or any previous visit.No results found for this or any previous visit.    CXR:  Recent Results (from the past 24 hour(s))   XR Chest Port 1 View    Narrative    EXAM: XR CHEST PORT 1 VW  3/6/2019 9:44 PM      HISTORY: Peripheral ECMO placement    COMPARISON: Radiograph 3/6/2019    FINDINGS: AP radiograph of the chest. Interval readjustment of the low  approach ECMO cannula. The tip now projects over the central left  brachiocephalic vein. Right IJ ECMO tip projects over the pulmonary  artery. Feeding tube and gastric tube pass below the diaphragm,  collimated off the field-of-view. Endotracheal tube projects over the  midthoracic trachea. Esophageal temperature probe projects over the  cervical spine.    Worsening diffuse alveolar opacities. No pneumothorax. No pleural  effusions. Pulmonary vascularity is obscured.       Impression    IMPRESSION:   1.  Interval  readjustment of the low approach ECMO cannula. The tip  now projects over the central left brachiocephalic vein. This may need  to be retracted.  2. Esophageal temperature probe projects over the cervical spine.  Recommend advancement to ensure esophageal placement.  3. Worsening diffuse patchy and interstitial opacities. Findings  suggest cardiogenic or noncardiogenic pulmonary edema versus  infection.    Findings of ECMO catheter placement were discussed with Dr. Covarrubias by  telephone at 3/6/2019 10:00 PM by Dr. Jai Burr.    I have personally reviewed the examination and initial interpretation  and I agree with the findings.    BEVERLY BOLTON MD   XR Abdomen Port 1 View    Narrative    Preliminary     Impression    impression:  1. Gastric tube tip and side-port project over the stomach.  2. Feeding tube tip projects over the ligament of Treitz.  3. Please see same day chest radiograph regarding lines and tubes and  pulmonary findings.         Labs:  Recent Labs   Lab 03/07/19  0540 03/07/19  0416 03/07/19  0202 03/07/19  0005   PH 7.42 7.44 7.39 7.37   PCO2 31* 29* 34* 32*   PO2 132* 179* 72* 124*   HCO3 20* 20* 20* 19*   O2PER 80.0 80.0 70.0 70.0       Lab Results   Component Value Date    HGB 9.1 (L) 03/07/2019    PHGB 50 (H) 03/07/2019     (L) 03/07/2019    FIBR 772 (H) 03/05/2019    INR 1.23 (H) 03/07/2019     (HH) 03/07/2019    DD >20.0 (H) 03/05/2019    AXA 0.58 03/07/2019    ANTCH 61 (L) 03/06/2019         Plan is to continue managing patient on ECMO.      Guillermo Menjivar, RRT  3/7/2019 6:25 AM

## 2019-03-07 NOTE — PLAN OF CARE
PERRL 2mm sluggish. Versed 8 Fent 100. No sedation holiday. VAV ECMO F-5 S-7 Ci44-576% Bigenimal rhythm/SR/3-5 beat runs of VT. BPs labile. Vaso and Levo titrated as indicated. No volume given to time. IABP 1:1 Aug 100%. Pulses dopplerable. Lungs coarse bilat. Large amounts of bloody/creamy secretions from ETT and oral suctioning. Flolan decreased to 10. :12:10:80%. Feeding tube advanced. Feedings on hold to time for Cath lab and abd ultrasound. See flowsheets for rectal tube output and OG output. Patient down in cath lab to time. Family updated. Await return to .    Returned from Cath lab approx 1850. See flowsheets for gtts and outputs to time. 1600 labs drawn upon return. Overnight cardiology cover updating family to time.

## 2019-03-07 NOTE — OP NOTE
OPERATIVE DATE: 3/6/2019    PRE-OPERATIVE DIAGNOSIS:  1) VT cardiac arrest  2) Coronary artery disease  3) VA ECMO    POST-OPERATIVE DIAGNOSIS:  1)  VT cardiac arrest  2) Coronary artery disease  3) VA ECMO    PROCEDURE:  1) VA ECMO decannulation  2) Repair of left femoral vessels    SURGEON: Reggie Perez MD    ASSISTANT: Andrews Covarrubias MD    ANESTHESIA: GETA    ESTIMATED BLOOD LOSS: 50cc    INDICATIONS:  Mr. LIONEL CARTY is a 39 year old male admitted with VT cardiac arrest. I was asked to evaluate for ECMO decannulation.  Risks and benefits of the operation were explained to the patient and their family including, but not limited to, bleeding, infection, stroke and even death.  They understood these risks and agreed to proceed electively.    OPERATIVE REPORT:  The patient was transferred to the operating room and positioned supine on the OR table.  General anesthesia was initiated by the anesthesia team.  Endotracheal intubation and IV access was already in place. The patients chest, abdomen and bilateral lower extremities were clipped, prepped and draped in sterile fashion.  A pre-procedure time-out was performed confirming the correct patient, correct site and correct procedure.    A transesophageal echocardiogram was performed.  The patient was given 5000 U IV heparin.  Flow on the circuit was decreased to 1L.  An ABG after 5 minutes was stable.    A longitudinal skin incision was made in the left groin crease.  The femoral vessels were isolated.  Vessel loops were placed around the femoral artery proximal and distal to the access site.  A 4-0 prolene pursestring was placed around the venous cannula.  Flow on the circuit was stopped.  The venous cannula was removed.  The site was oversewed with 4-0 prolene.  Next the arterial cannula was removed.  The site was repaired with 5-0 prolene stitches.      The wound was made hemostatic.  The incision was closed in layers using vicryl stitches.  Nylon stitches were  used to approximate the skin.  A Proveena skin vac was applied.       The patient was then transferred from the operating bed to an ICU bed and transferred to the ICU in critical, but stable, condition.    All needle, sponge and instrument counts were correct at the end of the case.    Reggie Perez  Cardiothoracic Surgery  Pager: [phone]

## 2019-03-07 NOTE — ANESTHESIA CARE TRANSFER NOTE
Patient: Hellen Riddle    Procedure(s):  EXTRACORPORAL MEMBRANE OXYGENATOR REMOVAL FEMORAL CANNULAS repair right femoral vessels  Emergent Insert VA extracorporal membrane oxygenator right groin    Diagnosis: Respiratory Failure  Diagnosis Additional Information: No value filed.    Anesthesia Type:   No value filed.     Note:  Airway :ETT  Patient transferred to:ICU  Comments: Patient transferred to CVICU in stable condition aided by VA ECMO. Sign out was given to receiving team and plan discussed. ICU Handoff: Call for PAUSE to initiate/utilize ICU HANDOFF, Identified Patient, Identified Responsible Provider, Reviewed the Pertinent Medical History, Discussed Surgical Course, Reviewed Intra-OP Anesthesia Management and Issues during Anesthesia, Set Expectations for Post Procedure Period and Allowed Opportunity for Questions and Acknowledgement of Understanding      Vitals: (Last set prior to Anesthesia Care Transfer)    CRNA VITALS  3/6/2019 1846 - 3/6/2019 1946      3/6/2019             Pulse:  184    ART BP:  108/73    ART Mean:  88    Ht Rate:  86    SpO2:  94 %    Resp Rate (observed):  12    Resp Rate (set):  12      CRNA VITALS  3/6/2019 2028 - 3/6/2019 2115      3/6/2019             Resp Rate (observed):  1  (Abnormal)     Resp Rate (set):  10                Electronically Signed By: Darren Langston MD  March 6, 2019  9:15 PM

## 2019-03-07 NOTE — PROGRESS NOTES
"CRRT STATUS NOTE    DATA:  Time:  5:39 AM  Pressures WNL:  YES  Filter Status:  WDL    Problems Reported/Alarms Noted:  none    Supplies Present:  NO    ASSESSMENT:  Patient Net Fluid Balance:3/6 +3034 ml, since midnight -170 ml    Vital Signs:  Temp 97.3  F (36.3  C)   Resp 12   Ht 1.68 m (5' 6.14\")   Wt 97.5 kg (214 lb 15.2 oz)   SpO2 100%   BMI 34.54 kg/m      Labs:  K+ 4.9, Cr 2.79, , Trop 9.036. WBC 21.5. Hgb 9.1, INR 1.23  Goals of Therapy:  0-100 net negative as BP allows      INTERVENTIONS:   Restarted after OR.  No other interventations     PLAN:  Continue to monitor circuit daily and change set q72 hours or PRN for clotting/clogging. Please call CRRT RN with any questions/problems.       "

## 2019-03-07 NOTE — ADDENDUM NOTE
Addendum  created 03/06/19 2130 by Violeta Newby MD    Intraprocedure Blocks edited, Sign clinical note

## 2019-03-07 NOTE — PROGRESS NOTES
"Cardiology Progress Note    Overnight/subj:   - Failed decannulation late yesterday due to persistent vtach post decannulation  - recannulated via peripheral vessels (right femoral)    VS reviewed: Notable for temp: 36.4, HR 77-83, MAP 63-88, oxygenating on 100%  Tele: biggayle, 10beats of NSVT  IABP via LFA, 1:1, triggered by ECG, augmented MAPs , pulses NIRS stable    ECMO  \"Patient remains on VAV ECMO, all equipment is functioning and alarms are appropriately set. RPM's 3480 with flow range 5.0 L/min. Sweep gas is at 7 LPM and FiO2 100%. Circuit remains free of air, clot and fibrin. Cannulas are secure with no bleeding from site. Extremities are warm to the touch. Suctioned ETT for moderate amount blood tinged secretions.\"    Wt: on admit 107.9, yday 95.7, today 97.5  I/O: 5.7 in / 3.1 out yday. Since  in / 1.2 out    Drips:   NE 0.03  Vaso 3  Flolan     Fentanyl 100  MIda 8  Hep 1500    PO Cardiac Meds: ASA 81mg daily, ticagrelor 90mg BID  Other meds: MVI, senna, miralax     Abx: tamiflu 03/04-**, ertapenem 03/07-  meropenem 03/04-03/06, vanco 03/04-03/06, micafungin 03/04-03/06,     Radiology Imaging  AXR- venous cannula in the L subclavian, IABP and ETT in good position, increase b/l opacities   Cardiology Studies:  ECG- pending    Labs: Reviewed in epic    Phys exam:  GEN: NAD  Pulm: course breath sounds b/l, stable airmovement  Cardiac:  Distant heart sounds, no murmurs  Vascular: mild IRISH and NIRS stable   GI: soft, non distended     ASSESSMENT/PLAN:  Mr. Riddle is a 38yo M with HLD and Fhx CAD/MI who presented with refractory VF OHCA brought directly to the CCL now s/p peripheral VA ECMO placement. Found to have severe 3v disease s/p MITA to mRCA and mLcx. Also with IABP placement for presumed pulmonary edema. Being managed as part post VF protocol.  Post arrest day 12.     Neurology:  # CT head day3: minimal edema - Continue EEG- no e/o seizures   - Intubated, sedated   -continue " fentanyl  - Versedd  - No longer on hypertonic saline, targeting normal serum Na   Cardiovascular / Hemodynamics:  # Refractory VF arrest.    #CAD s/p MITA  mRCA and mCx  # Torsades (likely relate Qtc >600, corrected)  # Cardiogenic Shock requiring ECMO support  # Recurrent VT with decannulation - continue vaso and NE as needed  -- VA to VV ECMO & IABP, CVTS consult  --continue ASA 81mg and ticagrelor 90mg BID  -- To CCL for PCI of LAD  --hold lipitor for now given likely hepatic injury during arrest  --hold ACE/ARB for now given likely reduced renal fxn after arrest  --holding beta blocker given shock    Pulmonary:  # Refractory Hypoxemia 2/2 pulmonary edema + Flu now VAV  # Resp acidosis (currected) -- Improved hypoxemia  -- Continue VC- wean PEEP as able.  Lung protected ventalation  -- wean flolan as able  -- Fluid removal as able   -- ETT stable   -- Q2h ABGs for now  -- consider scheduled duonebs if signs of lung dz, currently PRN          GI and Nutrition:   Shock Liver (recovering) No known medical hx.    --Nutrition consulted, appreciate their help  -- Post pyloric feeds started  --bowel regimen   --GI Prophylaxis: PPI    Renal, Fluid and Electrolytes:  # FERNANDA with anuria -- Renal consult   -- on CRRT, plan for volume removal given worsening hypoxia  --monitor urine output  --maintain K>3 and Mg>2    Infectious Disease:  # Marked Leukocytosis (improving)  # Bronch cx: E. Coli, staph hominus and Influenza  # Bcx 02/28 growing GPCs 1/2 (likely contaminant) -- Appreciate ID help  -- Continue tamiflu   -- ertapenem   -- discontinue meropenem, vancomycin and micafungin for now  --vancomycin/zosyn x5 days for ECMO (Completed 02/28/19)  -- daily blood cultures      Hematology: Coagulopathy related to ECMO Receiving heparin for ECMO and ASA/ticagrelor for MITA.   --cryo PRN fibrinogen < 200; FFP for INR >2  -- Transfuse plat <50  --Transfuse for Hgb<10  --heparin gtt for ECMO with ACT goal 160-180  --DVT PPX: Heparin  as above   Endocrinology: No known medical history. BG elevated.  --insulin gtt PRN   Lines: R femoral arterial and venous ECMO cannulae 02/23/19-03/06/19  But replaced on 03/06-  L femoral arterial line February 23, 2019  R radial arterial line February 23, 2019  ETT February 23, 2019  Pisano catheter February 23, 2019  RIJ-MPA 26Fr venous cannula  OG tube February 23, 2019  Restraint: PRN  Current lines are required for patient management         Code status FULL     Discussed with Dr. Humphreys.      Theron Hebert MD  Cardiology Fellow- [facility]  *[phone]       Critical Care Services Progress Note:     Hellen Riddle remains critically ill with acute coronary syndrome, hypotension and shock     I personally examined and evaluated the patient today.   The patient s prognosis today is grave  I have evaluated all laboratory values and imaging studies from the past 24 hours.  Key findings and decisions made today included Failed decannulation yesterday, continue hemodynamic support with ECMO  I personally managed the ventilator, sedation, pain control and analgesia, metabolic abnormalities, antibiotic therapy, nutritional status and vasoactive medications.   Consults ongoing and ordered are Neurology and Surgery  Procedures that will happen today are: none  All treatments were placed at my direction.  I formulated today s plan with the house staff team or resident(s) and agree with the findings and plan in the associated note.       The above plans and care have been discussed with family and all questions and concerns were addressed.     I spent a total of 60 minutes (excluding procedure time) personally providing and directing critical care services at the bedside and on the critical care unit for Hellen Riddle.        Oleksandr Humphreys

## 2019-03-08 NOTE — PROGRESS NOTES
CRRT RESTART NOTE     Reason for Restart:  OR  Error Code:  none     Patient s Vascular Access: ECMO     Procedure:  CVVHDF  Start Time:  2035  Machine#:  2  Filter:  M150  Blood Flow:   180 mL/min  Pre-Replacement Solution:  Phoxillum 4/2.5  Post-Replacement Solution:  Phoxillum 4/2.5  Dialysate Solution:  Phoxillum 4/2.5  Pre-Replacement Solution Rate:  1200mL/hr  Post-Replacement Solution Rate:  200mL/hr  Dialysate Flow Rate:  1200mL/hr  Patient Removal Rate:  0 mL/hr  Anticoagulation Type and Rate:  0           ASSESSMENT:  How Patient Tolerated Restart:  well  Vital Signs: HR 92, 93/64 (79), 95%, 36.8C   Initial Pressures:  Access: 35  Filter:  116  Return: 68   TMP:  59  Change in Filter Pressure:  15     INTERVENTIONS:  Restarted CRRT.  Lines connected to ECMO.  Mat in use.     PLAN:  Continue to monitor circuit daily and change set q72 hours or PRN for clotting/clogging. Please call CRRT RN with any questions/problems.

## 2019-03-08 NOTE — PLAN OF CARE
D: s/p revascularization of  LAD and LCx w/ MITA x8 (3/7/19). Sedated/intubated on VAV ECMO, IABP. Oozing from groin ECMO cannula sites, dressing changed/reinforced, PRBC x1 unit given. Improving oxygenation, FiO2 dropped to 60. Coughing at times causing ventilator alarms at times, w/ thick pink, creamy secretion noted. Hemodynamically stable, on vaso/levo. Frequent ectopies noted. Electrolytes ok, on CRRT, I = O this shift. Amio gtt started per MD. Feeding restarted slowly o/n, insulin gtt now restarted.  I: As above. Hygiene care provided. Family updated re: status. Care coordinated w/ bedside ECMO tech.  A: Critical.  P: Continue to monitor. Notify MD of changes/concerns. Anticipate weaning of sedation for a complete neuro assessment.

## 2019-03-08 NOTE — PROGRESS NOTES
[facility] Nurse Inpatient Adult Pressure Injury Prevention Assessment: ECMO  Follow up    3/8/19- pt's forehead was wrapped with kerlix to secure the ECMO tubings on right side of th head, unable to visualize the area.     Positioning Tolerance: Fair, only micro-turns  Date of ECMO cannulation: 2/23 Right Groin VA ECMO and VAV ECMO  Presence of Ischemia: No  Location of ischemia: NA    Pressure Injury Prevention Interventions In Place:  Optifoam Dressing under ECMO Cannula, Z flow Positioner under head, Pillows for repositioning, TAPs Wedge Positioners in use, Heel off-loading boots, Pillows under calves for heel suspension, Mepilex Sacral Dressing and Dressing to sacrum for prevention   Current support surface: Standard  Low air loss mattress       Pressure Injury Prevention Interventions Added:  Optifoam Dressing under ECMO Cannula around upper right above the ear.         Plan of Care for Positioning and Pressure Injury Prevention  Reposition patient every 1-2 hours using TAP Wedges  Position head on Z flow positioner, mold indentation at areas of pressure points.  Pad ECMO IJ cannula with Optifoam (#[id number]) along face and scalp between skin and cannula and under Coban head wraps    Pad ECMO groin and chest cannula under rigid connectors with Optifoam or Soft cloth  Heel off-loading Boots at all times  Sacral Mepilex for Prevention, change every 5 days and prn  Low Air loss mattress    Patient History:   According to medical record: Ciara Winslow is a 38 year old male who was admitted on 2/23/2019. Wife noticed agonal breathing and seizure like activity. Called EMS, who came and started chest compressions. No history of HTN, DM. Does have history of dyslipidemia. Non smoker. Went to bed at 11:30, wife found him foaming at mouth around midnight. Called EMS, did not get significant chest compressions. EMS arrived 5 minutes later. [facility] to [facility].    Current Diet / Nutrition:      Orders Placed This Encounter      Advance Diet as Tolerated: Clear Liquid Diet    Output:    I/O last 3 completed shifts:  In: 2201.82 [I.V.:1281.82; NG/GT:330]  Out: 2381 [Emesis/NG output:500; Other:1681; Stool:200]  Containment: of urine/stool: Rectal Pouch and Anuric    Risk Assessment:   Sensory Perception: 2-->very limited  Moisture: 3-->occasionally moist  Activity: 1-->bedfast  Mobility: 1-->completely immobile  Nutrition: 2-->probably inadequate  Friction and Shear: 1-->problem  Kirby Score: 9    Labs:    Recent Labs   Lab 03/08/19  0355   ALBUMIN 1.9*   HGB 8.7*   INR 1.20*   WBC 18.4*   .0*     Focused Assessment:  Right groin ECMO cannula and Heels, occiput, mouth    Pressure Injury Present::Yes      ASSESSMENT  Pressure Injury: under EEG leads , hospital acquired ,   This is a Medical Device Related Pressure Injury (MDRPI) due to EEG and NIRs with surglist to hold it in place.   Pressure Injury is Stage Deep Tissue Pressure Injury (DTPI)   Contributing factor of the pressure injury: pressure and immobility, cannot rule out burn from NIRs being placed over EEG.   Status: Follow up assessment, Stable   Recommend provider order: NA     TREATMENT PLAN  Bilateral Forehead wounds: BID and PRN cleanse with saline and pat dry. Apply thin layer of Vaseline. Avoid placing NIRs directly over EEG leads. Avoid placing EEG leads directly on wound.   Orders reviewed   WOC Nurse follow-up plan:twice weekly  Nursing to notify the Provider(s) and re-consult the WOC Nurse if wound(s) deteriorates or new skin concern.    Physical Exam    Wound Location:  Bilateral Forehead (assessment from 3/4)      Date of last Photo 3/4  Wound History: Pt had NIRs in place over EEG held in place with Surgilast netting.   Measurements (length x width x depth, in cm) 0.6 cm x 0.6 cm  x  0 cm (both)  Wound Base:  100 % non-blanchable erythema and deep purple/ brown  Tunneling N/A  Undermining N/A  Palpation of the wound bed:  normal   Periwound skin: intact  Color: normal and consistent with surrounding tissue  Temperature: normal   Drainage:, none  Description of drainage: none  Odor: none  Pain: no grimacing or signs of discomfort and unable to assess due to  sedation , insensate    Education provided to: nurse  Discussed importance of:their role in pressure injury prevention  Discussed plan of care with Nurse  WOC Nurse follow-up plan:twice weekly    Dora Johnston RN CWOCN

## 2019-03-08 NOTE — PROCEDURES
PRELIMINARY CARDIAC CATH REPORT:     PROCEDURES PERFORMED:   Coronary Angiography  Percutaneous Coronary Intervention  IABP    PHYSICIANS:  Attending Physician: Dante Molina MD  Interventional Cardiology Fellow: Justin Escobar    INDICATION:  Hellen Riddle is a 39 year old gentleman with a history of cardiac arrest on ECMO with refractory VT who presents for revascularization of his distal LCx and  LAD.    DESCRIPTION:  1. Consent obtained with discussion of risks.  All questions were answered.  2. Sterile prep and procedure.  3. Location with Sheaths:   Lf Radial Arterial  6 Fr  10 cm [short]   Left common femoral artery 9 Indonesian 25 cm  4. Access: Local anesthetic with lidocaine.  A micropuncture 21 guage needle with ultrasound guidance was used to establish vascular access using a modified Seldinger technique.  5. Diagnostic Catheters:   6 Fr  JR4, 3DRC  6. Guiding Catheters:  8 Fr  XB LF 3.5 GC  6. Estimated blood loss: < 25 ml    MEDICATIONS:  The procedure was performed under deep sedation.  Heart rate, BP, respiration, oxygen saturation and patient responses were monitored throughout the procedure with the assistance of the RN under my supervision.  >> IV UFH was administered to achieve anticoagulation.  >> Antiplatelet Therapy: aspirin and ticagrelor    Procedures:    CORONARY ANGIOGRAM:   1. Both coronary arteries arise from their respective cusps.  2. Dominance: Right  3. The LM has mild 20-30% disease.  4. LAD: Type 3 [LAD supplies the entire apex].. The LAD gives rise to septal perforators, D1 and D2.  The LAD is  at the mid segment just after the first diagonal branch, it is filled retrograde via collaterals from the right which are grade III, the  is short <20 cm with mild calcification.   5. LCX gives rise to OM1 and OM2 vessels along with 4 PL branches.  The proximal LCx has a mild 30-40% stenosis, first OM has minimal 20% ostial disease, OM2 is small and diffusely diseased, and OM 3 is large  caliber with 30-40% disease. PL branches have ostial 40% ostial disease. Mid to distal LCx has 70-80% disease.  6. RCA gives rise to PL branches and supplies PDA. Proximal RCA has a minimal 20% disease, the mid RCA has a 50% narrowing and just beyond it there is a patent stent, RCA has minimal irregularities along with the PDA the PL branches.    PERCUTANEOUS CORONARY INTERVENTION:   Lesion #1: mid and distal LCx    An 8 Romansh XB 3.5 was positioned in the ostium of the LM. An 0.014 Johnson blue was advanced into the distal LCx and positioned in the last PL. A 2.0x20mm Emerge was used to pre dilate the mid to distal LCx. A stent could not be passed however and first a yoselin wire Runthrough was placed in the first OM but still unable to cross with stent. Finally Runthrough removed and a GuideLiner 6 Romansh placed but would not advance beyond the proximal LCx stent. A 3.0x8mm NC Emerge was then used to post dilate the proximal LCx stent and the GuideLiner advanced into the stent. A 2.0x26mm Ashaway was advanced into the distal LCx and deployed followed by a 2.33a85ie Synergy which was deployed more proximal in the mid LCx.    Lesion #2: proximal and mid LAD    Equipment and wires removed from the LCx. A  50 was initially attempted but was unable to cross. A  200 was then used with a 1.5x12mm balloon as back up. The  200 was able to cross and was advanced distally. The 1.5 balloon was unable to cross and was used to pre dilate the lesion. A 1.2x20mm Emerge was then used to pre dilate the lesion and was able to cross. Finally a 2.0x20mm Emerge was used to pre dilate the vessel further and a 2.0x15mm NC Emerge was then used to pre dilate the  segment. A 2.5x38mm Synergy was deployed in the mid LAD, another 2.5x38mm Synergy was deployed in the proximal LAD, two overlapping 2.22k52io Synergy stents were placed distal the mid stent, and last 2.31x26xl Synergy was placed in the distal LAD. A 3.0x20mm Synergy was  placed proximally to the ostium of the LAD. The mid and proximal LAD stents were post dilated with a 2.5x20mm NC emerge. Final angiography showed no evidence of perforation or dissection with residual stenosis of 0% and BERNICE 3 flow.  No complications.    INTRA-AORTIC BALLOON PUMP INSERTION:  1. A 7.5 Fr 50 mL IABP was inserted into the left femoral artery under fluoroscopic guidance.    Sheath Removal:  The left radial sheath was manually removed in the cardiac catheterization laboratory.    Contrast: Isovue, 400 ml     Fluoroscopy Time: 39.9 min    COMPLICATIONS:  1. None    SUMMARY:   Two vessel CAD involving the mid to distal LCx and  of the mid LAD  Successful PCI with two drug eluting stents to the mid and distal LCx  Successful PCI with six drug eluting stents to the proximal, mid, and distal LAD  Intra Aortic Balloon Pump removal and placement    PLAN:   >> ASA 81 mg qd and Ticagrelor 90 mg BID  >> Bedrest per protocol.  >> Continued medical management and lifestyle modification for cardiovascular risk factor optimization.   >>. Return to the primary inpatient team for further evaluation and management.    The attending interventional cardiologist was present and supervised all critical aspects the procedure.    Findings discussed with primary team.    See CVIS report for final draft.    Justin Escobar   Cardiology Fellow    Dante Molina   Cardiology Staff

## 2019-03-08 NOTE — PROGRESS NOTES
"CLINICAL NUTRITION SERVICES - BRIEF NOTE    Received provider consult for nutrition education with comments \"heart healthy diet education\" (automatic consult post-cath lab). Pt not appropriate for nutrition education at this time - intubated/sedated on VAV ECMO.     RD will continue to follow.     Sudha Winters RD, LD, [facility]  CVICU Dietitian  Pager: [phone]    "

## 2019-03-08 NOTE — PROGRESS NOTES
CRRT STATUS NOTE    DATA:  Time:  6:02 AM  Pressures WNL:  YES  Filter Status:  WDL    Problems Reported/Alarms Noted:  None    Supplies Present:  YES    ASSESSMENT:  Patient Net Fluid Balance:  Negative 85mL since MN, negative 9,982mL since admit.  Vital Signs:    92/54 (74)  Temp: 98.1  F (36.7  C) Temp src: Esophageal     Heart Rate: 80 Resp: 12 SpO2: 100 % .b  Labs:  Cr 2.85, Mg 3.1, phos 6.4.  Goals of Therapy:  0-50cc/hr net negative as BP's allow.    INTERVENTIONS:   Restarted after back from procedure.    PLAN:  Continue fluid removal as tolerated per goals of therapy.  Check filter daily and change filter q 72 hrs and PRN.  Please contact the CRRT resource RN at [phone] with any questions/concerns.

## 2019-03-08 NOTE — PROGRESS NOTES
[facility] ID Service: Follow Up Note      Patient:  Hellen Riddle, Date of birth 1980, Medical record number [id number]           Assessment and Recommendations:   Recommendations:  - cont ertapenem (3/7-current [previously meropenem 3/5-3/7])  - continue oseltamivir (3/4-current)  - ID will continue to follow, total duration of antimicrobials TBD    Problem List:  # Hypoxic respiratory failure - on VV ECMO  # Influenza A infection   # E. Coli pneumonia    # Candida in sputum (oropharyngeal colonization)  # Extreme leukocytosis, improving   # Septic shock, improving   # S/P out of hospital cardiac arrest, s/p VA ECMO     Discussion:  40 yo male admitted 2/23/19 with refractory VF OHCA s/p VA ECMO on admission, found to have 3v CAD s/p intervention. ID consulted 3/4/19 for increasing leukocytosis, hypoxia, lactic acidosis.     He is positive for Influenza A, which could certainly explain the worsening hypoxia, lactic acidosis at time of consult. He is also growing E. Coli from the sputum, one strain of which is resistant to pip-tazo (which he was receiving at the time of culture) and we will treat him for bacterial pulmonary infection as well with IV ertapenem.  We are finding Candida in his sputum however this is not likely to be pathogenic and likely represents colonization. We have no sign of Candidemia or other pulmonary fungal infection (as well as alternate explanation for his decompensation) at this point.     He is remains critically ill however improving in regards to leukocytosis, hypoxia and we will continue treating for Influenza and E. Coli lower respiratory tract infection for now.     Patient seen and discussed with attending physician Dr. Ricardo Nesbitt MD  PGY-5 Infectious Diseases Fellow  pgr [phone]          Interval History:   Went to cath lab for CAD intervention.   Frequent ectopy and started on amiodaraon ggt.   Some blood tinged secretions per RN.   Stool is non-bloody,  brown loose.          Current Antimicrobials   Ertapenem 3/7-current  Oseltamivir 3/4-current    Meropenem 3/5-3/7  Vancomycin 2/23-3/6  Micafungin 3/5-3/6    Prior antibiotics:  Pip/tazo 2/23-3/4         Physical Exam:   Ranges for vital signs:  Temp:  [97  F (36.1  C)-98.4  F (36.9  C)] 98.1  F (36.7  C)  Heart Rate:  [71-96] 87  Resp:  [12] 12  MAP:  [57 mmHg-107 mmHg] 64 mmHg  Arterial Line BP: ()/(37-84) 74/51  FiO2 (%):  [60 %-80 %] 60 %  SpO2:  [89 %-100 %] 99 %    Intake/Output Summary (Last 24 hours) at 3/6/2019 1353  Last data filed at 3/6/2019 1200  Gross per 24 hour   Intake 3838.03 ml   Output 5115 ml   Net -1276.97 ml     Exam:  GENERAL:  Intubated and sedated   ENT:  Head is normocephalic, atraumatic. Sclera are somewhat injected.    LUNGS:  on ventilator  CARDIOVASCULAR: ECMO hum  ABDOMEN:  Normal bowel sounds, soft, nontender.  EXT: Extremities warm and without edema. ECMO and balloon pump entrance sites without erythema or disharge. Has L femoral line as well without surrounding erythema  SKIN:  No acute rashes.    NEUROLOGIC: Sedated         Laboratory Data:   Microbiology:   Culture Micro   Date Value Ref Range Status   03/08/2019 PENDING  Preliminary   03/08/2019 No growth after 1 hour  Preliminary   03/07/2019 Culture in progress  Preliminary   03/07/2019 No growth after 1 day  Preliminary   03/06/2019 No growth after 2 days  Preliminary   03/06/2019 Culture in progress  Preliminary   03/06/2019 (A)  Preliminary    Light growth  Lactose fermenting gram negative rods     03/05/2019 Light growth  Escherichia coli   (A)  Final   03/05/2019 Light growth  Strain 2  Escherichia coli   (A)  Final   03/05/2019 (A)  Final    Light growth  Candida albicans / dubliniensis  Candida albicans and Candida dubliniensis are not routinely speciated  Susceptibility testing not routinely done     03/05/2019 Susceptibility testing done on previous specimen  Final   03/05/2019 No growth after 3 days   Preliminary   03/04/2019 Light growth  Escherichia coli   (A)  Final   03/04/2019 Light growth  Strain 2  Escherichia coli   (A)  Final   03/04/2019 (A)  Final    Light growth  Candida albicans / dubliniensis  Candida albicans and Candida dubliniensis are not routinely speciated  Susceptibility testing not routinely done     03/04/2019 Susceptibility testing done on previous specimen  Final   03/04/2019 No growth after 4 days  Preliminary   03/03/2019 Moderate growth  Escherichia coli   (A)  Final   03/03/2019 Moderate growth  Strain 2  Escherichia coli   (A)  Final   03/03/2019 (A)  Final    Light growth  Candida albicans / dubliniensis  Candida albicans and Candida dubliniensis are not routinely speciated  Susceptibility testing not routinely done     03/03/2019 No growth after 5 days  Preliminary   03/03/2019 (A)  Final    Moderate growth  Escherichia coli  Susceptibility testing done on previous specimen     03/03/2019 (A)  Final    Moderate growth  Candida albicans / dubliniensis  Candida albicans and Candida dubliniensis are not routinely speciated  Susceptibility testing not routinely done     03/02/2019 Heavy growth  Escherichia coli   (A)  Final   03/02/2019 (A)  Final    Light growth  Candida albicans / dubliniensis  Candida albicans and Candida dubliniensis are not routinely speciated  Susceptibility testing not routinely done     03/02/2019 No growth  Final   03/01/2019 (A)  Final    Light growth  Candida albicans / dubliniensis  Candida albicans and Candida dubliniensis are not routinely speciated  Susceptibility testing not routinely done     03/01/2019 No growth  Final   02/28/2019 (A)  Final    Cultured on the 1st day of incubation:  Staphylococcus hominis     02/28/2019   Final    Critical Value/Significant Value, preliminary result only, called to and read back by  SURESH DE LEON RN (4E).  03.01.19  0518 GJS     02/28/2019   Final    (Note)  POSITIVE for Staphylococci other than S.aureus,  S.epidermidis and  S.lugdunensis, by Wanderful Mediaigene multiplex nucleic acid test.  Coagulase-negative staphylococci are the most common venipuncture or  collection associated skin CONTAMINANTS grown in blood cultures.  Final identification and antimicrobial susceptibility testing will be  verified by standard methods.    Specimen tested with Verigene multiplex, gram-positive blood culture  nucleic acid test for the following targets: Staph aureus, Staph  epidermidis, Staph lugdunensis, other Staph species, Enterococcus  faecalis, Enterococcus faecium, Streptococcus species, S. agalactiae,  S. anginosus grp., S. pneumoniae, S. pyogenes, Listeria sp., mecA  (methicillin resistance) and Marlon/B (vancomycin resistance).    Critical Value/Significant Value called to and read back by ra rodriguez rn @0809 3/1/19. ct     02/27/2019 No growth  Final   02/26/2019 No growth  Final   02/26/2019 No growth  Final   02/25/2019 No growth  Final   02/25/2019 No growth  Final   02/24/2019 Canceled, Test credited  Final   02/24/2019 Cancelled by lab - Specimen never received.  Final   02/24/2019 Canceled, Test credited  Final   02/24/2019 Cancelled by lab - Specimen never received.  Final   02/24/2019 Light growth  Normal marianna    Final   02/24/2019 No growth  Final   02/23/2019 Light growth  Normal marianna    Final           Other Laboratory Data:    Urine Studies    Recent Labs   Lab Test 02/23/19  0802   LEUKEST Negative   WBCU 7*       Inflammatory Markers    Recent Labs   Lab Test 03/08/19  0355 03/07/19  0416 03/06/19  0414 03/05/19  0351 03/04/19  0357 03/03/19  0341 03/02/19  0357 03/01/19  0401   SED 80* 83* 104* 93* 97* 140* >140* >140*   .0* 250.0* 320.0* 300.0* 340.0* 260.0* 270.0* 260.0*       Hematology Studies    Recent Labs   Lab Test 03/08/19  0955 03/08/19  0355 03/07/19  2136 03/07/19  1848 03/07/19  0940 03/07/19  0416   WBC 17.3* 18.4* 18.6* 19.8* 21.1* 21.5*   ANEU 14.1* 15.4* 15.8* 17.8* 18.7* 18.3*   AEOS 0.9*  0.2 0.2 0.2 0.0 0.2   HGB 8.2* 8.7* 8.0* 7.9* 9.0* 9.1*   MCV 93 91 91 91 91 91   * 135* 124* 110* 110* 111*     Metabolic Studies     Recent Labs   Lab Test 03/08/19  0955 03/08/19  0355 03/07/19  2136 03/07/19  1848 03/07/19  0940    135 134 132* 137   POTASSIUM 4.7 4.5 4.7 4.5 4.8   CHLORIDE 102 103 100 99 104   CO2 17* 18* 20 20 20   BUN 67* 66* 76* 74* 70*   CR 2.78* 2.85* 3.14* 3.14* 2.70*   GFRESTIMATED 27* 26* 23* 23* 28*       Hepatic Studies    Recent Labs   Lab Test 03/08/19  0955 03/08/19  0355 03/07/19 2136 03/07/19  1848 03/07/19  0940 03/07/19  0416   BILITOTAL 10.2* 11.0* 11.1* 10.6* 10.8* 10.7*   ALKPHOS 102 103 99 95 95 98   ALBUMIN 1.8* 1.9* 1.9* 1.7* 2.0* 2.0*   AST 99* 104* 102* 99* 118* 118*   ALT 54 56 57 55 65 63     Vancomycin Levels    Recent Labs   Lab Test 02/26/19  2155 02/25/19  0605   VANCOMYCIN 24.5 26.3*     Imaging:  3/8/19 CXR:  IMPRESSION:   1. Stable placement of the ECMO cannula. The venous cannula continues  to project over the left brachiocephalic vein.  2. Improved patchy and interstitial airspace opacities, likely  indicating improving pulmonary edema.

## 2019-03-08 NOTE — PROGRESS NOTES
ECMO Shift Summary:    Patient remains on VAV ECMO, all equipment is functioning and alarms are appropriately set. RPM's 3430 with flow range 4.97-5.21 ( flow to Protek cannula 1.98) Arterial L/min. Sweep gas is at 7 LPM and FiO2 100%. Circuit remains free of air, clot and fibrin. Cannulas are secure with some bleeding from site. Extremities are warm. Suctioned ETT for moderate red streaked thick .    Significant Shift Events: Patient was transported to cath lab for stenting, pt got 8 stents. IABP was pulled and replaced at the end of the case. Taken back to 4E without incident.    Vent settings:  Ventilation Mode: CMV/AC  (Continuous Mandatory Ventilation/ Assist Control)  FiO2 (%): 80 %  Rate Set (breaths/minute): 12 breaths/min  Tidal Volume Set (mL): 530 mL  PEEP (cm H2O): 10 cmH2O  Oxygen Concentration (%): 80 %  Resp: 12  .    Heparin is running at 1300 u/hr, ACT range 188-196.    Urine output is none, blood loss was small. Product given included none.      Intake/Output Summary (Last 24 hours) at 3/7/2019 1931  Last data filed at 3/7/2019 1927  Gross per 24 hour   Intake 3763.66 ml   Output 2585 ml   Net 1178.66 ml       ECHO:  No results found for this or any previous visit.No results found for this or any previous visit.    CXR:  Recent Results (from the past 24 hour(s))   XR Chest Port 1 View    Narrative    EXAM: XR CHEST PORT 1 VW  3/6/2019 9:44 PM      HISTORY: Peripheral ECMO placement    COMPARISON: Radiograph 3/6/2019    FINDINGS: AP radiograph of the chest. Interval readjustment of the low  approach ECMO cannula. The tip now projects over the central left  brachiocephalic vein. Right IJ ECMO tip projects over the pulmonary  artery. Feeding tube and gastric tube pass below the diaphragm,  collimated off the field-of-view. Endotracheal tube projects over the  midthoracic trachea. Esophageal temperature probe projects over the  cervical spine.    Worsening diffuse alveolar opacities. No pneumothorax.  No pleural  effusions. Pulmonary vascularity is obscured.       Impression    IMPRESSION:   1.  Interval readjustment of the low approach ECMO cannula. The tip  now projects over the central left brachiocephalic vein. This may need  to be retracted.  2. Esophageal temperature probe projects over the cervical spine.  Recommend advancement to ensure esophageal placement.  3. Worsening diffuse patchy and interstitial opacities. Findings  suggest cardiogenic or noncardiogenic pulmonary edema versus  infection.    Findings of ECMO catheter placement were discussed with Dr. Covarrubias by  telephone at 3/6/2019 10:00 PM by Dr. Jai Burr.    I have personally reviewed the examination and initial interpretation  and I agree with the findings.    BEVERLY BOLTON MD   XR Abdomen Port 1 View    Narrative    EXAM: XR ABDOMEN PORT 1 VW  3/6/2019 10:15 PM      HISTORY: OG placement    COMPARISON: Radiograph 2/25/2019, chest radiograph 3/6/2018.    FINDINGS: Supine radiograph of the abdomen. Gastric tube tip and  side-port project over the stomach. Feeding tube tip projects over the  ligament of Treitz. Lower approach PICC projects over the central IVC.  Stable lines and tubes in the chest, as described on same day chest  radiograph.     Generalized paucity small bowel gas. No definite pneumatosis or portal  venous gas. Diffuse mixed airspace opacities in the lungs, as  described on same day chest radiograph.      Impression    IMPRESSION:   1. Gastric tube tip and side-port project over the stomach.  2. Feeding tube tip projects over the ligament of Treitz.  3. Please see same day chest radiograph regarding lines and tubes and  pulmonary findings.    I have personally reviewed the examination and initial interpretation  and I agree with the findings.    MIKKI DAI MD       Labs:  Recent Labs   Lab 03/07/19  1848 03/07/19  1357 03/07/19  1254 03/07/19  0940   PH 7.38 7.46* 7.48* 7.41   PCO2 35 31* 29* 34*   PO2 101 120* 112*  135*   HCO3 21 22 22 21   O2PER 80.0 80 21 80       Lab Results   Component Value Date    HGB 7.9 (L) 03/07/2019    PHGB 50 (H) 03/07/2019     (L) 03/07/2019    FIBR 772 (H) 03/05/2019    INR 1.29 (H) 03/07/2019     (HH) 03/07/2019    DD >20.0 (H) 03/05/2019    AXA 0.84 03/07/2019    ANTCH 52 (L) 03/07/2019         Plan is to reamin stable on VAV ECMO.      Krista Hannon, RRT  3/7/2019 7:31 PM

## 2019-03-08 NOTE — PROGRESS NOTES
ECMO Shift Summary:    Patient remains on VAV ECMO, all equipment is functioning and alarms are appropriately set. RPM's 3430 with flow range 4.77-5.11 L/min. Sweep gas is at 7 LPM and FiO2 100%. Circuit remains free of air, clot and fibrin. Cannulas are secure with oozing from site. Extremities are warm. Suctioned ETT for small, red, thick secretions.    Significant Shift Events: 1 unit of PRBC's given.     Vent settings:  Ventilation Mode: CMV/AC  (Continuous Mandatory Ventilation/ Assist Control)  FiO2 (%): 80 %  Rate Set (breaths/minute): 12 breaths/min  Tidal Volume Set (mL): 530 mL  PEEP (cm H2O): 10 cmH2O  Oxygen Concentration (%): 80 %  Resp: 12  .    Heparin is running at 1100 u//hr, ACT range 180-225.    Urine output is none on CRRT, blood loss was minimal. Product given included 1 unit of PRBC.      Intake/Output Summary (Last 24 hours) at 3/8/2019 0558  Last data filed at 3/8/2019 0500  Gross per 24 hour   Intake 1847.82 ml   Output 2146 ml   Net -298.18 ml       ECHO:  No results found for this or any previous visit.No results found for this or any previous visit.    CXR:  Recent Results (from the past 24 hour(s))   US Abdomen Limited    Narrative    EXAMINATION: Limited Abdominal Ultrasound, 3/7/2019 7:42 PM     COMPARISON: CT CAP dated 2/23/2018    HISTORY: Concern for choledocholithiasis    FINDINGS:   Fluid: No evidence of ascites or pleural effusions.    Liver: The liver parenchyma is diffusely hyperechoic with sparing  around the gallbladder, measuring 19.5 cm in craniocaudal dimension.  There is no focal mass.     Gallbladder: Small volume of echogenic sludge in the gallbladder. No  echogenic stones or wall thickening. No pericholecystic fluid  accumulations.    Bile Ducts: Both the intra- and extrahepatic biliary system are of  normal caliber.  The common bile duct measures 4 mm in diameter.    Pancreas: Visualized portions of the head and body of the pancreas are  unremarkable.     Kidney: The  right kidney measures 13.8 cm long. There is mild  hydronephrosis with central but no peripheral dilatation of the renal  collecting system. No echogenic stones or focal mass.      Impression    IMPRESSION:   1.  Hepatomegaly with steatosis. No focal mass.  2.  Small amount of echogenic sludge in the gallbladder without  definite evidence of acute cholecystitis.    I have personally reviewed the examination and initial interpretation  and I agree with the findings.    AYSE LEROY MD       Labs:  Recent Labs   Lab 03/08/19  0355 03/07/19  2136 03/07/19  1919 03/07/19  1848 03/07/19  1357   PH 7.44 7.38  --  7.38 7.46*   PCO2 31* 36  --  35 31*   PO2 144* 90  --  101 120*   HCO3 21 21  --  21 22   O2PER 80% 80.0 80 100  80.0 80       Lab Results   Component Value Date    HGB 8.7 (L) 03/08/2019    PHGB 50 (H) 03/08/2019     (L) 03/08/2019    FIBR 772 (H) 03/05/2019    INR 1.20 (H) 03/08/2019    PTT 76 (H) 03/08/2019    DD >20.0 (H) 03/05/2019    AXA 0.45 03/08/2019    ANTCH 52 (L) 03/07/2019         Plan is to remain stable on VAV ECMO.      Velma Guzmán, RRT  3/8/2019 5:58 AM

## 2019-03-08 NOTE — PROGRESS NOTES
"CRRT STATUS NOTE    DATA:  Time:  6:16 PM  Pressures WNL: YES  Filter Status: WDL    Problems Reported/Alarms Noted: none reported    Supplies Present: YES    ASSESSMENT:  Patient Net Fluid Balance:  Weight down approx 10 kg from admission.  Net -941 today prior to IR cath procedure.   Vital Signs: Hemodynamically stable prior to procedure.    Labs: PM labs pending due to procedure.   Goals of Therapy: Per order:  \"0-50cc/hr net negative as BP's allow\".  Able to meet goals of therapy today.    INTERVENTIONS:   Machine recirculated for IR procedure but 2 hour time elapsed so machine taken down per bedside RN. No other interventions this shift.     PLAN:  Will restart and continue with POC when returns from cath lab. Call resource when patient available. #[id number].    "

## 2019-03-08 NOTE — PLAN OF CARE
D/I/A: Patient remains intubated and sedated. Afebrile. No vent changes, LS coarse and diminished. IABP 1:1, 100% augmentation. VAV ECMO continued, sweep 6,  100%, flow 4 lpm, speed 3100 rpm. MAP trending down during the day and pressors to maintain MAP > 65. ECMO turndown at bedside. CRRT continued, 0-50 net negative goal, -800 for the day. TF continued.   P: Will continue to monitor and notify MD of any changes.

## 2019-03-08 NOTE — PROGRESS NOTES
"Cardiology Progress Note    Overnight/subj:   - CCL yesterday now s/p MITA to dLCx and p-dLAD  - Started on low dose amio gtt overnight   - no events overnight     VS reviewed: Notable for temp: AF, HR 74-85, MAP 64-75, oxygenating on 100%  Vent: 12/530/10/80   Tele:3-4beat runs of NSVT, recurrent bigemeny    IABP via LFA, 1:1, triggered by ECG, augmented MAPs 95-96, pulses NIRS stable   ECMO  \"Patient remains on VAV ECMO, all equipment is functioning and alarms are appropriately set. RPM's 3430 with flow range 4.77-5.11 L/min. Sweep gas is at 7 LPM and FiO2 100%. Circuit remains free of air, clot and fibrin. Cannulas are secure with oozing from site. Extremities are warm. Suctioned ETT for small, red, thick secretions.\"    Wt: on admit 107.9, yday 97.5, today 97.3  I/O: 1.9 in / 2.5 out yday. Since MN 1.1 in / 1.0 out    Drips:   NE 0.03  Vaso 3    Amio 0.5  Fentanyl 100  Mida 8  Hep 1200  Insulin 1    Flolan    PO Cardiac Meds: ASA 81mg daily, ticagrelor 90mg BID  Other meds: MVI, senna, miralax     Abx: tamiflu 03/04-**, ertapenem 03/07-**  meropenem 03/04-03/06, vanco 03/04-03/06, micafungin 03/04-03/06,     Radiology Imaging  CXR- Lines stable (Venous cannula in the L subclavian) improved interstial opacities     Cardiology Studies:  ECG: Qtc 495, sinus rhythm    Labs: Reviewed in epic    Phys exam:  GEN: NAD  Pulm: course breath sounds b/l, stable airmovement  Cardiac:  Distant heart sounds, no murmurs  Vascular: mild IRISH and NIRS stable   GI: soft, non distended     ASSESSMENT/PLAN:  Mr. Riddle is a 40yo M with HLD and Fhx CAD/MI who presented with refractory VF OHCA brought directly to the CCL now s/p peripheral VA ECMO placement. Found to have severe 3v disease s/p MITA to mRCA and mLcx. Also with IABP placement for presumed pulmonary edema. Being managed as part post VF protocol.  Post arrest day 13.     Neurology:  # CT head day3: minimal edema - discontinue EEG- no e/o seizures   - Intubated, " sedated   -continue fentanyl  - Versedd  - No longer on hypertonic saline, targeting normal serum Na   Cardiovascular / Hemodynamics:  # Refractory VF arrest.    #CAD s/p MITA  mRCA and mCx(admission)  S/p MITA dCx and p-dLAD (03/07)  # Torsades (likely relate Qtc >600, corrected)  # Cardiogenic Shock requiring ECMO support  # Recurrent VT with decannulation - continue vaso and NE as needed  -- VA to VV ECMO & IABP, CVTS consult  -- EP consult, for arrhythmia optimization  --continue ASA 81mg and ticagrelor 90mg BID  -- Turndown today, plan possible decannulation tomorrow  --hold lipitor for now given likely hepatic injury during arrest  --hold ACE/ARB for now given likely reduced renal fxn after arrest  --holding beta blocker given shock    Pulmonary:  # Refractory Hypoxemia 2/2 pulmonary edema + Flu now VAV  # Resp acidosis (currected) -- Improved hypoxemia  -- Continue VC- wean PEEP as able.  Lung protected ventalation  -- wean flolan as able  -- Fluid removal as able   -- ETT stable   -- Q2h ABGs for now  -- consider scheduled duonebs if signs of lung dz, currently PRN          GI and Nutrition:   Shock Liver (recovering) No known medical hx.    --Nutrition consulted, appreciate their help  -- Post pyloric feeds   --bowel regimen   --GI Prophylaxis: PPI    Renal, Fluid and Electrolytes:  # FERNANDA with anuria -- Renal consult   -- on CRRT, plan for volume removal given worsening hypoxia  --monitor urine output  --maintain K>3 and Mg>2    Infectious Disease:  # Marked Leukocytosis (improving)  # Bronch cx: E. Coli, staph hominus and Influenza  # Bcx 02/28 growing GPCs 1/2 (likely contaminant) -- Appreciate ID help  -- Continue tamiflu   -- ertapenem   -- discontinue meropenem, vancomycin and micafungin for now  --vancomycin/zosyn x5 days for ECMO (Completed 02/28/19)  -- daily blood cultures      Hematology: Coagulopathy related to ECMO Receiving heparin for ECMO and ASA/ticagrelor for MITA.   --cryo PRN fibrinogen <  200; FFP for INR >2  -- Transfuse plat <50  --Transfuse for Hgb<10  --heparin gtt for ECMO with ACT goal 160-180  --DVT PPX: Heparin as above   Endocrinology: No known medical history. BG elevated.  --insulin gtt PRN   Lines: R femoral arterial and venous ECMO cannulae 02/23/19-03/06/19  But replaced on 03/06-  L femoral arterial line February 23, 2019  R radial arterial line February 23, 2019  ETT February 23, 2019  Pisano catheter February 23, 2019  RIJ-MPA 26Fr venous cannula  OG tube February 23, 2019  Restraint: PRN  Current lines are required for patient management        Code status FULL     Discussed with Dr. Molina.      Theron Hebert MD  Cardiology Fellow- [facility]  *[phone]

## 2019-03-08 NOTE — PROGRESS NOTES
Nephrology Progress Note  03/08/2019         Mr Riddle is a 39 yom w/HLP who had cardiac arrest on 2/23/19 with unclear down-time.  PCI done emergently, Nephrology consulted for management of FERNANDA, started CRRT on 2/25.       Interval History :   Mr Riddle continues on CRRT, net negative 0.6L yesterday, on track to be ~1L net negative today, is still on 2 pressors.  No major changes in nephrology plan, continuing to try to be net negative, needing mechanical and vasopressor support with IABP, VAV ECMO and 2 pressors.           Assessment & Recommendations:   FERNANDA-Baseline Cr unclear, admitted with Cr of 1.7 post arrest.  FERNANDA due to hemodynamic injury, also received contrast and had elevated CK.  Started CRRT on 2/25, has been anuric since starting CRRT, ~48h after arrest.  Continuing CRRT with no signs of recovery, still needing extensive mechanical support and is on 2 pressors.  Has been net negative the past week, on track to again be ~1L net negative today.                 -Access is ECMO circuit.               -CRRT, 0-50cc/hr net negative as BP's allow, 4k baths.      Volume status-Net negative 0.6L yesterday, on track to be ~1L net negative today.  Down ~10kg overall but still with some edema, continuing to pull while trying to avoid hypotension, on norepi and vaso.      Electrolytes/pH-No acute issues on CRRT, K 4.7, bicarb 17, lactate 5.1.       Ca/phos/pth-Ca 8.3, Mg 3.0, phos 7.5, should correct further with CRRT.      Anemia-Hgb 8.2, needing intermittent PRBC's with ECMO.     Nutrition-Impact TF.       Seen and discussed with Dr Caban.     Recommendations were communicated to primary team via verbal communication.      Darren Vidal  Clinical Nurse Specialist  [phone]    Review of Systems:   I reviewed the following systems:  ROS not done due to vent/sedation.     Physical Exam:   I/O last 3 completed shifts:  In: 2201.82 [I.V.:1281.82; NG/GT:330]  Out: 2381 [Emesis/NG output:500; Other:1681; Stool:200]  "  Temp 98.1  F (36.7  C)   Resp 12   Ht 1.68 m (5' 6.14\")   Wt 97.3 kg (214 lb 8.1 oz)   SpO2 100%   BMI 34.47 kg/m       GENERAL APPEARANCE: Intubated and sedated, on ECMO  EYES:  No scleral icterus, pupils equal  HENT: mouth without ulcers or lesions  PULM: lungs clear to auscultation, equal air movement, no cyanosis or clubbing  CV: regular rhythm, normal rate, no rub     -edema +1 LE  GI: soft, non-tender, non-distended, bowel sounds are +  MS: no evidence of inflammation in joints, no muscle tenderness  NEURO:  Intubated and sedated        Labs:   All labs reviewed by me  Electrolytes/Renal -   Recent Labs   Lab Test 03/08/19  0955 03/08/19  0355 03/07/19 2136    135 134   POTASSIUM 4.7 4.5 4.7   CHLORIDE 102 103 100   CO2 17* 18* 20   BUN 67* 66* 76*   CR 2.78* 2.85* 3.14*   *  187* 139*  142* 135*  129*   ALE[place]ANDER 8.3* 8.0* 8.0*   MAG 3.0* 3.1* 3.0*   PHOS 7.5* 6.4* 6.9*       CBC -   Recent Labs   Lab Test 03/08/19  0955 03/08/19  0355 03/07/19 2136   WBC 17.3* 18.4* 18.6*   HGB 8.2* 8.7* 8.0*   * 135* 124*       LFTs -   Recent Labs   Lab Test 03/08/19  0955 03/08/19  0355 03/07/19  2136   ALKPHOS 102 103 99   BILITOTAL 10.2* 11.0* 11.1*   ALT 54 56 57   AST 99* 104* 102*   PROTTOTAL 6.2* 6.1* 6.1*   ALBUMIN 1.8* 1.9* 1.9*       Iron Panel - No lab results found.        Current Medications:    artificial tears   Both Eyes Q8H     aspirin  81 mg Oral or Feeding Tube Daily     B and C vitamin Complex with folic acid  5 mL Per Feeding Tube Daily     ertapenem (INVanz) IV  1 g Intravenous Q24H     oseltamivir  75 mg Oral BID     pantoprazole  40 mg Oral or Feeding Tube QAM     polyethylene glycol  17 g Oral or Feeding Tube Daily     protein modular  1 packet Per Feeding Tube BID     senna-docusate  1 tablet Oral or Feeding Tube At Bedtime     sodium chloride (PF)  3 mL Intracatheter Q8H     ticagrelor  90 mg Oral or Feeding Tube BID       amiodarone 1 mg/min (03/08/19 1300)     - " MEDICATION INSTRUCTIONS -       - MEDICATION INSTRUCTIONS -       IV fluid REPLACEMENT ONLY 30 mL/hr at 03/07/19 1100     CRRT replacement solution 12.5 mL/kg/hr (03/08/19 1335)     epoprostenol (VELETRI) 20 mcg/mL in sterile water inhalation solution 10 ng/kg/min (03/08/19 0614)     fentaNYL 100 mcg/hr (03/08/19 1300)     heparin 2 unit/mL in 0.9% NaCl 3 mL/hr (03/08/19 1300)     HEParin 1,200 Units/hr (03/08/19 1200)     insulin (regular) 2 Units/hr (03/08/19 1300)     midazolam 8 mg/hr (03/08/19 1300)     - MEDICATION INSTRUCTIONS -       norepinephrine 0.081 mcg/kg/min (03/08/19 1300)     Percutaneous Coronary Intervention orders placed (this is information for BPA alerting)       CRRT replacement solution 2.051 mL/kg/hr (03/07/19 2022)     CRRT replacement solution 12.5 mL/kg/hr (03/08/19 1335)     ACE/ARB/ARNI NOT PRESCRIBED       BETA BLOCKER NOT PRESCRIBED       vasopressin (PITRESSIN) infusion ADULT (40 mL) 3 Units/hr (03/08/19 1300)

## 2019-03-09 NOTE — PHARMACY-VANCOMYCIN DOSING SERVICE
Pharmacy Vancomycin Initial Note  Date of Service 2019  Patient's  1980  39 year old male    Indication: [facility]ated Pneumonia and Ventilator-Associated Pneumonia    Current estimated CrCl = Estimated Creatinine Clearance: 40.9 mL/min (A) (based on SCr of 2.6 mg/dL (H)).    Creatinine for last 3 days  3/5/2019: 10:02 PM Creatinine 2.42 mg/dL  3/6/2019:  4:14 AM Creatinine 2.33 mg/dL; 10:00 AM Creatinine 2.36 mg/dL;  9:19 PM Creatinine 3.28 mg/dL  3/7/2019:  4:16 AM Creatinine 2.79 mg/dL;  9:40 AM Creatinine 2.70 mg/dL;  6:48 PM Creatinine 3.14 mg/dL;  9:36 PM Creatinine 3.14 mg/dL  3/8/2019:  3:55 AM Creatinine 2.85 mg/dL;  9:55 AM Creatinine 2.78 mg/dL;  3:48 PM Creatinine 2.60 mg/dL    Recent Vancomycin Level(s) for last 3 days  No results found for requested labs within last 72 hours.      Vancomycin IV Administrations (past 72 hours)      No vancomycin orders with administrations in past 72 hours.              Nephrotoxins and other renal medications (From now, onward)    Start     Dose/Rate Route Frequency Ordered Stop    19 1830  vancomycin (VANCOCIN) 2,000 mg in sodium chloride 0.9 % 250 mL intermittent infusion      2,000 mg (central catheter)  over 60 Minutes Intravenous EVERY 24 HOURS 19 18219 1900  norepinephrine (LEVOPHED) 16 mg in D5W 250 mL infusion      0.03-0.4 mcg/kg/min × 106 kg  3-39.8 mL/hr  Intravenous CONTINUOUS 19 1849      19 1515  vasopressin (VASOSTRICT) 40 Units in D5W 40 mL infusion      0.5-4 Units/hr  0.5-4 mL/hr  Intravenous CONTINUOUS 19 1504          Contrast Orders - past 72 hours (72h ago, onward)    Start     Dose/Rate Route Frequency Ordered Stop    19 180  iopamidol (ISOVUE-370) solution  Status:  Discontinued        ONCE PRN 19 [id number]          Plan:  1.  Resume vancomycin dosing at 2000mg IV vancomycin q24h while on CRRT.    2.  Goal Trough Level: 15-20 mg/L   3.  Pharmacy will check  trough levels as appropriate in 1-3 days.    4. Serum creatinine levels will be ordered daily for the first week of therapy and at least twice weekly for subsequent weeks.    5. [place] method utilized to dose vancomycin therapy: Method 2    Vinay LobatoD

## 2019-03-09 NOTE — PROGRESS NOTES
CRRT STATUS NOTE    DATA:  Time:  4:42 PM  Pressures WNL:  YES  Filter Status:  WDL    Problems Reported/Alarms Noted:  Frequent flow alarms    Supplies Present:  YES    ASSESSMENT:  Patient Net Fluid Balance Since Midnight: -712 ml      Vital Signs:  Temp: 97.7  F (36.5  C) Temp src: Esophageal     Heart Rate: 72 Resp: 12 SpO2: 99 % O2 Device: Mechanical Ventilator      Labs:    Lab Results   Component Value Date     03/09/2019      Lab Results   Component Value Date    POTASSIUM 4.8 03/09/2019     Lab Results   Component Value Date    CHLORIDE 99 03/09/2019     Lab Results   Component Value Date    ICAW 4.8 03/09/2019     Lab Results   Component Value Date    CO2 21 03/09/2019     Lab Results   Component Value Date    BUN 60 03/09/2019     Lab Results   Component Value Date    CR 2.19 03/09/2019     Lab Results   Component Value Date     03/09/2019     03/09/2019     Lab Results   Component Value Date    MAG 2.5 03/09/2019     Lab Results   Component Value Date    PHOS 5.1 03/09/2019     Goals of Therapy:  0-50 ml/hour net negative without increasing pressor requirements. Keep MAP>65    INTERVENTIONS:   Continue CRRT as ordered    PLAN:  Continue CRRT per plan of care. Contact CRRT Resource RN x [phone] with questions/concerns.

## 2019-03-09 NOTE — PLAN OF CARE
Patient remains on VAV ECMO support with sweep of 6LPM, 3100RPM flowing at 4LPM. Levo titrated down to 0.05mcg/kg/min, vaso remains at 3, amio remains at 1, versed 8, fent 100, insulin 10, heparin 1400. No products/no volume given overnight. Thick secretions with ETT suction. Vent settings remain 530/12RR/55%/10PEEP - inhaled flolan at 10. TF at goal of 50cc/h. Ectopy with some PACs/PVCs/runs of VT/bigeminal PVCs. Did not tolerate turndown yesterday - awaiting more attempts at weaning ECMO support when pressor/oxygenation requirements can be further weaned. Slightly net neg on CRRT, changed baths to 2K+ in light of increasing potassium on lab draws.

## 2019-03-09 NOTE — PROGRESS NOTES
CRRT STATUS NOTE    DATA:  Time:  6:19 AM  Pressures WNL:  YES  Filter Status:  WDL    Problems Reported/Alarms Noted:  none    Supplies Present:  YES    ASSESSMENT:  Patient Net Fluid Balance:  3/8: -800 mL; 3/9 since MN: -600 mL  Vital Signs:  T 97.5-98 esoph, HR 69-76, MAP 62-76 (pressors), RR 12, SPO2 100% (CMV 55%/ECMO)  Labs:  K 5.7, Crt 2.37, LA 2.2, lactate 1222, Trop 3.5 (downtrending), WBC 22.7, HepXa 0.34  Goals of Therapy:  Net neg 0-50 ml/h as BP allows, meeting goals    INTERVENTIONS:   Changed to 2K bath d/t rising K.     PLAN:  Continue fluid removal as tolerated per goals of therapy. Check circuit daily and change circuit q72h and prn. Please contact CRRT resource RN at [phone] with any questions/concerns.

## 2019-03-09 NOTE — PROGRESS NOTES
ECMO Shift Summary:    Patient remains on VA ECMO, all equipment is functioning and alarms are appropriately set. RPM's 3106 with flow range 4 L/min. Sweep gas is at 6 LPM and FiO2 100%. Circuit remains free of air, clot and fibrin. Cannulas are secure with no bleeding from site. Extremities are warm. Suctioned ETT for small amount.    Significant Shift Events: turn down done, with clamp of VA cannulas, not tolerated well.    Vent settings:  Ventilation Mode: CMV/AC  (Continuous Mandatory Ventilation/ Assist Control)  FiO2 (%): 60 %  Rate Set (breaths/minute): 12 breaths/min  Tidal Volume Set (mL): 530 mL  PEEP (cm H2O): 10 cmH2O  Oxygen Concentration (%): 60 %  Resp: 12  .    Heparin is running at 1100 u/hr, ACT range 180-200.    Urine output is as charted, blood loss was none. Product given included none.      Intake/Output Summary (Last 24 hours) at 3/8/2019 1811  Last data filed at 3/8/2019 1800  Gross per 24 hour   Intake 2940.74 ml   Output 3303 ml   Net -362.26 ml       ECHO:  No results found for this or any previous visit.No results found for this or any previous visit.    CXR:  Recent Results (from the past 24 hour(s))   US Abdomen Limited    Narrative    EXAMINATION: Limited Abdominal Ultrasound, 3/7/2019 7:42 PM     COMPARISON: CT CAP dated 2/23/2018    HISTORY: Concern for choledocholithiasis    FINDINGS:   Fluid: No evidence of ascites or pleural effusions.    Liver: The liver parenchyma is diffusely hyperechoic with sparing  around the gallbladder, measuring 19.5 cm in craniocaudal dimension.  There is no focal mass.     Gallbladder: Small volume of echogenic sludge in the gallbladder. No  echogenic stones or wall thickening. No pericholecystic fluid  accumulations.    Bile Ducts: Both the intra- and extrahepatic biliary system are of  normal caliber.  The common bile duct measures 4 mm in diameter.    Pancreas: Visualized portions of the head and body of the pancreas are  unremarkable.     Kidney:  The right kidney measures 13.8 cm long. There is mild  hydronephrosis with central but no peripheral dilatation of the renal  collecting system. No echogenic stones or focal mass.      Impression    IMPRESSION:   1.  Hepatomegaly with steatosis. No focal mass.  2.  Small amount of echogenic sludge in the gallbladder without  definite evidence of acute cholecystitis.    I have personally reviewed the examination and initial interpretation  and I agree with the findings.    AYSE LEROY MD   XR Chest Port 1 View    Narrative    EXAM: XR CHEST PORT 1 VW  3/8/2019 1:35 AM      HISTORY: Lines and tubes.    COMPARISON: Radiograph 3/6/2019    FINDINGS: AP radiograph of the chest. Venous ECMO cannula projects  over the left brachiocephalic vein, stable. Arterial ECMO cannula  projects over the pulmonary artery. Endotracheal tube projects over  the midthoracic trachea. Feeding tube and gastric tube pass below the  diaphragm, collimated off the field-of-view. Esophageal temperature  probe has been removed.    Cardiomegaly with diffuse patchy and interstitial airspace opacities,  slightly decreased from prior. No pneumothorax. No pleural effusions.      Impression    IMPRESSION:   1. Stable placement of the ECMO cannula. The venous cannula continues  to project over the left brachiocephalic vein.  2. Improved patchy and interstitial airspace opacities, likely  indicating improving pulmonary edema.    I have personally reviewed the examination and initial interpretation  and I agree with the findings.    MIKKI DAI MD       Labs:  Recent Labs   Lab 03/08/19  1548 03/08/19  1407 03/08/19  1152 03/08/19  0955   PH 7.30* 7.30* 7.33* 7.29*   PCO2 40 37 37 40   PO2 81 107* 85 76*   HCO3 20* 18* 19* 20*   O2PER 60.0 60 60 60       Lab Results   Component Value Date    HGB 9.1 (L) 03/08/2019    PHGB 50 (H) 03/08/2019     03/08/2019    FIBR 772 (H) 03/05/2019    INR 1.24 (H) 03/08/2019    PTT 73 (H) 03/08/2019    DD  >20.0 (H) 03/05/2019    AXA 0.36 03/08/2019    ANTCH 59 (L) 03/08/2019         Plan is continue to provide support.      Greer Gillis, RRT  3/8/2019 6:11 PM

## 2019-03-09 NOTE — PROGRESS NOTES
Nephrology  Progress note- continuous dialysis visit  03/09/2019     Hellen Riddle was seen once on CRRT for volume management and dialysis prescription.   Mr Riddle continues to require 2 pressors. No significant improvement in oxygenation yet.   Laboratory results and nurses' notes were reviewed.   No changes to management of volume, acidosis, or electrolytes.   Diagnosis - FERNANDA requiring CRRT  Continue UF of 0-50 ml/hr as tolerated as long as there is no increasing pressor requirement to maintain MAP > 65    Siri Caban MD

## 2019-03-09 NOTE — PROGRESS NOTES
ECMO Shift Summary:    Patient remains on VA ECMO, all equipment is functioning and alarms are appropriately set. RPM's 3100 with flow range 3.9-4 L/min. Sweep gas is at 6 LPM and FiO2 100%. Circuit remains free of air, clot and fibrin. Cannulas are secure with no bleeding from site. Extremities are warm. Suctioned ETT for small amount of thick tan.    Significant Shift Events: none    Vent settings:  Ventilation Mode: CMV/AC  (Continuous Mandatory Ventilation/ Assist Control)  FiO2 (%): 55 %  Rate Set (breaths/minute): 12 breaths/min  Tidal Volume Set (mL): 530 mL  PEEP (cm H2O): 10 cmH2O  Oxygen Concentration (%): 55 %  Resp: 12  .    Heparin is running at 1400 u/hr, ACT range 180-200.    Urine output is as charted, blood loss was none. Product given included none.      Intake/Output Summary (Last 24 hours) at 3/9/2019 1755  Last data filed at 3/9/2019 1700  Gross per 24 hour   Intake 3557.47 ml   Output 4269 ml   Net -711.53 ml       ECHO:  No results found for this or any previous visit.No results found for this or any previous visit.    CXR:  Recent Results (from the past 24 hour(s))   XR Chest Port 1 View    Narrative    EXAM: XR CHEST PORT 1 VW  3/9/2019 1:52 AM      HISTORY: Lines and tubes.    COMPARISON: Radiograph 3/8/2019    FINDINGS: AP radiograph of the chest. Endotracheal tube tip projects  over the midthoracic trachea. Inferior ECMO cannula projects over the  left brachiocephalic vein, stable. Superior ECMO catheter tip projects  over the right pulmonary artery, stable. Intraaortic balloon pump tip  projects close to the aortic knob, stable. Esophageal temperature  probe projects over C3. Feeding tube and gastric tube pass below the  diaphragm, collimated off the field-of-view. Coronary artery stents  present.    Cardiomegaly. Stable patchy and interstitial airspace opacities. No  pneumothorax. No pleural effusions.      Impression    IMPRESSION:   1. Esophageal temperature probe projects over C3.  Recommend  advancement.  2. Stable ECMO cannulae.  3. Stable mixed patchy and interstitial airspace opacities, likely  indicating pulmonary edema/ARDS.    I have personally reviewed the examination and initial interpretation  and I agree with the findings.    BEVERLY BOLTON MD       Labs:  Recent Labs   Lab 03/09/19  1531 03/09/19  1357 03/09/19  1249 03/09/19  1152   PH 7.38 7.38 7.37 7.38   PCO2 39 38 40 39   PO2 90 91 73* 82   HCO3 23 22 23 23   O2PER 55.0 55 55 55       Lab Results   Component Value Date    HGB 8.7 (L) 03/09/2019    PHGB 50 (H) 03/09/2019     03/09/2019    FIBR 772 (H) 03/05/2019    INR 1.13 03/09/2019    PTT 72 (H) 03/09/2019    DD >20.0 (H) 03/05/2019    AXA 0.36 03/09/2019    ANTCH 68 (L) 03/09/2019         Plan is continue to provide  VA ECMO support.      Greer Gillis, RRT  3/9/2019 5:55 PM

## 2019-03-09 NOTE — PROGRESS NOTES
[facility] ID Service: Follow Up Note      Patient:  Hellen Riddle, Date of birth 1980, Medical record number [id number]           Assessment and Recommendations:   Recommendations:  - cont IV vancomycin (2/23-3/5, restarted 3/8)  - cont ertapenem (3/7-current [previously meropenem 3/5-3/7])  - continue oseltamivir (3/4-current)  - ID will continue to follow, total duration of antimicrobials TBD    Problem List:  # Hypoxic respiratory failure / ARDS / pulm edema - on VV ECMO  # Lactic acidosis  # Leukocytosis  # Influenza A infection   # E. Coli pneumonia    # Candida in sputum (oropharyngeal colonization)  # S/P out of hospital cardiac arrest, on VA ECMO     Discussion:  38 yo male admitted 2/23/19 with refractory VF OHCA s/p VA ECMO on admission, found to have 3v CAD s/p intervention. ID consulted 3/4/19 for increasing leukocytosis, hypoxia, lactic acidosis.     He is positive for Influenza A, which could certainly explain the worsening hypoxia, lactic acidosis at time of consult. He is also growing E. Coli from the sputum, one strain of which is resistant to pip-tazo (which he was receiving at the time of culture) and we are treating him for bacterial pulmonary infection as well with IV ertapenem. He developed worsening lactic acidosis the evening of 3/8 and was restarted on IV vancomycin after a brief hiatus - if blood cultures remain negative and no other sign of infection requiring vancomycin we will considering discontinuing in upcoming days.      We are finding Candida in his sputum however this is not likely to be pathogenic and likely represents colonization. We have no sign of Candidemia or other pulmonary fungal infection (as well as alternate explanation for his decompensation) at this point. If he were to decompensate further we could consider empiric micafungin while awaiting cultures however would hold off for now.     Patient seen and discussed with attending physician Dr. Shabnam Manzanares  Laz BERGMAN  PGY-5 Infectious Diseases Fellow  [facility] [phone]          Interval History:   Persistent lactic acidosis yesterday during the day, restarted on IV vancomycin.   Did not tolerate VA ECMO turn down.   Having some thick bloody secretions.   No urine output.   Rectal tube in place, loose brown stool         Current Antimicrobials   IV Ertapenem 3/7-current  IV Vancomycin 2/23-3/6, 3/8-current  Oseltamivir 3/4-current    Prior antibiotics:  Meropenem 3/5-3/7  Micafungin 3/5-3/6  Pip/tazo 2/23-3/4         Physical Exam:   Ranges for vital signs:  Temp:  [97.16  F (36.2  C)-98.2  F (36.8  C)] 97.9  F (36.6  C)  Heart Rate:  [64-82] 77  Resp:  [12] 12  MAP:  [59 mmHg-81 mmHg] 64 mmHg  Arterial Line BP: ()/(44-65) 80/50  FiO2 (%):  [40 %-60 %] 55 %  SpO2:  [96 %-100 %] 99 %    Intake/Output Summary (Last 24 hours) at 3/6/2019 1353  Last data filed at 3/6/2019 1200  Gross per 24 hour   Intake 3838.03 ml   Output 5115 ml   Net -1276.97 ml     Exam:  GENERAL:  Intubated and sedated   ENT:  Head is normocephalic, atraumatic. .    LUNGS:  on ventilator  CARDIOVASCULAR: ECMO hum  ABDOMEN:  Normal bowel sounds, soft, nontender.  EXT: Extremities warm and without edema. ECMO entrance sites without erythema or disharge. Has L femoral line as well without surrounding erythema  SKIN:  No acute rashes.    NEUROLOGIC: Sedated         Laboratory Data:   Microbiology:   Culture Micro   Date Value Ref Range Status   03/09/2019 PENDING  Preliminary   03/09/2019 PENDING  Preliminary   03/08/2019 Culture in progress  Preliminary   03/08/2019 No growth after 23 hours  Preliminary   03/07/2019 (A)  Final    Light growth  Escherichia coli  Susceptibility testing done on previous specimen     03/07/2019 (A)  Final    Light growth  Candida albicans / dubliniensis  Candida albicans and Candida dubliniensis are not routinely speciated  Susceptibility testing not routinely done     03/07/2019 No growth after 2 days  Preliminary    03/06/2019 No growth after 3 days  Preliminary   03/06/2019 (A)  Final    Light growth  Escherichia coli  Susceptibility testing done on previous specimen     03/06/2019 (A)  Final    Light growth  Candida albicans / dubliniensis  Candida albicans and Candida dubliniensis are not routinely speciated  Susceptibility testing not routinely done     03/05/2019 Light growth  Escherichia coli   (A)  Final   03/05/2019 Light growth  Strain 2  Escherichia coli   (A)  Final   03/05/2019 (A)  Final    Light growth  Candida albicans / dubliniensis  Candida albicans and Candida dubliniensis are not routinely speciated  Susceptibility testing not routinely done     03/05/2019 Susceptibility testing done on previous specimen  Final   03/05/2019 No growth after 4 days  Preliminary   03/04/2019 Light growth  Escherichia coli   (A)  Final   03/04/2019 Light growth  Strain 2  Escherichia coli   (A)  Final   03/04/2019 (A)  Final    Light growth  Candida albicans / dubliniensis  Candida albicans and Candida dubliniensis are not routinely speciated  Susceptibility testing not routinely done     03/04/2019 Susceptibility testing done on previous specimen  Final   03/04/2019 No growth after 5 days  Preliminary   03/03/2019 Moderate growth  Escherichia coli   (A)  Final   03/03/2019 Moderate growth  Strain 2  Escherichia coli   (A)  Final   03/03/2019 (A)  Final    Light growth  Candida albicans / dubliniensis  Candida albicans and Candida dubliniensis are not routinely speciated  Susceptibility testing not routinely done     03/03/2019 No growth  Final   03/03/2019 (A)  Final    Moderate growth  Escherichia coli  Susceptibility testing done on previous specimen     03/03/2019 (A)  Final    Moderate growth  Candida albicans / dubliniensis  Candida albicans and Candida dubliniensis are not routinely speciated  Susceptibility testing not routinely done     03/02/2019 Heavy growth  Escherichia coli   (A)  Final   03/02/2019 (A)  Final     Light growth  Candida albicans / dubliniensis  Candida albicans and Candida dubliniensis are not routinely speciated  Susceptibility testing not routinely done     03/02/2019 No growth  Final   03/01/2019 (A)  Final    Light growth  Candida albicans / dubliniensis  Candida albicans and Candida dubliniensis are not routinely speciated  Susceptibility testing not routinely done     03/01/2019 No growth  Final   02/28/2019 (A)  Final    Cultured on the 1st day of incubation:  Staphylococcus hominis     02/28/2019   Final    Critical Value/Significant Value, preliminary result only, called to and read back by  SURESH DE LEON RN ([id number]).  03.01.19  0518 S     02/28/2019   Final    (Note)  POSITIVE for Staphylococci other than S.aureus, S.epidermidis and  S.lugdunensis, by [employer] multiplex nucleic acid test.  Coagulase-negative staphylococci are the most common venipuncture or  collection associated skin CONTAMINANTS grown in blood cultures.  Final identification and antimicrobial susceptibility testing will be  verified by standard methods.    Specimen tested with Verigene multiplex, gram-positive blood culture  nucleic acid test for the following targets: Staph aureus, Staph  epidermidis, Staph lugdunensis, other Staph species, Enterococcus  faecalis, Enterococcus faecium, Streptococcus species, S. agalactiae,  S. anginosus grp., S. pneumoniae, S. pyogenes, Listeria sp., mecA  (methicillin resistance) and Marlon/B (vancomycin resistance).    Critical Value/Significant Value called to and read back by ra rodriguez rn @0809 3/1/19. ct     02/27/2019 No growth  Final   02/26/2019 No growth  Final   02/26/2019 No growth  Final   02/25/2019 No growth  Final   02/25/2019 No growth  Final   02/24/2019 Canceled, Test credited  Final   02/24/2019 Cancelled by lab - Specimen never received.  Final   02/24/2019 Canceled, Test credited  Final   02/24/2019 Cancelled by lab - Specimen never received.  Final   02/24/2019 Light  growth  Normal marianna    Final   02/24/2019 No growth  Final   02/23/2019 Light growth  Normal marianna    Final     Other Laboratory Data:  Urine Studies    Recent Labs   Lab Test 02/23/19  0802   LEUKEST Negative   WBCU 7*       Inflammatory Markers    Recent Labs   Lab Test 03/09/19  0400 03/08/19  0355 03/07/19  0416 03/06/19  0414 03/05/19  0351 03/04/19  0357 03/03/19  0341 03/02/19  0357   SED 77* 80* 83* 104* 93* 97* 140* >140*   .0* 240.0* 250.0* 320.0* 300.0* 340.0* 260.0* 270.0*       Hematology Studies    Recent Labs   Lab Test 03/09/19  0953 03/09/19  0400 03/08/19 2156 03/08/19  1548 03/08/19  0955 03/08/19  0355   WBC 23.7* 22.7* 21.6* 23.5* 17.3* 18.4*   ANEU 18.7* 19.4* 17.5* 18.4* 14.1* 15.4*   AEOS 0.2 0.0 0.4 0.6 0.9* 0.2   HGB 8.7* 8.8* 9.2* 9.1* 8.2* 8.7*   MCV 93 93 92 93 93 91    199 185 165 131* 135*     Metabolic Studies     Recent Labs   Lab Test 03/09/19  0953 03/09/19  0400 03/09/19  0158 03/08/19 2156 03/08/19  1548    135 136 135 136   POTASSIUM 5.1 5.7* 5.8* 5.4* 5.0   CHLORIDE 101 103 103 103 102   CO2 20 18* 20 20 20   BUN 62* 64* 65* 63* 63*   CR 2.22* 2.37* 2.37* 2.39* 2.60*   GFRESTIMATED 35* 33* 33* 32* 29*       Hepatic Studies    Recent Labs   Lab Test 03/09/19  0953 03/09/19  0400 03/08/19  2156 03/08/19  1548 03/08/19  0955 03/08/19  0355   BILITOTAL 10.6* 10.8* 12.3* 11.9* 10.2* 11.0*   ALKPHOS 114 114 117 116 102 103   ALBUMIN 2.0* 2.0* 2.0* 1.9* 1.8* 1.9*   * 154* 161* 154* 99* 104*   ALT 73* 69 68 64 54 56     Vancomycin Levels    Recent Labs   Lab Test 02/26/19  2155 02/25/19  0605   VANCOMYCIN 24.5 26.3*     Imaging:  3/9/19 CXR:  IMPRESSION:   1. Esophageal temperature probe projects over C3. Recommend  advancement.  2. Stable ECMO cannulae.  3. Stable mixed patchy and interstitial airspace opacities, likely  indicating pulmonary edema/ARDS.

## 2019-03-09 NOTE — PROGRESS NOTES
ECMO Shift Summary:    Patient remains on VAV ECMO, all equipment is functioning and alarms are appropriately set. RPM's 3100 with total ecmo flow range 3.98-4.1 L/min and VV ecmo flow 1.61-1.68 L/min. Sweep gas is at 6 LPM and FiO2 100%. Circuit remains free of air, clot and fibrin. Cannulas are secure with no bleeding from site. Extremities are warm to touch. Suctioned ETT for a small amount of thick tan secretions.    Significant Shift Events: none    Vent settings:  Ventilation Mode: CMV/AC  (Continuous Mandatory Ventilation/ Assist Control)  FiO2 (%): 55 %  Rate Set (breaths/minute): 12 breaths/min  Tidal Volume Set (mL): 530 mL  PEEP (cm H2O): 10 cmH2O  Oxygen Concentration (%): 60 %  Resp: 12  .    Heparin is running at 1400 u/hr, ACT range 160-176.    Urine output is minimal, blood loss was minimal. No product given.      Intake/Output Summary (Last 24 hours) at 3/9/2019 0559  Last data filed at 3/9/2019 0500  Gross per 24 hour   Intake 3516.79 ml   Output 5210 ml   Net -1693.21 ml       ECHO:  No results found for this or any previous visit.No results found for this or any previous visit.    CXR:  No results found for this or any previous visit (from the past 24 hour(s)).    Labs:  Recent Labs   Lab 03/09/19  0400 03/09/19  0158 03/09/19  0007 03/08/19  2156   PH 7.34* 7.34* 7.35 7.36   PCO2 40 40 40 38   PO2 95 79* 77* 100   HCO3 22 22 22 22   O2PER 55 50 40 50.0       Lab Results   Component Value Date    HGB 8.8 (L) 03/09/2019    PHGB 50 (H) 03/09/2019     03/09/2019    FIBR 772 (H) 03/05/2019    INR 1.15 (H) 03/09/2019    PTT 58 (H) 03/09/2019    DD >20.0 (H) 03/05/2019    AXA 0.34 03/09/2019    ANTCH 59 (L) 03/08/2019         Plan is to remain on ECMO support at this time.      Jong Cavazos, RRT  3/9/2019 5:59 AM

## 2019-03-09 NOTE — PROGRESS NOTES
"Cardiology Progress Note    Overnight/subj:   Turn down yesterday resulted in ectopy and rise in lactate  Stable rhythm overnight  Pulling fluid with CRRT without increase in pressor requirement    VS   Temp 97.9  F (36.6  C) (Esophageal)   Resp 12   Ht 1.68 m (5' 6.14\")   Wt 95.1 kg (209 lb 10.5 oz)   SpO2 99%   BMI 33.69 kg/m     Vent: 12/530/10/55  Tele: Several runs of NSVT overnight    IABP via LFA, 1:1, triggered by ECG    Wt: on admit 107.9, 97.3>95.1kg    Intake/Output Summary (Last 24 hours) at 3/9/2019 1447  Last data filed at 3/9/2019 1400  Gross per 24 hour   Intake 3546.97 ml   Output 4205 ml   Net -658.03 ml     Drips:   NE 0.05  Vaso 3    Amio 0.5  Fentanyl 100  Mida 3  Hep 1200  Insulin gtt    Flolan    PO Cardiac Meds: ASA 81mg daily, ticagrelor 90mg BID  Other meds: MVI, senna, miralax     Abx: tamiflu 03/04-**, ertapenem 03/07-**, vanco 03/04-03/06, 3/8-**  meropenem 03/04-03/06,  micafungin 03/04-03/06,     Radiology Imaging  CXR- Lines stable (Venous cannula in the L subclavian) improved interstial opacities     Cardiology Studies:  ECG: Qtc 490, sinus rhythm    Labs: Reviewed in epic  K 5.7  Lactate 2.2    Phys exam:  GEN: NAD  Pulm: course breath sounds b/l, stable airmovement  Cardiac:  Distant heart sounds, no murmurs  Vascular: mild IRISH and NIRS stable   GI: soft, non distended   Neuro: sedated; pupils reactive    ASSESSMENT/PLAN:  Mr. Riddle is a 38yo M with HLD and Fhx CAD/MI who presented with refractory VF OHCA brought directly to the CCL now s/p peripheral VA ECMO placement. Found to have severe 3v disease s/p MITA to mRCA and mLcx. Also with IABP placement for presumed pulmonary edema. Being managed as part post VF protocol.      Neurology:  # CT head day3: minimal edema - discontinue EEG- no e/o seizures   - Intubated, sedated   -continue fentanyl  - Versedd  - No longer on hypertonic saline, targeting normal serum Na   Cardiovascular / Hemodynamics:  # Refractory VF arrest.    #CAD " s/p MITA  mRCA and mCx(admission)  S/p MITA dCx and p-dLAD (03/07)  # Torsades (likely relate Qtc >600, corrected)  # Cardiogenic Shock requiring ECMO support  # Recurrent VT with decannulation - continue vaso and NE as needed  -- VA to VV to VAV ECMO & IABP  -- ECMO turned down today from 4lpm to 3.5lpm with rise in lactate from 2.2 to 3.9  -- EP consult, for arrhythmia optimization--continue amiodarone gtt  --continue ASA 81mg and ticagrelor 90mg BID  --hold lipitor for now given likely hepatic injury during arrest  --hold ACE/ARB for now given reduced renal fxn after arrest  --holding beta blocker given shock    Pulmonary:  # Refractory Hypoxemia 2/2 pulmonary edema + Flu now VAV  # Resp acidosis (currected) -- Improved hypoxemia  -- Continue VC- wean PEEP as able.  Lung protected ventilation  -- wean flolan as able  -- Fluid removal as able   -- ETT stable   -- Q2h ABGs for now  -- consider scheduled duonebs if signs of lung dz, currently PRN  -- will need to consider trach soon   GI and Nutrition:   Shock Liver (recovering) No known medical hx.    --Nutrition consulted, appreciate their help  -- Post pyloric feeds   --bowel regimen   --GI Prophylaxis: PPI    Renal, Fluid and Electrolytes:  # renal failure -- Renal consult   -- on CRRT, plan for volume removal given worsening hypoxia  --monitor urine output  --maintain K>4 and Mg>2    Infectious Disease:  # Marked Leukocytosis (improving but stable)  # Bronch cx: E. Coli, staph hominus and Influenza  # Bcx 02/28 growing GPCs 1/2 (likely contaminant) -- Appreciate ID help  -- Continue tamiflu   -- ertapenem, vancomycin  -- discontinue meropenem and micafungin for now  -- daily blood cultures      Hematology: Coagulopathy related to ECMO Receiving heparin for ECMO and ASA/ticagrelor for MITA.   --cryo PRN fibrinogen < 200; FFP for INR >2  -- Transfuse plat <50  --Transfuse for Hgb<10  --heparin gtt for ECMO with ACT goal 160-180  --DVT PPX: Heparin as above    Endocrinology: No known medical history. BG elevated.  --insulin gtt PRN   Lines: R femoral arterial and venous ECMO cannulae 02/23/19-03/06/19  But replaced on 03/06-  L femoral arterial line February 23, 2019  L femoral CVC  R radial arterial line February 23, 2019  ETT February 23, 2019  Pisano catheter February 23, 2019  RIJ-MPA 26Fr venous cannula  OG tube February 23, 2019  Restraint: PRN  Current lines are required for patient management        Code status FULL     Discussed with Dr. Humphreys.      Jersey Alexander MD  Cardiovascular Medicine Fellow, PGY-7  [phone]       Critical Care Services Progress Note:     Hellen Riddle remains critically ill with acute coronary syndrome, shock and VT/VF Cardiac arrest on ECMO     I personally examined and evaluated the patient today.   The patient s prognosis today is grave and unchanged  I have evaluated all laboratory values and imaging studies from the past 24 hours.  Key findings and decisions made today included Attempt in turndown failed with Increase LA  I personally managed the ventilator, sedation, pain control and analgesia, metabolic abnormalities, antibiotic therapy, nutritional status and vasoactive medications.   Consults ongoing and ordered are none  Procedures that will happen today are: none  All treatments were placed at my direction.  I formulated today s plan with the house staff team or resident(s) and agree with the findings and plan in the associated note.       The above plans and care have been discussed with no one and all questions and concerns were addressed.     I spent a total of 60 minutes (excluding procedure time) personally providing and directing critical care services at the bedside and on the critical care unit for Hellen Riddle.        Oleksandr Humphreys

## 2019-03-10 NOTE — PROGRESS NOTES
CRRT STATUS NOTE    DATA:  Time:  6:23 AM  Pressures WNL:  YES  Filter Status:  WDL    Problems Reported/Alarms Noted:  none    Supplies Present:  YES    ASSESSMENT:  Patient Net Fluid Balance:  3/9: Net -1.3L, 3/10 since MN: Net -1.3L   Vital Signs:  T 97.5-98 esoph, HR 81-87, MAP 68-81 (pressors), RR 12, SPO2 100% (CMV 55%/ECMO)  Labs:  K 4.0, Crt 2.05, LA 2.5, WBC 24.7  Goals of Therapy:  0-50 mL/h net negative without increased pressor needs, keep MAP>65, meeting goals at this time  INTERVENTIONS:    none    PLAN:  Continue fluid removal as tolerated per goals of therapy. Check circuit daily and change circuit q72h and prn. Please contact CRRT resource RN at [phone] with any questions/concerns.

## 2019-03-10 NOTE — PLAN OF CARE
Patient remains on VAV ECMO with sweep 5, speed 3100 rpm and flow 3.9 LPM. No blood product or volume given. Flows reduced to VA cannulas and increased to VV cannulas, tolerated well. Possible VA turn down in OR with de-cannulation tomorrow. Levo weaned. IABP 1:1, 100% augmentation. No vent changes. CRRT net neg for day. TF continued at goal 50 ml/hr.

## 2019-03-10 NOTE — PLAN OF CARE
Patient remains intubated and sedated. Sedated lowered. Afebrile. No vent changes, LS coarse and diminished. VAV ECMO continued. Pressors titrated. Unable to decrease flows do to increased lactic acid. IABP unchanged, 1:1, 100% augmentation. CRRT goal 0-50, currently -700.

## 2019-03-10 NOTE — PROGRESS NOTES
ECMO Shift Summary:    Patient remains on VAV ECMO, all equipment is functioning and alarms are appropriately set. RPM's 3100 with flow range 3.75-3.83 L/min. Sweep gas is at 6 LPM and FiO2 100%. Circuit remains free of air, clot and fibrin. Cannulas are secure with no bleeding from site. Extremities are warm to touch. Suctioned ETT for scant secretions.    Significant Shift Events: none    Vent settings:  Ventilation Mode: CMV/AC  (Continuous Mandatory Ventilation/ Assist Control)  FiO2 (%): 55 %  Rate Set (breaths/minute): 12 breaths/min  Tidal Volume Set (mL): 530 mL  PEEP (cm H2O): 10 cmH2O  Oxygen Concentration (%): 55 %  Resp: 12  .    Heparin is running at 1500 u/hr, ACT range 164-168.    Urine output is minimal, blood loss was minimal. No product given.      Intake/Output Summary (Last 24 hours) at 3/10/2019 0554  Last data filed at 3/10/2019 0500  Gross per 24 hour   Intake 3146.71 ml   Output 5363 ml   Net -2216.29 ml       ECHO:  No results found for this or any previous visit.No results found for this or any previous visit.    CXR:  No results found for this or any previous visit (from the past 24 hour(s)).    Labs:  Recent Labs   Lab 03/10/19  0403 03/10/19  0013 03/09/19  2148 03/09/19 2015   PH 7.47* 7.45 7.45 7.43   PCO2 34* 35 35 37   PO2 81 84 93 82   HCO3 25 25 24 24   O2PER 55 55 55.0 55.0       Lab Results   Component Value Date    HGB 8.8 (L) 03/10/2019    PHGB 40 (H) 03/10/2019     03/10/2019    FIBR 772 (H) 03/05/2019    INR 1.10 03/10/2019    PTT 77 (H) 03/10/2019    DD >20.0 (H) 03/05/2019    AXA 0.43 03/10/2019    ANTCH 68 (L) 03/09/2019         Plan is to remain on ECMO support at this time.      Jong Cavazos, RRT  3/10/2019 5:54 AM

## 2019-03-10 NOTE — PROGRESS NOTES
[facility]  Cardiac ICU Progress Note  March 10, 2019           Key events - last 24 hours / Hospital course   No major events overnight.  Tolerated volume removal with CRRT  This morning on low dose norepi- 0.02              Assessment and Plan:   Hellen Riddle is a 39 year old male who was admitted on 2/23/2019.    Neurology: Intubated, sedated.  --reduce sedation to maintain RASS -1, to move toward extubation.   Cardiovascular / Hemodynamics: Refractory VF arrest, currently on peripheral VA ECMO support.  Coronary angiography on admission showed 3v disease and he underwent MITA to mRCA & mLCx.  Has IABP as well.  ECMO decannulation stymied by VT in OR on Friday.  Since then started on amiodarone with good suppression of ventricular arrhythmias.  ECMO turndown today: The limb of the circuit supplying arterial VA cannula was clamped with reduction in the VA arterial flow to approximately 1L, per RT.  He maintained his MAPs in the 60s on the 0.02 of norepi and lactate after 30 min was stable (3.0 -> 2.8).  May re-attempt decannulation tomorrow.  TTE: LV EF 35-40%.  RV dysfunction is mild.  --wean pressors as able  --continue ASA 81mg and ticagrelor 90mg BID  --ACT goal 180-200  --hold lipitor for now given likely hepatic injury during arrest  --hold ACE/ARB for now given likely reduced renal fxn after arrest  --holding beta blocker given shock    Pulmonary: Acute hypoxic respiratory failure due to pulmonary edema, improving as we pull volume with CRRT.  ETT in the trachea- will need to consider tracheostomy if unable to extubate within the next couple of days.  --wean vent as able  --daily CXR  --Q2h ABGs for now  --consider scheduled duonebs if signs of lung dz, currently PRN     GI and Nutrition: --monitor BID LFTs  --NPO while cooled - nutrition consult pending feeding tube placement once he is warmed   --bowel regimen - on hold for now due to hypothermia  --GI Prophylaxis: PPI    Renal,  Fluid and Electrolytes: FERNANDA.  Appreciate nephrology's assistance.  On CRRT.  --monitor urine output  --maintain K>3 and Mg>2    Infectious Disease: Multiple organisms recovered in sputum: continue antimicrobial therapy as guided by infectious disease.  Course of Tamiflu completed.   Hematology and Oncology: Receiving heparin for ECMO and ASA/ticagrelor for MITA.   --cryo PRN fibrinogen < 200; FFP for INR >2  --Transfuse for Hgb<10  --heparin gtt for ECMO with ACT goal 160-180  --US LE w/ arterial duplex per ECMO protocol   DVT PPX: on heparin infusion   Endocrinology: No known medical history. BG elevated.  --insulin gtt  --f/u HgbA1c    Lines: PA catheter March 10, 2019  R femoral arterial and venous ECMO cannulae March 10, 2019  L femoral arterial line March 10, 2019  R radial arterial line March 10, 2019  ETT March 10, 2019  Pisano catheter March 10, 2019  OG tube March 10, 2019  Restraint: needed    Current lines are required for patient management             Medications:   I have reviewed this patient's current medications           Review of Systems:   Review of systems not obtained due to patient factors - intubation              Physical Exam:   Exam and Labs by System  Neuro: Exam: intubated and sedated, pupils equal but not reactive  General Appearance:   Comfortable, no pain or respiratory distress   Neuro:   Coma: deeply sedated       Cardiovascular:     Heart Rate: 87  Exam:    Normal s1 and s2, no audible murmur, warm extremities.     Recent Labs   Lab 03/10/19  0956 03/10/19  0403 03/09/19  2149   TROPI 2.615* 2.614* 2.872*       Pulm:   Meds:  Vent Settings:  Resp: 12 SpO2: 100 % O2 Device: Mechanical Ventilator    Ventilation Mode: CMV/AC  (Continuous Mandatory Ventilation/ Assist Control)  FiO2 (%): 55 %  Rate Set (breaths/minute): 12 breaths/min  Tidal Volume Set (mL): 530 mL  PEEP (cm H2O): 10 cmH2O  Oxygen Concentration (%): 55 %  Resp: 12    Exam:   Symmetrical chest shape and movements with  each tidal breath  Good patient-ventilator synchrony     Arterial Blood Gas:   Recent Labs   Lab 03/10/19  0956 03/10/19  0806 03/10/19  0601 03/10/19  0403   PH 7.42 7.47* 7.50* 7.47*   PCO2 37 34* 32* 34*   PO2 104 91 102 81   HCO3 24 24 25 25   O2PER 956 55 55 55       Renal:   Meds:  Vitals:    03/08/19 0400 03/09/19 0330 03/10/19 0400   Weight: 97.3 kg (214 lb 8.1 oz) 95.1 kg (209 lb 10.5 oz) 92.3 kg (203 lb 7.8 oz)   I/O last 3 completed shifts:  In: 3111.16 [I.V.:1501.16; NG/GT:460]  Out: 5278 [Urine:110; Emesis/NG output:600; Other:3268; Stool:1300]  Recent Labs   Lab 03/10/19  0956 03/10/19  0403    136   POTASSIUM 3.9 4.0   CHLORIDE 101 102   CO2 21 22   ANIONGAP 13 12   *  139* 125*  127*   BUN 60* 60*   CR 2.04* 2.05*   ALEKSANDER 9.5 9.5     No components found for: URINE     GI:   Exam:    Non-distended  Soft     Diet: NPO  Recent Labs   Lab 03/10/19  0956 03/10/19  0403 03/09/19  2149   * 207* 193*   * 96* 88*   BILITOTAL 12.2* 11.7* 11.4*   ALBUMIN 1.9* 2.0* 1.9*   PROTTOTAL 7.2 7.2 7.0   ALKPHOS 120 112 110       Heme/ID:   Meds:  Temp: 97.5  F (36.4  C) Temp src: EsophagealTemp  Min: 95.7  F (35.4  C)  Max: 98.24  F (36.8  C)   Recent Labs   Lab 03/10/19  0956 03/10/19  0403 03/09/19 2149 03/09/19  1531 03/09/19  0953   WBC 24.6* 24.7* 24.5* 24.5* 23.7*   HGB 8.8* 8.8* 8.5* 8.7* 8.7*   HCT 27.7* 27.2* 26.9* 27.5* 27.3*   MCV 95 93 93 94 93   RDW 21.4* 20.9* 20.7* 20.5* 20.3*    239 225 216 198     Recent Labs   Lab 03/10/19  0956 03/10/19  0403 03/09/19  2149 03/09/19  1531 03/09/19  0953   INR 1.14 1.10 1.12 1.13 1.13   PTT 81* 77* 69* 72* 61*     Recent Labs   Lab 03/10/19  0423 03/09/19  0448 03/09/19  0317 03/08/19  0407 03/08/19  0307 03/07/19  0807   CULT No growth after 2 hours  PENDING Culture negative < 24 hours, reincubate No growth after 1 day Light growth  Escherichia coli  Susceptibility testing in progress  *  Light growth  Klebsiella  pneumoniae  Susceptibility testing in progress  *  Moderate growth  Candida albicans / dubliniensis  Candida albicans and Candida dubliniensis are not routinely speciated  Susceptibility testing not routinely done  * No growth after 2 days Light growth  Escherichia coli  Susceptibility testing done on previous specimen  *  Light growth  Candida albicans / dubliniensis  Candida albicans and Candida dubliniensis are not routinely speciated  Susceptibility testing not routinely done  *       Endo:   Meds:  Recent Labs   Lab 03/10/19  0956 03/10/19  0403 03/09/19  2149 03/09/19  2148 03/09/19  1531   *  139* 125*  127* 120* 125* 128*  130*       Lines:    No erythema or discharge at the catheter entry site               Data:   All lab results reviewed    Recent Results (from the past 24 hour(s))   XR Chest Port 1 View    Narrative    EXAM: XR CHEST PORT 1 VW  3/10/2019 1:42 AM      HISTORY: Lines and tubes    COMPARISON: Radiograph 3/9/2019    FINDINGS: AP radiograph of the chest. Esophageal temperature probe  projects over the high cervical spine. Endotracheal tube projects over  the midthoracic trachea. ECMO cannula remain in stable position.  Feeding tube and gastric tube pass below the diaphragm, collimated off  the field-of-view. Intra-aortic balloon pump marker above the tereso,  in stable position.     Stable cardiac silhouette. Diffuse patchy airspace opacities and  interstitial opacities, slightly improved compared with prior  examination.      Impression    IMPRESSION:   1. Esophageal temperature probe projects over the high cervical spine.  Recommend advancement.  2. Stable ECMO cannulae.  3. Diffuse patchy airspace opacities and interstitial opacities,  slightly improved compared with prior examination. Findings likely  represents pulmonary edema.    I have personally reviewed the examination and initial interpretation  and I agree with the findings.    ERIN BABB MD   XR Chest Port 1  View    Narrative    Exam: XR CHEST PORT 1 VW, 3/10/2019 9:37 AM    Indication: ET tube placement    Comparison: 3/10/2019    Findings:   Lower approach cannula tip seen in the left brachiocephalic vein.  There are 2 right IJ approach cannula, one in the right heart and the  other in the pulmonary artery.    Endotracheal tube tip 4.9 cm above the tereso. Intervertebral balloon  pump tip at the proximal descending thoracic aorta. Enteric tubes  descend below the field of view in change. Mild perihilar pulmonary  edema unchanged.      Impression    Impression:   1. Endotracheal tube tip 4.9 cm above the tereso.  2. Patchy perihilar pulmonary edema unchanged.    I have personally reviewed the examination and initial interpretation  and I agree with the findings.    ERIN BABB MD       The pt was discussed and evaluated with Abraham Lindsay MD, attending physician, who agrees with the assessment and plan above.     Joseph Li MD  Cardiovascular disease fellow

## 2019-03-10 NOTE — PLAN OF CARE
Patient remains on VAV ECMO support with sweep of 6LPM, 3100RPM flowing at 4LPM. Levo titrated down to 0.02mcg/kg/min, amio remains at 1, versed4, fent 100, insulin 10, heparin 1500. No products/no volume given overnight. Thick secretions with ETT suction. Vent settings remain 530/12RR/55%/10PEEP - inhaled flolan at 10. TF at goal of 50cc/h. Ectopy with some PACs/PVCs. Did not tolerate turndown yesterday - awaiting more attempts at weaning ECMO support when pressor/oxygenation requirements can be further weaned. Slightly net neg on CRRT, changed baths to 2K+ in light of increasing potassium on lab draws.

## 2019-03-10 NOTE — PROGRESS NOTES
ECMO Shift Summary:    Patient remains on VAV ECMO, all equipment is functioning and alarms are appropriately set. RPM's 3100 with flow range 3.8-3.89 L/min with 1.64-1.68 through the Protek arterial cannula. Sweep gas is at 5 LPM and FiO2 100%. Circuit remains free of air, with clot and fibrin on the oxygenator. Cannulas are secure with no bleeding from site. Extremities are warm. Suctioned ETT for moderate to large amount of yellow-tan secretions.    Significant Shift Events:     Flow restrictive clamp was moved from the Protek arterial circuit to the femoral arterial circuit to reduce VA ECMO flow and increase VV ECMO flow for about 1.5 hours.  Patient did well with about 2.6 L/m VV ECMO and about 1 L/m VA ECMO.    Vent settings:  Ventilation Mode: CMV/AC  (Continuous Mandatory Ventilation/ Assist Control)  FiO2 (%): 55 %  Rate Set (breaths/minute): 12 breaths/min  Tidal Volume Set (mL): 530 mL  PEEP (cm H2O): 10 cmH2O  Oxygen Concentration (%): 55 %  Resp: 12  .    Heparin is running at 1600 u/hr, ACT range 168-188.    Patient is anuric and on CRRT, there was no blood loss. No product was given.      Intake/Output Summary (Last 24 hours) at 3/10/2019 1841  Last data filed at 3/10/2019 1800  Gross per 24 hour   Intake 3253.42 ml   Output 5492 ml   Net -2238.58 ml       ECHO:  No results found for this or any previous visit.No results found for this or any previous visit.    CXR:  Recent Results (from the past 24 hour(s))   XR Chest Port 1 View    Narrative    EXAM: XR CHEST PORT 1 VW  3/10/2019 1:42 AM      HISTORY: Lines and tubes    COMPARISON: Radiograph 3/9/2019    FINDINGS: AP radiograph of the chest. Esophageal temperature probe  projects over the high cervical spine. Endotracheal tube projects over  the midthoracic trachea. ECMO cannula remain in stable position.  Feeding tube and gastric tube pass below the diaphragm, collimated off  the field-of-view. Intra-aortic balloon pump marker above the  tereso,  in stable position.     Stable cardiac silhouette. Diffuse patchy airspace opacities and  interstitial opacities, slightly improved compared with prior  examination.      Impression    IMPRESSION:   1. Esophageal temperature probe projects over the high cervical spine.  Recommend advancement.  2. Stable ECMO cannulae.  3. Diffuse patchy airspace opacities and interstitial opacities,  slightly improved compared with prior examination. Findings likely  represents pulmonary edema.    I have personally reviewed the examination and initial interpretation  and I agree with the findings.    ERIN BABB MD   XR Chest Port 1 View    Narrative    Exam: XR CHEST PORT 1 VW, 3/10/2019 9:37 AM    Indication: ET tube placement    Comparison: 3/10/2019    Findings:   Lower approach cannula tip seen in the left brachiocephalic vein.  There are 2 right IJ approach cannula, one in the right heart and the  other in the pulmonary artery.    Endotracheal tube tip 4.9 cm above the tereso. Intervertebral balloon  pump tip at the proximal descending thoracic aorta. Enteric tubes  descend below the field of view in change. Mild perihilar pulmonary  edema unchanged.      Impression    Impression:   1. Endotracheal tube tip 4.9 cm above the tereso.  2. Patchy perihilar pulmonary edema unchanged.    I have personally reviewed the examination and initial interpretation  and I agree with the findings.    ERIN BABB MD       Labs:  Recent Labs   Lab 03/10/19  1754 03/10/19  1600 03/10/19  1459 03/10/19  1354   PH 7.43 7.45 7.46* 7.40   PCO2 38 35 34* 40   PO2 105 121* 147* 115*   HCO3 25 25 24 25   O2PER 50.0 55.0 55 55       Lab Results   Component Value Date    HGB 9.0 (L) 03/10/2019    PHGB 40 (H) 03/10/2019     03/10/2019    FIBR 772 (H) 03/05/2019    INR 1.12 03/10/2019    PTT 90 (H) 03/10/2019    DD >20.0 (H) 03/05/2019    AXA 0.59 03/10/2019    ANTCH 76 (L) 03/10/2019         Plan is for a turndown and  possible decannulation tomorrow.      Getachew Garvin, RRT  3/10/2019 6:41 PM

## 2019-03-10 NOTE — PROGRESS NOTES
Nephrology  Progress note- continuous dialysis visit  03/10/2019     Hellen Riddle was seen once on CRRT for volume management and dialysis prescription.   Mr Riddle has decreasing pressor requirements. No significant improvement in oxygenation yet.   Laboratory results and nurses' notes were reviewed.   No changes to management of volume, acidosis, or electrolytes.   Diagnosis - FERNANDA requiring CRRT  Continue UF of 0-50 ml/hr as tolerated as long as there is no increasing pressor requirement to maintain MAP > 65. Once off pressors, increase UF goal to net negative 100/hr    Siri Caban MD

## 2019-03-10 NOTE — PROGRESS NOTES
CRRT STATUS NOTE    DATA:  Time:  6:38 PM  Pressures WNL:  YES  Filter Status:  WDL    Problems Reported/Alarms Noted:  none    Supplies Present:  YES    ASSESSMENT:  Patient Net Fluid Balance:  - 1939 ml since midnight  Vital Signs:  T 97.3 F Esophageal, HR 81, BP 80/49 (63) art line, RR 12, Sat 100% on 55% FiO2 and V-A-V ECMO  Labs:  Results for LIONEL CARTY (MRN [id number]) as of 3/10/2019 18:40   Ref. Range 3/10/2019 16:00 3/10/2019 16:00   Sodium Latest Ref Range: 133 - 144 mmol/L  135   Potassium Latest Ref Range: 3.4 - 5.3 mmol/L  4.1   Chloride Latest Ref Range: 94 - 109 mmol/L  101   Carbon Dioxide Latest Ref Range: 20 - 32 mmol/L  23   Urea Nitrogen Latest Ref Range: 7 - 30 mg/dL  62 (H)   Creatinine Latest Ref Range: 0.66 - 1.25 mg/dL  1.98 (H)   GFR Estimate Latest Ref Range: >60 mL/min/1.73_m2  41 (L)   GFR Estimate If Black Latest Ref Range: >60 mL/min/1.73_m2  47 (L)   Calcium Latest Ref Range: 8.5 - 10.1 mg/dL  9.6   Anion Gap Latest Ref Range: 3 - 14 mmol/L  12   Magnesium Latest Ref Range: 1.6 - 2.3 mg/dL  2.3   Phosphorus Latest Ref Range: 2.5 - 4.5 mg/dL  3.9   Albumin Latest Ref Range: 3.4 - 5.0 g/dL  2.0 (L)   Protein Total Latest Ref Range: 6.8 - 8.8 g/dL  7.3   Bilirubin Total Latest Ref Range: 0.2 - 1.3 mg/dL  13.0 (H)   Alkaline Phosphatase Latest Ref Range: 40 - 150 U/L  122   ALT Latest Ref Range: 0 - 70 U/L  133 (H)   AST Latest Ref Range: 0 - 45 U/L  270 (H)   Calcium Ionized Whole Blood Latest Ref Range: 4.4 - 5.2 mg/dL 5.1    Lactic Acid Latest Ref Range: 0.7 - 2.0 mmol/L 3.1 (H)    Troponin I ES Latest Ref Range: 0.000 - 0.045 ug/L  2.632 (HH)   Results for LIONEL CARTY (MRN [id number]) as of 3/10/2019 18:40   Ref. Range 3/10/2019 16:00   pH Arterial Latest Ref Range: 7.35 - 7.45 pH 7.45   pCO2 Arterial Latest Ref Range: 35 - 45 mm Hg 35   PO2 Arterial Latest Ref Range: 80 - 105 mm Hg 121 (H)   Bicarbonate Arterial Latest Ref Range: 21 - 28 mmol/L 25   Base Excess Art Latest Units:  mmol/L 0.7   FIO2 Unknown 55.0   Oxyhemoglobin Arterial Latest Ref Range: 92 - 100 % 96   Base Excess Venous Latest Units: mmol/L 1.8   Results for LIONEL CARTY (MRN [id number]) as of 3/10/2019 18:40   Ref. Range 3/10/2019 16:00   WBC Latest Ref Range: 4.0 - 11.0 10e9/L 24.6 (H)   Hemoglobin Latest Ref Range: 13.3 - 17.7 g/dL 9.0 (L)   Hematocrit Latest Ref Range: 40.0 - 53.0 % 28.2 (L)   Platelet Count Latest Ref Range: 150 - 450 10e9/L 241   RBC Count Latest Ref Range: 4.4 - 5.9 10e12/L 2.97 (L)     Goals of Therapy:  Continue UF of 0-50 ml/hr as tolerated as long as there is no increasing pressor requirement to maintain MAP > 65. Once off pressors, increase UF goal to net negative 100 ml/hr.    INTERVENTIONS:   none    PLAN:  Continue CRRT as per MD order. Contact CRRT Resource RN with any questions or concerns.

## 2019-03-11 NOTE — PROGRESS NOTES
[facility]  Cardiac ICU Progress Note  March 10, 2019           Key events - last 24 hours / Hospital course   No major events overnight.  Tolerated volume removal with CRRT  This morning on low dose norepi- 0.02              Assessment and Plan:   Hellen Riddle is a 39 year old male who was admitted on 2/23/2019.    Neurology: Intubated, sedated.  --reduce sedation to maintain RASS -1, to move toward extubation.   Cardiovascular / Hemodynamics: Refractory VF arrest, currently on peripheral VA ECMO support.  Coronary angiography on admission showed 3v disease and he underwent MITA to mRCA & mLCx. Has IABP as well.  ECMO decannulation stymied by VT in OR on 3/6. Returned to the cath lab for MITA to  LAD and distal LCx.  He was started on amiodarone with good suppression of ventricular arrhythmias.  ECMO turndown today: The limb of the circuit supplying arterial VA cannula was clamped with reduction in the VA arterial flow to approximately 1.5L, per RT.  He maintained his MAPs in the 60s on the 0.02 of norepi and lactate has been stable to mildly increased.   May re-attempt decannulation tomorrow.  TTE: LV EF 35-40%.  RV dysfunction is mild.  --wean pressors as able  --continue ASA 81mg and ticagrelor 90mg BID  --ACT goal 180-200  --hold lipitor for now given hepatic injury during arrest  --hold ACE/ARB for now given renal failure  --holding beta blocker given shock    Pulmonary: Acute hypoxic respiratory failure due to pulmonary edema, improving as we pull volume with CRRT.  ETT in the trachea- will need to consider tracheostomy if unable to extubate within the next couple of days.  --wean vent as able  --daily CXR  --Q2h ABGs for now  --consider scheduled duonebs if signs of lung dz, currently PRN     GI and Nutrition: --monitor BID LFTs  --tube feeds at goal  --bowel regimen - ordered  --GI Prophylaxis: PPI    Renal, Fluid and Electrolytes: FERNANDA.  Appreciate nephrology's assistance.  On  CRRT.  --monitor urine output  --maintain K>3 and Mg>2    Infectious Disease: Multiple organisms recovered in sputum: continue antimicrobial therapy as guided by infectious disease.  Course of Tamiflu completed.   Hematology and Oncology: Receiving heparin for ECMO and ASA/ticagrelor for MITA.   --cryo PRN fibrinogen < 200; FFP for INR >2  --Transfuse for Hgb<10  --heparin gtt for ECMO with ACT goal 160-180  DVT PPX: on heparin infusion   Endocrinology: No known medical history. BG elevated.  --insulin gtt   Lines: R femoral arterial and venous ECMO cannulae March 10, 2019  L femoral arterial line March 10, 2019  R radial arterial line March 10, 2019  L femoral CVC   ETT March 10, 2019  Pisano catheter March 10, 2019  OG tube March 10, 2019  Restraint: needed    Current lines are required for patient management             Medications:   I have reviewed this patient's current medications           Review of Systems:   Review of systems not obtained due to patient factors - intubation              Physical Exam:   Exam and Labs by System  Neuro: Exam: intubated and sedated, pupils equal but not reactive  General Appearance:   Comfortable, no pain or respiratory distress   Neuro:   Coma: deeply sedated       Cardiovascular:     Heart Rate: 80  Exam:    Normal s1 and s2, no audible murmur, warm extremities.     Recent Labs   Lab 03/11/19  0957 03/11/19  0401 03/10/19  2144   TROPI 2.530* 2.525* 2.578*       Pulm:   Meds:  Vent Settings:  Resp: 12 SpO2: 100 % O2 Device: Mechanical Ventilator    Ventilation Mode: CMV/AC  (Continuous Mandatory Ventilation/ Assist Control)  FiO2 (%): 40 %  Rate Set (breaths/minute): 12 breaths/min  Tidal Volume Set (mL): 530 mL  PEEP (cm H2O): 10 cmH2O  Oxygen Concentration (%): 40 %  Resp: 12    Exam:   Symmetrical chest shape and movements with each tidal breath  Good patient-ventilator synchrony     Arterial Blood Gas:   Recent Labs   Lab 03/11/19  1358 03/11/19  1154 03/11/19  0957  03/11/19  0806   PH 7.41 7.43 7.42 7.44   PCO2 38 37 38 38   PO2 154* 201* 205* 142*   HCO3 24 25 25 25   O2PER 40 40 45 50.0       Renal:   Meds:  Vitals:    03/09/19 0330 03/10/19 0400 03/11/19 0400   Weight: 95.1 kg (209 lb 10.5 oz) 92.3 kg (203 lb 7.8 oz) 90.6 kg (199 lb 11.8 oz)   I/O last 3 completed shifts:  In: 3353.83 [I.V.:1793.83; NG/GT:360]  Out: 4583 [Emesis/NG output:600; Other:3383; Stool:600]  Recent Labs   Lab 03/11/19 0957 03/11/19 0411 03/11/19 0401     --  136   POTASSIUM 3.4  --  4.0   CHLORIDE 99  --  100   CO2 22  --  21   ANIONGAP 16*  --  15*   *  111* 131* 129*   BUN 60*  --  60*   CR 2.02*  --  2.07*   ALEKSANDER 9.5  --  9.5     No components found for: URINE     GI:   Exam:    Non-distended  Soft     Diet: tube feeds  Recent Labs   Lab 03/11/19 0957 03/11/19  0401 03/10/19  2144   * 291* 271*   * 164* 144*   BILITOTAL 9.9* 11.5* 12.5*   ALBUMIN 2.1* 2.1* 2.1*   PROTTOTAL 7.4 7.2 7.2   ALKPHOS 143 134 124       Heme/ID:   Meds:  Temp: 97  F (36.1  C) Temp src: EsophagealTemp  Min: 95.2  F (35.1  C)  Max: 98.2  F (36.8  C)   Recent Labs   Lab 03/11/19  0957 03/11/19  0401 03/10/19  2144 03/10/19  1600 03/10/19  0956   WBC 19.9* 23.7* 26.0* 24.6* 24.6*   HGB 9.4* 9.2* 8.8* 9.0* 8.8*   HCT 30.2* 29.0* 27.6* 28.2* 27.7*   MCV 98 95 95 95 95   RDW 23.2* 22.4* 22.4* 21.8* 21.4*    272 272 241 239     Recent Labs   Lab 03/11/19  0957 03/11/19  0401 03/10/19  2144 03/10/19  1600 03/10/19  0956   INR 1.14 1.11 1.14 1.12 1.14   * 108* 98* 90* 81*     Recent Labs   Lab 03/11/19  0511 03/11/19  0453 03/10/19  0423 03/09/19  0448 03/09/19  0317 03/08/19  0407   CULT Culture negative < 24 hours, reincubate No growth after 1 hour Moderate growth  Klebsiella pneumoniae  *  Moderate growth  Candida albicans / dubliniensis  Candida albicans and Candida dubliniensis are not routinely speciated  Susceptibility testing not routinely done  *  Culture in progress  No  growth after 1 day Light growth  Escherichia coli  Susceptibility testing done on previous specimen  *  Light growth  Klebsiella pneumoniae  Susceptibility testing done on previous specimen  *  Light growth  Candida albicans / dubliniensis  Candida albicans and Candida dubliniensis are not routinely speciated  Susceptibility testing not routinely done  * No growth after 2 days Light growth  Escherichia coli  *  Light growth  Klebsiella pneumoniae  *  Moderate growth  Candida albicans / dubliniensis  Candida albicans and Candida dubliniensis are not routinely speciated  Susceptibility testing not routinely done  *       Endo:   Meds:  Recent Labs   Lab 03/11/19  0957 03/11/19  0411 03/11/19  0401 03/10/19  2144 03/10/19  1600   *  111* 131* 129* 133*  134* 125*  128*       Lines:    No erythema or discharge at the catheter entry site               Data:   All lab results reviewed    Recent Results (from the past 24 hour(s))   XR Chest Port 1 View    Narrative    Exam: XR CHEST PORT 1 VW, 3/11/2019 1:45 AM    Indication: VAV ECMO/IABP/INTUBATED    Comparison: 3/10/2019    Findings:   Single portable view of the chest. ECMO cannulas in similar position.  Enteric tubes projecting over the esophagus and stomach. Intra-aortic  balloon pump tip 2 cm above the tereso at the aortic arch.  Endotracheal tube approximately 5 cm above the tereso. The  cardiomediastinal silhouette and upper abdomen are unremarkable. No  pleural effusion. No pneumothorax. Hazy patchy opacities of the lungs,  similar to previous.      Impression    Impression:   1. Support devices in stable position  2. Patchy perihilar pulmonary edema is unchanged.    I have personally reviewed the examination and initial interpretation  and I agree with the findings.    ROSELYN CASH MD       The pt was discussed and evaluated with Dr. Chatterjee.    Jersey Alexander MD  Cardiovascular Medicine Fellow, PGY-7  [phone]   I have seen and  examined the patient with the CSI team. I agree with the assessment and plan of the note above.I have reviewed pertinent labs.     Caleb Chatterjee MD  Interventional Cardiology  Pager: [phone]

## 2019-03-11 NOTE — PROGRESS NOTES
CRRT STATUS NOTE    DATA:  Time:  6:26 AM  Pressures WNL:  YES  Filter Status:  WDL    Problems Reported/Alarms Noted:  none    Supplies Present:  YES    ASSESSMENT:  Patient Net Fluid Balance:  3/10: Net -1.9L; 3/11: Net -804 mL since MN  Vital Signs:  T 96.1-98.1 esoph, HR 71-81, MAP 66-74, RR 12, SPO2 100%  Labs:  K 4.0, Crt 2.07, Mg 2.3, Phos 3.0, iCa 5.1, LA 2.6, HepXa 0.7  Goals of Therapy:  0-50 mL/h net negative without increased pressor needs, keep MAP>65. When off pressors increase UF to 100 ml/h. Meeting goals at this time.    INTERVENTIONS:   Filter changed overnight.     PLAN:  Continue fluid removal as tolerated per goals of therapy. Check circuit daily and change circuit q72h and prn. Please contact CRRT resource RN at [phone] with any questions/concerns.

## 2019-03-11 NOTE — PHARMACY-VANCOMYCIN DOSING SERVICE
Pharmacy Vancomycin Note  Date of Service March 10, 2019  Patient's  1980   39 year old, male    Indication: [facility] Pneumonia  Goal Trough Level: 15-20 mg/L  Day of Therapy: 3  Current Vancomycin regimen: 2000 mg iv q24h    Current estimated CrCl = on CRRT    Creatinine for last 3 days  3/7/2019:  9:36 PM Creatinine 3.14 mg/dL  3/8/2019:  3:55 AM Creatinine 2.85 mg/dL;  9:55 AM Creatinine 2.78 mg/dL;  3:48 PM Creatinine 2.60 mg/dL;  9:56 PM Creatinine 2.39 mg/dL  3/9/2019:  1:58 AM Creatinine 2.37 mg/dL;  4:00 AM Creatinine 2.37 mg/dL;  9:53 AM Creatinine 2.22 mg/dL;  3:31 PM Creatinine 2.19 mg/dL;  9:49 PM Creatinine 2.07 mg/dL  3/10/2019:  4:03 AM Creatinine 2.05 mg/dL;  9:56 AM Creatinine 2.04 mg/dL;  4:00 PM Creatinine 1.98 mg/dL    Recent Vancomycin Levels (past 3 days)  3/10/2019:  4:53 PM Vancomycin Level 21.3 mg/L    Vancomycin IV Administrations (past 72 hours)                   vancomycin (VANCOCIN) 2,000 mg in sodium chloride 0.9 % 250 mL intermittent infusion (mg) 2,000 mg New Bag 03/10/19 1805     2,000 mg New Bag 19 1757     2,000 mg New Bag 19 1834                Nephrotoxins and other renal medications (From now, onward)    Start     Dose/Rate Route Frequency Ordered Stop    19 1830  vancomycin (VANCOCIN) 1,750 mg in sodium chloride 0.9 % 250 mL intermittent infusion      1,750 mg (central catheter)  over 60 Minutes Intravenous EVERY 24 HOURS 03/10/19 1954      19 1900  vasopressin (VASOSTRICT) 40 Units in D5W 40 mL infusion      0.5-4 Units/hr  0.5-4 mL/hr  Intravenous CONTINUOUS 19 1848      19 1900  norepinephrine (LEVOPHED) 16 mg in D5W 250 mL infusion      0.03-0.4 mcg/kg/min × 106 kg  3-39.8 mL/hr  Intravenous CONTINUOUS 19 1849               Contrast Orders - past 72 hours (72h ago, onward)    None          Interpretation of levels and current regimen:  Trough level is  Supratherapeutic  @ 21.3 mg/L - level drawn before the 3rd  dose    Has serum creatinine changed > 50% in last 72 hours: No  Urine output:  anuricanuric  Renal Function: on CRRT    Plan:  1.  Decrease Dose to Vancomycin 1750 mg iv q24h  2.  Pharmacy will check trough levels as appropriate in 1-3 Days.    3. Serum creatinine levels will be ordered daily for the first week of therapy and at least twice weekly for subsequent weeks.      Stephanie Paris, VinayD        .

## 2019-03-11 NOTE — CONSULTS
Electrophysiology Consultation Note   EP Attending: .   Reason for consultation: Failed ECMO decannulation due to PVC/VT, now with intermittent PVCs.   Provider requesting consultation: , [facility] Service.  Date of Service: 3/12/2019      HPI:   Mr. Riddle is a 39 year old male who has a past medical history significant for HLD. He was brought to [facility] on 2/23/2019 after having a refractory out-of-hospital VF cardiac arrest.  He reportedly went to bed at 11:30 on 2/23/19, wife found him foaming at mouth around midnight. His Wife noticed agonal breathing and seizure like activity. She called EMS, who came within about 5 minutes and started chest compressions. He was taken here to [facility]. Emergent coronary angiogram revealed severe 3v CAD w/  of the LAD, severe RCA and LCx stenosis now s/p PCI of the mRCA and mLCx.ECMO decannulation stymied by VT in OR on 3/8/19.  Since then started on amiodarone and no recurrent ventricular arrhythmias. He does continue to have some PVCs.  ECMO turndown 3/10/19 showed he maintained his MAPs in the 60s on the 0.02 of norepi and lactate after 30 min was stable (3.0 -> 2.8). TTE: LV EF 35-40%.  RV dysfunction is mild. He remains intubated and sedated. He has FERNANDA and is on CRRT. Scr 2.07, GFR 39. Transaminese. Unfortunately, he also developed ischemic bowel and GIB and is in critical condition. Current cardiac medications include: ASA, tricagrelor, amiodarone gtt, epoprostenol, heparin gtt, and levophed gtt.     Past Medical History:   Past Medical History:   Diagnosis Date     Dyslipidemia      Past Surgical History:   Past Surgical History:   Procedure Laterality Date     INSERT EXTRACORPORAL MEMBRANE OXYGENATOR Right 3/6/2019    Procedure: Emergent Insert VA extracorporal membrane oxygenator right groin;  Surgeon: Reggie Perez MD;  Location: [facility] OR     REMOVE EXTRACORPORAL MEMBRANE OXYGENATOR ADULT N/A 3/6/2019    Procedure: EXTRACORPORAL MEMBRANE OXYGENATOR  "REMOVAL FEMORAL CANNULAS repair right femoral vessels;  Surgeon: Craig Berry MD;  Location: [facility] OR     Allergies: Per MAR   No Known Allergies  Medications:   Per MAR current outpatient cardiovascular medications include:   No medications prior to admission.     No current outpatient medications on file.     Current Facility-Administered Medications   Medication Dose Route Frequency     aspirin  81 mg Oral or Feeding Tube Daily     B and C vitamin Complex with folic acid  5 mL Per Feeding Tube Daily     ertapenem (INVanz) IV  1 g Intravenous Q24H     hydrocortisone sodium succinate PF  50 mg Intravenous Q6H     oseltamivir  75 mg Oral BID     pantoprazole  40 mg Oral or Feeding Tube QAM     polyethylene glycol  17 g Oral or Feeding Tube Daily     protein modular  1 packet Per Feeding Tube BID     senna-docusate  1 tablet Oral or Feeding Tube At Bedtime     sodium chloride (PF)  3 mL Intracatheter Q8H     ticagrelor  90 mg Oral or Feeding Tube BID     [START ON 3/11/2019] vancomycin (VANCOCIN) IV  1,750 mg (central catheter) Intravenous Q24H     Family History:   No family history on file.  Social History:   Social History     Tobacco Use     Smoking status: Not on file   Substance Use Topics     Alcohol use: Not on file       ROS:   Unable to obtain as patient intubated and sedated.     Physical Examination:   VITALS: Temp 97.9  F (36.6  C)   Resp 12   Ht 1.68 m (5' 6.14\")   Wt 92.3 kg (203 lb 7.8 oz)   SpO2 100%   BMI 32.70 kg/m    GENERAL APPEARANCE: critically ill, intubated, sedated.   HEENT:  MMM.   NECK: Supple. .   CHEST: intubated, good airflow.    CARDIOVASCULAR: S1S2, Reg, No m/r/g.   ABDOMEN: BS+, soft.   EXTREMITIES: generalized edema. Distal pulses intact.   NEURO: sedated.  PSYCH: sedated.   SKIN: Warm and dry.   Data:   Labs:  [facility]  Recent Labs   Lab 03/10/19  2144 03/10/19  1600 03/10/19  0956 03/10/19  0403    135 135 136   POTASSIUM 4.1 4.1 3.9 4.0   CHLORIDE 99 101 101 102   ALEKSANDER 9.0 9.6 " 9.5 9.5   CO2 23 23 21 22   BUN 63* 62* 60* 60*   CR 2.09* 1.98* 2.04* 2.05*   *  134* 125*  128* 133*  139* 125*  127*     CBC  Recent Labs   Lab 03/10/19  2144 03/10/19  1600 03/10/19  0956 03/10/19  0403   WBC 26.0* 24.6* 24.6* 24.7*   RBC 2.91* 2.97* 2.93* 2.92*   HGB 8.8* 9.0* 8.8* 8.8*   HCT 27.6* 28.2* 27.7* 27.2*   MCV 95 95 95 93   MCH 30.2 30.3 30.0 30.1   MCHC 31.9 31.9 31.8 32.4   RDW 22.4* 21.8* 21.4* 20.9*    241 239 239     INR  Recent Labs   Lab 03/10/19  2144 03/10/19  1600 03/10/19  0956 03/10/19  0403   INR 1.14 1.12 1.14 1.10     No results found for: CKTOTAL, CKMB, TROPN  Cholesterol (mg/dL)   Date Value   03/07/2019 Unsatisfactory specimen - icteric     HDL Cholesterol (mg/dL)   Date Value   03/07/2019 8 (L)     LDL Cholesterol Calculated (mg/dL)   Date Value   03/07/2019 Unable to Calculate     EKG:     3/8/19 ECHO:   Interpretation Summary  With ECMO set at 4.0 LPM the left ventricular systolic function is moderately  decreased at 35-40% with apical hypokinesis. Right ventricle with mild  systolic dysfunction.     After clamping the heart rate increases from 92 to 102 bpm. LVEF seems to  increase to 40-45% and right ventricular systolic function improves to normal.        This study was compared with the study from 3/5/19 and LVEF has improved .  Assessment:   Mr. Riddle is a 39 year old male who has a past medical history significant for HLD. He was brought to [facility] on 2/23/2019 after having a refractory out-of-hospital VF cardiac arrest.  He reportedly went to bed at 11:30 on 2/23/19, wife found him foaming at mouth around midnight. His Wife noticed agonal breathing and seizure like activity. She called EMS, who came within about 5 minutes and started chest compressions. He was taken here to [facility]. Emergent coronary angiogram revealed severe 3v CAD w/  of the LAD, severe RCA and LCx stenosis now s/p PCI of the mRCA and mLCx.ECMO decannulation stymied by VT in OR on 3/8/19.   Since then started on amiodarone and no recurrent ventricular arrhythmias. He does continue to have some PVCs.  ECMO turndown 3/10/19 showed he maintained his MAPs in the 60s on the 0.02 of norepi and lactate after 30 min was stable (3.0 -> 2.8). TTE: LV EF 35-40%.  RV dysfunction is mild. He remains intubated and sedated. He has FERNANDA and is on CRRT. Scr 2.07, GFR 39. Transaminese. Unfortunately, he also developed ischemic bowel and GIB and is in critical condition. .   Current cardiac medications include: ASA, tricagrelor, amiodarone gtt, epoprostenol, heparin gtt, and levophed gtt.     EP Recommendations:  Out of hospital refractory VF arrest with severe 3V CAD s/p PCI with subsequent in-hospital VT arrest s/p external defibrillation. LVEF remains reduced 35-40%. Given further arrest > 48 hours after revascularization without explanation, we would recommend ICD if patient proves to make meaningful recovery.       Thank you for allowing us to participate in the care of this patient.     The patient was discussed w/ Dr. Marte.  The above note reflects our joint plan.    ISRAEL Roe CNP  Electrophysiology Consult Service  Pager: [phone]    EP STAFF NOTE  I have seen and examined the patient as part of a shared visit with VERNA Roe NP.  I agree with the note above. I reviewed today's vital signs and medications. I have reviewed and discussed with the advanced practice provider their physical examination, assessment, and plan   Briefly, VF more than 48H after revasc  My key history/exam findings are: RRR.   The key management decisions made by me: ICD if patient makes significant recovery.    Montana Marte MD [facility]  Cardiology - Electrophysiology

## 2019-03-11 NOTE — PROGRESS NOTES
CRRT STATUS NOTE    DATA:  Time:  4:54 PM  Pressures WNL:  YES  Filter Status:  CRRT Filter Status:WDL    Problems Reported/Alarms Noted:  none    Supplies Present:  YES    ASSESSMENT:  Patient Net Fluid Balance:  Net negative 972 since midnight  Vital Signs:  T 97.3 HR76 RR12 ArtBP 81/50 MAP69  Labs:  Lactic 3.1 pH art 7.46 K3.4 ICa4.8 Troponin 2.530  Goals of Therapy:  0 to 50 net negative    INTERVENTIONS:   Pt remains dependent on Norepi @0.02, vaso off.    PLAN:  Continue plan of care.

## 2019-03-11 NOTE — PROGRESS NOTES
Nephrology Progress Note  03/11/2019            Mr Riddle is a 39 yom w/HLP who had cardiac arrest on 2/23/19 with unclear down-time.  PCI done emergently, Nephrology consulted for management of FERNANDA, started CRRT on 2/25.       Interval History :   Mr Riddle continues on CRRT, net negative 1.9L yesterday, planning for 50cc/hr on one pressor today.  No major changes in nephrology plan, continuing to try to be net negative, needing mechanical and vasopressor support with IABP, VAV ECMO and 1-2 pressors, team is looking at weaning off of VA ECMO.           Assessment & Recommendations:   FERNANDA-Baseline Cr unclear, admitted with Cr of 1.7 post arrest.  FERNANDA due to hemodynamic injury, also received contrast and had elevated CK.  Started CRRT on 2/25, has been anuric since starting CRRT, ~48h after arrest.  Continuing CRRT with no signs of recovery, still needing extensive mechanical support and is on 2 pressors.  Has been net negative the past week, on track to again be ~1L net negative today.                 -Access is ECMO circuit.               -CRRT, 0-50cc/hr net negative as BP's allow, 4k baths.      Volume status-Net negative 1.9L yesterday partially due to large GI output although still needed 3L of UF to achieve.  Planning for 0-50cc/hr today to continue to optimize pulm status although still is on a bit of norepi.       Electrolytes/pH-No acute issues on CRRT, K 3.4, bicarb 22, changed to 4k baths.       Ca/phos/pth-Ca 9.5 with ical of 5.1, Mg 2.2, phos 3.5, stable with CRRT.      Anemia-Hgb 9.4, needing intermittent PRBC's with ECMO.     Nutrition-Impact TF.       Seen and discussed with Dr Altamirano     Recommendations were communicated to primary team via verbal communication.    Darren Vidal  Clinical Nurse Specialist  [phone]    Review of Systems:   I reviewed the following systems:  ROS not done due to vent/sedation.     Physical Exam:   I/O last 3 completed shifts:  In: 3278.12 [I.V.:1718.12; NG/GT:360]  Out:  "4591 [Emesis/NG output:600; Other:3391; Stool:600]   Temp 97.2  F (36.2  C)   Resp 12   Ht 1.68 m (5' 6.14\")   Wt 90.6 kg (199 lb 11.8 oz)   SpO2 100%   BMI 32.10 kg/m       GENERAL APPEARANCE: Intubated and sedated, on ECMO and IABP  EYES:  No scleral icterus, pupils equal  HENT: mouth without ulcers or lesions  PULM: lungs clear to auscultation, equal air movement, no cyanosis or clubbing  CV: regular rhythm, normal rate, no rub     -edema +1 LE  GI: soft, non-tender, non-distended, bowel sounds are +  MS: no evidence of inflammation in joints, no muscle tenderness  NEURO:  Intubated and sedated     Labs:   All labs reviewed by me  Electrolytes/Renal -   Recent Labs   Lab Test 03/11/19  0957 03/11/19  0411 03/11/19  0401 03/10/19  2144     --  136 135   POTASSIUM 3.4  --  4.0 4.1   CHLORIDE 99  --  100 99   CO2 22  --  21 23   BUN 60*  --  60* 63*   CR 2.02*  --  2.07* 2.09*   *  111* 131* 129* 133*  134*   ALEKSANDER 9.5  --  9.5 9.0   MAG 2.2  --  2.3 2.2   PHOS 3.5  --  3.0 3.7       CBC -   Recent Labs   Lab Test 03/11/19  0957 03/11/19  0401 03/10/19  2144   WBC 19.9* 23.7* 26.0*   HGB 9.4* 9.2* 8.8*    272 272       LFTs -   Recent Labs   Lab Test 03/11/19  0957 03/11/19  0401 03/10/19  2144   ALKPHOS 143 134 124   BILITOTAL 9.9* 11.5* 12.5*   * 164* 144*   * 291* 271*   PROTTOTAL 7.4 7.2 7.2   ALBUMIN 2.1* 2.1* 2.1*       Iron Panel - No lab results found.        Current Medications:    aspirin  81 mg Oral or Feeding Tube Daily     B and C vitamin Complex with folic acid  5 mL Per Feeding Tube Daily     ertapenem (INVanz) IV  1 g Intravenous Q24H     fiber modular (NUTRISOURCE FIBER)  1 packet Per Feeding Tube Q6H     hydrocortisone sodium succinate PF  50 mg Intravenous Q12H KELLY     oseltamivir  75 mg Oral BID     pantoprazole  40 mg Oral or Feeding Tube QAM     polyethylene glycol  17 g Oral or Feeding Tube Daily     senna-docusate  1 tablet Oral or Feeding Tube At " Bedtime     sodium chloride (PF)  3 mL Intracatheter Q8H     ticagrelor  90 mg Oral or Feeding Tube BID     vancomycin (VANCOCIN) IV  1,750 mg (central catheter) Intravenous Q24H       amiodarone 1 mg/min (03/11/19 1300)     - MEDICATION INSTRUCTIONS -       IV fluid REPLACEMENT ONLY 30 mL/hr at 03/07/19 1100     CRRT replacement solution 12.5 mL/kg/hr (03/11/19 1217)     epoprostenol (VELETRI) 20 mcg/mL in sterile water inhalation solution 10 ng/kg/min (03/11/19 0911)     fentaNYL 100 mcg/hr (03/11/19 1300)     heparin 2 unit/mL in 0.9% NaCl 3 mL/hr (03/11/19 1300)     HEParin 1,200 Units/hr (03/11/19 1200)     insulin (regular) 7 Units/hr (03/11/19 1300)     midazolam 2.5 mg/hr (03/11/19 1300)     - MEDICATION INSTRUCTIONS -       norepinephrine 0.01 mcg/kg/min (03/11/19 1300)     Percutaneous Coronary Intervention orders placed (this is information for BPA alerting)       CRRT replacement solution 200 mL/hr at 03/10/19 2010     CRRT replacement solution 12.5 mL/kg/hr (03/11/19 1217)     ACE/ARB/ARNI NOT PRESCRIBED       BETA BLOCKER NOT PRESCRIBED       vasopressin (PITRESSIN) infusion ADULT (40 mL) Stopped (03/10/19 1523)

## 2019-03-11 NOTE — PROGRESS NOTES
ECMO Shift Summary:    Patient remains on VA-V ECMO, all equipment is functioning and alarms are appropriately set. RPM's 3400 with flow range 3.9-4.12 L/min. Sweep gas is at 4 LPM and FiO2 80%. Circuit remains free of air, but a small amt of clot and fibrin are noted. Cannulas are secure with no bleeding from site. Extremities are warm. Suctioned ETT for a moderate to large amt of thick, tan secretions.    Significant Shift Events: Arterial Femoral ECMO line was clamped, with 1.5 lpm of flow going through it throughout the day. The patient did well with it, and the plan is to go to the OR tomorrow for femoral decannulation. Pt will remain on VV ECMO and IABP.   Sweep was weaned down to 4 lpm from 5 lpm today, and FiO2 on the ECMO circuit was weaned down to 80%.     Vent settings:  Ventilation Mode: CMV/AC  (Continuous Mandatory Ventilation/ Assist Control)  FiO2 (%): 40 %  Rate Set (breaths/minute): 12 breaths/min  Tidal Volume Set (mL): 530 mL  PEEP (cm H2O): 10 cmH2O  Oxygen Concentration (%): 40 %  Resp: 12  .    Heparin is running at 1200 u/hr, ACT range 180-200s.    Urine output is per RN charting, blood loss was minimal, and no product given on my shift.      Intake/Output Summary (Last 24 hours) at 3/11/2019 1829  Last data filed at 3/11/2019 1800  Gross per 24 hour   Intake 2977.74 ml   Output 4075 ml   Net -1097.26 ml       ECHO:  No results found for this or any previous visit.No results found for this or any previous visit.    CXR:  Recent Results (from the past 24 hour(s))   XR Chest Port 1 View    Narrative    Exam: XR CHEST PORT 1 VW, 3/11/2019 1:45 AM    Indication: VAV ECMO/IABP/INTUBATED    Comparison: 3/10/2019    Findings:   Single portable view of the chest. ECMO cannulas in similar position.  Enteric tubes projecting over the esophagus and stomach. Intra-aortic  balloon pump tip 2 cm above the tereso at the aortic arch.  Endotracheal tube approximately 5 cm above the tereso.  The  cardiomediastinal silhouette and upper abdomen are unremarkable. No  pleural effusion. No pneumothorax. Hazy patchy opacities of the lungs,  similar to previous.      Impression    Impression:   1. Support devices in stable position  2. Patchy perihilar pulmonary edema is unchanged.    I have personally reviewed the examination and initial interpretation  and I agree with the findings.    ROSELYN CASH MD       Labs:  Recent Labs   Lab 03/11/19  1759 03/11/19  1600 03/11/19  1358 03/11/19  1154   PH 7.42 7.46* 7.41 7.43   PCO2 35 33* 38 37   PO2 153* 144* 154* 201*   HCO3 23 24 24 25   O2PER 40 40 40 40       Lab Results   Component Value Date    HGB 9.4 (L) 03/11/2019    PHGB 70 (H) 03/11/2019     03/11/2019    FIBR 772 (H) 03/05/2019    INR 1.13 03/11/2019     (H) 03/11/2019    DD >20.0 (H) 03/05/2019    AXA 0.62 03/11/2019    ANTCH 90 03/11/2019         Plan is to decannulate VA femoral ECMO tomorrow, and leave the pt on VV ECMO.      Marilee Cleveland, RRT  3/11/2019 6:29 PM

## 2019-03-11 NOTE — PROGRESS NOTES
[facility], [facility], [place] Nurse Inpatient Adult Pressure Injury Prevention Assessment: ECMO  Follow up    3/8/19- pt's forehead was wrapped with kerlix to secure the ECMO tubings on right side of th head, unable to visualize the area.   3/11- unable to visualize, NIRS over wounds, wounds may have healed. Will follow up on Thursday.    Positioning Tolerance: Fair, only micro-turns  Date of ECMO cannulation: 2/23 Right Groin VA ECMO and Right internal jugular VAV ECMO  Presence of Ischemia: No  Location of ischemia: NA    Pressure Injury Prevention Interventions In Place:  Optifoam Dressing under ECMO Cannula, Z flow Positioner under head, Pillows for repositioning, TAPs Wedge Positioners in use, Heel off-loading boots, Pillows under calves for heel suspension, Mepilex Sacral Dressing and Dressing to sacrum for prevention   Current support surface: Standard  Low air loss mattress       Pressure Injury Prevention Interventions Added:  None around upper right above the ear.         Plan of Care for Positioning and Pressure Injury Prevention  Reposition patient every 1-2 hours using TAP Wedges  Position head on Z flow positioner, mold indentation at areas of pressure points.  Pad ECMO IJ cannula with Optifoam (#[id number]) along face and scalp between skin and cannula and under Coban head wraps    Pad ECMO groin and chest cannula under rigid connectors with Optifoam or Soft cloth  Heel off-loading Boots at all times  Sacral Mepilex for Prevention, change every 5 days and prn  Low Air loss mattress    Patient History:   According to medical record: Ciara Winslow is a 38 year old male who was admitted on 2/23/2019. Wife noticed agonal breathing and seizure like activity. Called EMS, who came and started chest compressions. No history of HTN, DM. Does have history of dyslipidemia. Non smoker. Went to bed at 11:30, wife found him foaming at mouth around midnight. Called EMS, did not get significant  chest compressions. EMS arrived 5 minutes later. Taken to [facility].    Current Diet / Nutrition:     Orders Placed This Encounter      Advance Diet as Tolerated: Clear Liquid Diet    Output:    I/O last 3 completed shifts:  In: 3353.83 [I.V.:1793.83; NG/GT:360]  Out: 4583 [Emesis/NG output:600; Other:3383; Stool:600]  Containment: of urine/stool: Rectal Pouch and Anuric    Risk Assessment:   Sensory Perception: 1-->completely limited  Moisture: 3-->occasionally moist  Activity: 1-->bedfast  Mobility: 1-->completely immobile  Nutrition: 2-->probably inadequate  Friction and Shear: 2-->potential problem  Kirby Score: 10    Labs:    Recent Labs   Lab 03/11/19  0957 03/11/19  0401   ALBUMIN 2.1* 2.1*   HGB 9.4* 9.2*   INR 1.14 1.11   WBC 19.9* 23.7*   CRP  --  110.0*     Focused Assessment:  Right IJ ECMO cannula, Right groin ECMO cannula and Heels, mouth    Pressure Injury Present::Yes      ASSESSMENT  Pressure Injury: under EEG leads , hospital acquired ,   This is a Medical Device Related Pressure Injury (MDRPI) due to EEG and NIRs with surglist to hold it in place.   Pressure Injury is Stage Deep Tissue Pressure Injury (DTPI)   Contributing factor of the pressure injury: pressure and immobility, cannot rule out burn from NIRs being placed over EEG.   Status: Follow up assessment, PAO  Recommend provider order: NA     TREATMENT PLAN  Bilateral Forehead wounds: BID and PRN cleanse with saline and pat dry. Apply thin layer of Vaseline. Avoid placing NIRs directly over EEG leads. Avoid placing EEG leads directly on wound.   Orders reviewed   WOC Nurse follow-up plan:twice weekly  Nursing to notify the Provider(s) and re-consult the WOC Nurse if wound(s) deteriorates or new skin concern.    Physical Exam    Wound Location:  Bilateral Forehead (assessment from 3/4)      Date of last Photo 3/4  Wound History: Pt had NIRs in place over EEG held in place with Surgilast netting.   Measurements (length x width x depth, in cm) 0.6  cm x 0.6 cm  x  0 cm (both)  Wound Base:  100 % non-blanchable erythema and deep purple/ brown  Tunneling N/A  Undermining N/A  Palpation of the wound bed: normal   Periwound skin: intact  Color: normal and consistent with surrounding tissue  Temperature: normal   Drainage:, none  Description of drainage: none  Odor: none  Pain: no grimacing or signs of discomfort and unable to assess due to  sedation , insensate    Education provided to: nurse  Discussed importance of:their role in pressure injury prevention  Discussed plan of care with Nurse  WOC Nurse follow-up plan:twice weekly    Violeta Grossman RN CWCN

## 2019-03-11 NOTE — PROGRESS NOTES
CRRT RESTART NOTE    Reason for Restart:  72h filter change    Patient s Vascular Access: ECMO                      DATA:  Procedure:  CVVHDF  Start Time:  2028  Machine#:  2  Filter:  M150  Blood Flow:   180 mL/min  Pre-Replacement Solution:  PrismaSol BGK 2/3.5  Post-Replacement Solution:  PrismaSol BGK 2/3.5  Dialysate Solution:  PrismaSol BGK 2/3.5  Pre-Replacement Solution Rate:  1300 mL/hr  Post-Replacement Solution Rate:  200 mL/hr  Dialysate Flow Rate:  1300 mL/hr  Patient Removal Rate:  150 mL/hr  Anticoagulation Type and Rate:  NA    ASSESSMENT:  How Patient Tolerated Restart:  well  Vital Signs:  T 36.8, HR 77, MAP 76, RR 12, SPO2 100%  Initial Pressures:  Access:  0  Filter:  166  Return:  120  TMP:  75  Change in Filter Pressure:  26    INTERVENTIONS:  none    PLAN:  Continue fluid removal as tolerated per goals of therapy. Check circuit daily and change circuit q72h and prn. Please contact CRRT resource RN at [phone] with any questions/concerns.

## 2019-03-11 NOTE — PLAN OF CARE
Patient remains on VAV ECMO, sweep 4, flow 4 lpm and 3400 rpm. Arterial VA cannula flow reduced to 1.5 lpm and Arterial VV cannula is at 2.5 lpm. Plan to de-cannulate tomorrow. IABP continued, 1:1, 100% augmentation. Pressors titrated to MAP > 60. FiO2 weaned to 40%. LS coarse and diminished. CRRT continued 0-100 goal.

## 2019-03-11 NOTE — PROGRESS NOTES
CLINICAL NUTRITION SERVICES - REASSESSMENT NOTE     Nutrition Prescription    Malnutrition Status:    Pt does not meet criteria    Recommendations already ordered by Registered Dietitian (RD):  1. Increase to Impact Peptide @ goal 60 ml/hr (1440 ml/day) to provide 2160 kcals (30 kcal/kg/day), 135 g PRO (1.9 g/kg/day), 1109 ml free H2O, 92 g Fat (50% from MCTs), 202 g CHO and no Fiber daily.     2. Discontinue ProSource    3. Add Nutrisource fiber 1 pkt q6h     Future/Additional Recommendations:  If/when able to come off ECMO support and meets criteria for indirect calorimetry, recommend obtain metabolic cart study to assess approp of energy provisions to avoid over/under feeding.     EVALUATION OF THE PROGRESS TOWARD GOALS   Diet: NPO  Enteral Access: NDT placed 2/25 by RD + bridle  Nutrition Support: Impact Peptide 1.5 @ 50 mL/hr +  ProSource 1 pkt BID for total 1880 kcal (25 kcal/kg) and 135 g PRO (1.8 g/kg)   Intake: Average intakes over past 7 days = 1450 kcal (20 kcal/kg) and 108 g PRO (1.5 g/kg) per previous dosing wt 73 kg.      NEW FINDINGS   -Resp/CV: on VAV ECMO  -Wt: Wt trended down to 90.6 kg on 3/11 from 107.9 kg on admit (down 16%). New dosing wt 71 kg (adjusted)  -GI: Stool outputs ~500-700 mL daily from 3/6-3/9. Output was 1600 mL yesterday. Has miralax and senna scheduled but held by RN for 3 days per MAR.  -Skin: Kirby 10. Bridle position inspected. Appears appropriate with no pressure to nare, no skin breakdown present.  DTPI under EEG leads on forehead per WOC 3/8.   -Labs:   -Renal: CRRT, K+ 3.4 and phos 3.5.  On nephronex    Updated ASSESSED NUTRITION NEEDS per 71 kg dosing wt  Estimated Energy Needs: 1775 - 2130 kcals/day (25 - 30 kcals/kg)  Justification: Vented, obese however with rapid wt loss  Estimated Protein Needs: 107 - 142 grams protein/day (1.5 - 2 grams of pro/kg)  Justification: Hypercatabolism with critical illness and Obesity guidelines    MALNUTRITION  % Intake: No  decreased intake noted  % Weight Loss: > 2% in 1 week (severe)  Subcutaneous Fat Loss: None observed  Muscle Loss: None observed  Fluid Accumulation/Edema: None noted  Malnutrition Diagnosis: Patient does not meet two of the above criteria necessary for diagnosing malnutrition    Previous Goals   Total avg nutritional intake to meet a minimum of 20 kcal/kg and 1.5 g PRO/kg daily (per dosing wt 73 kg).  Evaluation: Met    Previous Nutrition Diagnosis  Predicted inadequate nutrient intake (kcal/PRO) related to reliance on TF to meet needs, unable to obtain metabolic cart study as evidenced by potential for TF interruptions, changes to metabolic needs    Evaluation: updated below    CURRENT NUTRITION DIAGNOSIS  Predicted inadequate nutrient intake (kcal/PRO) related to reliance on TF to meet needs, unable to obtain metabolic cart study as evidenced by potential for TF interruptions, changes to metabolic needs, wt loss 16% since admission but has been receiving 100% previously assessed nutritional needs      INTERVENTIONS  Implementation  Enteral Nutrition - Increase slightly to optimize intakes. Add fiber for high stool outputs    Goals  Total avg nutritional intake to meet a minimum of 20 kcal/kg and 1.5 g PRO/kg daily (per dosing wt 73 kg).    Monitoring/Evaluation  Progress toward goals will be monitored and evaluated per protocol.    Sudha Winters RD, LD, [facility]  CVICU Dietitian  Pager: [phone]

## 2019-03-11 NOTE — PLAN OF CARE
Remains intubated/sedated with VAV ECMO support. No vent changes this shift. Berta patent; able to meet therapy goal of net negative 50ml/hr. IABP patent~see flow sheets for readings. Drips include:  Levo; Versed, Fentanyl, Amiodarone, Heparin, and Insulin~see MAR for rates and dosages. See flow sheets for other assessments. Plan to continue to closely monitor; keeping MDs as well as family updated. Possible OR today to decanulate AV ECMO.

## 2019-03-11 NOTE — PROGRESS NOTES
ECMO Shift Summary:    Patient remains on VAV ECMO, all equipment is functioning and alarms are appropriately set. RPM's 3100 with flow range 3.6-3.83 L/min with 4.59-1.72 through the Protek arterial cannula. Sweep gas is at 5 LPM and FiO2 100%. Circuit remains free of air, however clot and fibrin can be visualized in the oxygenator. Cannulas are secure with no bleeding from site. Extremities are warm. Suctioned ETT for small thick green yellow secretions.    Significant Shift Events: None    Vent settings:  Ventilation Mode: CMV/AC  (Continuous Mandatory Ventilation/ Assist Control)  FiO2 (%): 50 %  Rate Set (breaths/minute): 12 breaths/min  Tidal Volume Set (mL): 530 mL  PEEP (cm H2O): 10 cmH2O  Oxygen Concentration (%): 50 %  Resp: 12  .    Heparin is running at 1600 u/kg/hr, ACT range 192-205.    Patient is anuric on CRRT, blood loss was minimal. No product was given.      Intake/Output Summary (Last 24 hours) at 3/11/2019 0603  Last data filed at 3/11/2019 0600  Gross per 24 hour   Intake 3175.37 ml   Output 3837 ml   Net -661.63 ml       ECHO:  No results found for this or any previous visit.No results found for this or any previous visit.    CXR:  Recent Results (from the past 24 hour(s))   XR Chest Port 1 View    Narrative    Exam: XR CHEST PORT 1 VW, 3/10/2019 9:37 AM    Indication: ET tube placement    Comparison: 3/10/2019    Findings:   Lower approach cannula tip seen in the left brachiocephalic vein.  There are 2 right IJ approach cannula, one in the right heart and the  other in the pulmonary artery.    Endotracheal tube tip 4.9 cm above the tereso. Intervertebral balloon  pump tip at the proximal descending thoracic aorta. Enteric tubes  descend below the field of view in change. Mild perihilar pulmonary  edema unchanged.      Impression    Impression:   1. Endotracheal tube tip 4.9 cm above the tereso.  2. Patchy perihilar pulmonary edema unchanged.    I have personally reviewed the examination and  initial interpretation  and I agree with the findings.    ERIN BABB MD       Labs:  Recent Labs   Lab 03/11/19  0538 03/11/19  0411 03/11/19  0215 03/10/19  2338   PH 7.46* 7.46* 7.44 7.41   PCO2 35 35 37 40   PO2 137* 131* 88 104   HCO3 25 25 25 25   O2PER 50 50.0 50.0 50.0       Lab Results   Component Value Date    HGB 9.2 (L) 03/11/2019    PHGB 40 (H) 03/10/2019     03/11/2019    FIBR 772 (H) 03/05/2019    INR 1.11 03/11/2019     (HH) 03/11/2019    DD >20.0 (H) 03/05/2019    AXA 0.70 03/11/2019    ANTCH 76 (L) 03/10/2019         Plan is to go to OR for a turndown and possible VA decannulation later today.      Sarah Saleem, RRT  3/11/2019 6:03 AM

## 2019-03-12 NOTE — PROGRESS NOTES
Care Coordinator Progress Note    Admission Date/Time:  2/23/2019  Attending MD:  Abraham Baer MD    Data  Chart reviewed, discussed with interdisciplinary team.   Patient was admitted for: Cardiac arrest (H).    Concerns with insurance coverage for discharge needs: None.  Current Living Situation: Patient lives with spouse.  Support System: Supportive and Involved  Services Involved: None  Transportation at Discharge: Family or friend will provide  Transportation to Medical Appointments:   - Not applicable  Barriers to Discharge: chronically ill    Assessment  Pt remain in ICU vented, sedated, on ECMO, IABP and CRRT.  Attempt to visit family for support.  No family was in the room at time of my visit.  RNCC will cont to follow plan of care.     Plan  Anticipated Discharge Date:  TBD.  Anticipated Discharge Plan:  TBD.  RNCC will cont to follow plan of care.      Romel Lino RN, PHN, BSN  4A and 4E/ ICU  Care Coordinator  Phone: [phone]  Pager: [phone]

## 2019-03-12 NOTE — CONSULTS
GASTROENTEROLOGY CONSULTATION      Date of Admission: 2/23/2019       Date of Consult: 3/12/19          Chief Complaint:   We were asked by Jersey Alexander MD to evaluate this patient with GI bleed in the setting of recent cardiac arrest.          ASSESSMENT AND RECOMMENDATIONS:   Assessment:  Hellen Riddle is a 39 year old male with a history of hyperlipidemia who had a witnessed cardiac arrest (2/2 refracory VF) on 2/23/19 ICU and found to have x3 vessel CAD, s/p peripheral VA ECMO, and CRRT for FERNANDA s/t cardiogenic shock, acute hypoxic respiratory failure d/t pulm edema (currently intubated), acute liver injury s/t cardiogenic, normocytic anemia, who developed hematochezia 3/11/19 associated with HD instability concerning for hemorrhagic shock, required 8 unit(s) pRBC and 1 unit(s) FFP. GI is consulted for acute GIB. Bleeding slowed down this morning. Also noted blood coming out of OG and mouth.       #Hematochezia/Hematemesis:   # Hemorrhagic shock   - Sudden onset of GIB with HD instability 3/11/19. Bleeding was very brisk, would be atypical for ischemic bowel bleeding. Currently slowing down with supportive care. Differentials include large PUD vs. Dieulafoy's vs possible ileo-colonic fistula ?. Pt may develop ischemic bowel from this hemorrhagic shock. Phos is up trending.       #Cardiogenic shock w/multi system organ failure   - occurred 2/23/19, has been on ECMO since then.      # Elevated AST/ALT:   - likely from ischemic secondary to shock.      Recommendations  - Continue with IV PPI twice daily  - Trend CBC and transfuse as needed, Hgb goal > 7.   - Continue supportive care.  - Trend lactic acid and phosphate.   - Trend INR and liver enzymes daily   - Recommend CTA to localize source of bleeding once stable.   - GI team will continue to follow, please page if any questions.     Patient care plan discussed with Dr. Boyd, GI staff physician. Thank you for involving us in this patient's care. Please do  not hesitate to contact the GI service with any questions or concerns.     Tay Bacon MD (GI fellow)  Department of Gastroenterology     ATTENDING ATTESTATION:    REFERRING PROVIDER: Latasha Alexander  DATE SEEN: 3/12/19    While ischemic insult to the bowel could explain some of the hemorrhagic nature of current GI output, timing of bleeding and acuity could raise suspicion for vascular-enteric fistula. Agree w/ Dr. Bacon's differential, recommend directed imaging once Cardiology feels pt stable enough to tolerate/transfer for localizing exam.    Patient was discussed, seen, and examined by me, Elliott Boyd. The plan of care and pertinent data/imaging were also reviewed with the GI Consult team and GI Fellow as well. Agree with the joint assessment and plan as delineated above.    Please contact me with any further questions.    Elliott Boyd MD    [facility] - Department of Medicine  Division of Gastroenterology  [phone]    -------------------------------------------------------------------------------------------------------------------   History is obtained from the patient and the medical record.          History of Present Illness:   Hellen Riddle is a 39 year old male with a history of HLD who had a witnessed cardiac arrest (2/2 refracory VF) on 2/23/19 ICU and found to have x3 vessel CAD, s/p peripheral VA ECMO, and CRRT for FERNANDA s/t cardiogenic shock, acute hypoxic respiratory failure d/t pulm edema (currently intubated), acute liver injury s/t cardiogenic, normocytic anemia, who developed hematochezia 3/11/19, GI is consulted for acute GIB. Per ICU team, patient was HD stable yesterday, and had femoral ECMO line unclamped. He developed sudden onset of hematochezia with maroon bloody output out of OG yesterday evening, became unstable, required increased amount of pressor. Also noted elevated lactic acid to 6.4, elevated AST/ALT 2372/7064.  Patient received 8 unit(s) pPBC and 1  unit(s) FFP, bleeding slowed down this morning. Started on PPI BID.           Past Medical History:   Reviewed and edited as appropriate  Past Medical History:   Diagnosis Date     Dyslipidemia           Past Surgical History:   Reviewed and edited as appropriate   Past Surgical History:   Procedure Laterality Date     INSERT EXTRACORPORAL MEMBRANE OXYGENATOR Right 3/6/2019    Procedure: Emergent Insert VA extracorporal membrane oxygenator right groin;  Surgeon: Reggie Perez MD;  Location: [facility] OR     REMOVE EXTRACORPORAL MEMBRANE OXYGENATOR ADULT N/A 3/6/2019    Procedure: EXTRACORPORAL MEMBRANE OXYGENATOR REMOVAL FEMORAL CANNULAS repair right femoral vessels;  Surgeon: Craig Berry MD;  Location: [facility] OR            Social History:   Unable to elicit          Family History:   Unable to elicit        Allergies:   Reviewed and edited as appropriate   No Known Allergies         Medications:     Current Facility-Administered Medications   Medication     acetaminophen (TYLENOL) tablet 325 mg     albumin human 5 % injection     albuterol (PROVENTIL) neb solution 2.5 mg     amiodarone (NEXTERONE) 1,500 mg in D5W 250 mL infusion     artificial tears ophthalmic ointment     B and C vitamin Complex with folic acid (NEPHRONEX) liquid 5 mL     calcium chloride 1 g in sodium chloride 0.9 % 100 mL intermittent infusion     calcium chloride 2 g in sodium chloride 0.9 % 100 mL intermittent infusion     calcium chloride 3 g in sodium chloride 0.9 % 200 mL intermittent infusion     calcium chloride 4 g in sodium chloride 0.9 % 200 mL intermittent infusion     Continuing antiplatelet from home medication list OR antiplatelet order already placed during this visit     dextrose 10 % 1,000 mL infusion     glucose gel 15-30 g    Or     dextrose 50 % injection 25-50 mL    Or     glucagon injection 1 mg     dialysate for CVVHD & CVVHDF (Phoxillum BK4/2.5)     epoprostenol (VELETRI) 20 mcg/mL in sterile water inhalation solution      ertapenem (INVanz) 1 g vial to attach to  mL bag     fentaNYL (PF) (SUBLIMAZE) injection 50 mcg     fentaNYL (SUBLIMAZE) infusion     fiber modular (NUTRISOURCE FIBER) packet 1 packet     heparin 100 UNIT/ML injection 320-640 Units     heparin 2 unit/mL in 0.9% NaCl (for REPERFUSION CATHETER)     heparin infusion 25,000 units in 0.45% NaCl 250 mL     hydrocortisone sodium succinate PF (solu-CORTEF) injection 50 mg     insulin 1 unit/mL in saline (NovoLIN, HumuLIN Regular) drip - ADULT IV Infusion     lidocaine (LMX4) cream     lidocaine 1 % 1 mL     magnesium sulfate 2 g in water intermittent infusion     metroNIDAZOLE (FLAGYL) infusion 500 mg     midazolam (VERSED) 5 mg/mL infusion (MAX CONC)     midazolam (VERSED) injection 1-3 mg     naloxone (NARCAN) injection 0.1-0.4 mg     nitroGLYcerin (NITROSTAT) sublingual tablet 0.4 mg     No heparin required     norepinephrine (LEVOPHED) 16 mg in D5W 250 mL infusion     oseltamivir (TAMIFLU) suspension 75 mg     pantoprazole (PROTONIX) 40 mg IV push injection     Percutaneous Coronary Intervention orders placed (this is information for BPA alerting)     polyethylene glycol (MIRALAX/GLYCOLAX) Packet 17 g     POST-filter replacement solution for CVVHD & CVVHDF (Phoxillum BK4/2.5)     potassium chloride 20 mEq in 50 mL intermittent infusion     PRE-filter replacement solution for CVVHD & CVVHDF (Phoxillum BK4/2.5)     Reason ACE/ARB/ARNI order not selected     Reason beta blocker not prescribed     senna-docusate (SENOKOT-S/PERICOLACE) 8.6-50 MG per tablet 1 tablet     sodium chloride (PF) 0.9% PF flush 3 mL     sodium chloride (PF) 0.9% PF flush 3 mL     sodium phosphate 20 mmol in D5W 100 mL intermittent infusion     ticagrelor (BRILINTA) tablet 90 mg     vasopressin (VASOSTRICT) 40 Units in D5W 40 mL infusion             Review of Systems:   A complete review of systems was performed and is negative except as noted in the HPI           Physical Exam:   Temp 98.1  " F (36.7  C)   Resp 18   Ht 1.68 m (5' 6.14\")   Wt 92.6 kg (204 lb 2.3 oz)   SpO2 99%   BMI 32.81 kg/m    Wt:   Wt Readings from Last 2 Encounters:   03/12/19 92.6 kg (204 lb 2.3 oz)      Constitutional: intubated and sedated   HEENT: a mouthful of maroon blood in oral cavity, unclear source, no obvious mouth or tongue lesions on exam.   CV: on ECMO  Respiratory: mechanical ventilation    Lymph: No axillary, submandibular, supraclavicular or inguinal lymphadenopathy  Abd: soft, distended, hypoactive BS.   Skin: warm, perfused, no jaundice  Neuro: sedated            Data:   Labs and imaging below were independently reviewed and interpreted    BMP  Recent Labs   Lab 03/12/19 0951 03/12/19 0325 03/11/19 2344 03/11/19 2200 03/11/19  1600    140  --  134 135   POTASSIUM 5.1 5.1  --  4.5 4.2   CHLORIDE 103 103  --  102 101   ALEKSANDER 8.3* 7.3*  --  8.0* 9.4   CO2 17* 18*  --  17* 19*   BUN 48* 46*  --  55* 61*   CR 1.74* 1.75*  --  1.89* 1.87*   *  116* 87 131* 181*  190* 98  116*     CBC  Recent Labs   Lab 03/12/19 0951 03/12/19 0325 03/11/19 2200 03/11/19  1600   WBC 27.4* 29.0* 21.3* 23.2*   RBC 2.87* 2.50* 2.78* 3.11*   HGB 8.7* 7.4* 8.5* 9.4*   HCT 26.8* 23.5* 27.3* 30.2*   MCV 93 94 98 97   MCH 30.3 29.6 30.6 30.2   MCHC 32.5 31.5 31.1* 31.1*   RDW 17.1* 18.6* 21.2* 23.3*   * 139* 231 279     INR  Recent Labs   Lab 03/12/19  0951 03/12/19 0325 03/11/19 2200 03/11/19  1600   INR 1.67* 1.56* 1.29* 1.13     LFTs  Recent Labs   Lab 03/12/19  0951 03/12/19  0325 03/11/19  2200 03/11/19  1600   ALKPHOS 198* 74 115 147   AST 7,064* 478* 248* 324*   ALT 2,372* 250* 161* 199*   BILITOTAL 8.8* 7.3* 6.1* 8.7*   PROTTOTAL 6.0* 6.4* 6.9 7.8   ALBUMIN 4.1 3.9 2.6* 2.1*      PANCNo lab results found in last 7 days.    "

## 2019-03-12 NOTE — PROGRESS NOTES
[facility]  Cardiac ICU Progress Note  March 10, 2019           Key events - last 24 hours / Hospital course   No major events overnight.  Tolerated volume removal with CRRT  This morning on low dose norepi- 0.02              Assessment and Plan:   Hellen Riddle is a 39 year old male who was admitted on 2/23/2019.    Neurology: Intubated, sedated.  --reduce sedation to minimize pressor requirements   Cardiovascular / Hemodynamics: Refractory VF arrest, currently on peripheral VA ECMO support.  Coronary angiography on admission showed 3v disease and he underwent MITA to mRCA & mLCx. Has IABP as well.  ECMO decannulation stymied by VT in OR on 3/6. Returned to the cath lab for MITA to  LAD and distal LCx.  He was started on amiodarone with good suppression of ventricular arrhythmias.  Hemorrhagic shock overnight due to bowel ischemia. No hypotension preceding the event.  --femoral ECMO line unclamped to provide maximal systemic flow  TTE: LV EF 35-40%.  RV dysfunction is mild.  --wean pressors as able to minimize bowel ischemia; will tolerate MAPs 55-60  --hold heparin gtt and ASA given ongoing bleeding and cont ticagrelor  --hold lipitor for now given hepatic injury during arrest  --hold ACE/ARB for now given renal failure and shock  --holding beta blocker given shock    Pulmonary: Acute hypoxic respiratory failure due to pulmonary edema, VAP  ETT in the trachea- will need to consider tracheostomy if he recovers from this event  --wean vent as able  --daily CXR  --Q2h ABGs for now  --consider scheduled duonebs if signs of lung dz, currently PRN     GI and Nutrition: Mesenteric ischemia  BRBPR and dark OG output  Shock liver  --PPI BID  --stop tube feeds  --supportive care as above  --surgery consult  --monitor BID LFTs   Renal, Fluid and Electrolytes: Renal failure.  Appreciate nephrology's assistance.  On CRRT.  --monitor urine output  --maintain K>4 and Mg>2    Infectious Disease: Multiple  organisms recovered in sputum: continue antimicrobial and antiviral therapy as guided by infectious disease.   Hematology and Oncology: Acute anemia due to GI bleed  Massive transfusion overnight and today  --holding heparin as above   --cryo PRN fibrinogen < 200; FFP for INR >2  --Transfuse for Hgb<10  --ddavp given uremia   Endocrinology: No known medical history. BG elevated.  --insulin gtt   Lines: R femoral arterial and venous ECMO cannulae   L femoral arterial line   R radial arterial line   L femoral CVC   ETT   Pisano catheter  OG tube   Current lines are required for patient management             Medications:   I have reviewed this patient's current medications           Review of Systems:   Review of systems not obtained due to patient factors - intubation              Physical Exam:   Exam and Labs by System  Neuro: Exam: intubated and sedated, pupils equal but not reactive  General Appearance:   Comfortable, no pain or respiratory distress   Neuro:   Coma: deeply sedated       Cardiovascular:     Heart Rate: 79  Exam:    Normal s1 and s2, no audible murmur, cool extremities.     Recent Labs   Lab 03/12/19  0951 03/11/19  2200 03/11/19  1600   TROPI 3.128* 2.637* 2.634*       Pulm:   Vent Settings:  Resp: 15 SpO2: 100 % O2 Device: Mechanical Ventilator    Ventilation Mode: CMV/AC  (Continuous Mandatory Ventilation/ Assist Control)  FiO2 (%): 60 %  Rate Set (breaths/minute): 12 breaths/min  Tidal Volume Set (mL): 530 mL  PEEP (cm H2O): 10 cmH2O  Oxygen Concentration (%): 60 %  Resp: 15    Exam:   Symmetrical chest shape and movements with each tidal breath  Good patient-ventilator synchrony     Arterial Blood Gas:   Recent Labs   Lab 03/12/19  0951 03/12/19  0757 03/12/19  0544 03/12/19  0325   PH 7.33* 7.34* 7.33* 7.33*   PCO2 33* 32* 35 32*   PO2 163* 193* 169* 204*   HCO3 17* 18* 18* 17*   O2PER 60 60.0 60 60       Renal:   Meds:  Vitals:    03/10/19 0400 03/11/19 0400 03/12/19 0500   Weight: 92.3 kg  (203 lb 7.8 oz) 90.6 kg (199 lb 11.8 oz) 92.6 kg (204 lb 2.3 oz)   I/O last 3 completed shifts:  In: 9233.64 [I.V.:1532.64; Other:21; NG/GT:330]  Out: 5969 [Emesis/NG output:500; Other:1869; Stool:3600]  Recent Labs   Lab 03/12/19 0951 03/12/19 0325    140   POTASSIUM 5.1 5.1   CHLORIDE 103 103   CO2 17* 18*   ANIONGAP 20* 19*   *  116* 87   BUN 48* 46*   CR 1.74* 1.75*   ALEKSANDER 8.3* 7.3*     No components found for: URINE     GI:   Exam:    Distended, soft, hypoactive bowel sounds  Red blood in stool bag     Diet: tube feeds  Recent Labs   Lab 03/12/19 0951 03/12/19 0325 03/11/19 2200   AST 7,064* 478* 248*   ALT 2,372* 250* 161*   BILITOTAL 8.8* 7.3* 6.1*   ALBUMIN 4.1 3.9 2.6*   PROTTOTAL 6.0* 6.4* 6.9   ALKPHOS 198* 74 115       Heme/ID:   Meds:  Temp: 97.7  F (36.5  C) Temp src: EsophagealTemp  Min: 96.1  F (35.6  C)  Max: 98.1  F (36.7  C)   Recent Labs   Lab 03/12/19 0951 03/12/19 0325 03/11/19 2200 03/11/19  1600 03/11/19  0957   WBC 27.4* 29.0* 21.3* 23.2* 19.9*   HGB 8.7* 7.4* 8.5* 9.4* 9.4*   HCT 26.8* 23.5* 27.3* 30.2* 30.2*   MCV 93 94 98 97 98   RDW 17.1* 18.6* 21.2* 23.3* 23.2*   * 139* 231 279 264     Recent Labs   Lab 03/12/19 0951 03/12/19 0325 03/11/19 2200 03/11/19  1600 03/11/19  0957   INR 1.67* 1.56* 1.29* 1.13 1.14   PTT 41* 55* 84* 69* 102*     Recent Labs   Lab 03/12/19  0324 03/11/19  0511 03/11/19  0453 03/10/19  0423 03/09/19  0448 03/09/19  0317   CULT No growth after 3 hours Light growth  Lactose fermenting gram negative rods  *  Moderate growth  Yeast  *  Culture in progress No growth after 1 day Moderate growth  Klebsiella pneumoniae  Susceptibility testing done on previous specimen  *  Moderate growth  Candida albicans / dubliniensis  Candida albicans and Candida dubliniensis are not routinely speciated  Susceptibility testing not routinely done  *  No growth after 2 days Light growth  Escherichia coli  Susceptibility testing done on previous  specimen  *  Light growth  Klebsiella pneumoniae  Susceptibility testing done on previous specimen  *  Light growth  Candida albicans / dubliniensis  Candida albicans and Candida dubliniensis are not routinely speciated  Susceptibility testing not routinely done  * No growth after 3 days       Endo:   Meds:  Recent Labs   Lab 03/12/19  0951 03/12/19  0325 03/11/19  2344 03/11/19  2200 03/11/19  1600   *  116* 87 131* 181*  190* 98  116*       Lines:    No erythema or discharge at the catheter entry site               Data:   All lab results reviewed    Recent Results (from the past 24 hour(s))   XR Abdomen Port 1 View    Narrative    Exam: XR ABDOMEN PORT 1 VW, 3/12/2019 1:51 AM    Indication: abdominal distention, ileus? pneumatosis intestinalis from  gut ischemia?    Comparison: 3/6/2019    Findings:   Supine views of the abdomen. Inferior approach ECMO cannulas in place.  Enteric tubes projecting over the stomach with one terminating within  the stomach the other terminating in the second part of the duodenum.  Prominent gas-filled transverse colon. No portal venous gas. No  pneumatosis.      Impression    Impression: Prominent gas-filled transverse colon without evidence of  pneumatosis.    I have personally reviewed the examination and initial interpretation  and I agree with the findings.    ROSELYN CASH MD       The pt was discussed and evaluated with Dr. Humphreys.    Jersey Alexander MD  Cardiovascular Medicine Fellow, PGY-[id number]  [phone]       Critical Care Services Progress Note:     Hellen Riddle remains critically ill with acute coronary syndrome, shock and on ECMO      I personally examined and evaluated the patient today.   The patient s prognosis today is grave and unchanged  I have evaluated all laboratory values and imaging studies from the past 24 hours.  Key findings and decisions made today included continue with ECMO support  I personally managed the ventilator, sedation, pain  control and analgesia, metabolic abnormalities, antibiotic therapy, nutritional status and vasoactive medications.   Consults ongoing and ordered are none  Procedures that will happen today are: none  All treatments were placed at my direction.  I formulated today s plan with the house staff team or resident(s) and agree with the findings and plan in the associated note.       The above plans and care have been discussed with family and all questions and concerns were addressed.     I spent a total of 60 minutes (excluding procedure time) personally providing and directing critical care services at the bedside and on the critical care unit for Hellen RiddleNoble Humphreys

## 2019-03-12 NOTE — PLAN OF CARE
Pt continues on VAV ECMO. Afebrile. HR 70-80s, sinus with frequent PVCs and PACs. MAPs 50-75. Titrating levo and vaso as needed. Pt still bleeding from GI tracts. Almost 2L of bloody stool out this shift. Pt also bleeding from mouth. He has received 4u of PRBCs, 500cc of albumin and 2u of platelets. Hgb goal to stay above 10. Heparin still off. TFs off. Not able to pull fluid on CRRT. Changing CRRT to 2K baths d/t high potassium. Pt still sedated on versed and fentanyl. Ins gtt on alg 1. IABP 1:1, 100% augmentation. Pulses dopperable. Pt not tolerating turns well, his MAPs drop and he starts chugging on ECMO. Plan to keep monitoring and replacing blood products as needed. Will update MD as needed. Refer to flowsheets for further documentation.

## 2019-03-12 NOTE — PROGRESS NOTES
"  Nephrology Consult Daily Note  03/12/2019          AGUILA Student Note AGUILA Note   Assessment and Recommendation (Student)    Assessment:  38 yo M PMH HLD cardiac arrested on 2/23/19 with unclear down-time outside of hospital.  Angiogram revealed severe 3v CAD w/  of the LAD, severe RCA and LCx stenosis now s/p PCI of the mRCA and mLCx. VAVECMO and IABP started upon admission.  Nephrology consulted for management of FERNANDA, started CRRT on 2/25.      Plan:  Set fluid removal to rate 0cc to promote hemodynamic stability   Assessment and  Recommendation (Physician)    Mr Riddle is a 39 yom w/HLP who had cardiac arrest on 2/23/19 with unclear down-time.  PCI done emergently, Nephrology consulted for management of FERNANDA, started CRRT on 2/25.      Plan:   Continuing CRRT but difficult situation due to multi-system failure and now with GIB in setting recent stenting, stopped ASA and heparin for ECMO.  No UF today with tenuous BP's and signs of GIB.      I have communicated the plan with the primary team via verbal communication.     Seen and discussed with Dr Altamirano.    Darren Vidal  Clinical Nurse Specialist  [phone]          AGUILA Student Interval History AGUILA Interval History   Student: Interval History  Fluid status +1460 and urine output 0cc yesterday.  Currently hypotensive needing vasopressor support.  K 5.1, creatinine 1.74    Review of Systems  Mr. Riddle is heavily sedated and intubated.  Unable to complete ROS.   Mr Riddle continues on ECMO, IABP, CRRT, difficulties with GIB overnight and overall difficult situation with multi-system organ failure.        Review of Systems:   ROS not done due to vent/sedation.      Physical Exam (Student)  Temp 98.1  F (36.7  C)   Resp 13   Ht 1.68 m (5' 6.14\")   Wt 92.6 kg (204 lb 2.3 oz)   SpO2 100%   BMI 32.81 kg/m    Vital signs reviewed    Intake/Output Summary (Last 24 hours) at 3/12/2019 1135  Last data filed at 3/12/2019 1100  Gross per 24 hour   Intake 9799.22 ml   Output 6258 " "ml   Net 3541.22 ml      Physical Exam   Constitutional: He is sedated and intubated.   HENT:   Head: Normocephalic and atraumatic.   Eyes: Conjunctivae are normal. Pupils are equal, round, and reactive to light.   Cardiovascular:   Murmur heard.  Pulmonary/Chest: He is intubated. He is in respiratory distress. He has rhonchi. He has rales.   Abdominal: Soft. Bowel sounds are normal.   Neurological: He is unresponsive.         Physical Exam (Physician)  Vitals were reviewed  Temp 97.7  F (36.5  C)   Resp 15   Ht 1.68 m (5' 6.14\")   Wt 92.6 kg (204 lb 2.3 oz)   SpO2 100%   BMI 32.81 kg/m       Intake/Output Summary (Last 24 hours) at 3/12/2019 1135  Last data filed at 3/12/2019 1100  Gross per 24 hour   Intake 9799.22 ml   Output 6258 ml   Net 3541.22 ml      GENERAL APPEARANCE: Intubated and sedated, on ECMO and IABP  EYES:  No scleral icterus, pupils equal  HENT: mouth without ulcers or lesions  PULM: lungs clear to auscultation, equal air movement, no cyanosis or clubbing  CV: regular rhythm, normal rate, no rub     -edema +1 LE  GI: soft, non-tender, non-distended, bowel sounds are +  MS: no evidence of inflammation in joints, no muscle tenderness  NEURO:  Intubated and sedated         Labs:   The following labs were reviewed:   CMP  Recent Labs   Lab 03/12/19  0951 03/12/19  0325 03/11/19  2344 03/11/19  2200 03/11/19  1600    140  --  134 135   POTASSIUM 5.1 5.1  --  4.5 4.2   CHLORIDE 103 103  --  102 101   CO2 17* 18*  --  17* 19*   ANIONGAP 20* 19*  --  16* 15*   *  116* 87 131* 181*  190* 98  116*   BUN 48* 46*  --  55* 61*   CR 1.74* 1.75*  --  1.89* 1.87*   GFRESTIMATED 48* 47*  --  43* 44*   GFRESTBLACK 55* 55*  --  50* 51*   ALEKSANDER 8.3* 7.3*  --  8.0* 9.4   MAG 2.5* 2.4*  --  2.5* 2.3   PHOS 7.6* 7.1*  --  7.6* 3.7   PROTTOTAL 6.0* 6.4*  --  6.9 7.8   ALBUMIN 4.1 3.9  --  2.6* 2.1*   BILITOTAL 8.8* 7.3*  --  6.1* 8.7*   ALKPHOS 198* 74  --  115 147   AST 7,064* 478*  --  248* 324* "   ALT 2,372* 250*  --  161* 199*     CBC  Recent Labs   Lab 03/12/19  0951 03/12/19  0325 03/11/19  2200 03/11/19  1600   HGB 8.7* 7.4* 8.5* 9.4*   WBC 27.4* 29.0* 21.3* 23.2*   RBC 2.87* 2.50* 2.78* 3.11*   HCT 26.8* 23.5* 27.3* 30.2*   MCV 93 94 98 97   MCH 30.3 29.6 30.6 30.2   MCHC 32.5 31.5 31.1* 31.1*   RDW 17.1* 18.6* 21.2* 23.3*   * 139* 231 279     INR  Recent Labs   Lab 03/12/19  0951 03/12/19  0325 03/11/19 2200 03/11/19  1600   INR 1.67* 1.56* 1.29* 1.13   PTT 41* 55* 84* 69*     ABG  Recent Labs   Lab 03/12/19  0951 03/12/19  0757 03/12/19  0544 03/12/19  0325   PH 7.33* 7.34* 7.33* 7.33*   PCO2 33* 32* 35 32*   PO2 163* 193* 169* 204*   HCO3 17* 18* 18* 17*   O2PER 60 60.0 60 60      URINE STUDIES  Recent Labs   Lab Test 02/23/19  0802   COLOR Yellow   APPEARANCE Slightly Cloudy   URINEGLC 300*   URINEBILI Negative   URINEKETONE 5*   SG 1.031   UBLD Moderate*   URINEPH 5.5   PROTEIN 100*   NITRITE Negative   LEUKEST Negative   RBCU >182*   WBCU 7*     No lab results found.  PTH  No lab results found.  IRON STUDIES  No lab results found.  Imaging:      Current Medications:    aspirin  81 mg Oral or Feeding Tube Daily     B and C vitamin Complex with folic acid  5 mL Per Feeding Tube Daily     ertapenem (INVanz) IV  1 g Intravenous Q24H     fiber modular (NUTRISOURCE FIBER)  1 packet Per Feeding Tube Q6H     hydrocortisone sodium succinate PF  50 mg Intravenous Q6H     metroNIDAZOLE  500 mg Intravenous Q6H     oseltamivir  75 mg Oral BID     pantoprazole (PROTONIX) IV  40 mg Intravenous BID     polyethylene glycol  17 g Oral or Feeding Tube Daily     senna-docusate  1 tablet Oral or Feeding Tube At Bedtime     sodium chloride (PF)  3 mL Intracatheter Q8H     ticagrelor  90 mg Oral or Feeding Tube BID       amiodarone 0.5 mg/min (03/12/19 1100)     - MEDICATION INSTRUCTIONS -       IV fluid REPLACEMENT ONLY 30 mL/hr at 03/07/19 1100     CRRT replacement solution 12.5 mL/kg/hr (03/12/19 1012)      epoprostenol (VELETRI) 20 mcg/mL in sterile water inhalation solution 10 ng/kg/min (03/12/19 1002)     fentaNYL 100 mcg/hr (03/12/19 1100)     heparin 2 unit/mL in 0.9% NaCl 3 mL/hr (03/12/19 1100)     HEParin Stopped (03/12/19 0148)     insulin (regular) 0.5 Units/hr (03/12/19 1106)     midazolam 8 mg/hr (03/12/19 1100)     - MEDICATION INSTRUCTIONS -       norepinephrine 0.08 mcg/kg/min (03/12/19 1100)     Percutaneous Coronary Intervention orders placed (this is information for BPA alerting)       CRRT replacement solution 1.887 mL/kg/hr (03/11/19 1428)     CRRT replacement solution 12.5 mL/kg/hr (03/12/19 1012)     ACE/ARB/ARNI NOT PRESCRIBED       BETA BLOCKER NOT PRESCRIBED       vasopressin (PITRESSIN) infusion ADULT (40 mL) Stopped (03/12/19 0200)     Elvira Perez

## 2019-03-12 NOTE — PROGRESS NOTES
CRRT STATUS NOTE    DATA:  Time:  5:13 PM  Pressures WNL:  YES  Filter Status:  WDL    Problems Reported/Alarms Noted:  none    Supplies Present:  YES    ASSESSMENT:  Patient Net Fluid Balance:  Net + 2032 ml since MN 3/12  Vital Signs:  HR=75, BP= 66/48 with MAP=65 on IABP. O2 sats 100% on 60% FiO2 on vent. Pt is on 1 pressor.  Labs:  Lactate=8.3, K=5.7, and WBCs=29.8  Goals of Therapy:  0-50mls/hr    INTERVENTIONS:   Discussed POC with bedside RN. Pt changed to PrismaSol BGK 2/3.5 secondary to K=5.7.    PLAN:  Continue POC. Call CRRT RN with any questions or concerns.

## 2019-03-12 NOTE — PROGRESS NOTES
Lactate started going up from 3 to 6. Arterial canula on VA ECMO unclamped. Total ECMO flow increased to 4.0 LPM with 1.5LPM going to VV ECMO. Lactate continue to rise to ~8.5. Patient developed abdominal distention. Abd xray showed ileus in transverse colon. Then he started having bright red blood in rectal tube.    # Gut ischemia  - Maximize ECMO total flow (4.5 LPM currently)  - Minimize pressors  - IV PPI empirically  - Anticoagulation stopped  - Goals of care discussion with family during the day    Olvin Menjivar  General Cardiology Fellow, PGY-6

## 2019-03-12 NOTE — PROGRESS NOTES
ECMO Shift Summary:    Patient remains on VAV ECMO, all equipment is functioning and alarms are appropriately set. RPM's 3500 with flow range 3.92-5.03 L/min. Sweep gas is at 6 LPM and FiO2 100%. Circuit remains free of air, with some clot and fibrin on the oxygenator. Cannulas are secure with no bleeding from site. Extremities are cool to the touch. Suctioned ETT for moderate amount of thick, tan secretions.    Significant Shift Events:     Patient's VA ECMO blood flow was being restricted with the clamp in preparation for possible decannulation.  The lactic acid hiral from 3 to 6 and the restrictive clamp was removed.  Patient began to chug and have flow issues with intermittent drops in BP.  Volume was given consistently for most of the shift to maintain flows.  There was significant chugging when volume was not being infused. Patient had blood coming from his mouth and the stool in the rectal tube became bloody. Rectal tube output increased throughout the night with increased blood content.  Patient's abdomin became distended overnight and a abdominal x-ray was performed.  Dr. Rizvi was contacted by the fellow and requested that the VV ECMO flow to be restricted and to increase VA ECMO flows as able to maintain MAP and clear the rising lactic acid.    Vent settings:  Ventilation Mode: CMV/AC  (Continuous Mandatory Ventilation/ Assist Control)  FiO2 (%): 60 %  Rate Set (breaths/minute): 12 breaths/min  Tidal Volume Set (mL): 530 mL  PEEP (cm H2O): 10 cmH2O  Oxygen Concentration (%): 60 %  Resp: 14  .    Heparin was stopped at 0148 secondary to bloody stool and oral bleeding, ACT range 156-180.    Patient is anuric and on CRRT, blood loss was approximately 200 mL oral and > 3 L of bloody stool. Product given included 4.25 L of albumin, 7 units RBC and 2 units FFP.      Intake/Output Summary (Last 24 hours) at 3/12/2019 0641  Last data filed at 3/12/2019 0630  Gross per 24 hour   Intake 9233.64 ml   Output  5969 ml   Net 3264.64 ml       ECHO:  No results found for this or any previous visit.No results found for this or any previous visit.    CXR:  No results found for this or any previous visit (from the past 24 hour(s)).    Labs:  Recent Labs   Lab 03/12/19  0544 03/12/19  0325 03/12/19  0115 03/11/19  2344   PH 7.33* 7.33* 7.32* 7.31*   PCO2 35 32* 32* 31*   PO2 169* 204* 266* 241*   HCO3 18* 17* 16* 16*   O2PER 60 60 60 60       Lab Results   Component Value Date    HGB 7.4 (L) 03/12/2019    PHGB 60 (H) 03/11/2019     (L) 03/12/2019    FIBR 259 03/12/2019    INR 1.56 (H) 03/12/2019    PTT 55 (H) 03/12/2019    DD >20.0 (H) 03/05/2019    AXA <0.10 03/12/2019    ANTCH 90 03/11/2019         Plan is to try have a care conference today.      Getachew Garvin, RRT  3/12/2019 6:41 AM

## 2019-03-12 NOTE — PLAN OF CARE
Difficulty maintaining flows last night. Requiring large volumes of colloid. 4.25L albumin, 7pRBCs, 2u FFP given. VA muffler removed and applied to VV cannulas. Currently VV flows ~1.7lpm, VA flows 3lpm. Sweep 6lpm. 100%. Sinus rhythm, EKG x2. Frequent PVC, occasional PVC bigeminy. IABP 1:1, 100%. Vaso on, but stopped as able to increase ECMO flows per MD. 3L stool output, bloody. AXR done, see result. 200mL OG bloody output. Oral/nasal bloody output continues. Heparin and TF stopped at 0148. PERRL. Afebrile. AC 60% 530/12/5. PIP 30. CRRT, no fluid removal. Family care conference today.     Kieran Quispe RN 03/12/19 6:52 AM    Hours of cares [phone]

## 2019-03-12 NOTE — PROGRESS NOTES
Social Work Services Progress Note    Hospital Day: 18  Date of Initial Social Work Evaluation:  Not complete  Collaborated with:  Pt's sister-in-law    Data:  Pt has been hospitalized with a prolonged ICU stay. Writer has been unsuccessful on several attempts to meet with family. Pt continues to require VAV ECMO support.    Intervention:  Writer met Pt's sister-in-law at bedside. Writer introduced self and role. Pt's sister-in-law stated that she is the sister of Pt's wife. She stated that a family member tries to come daily to check in on Pt. Pt's wife is currently emotional due to changes in Pt's condition and is at home with other family for support. Pt's sister-in-law came in to meet with MD and help facilitate communication on her sister's behalf as her sister is too emotional to come in today. MD arrived to provide update and Writer offered to return at a later time so that they could speak.    Assessment:  Pt continues to be in critical condition. Family has gathered around Pt's wife for support.    Plan:    Follow Up:  SW will remain available for support as needed.      Genia Man, [facility]  ICU   Pager: [phone]

## 2019-03-12 NOTE — PROGRESS NOTES
[facility] ID Service: Follow Up Note      Patient:  Hellen Riddle, Date of birth 1980, Medical record number [id number]           Assessment and Recommendations:   Recommendations:  - stop IV vancomycin  - cont ertapenem (3/7-current [previously meropenem 3/5-3/7])  - continue oseltamivir (3/4-current) - plan to continue beyond usual 5 days given ongoing critical illness  - ID will continue to follow, total duration of antimicrobials TBD    Problem List:  # Hypoxic respiratory failure / ARDS / pulm edema - ECMO ongoing  # Lactic acidosis  # Leukocytosis  # Influenza A infection   # E. coli pneumonia    # Candida in sputum (oropharyngeal colonization)  # S/P out of hospital cardiac arrest    Discussion:  40 yo male admitted 2/23/19 with refractory VF OHCA s/p VA ECMO on admission, found to have 3v CAD s/p intervention. ID consulted 3/4/19 for increasing leukocytosis, hypoxia, lactic acidosis.     He is positive for Influenza A, which could certainly explain the worsening hypoxia, lactic acidosis at time of consult. He is also growing E. Coli from the sputum, one strain of which is resistant to pip-tazo (which he was receiving at the time of culture) and we are treating him for bacterial pulmonary infection as well with IV ertapenem. He developed worsening lactic acidosis the evening of 3/8 and was restarted on IV vancomycin after a brief hiatus - how stable/improving again so we will discontinue this again.     Johanna Silva MD  Infectious Diseases  [phone]           Interval History:   Decreasing norepi needs and able to pull volume with CRRT. WBC continues to improve.          Current Antimicrobials   IV Ertapenem 3/7-current  IV Vancomycin 2/23-3/6, 3/8-current  Oseltamivir 3/4-current    Prior antibiotics:  Meropenem 3/5-3/7  Micafungin 3/5-3/6  Pip/tazo 2/23-3/4         Physical Exam:   Ranges for vital signs:  Temp:  [96.1  F (35.6  C)-98.2  F (36.8  C)] 97.5  F (36.4  C)  Heart Rate:  [67-90]  81  Resp:  [12] 12  MAP:  [59 mmHg-96 mmHg] 63 mmHg  Arterial Line BP: ()/(41-72) 85/45  FiO2 (%):  [40 %-50 %] 40 %  SpO2:  [74 %-100 %] 100 %    Intake/Output Summary (Last 24 hours) at 3/6/2019 1353  Last data filed at 3/6/2019 1200  Gross per 24 hour   Intake 3838.03 ml   Output 5115 ml   Net -1276.97 ml     Exam:  GENERAL:  Intubated and sedated   ENT:  Head is normocephalic, atraumatic. .    LUNGS:  on ventilator  CARDIOVASCULAR: ECMO hum  ABDOMEN:  Normal bowel sounds, soft, nontender.  EXT: Extremities warm and without edema. ECMO entrance sites without erythema or disharge.   SKIN:  No acute rashes.    NEUROLOGIC: Sedated         Laboratory Data:   Microbiology:   Culture Micro   Date Value Ref Range Status   03/11/2019 Culture negative < 24 hours, reincubate  Preliminary   03/11/2019 No growth after 14 hours  Preliminary   03/10/2019 Moderate growth  Klebsiella pneumoniae   (A)  Preliminary   03/10/2019 (A)  Preliminary    Moderate growth  Candida albicans / dubliniensis  Candida albicans and Candida dubliniensis are not routinely speciated  Susceptibility testing not routinely done     03/10/2019 Culture in progress  Preliminary   03/10/2019 No growth after 1 day  Preliminary   03/09/2019 (A)  Final    Light growth  Escherichia coli  Susceptibility testing done on previous specimen     03/09/2019 (A)  Final    Light growth  Klebsiella pneumoniae  Susceptibility testing done on previous specimen     03/09/2019 (A)  Final    Light growth  Candida albicans / dubliniensis  Candida albicans and Candida dubliniensis are not routinely speciated  Susceptibility testing not routinely done     03/09/2019 No growth after 2 days  Preliminary   03/08/2019 Light growth  Escherichia coli   (A)  Final   03/08/2019 Light growth  Klebsiella pneumoniae   (A)  Final   03/08/2019 (A)  Final    Moderate growth  Candida albicans / dubliniensis  Candida albicans and Candida dubliniensis are not routinely  speciated  Susceptibility testing not routinely done     03/08/2019 No growth after 3 days  Preliminary   03/07/2019 (A)  Final    Light growth  Escherichia coli  Susceptibility testing done on previous specimen     03/07/2019 (A)  Final    Light growth  Candida albicans / dubliniensis  Candida albicans and Candida dubliniensis are not routinely speciated  Susceptibility testing not routinely done     03/07/2019 No growth after 4 days  Preliminary   03/06/2019 No growth after 5 days  Preliminary   03/06/2019 (A)  Final    Light growth  Escherichia coli  Susceptibility testing done on previous specimen     03/06/2019 (A)  Final    Light growth  Candida albicans / dubliniensis  Candida albicans and Candida dubliniensis are not routinely speciated  Susceptibility testing not routinely done     03/05/2019 Light growth  Escherichia coli   (A)  Final   03/05/2019 Light growth  Strain 2  Escherichia coli   (A)  Final   03/05/2019 (A)  Final    Light growth  Candida albicans / dubliniensis  Candida albicans and Candida dubliniensis are not routinely speciated  Susceptibility testing not routinely done     03/05/2019 Susceptibility testing done on previous specimen  Final   03/05/2019 No growth  Final   03/04/2019 Light growth  Escherichia coli   (A)  Final   03/04/2019 Light growth  Strain 2  Escherichia coli   (A)  Final   03/04/2019 (A)  Final    Light growth  Candida albicans / dubliniensis  Candida albicans and Candida dubliniensis are not routinely speciated  Susceptibility testing not routinely done     03/04/2019 Susceptibility testing done on previous specimen  Final   03/04/2019 No growth  Final   03/03/2019 Moderate growth  Escherichia coli   (A)  Final   03/03/2019 Moderate growth  Strain 2  Escherichia coli   (A)  Final   03/03/2019 (A)  Final    Light growth  Candida albicans / dubliniensis  Candida albicans and Candida dubliniensis are not routinely speciated  Susceptibility testing not routinely done      03/03/2019 No growth  Final   03/03/2019 (A)  Final    Moderate growth  Escherichia coli  Susceptibility testing done on previous specimen     03/03/2019 (A)  Final    Moderate growth  Candida albicans / dubliniensis  Candida albicans and Candida dubliniensis are not routinely speciated  Susceptibility testing not routinely done     03/02/2019 Heavy growth  Escherichia coli   (A)  Final   03/02/2019 (A)  Final    Light growth  Candida albicans / dubliniensis  Candida albicans and Candida dubliniensis are not routinely speciated  Susceptibility testing not routinely done     03/02/2019 No growth  Final   03/01/2019 (A)  Final    Light growth  Candida albicans / dubliniensis  Candida albicans and Candida dubliniensis are not routinely speciated  Susceptibility testing not routinely done     03/01/2019 No growth  Final   02/28/2019 (A)  Final    Cultured on the 1st day of incubation:  Staphylococcus hominis     02/28/2019   Final    Critical Value/Significant Value, preliminary result only, called to and read back by  SURESH DE LEON RN (4E).  03.01.19  0518 GJS     02/28/2019   Final    (Note)  POSITIVE for Staphylococci other than S.aureus, S.epidermidis and  S.lugdunensis, by Tripwareigene multiplex nucleic acid test.  Coagulase-negative staphylococci are the most common venipuncture or  collection associated skin CONTAMINANTS grown in blood cultures.  Final identification and antimicrobial susceptibility testing will be  verified by standard methods.    Specimen tested with Verigene multiplex, gram-positive blood culture  nucleic acid test for the following targets: Staph aureus, Staph  epidermidis, Staph lugdunensis, other Staph species, Enterococcus  faecalis, Enterococcus faecium, Streptococcus species, S. agalactiae,  S. anginosus grp., S. pneumoniae, S. pyogenes, Listeria sp., mecA  (methicillin resistance) and Marlon/B (vancomycin resistance).    Critical Value/Significant Value called to and read back by  ra rodriguez rn @0809 3/1/19. ct     02/27/2019 No growth  Final   02/26/2019 No growth  Final   02/26/2019 No growth  Final   02/25/2019 No growth  Final   02/25/2019 No growth  Final   02/24/2019 Canceled, Test credited  Final   02/24/2019 Cancelled by lab - Specimen never received.  Final   02/24/2019 Canceled, Test credited  Final   02/24/2019 Cancelled by lab - Specimen never received.  Final   02/24/2019 Light growth  Normal marianna    Final   02/24/2019 No growth  Final   02/23/2019 Light growth  Normal marianna    Final     Other Laboratory Data:  Urine Studies    Recent Labs   Lab Test 02/23/19  0802   LEUKEST Negative   WBCU 7*       Inflammatory Markers    Recent Labs   Lab Test 03/11/19  0401 03/10/19  0403 03/09/19  0400 03/08/19  0355 03/07/19  0416 03/06/19  0414 03/05/19  0351 03/04/19  0357   SED 80* 79* 77* 80* 83* 104* 93* 97*   .0* 170.0* 260.0* 240.0* 250.0* 320.0* 300.0* 340.0*       Hematology Studies    Recent Labs   Lab Test 03/11/19  1600 03/11/19  0957 03/11/19  0401 03/10/19  2144 03/10/19  1600 03/10/19  0956   WBC 23.2* 19.9* 23.7* 26.0* 24.6* 24.6*   ANEU 17.1* 12.7* 19.6* 17.6* 18.6* 17.4*   AEOS 0.6 0.0 0.2 0.0 0.0 0.0   HGB 9.4* 9.4* 9.2* 8.8* 9.0* 8.8*   MCV 97 98 95 95 95 95    264 272 272 241 239     Metabolic Studies     Recent Labs   Lab Test 03/11/19  1600 03/11/19  0957 03/11/19  0401 03/10/19  2144 03/10/19  1600    137 136 135 135   POTASSIUM 4.2 3.4 4.0 4.1 4.1   CHLORIDE 101 99 100 99 101   CO2 19* 22 21 23 23   BUN 61* 60* 60* 63* 62*   CR 1.87* 2.02* 2.07* 2.09* 1.98*   GFRESTIMATED 44* 40* 39* 38* 41*       Hepatic Studies    Recent Labs   Lab Test 03/11/19  1600 03/11/19  0957 03/11/19  0401 03/10/19  2144 03/10/19  1600 03/10/19  0956   BILITOTAL 8.7* 9.9* 11.5* 12.5* 13.0* 12.2*   ALKPHOS 147 143 134 124 122 120   ALBUMIN 2.1* 2.1* 2.1* 2.1* 2.0* 1.9*   * 297* 291* 271* 270* 243*   * 178* 164* 144* 133* 115*     Vancomycin Levels     Recent Labs   Lab Test 03/10/19  1653 02/26/19  2155 02/25/19  0605   VANCOMYCIN 21.3 24.5 26.3*     Imaging:  3/11/19 CXR:  IMPRESSION:   Impression:   1. Support devices in stable position  2. Patchy perihilar pulmonary edema is unchanged.Impression:

## 2019-03-12 NOTE — PROGRESS NOTES
ECMO Shift Summary:    Patient remains on VAV ECMO, all equipment is functioning and alarms are appropriately set. RPM's 3500 with flow range 4.85 - 5.06 L/min. Sweep gas is at 6 LPM and FiO2 80%. Circuit remains free of air, clot and fibrin. Cannulas are secure with no bleeding from site. Extremities are warm. Suctioned ETT for moderate amount of thick tan, paul secretions.    Significant Shift Events: Pt MAP drop and start chugging with turn.    Vent settings:  Ventilation Mode: CMV/AC  (Continuous Mandatory Ventilation/ Assist Control)  FiO2 (%): 60 %  Rate Set (breaths/minute): 12 breaths/min  Tidal Volume Set (mL): 530 mL  PEEP (cm H2O): 10 cmH2O  Oxygen Concentration (%): 60 %  Resp: 14  .    Heparin is off. ACT range 144-156.    Urine output is as charted, blood loss through OG, mouth and stool . Product given included 4 units of PRBCs, 500cc of albumin and 2 units of Platelets.       Intake/Output Summary (Last 24 hours) at 3/12/2019 1804  Last data filed at 3/12/2019 1800  Gross per 24 hour   Intake 48688.25 ml   Output 5122 ml   Net 5811.25 ml       ECHO:  No results found for this or any previous visit.No results found for this or any previous visit.    CXR:  Recent Results (from the past 24 hour(s))   XR Abdomen Port 1 View    Narrative    Exam: XR ABDOMEN PORT 1 VW, 3/12/2019 1:51 AM    Indication: abdominal distention, ileus? pneumatosis intestinalis from  gut ischemia?    Comparison: 3/6/2019    Findings:   Supine views of the abdomen. Inferior approach ECMO cannulas in place.  Enteric tubes projecting over the stomach with one terminating within  the stomach the other terminating in the second part of the duodenum.  Prominent gas-filled transverse colon. No portal venous gas. No  pneumatosis.      Impression    Impression: Prominent gas-filled transverse colon without evidence of  pneumatosis.    I have personally reviewed the examination and initial interpretation  and I agree with the  findings.    ROSELYN CASH MD       Labs:  Recent Labs   Lab 03/12/19  1550 03/12/19  1405 03/12/19  1152 03/12/19  0951   PH 7.28* 7.29* 7.34* 7.33*   PCO2 32* 30* 33* 33*   PO2 103 178* 137* 163*   HCO3 15* 15* 18* 17*   O2PER 60 60 60 60       Lab Results   Component Value Date    HGB 9.8 (L) 03/12/2019    PHGB 60 (H) 03/11/2019     (L) 03/12/2019    FIBR 259 03/12/2019    INR 1.92 (H) 03/12/2019    PTT 43 (H) 03/12/2019    DD >20.0 (H) 03/05/2019    AXA <0.10 03/12/2019    ANTCH 69 (L) 03/11/2019         Plan is remain on VAV ECMO      Sylvia Clemens, RRT  3/12/2019 6:04 PM

## 2019-03-13 NOTE — PROGRESS NOTES
"GASTROENTEROLOGY PROGRESS NOTE    ASSESSMENT:  39 year old male with a history of hyperlipidemia who had a witnessed cardiac arrest (2/2 refracory VF) on 2/23/19 ICU and found to have x3 vessel CAD, s/p peripheral VA ECMO, and CRRT for FERNANDA s/t cardiogenic shock, acute hypoxic respiratory failure d/t pulm edema (currently intubated), acute liver injury s/t cardiogenic, normocytic anemia, who developed hematochezia 3/11/19 associated with HD instability concerning for hemorrhagic shock, required 8 unit(s) pRBC and 1 unit(s) FFP. GI is consulted for acute GIB. Brisk bleeding started 3/11 overnight, was brisk, gradually slowed down. Also noted blood coming out of OG and mouth.     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     RECOMMENDATIONS:  - Continue supportive care   - Continue IV PPI BID  - vascular surgery consult     - No further recommendation from GI team at this time. We will sign off. Please page us if any questions.     Gastroenterology Inpatient Sign Off Note    Inpatient GI consults service will sign off. No further recommendations at this time. If primary team has addition questions, please page consult fellow listed in Amion.    Current GI Consult Staff: Dr. Boyd     Follow up recommendations:   No outpatient GI clinic follow-up indicated. Follow-up with primary care provider at timing determined by discharge physician.    The patient was discussed and plan agreed upon with GI staff, Dr. Boyd.     Tay Bacon  GI Fellow  Pager   ______________________________________________________________  S: patient continue to bleed overnight, had above 700-800 hematochezia. Had 3 uPRBC transfusion overnight.     O:  Temperature 97.9  F (36.6  C), resp. rate 13, height 1.68 m (5' 6.14\"), weight 95.8 kg (211 lb 3.2 oz), SpO2 98 %.    Constitutional: intubated and sedated   HEENT: blood in mouth, " OG suction.   CV: on ECMO  Respiratory: mechanical ventilation    Lymph: No axillary, submandibular, supraclavicular or inguinal lymphadenopathy  Abd: soft, distended, hypoactive BS.   Skin: warm, perfused, no jaundice  Neuro: sedated   Rectal: attached to rectal tube, noted dark maroon stool in bag        LABS:  BMP  Recent Labs   Lab 03/13/19  1633 03/13/19  1029 03/13/19  0956 03/13/19  0358  03/12/19  2240    137  --  140  --  138   POTASSIUM 5.2 4.9  --  4.6  --  5.3   CHLORIDE 100 102  --  108  --  106   ALEKSANDER 7.7* 8.5  --  7.2*  --  7.9*   CO2 20 19*  --  14*  --  16*   BUN 54* 52*  --  45*  --  51*   CR 1.93* 1.69*  --  1.55*  --  1.70*   *  128* 119* 136* 101*   < > 126*    < > = values in this interval not displayed.     CBC  Recent Labs   Lab 03/13/19  1633 03/13/19  1029 03/13/19  0358 03/12/19  2240   WBC 32.4* 31.6* 27.4* 29.0*   RBC 3.20* 3.77* 3.30* 3.39*   HGB 9.7* 11.4* 9.9* 10.2*   HCT 29.4* 34.0* 30.4* 30.8*   MCV 92 90 92 91   MCH 30.3 30.2 30.0 30.1   MCHC 33.0 33.5 32.6 33.1   RDW 18.0* 17.4* 17.6* 17.3*    134* 117* 143*     INR  Recent Labs   Lab 03/13/19  1719 03/13/19  1029 03/13/19  0358 03/12/19  2240   INR 2.10* 2.05* 2.13* 2.02*     LFTs  Recent Labs   Lab 03/13/19  1633 03/13/19  1029 03/13/19  0358 03/12/19  2240   ALKPHOS 620* 701* 560* 525*   AST 8,672* 13,264* Canceled, Test credited Canceled, Test credited   ALT 3,748* 4,881* 4,353* 4,496*   BILITOTAL 13.6* 14.1* 10.2* 9.9*   PROTTOTAL 5.1* 5.5* 4.9* 5.5*   ALBUMIN 3.0* 3.3* 3.0* 3.2*      PANCNo lab results found in last 7 days.

## 2019-03-13 NOTE — PROGRESS NOTES
ECMO Shift Summary:    Patient remains on VAV ECMO, all equipment is functioning and alarms are appropriately set. RPM's 5344-1474 with flow range 4.5-4.8 L/min.  Flows titrated due to chugging.  Sweep gas is at 6 LPM and FiO2 80%. Circuit remains free of visible air and clot.  Minimal fibrin appreciated on the post side of the oxygenator and at some connectors. Cannulas are secure with no bleeding from site. Extremities are warm to the touch.     Significant Shift Events:   n/a    Vent settings:  VCV 12/530/+10/60%.  BS coarse, unable to clear with suctioning.  Suctioned ETT for small-moderate, pink-bloody secretions.  Continues on 10 ng/kg/min of Flolan.        Heparin is not running, .    Urine output is nill, remains on CRRT but not pulling fluid.  Blood loss was appreciated from pts oral cavity, ETT, and stool. Product given included 2 units of PRBCs to maintain ordered parameters.      Intake/Output Summary (Last 24 hours) at 3/13/2019 0609  Last data filed at 3/13/2019 0600  Gross per 24 hour   Intake 4487.6 ml   Output 2317 ml   Net 2170.6 ml       ECHO:  No results found for this or any previous visit.No results found for this or any previous visit.    CXR:  No results found for this or any previous visit (from the past 24 hour(s)).    Labs:  Recent Labs   Lab 03/13/19  0354 03/13/19  0159 03/13/19  0005 03/12/19  2212   PH 7.29* 7.33* 7.31* 7.31*   PCO2 36 32* 34* 34*   PO2 135* 153* 149* 157*   HCO3 17* 17* 17* 17*   O2PER 60 60 60 60       Lab Results   Component Value Date    HGB 9.9 (L) 03/13/2019    PHGB 130 (H) 03/13/2019     (L) 03/13/2019    FIBR 259 03/12/2019    INR 2.13 (H) 03/13/2019    PTT 45 (H) 03/13/2019    DD >20.0 (H) 03/05/2019    AXA <0.10 03/13/2019    ANTCH 69 (L) 03/11/2019         Plan is to continue VAV ECMO.      Violeta Allan, RRT  3/13/2019 6:09 AM

## 2019-03-13 NOTE — PROGRESS NOTES
I had a conference with the patient's wife, mother and several other family members.    I explained to them the results of the series of CT scans done today. We covered the poor prognosis given multi-organ dysfunction/failure including neurologic, cardiovascular, pulmonary, renal, hepatic systems and the aorto-esophageal fistula. Although he is hemodynamically stable at the moment an aorto-esophageal fistula could result in a catastrophic bleed at any point.    While the family understands the gravity of the situation, they remain hopeful for a miracle. At this point they wish us to continue full support including blood transfusions and to wait for an opinion from vascular surgery regarding the aorto-esophageal fistula. They understand that Mr. Riddle may not a survive a large bleeding event or continued hypotensive/hypoperfusion events; and they would accept that fate as his own body demonstrating an inability to survive.    Dr. Humphreys has been updated.    Jersey Alexander MD  Cardiovascular Medicine Fellow, PGY-7  [phone]           Oleksandr Humphreys MD  Cardiology-[facility]  [phone]

## 2019-03-13 NOTE — PROGRESS NOTES
[facility] ID Service: Follow Up Note      Patient:  Hellen Riddle, Date of birth 1980, Medical record number [id number]           Assessment and Recommendations:   Problem List:  # Aorto-esophageal fistula diagnosed 3/13. Not a candidate for any type of repair  # Hypoxic respiratory failure / ARDS / pulm edema - ECMO ongoing  # Lactic acidosis  # Leukocytosis  # Influenza A infection   # E. coli pneumonia    # Candida in sputum (oropharyngeal colonization)  # S/P out of hospital cardiac arrest    Discussion:  38 yo male admitted 2/23/19 with refractory VF OHCA s/p [facility] ECMO on admission, found to have 3v CAD s/p intervention. ID consulted 3/4/19 for increasing leukocytosis, hypoxia, lactic acidosis. He was positive for Influenza A, which could certainly have explained the worsening hypoxia, lactic acidosis at time of consult. He is also growing E. Coli from the sputum, one strain of which is resistant to pip-tazo (which he was receiving at the time of culture) and we are treating him for bacterial pneumonia as well with IV ertapenem. He had been stable, but then developed GI bleeding and now unfortunately has imaging consistent with an aorto-esophageal fistula. He is not a surgical candidate. Ongoing discussions regarding goals of care.     Recommendations:  - Continue ertapenem   - No clear indication for metronidazole. Ertapenem has excellent anaerobic coverage. Consider discontinuing.   - OK to stop oseltamivir at any time    It has been a pleasure to be involved with this patient's care. We will sign off for now, but please feel free to call with additional questions.     Johanna Silva MD  Infectious Diseases  [phone]           Interval History:   Ongoing elevated lactate, leukocytosis, GI bleeding. Now with aorto-esophageal fistula causing ongoing GI bleed. No new positive cultures         Current Antimicrobials   Ertapenem 3/7-current  Oseltamivir 3/4-current  Metronidazole  3/11-present    Prior antibiotics:  Meropenem 3/5-3/7  Micafungin 3/5-3/6  Pip/tazo 2/23-3/4  IV Vancomycin 2/23-3/6, 3/8-3/11         Physical Exam:   Ranges for vital signs:  Temp:  [97.9  F (36.6  C)-98.6  F (37  C)] 98.2  F (36.8  C)  Heart Rate:  [70-89] 77  Resp:  [12-16] 12  MAP:  [46 mmHg-295 mmHg] 73 mmHg  Arterial Line BP: ()/() 90/58  FiO2 (%):  [60 %] 60 %  SpO2:  [87 %-100 %] 95 %    Intake/Output Summary (Last 24 hours) at 3/6/2019 1353  Last data filed at 3/6/2019 1200  Gross per 24 hour   Intake 3838.03 ml   Output 5115 ml   Net -1276.97 ml     Exam:  GENERAL:  Intubated and sedated   ENT:  Head is normocephalic, atraumatic. .    LUNGS:  on ventilator  CARDIOVASCULAR: ECMO hum  ABDOMEN:  Normal bowel sounds, soft, nontender. Dark red stool in rectal tube.   EXT: Extremities warm and without edema. ECMO entrance sites without erythema or disharge.   SKIN:  No acute rashes.    NEUROLOGIC: Sedated         Laboratory Data:   Microbiology:   Culture Micro   Date Value Ref Range Status   03/13/2019 No growth after 9 hours  Preliminary   03/13/2019 Culture in progress  Preliminary   03/12/2019 Moderate growth  Yeast   (A)  Preliminary   03/12/2019 Light growth  Probable  Klebsiella pneumoniae   (A)  Preliminary   03/12/2019 Culture in progress  Preliminary   03/12/2019 No growth after 1 day  Preliminary   03/11/2019 (A)  Final    Light growth  Escherichia coli  Susceptibility testing done on previous specimen     03/11/2019 (A)  Final    Light growth  Klebsiella pneumoniae  Susceptibility testing done on previous specimen     03/11/2019 (A)  Final    Moderate growth  Candida albicans / dubliniensis  Candida albicans and Candida dubliniensis are not routinely speciated  Susceptibility testing not routinely done     03/11/2019 No growth after 2 days  Preliminary   03/10/2019 (A)  Final    Moderate growth  Klebsiella pneumoniae  Susceptibility testing done on previous specimen     03/10/2019 (A)   Final    Moderate growth  Candida albicans / dubliniensis  Candida albicans and Candida dubliniensis are not routinely speciated  Susceptibility testing not routinely done     03/10/2019 No growth after 3 days  Preliminary   03/09/2019 (A)  Final    Light growth  Escherichia coli  Susceptibility testing done on previous specimen     03/09/2019 (A)  Final    Light growth  Klebsiella pneumoniae  Susceptibility testing done on previous specimen     03/09/2019 (A)  Final    Light growth  Candida albicans / dubliniensis  Candida albicans and Candida dubliniensis are not routinely speciated  Susceptibility testing not routinely done     03/09/2019 No growth after 4 days  Preliminary   03/08/2019 Light growth  Escherichia coli   (A)  Final   03/08/2019 Light growth  Klebsiella pneumoniae   (A)  Final   03/08/2019 (A)  Final    Moderate growth  Candida albicans / dubliniensis  Candida albicans and Candida dubliniensis are not routinely speciated  Susceptibility testing not routinely done     03/08/2019 No growth after 5 days  Preliminary   03/07/2019 (A)  Final    Light growth  Escherichia coli  Susceptibility testing done on previous specimen     03/07/2019 (A)  Final    Light growth  Candida albicans / dubliniensis  Candida albicans and Candida dubliniensis are not routinely speciated  Susceptibility testing not routinely done     03/07/2019 No growth  Final   03/06/2019 No growth  Final   03/06/2019 (A)  Final    Light growth  Escherichia coli  Susceptibility testing done on previous specimen     03/06/2019 (A)  Final    Light growth  Candida albicans / dubliniensis  Candida albicans and Candida dubliniensis are not routinely speciated  Susceptibility testing not routinely done     03/05/2019 Light growth  Escherichia coli   (A)  Final   03/05/2019 Light growth  Strain 2  Escherichia coli   (A)  Final   03/05/2019 (A)  Final    Light growth  Candida albicans / dubliniensis  Candida albicans and Candida dubliniensis are  not routinely speciated  Susceptibility testing not routinely done     03/05/2019 Susceptibility testing done on previous specimen  Final   03/05/2019 No growth  Final   03/04/2019 Light growth  Escherichia coli   (A)  Final   03/04/2019 Light growth  Strain 2  Escherichia coli   (A)  Final   03/04/2019 (A)  Final    Light growth  Candida albicans / dubliniensis  Candida albicans and Candida dubliniensis are not routinely speciated  Susceptibility testing not routinely done     03/04/2019 Susceptibility testing done on previous specimen  Final   03/04/2019 No growth  Final   03/03/2019 Moderate growth  Escherichia coli   (A)  Final   03/03/2019 Moderate growth  Strain 2  Escherichia coli   (A)  Final   03/03/2019 (A)  Final    Light growth  Candida albicans / dubliniensis  Candida albicans and Candida dubliniensis are not routinely speciated  Susceptibility testing not routinely done     03/03/2019 No growth  Final   03/03/2019 (A)  Final    Moderate growth  Escherichia coli  Susceptibility testing done on previous specimen     03/03/2019 (A)  Final    Moderate growth  Candida albicans / dubliniensis  Candida albicans and Candida dubliniensis are not routinely speciated  Susceptibility testing not routinely done     03/02/2019 Heavy growth  Escherichia coli   (A)  Final   03/02/2019 (A)  Final    Light growth  Candida albicans / dubliniensis  Candida albicans and Candida dubliniensis are not routinely speciated  Susceptibility testing not routinely done     03/02/2019 No growth  Final   03/01/2019 (A)  Final    Light growth  Candida albicans / dubliniensis  Candida albicans and Candida dubliniensis are not routinely speciated  Susceptibility testing not routinely done     03/01/2019 No growth  Final   02/28/2019 (A)  Final    Cultured on the 1st day of incubation:  Staphylococcus hominis     02/28/2019   Final    Critical Value/Significant Value, preliminary result only, called to and read back by  SURESH DE LEON  RN (4E).  03.01.19  0518 GJS     02/28/2019   Final    (Note)  POSITIVE for Staphylococci other than S.aureus, S.epidermidis and  S.lugdunensis, by Mercauxigene multiplex nucleic acid test.  Coagulase-negative staphylococci are the most common venipuncture or  collection associated skin CONTAMINANTS grown in blood cultures.  Final identification and antimicrobial susceptibility testing will be  verified by standard methods.    Specimen tested with Verigene multiplex, gram-positive blood culture  nucleic acid test for the following targets: Staph aureus, Staph  epidermidis, Staph lugdunensis, other Staph species, Enterococcus  faecalis, Enterococcus faecium, Streptococcus species, S. agalactiae,  S. anginosus grp., S. pneumoniae, S. pyogenes, Listeria sp., mecA  (methicillin resistance) and Marlon/B (vancomycin resistance).    Critical Value/Significant Value called to and read back by ra rodriguez rn @0809 3/1/19. ct     02/27/2019 No growth  Final   02/26/2019 No growth  Final   02/26/2019 No growth  Final   02/25/2019 No growth  Final   02/25/2019 No growth  Final   02/24/2019 Canceled, Test credited  Final   02/24/2019 Cancelled by lab - Specimen never received.  Final   02/24/2019 Canceled, Test credited  Final   02/24/2019 Cancelled by lab - Specimen never received.  Final   02/24/2019 Light growth  Normal marianna    Final   02/24/2019 No growth  Final   02/23/2019 Light growth  Normal marianna    Final     Other Laboratory Data:  Urine Studies    Recent Labs   Lab Test 02/23/19  0802   LEUKEST Negative   WBCU 7*       Inflammatory Markers    Recent Labs   Lab Test 03/13/19  0358 03/11/19  2200 03/11/19  0401 03/10/19  0403 03/09/19  0400 03/08/19  0355 03/07/19  0416 03/06/19  0414   SED 6 36* 80* 79* 77* 80* 83* 104*   CRP 61.0* 57.0* 110.0* 170.0* 260.0* 240.0* 250.0* 320.0*       Hematology Studies    Recent Labs   Lab Test 03/13/19  1029 03/13/19  0358 03/12/19  2240 03/12/19  1550 03/12/19  0951 03/12/19  0327  03/11/19  2200   WBC 31.6* 27.4* 29.0* 29.8* 27.4* 29.0* 21.3*   ANEU 29.0* 23.4* 23.9* 24.2* 20.8*  --  15.4*   AEOS 0.0 0.0 0.3 0.0 0.0  --  0.4   HGB 11.4* 9.9* 10.2* 9.8* 8.7* 7.4* 8.5*   MCV 90 92 91 92 93 94 98   * 117* 143* 124* 110* 139* 231     Metabolic Studies     Recent Labs   Lab Test 03/13/19  1029 03/13/19  0358 03/12/19  2240 03/12/19  1550 03/12/19  0951    140 138 137 139   POTASSIUM 4.9 4.6 5.3 5.7* 5.1   CHLORIDE 102 108 106 104 103   CO2 19* 14* 16* 16* 17*   BUN 52* 45* 51* 50* 48*   CR 1.69* 1.55* 1.70* 1.72* 1.74*   GFRESTIMATED 49* 55* 49* 48* 48*       Hepatic Studies    Recent Labs   Lab Test 03/13/19  1029 03/13/19  0358 03/12/19  2240 03/12/19  1550 03/12/19  0951 03/12/19  0325   BILITOTAL 14.1* 10.2* 9.9* 8.7* 8.8* 7.3*   ALKPHOS 701* 560* 525* 389* 198* 74   ALBUMIN 3.3* 3.0* 3.2* 3.6 4.1 3.9   AST 13,264* Canceled, Test credited Canceled, Test credited 12,268* 7,064* 478*   ALT 4,881* 4,353* 4,496* 4,024* 2,372* 250*     Vancomycin Levels    Recent Labs   Lab Test 03/10/19  1653 02/26/19  2155 02/25/19  0605   VANCOMYCIN 21.3 24.5 26.3*     Results for orders placed or performed during the hospital encounter of 02/23/19   CT Head w/o Contrast    Narrative    CT HEAD W/O CONTRAST 2/23/2019 4:36 AM    Provided History: Intraop and postop complications of circulatory  system    Comparison: None available.    Technique: Using multidetector thin collimation helical acquisition  technique, axial, coronal and sagittal CT images from the skull base  to the vertex were obtained without intravenous contrast.     Findings:    Diffuse increased density of the vascular structures consistent with  recent catheter angiography. No intracranial hemorrhage, mass effect,  or midline shift. Mild cerebral volume loss. The ventricles are  proportionate to the cerebral sulci. The gray to white matter  differentiation of the cerebral hemispheres is preserved. The basal  cisterns are patent.  There is opacification of the cerebral arteries.    Mild paranasal sinus mucosal thickening. The mastoid air cells are  hypoplastic.      Impression    Impression:   No acute intracranial pathology.    I have personally reviewed the examination and initial interpretation  and I agree with the findings.    MONIKA VALENTIN MD   CT Chest Abdomen Pelvis w/o Contrast    Narrative    Exam: CT CHEST ABDOMEN PELVIS W/O CONTRAST, 2/23/2019 4:42 AM    Indication: Intraop and postop complications of circulatory system;  ecmo    Comparison: None available    Findings:   Lines and tubes: Inferior approach ECMO catheter terminating in the  low SVC. Endotracheal tube terminating in the mid thoracic trachea.  Enteric tube with tip and sidehole in the gastric body. Coronary  stents in the RCA and mid circumflex. Right inferior approach arterial  catheter terminating in the distal aorta proximal external iliac  artery. Left-sided inferior approach intra-aortic balloon pump,  terminating at the distal aortic arch, immediately distal to the left  subclavian origin. Left lower extremity central venous catheter  terminating in the high IVC. Pisano balloon in the urinary bladder.    Chest: Thyroid gland is unremarkable. No supraclavicular or axillary  lymphadenopathy. No mediastinal lymphadenopathy. Heart size is  enlarged. No pericardial effusion. Ascending aorta measures 3.7 cm  maximal diameter. Main pulmonary artery caliber within normal limits.  Visualized portions of the aortic arch branching vessels are  unremarkable. Esophagus is unremarkable.    Trachea and bronchi are patent and clear of debris. There are are  significant confluent consolidations through the posterior lungs with  air bronchograms. The aerated portions of the anterior lung  demonstrate diffuse interlobular septal thickening with scattered  areas of groundglass opacity. No pneumothorax. No pleural effusion.    Abdomen and pelvis: Diffuse hepatic steatosis. The  spleen, adrenal  glands, and gallbladder are unremarkable. Mild fatty replacement of  pancreas. The kidneys are unremarkable with a amount of contrast  within the bilateral collecting systems. Hypoattenuating Small amount  of contrast within the urinary bladder. Stomach is markedly distended  with gastric contents. The small intestine and colon are unremarkable.  Appendix is visualized and normal in caliber. No suspicious abdominal  or pelvic lymphadenopathy. No free fluid. No pneumoperitoneum.    Abdominal aorta is normal in caliber. Inflated intra-aortic balloon  pump in the thoracoabdominal aorta.     Bones and soft tissues: Bilateral nondisplaced manubrial fractures.  Mild degenerative changes of the spine. Nondisplaced rib fracture of  the left anterior and posterior fourth rib, left anterior fifth and  sixth ribs rib, right anterior fourth and sixth rib. Trace amount of  air in the right inguinal canal and left groin. Ovoid mass in the left  inguinal canal, possibly partially retracted testicle.       Impression    IMPRESSION:   1. Extensive confluent opacities in the bilateral posterior lungs,  likely atelectasis. Intralobular septal thickening and groundglass  opacities throughout the aerated portions of the lung, alveolar  hemorrhage versus pulmonary edema.   2. Lines and tubes as described in findings. Note the right-sided  arterial cannula entry point may be above the inguinal ligament which  may put the patient at risk for retroperitoneal bleed at the time of  catheter removal.    3. Bilateral nondisplaced rib fractures.  4. 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    I have personally reviewed the examination and initial interpretation  and I agree with the findings.    AYSE LEROY MD   XR Chest Port 1 View    Narrative    Exam: XR CHEST PORT 1 VW, 2/23/2019 6:50 AM    Indication: arrest    Comparison: Same day CT chest abdomen pelvis.    Findings: Single AP chest radiograph. Endotracheal tube  terminating  approximately 5.9 cm above the tereso. Enteric tube projecting over  the esophagus, tip not visualized. Intra-aortic balloon pump marker 3  cm above the tereso.  Inferior approach ECMO catheter terminating at  the midsuperior vena cava. Catheter visualized terminating over the  inferior cavoatrial junction. Cardiomediastinal silhouette is  completely obscured. Diffuse opacification of the bilateral lungs.  Nondisplaced rib fractures are not well appreciated on radiograph.      Impression    Impression:   1. Intra-aortic balloon pump 3 cm above the tereso. Inferior ECMO  cannula terminating at the midsuperior vena cava. Enteric tube in the  mid thoracic trachea.  2. Near complete opacification of the bilateral lungs, correspond with  confluent opacities seen on same-day CT and likely  pulmonary edema.    I have personally reviewed the examination and initial interpretation  and I agree with the findings.    AYSE LEROY MD   XR Chest Port 1 View    Narrative    Exam: XR CHEST PORT 1 VW, 2/24/2019 2:55 AM    Indication: arrest    Comparison: 2/23/2019    Findings: Single AP chest radiograph. ET tube projects approximately 3  cm above the tereso. Enteric tube projects over the esophagus, tip not  visualized. ECMO catheter in unchanged position. Intra-aortic balloon  pump marker in stable position, projecting at the level of the tereso.    Cardiomediastinal silhouette is obscured by diffuse alveolar airspace  opacities. Slight aeration of the upper left lung, not significantly  changed from prior. No appreciable pneumothorax.      Impression    Impression:   1. Endotracheal tube approximately 3 cm above the tereso. Additional  support devices in stable position.  2. Diffuse bilateral alveolar airspace opacities, right worse than  left but overall stable.    I have personally reviewed the examination and initial interpretation  and I agree with the findings.    MIKKI DAI MD    Lower Extremity  Arterial Duplex Bilateral    Narrative    Exam: Duplex ultrasound of bilateral lower extremity arteries dated  2/23/2019 9:21 AM     Clinical information: Cardiac Arrest     Comparison: None    Technique: Includes Gray Scale images, color Doppler, spectral Doppler  waveforms and velocities with appropriate angles of 60 degrees or  less.    Findings: ECMO in right groin, balloon pump in left groin    Right lower extremity:     Mid SFA: 24 cm/sec.  Distal SFA: 16 cm/sec.  Popliteal artery: 10 cm/sec.  PTA ankle: 6 cm/sec.  MAILE ankle: 5 cm/sec.    Waveforms: Monophasic tardus parvus waveforms throughout    Left lower extremity:    Proximal SFA: 87 cm/sec.  Mid SFA: 57 cm/sec.  Distal SFA: 71 cm/sec.  Popliteal artery: Unable to evaluate secondary to cooling device  PTA ankle: 26 cm/sec.  MAILE ankle: 31 cm/sec.    Waveforms: Triphasic waveforms throughout with blunted upstroke      Impression    Impression:     1. Right leg: Monophasic tardus parvus waveforms throughout, likely  secondary to right femoral ECMO.  2. Left leg: Triphasic waveforms throughout with blunted upstroke,  likely secondary to IABP.     Guidelines:  [facility] duplex criteria for lower limb arterial  occlusive disease  -Percent stenosis- Normal (1-19%): Peak systolic velocity (cm/s):  <150, End-diastolic velocity (cm/s): <40, Velocity ration (Vr): <1.5,  Distal arterial waveform: Triphasic  -Percent stenosis- 20-49%: Peak systolic velocity (cm/s): 150-200,  End-diastolic velocity (cm/s): <40, Velocity ration (Vr): 1.5-2.0,  Distal arterial waveform: Triphasic  -Percent stenosis- 50-75%: Peak systolic velocity (cm/s): 200-300,  End-diastolic velocity (cm/s): <90, Velocity ration (Vr): 2.0-3.9,  Distal arterial waveform: Poststenotic turbulence distal to stenosis,  monophasic distal waveform  -Percent stenosis- >75%: Peak systolic velocity (cm/s): >300,  End-diastolic velocity (cm/s): <90, Velocity ration (Vr): >4.0, Distal  arterial  waveform: Dampened distal waveform and low PSV/EDV* in the  stenosis  -Percent stenosis- Occlusion: Absent flow by color Doppler/pulsed  Doppler spectral analysis; length of occlusion estimated from distance  between exit and reentry collateral arteries  *PSV = peak systolic velocity, EDV = end-diastolic velocity  http://link.[employer].com/chapter/[email or web address]/fulltext  html    I have personally reviewed the examination and initial interpretation  and I agree with the findings.    AYSE LEROY MD   XR Chest Port 1 View    Narrative    Exam: XR CHEST PORT 1 VW, 2/23/2019 2:32 PM    Indication: ETT repositioned    Comparison: Radiograph on 12/23/2019 at 0627    Findings:   Supine frontal view of chest.  The tip of ETT projects 4 cm above the tereso. Unchanged position of  ECMO cannula. An enteric tube courses below the left hemidiaphragm and  out of field of view. The metallic marker of IABP unchanged in  position.    Interval moderate improvement in aeration of the left lung. No  significant change in diffuse airspace opacity of about the right  lung. No appreciable pneumothorax.      Impression    Impression:   1. The tip of ETT projects 4 cm above the tereso.  2. Interval moderate improvement of left lung aeration.  3. No significant change in diffuse right lung airspace opacities.    I have personally reviewed the examination and initial interpretation  and I agree with the findings.    MIKKI DAI MD   XR Abdomen Port 1 View    Narrative    Abdominal radiograph one view  2/23/2019 2:35 PM      HISTORY: Evaluate for ischemic bowel    COMPARISON: CT earlier today    FINDINGS: Gastric tube tip and sidehole project over the stomach. Left  femoral line at the atriocaval junction. Right femoral ECMO partially  seen. Nonobstructive bowel gas pattern with no pneumatosis or portal  venous gas. Persistent bilateral nephrogram, right greater than left  from recent CT of the brain with contrast.       Impression    IMPRESSION: Nonobstructive bowel gas pattern with no pneumatosis or  portal venous gas.    I have personally reviewed the examination and initial interpretation  and I agree with the findings.    MIKKI DAI MD   XR Chest Port 1 View    Narrative    Exam: XR CHEST PORT 1 , 2/25/2019 1:45 AM    Indication: arrest    Comparison: Chest radiograph dated 2/24/2019.    Findings:   Single AP view of the chest. Endotracheal tube in place with the tip  projecting at the level of the lower thoracic trachea. Enteric tube in  place the distal end extending beyond the field-of-view. ECMO cannula  stable in position. Intra-aortic balloon pump in place with the marker  stable in position near the level of the tereso. Additional lines and  hardware presumed exterior to the patient.    Worsening bilateral airspace opacities compared to prior exam. There  is now near complete consolidation of the right hemithorax and the  left lower lung. Cardiac silhouette not well visualized.      Impression    Impression:   1. Worsening airspace opacities with near complete consolidation of  the right hemithorax and the left lower lung.  2. Stable support devices as detailed above.    I have personally reviewed the examination and initial interpretation  and I agree with the findings.    ROSELYN CASH MD   XR Chest Port 1 View    Narrative    Exam: XR CHEST PORT 1 , 2/26/2019 1:46 AM    Indication: arrest    Comparison: Chest radiograph dated 2/25/2019.    Findings:   Single AP view of the chest. Endotracheal tube in place with the tip  projecting at the level of the lower thoracic trachea. ECMO cannula in  place with the tip projected at approximately the expected location of  the right atrium. Intra-aortic balloon pump with the marker projecting  at the level of the tereso. Extrathoracic defibrillator pad additional  lines presumed exterior to the patient.    Worsening airspace opacities with complete opacification of  both lungs  with air bronchograms. The cardiac silhouette is obscured. Visualized  portion of the abdomen is unremarkable.      Impression    Impression:   1. Worsening airspace opacities with complete opacification of both  lungs.  2. Support devices as detailed above.    I have personally reviewed the examination and initial interpretation  and I agree with the findings.    ROSELYN CASH MD   CT Head w/o Contrast    Narrative    CT HEAD W/O CONTRAST 2/25/2019 10:25 AM    Provided History: Neuro deficit(s), subacute  ICD-10:    Comparison: 2/23/2019.    Technique: Using multidetector thin collimation helical acquisition  technique, axial, coronal and sagittal CT images from the skull base  to the vertex were obtained without intravenous contrast.     Findings:    No intracranial hemorrhage, mass effect, or midline shift. The  ventricles are proportionate to the cerebral sulci. Cerebral atrophy.  The gray to white matter differentiation of the cerebral hemispheres  is preserved. The basal cisterns are patent. Mild bilateral maxillary  mucosal thickening.    The visualized paranasal sinuses are clear. The mastoid air cells are  clear.       Impression    Impression: No acute intracranial pathology. No definite hypoxic  ischemic injury. Repeat CT could be considered, as clinically  indicated.    I have personally reviewed the examination and initial interpretation  and I agree with the findings.    JOEL GARNETT MD   XR Abdomen Port 1 View    Narrative    EXAM: XR ABDOMEN PORT 1 VW  2/25/2019 3:59 PM      HISTORY: TF placement    COMPARISON: 2/23/2019    FINDINGS: Single supine radiograph of the abdomen. Feeding tube tip  projects over the expected location of the fourth portion of the  duodenum. Multiple cardiac leads visualized. Gastric tube tip and  sidehole project over the expected location of the stomach. Right  femoral ECMO tip terminating at the mid superior vena cava. Inferior  intra-aortic balloon pump in  place with marker in stable position.  Left femoral approach venous catheter in stable position. Additional  lines and hardware presumed exterior to the patient.  Partially  visualized opacification and air bronchograms in the lung bases.   Cutaneous pacer pads.    Paucity of air filled bowel gas pattern. No pneumatosis. No portal  venous gas.       Impression    IMPRESSION:  1. Feeding tube tip projects over the expected location of the fourth  portion of the duodenum.  2. Stable support device positioning.  3. Paucity of air filled bowel.  4.  Partially visualized opacification of lungs.    I have personally reviewed the examination and initial interpretation  and I agree with the findings.    LANDRY YI MD   XR Chest Port 1 View    Narrative    Exam: XR CHEST PORT 1 VW, 2/27/2019 1:55 AM    Indication: arrest    Comparison: Chest radiograph dated 2/26/2018.    Findings:   Single AP view of the chest. Endotracheal tube in place with the tip  in the mid thoracic trachea. Enteric tubes in place with the distal  end extending beyond the field-of-view. The gastric tube has a loop in  the stomach. Intra-aortic balloon pump marker at the level of the  tereso. Defibrillator pad has been removed.    Near complete opacification of both lungs with air bronchograms  similar to prior exam. Cardiac silhouette is obscured.      Impression    Impression:   1. Unchanged near complete opacification of the lungs with air  bronchograms.  2. Support devices as detailed above.    I have personally reviewed the examination and initial interpretation  and I agree with the findings.    ROSELYN CSAH MD   XR Chest Port 1 View    Narrative    Exam: XR CHEST PORT 1 VW, 2/28/2019 1:40 AM    Indication: arrest    Comparison: Radiograph on 2/27/2019    Findings:   AP view of the chest. Endotracheal tube with the distal end in the  lower thoracic trachea. Intra-aortic balloon pump marker at the level  of the tereso. Enteric tubes extending  beyond the field-of-view. ECMO  cannula tip projecting over the right atrium.    Near complete opacification of both lungs similar to prior exam.  Cardiac silhouette remains obscured. No pneumothorax. Visualized  portion of the upper abdomen is unremarkable.      Impression    Impression:   1. Unchanged near complete opacification of both lungs similar to  prior exam. Likely edema and/or hemorrhage.  2. Support devices as detailed above.    I have personally reviewed the examination and initial interpretation  and I agree with the findings.    ROSELYN CASH MD   XR Chest Port 1 View    Narrative    Exam: XR CHEST PORT 1 , 2/27/2019 5:20 PM    Indication: hypoxia, lines    Comparison: 2/27/2019    Findings: AP chest radiograph. Endotracheal tube is 2.9 cm above the  tereso. Enteric and nasogastric tube projecting over the esophagus,  tips not visualized and field-of-view. Inferior approach ECMO cannula  terminating over the inferior cavoatrial junction. Intra-aortic  balloon pump in stable position, at the level immediately above the  tereso. Cardiomediastinal silhouette is obscured. Near complete  opacification of the bilateral lungs, right greater than left, with  air bronchograms. No pneumothorax. No acute osseous abnormalities.      Impression    Impression:   1. Support devices in stable position.  2. Unchanged near complete opacification of the bilateral lungs with  hilar atelectasis.    I have personally reviewed the examination and initial interpretation  and I agree with the findings.    ASHLEIGH CHAN MD   XR Chest Port 1 View    Narrative    Exam: XR CHEST PORT 1 VW, 3/1/2019 1:45 AM    Indication: arrest    Comparison: 2/28/2019    Findings:   AP view of the chest. Endotracheal tube with the tip in the lower  thoracic trachea. Intra-aortic balloon pump marker at the level of the  tereso. Enteric tubes with the end extending beyond the field-of-view.  Unchanged loop seen within the stomach. ECMO cannula  with the tip  projecting over the upper right atrium.    Bilateral opacification of the lungs similar to prior exam. Right  greater than left pleural effusions. Cardiac silhouette remains  obscured. Upper abdomen is unremarkable.      Impression    Impression:   1. Near complete opacification of both lungs similar to prior exam.  Findings likely representing edema and/or hemorrhage.  2. Support devices as detailed above.    I have personally reviewed the examination and initial interpretation  and I agree with the findings.    ROSELYN CASH MD   XR Chest Port 1 View    Narrative    Exam: XR CHEST PORT 1 VW, 3/2/2019 1:55 AM    Indication: arrest    Comparison: 3/1/2019    Findings:   AP view of the chest. Endotracheal tube tip in the lower thoracic  trachea. Intra-aortic balloon pump marker at the level of the tereso.  Unchanged ECMO cannula. Gastric and feeding tubes remain in place. The  distal end extending beyond the field-of-view.    Bilateral airspace opacities are again seen. There is been slight  interval in treatment in aeration of both upper lungs. Right greater  than left pleural effusions, unchanged. No pneumothorax. Cardiac  silhouette is unchanged. Upper abdomen is unremarkable.      Impression    Impression:   1. Bilateral airspace opacities consistent with pulmonary edema and/or  hemorrhage are again seen. There is been slight interval improvement  in aeration of the upper lungs.  2. Right greater than left pleural effusions, unchanged.  3. Support devices as detailed above.    I have personally reviewed the examination and initial interpretation  and I agree with the findings.    AYSE ALVAREZ   XR Chest Port 1 View    Narrative    Exam: XR CHEST PORT 1 VW 3/3/2019 10:03 AM    History: ECMO, intubated    Comparison: 3/2/2019    Findings: Single AP view of the chest. Inferior approach ECMO cannula  projects over the right atrium. Endotracheal tube is in appropriate  position, stable. Feeding tube  and gastric tubes extend below the  diaphragm, tip not included the field-of-view. Stable position of the  intra-aortic balloon pump superior metallic marker. Cardia mediastinal  silhouette is stable, slightly obscured. Lung volumes are normal.  Diffuse pulmonary opacities, slightly improved since prior. Aeration  in both lungs, especially pronounced superiorly. Small area pleural  effusions, unchanged. No pneumothorax. Visualized upper abdomen is  unremarkable. No acute bony abnormality.      Impression    Impression:   1. Inferior approach ECMO cannula projects over the right atrium,  stable. Additional lines and tubes as above.  2. Diffuse pulmonary opacities, slightly improved since prior.  Improving aeration, especially pronounced in the upper lungs.  3. Small layering pleural effusions are again seen.    ASHLEIGH CHAN MD   XR Chest Port 1 View    Narrative    EXAM: XR CHEST PORT 1 VW  3/4/2019 1:53 AM      HISTORY: Cardiac arrest    COMPARISON: Radiograph 3/30/2018, 2/27/2019    FINDINGS: AP radiograph of the chest. Intra-aortic balloon pump tip  projects at the level of the tereso. ECMO cannula projects over the  right atrium. Endotracheal tube projects over the midthoracic trachea.  Feeding tube passes below the diaphragm. Small right pleural effusion.  Diffuse patchy airspace opacities, stable. No pneumothorax.      Impression    IMPRESSION:   1. Intra-aortic balloon pump tip projects at the level of the tereso.   2. ECMO cannula projects over the right atrium.   3. Endotracheal tube projects over the midthoracic trachea.   4. Diffuse patchy airspace opacities, stable, which may indicate  pulmonary edema versus severe infection.    I have personally reviewed the examination and initial interpretation  and I agree with the findings.    ROSELYN CASH MD   XR Chest Port 1 View    Narrative    EXAM: XR CHEST PORT 1 VW  3/5/2019 1:35 AM      HISTORY: Cardiac arrest, ECMO    COMPARISON: Radiograph  3/4/2019    FINDINGS: AP radiograph of the chest. Right IJ ECMO catheter tip  projects over the right pulmonary artery. Lower approach ECMO catheter  projects over the right atrium. Intra-aortic balloon pump tip projects  at the level of the tereso. Endotracheal tube tip projects over the  midthoracic trachea. Feeding tube and gastric tube pass below the  diaphragm, collimated off the field-of-view.    Small pleural effusions are stable. Diffuse interstitial opacities,  right greater than left, slightly improved. Stable retrocardiac  opacity.       Impression    IMPRESSION:   1. Right IJ ECMO catheter tip projects over the right pulmonary  artery. Lower approach ECMO catheter projects over the right atrium.    2. Intra-aortic balloon pump tip projects at the level of the tereso.   3. Diffuse interstitial opacities, right greater than left, slightly  improved. Likely pulmonary edema.  4. Stable retrocardiac opacity, likely atelectasis.    I have personally reviewed the examination and initial interpretation  and I agree with the findings.    ROSELYN CASH MD   XR Chest Port 1 View    Narrative    EXAM: XR CHEST PORT 1 VW  3/6/2019 2:00 AM      HISTORY: Cardiac arrest    COMPARISON: Radiograph 3/5/2018    FINDINGS: AP radiograph of the chest. Intrahepatic balloon pump tip  projects above the level of the tereso, stable. ECMO cannula project  over the right pulmonary artery and right atrium, stable. Endotracheal  tube tip projects over the midthoracic trachea, stable. Feeding tube  and gastric tube pass below the diaphragm, collimated off the  field-of-view.    Low lung volumes. Diffuse interstitial opacities are stable, likely  representing pulmonary edema. Bibasilar atelectasis is stable. No  pneumothorax. Small pleural effusions.      Impression    IMPRESSION:   1. Stable lines and tubes.  2. Stable mild pulmonary edema.  3. Stable bibasilar atelectasis and small pleural effusions.    I have personally reviewed the  examination and initial interpretation  and I agree with the findings.    ROSELYN CASH MD   XR Chest Port 1 View    Narrative    EXAM: XR CHEST PORT 1 VW  3/6/2019 9:44 PM      HISTORY: Peripheral ECMO placement    COMPARISON: Radiograph 3/6/2019    FINDINGS: AP radiograph of the chest. Interval readjustment of the low  approach ECMO cannula. The tip now projects over the central left  brachiocephalic vein. Right IJ ECMO tip projects over the pulmonary  artery. Feeding tube and gastric tube pass below the diaphragm,  collimated off the field-of-view. Endotracheal tube projects over the  midthoracic trachea. Esophageal temperature probe projects over the  cervical spine.    Worsening diffuse alveolar opacities. No pneumothorax. No pleural  effusions. Pulmonary vascularity is obscured.       Impression    IMPRESSION:   1.  Interval readjustment of the low approach ECMO cannula. The tip  now projects over the central left brachiocephalic vein. This may need  to be retracted.  2. Esophageal temperature probe projects over the cervical spine.  Recommend advancement to ensure esophageal placement.  3. Worsening diffuse patchy and interstitial opacities. Findings  suggest cardiogenic or noncardiogenic pulmonary edema versus  infection.    Findings of ECMO catheter placement were discussed with Dr. Covarrubias by  telephone at 3/6/2019 10:00 PM by Dr. Jai Burr.    I have personally reviewed the examination and initial interpretation  and I agree with the findings.    BEVERLY BOLTON MD   XR Abdomen Port 1 View    Narrative    EXAM: XR ABDOMEN PORT 1 VW  3/6/2019 10:15 PM      HISTORY: OG placement    COMPARISON: Radiograph 2/25/2019, chest radiograph 3/6/2018.    FINDINGS: Supine radiograph of the abdomen. Gastric tube tip and  side-port project over the stomach. Feeding tube tip projects over the  ligament of Treitz. Lower approach PICC projects over the central IVC.  Stable lines and tubes in the chest, as described  on same day chest  radiograph.     Generalized paucity small bowel gas. No definite pneumatosis or portal  venous gas. Diffuse mixed airspace opacities in the lungs, as  described on same day chest radiograph.      Impression    IMPRESSION:   1. Gastric tube tip and side-port project over the stomach.  2. Feeding tube tip projects over the ligament of Treitz.  3. Please see same day chest radiograph regarding lines and tubes and  pulmonary findings.    I have personally reviewed the examination and initial interpretation  and I agree with the findings.    MIKKI DAI MD   US Abdomen Limited    Narrative    EXAMINATION: Limited Abdominal Ultrasound, 3/7/2019 7:42 PM     COMPARISON: CT CAP dated 2/23/2018    HISTORY: Concern for choledocholithiasis    FINDINGS:   Fluid: No evidence of ascites or pleural effusions.    Liver: The liver parenchyma is diffusely hyperechoic with sparing  around the gallbladder, measuring 19.5 cm in craniocaudal dimension.  There is no focal mass.     Gallbladder: Small volume of echogenic sludge in the gallbladder. No  echogenic stones or wall thickening. No pericholecystic fluid  accumulations.    Bile Ducts: Both the intra- and extrahepatic biliary system are of  normal caliber.  The common bile duct measures 4 mm in diameter.    Pancreas: Visualized portions of the head and body of the pancreas are  unremarkable.     Kidney: The right kidney measures 13.8 cm long. There is mild  hydronephrosis with central but no peripheral dilatation of the renal  collecting system. No echogenic stones or focal mass.      Impression    IMPRESSION:   1.  Hepatomegaly with steatosis. No focal mass.  2.  Small amount of echogenic sludge in the gallbladder without  definite evidence of acute cholecystitis.    I have personally reviewed the examination and initial interpretation  and I agree with the findings.    AYSE LEROY MD   XR Chest Port 1 View    Narrative    EXAM: XR CHEST PORT 1 VW   3/8/2019 1:35 AM      HISTORY: Lines and tubes.    COMPARISON: Radiograph 3/6/2019    FINDINGS: AP radiograph of the chest. Venous ECMO cannula projects  over the left brachiocephalic vein, stable. Arterial ECMO cannula  projects over the pulmonary artery. Endotracheal tube projects over  the midthoracic trachea. Feeding tube and gastric tube pass below the  diaphragm, collimated off the field-of-view. Esophageal temperature  probe has been removed.    Cardiomegaly with diffuse patchy and interstitial airspace opacities,  slightly decreased from prior. No pneumothorax. No pleural effusions.      Impression    IMPRESSION:   1. Stable placement of the ECMO cannula. The venous cannula continues  to project over the left brachiocephalic vein.  2. Improved patchy and interstitial airspace opacities, likely  indicating improving pulmonary edema.    I have personally reviewed the examination and initial interpretation  and I agree with the findings.    MIKKI DAI MD   XR Chest Port 1 View    Narrative    EXAM: XR CHEST PORT 1 VW  3/9/2019 1:52 AM      HISTORY: Lines and tubes.    COMPARISON: Radiograph 3/8/2019    FINDINGS: AP radiograph of the chest. Endotracheal tube tip projects  over the midthoracic trachea. Inferior ECMO cannula projects over the  left brachiocephalic vein, stable. Superior ECMO catheter tip projects  over the right pulmonary artery, stable. Intraaortic balloon pump tip  projects close to the aortic knob, stable. Esophageal temperature  probe projects over C3. Feeding tube and gastric tube pass below the  diaphragm, collimated off the field-of-view. Coronary artery stents  present.    Cardiomegaly. Stable patchy and interstitial airspace opacities. No  pneumothorax. No pleural effusions.      Impression    IMPRESSION:   1. Esophageal temperature probe projects over C3. Recommend  advancement.  2. Stable ECMO cannulae.  3. Stable mixed patchy and interstitial airspace opacities, likely  indicating  pulmonary edema/ARDS.    I have personally reviewed the examination and initial interpretation  and I agree with the findings.    BEVERLY BOLTON MD   XR Chest Port 1 View    Narrative    EXAM: XR CHEST PORT 1 VW  3/10/2019 1:42 AM      HISTORY: Lines and tubes    COMPARISON: Radiograph 3/9/2019    FINDINGS: AP radiograph of the chest. Esophageal temperature probe  projects over the high cervical spine. Endotracheal tube projects over  the midthoracic trachea. ECMO cannula remain in stable position.  Feeding tube and gastric tube pass below the diaphragm, collimated off  the field-of-view. Intra-aortic balloon pump marker above the tereso,  in stable position.     Stable cardiac silhouette. Diffuse patchy airspace opacities and  interstitial opacities, slightly improved compared with prior  examination.      Impression    IMPRESSION:   1. Esophageal temperature probe projects over the high cervical spine.  Recommend advancement.  2. Stable ECMO cannulae.  3. Diffuse patchy airspace opacities and interstitial opacities,  slightly improved compared with prior examination. Findings likely  represents pulmonary edema.    I have personally reviewed the examination and initial interpretation  and I agree with the findings.    ERIN BABB MD   XR Chest Port 1 View    Narrative    Exam: XR CHEST PORT 1 VW, 3/10/2019 9:37 AM    Indication: ET tube placement    Comparison: 3/10/2019    Findings:   Lower approach cannula tip seen in the left brachiocephalic vein.  There are 2 right IJ approach cannula, one in the right heart and the  other in the pulmonary artery.    Endotracheal tube tip 4.9 cm above the tereso. Intervertebral balloon  pump tip at the proximal descending thoracic aorta. Enteric tubes  descend below the field of view in change. Mild perihilar pulmonary  edema unchanged.      Impression    Impression:   1. Endotracheal tube tip 4.9 cm above the tereso.  2. Patchy perihilar pulmonary edema  unchanged.    I have personally reviewed the examination and initial interpretation  and I agree with the findings.    ERIN BABB MD   XR Chest Port 1 View    Narrative    Exam: XR CHEST PORT 1 VW, 3/11/2019 1:45 AM    Indication: VAV ECMO/IABP/INTUBATED    Comparison: 3/10/2019    Findings:   Single portable view of the chest. ECMO cannulas in similar position.  Enteric tubes projecting over the esophagus and stomach. Intra-aortic  balloon pump tip 2 cm above the tereso at the aortic arch.  Endotracheal tube approximately 5 cm above the tereso. The  cardiomediastinal silhouette and upper abdomen are unremarkable. No  pleural effusion. No pneumothorax. Hazy patchy opacities of the lungs,  similar to previous.      Impression    Impression:   1. Support devices in stable position  2. Patchy perihilar pulmonary edema is unchanged.    I have personally reviewed the examination and initial interpretation  and I agree with the findings.    ROSELYN CASH MD   XR Chest Port 1 View    Narrative    Exam: XR CHEST PORT 1 , 3/12/2019 1:52 AM    Indication: VAV ECMO/IABP/INTUBATED    Comparison: 3/11/2019    Findings:   Single portable view of the chest. ECMO cannula unchanged in position  from previous. Unchanged intra-aortic balloon pump. Enteric tubes  projecting over the esophagus and stomach. Endotracheal tube  approximately 4 cm above the tereso.     The cardiomediastinum and upper abdomen are unchanged. Slightly  decreased patchy perihilar pulmonary opacities. No pneumothorax. No  pleural effusion.      Impression    Impression: Slightly decreased patchy perihilar opacities. Support  devices unchanged in position.    I have personally reviewed the examination and initial interpretation  and I agree with the findings.    ROSELYN CASH MD   XR Abdomen Port 1 View    Narrative    Exam: XR ABDOMEN PORT 1 VW, 3/12/2019 1:51 AM    Indication: abdominal distention, ileus? pneumatosis intestinalis from  gut  ischemia?    Comparison: 3/6/2019    Findings:   Supine views of the abdomen. Inferior approach ECMO cannulas in place.  Enteric tubes projecting over the stomach with one terminating within  the stomach the other terminating in the second part of the duodenum.  Prominent gas-filled transverse colon. No portal venous gas. No  pneumatosis.      Impression    Impression: Prominent gas-filled transverse colon without evidence of  pneumatosis.    I have personally reviewed the examination and initial interpretation  and I agree with the findings.    ROSELYN CASH MD   XR Chest Port 1 View    Narrative    Exam: XR CHEST PORT 1 VW, 3/13/2019 1:30 AM    Indication: VAV ECMO/IABP/INTUBATED    Comparison: 3/12/2019    Findings:   Single portable view of the chest. ECMO cannula unchanged in position  from previous. Unchanged intra-aortic balloon pump at the tereso.  Enteric tubes projecting over the esophagus and stomach. Endotracheal  tube projecting approximately 3.5 cm above the tereso, slightly  advanced from previous. The cardiomediastinum and upper abdomen are  unchanged. No pleural effusion. No pneumothorax. No focal airspace  opacities.      Impression    Impression:  1. Endotracheal tube slightly advanced from previous, otherwise, no  significant interval change.  2. Stable mild pulmonary edema.    I have personally reviewed the examination and initial interpretation  and I agree with the findings.    MIKKI DAI MD   CT Head w/o Contrast     Value    Radiologist flags (Urgent)     Right posterior parietal/occipital intraparenchymal    Narrative    CT HEAD W/O CONTRAST 3/13/2019 3:43 PM    Provided History: Altered mental status (AMS), unclear cause    ICD-10: Altered mental status    Comparison: CT dated 2/25/2019.    Technique: Using multidetector thin collimation helical acquisition  technique, axial, coronal and sagittal CT images from the skull base  to the vertex were obtained without intravenous contrast.      Findings:  Intraparenchymal hemorrhage in the posterior right  parietal/occipital lobe measuring 2.5 x 1.5 cm and associated with  surrounding edema. No significant mass effect or midline shift. The  ventricles are proportionate to the cerebral sulci. The gray to white  matter differentiation of the cerebral hemispheres is preserved. The  basal cisterns are patent.    The visualized paranasal sinuses are clear. The mastoid air cells are  clear.       Impression    Impression: Intraparenchymal hemorrhage in the posterior right  parietal/occipital lobe, with a smaller hemorrhagic focus in the  anterior left frontal lobe. Recommend 6 hour follow-up CT to assess  stability.    [Urgent Result: Right posterior parietal/occipital intraparenchymal  hemorrhage]    Finding was identified on 3/13/2019 3:46 PM.     Dr. Latasha Alexander was contacted by Dr. Mckeon at 3/13/2019 3:49 PM and  verbalized understanding of the urgent finding.     I have personally reviewed the examination and initial interpretation  and I agree with the findings.    JOEL GARNETT MD   CTA Abdomen Pelvis with Contrast     Value    Radiologist flags Gastric hemorrhage (Urgent)    Narrative    EXAMINATION: CTA ABDOMEN PELVIS WITH CONTRAST, 3/13/2019 3:56 PM    TECHNIQUE:  Helical CT images from the lung bases through the  symphysis pubis were obtained  with IV contrast. Contrast dose:  iopamidol (ISOVUE-370) solution 130 mL    COMPARISON: CT chest/abdomen/pelvis 2/23/2018    HISTORY: GI bleed; On VAV ECMO; brisk intermittent GI bleed with  concern for ischemic bowel vs arterio-enteric fistula    FINDINGS:  Chest:  Compressive bibasilar atelectasis the lungs. Patchy, diffuse  centrilobular groundglass opacity, appears decreased compared to  2/23/2019. Significant continuous hypoattenuation along the  subendocardium in the left ventricle involving the apex and along the  apical, mid and basilar segments of the left ventricular free wall. No  pericardial  effusion. Leads in the right ventricle. The right femoral  approach ECMO cannula extends through the right atrium, and ascends  above the field-of-view.    Abdomen/pelvis:  Newly visualized is a large volume of hemorrhage in the stomach and  duodenal bulb, which is markedly distends the stomach. There are 2  enteric tubes, with one terminating in the stomach, and another within  the second/third portion of the duodenum. 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    New since the prior examination, marked peripherally located patchy  areas of hypoattenuation, several of which are geographic or  wedge-shaped in the liver, with a large region of involvement in the  left lobe of the liver.    Significant patchy hypoattenuation peripherally in the spleen.    Symmetric enhancement of the kidneys. No suspicious renal mass. No  hydronephrosis. Hyperenhancement of the adrenal glands consistent with  hypoperfusion complex. Pancreas is unremarkable.    No bowel junction. No significant free pelvic fluid. No  pneumoperitoneum. Contrast in the urinary bladder which is  decompressed. Rectal tube.    Right femoral lines and ECMO catheter noted, there is a lobulated 6.7  x 6.2 cm hematoma about the femoral vasculature at the insertion  point.    Left femoral approach intra-aortic balloon pump, with the inferior tip  just below the level of the renal arteries and the superior tip above  the field-of-view.    Bones:  Bones are unremarkable.      Impression    IMPRESSION:   1. Large volume of hemorrhage markedly distending the stomach and  duodenal bulb. 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  2. Numerous patchy areas of hypoattenuation peripherally in the liver  and spleen most consistent with infarction.  3. 6.7 x 6.2 cm hematoma in the right groin about the access point of  the  femoral lines.  4. Intraoperative balloon pump tip seen just below the level of the  renal arteries. Both kidneys normally enhance.  5. Bibasilar atelectasis, with interval decrease in patchy confluent  airspace opacities consistent with resolving pulmonary edema versus  diffuse alveolar damage.  6. Evidence areas of subendocardial hypoperfusion of the left  ventricle, suggesting ischemia.    [Urgent Result: Gastric hemorrhage]    Finding was identified on 3/13/2019 4:01 PM.     Dr. Muller was contacted by  at 3/13/2019 4:26 PM and  verbalized understanding of the urgent finding.      The preliminary report of no aortoenteric fistula was provided by . After review by the staff radiologist, the final interpretation  of aortoesophageal fistula was provided by  and communicated  to Dr. Muller at 4:45 PM on 3/13/2019.   CT Chest w/o Contrast    Narrative    EXAMINATION: CT CHEST W/O CONTRAST, 3/13/2019 3:52 PM    TECHNIQUE:  Helical CT images from the thoracic inlet through the lung  bases were obtained without IV contrast. Contrast dose: None    COMPARISON: 2/23/2019    HISTORY: Interstitial lung disease    FINDINGS:    Endotracheal tube tip in midthoracic trachea. Right IJ approach ECMO  cannula with tip in the right pulmonary artery. Inferior approach ECMO  cannula with tip in the left brachiocephalic vein. Gastric tube  coursing below the field-of-view of this study. Intra-aortic balloon  pump superior marker at the level of the tereso. Inferior approach  central line with tip at the hepatic IVC.    Diffuse centrilobular groundglass opacities with mild interlobular  septal thickening. Subpleural and gravity dependent sparing. Bilateral  lower lobar atelectasis. No pneumothorax or pleural effusion.    Left ventricular enlargement. Multiple coronary stents. No pericardial  effusion. Normal caliber and configuration of the thoracic great  vessels. No thoracic adenopathy.    Large amount of blood  in the stomach better visualized on the abdomen  and pelvis portion. Splenic and hepatic infarcts also better  appreciated on the abdominal pelvic portion. Vicarious excretion of  contrast in the gallbladder. Sternal fracture, nondisplaced.      Impression    IMPRESSION:   1. Right IJ approach ECMO cannula with tip in the right pulmonary  artery. Inferior approach ECMO cannula with tip in the left  brachiocephalic vein.   2. Diffuse pulmonary opacities in a pattern most consistent with  pulmonary edema or diffuse alveolar hemorrhage.  3. Large amount of blood products in the stomach better visualized on  the abdominal pelvic study from today.    I have personally reviewed the examination and initial interpretation  and I agree with the findings.    AYSE LEROY MD

## 2019-03-13 NOTE — PROGRESS NOTES
"CRRT STATUS NOTE    DATA:  Time:  5:12 AM  Pressures WNL: Yes  Filter Status:  WDL  Problems Reported/Alarms Noted:  No alarms reported and no alarms noted.  Bedside RN called and stated that machine started giving flow alarms starting ~0500.  CRRT machine on a mat. Suggested using hair ties for support on the bags.    Supplies Present: Yes  ASSESSMENT:  Patient Net Fluid Balance:  Pt was net positive 2.2liters for the last 24hrs and net positive 0.6liters for the last 8hrs.  Vital Signs: Temp 98.1  F (36.7  C)   Resp 13   Ht 1.68 m (5' 6.14\")   Wt 95.8 kg (211 lb 3.2 oz)   SpO2 100%   BMI 33.94 kg/m    Labs:  Labs reviewed. Notable for TBili 10.2, CRP 61, Trop I 3.17, ALT 4353 and WBC 29.8.  Goals of Therapy:  Current goal of therapy is net negative 50ml/hr.  Pt currently unable to obtain this goal 2/2 state of health.    INTERVENTIONS:   No interventions required this shift.    PLAN:  Continue current POC.  Check circuit qshift and PRN.  Change circuit q72hrs or PRN.  For questions or concerns please contact CRRT RN @[phone].    "

## 2019-03-13 NOTE — PLAN OF CARE
Pt continues on VAV ECMO. Afebrile. No changes to vent settings. Flolan weaned from 10 down to 6. Suctioning red tinged sputum from ETT and mouth. Pt on versed and fentanyl for sedation. Pt will open eyes with cares, but no movement of extremities or anything to command. HR 70-80s, sinus with PVCs. MAPs 65-75. Levo at 0.07mcg/kg/min. Gave 1u FFP, 1u platelets, and 1u PRBCs. IABP 1:1, augmenting 100%. Not pulling fluid on CRRT. Circuit was changed today. Went down for head, chest, abd, pelvis CT. CSI Fellow talking to family about the results. TF still off. Bloody output from OG and rectal tube. Will continue to monitor and update as needed. Refer to flowsheets for further documentation.

## 2019-03-13 NOTE — PROGRESS NOTES
CLINICAL NUTRITION SERVICES - brief note. See 3/11 note for full reassessment.    FEN/GI: bloody stools, concern for ischemic bowel. Abdomen distended. TFs and fiber have been held.    Interventions  Enteral Nutrition - Adjusted orders to hold TF and discontinue fiber.     RD will continue to follow.    Sudha Winters RD, LD, [facility]  CVICU Dietitian  Pager: [phone]

## 2019-03-13 NOTE — PROGRESS NOTES
"Nephrology Progress Note  03/13/2019           Mr Riddle is a 39 yom w/HLP who had cardiac arrest on 2/23/19 with unclear down-time.  PCI done emergently, Nephrology consulted for management of FERNANDA, started CRRT on 2/25.       Interval History :   Mr Riddle continues on CRRT, net positive with no UF yesterday due to GIB issues.  Difficult case with multi-system organ failure, trying to match I=O as able today but may be positive again with labile BP's.       Assessment & Recommendations:   FERNANDA-Baseline Cr unclear, admitted with Cr of 1.7 post arrest.  FERNANDA due to hemodynamic injury, also received contrast and had elevated CK.  Started CRRT on 2/25, has been anuric since starting CRRT, ~48h after arrest.  Continuing CRRT with no signs of recovery, still needing extensive mechanical support and is on pressors now with GIB.                 -Access is ECMO circuit.               -CRRT,I=O as BP's allow, 2k baths.      Volume status-Net positive yesterday with UF set at 0 with hemodynamic instability and evidence of GIB.  Will try to match I=O as able today.       Electrolytes/pH-No acute issues on CRRT, K 4.9, bicarb 19, on 2k baths with elevated lactate.       Ca/phos/pth-Ca 8.5 with ical of 4.4, Mg 2.4, phos 5.5, stable with CRRT.      Anemia-Hgb 11.4, needing intermittent PRBC's with ECMO and now GIB.     Nutrition-Impact TF.       Seen and discussed with Dr Altamirano     Recommendations were communicated to primary team via verbal communication.      Darren Vidal  Clinical Nurse Specialist  [phone]    Review of Systems:   I reviewed the following systems:  ROS not done due to vent/sedation.       Physical Exam:   I/O last 3 completed shifts:  In: 4496.6 [I.V.:1433.6; Other:47; NG/GT:180]  Out: 2335 [Other:135; Stool:2200]   Temp 98.4  F (36.9  C)   Resp 15   Ht 1.68 m (5' 6.14\")   Wt 95.8 kg (211 lb 3.2 oz)   SpO2 100%   BMI 33.94 kg/m       GENERAL APPEARANCE: Intubated and sedated, on ECMO and " IABP  EYES:  No scleral icterus, pupils equal  HENT: mouth without ulcers or lesions  PULM: lungs clear to auscultation, equal air movement, no cyanosis or clubbing  CV: regular rhythm, normal rate, no rub     -edema +1 LE  GI: soft, non-tender, non-distended, dark red stool  MS: no evidence of inflammation in joints, no muscle tenderness  NEURO:  Intubated and sedated    Labs:   All labs reviewed by me  Electrolytes/Renal -   Recent Labs   Lab Test 03/13/19  1029 03/13/19  0956 03/13/19  0358  03/12/19  2240     --  140  --  138   POTASSIUM 4.9  --  4.6  --  5.3   CHLORIDE 102  --  108  --  106   CO2 19*  --  14*  --  16*   BUN 52*  --  45*  --  51*   CR 1.69*  --  1.55*  --  1.70*   * 136* 101*   < > 126*   ALEKSANDER 8.5  --  7.2*  --  7.9*   MAG 2.4*  --  2.0  --  2.3   PHOS 5.5*  --  6.2*  --  7.8*    < > = values in this interval not displayed.       CBC -   Recent Labs   Lab Test 03/13/19  1029 03/13/19  0358 03/12/19  2240   WBC 31.6* 27.4* 29.0*   HGB 11.4* 9.9* 10.2*   * 117* 143*       LFTs -   Recent Labs   Lab Test 03/13/19  1029 03/13/19  0358 03/12/19  2240   ALKPHOS 701* 560* 525*   BILITOTAL 14.1* 10.2* 9.9*   ALT PENDING 4,353* 4,496*   AST PENDING Canceled, Test credited Canceled, Test credited   PROTTOTAL 5.5* 4.9* 5.5*   ALBUMIN 3.3* 3.0* 3.2*       Iron Panel - No lab results found.        Current Medications:    B and C vitamin Complex with folic acid  5 mL Per Feeding Tube Daily     ertapenem (INVanz) IV  1 g Intravenous Q24H     hydrocortisone sodium succinate PF  50 mg Intravenous Q6H     metroNIDAZOLE  500 mg Intravenous Q6H     oseltamivir  75 mg Oral BID     pantoprazole (PROTONIX) IV  40 mg Intravenous BID     polyethylene glycol  17 g Oral or Feeding Tube Daily     senna-docusate  1 tablet Oral or Feeding Tube At Bedtime     sodium chloride (PF)  3 mL Intracatheter Q8H     ticagrelor  90 mg Oral or Feeding Tube BID       amiodarone 0.5 mg/min (03/13/19 1100)     -  MEDICATION INSTRUCTIONS -       IV fluid REPLACEMENT ONLY 30 mL/hr at 03/07/19 1100     CRRT replacement solution 12.5 mL/kg/hr (03/13/19 1002)     epoprostenol (VELETRI) 20 mcg/mL in sterile water inhalation solution 8 ng/kg/min (03/13/19 0926)     fentaNYL 100 mcg/hr (03/13/19 1100)     heparin 2 unit/mL in 0.9% NaCl 3 mL/hr (03/13/19 1100)     HEParin Stopped (03/12/19 0148)     insulin (regular) 0.5 Units/hr (03/13/19 1100)     midazolam 6 mg/hr (03/13/19 1100)     - MEDICATION INSTRUCTIONS -       - MEDICATION INSTRUCTIONS -       norepinephrine 0.08 mcg/kg/min (03/13/19 1100)     Percutaneous Coronary Intervention orders placed (this is information for BPA alerting)       CRRT replacement solution 200 mL/hr at 03/12/19 1812     CRRT replacement solution 12.5 mL/kg/hr (03/13/19 1002)     ACE/ARB/ARNI NOT PRESCRIBED       BETA BLOCKER NOT PRESCRIBED       vasopressin (PITRESSIN) infusion ADULT (40 mL) 1 Units/hr (03/13/19 0400)

## 2019-03-13 NOTE — PROGRESS NOTES
CRRT STATUS NOTE    DATA:  Time:  17:30PM  Pressures WNL:  YES  Filter Status:  WDL; new tonight  Problems Reported/Alarms Noted: none  Supplies Present:  YES    ASSESSMENT:  Patient Net Fluid Balance:  +2.2L Yesterday. Today thus far, +1.7L.   Vital Signs:  On Norepi, HR 77. BP 92/60 (74). 95% on 60% Fio2 PEEP 10. T 98.4F   Labs:  LA 5.9; ical 4.3. Other lytes stable. WBC 31.6.   Goals of Therapy:  I=O as BPs allow. Not meeting goals of therapy.     INTERVENTIONS:   Restarted CRRT via ECMO today at 1710 after return from CT. Started without binders to reassess need for them.     PLAN:  Continue CRRT to meet goals of therapy.  Contact CRRT RN [phone] with concerns.

## 2019-03-13 NOTE — PROGRESS NOTES
I gave Mr. Riddle's wife and other family members an update about him. I informed them that he is very sick. He has had bowel ischemia which we are managing medically due to high surgical risk. His prognosis is guarded. Family seemed to understand this.

## 2019-03-13 NOTE — PROGRESS NOTES
Care Coordinator Progress Note    Admission Date/Time:  2/23/2019  Attending MD:  Abraham Baer MD    Data  Chart reviewed, discussed with interdisciplinary team.   Patient was admitted for: Cardiac arrest (H).    Assessment  Pt is critically ill in ICU, vented, sedated, on ECMO, IABP and CRRT.  RNCC discussed the plan of care with the team and suggested to have care conf. with all the family to provide update and discuss the goal of care.  The team agreed to meet tomorrow morning if family is available.  RNCC called pt spouse and discuss about care conf.  Pt spouse gave the phone to her sister, Raissa to talk to me.  Raissa stated she will discuss with the other family members if they have question and if they need care conf.  Raissa agreed to call me back.  RNCC contact info provided.  Pt Rama ware called back # [phone] and stated they have been updated last night by the DrNoble and understand that pt is critically ill.  Rama stated they don't have any question at this time and the family wish is to continue with the current plan of care and hope for miracle.  Gerardotisha stated they will contact me if they need to have care conf. arranged.  Family declined for the need of care conf. at this time.  The above info have been shared with the team.  RNCC will cont to follow plan of care.    Plan  Anticipated Discharge Date:  TBD.  Anticipated Discharge Plan:   TBD.  RNCC will cont to follow plan of care.      Romel Lino RN, PHN, BSN  4A and 4E/ ICU  Care Coordinator  Phone: [phone]  Pager: [phone]

## 2019-03-13 NOTE — PROGRESS NOTES
ECMO Shift Summary:    Patient remains on VAV ECMO, all equipment is functioning and alarms are appropriately set. RPM's 3365 with flow range 4.4-4.69 L/min. Sweep gas is at 6 LPM and FiO2 80%. Circuit remains free of air, clot and a small amount of fibrin at connectors. Cannulas are secure with no bleeding from site. Extremities are cool. Suctioned ETT for small red thick.    Significant Shift Events: Pt taken to CT of head, chest, abdomen, and pelvis without immediate complication. Fellow talking with family post radiology read.    Vent settings:  Ventilation Mode: CMV/AC  (Continuous Mandatory Ventilation/ Assist Control)  FiO2 (%): 60 %  Rate Set (breaths/minute): 12 breaths/min  Tidal Volume Set (mL): 530 mL  PEEP (cm H2O): 10 cmH2O  Oxygen Concentration (%): 60 %  Resp: 12  .    Heparin is not running, ACT range 172-184.    Urine output is none, blood loss was undetermined. Product given included 1 FFP, 1 Platelet, and 1 PRBC.      Intake/Output Summary (Last 24 hours) at 3/13/2019 1754  Last data filed at 3/13/2019 1700  Gross per 24 hour   Intake 2865.7 ml   Output 1224 ml   Net 1641.7 ml       ECHO:  No results found for this or any previous visit.No results found for this or any previous visit.    CXR:  Recent Results (from the past 24 hour(s))   XR Chest Port 1 View    Narrative    Exam: XR CHEST PORT 1 , 3/13/2019 1:30 AM    Indication: VAV ECMO/IABP/INTUBATED    Comparison: 3/12/2019    Findings:   Single portable view of the chest. ECMO cannula unchanged in position  from previous. Unchanged intra-aortic balloon pump at the tereso.  Enteric tubes projecting over the esophagus and stomach. Endotracheal  tube projecting approximately 3.5 cm above the tereso, slightly  advanced from previous. The cardiomediastinum and upper abdomen are  unchanged. No pleural effusion. No pneumothorax. No focal airspace  opacities.      Impression    Impression:  1. Endotracheal tube slightly advanced from previous,  otherwise, no  significant interval change.  2. Stable mild pulmonary edema.    I have personally reviewed the examination and initial interpretation  and I agree with the findings.    MIKKI DAI MD   CT Head w/o Contrast   Result Value    Radiologist flags (Urgent)     Right posterior parietal/occipital intraparenchymal    Narrative    CT HEAD W/O CONTRAST 3/13/2019 3:43 PM    Provided History: Altered mental status (AMS), unclear cause    ICD-10: Altered mental status    Comparison: CT dated 2/25/2019.    Technique: Using multidetector thin collimation helical acquisition  technique, axial, coronal and sagittal CT images from the skull base  to the vertex were obtained without intravenous contrast.     Findings:  Intraparenchymal hemorrhage in the posterior right  parietal/occipital lobe measuring 2.5 x 1.5 cm and associated with  surrounding edema. No significant mass effect or midline shift. The  ventricles are proportionate to the cerebral sulci. The gray to white  matter differentiation of the cerebral hemispheres is preserved. The  basal cisterns are patent.    The visualized paranasal sinuses are clear. The mastoid air cells are  clear.       Impression    Impression: Intraparenchymal hemorrhage in the posterior right  parietal/occipital lobe, with a smaller hemorrhagic focus in the  anterior left frontal lobe. Recommend 6 hour follow-up CT to assess  stability.    [Urgent Result: Right posterior parietal/occipital intraparenchymal  hemorrhage]    Finding was identified on 3/13/2019 3:46 PM.     Dr. Latasha Alexander was contacted by Dr. Mckeon at 3/13/2019 3:49 PM and  verbalized understanding of the urgent finding.     I have personally reviewed the examination and initial interpretation  and I agree with the findings.    JOEL GARNETT MD   CT Chest w/o Contrast    Narrative    EXAMINATION: CT CHEST W/O CONTRAST, 3/13/2019 3:52 PM    TECHNIQUE:  Helical CT images from the thoracic inlet through the lung  bases  were obtained without IV contrast. Contrast dose: None    COMPARISON: 2/23/2019    HISTORY: Interstitial lung disease    FINDINGS:    Endotracheal tube tip in midthoracic trachea. Right IJ approach ECMO  cannula with tip in the right pulmonary artery. Inferior approach ECMO  cannula with tip in the left brachiocephalic vein. Gastric tube  coursing below the field-of-view of this study. Intra-aortic balloon  pump superior marker at the level of the tereso. Inferior approach  central line with tip at the hepatic IVC.    Diffuse centrilobular groundglass opacities with mild interlobular  septal thickening. Subpleural and gravity dependent sparing. Bilateral  lower lobar atelectasis. No pneumothorax or pleural effusion.    Left ventricular enlargement. Multiple coronary stents. No pericardial  effusion. Normal caliber and configuration of the thoracic great  vessels. No thoracic adenopathy.    Large amount of blood in the stomach better visualized on the abdomen  and pelvis portion. Splenic and hepatic infarcts also better  appreciated on the abdominal pelvic portion. Vicarious excretion of  contrast in the gallbladder. Sternal fracture, nondisplaced.      Impression    IMPRESSION:   1. Right IJ approach ECMO cannula with tip in the right pulmonary  artery. Inferior approach ECMO cannula with tip in the left  brachiocephalic vein.   2. Diffuse pulmonary opacities in a pattern most consistent with  pulmonary edema or diffuse alveolar hemorrhage.  3. Large amount of blood products in the stomach better visualized on  the abdominal pelvic study from today.    I have personally reviewed the examination and initial interpretation  and I agree with the findings.    AYSE LEROY MD   CTA Abdomen Pelvis with Contrast   Result Value    Radiologist flags Gastric hemorrhage (Urgent)    Narrative    EXAMINATION: CTA ABDOMEN PELVIS WITH CONTRAST, 3/13/2019 3:56 PM    TECHNIQUE:  Helical CT images from the lung bases through  the  symphysis pubis were obtained  with IV contrast. Contrast dose:  iopamidol (ISOVUE-370) solution 130 mL    COMPARISON: CT chest/abdomen/pelvis 2/23/2018    HISTORY: GI bleed; On VAV ECMO; brisk intermittent GI bleed with  concern for ischemic bowel vs arterio-enteric fistula    FINDINGS:  Chest:  Compressive bibasilar atelectasis the lungs. Patchy, diffuse  centrilobular groundglass opacity, appears decreased compared to  2/23/2019. Significant continuous hypoattenuation along the  subendocardium in the left ventricle involving the apex and along the  apical, mid and basilar segments of the left ventricular free wall. No  pericardial effusion. Leads in the right ventricle. The right femoral  approach ECMO cannula extends through the right atrium, and ascends  above the field-of-view.    Abdomen/pelvis:  Newly visualized is a large volume of hemorrhage in the stomach and  duodenal bulb, which is markedly distends the stomach. There are 2  enteric tubes, with one terminating in the stomach, and another within  the second/third portion of the duodenum. 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    New since the prior examination, marked peripherally located patchy  areas of hypoattenuation, several of which are geographic or  wedge-shaped in the liver, with a large region of involvement in the  left lobe of the liver.    Significant patchy hypoattenuation peripherally in the spleen.    Symmetric enhancement of the kidneys. No suspicious renal mass. No  hydronephrosis. Hyperenhancement of the adrenal glands consistent with  hypoperfusion complex. Pancreas is unremarkable.    No bowel junction. No significant free pelvic fluid. No  pneumoperitoneum. Contrast in the urinary bladder which is  decompressed. Rectal tube.    Right femoral lines and ECMO catheter noted, there is a lobulated  6.7  x 6.2 cm hematoma about the femoral vasculature at the insertion  point.    Left femoral approach intra-aortic balloon pump, with the inferior tip  just below the level of the renal arteries and the superior tip above  the field-of-view.    Bones:  Bones are unremarkable.      Impression    IMPRESSION:   1. Large volume of hemorrhage markedly distending the stomach and  duodenal bulb. 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  2. Numerous patchy areas of hypoattenuation peripherally in the liver  and spleen most consistent with infarction.  3. 6.7 x 6.2 cm hematoma in the right groin about the access point of  the femoral lines.  4. Intraoperative balloon pump tip seen just below the level of the  renal arteries. Both kidneys normally enhance.  5. Bibasilar atelectasis, with interval decrease in patchy confluent  airspace opacities consistent with resolving pulmonary edema versus  diffuse alveolar damage.  6. Evidence areas of subendocardial hypoperfusion of the left  ventricle, suggesting ischemia.    [Urgent Result: Gastric hemorrhage]    Finding was identified on 3/13/2019 4:01 PM.     Dr. Muller was contacted by  at 3/13/2019 4:26 PM and  verbalized understanding of the urgent finding.      The preliminary report of no aortoenteric fistula was provided by . After review by the staff radiologist, the final interpretation  of aortoesophageal fistula was provided by  and communicated  to Dr. Muller at 4:45 PM on 3/13/2019.       Labs:  Recent Labs   Lab 03/13/19  1633 03/13/19  1353 03/13/19  1154 03/13/19  0956   PH 7.38 7.42 7.38 7.38   PCO2 34* 32* 34* 33*   PO2 224* 80 101 135*   HCO3 20* 20* 20* 20*   O2PER 60.0 60 60 60       Lab Results   Component Value Date    HGB 9.7 (L) 03/13/2019    PHGB 130 (H) 03/13/2019     03/13/2019    FIBR 259 03/12/2019    INR 2.05 (H) 03/13/2019    PTT 44 (H) 03/13/2019    DD >20.0 (H) 03/05/2019    AXA <0.10 03/13/2019     ANTCH 33 (L) 03/13/2019         Plan is to remain on VAV ECMO.      Krista Hannon, RRT  3/13/2019 5:54 PM

## 2019-03-14 NOTE — PROGRESS NOTES
CRRT STATUS NOTE    DATA:  Time:  6:51 PM  Pressures WNL:  YES  Filter Status:  WDL    Problems Reported/Alarms Noted:  None reported this shift.    Supplies Present:  YES    ASSESSMENT:  Patient Net Fluid Balance:  -139 mL since 0000, 3/14.  Vital Signs:  Temp: 98.1  F (36.7  C) Temp src: Esophageal  BP: 62/59 (61), Arterial Line   Heart Rate: 82 Resp: 13 SpO2: 100 % O2 Device: Mechanical Ventilator    Labs:  Na 138, K 4.4, BUN 41, Cr 1.58, Mg 2.2, Phos 3.2, Bilirubin 19.8, ALT 2,877, WBC 35.3, Hgb 10.3, Plt 146, INR 2.15, PTT 44  Goals of Therapy:  I=O    INTERVENTIONS:   No interventions at this time.    PLAN:  Continue with CVVHDF CRRT as per POC. Change circuit q72h or PRN. Will continue to monitor patient. Contact CRRT RN at [phone] with further questions/concerns.

## 2019-03-14 NOTE — CONSULTS
"Vascular Surgery Consult  3/13/2019    I was asked by Dr Latasha Alexander to evaluate for aorto-esophageal fistula    CC: GI bleed    HPI: Hellen Riddle is a 39 year old male with a past medical history of HLD that was admitted to the cardiology service on 2/23 for an out of hospital refractory vfib arrest that has now had a prolonged CV ICU stay complicated by ischemic liver failure, spleen infarcts, acute renal failure on CRRT, IPH with questionable neuro status, E coli pneumonia, hypoxia requiring VA and now VA+VV ECMO, and continued cardiac and pulmonary failure s/p PCI that now has concern for an aorto-esophageal fistula given blood output from an OG tube and from a rectal tube with CT findings showing a duodenum and stomach filled with blood. A family conference was held today and despite his dismal prognosis the family wanted a vascular surgery opinion on management of the suspected aorto-enteric fistula. He has been off of heparin given the concern for bleeding, but despite that he has continued having ACTs that are therapeutic likely 2/2 to his ischemic liver failure. At the bedside he is not responsive and does not follow commands.    ROS: A 10 point ROS could not be conducted secondary to the patient's mental status  PMH: HLD  PSH: femoral VA  ecmo cannulation, VV ecmo cannulation, coronary angiography  SH: Unknown  FH: Unknown  Medications: Ertapenem, flagyl, solu-cortef, pantoprazole, senna, brilanta  Allergies: No known drug allergies    Physical Exam    Temp 97.9  F (36.6  C)   Resp 13   Ht 1.68 m (5' 6.14\")   Wt 95.8 kg (211 lb 3.2 oz)   SpO2 98%   BMI 33.94 kg/m      Gen: Ill appearing gentleman, intubated/sedated  Eyes: Scleral icterus  Pulm: Mechanically ventilated  CVS: RRR  Abd: Soft, distended. OG with dark red output. Rectal tube with dark red output.  Ext: +2 pitting edema  Neuro: Not responsive to sternal rub  Skin: Jaundiced    Lab Results   Component Value Date    WBC 32.4 (H) 03/13/2019    HGB " 9.7 (L) 03/13/2019    HCT 29.4 (L) 03/13/2019     03/13/2019     03/13/2019    POTASSIUM 5.2 03/13/2019    CHLORIDE 100 03/13/2019    CO2 20 03/13/2019    BUN 54 (H) 03/13/2019    CR 1.93 (H) 03/13/2019     (H) 03/13/2019     (H) 03/13/2019    SED 6 03/13/2019    DD >20.0 (H) 03/05/2019    TROPI 3.102 (HH) 03/13/2019    AST 8,672 (HH) 03/13/2019    ALT 3,748 (HH) 03/13/2019    ALKPHOS 620 (H) 03/13/2019    BILITOTAL 13.6 (H) 03/13/2019    INR 2.10 (H) 03/13/2019     CT Abd/Pelvis shows a stomach and a duodenum that is filled with blood with a tract suspicious for an aortoenteric fistula    Assessment: 39 year old male with a past medical history of HLD admitted on 2/23/19 with refractory VF OHCA s/p VA ECMO on admission found to have 3 vessel CAD s/p intervention, also with hospital course c/b leukocytosis, hypoxia, lactic acidosis, E. 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 Unfortunately, we do agree with the primary team that Mr Riddle's prognosis is dismal. He has multiorgan failure and in the setting of VA+VV ECMO with liver, renal, neurlogic, cardiac, and lung failure would be completely unable to tolerate any open procedure, or even a cut down for aortic endovascular intervention. We believe surgical intervention for this problem to be futile and encourage future discussion of cares to be oriented around palliation.    Plan:  - Surgical intervention would be futile. We will not offer any open or endovascular intervention for this likely aorto esophageal fistula  - Recommend discussion of palliation, comfort cares  - Vascular surgery  will sign off at this time. Please call with any questions or concerns,    Discussed with staff, Dr Dejesus    --  Lisandro Springer MD  General Surgery PGY2

## 2019-03-14 NOTE — PLAN OF CARE
Patient remains intubated, sedated, on VAV ECMO. Opens eyes to stimuli, PERRL, no purposeful movement. One episode of sustained VT - self converted. 150 amio bolus given. Levo 0.06, vaso 1. Doppler pulses. IABP 1:1, site bleeding requiring dressing change but has improved. 1 unit FFP given. Continued maroon stools and OG output.

## 2019-03-14 NOTE — PROGRESS NOTES
[facility]  Cardiac ICU Progress Note  03/14/2019           Key events - last 24 hours / Hospital course   Bleeding around IABP site  VT overnight that self terminated; MAPs dropped with chugging of the ECMO during the event  CT scans done yesterday showing intraparenchymal hemorrhage, aorto-esophageal fistula, liver and splenic infarcts.    Changes today:  - ongoing discussions with family regarding               Assessment and Plan:   Hellen Riddle is a 39 year old male who was admitted on 2/23/2019.    Neurology: Intraparenchymal hemorrhage  Intubated, sedated.  --reduce sedation to minimize pressor requirements   Cardiovascular / Hemodynamics: Refractory VF arrest, currently on peripheral VA ECMO support.  Coronary angiography on admission showed 3v disease and he underwent MITA to mRCA & mLCx. Has IABP as well.  ECMO decannulation stymied by VT in OR on 3/6. Returned to the cath lab for MITA to  LAD and distal LCx.  He was started on amiodarone with good suppression of ventricular arrhythmias.  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  RV dysfunction is mild.  --vascular surgery consulted for aorto-esophageal fistula, but declined to operate due to futility and high risk  --wean pressors as able; will tolerate MAPs 55-60  --hold heparin gtt and ASA given ongoing bleeding and cont ticagrelor  --hold lipitor for now given hepatic injury during arrest  --hold ACE/ARB for now given renal failure and shock  --holding beta blocker given shock    Pulmonary: Acute hypoxic respiratory failure due to pulmonary edema, VAP  ETT in the trachea  --cont abx as below  --wean vent as able  --wean flolan  --daily CXR  --Q2h ABGs for now  --consider scheduled duonebs if signs of lung dz, currently PRN     GI and Nutrition: 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  Appreciate nephrology's assistance.  On CRRT.  --monitor urine output  --maintain K>4 and Mg>2    Infectious Disease: Multiple organisms recovered in sputum: continue antimicrobial and antiviral therapy as guided by infectious disease.   Hematology and Oncology: Acute anemia due to GI bleed  --holding heparin as above   --cryo PRN fibrinogen < 200; FFP for INR >2  --Transfuse for Hgb<10   Endocrinology: No known medical history. BG elevated.  --insulin gtt   Lines: R femoral arterial and venous ECMO cannulae   L femoral arterial line   R radial arterial line   L femoral CVC   ETT   Pisano catheter  OG tube   Current lines are required for patient management             Medications:   I have reviewed this patient's current medications           Review of Systems:   Review of systems not obtained due to patient factors - intubation              Physical Exam:   Exam and Labs by System  Neuro: Exam: intubated and sedated, pupils equal but not reactive  General Appearance:   Comfortable, sedated   Neuro:   Sedated, flutters eyes; PERRL       Cardiovascular:     Heart Rate: 70  Exam:    Normal s1 and s2, no audible murmur, cool extremities.     Recent Labs   Lab 03/14/19  0404 03/13/19  2203 03/13/19  1633   TROPI 2.615* 2.531* 3.102*       Pulm:   Vent Settings:  Resp: 12 SpO2: 100 % O2 Device: Mechanical Ventilator    Ventilation Mode: CMV/AC  (Continuous Mandatory Ventilation/ Assist Control)  FiO2 (%): 60 %  Rate Set (breaths/minute): 12 breaths/min  Tidal Volume Set (mL): 530 mL  PEEP (cm H2O): 10 cmH2O  Oxygen Concentration (%): 60 %  Resp: 12    Exam:   Symmetrical chest shape and movements with each tidal breath  Good patient-ventilator synchrony     Arterial Blood Gas:   Recent Labs   Lab 03/14/19  0753 03/14/19  0606 03/14/19  0404 03/14/19  0212   PH 7.41 7.40 7.38 7.41   PCO2 34* 35 36 34*   PO2 91 97  124* 111*   HCO3 22 21 21 22   O2PER 60.0 60.0 60.0 60.0       Renal:   Meds:  Vitals:    03/12/19 0500 03/13/19 0300 03/14/19 0200   Weight: 92.6 kg (204 lb 2.3 oz) 95.8 kg (211 lb 3.2 oz) 97.9 kg (215 lb 13.3 oz)   I/O last 3 completed shifts:  In: 2981.7 [I.V.:1256.7; Other:44; NG/GT:205]  Out: 2002 [Emesis/NG output:500; Other:402; Stool:1100]  Recent Labs   Lab 03/14/19  0404 03/13/19  2203    138   POTASSIUM 4.4 4.6   CHLORIDE 103 102   CO2 20 18*   ANIONGAP 14 18*   *  119* 118*  118*   BUN 48* 52*   CR 1.68* 1.79*   ALEKSANDER 8.3* 8.2*     No components found for: URINE     GI:   Exam:    Distended, less soft today, no bowel sounds  Red blood in stool bag     Diet: tube feeds on hold  Recent Labs   Lab 03/14/19  0404 03/13/19 2203 03/13/19  1633   AST 5,882* 7,466* 8,672*   ALT 3,411* 3,941* 3,748*   BILITOTAL 17.0* 15.7* 13.6*   ALBUMIN 3.1* 3.2* 3.0*   PROTTOTAL 5.2* 5.2* 5.1*   ALKPHOS 681* 672* 620*       Heme/ID:   Meds:  Temp: 97.7  F (36.5  C) Temp src: EsophagealTemp  Min: 96.8  F (36  C)  Max: 98.6  F (37  C)   Recent Labs   Lab 03/14/19  0404 03/13/19 2203 03/13/19  1633 03/13/19  1029 03/13/19  0358   WBC 32.9* 32.7* 32.4* 31.6* 27.4*   HGB 11.0* 11.1* 9.7* 11.4* 9.9*   HCT 33.1* 32.6* 29.4* 34.0* 30.4*   MCV 91 90 92 90 92   RDW 18.6* 18.1* 18.0* 17.4* 17.6*    179 186 134* 117*     Recent Labs   Lab 03/14/19  0404 03/13/19  2203 03/13/19  1719 03/13/19  1029 03/13/19  0358   INR 2.13* 2.05* 2.10* 2.05* 2.13*   PTT 45* 44* 45* 44* 45*     Recent Labs   Lab 03/14/19  0607 03/14/19  0304 03/13/19  0414 03/13/19  0359 03/12/19  1053 03/12/19  0324   CULT PENDING No growth after 1 hour No growth after 1 day Culture in progress Moderate growth  Yeast  *  Light growth  Probable  Klebsiella pneumoniae  *  Culture in progress No growth after 2 days       Endo:   Meds:  Recent Labs   Lab 03/14/19  0404 03/13/19  2203 03/13/19  1633 03/13/19  1029 03/13/19  0956   *  119* 118*   118* 125*  128* 119* 136*       Lines:    No erythema or discharge at the catheter entry site               Data:   All lab results reviewed    The pt was discussed and evaluated with Dr. Humphreys.    Jersey Alexander MD  Cardiovascular Medicine Fellow, PGY-7  [phone]       Critical Care Services Progress Note:     Hellen Riddle remains critically ill with acute coronary syndrome, cardiac arrhythmia, shock and On ECMO      I personally examined and evaluated the patient today.   The patient s prognosis today is grave  I have evaluated all laboratory values and imaging studies from the past 24 hours.  Key findings and decisions made today included Intraparenchymal Bleeding   I personally managed the ventilator, sedation, pain control and analgesia, metabolic abnormalities, antibiotic therapy, nutritional status and vasoactive medications.   Consults ongoing and ordered are Surgery  Procedures that will happen today are: none  All treatments were placed at my direction.  I formulated today s plan with the house staff team or resident(s) and agree with the findings and plan in the associated note.       The above plans and care have been discussed with family and all questions and concerns were addressed.     I spent a total of 60 minutes (excluding procedure time) personally providing and directing critical care services at the bedside and on the critical care unit for Hellen Riddle.        Oleksandr Humphreys

## 2019-03-14 NOTE — PROGRESS NOTES
CRRT STATUS NOTE    DATA:  Time:  06:14 AM  Pressures WNL:  Yes  Filter Status: WDL    Problems Reported/Alarms Noted:  None reported    Supplies Present:  Yes    ASSESSMENT:  Patient Net Fluid Balance:  Net +2 L yesterday.  Net +180 cc since midnight  Vital Signs:  Temp 97, HR 72, BP 72/50 (62), Saturations 100% via Vent and Ecmo  Labs: Potassium 4.4, Mag 2.5, Phos 5, Lactate 5.1, BUN 48, Creat 1.68, WBC 32.9, Hgb 11, INR 2.13  Goals of Therapy:  I=O as BP allows    INTERVENTIONS:   None required overnight.     PLAN:  Continue with current plan of care. Change circuit Q 72 hours and prn. Contact CRRT Resource RN at [phone] with questions or concerns.

## 2019-03-14 NOTE — PROGRESS NOTES
Nephrology dialysis note    This patient was seen and examined while on CRRT. Laboratory results and nurses' notes were reviewed. Remains on norepi, vaso. Ran positive ~2L.     No changes to management of volume, anemia, BMD, acidosis, or electrolytes.    Diagnosis - FERNANDA in setting of cardiogenic shock, continue CRRT    Jai Altamirano MD  [phone]

## 2019-03-14 NOTE — CONSULTS
[facility]  NEUROCRITICAL CARE/STROKE CONSULTATION NOTE    This consultation was requested by Dr. Correa from the CV ICU service.    Reason for Consultation: right posterior parietal/occipital lobe intraparenchymal hemorrhage     HPI:  Mr. Riddle is a 39 year old male who presents with a past medical history of HLD, admitted on 2/23 for an out-of-hospital refractory vfib cardiac arrest s/p ECMO cannulation and cardiac PCI. His hospitalization course has been complicated by ischemic liver failure, spleen infarct, ARF on CRRT, E.Coli and Klebsiella pneumonia, VA and VV ECMO cannulation, and recent diagnosis of aorto-esophageal fistula. On repeat head CT scan performed once stable to go to CT scanner, he does also now have a small hyperdensity present in the right posterior parieto-occipital lobe that was not present on initial scan obtained after ECMO cannulation. He is not currently on therapeutic anti-coagulation (held since midnight at end of 3/11/19), and is currently on brilinta for stents. Is also receiving ertapenem for ventilator-associated pneumonia. Weaning versed/fentanyl today.     PAST MEDICAL HISTORY:   Past Medical History:   Diagnosis Date     Dyslipidemia        PAST SURGICAL HISTORY:   Past Surgical History:   Procedure Laterality Date     CV HEART CATHETERIZATION WITH POSSIBLE INTERVENTION N/A 3/7/2019    Procedure: Coronary Angiogram;  Surgeon: Dante Molina MD;  Location:  HEART CARDIAC CATH LAB     INSERT EXTRACORPORAL MEMBRANE OXYGENATOR Right 3/6/2019    Procedure: Emergent Insert VA extracorporal membrane oxygenator right groin;  Surgeon: Reggie Perez MD;  Location:  OR     REMOVE EXTRACORPORAL MEMBRANE OXYGENATOR ADULT N/A 3/6/2019    Procedure: EXTRACORPORAL MEMBRANE OXYGENATOR REMOVAL FEMORAL CANNULAS repair right femoral vessels;  Surgeon: Craig Berry MD;  Location: [facility] OR     FAMILY HISTORY: No family history on  "file.    SOCIAL HISTORY:   Social History     Tobacco Use     Smoking status: Not on file   Substance Use Topics     Alcohol use: Not on file     MEDICATIONS:  No current outpatient medications on file.     Allergies:  No Known Allergies    ROS: 10 point ROS of systems including Constitutional, Eyes, Respiratory, Cardiovascular, Gastroenterology, Genitourinary, Integumentary, Muscularskeletal, Psychiatric were all negative except for pertinent positives noted in my HPI.    EXAM:  Temp 98.1  F (36.7  C)   Resp 12   Ht 1.68 m (5' 6.14\")   Wt 97.9 kg (215 lb 13.3 oz)   SpO2 100%   BMI 34.69 kg/m    CV: RRR, no m/r/g  Resp: CTAB, no wheezes or rubs  Abd: soft, NT, ND. BS+ throughout.  Neuro:   Remains intubated, recently weaned off of sedation.   PERRL, corneals intact, gag reflex intact, cough intact.   No movement to noxious stimuli x 4.    LABS/IMAGING:  Most Recent 3 CBC's:  Recent Labs   Lab Test 03/14/19  1549 03/14/19  0954 03/14/19  0404   WBC PENDING 35.0* 32.9*   HGB 10.3* 10.3* 11.0*   MCV 92 92 91   * 155 172     Most Recent 3 BMP's:  Recent Labs   Lab Test 03/14/19  0954 03/14/19  0404 03/13/19  2203    137 138   POTASSIUM 4.1 4.4 4.6   CHLORIDE 102 103 102   CO2 21 20 18*   BUN 47* 48* 52*   CR 1.56* 1.68* 1.79*   ANIONGAP 14 14 18*   ALEKSANDER 8.4* 8.3* 8.2*   *  116* 122*  119* 118*  118*     CTA Abdomen Pelvis with Contrast   Final Result   Abnormal   IMPRESSION:    1. Large volume of hemorrhage markedly distending the stomach and   duodenal bulb. Aortoesophageal fistula highly suspected, as described   above.   2. Numerous patchy areas of hypoattenuation peripherally in the liver   and spleen most consistent with infarction.   3. 6.7 x 6.2 cm hematoma in the right groin about the access point of   the femoral lines.   4. Intraoperative balloon pump tip seen just below the level of the   renal arteries. Both kidneys normally enhance.   5. Bibasilar atelectasis, with interval " decrease in patchy confluent   airspace opacities consistent with resolving pulmonary edema versus   diffuse alveolar damage.   6. Evidence areas of subendocardial hypoperfusion of the left   ventricle, suggesting ischemia or infarct.      [Urgent Result: Gastric hemorrhage]      Finding was identified on 3/13/2019 4:01 PM.       Dr. Muller was contacted by  at 3/13/2019 4:26 PM and   verbalized understanding of the urgent finding.        The preliminary report of no aortoenteric fistula was provided by . After review by the staff radiologist, the final interpretation   of aortoesophageal fistula was provided by  and communicated   to Dr. Muller at 4:45 PM on 3/13/2019.      I have personally reviewed the examination and initial interpretation   and I agree with the findings.      AYSE LEROY MD      XR Chest Port 1 View   Final Result   Impression:    1. Increased patchy hazy and interstitial opacities of lungs likely   representing pulmonary edema, better delineated on CT 2/13/2019.   2. Support devices in stable position.   3. Right upper lung changes suspicious for pneumonic process or severe   atelectasis.      I have personally reviewed the examination and initial interpretation   and I agree with the findings.      MIKKI DAI MD      CT Head w/o Contrast   Final Result   Abnormal   Impression: Intraparenchymal hemorrhage in the posterior right   parietal/occipital lobe, with a smaller hemorrhagic focus in the   anterior left frontal lobe. Recommend 6 hour follow-up CT to assess   stability.      [Urgent Result: Right posterior parietal/occipital intraparenchymal   hemorrhage]      Finding was identified on 3/13/2019 3:46 PM.       Dr. Latasha Alexander was contacted by Dr. Mckeon at 3/13/2019 3:49 PM and   verbalized understanding of the urgent finding.       I have personally reviewed the examination and initial interpretation   and I agree with the findings.      JOEL GARNETT MD       CT Chest w/o Contrast   Final Result   IMPRESSION:    1. Right IJ approach ECMO cannula with tip in the right pulmonary   artery. Inferior approach ECMO cannula with tip in the left   brachiocephalic vein.    2. Diffuse pulmonary opacities in a pattern most consistent with   pulmonary edema or diffuse alveolar hemorrhage.   3. Large amount of blood products in the stomach better visualized on   the abdominal pelvic study from today.      I have personally reviewed the examination and initial interpretation   and I agree with the findings.      AYSE LEROY MD      XR Chest Port 1 View   Final Result   Impression:   1. Endotracheal tube slightly advanced from previous, otherwise, no   significant interval change.   2. Stable mild pulmonary edema.      I have personally reviewed the examination and initial interpretation   and I agree with the findings.      MIKKI DAI MD      XR Chest Port 1 View   Final Result   Impression: Slightly decreased patchy perihilar opacities. Support   devices unchanged in position.      I have personally reviewed the examination and initial interpretation   and I agree with the findings.      ROSELYN CASH MD      XR Abdomen Port 1 View   Final Result   Impression: Prominent gas-filled transverse colon without evidence of   pneumatosis.      I have personally reviewed the examination and initial interpretation   and I agree with the findings.      ROSELYN CASH MD      XR Chest Port 1 View   Final Result   Impression:    1. Support devices in stable position   2. Patchy perihilar pulmonary edema is unchanged.      I have personally reviewed the examination and initial interpretation   and I agree with the findings.      ROSELYN CASH MD      XR Chest Port 1 View   Final Result   Impression:    1. Endotracheal tube tip 4.9 cm above the tereso.   2. Patchy perihilar pulmonary edema unchanged.      I have personally reviewed the examination and initial interpretation    and I agree with the findings.      ERIN BABB MD      XR Chest Port 1 View   Final Result   IMPRESSION:    1. Esophageal temperature probe projects over the high cervical spine.   Recommend advancement.   2. Stable ECMO cannulae.   3. Diffuse patchy airspace opacities and interstitial opacities,   slightly improved compared with prior examination. Findings likely   represents pulmonary edema.      I have personally reviewed the examination and initial interpretation   and I agree with the findings.      ERIN BABB MD      XR Chest Port 1 View   Final Result   IMPRESSION:    1. Esophageal temperature probe projects over C3. Recommend   advancement.   2. Stable ECMO cannulae.   3. Stable mixed patchy and interstitial airspace opacities, likely   indicating pulmonary edema/ARDS.      I have personally reviewed the examination and initial interpretation   and I agree with the findings.      BEVERLY BOLTON MD      XR Chest Port 1 View   Final Result   IMPRESSION:    1. Stable placement of the ECMO cannula. The venous cannula continues   to project over the left brachiocephalic vein.   2. Improved patchy and interstitial airspace opacities, likely   indicating improving pulmonary edema.      I have personally reviewed the examination and initial interpretation   and I agree with the findings.      MIKKI DAI MD      US Abdomen Limited   Final Result   IMPRESSION:    1.  Hepatomegaly with steatosis. No focal mass.   2.  Small amount of echogenic sludge in the gallbladder without   definite evidence of acute cholecystitis.      I have personally reviewed the examination and initial interpretation   and I agree with the findings.      AYSE LEROY MD      XR Abdomen Port 1 View   Final Result   IMPRESSION:    1. Gastric tube tip and side-port project over the stomach.   2. Feeding tube tip projects over the ligament of Treitz.   3. Please see same day chest radiograph regarding lines and  tubes and   pulmonary findings.      I have personally reviewed the examination and initial interpretation   and I agree with the findings.      MIKKI DAI MD      XR Chest Port 1 View   Final Result   IMPRESSION:    1.  Interval readjustment of the low approach ECMO cannula. The tip   now projects over the central left brachiocephalic vein. This may need   to be retracted.   2. Esophageal temperature probe projects over the cervical spine.   Recommend advancement to ensure esophageal placement.   3. Worsening diffuse patchy and interstitial opacities. Findings   suggest cardiogenic or noncardiogenic pulmonary edema versus   infection.      Findings of ECMO catheter placement were discussed with Dr. Covarrubias by   telephone at 3/6/2019 10:00 PM by Dr. Jai Burr.      I have personally reviewed the examination and initial interpretation   and I agree with the findings.      BEVERLY BOLTON MD      XR Chest Port 1 View   Final Result   IMPRESSION:    1. Stable lines and tubes.   2. Stable mild pulmonary edema.   3. Stable bibasilar atelectasis and small pleural effusions.      I have personally reviewed the examination and initial interpretation   and I agree with the findings.      ROSELYN CASH MD      XR Chest Port 1 View   Final Result   IMPRESSION:    1. Right IJ ECMO catheter tip projects over the right pulmonary   artery. Lower approach ECMO catheter projects over the right atrium.     2. Intra-aortic balloon pump tip projects at the level of the tereso.    3. Diffuse interstitial opacities, right greater than left, slightly   improved. Likely pulmonary edema.   4. Stable retrocardiac opacity, likely atelectasis.      I have personally reviewed the examination and initial interpretation   and I agree with the findings.      ROSELYN CASH MD      XR Chest Port 1 View   Final Result   IMPRESSION:    1. Intra-aortic balloon pump tip projects at the level of the tereso.    2. ECMO cannula projects over  the right atrium.    3. Endotracheal tube projects over the midthoracic trachea.    4. Diffuse patchy airspace opacities, stable, which may indicate   pulmonary edema versus severe infection.      I have personally reviewed the examination and initial interpretation   and I agree with the findings.      ROSELYN CASH MD      XR Chest Port 1 View   Final Result   Impression:    1. Inferior approach ECMO cannula projects over the right atrium,   stable. Additional lines and tubes as above.   2. Diffuse pulmonary opacities, slightly improved since prior.   Improving aeration, especially pronounced in the upper lungs.   3. Small layering pleural effusions are again seen.      ASHLEIGH CHAN MD      XR Chest Port 1 View   Final Result   Impression:    1. Bilateral airspace opacities consistent with pulmonary edema and/or   hemorrhage are again seen. There is been slight interval improvement   in aeration of the upper lungs.   2. Right greater than left pleural effusions, unchanged.   3. Support devices as detailed above.      I have personally reviewed the examination and initial interpretation   and I agree with the findings.      AYSE ALVAREZ      XR Chest Port 1 View   Final Result   Impression:    1. Near complete opacification of both lungs similar to prior exam.   Findings likely representing edema and/or hemorrhage.   2. Support devices as detailed above.      I have personally reviewed the examination and initial interpretation   and I agree with the findings.      ROSELYN CASH MD      XR Chest Port 1 View   Final Result   Impression:    1. Unchanged near complete opacification of both lungs similar to   prior exam. Likely edema and/or hemorrhage.   2. Support devices as detailed above.      I have personally reviewed the examination and initial interpretation   and I agree with the findings.      ROSELYN CASH MD      XR Chest Port 1 View   Final Result   Impression:    1. Support devices in stable  position.   2. Unchanged near complete opacification of the bilateral lungs with   hilar atelectasis.      I have personally reviewed the examination and initial interpretation   and I agree with the findings.      ASHLEIGH CHAN MD      XR Chest Port 1 View   Final Result   Impression:    1. Unchanged near complete opacification of the lungs with air   bronchograms.   2. Support devices as detailed above.      I have personally reviewed the examination and initial interpretation   and I agree with the findings.      ROSELYN CASH MD      XR Chest Port 1 View   Final Result   Impression:    1. Worsening airspace opacities with complete opacification of both   lungs.   2. Support devices as detailed above.      I have personally reviewed the examination and initial interpretation   and I agree with the findings.      ROSELYN CASH MD      XR Abdomen Port 1 View   Final Result   IMPRESSION:   1. Feeding tube tip projects over the expected location of the fourth   portion of the duodenum.   2. Stable support device positioning.   3. Paucity of air filled bowel.   4.  Partially visualized opacification of lungs.      I have personally reviewed the examination and initial interpretation   and I agree with the findings.      LANDRY YI MD      XR Chest Port 1 View   Final Result   Impression:    1. Intra-aortic balloon pump 3 cm above the tereso. Inferior ECMO   cannula terminating at the midsuperior vena cava. Enteric tube in the   mid thoracic trachea.   2. Near complete opacification of the bilateral lungs, correspond with   confluent opacities seen on same-day CT and likely  pulmonary edema.      I have personally reviewed the examination and initial interpretation   and I agree with the findings.      AYSE LEROY MD      CT Head w/o Contrast   Final Result   Impression: No acute intracranial pathology. No definite hypoxic   ischemic injury. Repeat CT could be considered, as clinically   indicated.      I  have personally reviewed the examination and initial interpretation   and I agree with the findings.      JOEL GARNETT MD      CT Chest Abdomen Pelvis w/o Contrast   Final Result   IMPRESSION:    1. Extensive confluent opacities in the bilateral posterior lungs,   likely atelectasis. Intralobular septal thickening and groundglass   opacities throughout the aerated portions of the lung, alveolar   hemorrhage versus pulmonary edema.    2. Lines and tubes as described in findings. Note the right-sided   arterial cannula entry point may be above the inguinal ligament which   may put the patient at risk for retroperitoneal bleed at the time of   catheter removal.     3. Bilateral nondisplaced rib fractures.   4. 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      I have personally reviewed the examination and initial interpretation   and I agree with the findings.      AYSE LEROY MD      XR Chest Port 1 View   Final Result   Impression:    1. Worsening airspace opacities with near complete consolidation of   the right hemithorax and the left lower lung.   2. Stable support devices as detailed above.      I have personally reviewed the examination and initial interpretation   and I agree with the findings.      ROSELYN CASH MD      XR Chest Port 1 View   Final Result   Impression:    1. Endotracheal tube approximately 3 cm above the tereso. Additional   support devices in stable position.   2. Diffuse bilateral alveolar airspace opacities, right worse than   left but overall stable.      I have personally reviewed the examination and initial interpretation   and I agree with the findings.      MIKKI DAI MD      XR Abdomen Port 1 View   Final Result   IMPRESSION: Nonobstructive bowel gas pattern with no pneumatosis or   portal venous gas.      I have personally reviewed the examination and initial interpretation   and I agree with the findings.      MIKKI DAI MD      XR Chest Port 1 View   Final Result    Impression:    1. The tip of ETT projects 4 cm above the tereso.   2. Interval moderate improvement of left lung aeration.   3. No significant change in diffuse right lung airspace opacities.      I have personally reviewed the examination and initial interpretation   and I agree with the findings.      MIKKI DAI MD      US Lower Extremity Arterial Duplex Bilateral   Final Result   Impression:       1. Right leg: Monophasic tardus parvus waveforms throughout, likely   secondary to right femoral ECMO.   2. Left leg: Triphasic waveforms throughout with blunted upstroke,   likely secondary to IABP.       Guidelines:   [facility] duplex criteria for lower limb arterial   occlusive disease   -Percent stenosis- Normal (1-19%): Peak systolic velocity (cm/s):   <150, End-diastolic velocity (cm/s): <40, Velocity ration (Vr): <1.5,   Distal arterial waveform: Triphasic   -Percent stenosis- 20-49%: Peak systolic velocity (cm/s): 150-200,   End-diastolic velocity (cm/s): <40, Velocity ration (Vr): 1.5-2.0,   Distal arterial waveform: Triphasic   -Percent stenosis- 50-75%: Peak systolic velocity (cm/s): 200-300,   End-diastolic velocity (cm/s): <90, Velocity ration (Vr): 2.0-3.9,   Distal arterial waveform: Poststenotic turbulence distal to stenosis,   monophasic distal waveform   -Percent stenosis- >75%: Peak systolic velocity (cm/s): >300,   End-diastolic velocity (cm/s): <90, Velocity ration (Vr): >4.0, Distal   arterial waveform: Dampened distal waveform and low PSV/EDV* in the   stenosis   -Percent stenosis- Occlusion: Absent flow by color Doppler/pulsed   Doppler spectral analysis; length of occlusion estimated from distance   between exit and reentry collateral arteries   *PSV = peak systolic velocity, EDV = end-diastolic velocity   [email or web address]/[email or web address]   html      I have personally reviewed the examination and initial interpretation   and I agree  with the findings.      AYSE LEROY MD      CT Head w/o Contrast   Final Result   Impression:    No acute intracranial pathology.      I have personally reviewed the examination and initial interpretation   and I agree with the findings.      MONIKA VALENTIN MD          ASSESSMENT:  39 year old male s/p vfib cardiac arrest, s/p VV and VA ECMO and PCI. Complicated intensive care hospitalization course, including organ failure, E.Coli and Klebsiella pneumonia and aorto-pulmonary fistula, now with evidence of right parieto-occipital hyperdensity. This hyperdensity, which was not present on initial imaging, is likely an intraparenchymal bleed surrounding acute illness while being anti-coagulated. However, there is a hypodense circumscribed mass present on imaging, and cannot rule out potential infection/abscess.     RECOMMENDATIONS:  - Would recommend a repeat head CT when medically stable to do so. Please obtain head CT without and with contrast.   - Also would recommend broad CNS coverage of aerobes and anaerobes until able to rule out intraparenchymal infection.     The patient was discussed with Dr. Jason, neurocritical care staff, and he agrees with the above.    Vickie Renteria MD  Neurocritical care fellow

## 2019-03-14 NOTE — PLAN OF CARE
Pt remains on VAV ECMO. Afebrile. HR 60-80s, sinus with ocassional PVCs. One short run of VT today, only about 10 beats. MAPs 55-75. Levo at 0.06mcg/kg/min, vaso titrated off. IABP 1:1, 100% augmentation. Did turn down on VA ECMO, MAPs dropped to 50, unsure if he will be decannulated tomorrow. Vent settings CMV, 70%, 12, 530, 10. Still having bloody output from rectal tube and OG. No product or fluids given this shift. Sedation was shut off this afternoon, pt has not woke up, moved any extremities or followed commands. Will occasionally open eyes with turns, but no tracking. Fentanyl running at 50mcg/hr for pain control. Called [facility] fellow about art line having a flat wave form and not being able to draw back. He said they would come change it out tonight. Will continue to monitor and update as needed. Refer to flowsheets for further documentation.

## 2019-03-14 NOTE — PROGRESS NOTES
ECMO Shift Summary:    Patient remains on VAV ECMO, all equipment is functioning and alarms are appropriately set. RPM's 3265 with flow range 4.03 - 4.26 L/min. Sweep gas is at 2.5 LPM and FiO2 90%. Circuit remains free of air, clot and fibrin. Cannulas are secure with no bleeding from site. Extremities are cool. Suctioned ETT for moderate thick yellow secretions.    Significant Shift Events: turn down VA ECMO, MAP's dropped to 50's.    Vent settings:  Ventilation Mode: CMV/AC  (Continuous Mandatory Ventilation/ Assist Control)  FiO2 (%): 70 %  Rate Set (breaths/minute): 12 breaths/min  Tidal Volume Set (mL): 530 mL  PEEP (cm H2O): 10 cmH2O  Oxygen Concentration (%): 60 %  Resp: 13  .    Heparin off, ACT range 168-176    Urine output is as charted, blood loss OG output. No product given      Intake/Output Summary (Last 24 hours) at 3/14/2019 1830  Last data filed at 3/14/2019 1800  Gross per 24 hour   Intake 1885 ml   Output 1907 ml   Net -22 ml       ECHO:  No results found for this or any previous visit.No results found for this or any previous visit.    CXR:  Recent Results (from the past 24 hour(s))   XR Chest Port 1 View    Narrative    Exam: XR CHEST PORT 1 VW, 3/14/2019 1:50 AM    Indication: VAV ECMO/IABP/INTUBATED    Comparison: 3/13/2019    Findings:   Single portable view the chest. ECMO cannula unchanged in position  from previous. Unchanged intra-aortic balloon pump projecting at the  tereso. Enteric tube projecting over the esophagus and stomach.  Endotracheal tube in the mid thoracic trachea. The cardiomediastinum  and upper abdomen are unchanged. There are increased interstitial and  hazy opacities of lungs most prominent in the right apex and  retrocardiac space. These findings likely representing pulmonary edema  has better delineated on CT 3/13/2019.      Impression    Impression:   1. Increased patchy hazy and interstitial opacities of lungs likely  representing pulmonary edema, better delineated  on CT 2/13/2019.  2. Support devices in stable position.  3. Right upper lung changes suspicious for pneumonic process or severe  atelectasis.    I have personally reviewed the examination and initial interpretation  and I agree with the findings.    MIKKI DAI MD       Labs:  Recent Labs   Lab 03/14/19  1807 03/14/19  1550 03/14/19  1549 03/14/19  1358  03/14/19  1202   PH 7.38  --  7.45 7.41  --  7.48*   PCO2 39  --  32* 34*  --  30*   PO2 79*  --  91 92  --  70*   HCO3 23  --  22 22  --  22   O2PER 70.0 70.0 90.0  70.0 70   < > 60    < > = values in this interval not displayed.       Lab Results   Component Value Date    HGB 10.3 (L) 03/14/2019    PHGB Canceled, Test credited 03/14/2019     (L) 03/14/2019    FIBR 259 03/12/2019    INR 2.15 (H) 03/14/2019    PTT 44 (H) 03/14/2019    DD >20.0 (H) 03/05/2019    AXA <0.10 03/14/2019    ANTCH 37 (L) 03/14/2019         Plan is remains on VAV ECMO      Sylvia Clemens, RRT  3/14/2019 6:30 PM

## 2019-03-14 NOTE — PROGRESS NOTES
ECMO Shift Summary:    Patient remains on VAV ECMO, all equipment is functioning and alarms are appropriately set. RPM's 3265 with flow range 4.03-4.31 L/min VV flow has been 1.72-1.79 L/m. Sweep gas is at 3 LPM and FiO2 80%. Circuit remains free of air, with clot and fibrin on the oxygenator. Cannulas are secure with no bleeding from site. Extremities are cool. Suctioned ETT for yellow, red streaked secretions.    Significant Shift Events:     Patient had a short run of VT with chugging.  Pads were placed on patient and the rhythm converted before a shock could be delivered.    Vent settings:  Ventilation Mode: CMV/AC  (Continuous Mandatory Ventilation/ Assist Control)  FiO2 (%): 60 %  Rate Set (breaths/minute): 12 breaths/min  Tidal Volume Set (mL): 530 mL  PEEP (cm H2O): 10 cmH2O  Oxygen Concentration (%): 60 %  Resp: 14      Patient is not on heparin, ACT range 168-184.    Patient is anuric on CRRT, blood loss was immeasurable fith a leak at the IABP site, bloody stool and bloody OG output. Product given included 1 unit FFP.      Intake/Output Summary (Last 24 hours) at 3/14/2019 0639  Last data filed at 3/14/2019 0600  Gross per 24 hour   Intake 2952.7 ml   Output 1371 ml   Net 1581.7 ml       ECHO:  No results found for this or any previous visit.No results found for this or any previous visit.    CXR:  Recent Results (from the past 24 hour(s))   CT Head w/o Contrast   Result Value    Radiologist flags (Urgent)     Right posterior parietal/occipital intraparenchymal    Narrative    CT HEAD W/O CONTRAST 3/13/2019 3:43 PM    Provided History: Altered mental status (AMS), unclear cause    ICD-10: Altered mental status    Comparison: CT dated 2/25/2019.    Technique: Using multidetector thin collimation helical acquisition  technique, axial, coronal and sagittal CT images from the skull base  to the vertex were obtained without intravenous contrast.     Findings:  Intraparenchymal hemorrhage in the posterior  right  parietal/occipital lobe measuring 2.5 x 1.5 cm and associated with  surrounding edema. No significant mass effect or midline shift. The  ventricles are proportionate to the cerebral sulci. The gray to white  matter differentiation of the cerebral hemispheres is preserved. The  basal cisterns are patent.    The visualized paranasal sinuses are clear. The mastoid air cells are  clear.       Impression    Impression: Intraparenchymal hemorrhage in the posterior right  parietal/occipital lobe, with a smaller hemorrhagic focus in the  anterior left frontal lobe. Recommend 6 hour follow-up CT to assess  stability.    [Urgent Result: Right posterior parietal/occipital intraparenchymal  hemorrhage]    Finding was identified on 3/13/2019 3:46 PM.     Dr. Latasha Alexander was contacted by Dr. Mckeon at 3/13/2019 3:49 PM and  verbalized understanding of the urgent finding.     I have personally reviewed the examination and initial interpretation  and I agree with the findings.    JOEL GARNETT MD   CT Chest w/o Contrast    Narrative    EXAMINATION: CT CHEST W/O CONTRAST, 3/13/2019 3:52 PM    TECHNIQUE:  Helical CT images from the thoracic inlet through the lung  bases were obtained without IV contrast. Contrast dose: None    COMPARISON: 2/23/2019    HISTORY: Interstitial lung disease    FINDINGS:    Endotracheal tube tip in midthoracic trachea. Right IJ approach ECMO  cannula with tip in the right pulmonary artery. Inferior approach ECMO  cannula with tip in the left brachiocephalic vein. Gastric tube  coursing below the field-of-view of this study. Intra-aortic balloon  pump superior marker at the level of the tereso. Inferior approach  central line with tip at the hepatic IVC.    Diffuse centrilobular groundglass opacities with mild interlobular  septal thickening. Subpleural and gravity dependent sparing. Bilateral  lower lobar atelectasis. No pneumothorax or pleural effusion.    Left ventricular enlargement. Multiple coronary  stents. No pericardial  effusion. Normal caliber and configuration of the thoracic great  vessels. No thoracic adenopathy.    Large amount of blood in the stomach better visualized on the abdomen  and pelvis portion. Splenic and hepatic infarcts also better  appreciated on the abdominal pelvic portion. Vicarious excretion of  contrast in the gallbladder. Sternal fracture, nondisplaced.      Impression    IMPRESSION:   1. Right IJ approach ECMO cannula with tip in the right pulmonary  artery. Inferior approach ECMO cannula with tip in the left  brachiocephalic vein.   2. Diffuse pulmonary opacities in a pattern most consistent with  pulmonary edema or diffuse alveolar hemorrhage.  3. Large amount of blood products in the stomach better visualized on  the abdominal pelvic study from today.    I have personally reviewed the examination and initial interpretation  and I agree with the findings.    AYSE LEROY MD   CTA Abdomen Pelvis with Contrast   Result Value    Radiologist flags Gastric hemorrhage (Urgent)    Narrative    EXAMINATION: CTA ABDOMEN PELVIS WITH CONTRAST, 3/13/2019 3:56 PM    TECHNIQUE:  Helical CT images from the lung bases through the  symphysis pubis were obtained  with IV contrast. Contrast dose:  iopamidol (ISOVUE-370) solution 130 mL    COMPARISON: CT chest/abdomen/pelvis 2/23/2018    HISTORY: GI bleed; On VAV ECMO; brisk intermittent GI bleed with  concern for ischemic bowel vs arterio-enteric fistula    FINDINGS:  Chest:  Compressive bibasilar atelectasis the lungs. Patchy, diffuse  centrilobular groundglass opacity, appears decreased compared to  2/23/2019. Significant continuous hypoattenuation along the  subendocardium in the left ventricle involving the apex and along the  apical, mid and basilar segments of the left ventricular free wall. No  pericardial effusion. Leads in the right ventricle. The right femoral  approach ECMO cannula extends through the right atrium, and  ascends  above the field-of-view.    Abdomen/pelvis:  Newly visualized is a large volume of hemorrhage in the stomach and  duodenal bulb, which is markedly distends the stomach. There are 2  enteric tubes, with one terminating in the stomach, and another within  the second/third portion of the duodenum. 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    New since the prior examination, marked peripherally located patchy  areas of hypoattenuation, several of which are geographic or  wedge-shaped in the liver, with a large region of involvement in the  left lobe of the liver.    Significant patchy hypoattenuation peripherally in the spleen.    Symmetric enhancement of the kidneys. No suspicious renal mass. No  hydronephrosis. Hyperenhancement of the adrenal glands consistent with  hypoperfusion complex. Pancreas is unremarkable.    No bowel junction. No significant free pelvic fluid. No  pneumoperitoneum. Contrast in the urinary bladder which is  decompressed. Rectal tube.    Right femoral lines and ECMO catheter noted, there is a lobulated 6.7  x 6.2 cm hematoma about the femoral vasculature at the insertion  point.    Left femoral approach intra-aortic balloon pump, with the inferior tip  just below the level of the renal arteries and the superior tip above  the field-of-view.    Bones:  Bones are unremarkable.      Impression    IMPRESSION:   1. Large volume of hemorrhage markedly distending the stomach and  duodenal bulb. 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  2. Numerous patchy areas of hypoattenuation peripherally in the liver  and spleen most consistent with infarction.  3. 6.7 x 6.2 cm hematoma in the right groin about the access point of  the femoral lines.  4. Intraoperative balloon pump tip seen just below the level of the  renal arteries. Both kidneys normally  enhance.  5. Bibasilar atelectasis, with interval decrease in patchy confluent  airspace opacities consistent with resolving pulmonary edema versus  diffuse alveolar damage.  6. Evidence areas of subendocardial hypoperfusion of the left  ventricle, suggesting ischemia.    [Urgent Result: Gastric hemorrhage]    Finding was identified on 3/13/2019 4:01 PM.     Dr. Muller was contacted by  at 3/13/2019 4:26 PM and  verbalized understanding of the urgent finding.      The preliminary report of no aortoenteric fistula was provided by . After review by the staff radiologist, the final interpretation  of aortoesophageal fistula was provided by  and communicated  to Dr. Muller at 4:45 PM on 3/13/2019.       Labs:  Recent Labs   Lab 03/14/19  0606 03/14/19  0404 03/14/19  0212 03/13/19  2349   PH 7.40 7.38 7.41 7.44   PCO2 35 36 34* 31*   PO2 97 124* 111* 83   HCO3 21 21 22 21   O2PER 60.0 60.0 60.0 60.0       Lab Results   Component Value Date    HGB 11.0 (L) 03/14/2019    PHGB Canceled, Test credited 03/14/2019     03/14/2019    FIBR 259 03/12/2019    INR 2.13 (H) 03/14/2019    PTT 45 (H) 03/14/2019    DD >20.0 (H) 03/05/2019    AXA <0.10 03/14/2019    ANTCH 33 (L) 03/13/2019         Plan is to continue managing on VAV ECMO.      Getachew Garvin, RRT  3/14/2019 6:39 AM

## 2019-03-14 NOTE — PROGRESS NOTES
ECMO turn down evaluation was performed by reducing VA arterial flow from 2.4 lpm (4.2 lpm total flow minus 1.8 lpm VV flow) to approximately 0.5 lpm (2.2 lpm total minus 1.7 lpm VV) while maintaining VV flow at approximately 2 lpm throughout.  LV and RV function were assessed with echocardiography at both flows.  During the study he was on norepinephrine 0.06 mcg/kg/min and an IABP at 1:1.      At total flow of 4.2 lpm, estimate VA flow 2.4 lpm:  BP 66/45/61  HR 82  O2 95%    At total flow of c.2 lpm, estimate VA flow 0.5 lpm:  BP 62/47/60  HR 89  O2 92%  SvO2 at lower flow: 55%, calculating a cardiac index of 2.03    Joseph Li MD  Cardiovascular disease fellow

## 2019-03-15 NOTE — PROGRESS NOTES
[facility] ID Service: Follow Up Note      Patient:  Hellen Riddle, Date of birth 1980, Medical record number [id number]  DOS: 3/15/19         Assessment and Recommendations:   Problem List:  -Aorto-esophageal fistula diagnosed 3/13. Not a candidate for repair.  -Right posterior parietal/occipital intraparenchymal hemorrhage, concern for abscess as well  -Progressive leukocytosis  -Lactic acidosis  -Hypoxic respiratory failure  -Influenza A infection, status post 9 days of tamiflu (stopped 3/14)  -E. 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 Remains on ECMO.    Recommendations:  - Continue meropenem (CNS dosing given concern for intracranial abscess).   - Stop metronidazole (carbepenems provide excellent anaerobic coverage).   - Can broaden coverage by adding daptomycin renally dosed equivalent of 8mg/kg IV q24 hours to cover possibility of VRE bacteremia related to aortoenteric fistula or MRSA bacteremia related to indwelling lines.  - If signs of worsening infection, could also add micafungin.  - Check CK now (added on for you) and q week while on dapto.  - Repeat blood cx x2 today.  - Repeat C diff.  - Longer term abx plan based on goals of care.     Discussion:  40 yo male admitted 2/23/19 with refractory VF OHCA s/p VA ECMO on admission, found to have 3v CAD s/p intervention. ID consulted 3/4/19 for increasing leukocytosis, hypoxia, lactic acidosis possibly related to influenza A or VAP, both of which were treated. Course further complicated by GIB, presumably related to an aortoenteric fistula, as well as new CNS lesion concerning for bleed vs abscess. His WBC is now rising on meropenem/metro. He is clearly at risk for polymicrobial infection given communication between GI tract and bloodstream, so could broaden coverage by adding dapto for  VRE; this would also cover MRSA bacteremia related to line infection (multiple lines in place). If family still wants aggressive cares, could also add micafungin since yeast are part of normal bowel marianna.      Unfortunately, these abx are likely only temporizing measures since his aortoenteric fistula is presumably incurable without surgical intervention (for which he is not a candidate). I am happy to be involved in family care conferences if needed.     Recs were discussed with primary team today.  Don't hesitate to call with questions.  Dr. Murphy will assume care tomorrow.     Kiya Bear MD  [phone]  ID staff        Interval History:   WBC continuing to rise (now >40). Remains on ECMO and also has AIBP in place. Afbrile but on CRRT. Remains on norepi and vaso.     ROS unable to be obtained given intubation and sedation.          Current Antimicrobials   Meropenem 3/5-3/7, restart 3/14  Metronidazole 3/11-present    Prior antibiotics:  Micafungin 3/5-3/6  Pip/tazo 2/23-3/4  IV Vancomycin 2/23-3/6, 3/8-3/11  Oseltamivir 3/4-3/14  Ertapenem 3/7-3/14         Physical Exam:   Ranges for vital signs:  Temp:  [97  F (36.1  C)-98.2  F (36.8  C)] 97.7  F (36.5  C)  Pulse:  [77-84] 78  Heart Rate:  [72-88] 73  Resp:  [12-25] 17  BP: (65-89)/(46-66) 70/46  MAP:  [47 mmHg-263 mmHg] 62 mmHg  Arterial Line BP: ()/() 72/51  FiO2 (%):  [60 %-70 %] 60 %  SpO2:  [93 %-100 %] 99 %  GENERAL:  Intubated and sedated   ENT:  Head is normocephalic, atraumatic. ETT in place.  NECK: Supple   LUNGS:  Coarse bilaterally  ABDOMEN:  Slightly distended, +BS, no masses.   EXT: Extremities warm, mild BLE edema.   SKIN:  No acute rashes. ECMO cannulation sites/lines without erythema or disharge.   NEUROLOGIC: Sedated          Laboratory Data:   Reviewed.    WBC cont to rise (45K today)  Hgb stable  LFTs down slightly   (up past 2 days)    Recent pertinent micro data:  Blood  3/13, 3/14, 3/15 NGTD    Sputum   3/11:  Candida, Klebsiella, E coli  3/12: Candida, Klebsiella  3/13: Candida, Klebsiella  3/14: Candida, Klebsiella  3/15: Pending    C diff  3/4 negative          Imaging:   CXR today stable

## 2019-03-15 NOTE — PROGRESS NOTES
Care Coordinator Progress Note    Admission Date/Time:  2/23/2019  Attending MD:  Abraham Baer MD    Data  Chart reviewed, discussed with interdisciplinary team.   Patient was admitted for: Cardiac arrest (H).       Assessment  Pt remain in ICU vented, sedated, on ECMO and IABP.  The team reported they are planning to decannulate ECMO and need to meet with family prior decannulation to discuss the plan of care and goal of care.  RNCC called pt spouse and discuss abut the care conf.  Pt spouse gave the phone to her brother, Luis to talk to me  RNCC discussed with Luis the need of care conf.  Luis talked to his sister and agreed to meet today at 3:00.  Care conf. Time have been shared with CSI, palliative, SW, bedside RN and charge nurse.     Plan  Anticipated Discharge Date:  TBD.  Anticipated Discharge Plan:   TBD.  Care conf. Arranged for today, 3/15 at 3:00 in 4E conf. Room.  RNCC will cont to follow plan of care      Romel Lino RN, PHN, BSN  4A and 4E/ ICU  Care Coordinator  Phone: [phone]  Pager: [phone]

## 2019-03-15 NOTE — PROCEDURES
Procedure Note    Procedure: Left brachial arterial line placement  Surgeons: Sultana Lang MD (ICU fellow)  Lisandro Springer MD (General Surgery PGY2)  Complications: None  EBL: Minimal    Indications: Hellen Riddle is a 39 year old male on VA and VV ECMO for cardiorespiratory failure that needed an arterial line. Line placement had previously failed in the left radial, right radial, and right brachial so a left brachial was attempted.     Procedure Description: The left arm was taped and secured to expose the antecubital fossa. The area was prepped and draped in the usual fashion. Ultrasound guidance was used to identify the brachial artery and a needle was advanced into it with brisk return of pulsating arterial blood. A J wire was advanced through the needle, the needle was removed, a small skin incision was made and the arterial line was advanced into the brachial artery. The arterial line was hooked up to a transducer, zeroed, and the arterial line was sutured in place.    Sultana Lang, ICU fellow and CV ICU moonlighter, was present throughout the procedure. There were no immediate complications. All sharps were discarded.    --  Lisandro Springer MD  General Surgery PGY2  [phone]

## 2019-03-15 NOTE — PROGRESS NOTES
SPIRITUAL HEALTH SERVICES  SPIRITUAL ASSESSMENT Progress Note (Palliative Focus)  [facility] ([place]) 4E    REFERRAL SOURCE: Palliative care follow up.    Attempted visits times three attempts yesterday and today; however, no family present. Spoke with bedside RN in coordination of care. Per RN, sister-in-law Raissa called for updates this morning.    Plan: I will follow for spiritual support while Palliative Care is consulted.    Yue Hensley  Palliative   Pager [phone]  [facility] Inpatient Team Consult pager [phone] (M-F 8-4:30)  After-hours Answering Service [phone]

## 2019-03-15 NOTE — PROGRESS NOTES
ECMO Shift Summary:    Patient remains on VAV ECMO, all equipment is functioning and alarms are appropriately set. RPM's 3300 with flow range 4.14-4.23 L/min. Sweep gas is at 2.5 LPM and FiO2 90%. Circuit remains free of air, clot and fibrin. Cannulas are secure with no bleeding from site. Extremities are warm to touch. Suctioned ETT for scant secretions.    Significant Shift Events: none    Vent settings:  Ventilation Mode: CMV/AC  (Continuous Mandatory Ventilation/ Assist Control)  FiO2 (%): 70 %  Rate Set (breaths/minute): 12 breaths/min  Tidal Volume Set (mL): 530 mL  PEEP (cm H2O): 10 cmH2O  Oxygen Concentration (%): 70 %  Resp: 20  .    Heparin is off, ACT range 172-192.    Urine output is minimal, blood loss was a moderate amount of oozing from the IABP site. No product given.      Intake/Output Summary (Last 24 hours) at 3/15/2019 0546  Last data filed at 3/15/2019 0500  Gross per 24 hour   Intake 1447.63 ml   Output 1796 ml   Net -348.37 ml       ECHO:  No results found for this or any previous visit.No results found for this or any previous visit.    CXR:  No results found for this or any previous visit (from the past 24 hour(s)).    Labs:  Recent Labs   Lab 03/15/19  0342 03/15/19  0228 03/14/19  2354 03/14/19  1807   PH 7.34* 7.37 7.37 7.38   PCO2 41 41 42 39   PO2 101 93 139* 79*   HCO3 22 23 24 23   O2PER 70 70.0 70 70.0       Lab Results   Component Value Date    HGB 10.2 (L) 03/15/2019    PHGB Canceled, Test credited 03/15/2019     (L) 03/15/2019    FIBR 259 03/12/2019    INR 2.16 (H) 03/15/2019    PTT 51 (H) 03/15/2019    DD >20.0 (H) 03/05/2019    AXA <0.10 03/15/2019    ANTCH 37 (L) 03/14/2019         Plan is to remain on ECMO support at this time.      Jong Cavazos, RRT  3/15/2019 5:46 AM

## 2019-03-15 NOTE — PROGRESS NOTES
CRRT STATUS NOTE    DATA:  Time:  6:31 AM  Pressures WNL:  YES  Filter Status:  WDL    Problems Reported/Alarms Noted:  None    Supplies Present:  YES    ASSESSMENT:  Patient Net Fluid Balance:  -204 mL since MN  Vital Signs:  Temp 97.5 esophageal    HR 81    BP 71/45 (57) mmHg    RR 17    SpO2 100%  Labs:  LFTs elevated, lactate 6, WBC 41.5  Goals of Therapy:  I=O as BP allows    INTERVENTIONS:   None needed at this time.     PLAN:  Continue plan of care. Contact CRRT resource RN at [phone] with any questions or concerns.

## 2019-03-15 NOTE — PROGRESS NOTES
ECMO Shift Summary:    Patient remains on VA ECMO, all equipment is functioning and alarms are appropriately set. RPM's 3265 with flow range 4.0-4.2 L/min. Sweep gas is at 2.5 LPM and FiO2 90%. Circuit remains free of air, clot and fibrin. Cannulas are secure with no bleeding from site. Extremities are cool. Suctioned ETT for white yellow thick.    Significant Shift Events: None    Vent settings:  Ventilation Mode: CMV/AC  (Continuous Mandatory Ventilation/ Assist Control)  FiO2 (%): 70 %  Rate Set (breaths/minute): 12 breaths/min  Tidal Volume Set (mL): 530 mL  PEEP (cm H2O): 10 cmH2O  Oxygen Concentration (%): (S) 70 % (Met on)  Resp: 15  .    Heparin is not running, ACT range 176-180    Urine output is as RN charted, blood loss was minimal around IABP site. No product given.      Intake/Output Summary (Last 24 hours) at 3/14/2019 2249  Last data filed at 3/14/2019 2200  Gross per 24 hour   Intake 1759 ml   Output 1977 ml   Net -218 ml       ECHO:  No results found for this or any previous visit.No results found for this or any previous visit.    CXR:  Recent Results (from the past 24 hour(s))   XR Chest Port 1 View    Narrative    Exam: XR CHEST PORT 1 VW, 3/14/2019 1:50 AM    Indication: VAV ECMO/IABP/INTUBATED    Comparison: 3/13/2019    Findings:   Single portable view the chest. ECMO cannula unchanged in position  from previous. Unchanged intra-aortic balloon pump projecting at the  tereso. Enteric tube projecting over the esophagus and stomach.  Endotracheal tube in the mid thoracic trachea. The cardiomediastinum  and upper abdomen are unchanged. There are increased interstitial and  hazy opacities of lungs most prominent in the right apex and  retrocardiac space. These findings likely representing pulmonary edema  has better delineated on CT 3/13/2019.      Impression    Impression:   1. Increased patchy hazy and interstitial opacities of lungs likely  representing pulmonary edema, better delineated on CT  2/13/2019.  2. Support devices in stable position.  3. Right upper lung changes suspicious for pneumonic process or severe  atelectasis.    I have personally reviewed the examination and initial interpretation  and I agree with the findings.    MIKKI DAI MD       Labs:  Recent Labs   Lab 03/14/19  1807 03/14/19  1550 03/14/19  1549 03/14/19  1358  03/14/19  1202   PH 7.38  --  7.45 7.41  --  7.48*   PCO2 39  --  32* 34*  --  30*   PO2 79*  --  91 92  --  70*   HCO3 23  --  22 22  --  22   O2PER 70.0 70.0 90.0  70.0 70   < > 60    < > = values in this interval not displayed.       Lab Results   Component Value Date    HGB 10.3 (L) 03/14/2019    PHGB Canceled, Test credited 03/14/2019     (L) 03/14/2019    FIBR 259 03/12/2019    INR 2.06 (H) 03/14/2019    PTT 46 (H) 03/14/2019    DD >20.0 (H) 03/05/2019    AXA <0.10 03/14/2019    ANTCH 37 (L) 03/14/2019         Plan is to remain stable on VA ECMO.      Migue Chatman, RRT  3/14/2019 10:49 PM

## 2019-03-15 NOTE — CARE CONFERENCE
Care Conference    Care conference was held on March 15, 2019 at 3:00 in 4E conf. Room.      Attending the conference were:  CSI ( Dr. Correa), SW ( Gabriel Man) and RNCC (Romel Lino.  Family members- pt wife, 2 sisters, brother, niece and another niece on the speaker phone.    Purpose of the conference was to update family and discuss the plan of care.    Discussion/Outcomes/Follow-Up: Dr. Correa provided detail update system by system and discussed the current plan of care.  Dr. Correa stated pt heart function is improving and the plan is to remove the IABP today and in few days will remove ECMO.  He also discussed if pt declined after IABP and ECMO removed, will not replace it back.  Pt family agreed with the removal of IABP and not replacing it but would like to discuss with the other family members about the ECMO.  Pt family stated they understand pt is still very sick and he is not candidate for surgery to fix his esophageal fistula.  They stated they want to try everything and if he declined it means his body is giving up, not them removing any thing or giving up on him.  Family expressed that they still would like to keep pt full code.    The team and family agreed with the following plans:  To continue with the current plan of care.  To do head CT.  To remove IABP today and will not put back if pt declined.  Family to discuss with the other family members about the need of ECMO removal in the next 1-2 day and will not put back once removed ( pt family stated pt has 9 siblings and they want to make sure everyone is on the same page).  Family to inform the team about family decision after they discuss with the other family members.    The team addressed all pt families questions.    Romel Lino, RN, PHN, BSN  4A and 4E/ ICU  Care Coordinator  Phone: [phone]  Pager: [phone]

## 2019-03-15 NOTE — PLAN OF CARE
Assessment:   Cardiac: NSR 60s-80s. MAP goal >60. VAV ECMO with flow 4.5LPM, FiO2 90%, Sweep 4.5. IABP changed to 1:3 and 20% augmentation at 1100 per MD.   Resp: CMV- FiO2 60%, tV 530, Rate 12, Peep 10. Lung sounds coarse, diminished. Small amount of tan, creamy secretions. Cough/gag present at times with ET suctioning. Afebrile.  Neuro: Off Versed since yesterday. Unresponsive, but opens eyes slightly to vigorous stimulation. Does not move any extremities nor following commands. Pupils 4mm, equal, reactive.   : Anuric. CRRT with goal I=O.   GI: NJ with TF restarted this evening @ 10ml/hr (goal 30ml/hr). OG to suction with 50cc maroon output. Rectal tube in place with minimal loose, bloody output.   Endocrine: Sliding scale insulin q4h.   Drips: Fentanyl, Vaso, Levo, Amio.       Interventions: Gave 1u PRBC and 500cc Albumin.       Plan: Family care conference today at 1500- plan to remove IABP this evening/tomorrow in Cath Lab and remove ECMO within the next few days. Will continue to monitor/assess and update MDs as needed.

## 2019-03-15 NOTE — PROGRESS NOTES
CLINICAL NUTRITION SERVICES - brief note. See 3/11 note for full assessment.    -FEN/GI: TF have been off since 3/12. 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     Interventions  Collaboration with other providers - Discussed w/ cardiology fellow, will restart TF slowly today @ 10 mL/hr and advance to 30 mL/hr.   Enteral Nutrition - Initiate Impact Peptide @ 10 mL/hr and advance 10 mL q8h to 30 mL/hr.   **Note, recommend eventual goal rate of 60 mL/hr.**    RD will continue to follow.    Sudha Winters RD, LD, [facility]  CVICU Dietitian  Pager: [phone]

## 2019-03-15 NOTE — PROGRESS NOTES
Neuroscience Intensive Care Progress Note    03/15/2019    Hellen Riddle is a 39 year old male who presents with a past medical history of HLD, admitted on  for an out-of-hospital refractory vfib cardiac arrest s/p ECMO cannulation and cardiac PCI. His hospitalization course has been complicated by ischemic liver failure, spleen infarct, ARF on CRRT, E.Coli and Klebsiella pneumonia, VA and VV ECMO cannulation, and recent diagnosis of aorto-esophageal fistula. On repeat head CT scan performed once stable to go to CT scanner, he does also now have a small hyperdensity present in the right posterior parieto-occipital lobe that was not present on initial scan obtained after ECMO cannulation. He is not currently on therapeutic anti-coagulation (held since midnight at end of 3/11/19), and is currently on brilinta for stents. Is also receiving ertapenem for ventilator-associated pneumonia.     24 hour events:  - Weaned off of versed yesterday  - Family care conference today     24 Hour Vital Signs Summary:  Temperatures:  Current - Temp: 97  F (36.1  C); Max - Temp  Av  F (36.7  C)  Min: 97  F (36.1  C)  Max: 98.2  F (36.8  C)  Respiration range: Resp  Avg: 15.1  Min: 12  Max: 20  Pulse range: Pulse  Av.8  Min: 77  Max: 84  Blood pressure range: Systolic (24hrs), Av , Min:65 , Max:89   ; Diastolic (24hrs), Av, Min:46, Max:66    Pulse oximetry range: SpO2  Av.6 %  Min: 90 %  Max: 100 %    Ventilator Settings  Ventilation Mode: CMV/AC  (Continuous Mandatory Ventilation/ Assist Control)  FiO2 (%): 60 %  Rate Set (breaths/minute): 12 breaths/min  Tidal Volume Set (mL): 530 mL  PEEP (cm H2O): 10 cmH2O  Oxygen Concentration (%): (S) 60 %  Resp: 19    Intake/Output Summary (Last 24 hours) at 3/15/2019 1002  Last data filed at 3/15/2019 1000  Gross per 24 hour   Intake 1162.13 ml   Output 1409 ml   Net -246.87 ml     Arterial Line BP: ()/() 68/38  MAP:  [50 mmHg-263 mmHg] 52 mmHg  BP - Mean:   [61-76] 63    Current Medications:    B and C vitamin Complex with folic acid  5 mL Per Feeding Tube Daily     hydrocortisone sodium succinate PF  50 mg Intravenous Q12H     insulin aspart  1-6 Units Subcutaneous Q4H     meropenem  1 g Intravenous Q8H     pantoprazole (PROTONIX) IV  40 mg Intravenous BID     polyethylene glycol  17 g Oral or Feeding Tube Daily     senna-docusate  1 tablet Oral or Feeding Tube At Bedtime     sodium chloride (PF)  3 mL Intracatheter Q8H     ticagrelor  90 mg Oral or Feeding Tube BID       PRN Medications:  acetaminophen, albuterol, artificial tears, calcium chloride, calcium chloride, calcium chloride in 0.9% NaCl intermittent infusion, calcium chloride in 0.9% NaCl intermittent infusion, - MEDICATION INSTRUCTIONS -, IV fluid REPLACEMENT ONLY, glucose **OR** dextrose **OR** glucagon, fentaNYL, heparin, lidocaine 4%, lidocaine (buffered or not buffered), magnesium sulfate, midazolam, naloxone, nitroGLYcerin, - MEDICATION INSTRUCTIONS -, Percutaneous Coronary Intervention orders placed (this is information for BPA alerting), potassium chloride, ACE/ARB/ARNI NOT PRESCRIBED, BETA BLOCKER NOT PRESCRIBED, sodium chloride (PF), sodium phosphate (NaPHOS) CRRT Replacement    Infusions:    amiodarone 0.5 mg/min (03/15/19 0900)     - MEDICATION INSTRUCTIONS -       IV fluid REPLACEMENT ONLY 30 mL/hr at 03/07/19 1100     CRRT replacement solution 12.5 mL/kg/hr (03/15/19 0830)     epoprostenol (VELETRI) 20 mcg/mL in sterile water inhalation solution Stopped (03/15/19 0928)     fentaNYL 50 mcg/hr (03/15/19 0900)     heparin 2 unit/mL in 0.9% NaCl 3 mL/hr (03/15/19 0900)     HEParin Stopped (03/12/19 0148)     midazolam Stopped (03/14/19 1200)     - MEDICATION INSTRUCTIONS -       norepinephrine 0.07 mcg/kg/min (03/15/19 0900)     Percutaneous Coronary Intervention orders placed (this is information for BPA alerting)       CRRT replacement solution 200 mL/hr at 03/14/19 1822     CRRT replacement  "solution 12.5 mL/kg/hr (03/15/19 0755)     ACE/ARB/ARNI NOT PRESCRIBED       BETA BLOCKER NOT PRESCRIBED       vasopressin (PITRESSIN) infusion ADULT (40 mL) 2.5 Units/hr (03/15/19 0900)       No Known Allergies    Physical Examination:  BP (!) 70/46   Pulse 78   Temp 97  F (36.1  C)   Resp 19   Ht 1.68 m (5' 6.14\")   Wt 96.9 kg (213 lb 10 oz)   SpO2 100%   BMI 34.33 kg/m    Remains intubated, recently weaned off of sedation, on fentanyl @ 50.   PERRL, corneals intact, gag reflex intact, cough intact.   No movement to noxious stimuli x 4.    Labs/Studies:  Recent Labs   Lab Test 03/15/19  0342 03/14/19  2354 03/14/19 2216 03/14/19  1549     --  136 138   POTASSIUM 4.4  --  4.2 4.4   CHLORIDE 100  --  100 102   CO2 22  --  24 20   ANIONGAP 14  --  11 16*   *  132* 122* 114* 107*  112*   BUN 39*  --  39* 41*   CR 1.56*  --  1.69* 1.58*   ALEKSANDER 8.6  --  8.7 8.4*   WBC 41.5*  --  36.9* 35.3*   RBC 3.25*  --  3.37* 3.36*   HGB 10.2*  --  10.3* 10.3*   *  --  145* 146*       Recent Labs   Lab Test 03/15/19  0342 03/14/19  2216 03/14/19  1549   INR 2.16* 2.06* 2.15*   PTT 51* 46* 44*       Recent Labs   Lab 03/15/19  0803 03/15/19  0606 03/15/19  0342 03/15/19  0228   PH 7.31* 7.33* 7.34* 7.37   PCO2 40 41 41 41   PO2 141* 104 101 93   HCO3 20* 22 22 23   O2PER 70 70 70 70.0       Imaging:    Xr Chest Port 1 View    Result Date: 3/15/2019  Exam: XR CHEST PORT 1 VW, 3/15/2019 1:25 AM Indication: VAV ECMO/IABP/INTUBATED Comparison: 3/14/2019 Findings: Single portable view the chest. ECMO cannula unchanged in position from previous. Unchanged intra-aortic balloon pump projecting at the level of the tereso. Enteric tube projecting over the esophagus and stomach. Endotracheal tube projecting over the mid thoracic trachea. The cardiac mediastinum and upper abdomen are unchanged. There is stable interstitial and hazy opacities of the lungs. Slightly decreased right upper lobe opacity.     Impression: " Slightly decreased pulmonary edema. Support devices in stable position. I have personally reviewed the examination and initial interpretation and I agree with the findings. MIKKI DAI MD    Xr Chest Port 1 View    Result Date: 3/14/2019  Exam: XR CHEST PORT 1 VW, 3/14/2019 1:50 AM Indication: VAV ECMO/IABP/INTUBATED Comparison: 3/13/2019 Findings: Single portable view the chest. ECMO cannula unchanged in position from previous. Unchanged intra-aortic balloon pump projecting at the tereso. Enteric tube projecting over the esophagus and stomach. Endotracheal tube in the mid thoracic trachea. The cardiomediastinum and upper abdomen are unchanged. There are increased interstitial and hazy opacities of lungs most prominent in the right apex and retrocardiac space. These findings likely representing pulmonary edema has better delineated on CT 3/13/2019.     Impression: 1. Increased patchy hazy and interstitial opacities of lungs likely representing pulmonary edema, better delineated on CT 2/13/2019. 2. Support devices in stable position. 3. Right upper lung changes suspicious for pneumonic process or severe atelectasis. I have personally reviewed the examination and initial interpretation and I agree with the findings. MIKKI DAI MD    Xr Chest Port 1 View    Result Date: 3/13/2019  Exam: XR CHEST PORT 1 VW, 3/13/2019 1:30 AM Indication: VAV ECMO/IABP/INTUBATED Comparison: 3/12/2019 Findings: Single portable view of the chest. ECMO cannula unchanged in position from previous. Unchanged intra-aortic balloon pump at the tereso. Enteric tubes projecting over the esophagus and stomach. Endotracheal tube projecting approximately 3.5 cm above the tereso, slightly advanced from previous. The cardiomediastinum and upper abdomen are unchanged. No pleural effusion. No pneumothorax. No focal airspace opacities.     Impression: 1. Endotracheal tube slightly advanced from previous, otherwise, no significant interval change.  2. Stable mild pulmonary edema. I have personally reviewed the examination and initial interpretation and I agree with the findings. MIKKI DAI MD    Cta Abdomen Pelvis With Contrast    Result Date: 3/14/2019  EXAMINATION: CTA ABDOMEN PELVIS WITH CONTRAST, 3/13/2019 3:56 PM TECHNIQUE:  Helical CT images from the lung bases through the symphysis pubis were obtained  with IV contrast. Contrast dose: iopamidol (ISOVUE-370) solution 130 mL COMPARISON: CT chest/abdomen/pelvis 2/23/2018 HISTORY: GI bleed; On VAV ECMO; brisk intermittent GI bleed with concern for ischemic bowel vs arterio-enteric fistula FINDINGS: Chest: Compressive bibasilar atelectasis the lungs. Patchy, diffuse centrilobular groundglass opacity, appears decreased compared to 2/23/2019. Significant continuous hypoattenuation along the subendocardium in the left ventricle involving the apex and along the apical, mid and basilar segments of the left ventricular free wall. No pericardial effusion. Leads in the right ventricle. The right femoral approach ECMO cannula extends through the right atrium, and ascends above the field-of-view. Abdomen/pelvis: Newly visualized is a large volume of hemorrhage in the stomach and duodenal bulb, which is markedly distends the stomach. There are 2 enteric tubes, with one terminating in the stomach, and another within the second/third portion of the duodenum. Highly suspected aorto esophageal fistula, seen on the arterial phase (series 11 image 111), with dilution of contrast on the delayed venous phase (series 14 image 42). New since the prior examination, marked peripherally located patchy areas of hypoattenuation, several of which are geographic or wedge-shaped in the liver, with a large region of involvement in the left lobe of the liver. Significant patchy hypoattenuation peripherally in the spleen. Symmetric enhancement of the kidneys. No suspicious renal mass. No hydronephrosis. Hyperenhancement of the adrenal  glands consistent with hypoperfusion complex. Pancreas is unremarkable. No bowel junction. No significant free pelvic fluid. No pneumoperitoneum. Contrast in the urinary bladder which is decompressed. Rectal tube. Right femoral lines and ECMO catheter noted, there is a lobulated 6.7 x 6.2 cm hematoma about the femoral vasculature at the insertion point. Left femoral approach intra-aortic balloon pump, with the inferior tip just below the level of the renal arteries and the superior tip above the field-of-view. Bones: Bones are unremarkable.     IMPRESSION: 1. Large volume of hemorrhage markedly distending the stomach and duodenal bulb. Aortoesophageal fistula highly suspected, as described above. 2. Numerous patchy areas of hypoattenuation peripherally in the liver and spleen most consistent with infarction. 3. 6.7 x 6.2 cm hematoma in the right groin about the access point of the femoral lines. 4. Intraoperative balloon pump tip seen just below the level of the renal arteries. Both kidneys normally enhance. 5. Bibasilar atelectasis, with interval decrease in patchy confluent airspace opacities consistent with resolving pulmonary edema versus diffuse alveolar damage. 6. Evidence areas of subendocardial hypoperfusion of the left ventricle, suggesting ischemia or infarct. [Urgent Result: Gastric hemorrhage] Finding was identified on 3/13/2019 4:01 PM. Dr. Muller was contacted by  at 3/13/2019 4:26 PM and verbalized understanding of the urgent finding.  The preliminary report of no aortoenteric fistula was provided by . After review by the staff radiologist, the final interpretation of aortoesophageal fistula was provided by  and communicated to Dr. Muller at 4:45 PM on 3/13/2019. I have personally reviewed the examination and initial interpretation and I agree with the findings. AYSE LEROY MD    Ct Chest W/o Contrast    Result Date: 3/13/2019  EXAMINATION: CT CHEST W/O CONTRAST,  3/13/2019 3:52 PM TECHNIQUE:  Helical CT images from the thoracic inlet through the lung bases were obtained without IV contrast. Contrast dose: None COMPARISON: 2/23/2019 HISTORY: Interstitial lung disease FINDINGS: Endotracheal tube tip in midthoracic trachea. Right IJ approach ECMO cannula with tip in the right pulmonary artery. Inferior approach ECMO cannula with tip in the left brachiocephalic vein. Gastric tube coursing below the field-of-view of this study. Intra-aortic balloon pump superior marker at the level of the tereso. Inferior approach central line with tip at the hepatic IVC. Diffuse centrilobular groundglass opacities with mild interlobular septal thickening. Subpleural and gravity dependent sparing. Bilateral lower lobar atelectasis. No pneumothorax or pleural effusion. Left ventricular enlargement. Multiple coronary stents. No pericardial effusion. Normal caliber and configuration of the thoracic great vessels. No thoracic adenopathy. Large amount of blood in the stomach better visualized on the abdomen and pelvis portion. Splenic and hepatic infarcts also better appreciated on the abdominal pelvic portion. Vicarious excretion of contrast in the gallbladder. Sternal fracture, nondisplaced.     IMPRESSION: 1. Right IJ approach ECMO cannula with tip in the right pulmonary artery. Inferior approach ECMO cannula with tip in the left brachiocephalic vein. 2. Diffuse pulmonary opacities in a pattern most consistent with pulmonary edema or diffuse alveolar hemorrhage. 3. Large amount of blood products in the stomach better visualized on the abdominal pelvic study from today. I have personally reviewed the examination and initial interpretation and I agree with the findings. AYSE LEROY MD    Ct Head W/o Contrast    Result Date: 3/13/2019  CT HEAD W/O CONTRAST 3/13/2019 3:43 PM Provided History: Altered mental status (AMS), unclear cause ICD-10: Altered mental status Comparison: CT dated  2/25/2019. Technique: Using multidetector thin collimation helical acquisition technique, axial, coronal and sagittal CT images from the skull base to the vertex were obtained without intravenous contrast. Findings:  Intraparenchymal hemorrhage in the posterior right parietal/occipital lobe measuring 2.5 x 1.5 cm and associated with surrounding edema. No significant mass effect or midline shift. The ventricles are proportionate to the cerebral sulci. The gray to white matter differentiation of the cerebral hemispheres is preserved. The basal cisterns are patent. The visualized paranasal sinuses are clear. The mastoid air cells are clear.      Impression: Intraparenchymal hemorrhage in the posterior right parietal/occipital lobe, with a smaller hemorrhagic focus in the anterior left frontal lobe. Recommend 6 hour follow-up CT to assess stability. [Urgent Result: Right posterior parietal/occipital intraparenchymal hemorrhage] Finding was identified on 3/13/2019 3:46 PM. Dr. Latasha Alexander was contacted by Dr. Mckeon at 3/13/2019 3:49 PM and verbalized understanding of the urgent finding. I have personally reviewed the examination and initial interpretation and I agree with the findings. JOEL GARNETT MD    Assessment/Plan  Hellen Riddle is a 39 year old s/p vfib cardiac arrest, s/p VV and VA ECMO and PCI. Complicated intensive care hospitalization course, including organ failure, E.Coli and Klebsiella pneumonia and aorto-pulmonary fistula, now with evidence of right parieto-occipital hyperdensity.     Plan:  - Would obtain CT head without and without contrast to establish stability and also rule out infection/abscess, if stable       Vickie Duarte MD  Neurocritical care fellow

## 2019-03-15 NOTE — PROGRESS NOTES
CRRT STATUS NOTE    DATA:  Time:  1:29 PM  Pressures WNL: YES  Filter Status:  WDL    Problems Reported/Alarms Noted:  None noted    Supplies Present:  YES  ASSESSMENT:  Patient Net Fluid Balance:  850 ml net positive since midnight  Vital Signs:  BP 63/44 (53). HR 76, Temp 97.7 (esophageal), RR 19, O2 sats 100% on 60% FiO2.    Labs:  T bili 23.6, ALT 2382, AST 3046, TROP 1.873. abg 7.30/39/96/19, WBC 45.7.  All labs reviewed.    Goals of Therapy:  I=O if MAP >65.      INTERVENTIONS:   None needed at this time.    PLAN:  Continue CRRT.  Please contact Resource RN with any questions or concerns at [phone].

## 2019-03-15 NOTE — PLAN OF CARE
Versed off since noon yesterday. Overbreathes the vent and has a weak cough with suction. Pupils equal and sluggish. VAV ECMO has been stable. SR w/ PVCs. Levo 0.07, vaso 2.5. MAP goal >55. IABP 1:1. Continues to ooze from site. Can doppler all pulses. Extremities dusky. No product given. Veletri held at 4 overnight. CRRT pulling to match I/O. Bloody stools and OG output decreasing. Family updated at the bedside early in the evening and consented for new left brachial arterial line.

## 2019-03-15 NOTE — PROGRESS NOTES
Social Work Services Progress Note    Hospital Day: 21  Date of Initial Social Work Evaluation:  N/A  Collaborated with:   ICU, RNCC, Pt's family including his wife and several siblings.    Data:  A family care conference was scheduled for this afternoon to discuss Pt's current critical condition and to review ongoing care options.    Intervention:  Writer attended the conference with the above participants. Pt's wife and family indicated their understanding of Pt's current condition after MD reviewed Pt's status system by system. Pt's family agrees with the plan to remove Pt's balloon pump, but wishes to discuss with those who were unable to be present before agreeing with the plan to decannulate heart ECMO. Pt's wife states Pt is the youngest of 10 siblings. The siblings and Pt's mother are part of the decision-making. Pt's family expressed their understanding that Pt's condition is very serious. Code status was discussed, but they are not yet ready to agree to DNR.    Assessment:  Pt's family is emotional, but working together as they make decisions on Pt's behalf.    Plan:    Follow Up:  SW will remain available for ongoing support as needed.      Genia Man, [facility]  ICU   Pager: [phone]

## 2019-03-15 NOTE — PROGRESS NOTES
ECMO Attending Progress Note  3/14/2019    Hellen Riddle is a 39 year old male who was cannulated for ECMO 2/23 due to refractory VF arrest.  He has since been cannulated with a protek duo for VAV ECMO due to severe hypoxia from influenza infection.    Interval events: now has splenic and hepatic infarcts, intraparenchymal brain hemorrhage    Physical Exam:  Temp:  [96.8  F (36  C)-98.2  F (36.8  C)] 97.7  F (36.5  C)  Pulse:  [77-84] 78  Heart Rate:  [64-85] 74  Resp:  [12-15] 14  BP: (65-89)/(46-66) 70/46  MAP:  [54 mmHg-263 mmHg] 67 mmHg  Arterial Line BP: ()/() 84/50  FiO2 (%):  [60 %-70 %] 70 %  SpO2:  [90 %-100 %] 100 %    Gross per 24 hour   Intake 1842.5 ml   Output 1796 ml   Net 46.5 ml    Ventilation Mode: CMV/AC  (Continuous Mandatory Ventilation/ Assist Control)  FiO2 (%): 70 %  Rate Set (breaths/minute): 12 breaths/min  Tidal Volume Set (mL): 530 mL  PEEP (cm H2O): 10 cmH2O  Oxygen Concentration (%): 70 %  Resp: 14     General: no acute distress, intubated and sedated  HEENT: PERRLA, conjunctiva clear, without icterus or pallor, oropharyx clear  Cardiac: RRR nl S1S2   Lungs: clear to auscultation and percussion bilaterally anterior  Abdomen: soft, nontender, non distended, no hepatosplenomegaly or masses  Extremities: without edema or cyanosis; pulses are palpable    Skin: normal skin appearance without worrisome lesions.   Neurologic:intubated and sedated    Labs:  Recent Labs   Lab 03/14/19  2354 03/14/19  1807 03/14/19  1550 03/14/19  1549 03/14/19  1358   PH 7.37 7.38  --  7.45 7.41   PCO2 42 39  --  32* 34*   PO2 139* 79*  --  91 92   HCO3 24 23  --  22 22   O2PER 70 70.0 70.0 90.0  70.0 70      Recent Labs   Lab 03/14/19  2216 03/14/19  1549 03/14/19  0954 03/14/19  0404   WBC 36.9* 35.3* 35.0* 32.9*   HGB 10.3* 10.3* 10.3* 11.0*     Creatinine   Date Value Ref Range Status   03/14/2019 1.69 (H) 0.66 - 1.25 mg/dL Final   03/14/2019 1.58 (H) 0.66 - 1.25 mg/dL Final   03/14/2019 1.56 (H)  0.66 - 1.25 mg/dL Final   03/14/2019 1.68 (H) 0.66 - 1.25 mg/dL Final       ECMO Issues including assessments and plan on DOS 3/14/2019:  Neuro: Sedated for mechanical ventilation and ECMO.  NIRS stable b/l  RASS goal: -3  CV: Cardiogenic shock.  Hemodynamically stable on minimal pressors  Pulm: Flows unchanged at 4.3, Sweep unchanged at 2.5, Keep vent settings at rest settings  FEN/Renal: Electrolytes stable w/ replacement protocols in place, Cr stable, UOP stable  Heme: ACT goal: 160-180, Hemoglobin .  Goals: if O2 sat >85% Hgb >8.  If O2 Sat <85% keep Hgb 10-12.  Minimal oozing around the ECMO cannulas.  ID: Receiving empiric antibiotics  Cannulae: Position is acceptable on exam and the available imaging.  Distal perfusion cannula is in place and patent.     I have personally reviewed the ECMO flows, oxygenation and CO2 clearance, anticoagulation, and cannula position.  I have also personally assessed the patient's systemic response with hemodynamics, oxygenation, ventilation, and bleeding.       The patient requires continued ECMO support and management in the ICU.  I have discussed patient care and treatment plan with the primary team.      Abraham Baer MD, PhD  Interventional/Critical Care Cardiology  [phone]    March 14, 2019

## 2019-03-15 NOTE — PROGRESS NOTES
[facility]  Cardiac ICU Progress Note  03/15/2019           Key events - last 24 hours / Hospital course   No major events overnight.  Lactate rising this morning, unclear source  Blood pressure improves when IABP is held.  ECMO turndown showed nearly normal LV function at 2lpm.    Changes today:  - remove IABP  - head CT  - family conference              Assessment and Plan:   Hellen Riddle is a 39 year old male who was admitted on 2/23/2019 after cardiac arrest.    Neurology: Intraparenchymal hemorrhage  Possible intracranial abscess  Intubated, off sedation since 3/14  --repeat head CT with and without contrast   --abx expanded to meropenem to cover intracranial infection   Cardiovascular / Hemodynamics: Refractory VF arrest, currently on peripheral VA ECMO support.  Coronary angiography on admission showed 3v disease and he underwent MITA to mRCA & mLCx. Has IABP as well.  ECMO decannulation stymied by VT in OR on 3/6. Returned to the cath lab for MITA to  LAD and distal LCx.  He was started on amiodarone with good suppression of ventricular arrhythmias.  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 Stable  TTE: LV EF 50-55%.  RV dysfunction is mild.  --remove IABP today as hypotension appears to be distributive  --consider removal of ECMO with the caveat that we would not replace it.  --vascular surgery consulted for aorto-esophageal fistula, but declined to operate due to futility and high risk  --wean pressors as able; will tolerate MAPs 55-60  --hold heparin gtt and ASA given ongoing bleeding and cont ticagrelor--ACT is still in therapeutic range due to liver failure  --hold lipitor for now given hepatic injury during arrest  --hold ACE/ARB for now given renal failure and shock  --holding beta blocker given shock    Pulmonary: Acute hypoxic respiratory failure due to pulmonary edema, VAP  ETT in the trachea  --cont abx as below  --wean vent as able  --off flolan  --daily  CXR  --Q2h ABGs for now  --consider scheduled duonebs if signs of lung dz, currently PRN     GI and Nutrition: GI bleeding from aorto-esophageal fistula  Shock liver  Liver and splenic infarcts  --PPI BID  --restart trophic tube feeds since there is no bowel ischemia  --supportive care as above  --GI consulted and signed off  --monitor BID LFTs   Renal, Fluid and Electrolytes: Renal failure.  Appreciate nephrology's assistance.  On CRRT.  --monitor urine output  --maintain K>4 and Mg>2    Infectious Disease: Multiple organisms recovered in sputum; possible intracranial abscess  WBC rising; possibly line related  --cont meropenem  --plan to remove IABP and femoral CVC today   Hematology and Oncology: Acute anemia due to GI bleed  --holding heparin as above   --cryo PRN fibrinogen < 200; FFP for INR >2  --Transfuse for Hgb<10   Endocrinology: No known medical history. BG elevated.  --insulin gtt   Lines: R femoral arterial and venous ECMO cannulae   Left brachial arterial line  L femoral CVC   ETT   Pisano catheter  OG tube   Current lines are required for patient management             Medications:   I have reviewed this patient's current medications           Review of Systems:   Review of systems not obtained due to patient factors - intubation              Physical Exam:   Exam and Labs by System  Neuro: Exam: intubated and sedated, pupils equal but not reactive; opens eyes to stimulation  General Appearance:   Comfortable, sedated       Cardiovascular:    BP: (!) 70/46Heart Rate: 76  Exam:    Normal s1 and s2, no audible murmur, cool extremities.     Recent Labs   Lab 03/15/19  0958 03/15/19  0342 03/14/19  2216   TROPI 1.873* 2.075* 2.416*       Pulm:   Vent Settings:  Resp: 19 SpO2: 100 % O2 Device: Mechanical Ventilator    Ventilation Mode: CMV/AC  (Continuous Mandatory Ventilation/ Assist Control)  FiO2 (%): 60 %  Rate Set (breaths/minute): 12 breaths/min  Tidal Volume Set (mL): 530 mL  PEEP (cm H2O): 10  cmH2O  Oxygen Concentration (%): (S) 60 %  Resp: 19    Exam:   Symmetrical chest shape and movements with each tidal breath  Good patient-ventilator synchrony     Arterial Blood Gas:   Recent Labs   Lab 03/15/19  1153 03/15/19  0958 03/15/19  0803 03/15/19  0606   PH 7.30* 7.27* 7.31* 7.33*   PCO2 39 42 40 41   PO2 96 102 141* 104   HCO3 19* 19* 20* 22   O2PER 60 60 70 70       Renal:   Meds:  Vitals:    03/13/19 0300 03/14/19 0200 03/15/19 0400   Weight: 95.8 kg (211 lb 3.2 oz) 97.9 kg (215 lb 13.3 oz) 96.9 kg (213 lb 10 oz)   I/O last 3 completed shifts:  In: 1351.63 [I.V.:1181.63; Other:35; NG/GT:135]  Out: 1388 [Emesis/NG output:600; Other:713; Stool:75]  Recent Labs   Lab 03/15/19  0958 03/15/19  0342    136   POTASSIUM 4.5 4.4   CHLORIDE 99 100   CO2 19* 22   ANIONGAP 18* 14   *  115* 129*  132*   BUN 36* 39*   CR 1.61* 1.56*   ALEKSANDER 9.0 8.6     No components found for: URINE     GI:   Exam:    Distended, no bowel sounds  Red blood in stool bag     Diet: tube feeds on hold  Recent Labs   Lab 03/15/19  0958 03/15/19  0342 03/14/19  2216   AST 3,046* 3,526* 3,880*   ALT 2,382* 2,530* 2,711*   BILITOTAL 23.6* 23.6* 22.1*   ALBUMIN 2.8* 2.8* 2.8*   PROTTOTAL 4.9* 4.6* 5.0*   ALKPHOS 631* 640* 626*       Heme/ID:   Meds:  Temp: 97.7  F (36.5  C) Temp src: EsophagealTemp  Min: 97  F (36.1  C)  Max: 98.2  F (36.8  C)   Recent Labs   Lab 03/15/19  0958 03/15/19  0342 03/14/19  2216 03/14/19  1549 03/14/19  0954   WBC 45.7* 41.5* 36.9* 35.3* 35.0*   HGB 10.1* 10.2* 10.3* 10.3* 10.3*   HCT 31.7* 31.2* 32.0* 31.0* 30.7*   MCV 98 96 95 92 92   RDW 20.9* 20.4* 19.9* 19.4* 19.0*   * 133* 145* 146* 155     Recent Labs   Lab 03/15/19  0958 03/15/19  0342 03/14/19 2216 03/14/19  1549 03/14/19  0954   INR 2.33* 2.16* 2.06* 2.15* 2.02*   PTT 50* 51* 46* 44* 42*     Recent Labs   Lab 03/15/19  0405 03/14/19  0607 03/14/19  0304 03/13/19  0414 03/13/19  0359 03/12/19  1053   CULT No growth after 2 hours  Culture in progress  Light growth  Klebsiella pneumoniae  * No growth after 1 day No growth after 2 days Moderate growth  Klebsiella pneumoniae  Susceptibility testing done on previous specimen  *  Moderate growth  Candida albicans / dubliniensis  Candida albicans and Candida dubliniensis are not routinely speciated  * Moderate growth  Candida albicans / dubliniensis  Candida albicans and Candida dubliniensis are not routinely speciated  *  Light growth  Klebsiella pneumoniae  Susceptibility testing done on previous specimen  *       Endo:   Meds:  Recent Labs   Lab 03/15/19  0958 03/15/19  0342 03/14/19  2354 03/14/19  2216 03/14/19  1549   *  115* 129*  132* 122* 114* 107*  112*       Lines:    No erythema or discharge at the catheter entry site               Data:   All lab results reviewed    The pt was discussed and evaluated with Dr. Chatterjee.    Jersey Alexander MD  Cardiovascular Medicine Fellow, PGY-7  [phone]       I have seen and examined the patient with the CSI team. I agree with the assessment and plan of the note above.I have reviewed pertinent labs.     Caleb Chatterjee MD  Interventional Cardiology  Pager: [phone]

## 2019-03-16 NOTE — PROGRESS NOTES
.  ECMO Attending Progress Note  3/16/2019    ECMO Shift Summary:     Patient remains on VAV ECMO, all equipment is functioning and alarms are appropriately set. RPM's 3500 with flow range 4.65-4.71 L/min. The femoral arterial cannula remains partially clamped with flow range 2.4-2.5 L/min. Sweep gas is at 3.5 LPM and FiO2 80%. There is a small amount of clot/fibrin within the oxygenator. Cannulas are secure with no bleeding from the site. Extremities are warm to touch. Suctioned ETT for a small amount of thick, tan secretions.     Significant Shift Events: Two unit of RBCs and two units of platelets were given.     Vent settings:  Ventilation Mode: CMV/AC  (Continuous Mandatory Ventilation/ Assist Control)  FiO2 (%): 60 %  Rate Set (breaths/minute): 12 breaths/min  Tidal Volume Set (mL): 530 mL  PEEP (cm H2O): 10 cmH2O  Oxygen Concentration (%): 60 %  Resp: 13  .     Heparin remains off. ACT range 192-209.     Patient is on CRRT, blood loss was minimal. Product given included two units of RBCs and two units of platelets.        Intake/Output Summary (Last 24 hours) at 3/16/2019 0601  Last data filed at 3/16/2019 0600      Gross per 24 hour   Intake 3713.2 ml   Output 415 ml   Net 3298.2 ml         ECHO:  No results found for this or any previous visit.No results found for this or any previous visit.     CXR:  No results found for this or any previous visit (from the past 24 hour(s)).     Labs:         Recent Labs   Lab 03/16/19  0410 03/16/19  0250 03/15/19  2243 03/15/19  2019   PH 7.45 7.39 7.42 7.44   PCO2 37 44 40 37   PO2 83 83 89 100   HCO3 26 26 26 25   O2PER 60.0 60.0 60.0 60.0               Lab Results   Component Value Date     HGB 9.9 (L) 03/16/2019     PHGB Canceled, Test credited 03/16/2019     PLT 93 (L) 03/16/2019     FIBR 259 03/16/2019     INR 2.13 (H) 03/16/2019     PTT 54 (H) 03/16/2019     DD 12.8 (H) 03/15/2019     AXA <0.10 03/15/2019     [facility] 33 (L) 03/15/2019            Plan is to remove  both the IABP and the thermoguard tomorrow and place a left central line. Will continue VAV ECMO at this time.      .     I have personally reviewed the ECMO flows, oxygenation and CO2 clearance, anticoagulation, and cannula position.  I have also personally assessed the patient's systemic response with hemodynamics, oxygenation, ventilation, and bleeding.       The patient requires continued ECMO support and management in the ICU.  I have discussed patient care and treatment plan with the primary team.      Caleb Chatterjee MD     March 16, 2019

## 2019-03-16 NOTE — PROGRESS NOTES
CRRT RESTART NOTE    Reason for Restart:  Scheduled circuit change  Error Code:  none    Patient s Vascular Access: Catheter                   Placement Confirmed:  YES  Manufacture:  ECMO  Model:    Length/Danish Size:    Flush Volume:      DATA:  Procedure:  CVVHDF  Start Time:  1552  Machine#:  2  Filter:  M150  Blood Flow:   180 mL/min  Pre-Replacement Solution:  Phoxillium 4/2.5  Post-Replacement Solution:  Phoxillium 4/2.5  Dialysate Solution:  PrismaSol 2/3.5  Pre-Replacement Solution Rate:  1300mL/hr  Post-Replacement Solution Rate:  200mL/hr  Dialysate Flow Rate:  1300mL/hr  Patient Removal Rate:  100 mL/hr  Anticoagulation Type and Rate:  n/a    ASSESSMENT:  How Patient Tolerated Restart:  well  Vital Signs:  36.8, 74, 97%, 96/60, RR 16  Initial Pressures:  Access:  -13  Filter:  166  Return:  110  TMP:  52  Change in Filter Pressure:  19    INTERVENTIONS:  Scheduled 72hr circuit change done. No issues. Not on blood warmer.  Mat and hair ties continue to be in use.    PLAN:  Continue to pull I=O as patient tolerates.  Contact CRRT RN with questions/concerns #[phone]    Pt +[phone] for today.  Electrolytes and vital signs monitored.

## 2019-03-16 NOTE — PROGRESS NOTES
Nephrology dialysis note    This patient was seen and examined while on CRRT. Laboratory results and nurses' notes were reviewed. Remains on ECMO with IABP. Labs acceptable. Continues on norepi and vaso.    No changes to management of volume, anemia, BMD, acidosis, or electrolytes.    Diagnosis - FERNANDA in setting of cardiogenic shock. Continue CRRT running net I=O.    Jai Altamirano MD  [phone]

## 2019-03-16 NOTE — PROGRESS NOTES
[facility]  Cardiac ICU Progress Note  03/16/2019           Key events - last 24 hours / Hospital course   No major events overnight.  2u of PRBC and 2u of plts overnight  Lactate decreasing to normal today  Stable pressors    PaO2 dropped today when FiO2 on ECMO was decreased.    We had a family conference yesterday regarding goals of care. The family understands how sick he is right now. They are on board with progressive weaning of support but want to come to a consensus before each step. They understand that if we remove ECMO, we would not replace it.              Assessment and Plan:   Hellen Riddle is a 39 year old male who was admitted on 2/23/2019 after cardiac arrest.    Neurology: Intraparenchymal hemorrhage  Possible intracranial abscess  Intubated, off sedation since 3/14  --repeat head CT with and without contrast   --abx expanded to meropenem to cover intracranial infection   Cardiovascular / Hemodynamics: Refractory VF arrest, currently on peripheral VA ECMO support.  Coronary angiography on admission showed 3v disease and he underwent MITA to mRCA & mLCx. Has IABP as well.  ECMO decannulation stymied by VT in OR on 3/6. Returned to the cath lab for MITA to  LAD and distal LCx.  He was started on amiodarone with good suppression of ventricular arrhythmias.  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 Stable  TTE: LV EF 50-55%.  RV dysfunction is mild.  --remove IABP when we have cath lab availability  --consider removal of ECMO with the caveat that we would not replace it. Family is on board with progressive weaning of support but wants to come to a consensus before each step.  --vascular surgery consulted for aorto-esophageal fistula, but declined to operate due to futility and high risk  --wean pressors as able; will tolerate MAPs 55-60  --hold heparin gtt and ASA given ongoing bleeding and cont ticagrelor--ACT is still in therapeutic range due to liver  failure  --hold lipitor for now given hepatic injury during arrest  --hold ACE/ARB for now given renal failure and shock  --holding beta blocker given shock    Pulmonary: Acute hypoxic respiratory failure due to pulmonary edema, VAP  ETT in the trachea  --cont abx as below  --wean vent as able  --daily CXR  --Q2h ABGs for now  --consider scheduled duonebs if signs of lung dz, currently PRN     GI and Nutrition: GI bleeding from aorto-esophageal fistula  Shock liver  Liver and splenic infarcts  --PPI BID  --restarted tube feeds since there is no bowel ischemia  --supportive care as above  --GI consulted and signed off  --monitor BID LFTs   Renal, Fluid and Electrolytes: Renal failure.  Appreciate nephrology's assistance.  On CRRT.  --monitor urine output  --maintain K>4 and Mg>2    Infectious Disease: Multiple organisms recovered in sputum; possible intracranial abscess; possible bacteremia from enteric source  --cont meropenem  --daptomycin started yesterday with reduction in WBC count today  --plan to remove IABP and femoral CVC when ACT drops   Hematology and Oncology: Acute anemia due to GI bleed  --holding heparin as above   --cryo PRN fibrinogen < 200; FFP for INR >2  --Transfuse for Hgb<10   Endocrinology: No known medical history. BG elevated.  --insulin PRN   Lines: R femoral arterial and venous ECMO cannulae   Left brachial arterial line  L femoral CVC   ETT   Pisano catheter  OG tube   Current lines are required for patient management             Medications:   I have reviewed this patient's current medications           Review of Systems:   Review of systems not obtained due to patient factors - intubation              Physical Exam:   Exam and Labs by System  Neuro: Exam: intubated and sedated, pupils equal but not reactive; opens eyes to stimulation  General Appearance:   Comfortable       Cardiovascular:    Heart Rate: 78  Exam:    Normal s1 and s2, balloon pump noise; no audible murmur, cool  extremities.     Recent Labs   Lab 03/16/19  1600 03/16/19  0958 03/16/19  0431   TROPI 1.816* 2.292* 2.090*       Pulm:   Vent Settings:  Resp: 15 SpO2: 98 % O2 Device: Mechanical Ventilator    Ventilation Mode: CMV/AC  (Continuous Mandatory Ventilation/ Assist Control)  FiO2 (%): 60 %  Rate Set (breaths/minute): 12 breaths/min  Tidal Volume Set (mL): 530 mL  PEEP (cm H2O): 10 cmH2O  Oxygen Concentration (%): 60 %  Resp: 15    Exam:   Symmetrical chest shape and movements with each tidal breath  Good patient-ventilator synchrony     Arterial Blood Gas:   Recent Labs   Lab 03/16/19  1600 03/16/19  1352 03/16/19  1059 03/16/19  0958   PH 7.38 7.41 7.42 7.42   PCO2 42 40 41 40   PO2 68* 87 55* 89   HCO3 25 26 26 26   O2PER 60 60 60 60       Renal:   Meds:  Vitals:    03/14/19 0200 03/15/19 0400 03/16/19 0600   Weight: 97.9 kg (215 lb 13.3 oz) 96.9 kg (213 lb 10 oz) 95.6 kg (210 lb 12.2 oz)   I/O last 3 completed shifts:  In: 3430.3 [I.V.:1704.3; Other:10; NG/GT:270]  Out: 2173 [Emesis/NG output:300; Other:1748; Stool:125]  Recent Labs   Lab 03/16/19  1600 03/16/19  0958    137   POTASSIUM 4.0 3.9   CHLORIDE 103 104   CO2 22 25   ANIONGAP 13 9   *  175* 144*  155*   BUN 37* 36*   CR PENDING 1.39*   ALEKSANDER 8.0* 8.6     No components found for: URINE     GI:   Exam:    Distended, no bowel sounds     Diet: tube feeds  Recent Labs   Lab 03/16/19  1600 03/16/19  0958 03/16/19  0431   AST 1,999* Unsatisfactory specimen - hemolyzed 2,719*   ALT 1,372* 1,563* 1,656*   BILITOTAL 30.6* 30.2* 27.1*   ALBUMIN 2.4* 2.6* 2.5*   PROTTOTAL PENDING 5.0* 4.6*   ALKPHOS 508* 539* 504*       Heme/ID:   Meds:  Temp: 98.1  F (36.7  C) Temp src: EsophagealTemp  Min: 97.3  F (36.3  C)  Max: 98.1  F (36.7  C)   Recent Labs   Lab 03/16/19  1600 03/16/19  0958 03/16/19  0431 03/15/19  2303 03/15/19  1549   WBC 38.1* 37.6* 37.9* 38.4* 42.1*   HGB 10.8* 11.0* 9.9* 9.7* 10.1*   HCT 33.1* 33.4* 30.2* 29.2* 31.2*   MCV 94 94 95 95 95    RDW 19.5* 18.6* 19.2* 19.5* 19.6*   PLT 87* 102* 93* 96* 109*     Recent Labs   Lab 03/16/19  1600 03/16/19  0958 03/16/19  0431 03/15/19  2303 03/15/19  1549   INR 1.97* 1.98* 2.13* 2.35* 2.57*   PTT 48* 49* 54* 54* 53*     Recent Labs   Lab 03/16/19  0546 03/15/19  1651 03/15/19  1624 03/15/19  1225 03/15/19  0405 03/14/19  0607   CULT No growth after 9 hours No growth after 19 hours No growth after 19 hours Culture in progress No growth after 1 day Moderate growth  Candida albicans / dubliniensis  Candida albicans and Candida dubliniensis are not routinely speciated  Susceptibility testing not routinely done  *  Light growth  Klebsiella pneumoniae  *  Light growth  Escherichia coli  *  Susceptibility testing in progress       Endo:   Meds:  Recent Labs   Lab 03/16/19  1600 03/16/19  0958 03/16/19  0431 03/16/19  0410 03/15/19  2303   *  175* 144*  155* 158* 168* 146*       Lines:    No erythema or discharge at the catheter entry site               Data:   All lab results reviewed    The pt was discussed and evaluated with Dr. Chatterjee.    Jersey Alexander MD  Cardiovascular Medicine Fellow, PGY-7  [phone]     I have seen and examined the patient with the CSI team. I agree with the assessment and plan of the note above.I have reviewed pertinent labs.     Caleb Chatterjee MD  Interventional Cardiology  Pager: [phone]

## 2019-03-16 NOTE — PLAN OF CARE
Nights:  WILBUR  Pt had about 1 minute of Vtach at 2130 last evening which spontaneously converted to SR. Overnight several runs of Vtach less than 10 beats.  K+ is low 3.4 and replaced overnight as well as K+ bath changed in dialysate.  Mg+ replaced, Phos+ replaced overnight.  Tube feeding at 20cc/hr currently.  Titrated down Vaso and Levo gtt.  Amio, Fentanyl still infusing.  HR 60's, MAP 65-75.  Lungs coarse, Afebrile.  Unable to reach net negative status with CRRT.  2 Units PRBC's overnight, 2 Units Platelets.  Hgb 9.9, Plts 93.  Patient remains on VAV ECMO, all equipment is functioning and alarms are appropriately set. RPM's 3500 with flow range 4.65-4.71 L/min. The femoral arterial cannula remains partially clamped with flow range 2.4-2.5 L/min. Sweep gas is at 3.5 LPM and FiO2 80%. There is a small amount of clot/fibrin within the oxygenator. Cannulas are secure with no bleeding from the site. Extremities are warm to touch. Suctioned ETT for a small amount of thick, tan secretions.  P  Remove IABP, CT scan of head.  Continue to treat fluid volume status and Pressors/Ecmo.

## 2019-03-16 NOTE — PROGRESS NOTES
ECMO Shift Summary:    Patient remains on VA ECMO, all equipment is functioning and alarms are appropriately set. RPM's 3500 with flow range 4.3-4.65 L/min. Sweep gas is at 3.5 LPM and FiO2 80%. Circuit remains free of air, clot and fibrin. Cannulas are secure with no bleeding from site. Extremities are warm. Suctioned ETT for small thick tan secretions.    Significant Shift Events: informal turndown done, lactate remained stable.    Vent settings:  Ventilation Mode: CMV/AC  (Continuous Mandatory Ventilation/ Assist Control)  FiO2 (%): 60 %  Rate Set (breaths/minute): 12 breaths/min  Tidal Volume Set (mL): 530 mL  PEEP (cm H2O): 10 cmH2O  Oxygen Concentration (%): 60 %  Resp: 14  .    Heparin is off, ACT range 160-180.    Urine output is as charted, blood loss was none. Product given included 1 unit(s) PLT.      Intake/Output Summary (Last 24 hours) at 3/16/2019 1752  Last data filed at 3/16/2019 1700  Gross per 24 hour   Intake 3394 ml   Output 2360 ml   Net 1034 ml       ECHO:  No results found for this or any previous visit.No results found for this or any previous visit.    CXR:  No results found for this or any previous visit (from the past 24 hour(s)).    Labs:  Recent Labs   Lab 03/16/19  1600 03/16/19  1352 03/16/19  1059 03/16/19  0958   PH 7.38 7.41 7.42 7.42   PCO2 42 40 41 40   PO2 68* 87 55* 89   HCO3 25 26 26 26   O2PER 60 60 60 60       Lab Results   Component Value Date    HGB 10.8 (L) 03/16/2019    PHGB Canceled, Test credited 03/16/2019    PLT 87 (L) 03/16/2019    FIBR 277 03/16/2019    INR 1.97 (H) 03/16/2019    PTT 48 (H) 03/16/2019    DD 15.7 (H) 03/16/2019    AXA Unsatisfactory specimen - icteric 03/16/2019    ANTCH 30 (L) 03/16/2019         Plan is travel to heart cath and remove IABP and possibly transition to VV ECMO or decann completely..      Greer Gillis, RRT  3/16/2019 5:52 PM

## 2019-03-16 NOTE — PROGRESS NOTES
ECMO Shift Summary:    Patient remains on VA-V ECMO, all equipment is functioning and alarms are appropriately set. RPM's 3500 with flow range 4.5-4.8 L/min. Sweep gas is at 4 LPM and FiO2 90%. Circuit remains free of air and clot, but there is a very small amt of fibrin noted in the oxygenator. Cannulas are secure with no bleeding from site. Extremities are warm. Suctioned ETT for a small amt of thick, tan secretions.    Significant Shift Events: Femoral Arterial ECMO cannula is partially clamped with about 2.4 lpm of flow going through it.     Vent settings:  Ventilation Mode: CMV/AC  (Continuous Mandatory Ventilation/ Assist Control)  FiO2 (%): 60 %  Rate Set (breaths/minute): 12 breaths/min  Tidal Volume Set (mL): 530 mL  PEEP (cm H2O): 10 cmH2O  Oxygen Concentration (%): 60 %  Resp: 13  .    No heparin running- ACT goal range is 160-180s, and ACT range has been 184-209s throughout my shift.    Urine output is per RN flowsheet, blood loss was minimal. Product given included one unit of PRBC's. 500mls of Albumin were given for chugging on the ECMO circuit.      Intake/Output Summary (Last 24 hours) at 3/15/2019 2217  Last data filed at 3/15/2019 2200  Gross per 24 hour   Intake 2150.33 ml   Output 679 ml   Net 1471.33 ml       ECHO:  No results found for this or any previous visit.No results found for this or any previous visit.    CXR:  Recent Results (from the past 24 hour(s))   XR Chest Port 1 View    Narrative    Exam: XR CHEST PORT 1 VW, 3/15/2019 1:25 AM    Indication: VAV ECMO/IABP/INTUBATED    Comparison: 3/14/2019    Findings:   Single portable view the chest. ECMO cannula unchanged in position  from previous. Unchanged intra-aortic balloon pump projecting at the  level of the tereso. Enteric tube projecting over the esophagus and  stomach. Endotracheal tube projecting over the mid thoracic trachea.  The cardiac mediastinum and upper abdomen are unchanged. There is  stable interstitial and hazy opacities  of the lungs. Slightly  decreased right upper lobe opacity.      Impression    Impression: Slightly decreased pulmonary edema. Support devices in  stable position.    I have personally reviewed the examination and initial interpretation  and I agree with the findings.    MIKKI DAI MD       Labs:  Recent Labs   Lab 03/15/19  2019 03/15/19  1759 03/15/19  1546 03/15/19  1403   PH 7.44 7.46* 7.45 7.36   PCO2 37 34* 33* 40   PO2 100 128* 111* 94   HCO3 25 24 23 22   O2PER 60.0 60.0 60.0 60       Lab Results   Component Value Date    HGB 10.1 (L) 03/15/2019    PHGB Canceled, Test credited 03/15/2019     (L) 03/15/2019    FIBR 254 03/15/2019    INR 2.57 (H) 03/15/2019    PTT 53 (H) 03/15/2019    DD 12.8 (H) 03/15/2019    AXA <0.10 03/15/2019    ANTCH 33 (L) 03/15/2019         Plan is to continue on VA-V ECMO, with a possibility of IABP removal in heart cath lab tomorrow.      Marilee Cleveland, RRT  3/15/2019 10:17 PM

## 2019-03-16 NOTE — PLAN OF CARE
Assessment:   Cardiac: NSR 60s-80s. MAP goal >60. VAV ECMO with flows 4.5LPM, FiO2 80%, Sweep 3.5. IABP 1:3 and 20% augmentation.   Resp: CMV- FiO2 60%, tV 530, Rate 12, Peep 10. Lung sounds coarse, diminished. Small amount of tan, creamy secretions. Cough/gag not present with ET suctioning. Afebrile.  Neuro: Off Versed since 3/14. Unresponsive, but opens eyes slightly to stimulation. Does not move any extremities nor following commands. Pupils 3mm, equal, reactive.   : Anuric. CRRT with goal I=O.   GI: NJ with TF @ 30ml/hr (goal). OG to suction with 400cc dark red/maroon output. Rectal tube in place with 125cc of loose, bloody/dark brown output.   Endocrine: Sliding scale insulin q4h.   Drips: Fentanyl, Vaso, Levo, Amio.      Interventions: Gave 1u PLT. Replaced electrolytes per protocol.      Plan: Plan to remove IABP and VA ECMO tomorrow 3/17- wife and family at bedside and updated on plan/patient's status. Will continue to monitor/assess and update MDs as needed.

## 2019-03-16 NOTE — PROGRESS NOTES
ECMO Shift Summary:    Patient remains on VAV ECMO, all equipment is functioning and alarms are appropriately set. RPM's 3500 with flow range 4.65-4.71 L/min. The femoral arterial cannula remains partially clamped with flow range 2.4-2.5 L/min. Sweep gas is at 3.5 LPM and FiO2 80%. There is a small amount of clot/fibrin within the oxygenator. Cannulas are secure with no bleeding from the site. Extremities are warm to touch. Suctioned ETT for a small amount of thick, tan secretions.    Significant Shift Events: Two unit of RBCs and two units of platelets were given.    Vent settings:  Ventilation Mode: CMV/AC  (Continuous Mandatory Ventilation/ Assist Control)  FiO2 (%): 60 %  Rate Set (breaths/minute): 12 breaths/min  Tidal Volume Set (mL): 530 mL  PEEP (cm H2O): 10 cmH2O  Oxygen Concentration (%): 60 %  Resp: 13  .    Heparin remains off. ACT range 192-209.    Patient is on CRRT, blood loss was minimal. Product given included two units of RBCs and two units of platelets.      Intake/Output Summary (Last 24 hours) at 3/16/2019 0601  Last data filed at 3/16/2019 0600  Gross per 24 hour   Intake 3713.2 ml   Output 415 ml   Net 3298.2 ml       ECHO:  No results found for this or any previous visit.No results found for this or any previous visit.    CXR:  No results found for this or any previous visit (from the past 24 hour(s)).    Labs:  Recent Labs   Lab 03/16/19  0410 03/16/19  0250 03/15/19  2243 03/15/19  2019   PH 7.45 7.39 7.42 7.44   PCO2 37 44 40 37   PO2 83 83 89 100   HCO3 26 26 26 25   O2PER 60.0 60.0 60.0 60.0       Lab Results   Component Value Date    HGB 9.9 (L) 03/16/2019    PHGB Canceled, Test credited 03/16/2019    PLT 93 (L) 03/16/2019    FIBR 259 03/16/2019    INR 2.13 (H) 03/16/2019    PTT 54 (H) 03/16/2019    DD 12.8 (H) 03/15/2019    AXA <0.10 03/15/2019    ANTCH 33 (L) 03/15/2019         Plan is to remove both the IABP and the thermoguard today and place a left central line. Will continue VAV  ECMO at this time.      Alo Carty, RRT  3/16/2019 6:01 AM

## 2019-03-16 NOTE — PROGRESS NOTES
CRRT STATUS NOTE    DATA:  Time:  6:36 AM  Pressures WNL:  YES  Filter Status:  WDL    Problems Reported/Alarms Noted:  Flow alarms.    Supplies Present:  YES    ASSESSMENT:  Patient Net Fluid Balance:  Net +1117 ml @ 0600  Vital Signs:  HR 74, /61, MAP 81  Labs:  K 3.4, Mg 1.9, Phos 3.4, iCa 4.8, Hgb 9.9, Plt 93  Goals of Therapy:  I = O as BP allows.     INTERVENTIONS:   Mat under machine and hair ties in use. Changed dialysate to 4/2.5 and increased K replacement to 3.8.    PLAN:  Continue to monitor circuit daily and change set q72 hours or PRN for clotting/clogging. Please call CRRT RN with any questions/problems.

## 2019-03-17 NOTE — PROGRESS NOTES
CRRT STATUS NOTE    DATA:  Time:  7:02 AM  Pressures WNL:  YES  Filter Status:  WDL    Problems Reported/Alarms Noted:  None    Supplies Present:  YES    ASSESSMENT:  Patient Net Fluid Balance:  Net -576ml @0600  Vital Signs:  HR 84, BP 77/53, MAP 64  Labs:  K 3.7, Mg 2.5, iCa 4.6, Phos 2.6, Hgb 11.4, Plt 108  Goals of Therapy:  I = O    INTERVENTIONS:   Mat and hair ties in use.    PLAN:  Continue to monitor circuit daily and change set q72 hours or PRN for clotting/clogging. Please call CRRT RN with any questions/problems.

## 2019-03-17 NOTE — PROGRESS NOTES
CRRT STATUS NOTE    DATA:  Time:  6:31 PM  Pressures WNL:  YES  Filter Status:  WDL    Problems Reported/Alarms Noted:  None reported    Supplies Present:  YES    ASSESSMENT:  Patient Net Fluid Balance:  Net ne 427 since midnight  Vital Signs:  HR 90, BP 's/60's-80's on Levo, vaso and Amio  Labs:  WBC 32.5, Plt 96, Trop 1.5, , AST 1468, T Bili 34.5, Alk Phos 498  Goals of Therapy:  I=O as BP allows    INTERVENTIONS:   Checked in with bedside RN    PLAN:  Continue with treatment goals. Call CRRT RN at [phone] with questions or concerns.

## 2019-03-17 NOTE — PROGRESS NOTES
ECMO Shift Summary:    Patient remains on VAV ECMO, all equipment is functioning and alarms are appropriately set. RPM's 3500 with flow range 1.5 L/min. Sweep gas is at 1.5 LPM and FiO2 80%. Circuit remains free of air, clot and fibrin. Cannulas are secure with no bleeding from site. Extremities are warm. Suctioned ETT for moderate amount of tan.    Significant Shift Events: none    Vent settings:  Ventilation Mode: CMV/AC  (Continuous Mandatory Ventilation/ Assist Control)  FiO2 (%): 60 %  Rate Set (breaths/minute): 12 breaths/min  Tidal Volume Set (mL): 530 mL  PEEP (cm H2O): 10 cmH2O  Oxygen Concentration (%): 60 %  Resp: 18  .    Heparin is not running. ACT range 180.    Urine output is as charted, blood loss was none. Product given included 2 unit(s) PLTs.      Intake/Output Summary (Last 24 hours) at 3/17/2019 1830  Last data filed at 3/17/2019 1815  Gross per 24 hour   Intake 2839.63 ml   Output 3928 ml   Net -1088.37 ml       ECHO:  No results found for this or any previous visit.No results found for this or any previous visit.    CXR:  Recent Results (from the past 24 hour(s))   XR Chest Port 1 View    Narrative    Exam: XR CHEST PORT 1 VW, 3/17/2019 3:47 AM    Indication: monitor position of supportive devices    Comparison: 3/15/2019    Findings:   Single portable view the chest. ECMO cannula unchanged in position  from previous. Unchanged intra-aortic balloon pump projecting at the  level of the tereso. Enteric tubes with the tips projecting out of the  field-of-view. Endotracheal tube projecting over the mid intrathoracic  trachea, similar to previous. Stable cardiomediastinal silhouette.  Stable interstitial and hazy opacities of the lungs. No pneumothorax.      Impression    Impression:   1. Stable pulmonary edema versus atelectasis versus infection.  2. Bilateral lower lobes atelectasis versus consolidation, unchanged.  3. Stable support devices.    I have personally reviewed the examination and  initial interpretation  and I agree with the findings.    KARISHMA CHE MD       Labs:  Recent Labs   Lab 03/17/19  1749 03/17/19  1604 03/17/19  1417 03/17/19  1159   PH 7.43 7.42 7.43 7.43   PCO2 38 41 39 39   PO2 93 100 101 79*   HCO3 25 27 26 26   O2PER 60.0 60.0 60 60       Lab Results   Component Value Date    HGB 10.4 (L) 03/17/2019    PHGB Canceled, Test credited 03/17/2019    PLT 96 (L) 03/17/2019    FIBR 389 03/17/2019    INR 1.69 (H) 03/17/2019    PTT 45 (H) 03/17/2019    DD 13.7 (H) 03/17/2019    AXA Canceled, Test credited 03/17/2019    ANTCH 38 (L) 03/17/2019         Plan is travel to OR for possible conversion to  or [facility].      Greer Gillis, RRT  3/17/2019 6:30 PM

## 2019-03-17 NOTE — PROGRESS NOTES
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  Some grimacing & coughing but no other spontaneous movements on just fentanyl 50 mcg/hr (acknowleding that severe liver injury impair clearance of previously administered benzodiazepines).    Yesterday, a family conference was held regarding goals of care. The family understands how sick he is right now. They are on board with progressive weaning of support but want to come to a consensus before each step. They understand that if we remove ECMO, we would not replace it.              Assessment and Plan:   Hellen Riddle is a 39 year old male who was admitted on 2/23/2019.    Neurology: Intubated, sedated --continue fentanyl for analgesia  Intraparenchymal hemorrhage  Possible intracranial abscess  --CT head with contrast today  --Continue empiric antibiotics   Cardiovascular / Hemodynamics: 1  Refractory VF arrest with cardiogenic shock, currently on peripheral VA ECMO & IABP.  Coronary angiography on admission showed 3v disease -> MITA to mRCA & mLCx.  ECMO decannulation on 3/6 stymied by VT -> started amiodarone & returned to cath lab for stenting of LAD  and distal LCx. No ventricular arrhythmias since.  -- Tolerated ECLS turn down hemodynamically - remove IABP today and decannulate VA ECLS tomorrowin OR today.  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  TTE: LV EF 50-55%.  RV dysfunction is mild.  --vascular surgery evaluated aorto-esophageal fistula and declined to operate due to futility and high risk  --wean pressors as able; will tolerate MAPs 55-60  --hold heparin gtt and ASA given ongoing bleeding and cont ticagrelor-ACT is still in therapeutic range due to liver failure  --hold lipitor for now given hepatic injury  during arrest  --hold ACE/ARB for now given renal failure and shock  --holding beta blocker given shock    Pulmonary: Acute hypoxic respiratory failure due to pulmonary edema, VAP  ETT in the trachea  Will talk to ENT about tracheostomy tomorrow.  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   Infectious Disease: Multiple organisms recovered in sputum; possible intracranial abscess; possible bacteremia from enteric source  --on meropenem & daptomycin   Hematology and Oncology: Bleeding, as noted above.  Holding heparin & ASA; continuing ticagrelor  --cryo PRN fibrinogen < 200; FFP for INR >2  --Transfuse for Hgb<10  --heparin gtt for ECMO with ACT goal 160-180  --US LE w/ arterial duplex per ECMO protocol   DVT PPX: held as noted above.   Endocrinology: No known medical history. Insulin as needed for hyperglycemia.   Lines: PA catheter March 17, 2019  R femoral arterial and venous ECMO cannulae March 17, 2019  L femoral arterial line March 17, 2019  R radial arterial line March 17, 2019  ETT March 17, 2019  Pisano catheter March 17, 2019  OG tube March 17, 2019  Restraint: needed    Current lines are required for patient management     Code Status: Full code              Hospital Course:   Per nursing notes he grimaces and coughs but does not follow commands or move extremities.  1u Plts administered overnight.  Heparin has been held for intracranial and gastrointestinal bleeding and RN & RT have noted small bits of clot/fibrin within the oxygenator.           Medications:   I have reviewed this patient's current medications           Review of Systems:   Review of systems not obtained due to patient factors - intubation              Physical Exam:   Exam and Labs by System  Neuro:  Exam: intubated and sedated, pupils equal and reactive  General Appearance:   Sedated, no apparent pain or respiratory distress       Cardiovascular:  Heart Rate: 82  Exam:    Normal s1 and s2, no murmur, warm, grossly edematous extremities     Recent Labs   Lab 03/17/19  0427 03/16/19 2157 03/16/19  1600   TROPI 1.561* 1.518* 1.816*       Pulm:   Meds:  Vent Settings:  Resp: 13 SpO2: 100 % O2 Device: Mechanical Ventilator    Ventilation Mode: CMV/AC  (Continuous Mandatory Ventilation/ Assist Control)  FiO2 (%): 60 %  Rate Set (breaths/minute): 12 breaths/min  Tidal Volume Set (mL): 530 mL  PEEP (cm H2O): 10 cmH2O  Oxygen Concentration (%): 60 %  Resp: 13    Exam:   Symmetrical chest shape and movements with each tidal breath  Good patient-ventilator synchrony     Arterial Blood Gas:   Recent Labs   Lab 03/17/19  0413 03/17/19  0206 03/16/19 2157 03/16/19  1754   PH 7.46* 7.44 7.45 7.42   PCO2 35 38 36 38   PO2 97 82 92 71*   HCO3 25 26 25 25   O2PER 60.0 60.0 60 60     CXR: bilateral interstitial opacities perhaps slightly improved from yesterday.  ETT appropriately positioned in trachea.     Renal:   Meds:  Vitals:    03/15/19 0400 03/16/19 0600 03/17/19 0500   Weight: 96.9 kg (213 lb 10 oz) 95.6 kg (210 lb 12.2 oz) 96.3 kg (212 lb 4.9 oz)   I/O last 3 completed shifts:  In: 2711.5 [I.V.:1292.5; NG/GT:270]  Out: 4496 [Emesis/NG output:403; Other:3968; Stool:125]  Recent Labs   Lab 03/17/19 0427 03/17/19 0413 03/16/19 2157     --  138   POTASSIUM 3.7  --  3.9   CHLORIDE 101  --  102   CO2 21  --  25   ANIONGAP 15*  --  11   * 159* 150*  156*   BUN 39*  --  38*   CR 1.24  --  1.47*   ALEKSANDER 8.4*  --  8.4*     No components found for: URINE     GI:   Exam:    Icteric  sclerae  Non-distended  Soft     Diet: NPO  Recent Labs   Lab 03/17/19 0427 03/16/19 2157 03/16/19  1600   AST 1,823* 1,934* 1,999*   ALT 1,226* 1,360* 1,372*   BILITOTAL 36.7* 34.6* 30.6*   ALBUMIN 2.6* 2.6* 2.4*   PROTTOTAL 5.2* 4.8* 4.4*   ALKPHOS 547* 540* 508*       Heme/ID:   Meds:  Temp: 97  F (36.1  C) Temp src: EsophagealTemp  Min: 96.8  F (36  C)  Max: 98.1  F (36.7  C)   Recent Labs   Lab 03/17/19 0427 03/16/19 2157 03/16/19 1600 03/16/19  0958 03/16/19  0431   WBC 34.1* 36.8* 38.1* 37.6* 37.9*   HGB 11.4* 11.4* 10.8* 11.0* 9.9*   HCT 34.0* 34.5* 33.1* 33.4* 30.2*   MCV 94 94 94 94 95   RDW 19.9* 19.9* 19.5* 18.6* 19.2*   * 96* 87* 102* 93*     Recent Labs   Lab 03/17/19 0427 03/16/19 2157 03/16/19  1600 03/16/19  0958 03/16/19  0431 03/15/19  2303   INR 1.67* 1.72* 1.97* 1.98* 2.13* 2.35*   PTT  --  45* 48* 49* 54* 54*     Recent Labs   Lab 03/17/19  0444 03/16/19  0546 03/15/19  1651 03/15/19  1624 03/15/19  1225 03/15/19  0405   CULT No growth after 1 hour No growth after 1 day No growth after 2 days No growth after 2 days Culture in progress No growth after 2 days       Endo:   Meds:  Recent Labs   Lab 03/17/19 0427 03/17/19  0413 03/16/19 2157 03/16/19  1600 03/16/19  0958   * 159* 150*  156* 178*  175* 144*  155*       Lines:    No erythema or discharge at the catheter entry site               Data:     CMP  Recent Labs   Lab 03/17/19  0427 03/17/19  0413 03/16/19  2157 03/16/19  1600 03/16/19  0958     --  138 138 137   POTASSIUM 3.7  --  3.9 4.0 3.9   CHLORIDE 101  --  102 103 104   CO2 21  --  25 22 25   ANIONGAP 15*  --  11 13 9   * 159* 150*  156* 178*  175* 144*  155*   BUN 39*  --  38* 37* 36*   CR 1.24  --  1.47* 1.38* 1.39*   GFRESTIMATED 72  --  58* 63 62   GFRESTBLACK 83  --  68 73 72   ALEKSANDER 8.4*  --  8.4* 8.0* 8.6   MAG 2.5*  --  2.3 2.2 2.5*   PHOS 2.8  --  2.8 3.6 1.9*   PROTTOTAL 5.2*  --  4.8* 4.4* 5.0*   ALBUMIN 2.6*  --  2.6* 2.4* 2.6*    BILITOTAL 36.7*  --  34.6* 30.6* 30.2*   ALKPHOS 547*  --  540* 508* 539*   AST 1,823*  --  1,934* 1,999* Unsatisfactory specimen - hemolyzed   ALT 1,226*  --  1,360* 1,372* 1,563*     CBC  Recent Labs   Lab 03/17/19  0427 03/16/19 2157 03/16/19  1600 03/16/19  0958   WBC 34.1* 36.8* 38.1* 37.6*   RBC 3.63* 3.68* 3.51* 3.55*   HGB 11.4* 11.4* 10.8* 11.0*   HCT 34.0* 34.5* 33.1* 33.4*   MCV 94 94 94 94   MCH 31.4 31.0 30.8 31.0   MCHC 33.5 33.0 32.6 32.9   RDW 19.9* 19.9* 19.5* 18.6*   * 96* 87* 102*     INR  Recent Labs   Lab 03/17/19 0427 03/16/19 2157 03/16/19  1600 03/16/19  0958   INR 1.67* 1.72* 1.97* 1.98*     Arterial Blood Gas  Recent Labs   Lab 03/17/19  0756 03/17/19  0413 03/17/19  0206 03/16/19 2157   PH 7.43 7.46* 7.44 7.45   PCO2 39 35 38 36   PO2 84 97 82 92   HCO3 25 25 26 25   O2PER 60.0 60.0 60.0 60       No results found for this or any previous visit (from the past 24 hour(s)).    The pt was discussed and evaluated with Abraham Lindsay MD, attending physician, who agrees with the assessment and plan above.     Joseph Li MD  Cardiovascular disease fellow  I have seen and examined the patient with the [facility] team. I agree with the assessment and plan of the note above.I have reviewed pertinent labs.     Caleb Chatterjee MD  Interventional Cardiology  Pager: [phone]

## 2019-03-17 NOTE — PROGRESS NOTES
ECMO Shift Summary:    Patient remains on VA ECMO, all equipment is functioning and alarms are appropriately set. RPM's 3500 with flow range 4.2-4.44 L/min. The flow range of the arterial femoral cannula was 2.15-2.3 L/min. Sweep gas is at 1.5 LPM and FiO2 80%. There are small bits of clot/fibrin within the oxygenator. Cannulas are secure with no bleeding from the site. Extremities are warm to touch. Suctioned ETT for a small amount of thick, paul secretions.    Significant Shift Events: Decreased sweep to 1.5 L/min. One unit of platelets was given.    Vent settings:  Ventilation Mode: CMV/AC  (Continuous Mandatory Ventilation/ Assist Control)  FiO2 (%): 60 %  Rate Set (breaths/minute): 12 breaths/min  Tidal Volume Set (mL): 530 mL  PEEP (cm H2O): 10 cmH2O  Oxygen Concentration (%): 60 %  Resp: 15  .    Heparin remains off. ACT range 176-188.    Patient is on CRRT, blood loss was minimal. Product given included one unit of platelets.      Intake/Output Summary (Last 24 hours) at 3/17/2019 0521  Last data filed at 3/17/2019 0500  Gross per 24 hour   Intake 3193.5 ml   Output 4214 ml   Net -1020.5 ml       ECHO:  No results found for this or any previous visit.No results found for this or any previous visit.    CXR:  No results found for this or any previous visit (from the past 24 hour(s)).    Labs:  Recent Labs   Lab 03/17/19  0413 03/17/19  0206 03/16/19  2157 03/16/19  1754   PH 7.46* 7.44 7.45 7.42   PCO2 35 38 36 38   PO2 97 82 92 71*   HCO3 25 26 25 25   O2PER 60.0 60.0 60 60       Lab Results   Component Value Date    HGB 11.4 (L) 03/17/2019    PHGB Canceled, Test credited 03/17/2019     (L) 03/17/2019    FIBR 317 03/16/2019    INR 1.67 (H) 03/17/2019    PTT 45 (H) 03/16/2019    DD 15.7 (H) 03/16/2019    AXA Unsatisfactory specimen - icteric 03/16/2019    ANTCH 30 (L) 03/16/2019         Plan is to potentially remove the IABP and convert to VV ECMO. Will continue VAV ECMO until those events can  happen.      Alo Carty, RRT  3/17/2019 5:21 AM

## 2019-03-17 NOTE — PLAN OF CARE
Nights:  [facility]  Pt seems to be more responsive with grimaces and coughs but does not follow commands or move extremities.  Coarse lungs and afebrile.  HR 70-80's no ectopy.  MAP 65-80's.  Pressor LEVO and Vaso decreasing.  Amio gtt and fentanyl continue.  CRRT net neg about 150cc/hr and pt tolerating well.  Patient remains on VA ECMO, all equipment is functioning and alarms are appropriately set. RPM's 3500 with flow range 4.2-4.44 L/min. The flow range of the arterial femoral cannula was 2.15-2.3 L/min. Sweep gas is at 1.5 LPM and FiO2 80%. There are small bits of clot/fibrin within the oxygenator. Cannulas are secure with no bleeding from the site. Extremities are warm to touch. Suctioned ETT for a small amount of thick, paul secretions.  D  Decannulate VA ECMO, Remove IABP in OR today possibly.  Update family and MD with changes.

## 2019-03-17 NOTE — PROGRESS NOTES
.  ECMO Attending Progress Note  3/17/2019    ECMO Shift Summary:     Patient remains on VAV ECMO, all equipment is functioning and alarms are appropriately set. RPM's 3500 with flow range 4.2-4.5 L/min. The femoral arterial cannula remains partially clamped with flow range 2.15-2.4 L/min. Sweep gas is at 3.5 LPM and FiO2 80%. There is a small amount of clot/fibrin within the oxygenator. Cannulas are secure with no bleeding from the site. Extremities are warm to touch. Suctioned ETT for a small amount of thick, tan secretions.     Significant Shift Events: Two unit of RBCs and two units of platelets were given.       Vent settings:  Ventilation Mode: CMV/AC  (Continuous Mandatory Ventilation/ Assist Control)  FiO2 (%): 60 %  Rate Set (breaths/minute): 12 breaths/min  Tidal Volume Set (mL): 530 mL  PEEP (cm H2O): 10 cmH2O  Oxygen Concentration (%): 60 %  Resp: 15       Heparin remains off. ACT range 176-188.     Patient is on CRRT, blood loss was minimal. Product given included one unit of platelets.        Intake/Output Summary (Last 24 hours) at 3/17/2019 0521  Last data filed at 3/17/2019 0500      Gross per 24 hour   Intake 3193.5 ml   Output 4214 ml   Net -1020.5 ml         ECHO:  No results found for this or any previous visit.No results found for this or any previous visit.     CXR:  No results found for this or any previous visit (from the past 24 hour(s)).     Labs:         Recent Labs   Lab 03/17/19  0413 03/17/19  0206 03/16/19  2157 03/16/19  1754   PH 7.46* 7.44 7.45 7.42   PCO2 35 38 36 38   PO2 97 82 92 71*   HCO3 25 26 25 25   O2PER 60.0 60.0 60 60               Lab Results   Component Value Date     HGB 11.4 (L) 03/17/2019     PHGB Canceled, Test credited 03/17/2019      (L) 03/17/2019     FIBR 317 03/16/2019     INR 1.67 (H) 03/17/2019     PTT 45 (H) 03/16/2019     DD 15.7 (H) 03/16/2019     AXA Unsatisfactory specimen - icteric 03/16/2019     ANTCH 30 (L) 03/16/2019            Plan is  toremove the IABP and convert to VV ECMO tomorrow Will continue VAV ECMO until those events can happen.  GI consult for UGI bleeding.       Caleb Chatterjee MD     March 17, 2019

## 2019-03-17 NOTE — PLAN OF CARE
Assessment:   Cardiac: NSR 70s-90s. MAPs 50s-90s (goal >60). VAV ECMO with flows 4.5LPM, FiO2 80%, Sweep 1.5. IABP removed at 1200.   Resp: CMV- FiO2 60%, tV 530, Rate 12, Peep 10. Lung sounds coarse, diminished. Small amount of tan, creamy secretions. Cough/gag not present with ET suctioning. Afebrile.  Neuro: Off Versed since 3/14. Unresponsive, but opens eyes slightly to stimulation. Does not move any extremities nor following commands. Pupils 3mm, equal, reactive.   : Anuric. CRRT with goal I=O.   GI: NJ with TF @ 30ml/hr (goal). OG to suction with 500cc dark red/maroon output. Rectal tube in place with 100cc of loose, bloody/dark brown output.   Endocrine: Sliding scale insulin q4h.   Drips: Fentanyl, Vaso, Levo, Amio.      Interventions: MD removed IABP. Gave 2u PLT.     Plan: Plan to remove VA ECMO tomorrow 3/18- family at bedside and updated on plan/patient's status. Will continue to monitor/assess and update MDs as needed.

## 2019-03-17 NOTE — PROGRESS NOTES
Nephrology dialysis note    This patient was seen and examined while on CRRT. Laboratory results and nurses' notes were reviewed. Ran ~I=O yesterday. Remains on norepi, vaso, VAV ECMO. Plan to continue CRRT running net I=O.    No changes to management of volume, anemia, BMD, acidosis, or electrolytes.    Diagnosis - FERNANDA in setting of cardiogenic shock.    Jai Altamirano MD  [phone]

## 2019-03-18 NOTE — ANESTHESIA PREPROCEDURE EVALUATION
Anesthesia Pre-Procedure Evaluation    Patient: Hellen Riddle   MRN:     [id number] Gender:   male   Age:    39 year old :      1980        Preoperative Diagnosis: Respiratory Failure   Procedure(s):  EXTRACORPORAL MEMBRANE OXYGENATOR DecANNULATION     Past Medical History:   Diagnosis Date     Dyslipidemia       Past Surgical History:   Procedure Laterality Date     CV HEART CATHETERIZATION WITH POSSIBLE INTERVENTION N/A 3/7/2019    Procedure: Coronary Angiogram;  Surgeon: Dante Molina MD;  Location: [facility]     INSERT EXTRACORPORAL MEMBRANE OXYGENATOR Right 3/6/2019    Procedure: Emergent Insert VA extracorporal membrane oxygenator right groin;  Surgeon: Reggie Perez MD;  Location: [facility] OR     REMOVE EXTRACORPORAL MEMBRANE OXYGENATOR ADULT N/A 3/6/2019    Procedure: EXTRACORPORAL MEMBRANE OXYGENATOR REMOVAL FEMORAL CANNULAS repair right femoral vessels;  Surgeon: Craig Berry MD;  Location: [facility] OR          Anesthesia Evaluation     .             ROS/MED HX    ENT/Pulmonary:       Neurologic: Comment: Sedated and intubated      Cardiovascular: Comment: H/o VF arrest, found to have multi-vessel disease s/p PCI, placed on ECMO, recent oxygenation issues            METS/Exercise Tolerance:     Hematologic:         Musculoskeletal:         GI/Hepatic:         Renal/Genitourinary: Comment: On CRRT    (+) chronic renal disease, type: ARF, Pt requires dialysis, type: Hemodialysis,       Endo:         Psychiatric:         Infectious Disease:         Malignancy:         Other:                         PHYSICAL EXAM:   Mental Status/Neuro:   Sedation: Continuous Medical Sedation   Airway: Facies: Feasible  Mallampati: Not Assessed  Mouth/Opening: Not Assessed  TM distance: Not Assessed  Neck ROM: Not Assessed  Device in place: ETT   Respiratory:    CV:    Comments:                    Lab Results   Component Value Date    WBC 32.5 (H) 2019    HGB 10.9 (L) 2019    HCT 33.2  "(L) 03/18/2019    PLT 82 (L) 03/18/2019    .0 (H) 03/18/2019    SED 15 03/18/2019     03/18/2019    POTASSIUM 4.3 03/18/2019    CHLORIDE 104 03/18/2019    CO2 23 03/18/2019    BUN 43 (H) 03/18/2019    CR 1.28 (H) 03/18/2019     (H) 03/18/2019     (H) 03/18/2019    ALE[place]ANDER 8.1 (L) 03/18/2019    PHOS 3.2 03/18/2019    MAG 2.3 03/18/2019    ALBUMIN 2.3 (L) 03/18/2019    PROTTOTAL 4.9 (L) 03/18/2019     (HH) 03/18/2019    AST 1,102 (HH) 03/18/2019    ALKPHOS 477 (H) 03/18/2019    BILITOTAL 36.5 (HH) 03/18/2019    PTT 44 (H) 03/18/2019    INR 1.47 (H) 03/18/2019    FIBR 493 (H) 03/18/2019       Preop Vitals  BP Readings from Last 3 Encounters:   03/18/19 129/80    Pulse Readings from Last 3 Encounters:   03/14/19 78      Resp Readings from Last 3 Encounters:   03/18/19 21    SpO2 Readings from Last 3 Encounters:   03/18/19 100%      Temp Readings from Last 1 Encounters:   03/18/19 37.4  C (99.3  F) (Esophageal)    Ht Readings from Last 1 Encounters:   02/23/19 1.68 m (5' 6.14\")      Wt Readings from Last 1 Encounters:   03/18/19 97.4 kg (214 lb 11.7 oz)    Estimated body mass index is 34.51 kg/m  as calculated from the following:    Height as of 2/23/19: 1.68 m (5' 6.14\").    Weight as of an earlier encounter on 3/18/19: 97.4 kg (214 lb 11.7 oz).     LDA:  Peripheral IV 03/14/19 Left;Lateral Lower forearm (Active)   Site Assessment WDL 3/18/2019 12:00 PM   Line Status Saline locked 3/18/2019 12:00 PM   Phlebitis Scale 0-->no symptoms 3/18/2019 12:00 PM   Infiltration Scale 0 3/18/2019 12:00 PM   Extravasation? No 3/18/2019 12:00 PM   Number of days: 4       Arterial Line 03/14/19 Brachial (Active)   Site Assessment WDL 3/18/2019 12:00 PM   Line Status Pulsatile blood flow 3/18/2019 12:00 PM   Art Line Waveform Appropriate 3/18/2019 12:00 PM   Art Line Interventions Leveled;Connections checked and tightened;Flushed per protocol 3/18/2019 12:00 PM   Color/Movement/Sensation Capillary refill " less than 3 sec 3/18/2019 12:00 PM   Dressing Type Gauze;Transparent 3/18/2019 12:00 PM   Dressing Status Clean, dry, intact 3/18/2019 12:00 PM   Dressing Intervention Dressing changed/new dressing 3/17/2019  4:00 AM   Dressing Change Due 03/24/19 3/18/2019 12:00 PM   Number of days: 4       CVC Triple Lumen Left Femoral (Active)   Site Assessment WDL 3/18/2019 12:00 PM   Dressing Intervention Chlorhexidine sponge;Transparent 3/18/2019 12:00 PM   Dressing Change Due 03/24/19 3/18/2019 12:00 PM   CVC Comment Thermoguard 3/18/2019 12:00 PM   Lumen A - Color WHITE 3/18/2019 12:00 PM   Lumen A - Status infusing 3/18/2019 12:00 PM   Lumen A - Cap Change Due 03/19/19 3/18/2019 12:00 PM   Lumen B - Color BLUE 3/18/2019 12:00 PM   Lumen B - Status infusing 3/18/2019 12:00 PM   Lumen B - Cap Change Due 03/19/19 3/18/2019 12:00 PM   Lumen C - Color BROWN 3/18/2019 12:00 PM   Lumen C - Status infusing 3/18/2019 12:00 PM   Lumen C - Cap Change Due 03/19/19 3/18/2019 12:00 PM   Extravasation? No 3/18/2019 12:00 PM   Number of days:        Arterial Sheath  (Active)   Specific Qualities Sutured 3/18/2019 12:00 PM   Site Assessment UTV 3/18/2019 12:00 PM   Dressing Type Gauze;Transparent 3/18/2019 12:00 PM   Dressing Intervention Dressing changed/new dressing 3/8/2019  4:00 AM   Arterial Sheath Comment ECMO 3/18/2019 12:00 PM   Number of days: 23       Venous Sheath (Active)   Specific Qualities Sutured 3/18/2019 12:00 PM   Site Assessment UTV 3/18/2019 12:00 PM   Dressing Type Gauze;Transparent 3/18/2019 12:00 PM   Dressing Intervention Dressing changed/new dressing 3/8/2019  4:00 AM   Venous Sheath Comment ECMO 3/18/2019 12:00 PM   Number of days: 23       Venous Sheath (Active)   Specific Qualities Sutured 3/18/2019 12:00 PM   Site Assessment UTV 3/18/2019 12:00 PM   Dressing Type Gauze;Transparent 3/18/2019 12:00 PM   Dressing Intervention Dressing changed/new dressing 3/8/2019  4:00 AM   Venous Sheath Comment ECMO 3/18/2019  12:00 PM   Number of days: 14       Left Groin Interventional Procedure Access (Active)   Site Assessment [facility] 3/18/2019 12:00 PM   Hemostasis management Unchanged 3/18/2019 12:00 PM   Femoral Bruit not present 3/18/2019 12:00 PM   CMS Left Extremity [facility] 3/18/2019 12:00 PM   Dorsalis Pulse - Left Leg Normal 3/18/2019 12:00 PM   Posterior Tibial Pulse - Left Leg Normal 3/18/2019 12:00 PM   Number of days: 1       ETT (adult) (Active)   Secured By Commercial tube blandon 3/18/2019 12:00 PM   Site Appearance Clean and dry 3/18/2019 12:00 PM   Secured at (cm) to lip 26 cm 3/18/2019 11:52 AM   Site of ET Tube Securement At lip 3/18/2019 11:52 AM   Cuff Pressure - Type minimal occluding volume 3/18/2019 11:52 AM   Tube Care/Reposition repositioned tube right side of mouth 3/18/2019 12:00 PM   Bite Block Right 3/18/2019 11:52 AM   Safety Measures manual resuscitator at bedside;manual resuscitator/mask/valve in room;manual resuscitator/PEEP valve in room 3/18/2019 12:00 PM   ETT Cuff Deflation Leak Test No Leak 3/18/2019  4:00 AM   Number of days: 23       NG/OG Tube 10 fr Left nostril (Active)   Site Description [facility] 3/18/2019 12:00 PM   Status Enteral Feedings 3/18/2019 12:00 PM   Placement Check [place] unchanged 3/18/2019 12:00 PM   [place] (cm marking) at nare/mouth 88 cm 3/18/2019 12:00 PM   Intake (ml) 70 ml 3/18/2019  8:00 AM   Flush/Free Water (mL) 30 mL 3/18/2019 12:00 PM   Output (ml) 600 ml 3/2/2019  5:00 AM   Number of days: 21       NG/OG Tube Orogastric Right mouth (Active)   Site Description [facility] 3/18/2019 12:00 PM   Status Suction-low intermittent 3/18/2019 12:00 PM   Drainage Appearance Dark Red 3/18/2019 12:00 PM   Placement Check [place] unchanged 3/18/2019  8:00 AM   [place] (cm marking) at nare/mouth 76 cm 3/18/2019  8:00 AM   Intake (ml) 30 ml 3/17/2019  8:00 PM   Flush/Free Water (mL) 30 mL 3/18/2019 12:00 PM   Output (ml) 400 ml 3/18/2019 12:00 PM   Number of days: 5       Rectal Tube (Active)    Stool Amount Smear 3/18/2019 12:00 PM   Rectal Tube Output 500 ml 3/17/2019  8:00 PM   Number of days: 13            Assessment:   ASA SCORE: 4    NPO Status: > 6 hours since completed Solid Foods   Documentation: H&P complete; Preop Testing complete; Consents complete   Proceeding: Proceed without further delay  Tobacco Use:  NO Active use of Tobacco/UNKNOWN Tobacco use status     Plan:   Anes. Type:  General      Induction:  IV (Standard)   Airway: Oral ETT   Access/Monitoring: PIV (arterial line, central line in situ)   Maintenance: Balanced   Emergence: Procedure Site   Logistics: Same Day Surgery     Postop Pain/Sedation Strategy:  Standard-Options: Opioids PRN     PONV Management:  Adult Risk Factors:, Non-Smoker, Postop Opioids                         Maik Jefferson Pre-Procedure Evaluation    Patient: Hellen Riddle   MRN:     [id number] Gender:   male   Age:    39 year old :      1980        Preoperative Diagnosis: Heart failure   Procedure(s):  EXTRACORPORAL MEMBRANE OXYGENATOR DECCANULATION     Past Medical History:   Diagnosis Date     Dyslipidemia       Past Surgical History:   Procedure Laterality Date     CV HEART CATHETERIZATION WITH POSSIBLE INTERVENTION N/A 3/7/2019    Procedure: Coronary Angiogram;  Surgeon: Dante Molina MD;  [facility]     INSERT EXTRACORPORAL MEMBRANE OXYGENATOR Right 3/6/2019    Procedure: Emergent Insert VA extracorporal membrane oxygenator right groin;  Surgeon: Reggie Perez MD;  Location: [facility] OR     REMOVE EXTRACORPORAL MEMBRANE OXYGENATOR ADULT N/A 3/6/2019    Procedure: EXTRACORPORAL MEMBRANE OXYGENATOR REMOVAL FEMORAL CANNULAS repair right femoral vessels;  Surgeon: Craig Berry MD;  Location: [facility] OR          Anesthesia Evaluation     .             ROS/MED HX    ENT/Pulmonary: Comment: Intubated for hypoxic respiratory failure   Ventilator associated PNA       Neurologic: Comment: Intraparenchymal  hemorrhage  Intracranial abscess       Cardiovascular: Comment: Aortoesophageal fistula   Refractory VF arrest, currently on peripheral VAV ECMO support.  Coronary angiography on admission showed 3v disease and he underwent MITA to mRCA & mLCx. Has IABP removed 3/17. ECMO decannulation stymied by VT in OR on 3/6. Returned to the cath lab for MITA to  LAD and distal LCx.  He was started on amiodarone with good suppression of ventricular arrhythmias.    (+) --CAD, --stent,. : . . . :. dysrhythmias .       METS/Exercise Tolerance:     Hematologic:  - neg hematologic  ROS       Musculoskeletal:  - neg musculoskeletal ROS       GI/Hepatic: Comment: Liver and splenic infarcts   Shock liver         Renal/Genitourinary:     (+) chronic renal disease (on CRRT), type: ARF, Pt requires dialysis, type: Hemodialysis,       Endo:  - neg endo ROS       Psychiatric:         Infectious Disease:   (+) Other Infectious Disease (enteric precautions)       Malignancy:         Other:                         PHYSICAL EXAM:   Mental Status/Neuro:    Airway: Facies: Feasible  TM distance: Not Assessed  Neck ROM: Not Assessed  Device in place: ETT   Respiratory: Auscultation: Decreased BS     Resp. Support: SIMV      CV: Rhythm: Regular   Comments:                    Lab Results   Component Value Date    WBC 32.5 (H) 03/18/2019    HGB 10.9 (L) 03/18/2019    HCT 33.2 (L) 03/18/2019    PLT 82 (L) 03/18/2019    .0 (H) 03/18/2019    SED 15 03/18/2019     03/18/2019    POTASSIUM 4.3 03/18/2019    CHLORIDE 104 03/18/2019    CO2 23 03/18/2019    BUN 43 (H) 03/18/2019    CR 1.28 (H) 03/18/2019     (H) 03/18/2019     (H) 03/18/2019    ALEKSANDER 8.1 (L) 03/18/2019    PHOS 3.2 03/18/2019    MAG 2.3 03/18/2019    ALBUMIN 2.3 (L) 03/18/2019    PROTTOTAL 4.9 (L) 03/18/2019     (HH) 03/18/2019    AST 1,102 (HH) 03/18/2019    ALKPHOS 477 (H) 03/18/2019    BILITOTAL 36.5 (HH) 03/18/2019    PTT 44 (H) 03/18/2019    INR 1.47 (H)  "03/18/2019    FIBR 493 (H) 03/18/2019       Preop Vitals  BP Readings from Last 3 Encounters:   03/18/19 129/80    Pulse Readings from Last 3 Encounters:   03/14/19 78      Resp Readings from Last 3 Encounters:   03/18/19 14    SpO2 Readings from Last 3 Encounters:   03/18/19 100%      Temp Readings from Last 1 Encounters:   03/18/19 37.4  C (99.3  F) (Esophageal)    Ht Readings from Last 1 Encounters:   02/23/19 1.68 m (5' 6.14\")      Wt Readings from Last 1 Encounters:   03/18/19 97.4 kg (214 lb 11.7 oz)    Estimated body mass index is 34.51 kg/m  as calculated from the following:    Height as of this encounter: 1.68 m (5' 6.14\").    Weight as of this encounter: 97.4 kg (214 lb 11.7 oz).     LDA:  Peripheral IV 03/14/19 Left;Lateral Lower forearm (Active)   Site Assessment [facility] 3/18/2019  8:00 AM   Line Status Saline locked 3/18/2019  8:00 AM   Phlebitis Scale 0-->no symptoms 3/18/2019  8:00 AM   Infiltration Scale 0 3/18/2019  8:00 AM   Extravasation? No 3/18/2019  8:00 AM   Number of days: 4       Arterial Line 03/14/19 Brachial (Active)   Site Assessment [facility] 3/18/2019  8:00 AM   Line Status Pulsatile blood flow 3/18/2019  8:00 AM   Art Line Waveform Appropriate 3/18/2019  8:00 AM   Art Line Interventions Leveled;Connections checked and tightened;Flushed per protocol 3/18/2019  8:00 AM   Color/Movement/Sensation Capillary refill less than 3 sec 3/18/2019  8:00 AM   Dressing Type Gauze;Transparent 3/18/2019  8:00 AM   Dressing Status Clean, dry, intact 3/18/2019  8:00 AM   Dressing Intervention Dressing changed/new dressing 3/17/2019  4:00 AM   Dressing Change Due 03/24/19 3/18/2019  8:00 AM   Number of days: 4       CVC Triple Lumen Left Femoral (Active)   Site Assessment [facility] 3/18/2019  8:00 AM   Dressing Intervention Chlorhexidine sponge;Transparent 3/18/2019  8:00 AM   Dressing Change Due 03/24/19 3/18/2019  8:00 AM   CVC Comment Thermoguard 3/18/2019  8:00 AM   Lumen A - Color WHITE 3/18/2019  8:00 AM   Lumen " A - Status infusing 3/18/2019  8:00 AM   Lumen A - Cap Change Due 03/19/19 3/18/2019  8:00 AM   Lumen B - Color BLUE 3/18/2019  8:00 AM   Lumen B - Status infusing 3/18/2019  8:00 AM   Lumen B - Cap Change Due 03/19/19 3/18/2019  8:00 AM   Lumen C - Color BROWN 3/18/2019  8:00 AM   Lumen C - Status infusing 3/18/2019  8:00 AM   Lumen C - Cap Change Due 03/19/19 3/18/2019  8:00 AM   Extravasation? No 3/18/2019  8:00 AM   Number of days:        Arterial Sheath  (Active)   Specific Qualities Sutured 3/18/2019  8:00 AM   Site Assessment [facility] 3/18/2019  8:00 AM   Dressing Type Gauze;Transparent 3/18/2019  8:00 AM   Dressing Intervention Dressing changed/new dressing 3/8/2019  4:00 AM   Arterial Sheath Comment ECMO 3/18/2019  8:00 AM   Number of days: 23       Venous Sheath (Active)   Specific Qualities Sutured 3/18/2019  8:00 AM   Site Assessment [facility] 3/18/2019  8:00 AM   Dressing Type Gauze;Transparent 3/18/2019  8:00 AM   Dressing Intervention Dressing changed/new dressing 3/8/2019  4:00 AM   Venous Sheath Comment ECMO 3/18/2019  8:00 AM   Number of days: 23       Venous Sheath (Active)   Specific Qualities Sutured 3/18/2019  8:00 AM   Site Assessment UTV 3/18/2019  8:00 AM   Dressing Type Gauze;Transparent 3/18/2019  8:00 AM   Dressing Intervention Dressing changed/new dressing 3/8/2019  4:00 AM   Venous Sheath Comment ECMO 3/18/2019  8:00 AM   Number of days: 14       Left Groin Interventional Procedure Access (Active)   Site Assessment WDL 3/18/2019  8:00 AM   Hemostasis management Unchanged 3/18/2019  8:00 AM   Femoral Bruit not present 3/18/2019  8:00 AM   CMS Left Extremity WDL 3/18/2019  8:00 AM   Dorsalis Pulse - Left Leg Present with doppler 3/18/2019  8:00 AM   Posterior Tibial Pulse - Left Leg Present with doppler 3/18/2019  8:00 AM   Number of days: 1       ETT (adult) (Active)   Secured By Commercial tube blandon 3/18/2019  8:00 AM   Site Appearance Clean and dry 3/18/2019  8:00 AM   Secured at (cm) to lip 26  cm 3/18/2019  7:32 AM   Site of ET Tube Securement At lip 3/18/2019  7:32 AM   Cuff Pressure - Type minimal occluding volume 3/18/2019  7:32 AM   Tube Care/Reposition repositioned tube center of mouth 3/18/2019 10:00 AM   Bite Block Secure and Patent 3/18/2019  8:00 AM   Safety Measures manual resuscitator at bedside;manual resuscitator/mask/valve in room;manual resuscitator/PEEP valve in room 3/18/2019  8:00 AM   ETT Cuff Deflation Leak Test No Leak 3/18/2019  4:00 AM   Number of days: 23       NG/OG Tube 10 fr Left nostril (Active)   Site Description WDL 3/18/2019  8:00 AM   Status Enteral Feedings 3/18/2019  8:00 AM   Placement Check [place] unchanged 3/18/2019  8:00 AM   [place] (cm marking) at nare/mouth 88 cm 3/18/2019  8:00 AM   Intake (ml) 70 ml 3/18/2019  8:00 AM   Flush/Free Water (mL) 30 mL 3/18/2019  8:00 AM   Output (ml) 600 ml 3/2/2019  5:00 AM   Number of days: 21       NG/OG Tube Orogastric Right mouth (Active)   Site Description WDL 3/18/2019  8:00 AM   Status Suction-low intermittent 3/18/2019  8:00 AM   Drainage Appearance Dark Red;Tan 3/18/2019  8:00 AM   Placement Check [place] unchanged 3/18/2019  8:00 AM   [place] (cm marking) at nare/mouth 76 cm 3/18/2019  8:00 AM   Intake (ml) 30 ml 3/17/2019  8:00 PM   Flush/Free Water (mL) 30 mL 3/18/2019  8:00 AM   Output (ml) 200 ml 3/18/2019  4:00 AM   Number of days: 5       Rectal Tube (Active)   Stool Amount Smear 3/18/2019  8:00 AM   Rectal Tube Output 500 ml 3/17/2019  8:00 PM   Number of days: 13            Assessment:   ASA SCORE: 4    NPO Status: > 6 hours since completed Solid Foods   Documentation: H&P complete; Preop Testing complete; Consents complete   Proceeding: Proceed without further delay  Tobacco Use:  NO Active use of Tobacco/UNKNOWN Tobacco use status     Plan:   Anes. Type:  General   Pre-Induction: None     Fluid/Blood-Preparation: Blood in Room; PRBC; Hot Line; Albumin; Cell Saver     Drips/Meds-Preparation: Norepi;  Epinephrine; Vasopressin   Induction:  Inhalational   Airway: Oral ETT (ETT in situ)   Access/Monitoring:: All needed lines in situ.     Advanced Access: CPB   Maintenance: Balanced   Emergence: Post-Procedural Sedation; Postop SECURED AIRWAY likely   Logistics: ICU Admission     Postop Pain/Sedation Strategy:  Standard-Options: Opioids PRN  Advanced Options: Dexmedetomidine (cont.)     PONV Management:  Adult Risk Factors:, Non-Smoker, Postop Opioids  Prevention: Ondansetron     CONSENT: Direct conversation   Plan and risks discussed with: Spouse   Blood Products: Consented (ALL Blood Products)       Comments for Plan/Consent:  Plan is for VA decannulation to VV via cristela. The understanding is that we will take the patient off VA ECMO and if he does not tolerate or goes into an arrhythmia again, we will not return on to VA ECMO.                          Quang Plascencia MD

## 2019-03-18 NOTE — PROGRESS NOTES
D: Pt remains on VAV ecmo with CRRT. Lung sounds course with no change in vent settings.  NSR with rare PVC's and MAP fluctuating from 55-95. Vasopressin and Levophed gt's titrated as needed. Amiodarone and fentanyl gtt's unchanged.  CRRT goal I=O's.   P: Planning to go to OR this afternoon to decannulate from VA ecmo and transition to VV ecmo alone. See flowsheets for details. Continue with plan of care.

## 2019-03-18 NOTE — PROGRESS NOTES
[facility], [facility] Nurse Inpatient Adult Pressure Injury Prevention Assessment: ECMO  Follow up  Follow up consult for L) ear PI and L) forehead    3/8/19- pt's forehead was wrapped with kerlix to secure the ECMO tubings on right side of th head, unable to visualize the area.   3/11- unable to visualize, NIRS over wounds, wounds may have healed.   3/14- Stage 2, healing, thin scab    Positioning Tolerance: Fair, only micro-turns  Date of ECMO cannulation: 2/23 Right Groin VA ECMO and Right internal jugular VAV ECMO  Presence of Ischemia: No  Location of ischemia: NA    Pressure Injury Prevention Interventions In Place:  Optifoam Dressing under ECMO Cannula, Z flow Positioner under head, Pillows for repositioning, TAPs Wedge Positioners in use, Heel off-loading boots, Pillows under calves for heel suspension, Mepilex Sacral Dressing and Dressing to sacrum for prevention   Current support surface: Standard  Low air loss mattress       Pressure Injury Prevention Interventions Added:  None - reinforced education to ensure offloading the pressure around ears        Plan of Care for Positioning and Pressure Injury Prevention  Reposition patient every 1-2 hours using TAP Wedges  Position head on Z flow positioner, mold indentation at areas of pressure points.  Pad ECMO IJ cannula with Optifoam (#[id number]) along face and scalp between skin and cannula and under Coban head wraps    Pad ECMO groin and chest cannula under rigid connectors with Optifoam or Soft cloth  Heel off-loading Boots at all times  Sacral Mepilex for Prevention, change every 5 days and prn  Low Air loss mattress    Patient History:   According to medical record: Ciara Winslow is a 38 year old male who was admitted on 2/23/2019. Wife noticed agonal breathing and seizure like activity. Called EMS, who came and started chest compressions. No history of HTN, DM. Does have history of dyslipidemia. Non smoker. Went to bed at  11:30, wife found him foaming at mouth around midnight. Called EMS, did not get significant chest compressions. EMS arrived 5 minutes later. Taken to [facility].    Current Diet / Nutrition:     None    Output:    I/O last 3 completed shifts:  In: 3314.38 [I.V.:982.38; Other:25; NG/GT:320]  Out: 2928 [Emesis/NG output:1600; Other:728; Stool:600]  Containment: of urine/stool: Rectal Pouch and Anuric    Risk Assessment:   Sensory Perception: 1-->completely limited  Moisture: 3-->occasionally moist  Activity: 1-->bedfast  Mobility: 1-->completely immobile  Nutrition: 2-->probably inadequate  Friction and Shear: 2-->potential problem  Kirby Score: 10    Labs:    Recent Labs   Lab 03/18/19  0951 03/18/19  0357   ALBUMIN  --  2.3*   HGB 10.9* 11.1*   INR  --  1.47*   WBC 32.5* 32.8*   CRP  --  160.0*     Focused Assessment:  Ears and Face    Pressure Injury Present::Yes      ASSESSMENT  1. Pressure Injury: under EEG leads , hospital acquired ,   This is a Medical Device Related Pressure Injury (MDRPI) due to EEG and NIRs with surglist to hold it in place.   Pressure Injury is Stage 2  Contributing factor of the pressure injury: pressure and immobility, cannot rule out burn from NIRs being placed over EEG.   Status: Follow up assessment, healing   Recommend provider order: NA    2. L) ear- DTPI,  HAPI  Contributing factor of the pressure injury- Pressure   Status- follow up assessment, evolving     TREATMENT PLAN  Bilateral Forehead wounds: BID and PRN cleanse with saline and pat dry. Apply thin layer of Vaseline. Avoid placing NIRs directly over EEG leads. Avoid placing EEG leads directly on wound.     L) ear- Continue to use Z-flow pillow and ensure to mould around it.  Orders reviewed   WOC Nurse follow-up plan:twice weekly  Nursing to notify the Provider(s) and re-consult the WOC Nurse if wound(s) deteriorates or new skin concern.    Physical Exam    Wound Location:  Bilateral Forehead (assessment from 3/4)                                                                                     3/14    Date of last Photo 3/14  Wound History: Pt had NIRs in place over EEG held in place with Surgilast netting.   Measurements (length x width x depth, in cm) 0.8 cm x 0.4 cm  x  0.0 cm, right side has healed  Wound Base:  100 % superficial thin scab  Tunneling N/A  Undermining N/A  Palpation of the wound bed: normal   Periwound skin: intact  Color: normal and consistent with surrounding tissue  Temperature: normal   Drainage:, none  Description of drainage: none  Odor: none  Pain: no grimacing or signs of discomfort and unable to assess due to  sedation , insensate    2. L) ear        Date of last Photo 3/14  Wound History: Pt have been on ECMO, Z-flow positioner have been in place after initiation of ECMO. Per RN he was hemodynamically unstable for couple of shifts.  Measurements (length x width x depth, in cm) Proximal- 0.3x0.6x0.0cm- 100% dark purple discoloration. Distal- 2.8x0.4x0.0cm, 100% dark dusky/purple fluid filled, blister is partially opened now  Tunneling N/A  Undermining N/A  Palpation of the wound bed: normal   Periwound skin: intact  Color: normal and consistent with surrounding tissue  Temperature: normal   Drainage:, none  Description of drainage: none  Odor: none  Pain: no grimacing or signs of discomfort and unable to assess due to  sedation , insensate    Education provided to: nurse  Discussed importance of:their role in pressure injury prevention  Discussed plan of care with Nurse  WOC Nurse follow-up plan:twice weekly    Dora Johnston RN CWOCN

## 2019-03-18 NOTE — ANESTHESIA CARE TRANSFER NOTE
Patient: Hellen Riddle    Procedure(s):  RIGHT FEMORAL EXTRACORPORAL MEMBRANE OXYGENATOR DECANNULATION ; REPAIR RIGHT GROIN VENOUS AND ARTERIAL VESSELS  RIGHT COMMON FEMORAL EMBOLECTOMY AND SAPHENOUS VEIN PATCH    Diagnosis: Heart failure  Diagnosis Additional Information: No value filed.    Anesthesia Type:   No value filed.     Note:  Airway :ETT  Patient transferred to:ICU  Comments: To ICU, continue infusion from OR, VSS, ETT in place, V-V ECMO in place, RN at bedside, ICU handoff complete.ICU Handoff: Call for PAUSE to initiate/utilize ICU HANDOFF, Identified Patient, Identified Responsible Provider, Reviewed the Pertinent Medical History, Discussed Surgical Course, Reviewed Intra-OP Anesthesia Management and Issues during Anesthesia, Set Expectations for Post Procedure Period and Allowed Opportunity for Questions and Acknowledgement of Understanding      Vitals: (Last set prior to Anesthesia Care Transfer)    CRNA VITALS  3/18/2019 1726 - 3/18/2019 1826      3/18/2019             Resp Rate (observed):  10    Resp Rate (set):  12                Electronically Signed By: ISRAEL Mckenzie CRNA  March 18, 2019  6:26 PM

## 2019-03-18 NOTE — PROGRESS NOTES
CLINICAL NUTRITION SERVICES - REASSESSMENT NOTE     Nutrition Prescription    RECOMMENDATIONS FOR MDs/PROVIDERS TO ORDER:  None at this time    Malnutrition Status:    Severe malnutrition in the context of acute illness    Recommendations already ordered by Registered Dietitian (RD):  Increase goal to Impact Peptide @ 60 ml/hr (1440 ml/day) to provide 2160 kcals (30 kcal/kg/day), 135 g PRO (1.9 g/kg/day), 1109 ml free H2O, 92 g Fat (50% from MCTs), 202 g CHO and no Fiber daily.       EVALUATION OF THE PROGRESS TOWARD GOALS   Nutrition Support:   Enteral access: Nasoduodenal   Enteral Regimen: Impact Peptide @ goal 30 ml/hr (720 ml/day) to provide 1080 kcals (15 kcal/kg/day), 68 g PRO (1 g/kg/day), 555 ml free H2O, 46 g Fat (50% from MCTs), 101 g CHO and no Fiber daily. Eventual goal of 60 mL/hr.   Free Water Flush: 30 mL q4 hours.     Intake: Average intake over the last 2 days is 635 mL that gives: 953 kcal (13 kcal/kg, 54%) and 60 g PRO (0.8 g VT/kg, 56%) daily. TF was held for the 2 days prior (3/13, 3/14).     NEW FINDINGS   3/18: Tube feed colored output from OG after administering medications through feeding tube. Abd xr to assess.  Wt: 97.4 kg on 3/18 up from 90.6 kg on 3/11. (On admit 107.9 kg)  GI: Stool outputs ~ mL 3/13-3/17. 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  Labs:   Renal: CRRT, K+ 4.3 and phos 3.2  Meds: Nephronex      MALNUTRITION  % Intake: </= 50% for >/= 5 days (severe)  % Weight Loss: > 5% in 1 month (severe)  Subcutaneous Fat Loss: None observed  Muscle Loss: None observed  Fluid Accumulation/Edema: Mild-moderate  Malnutrition Diagnosis: Severe malnutrition in the context of acute illness    Previous Goals   Total avg nutritional intake to meet a minimum of 20 kcal/kg and 1.5 g PRO/kg daily (per dosing wt 73 kg).  Evaluation: Not met    Previous Nutrition Diagnosis  Predicted inadequate nutrient intake (kcal/PRO) related  to reliance on TF to meet needs, unable to obtain metabolic cart study as evidenced by potential for TF interruptions, changes to metabolic needs, wt loss 16% since admission but has been receiving 100% previously assessed nutritional needs    Evaluation: Declining    CURRENT NUTRITION DIAGNOSIS  Inadequate protein-energy intake related to TF being held and at a rate not meeting needs as evidenced by average energy intake giving 54% and protein intake giving 56%.     INTERVENTIONS  Implementation  Collaboration with other providers- MD requested TF increase to goal  Enteral Nutrition - Modify rate as above    Goals  Total avg nutritional intake to meet a minimum of 25 kcal/kg and 1.5 g PRO/kg daily (per dosing wt 71 kg).    Monitoring/Evaluation  Progress toward goals will be monitored and evaluated per protocol.    Moraima Springer, Dietetic Intern        I have read and agree with the above reassessment.  Sudha Winters, RD, LD, [facility]  CVICU Dietitian  Pager: [phone]

## 2019-03-18 NOTE — PROGRESS NOTES
[facility]  Cardiac ICU Progress Note  03/18/2019           Key events - last 24 hours / Hospital course   No major events overnight.  1u of RBCs and plts overnight  Lactate is normal.  Stable pressors.  This morning I was unable to doppler pedal pulses on the right  OG output looks like possible tube feeds    Today's plan:  - RLE arterial us  - abd us for feeding tube position  - VA ECMO decannulation later today if he does well on turndown; if he codes or does not tolerate decannulation, we would not replace ECMO. This was discussed and confirmed with the family.              Assessment and Plan:   Hellen Riddle is a 39 year old male who was admitted on 2/23/2019 after cardiac arrest.    Neurology: Intraparenchymal hemorrhage  Possible intracranial abscess  Intubated, off sedation since 3/14  --repeat head CT with and without contrast   --abx expanded to meropenem to cover intracranial infection   Cardiovascular / Hemodynamics: Refractory VF arrest, currently on peripheral VAV ECMO support.  Coronary angiography on admission showed 3v disease and he underwent MITA to mRCA & mLCx. Has IABP as well.  ECMO decannulation stymied by VT in OR on 3/6. Returned to the cath lab for MITA to  LAD and distal LCx.  He was started on amiodarone with good suppression of ventricular arrhythmias.  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 Stable  TTE: LV EF 50-55%.  RV dysfunction is mild.  --IABP removed 3/17/19  --Possible removal of ECMO today with the caveat that we would not replace it. Family updated  --LE us to evaluate flow on right  --vascular surgery consulted for aorto-esophageal fistula, but declined to operate due to futility and high risk  --wean pressors as able; MAP goal >65  --hold heparin gtt and ASA given ongoing bleeding and cont ticagrelor--ACT is still in therapeutic range due to liver failure  --hold lipitor for now given hepatic injury during arrest  --hold ACE/ARB for now  given renal failure and shock  --holding beta blocker given shock    Pulmonary: Acute hypoxic respiratory failure due to pulmonary edema, VAP  ETT in the trachea  --cont abx as below  --wean vent as able  --daily CXR  --Q2h ABGs  --consider scheduled duonebs if signs of lung dz, currently PRN     GI and Nutrition: 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  Appreciate nephrology's assistance.  On CRRT.  --monitor urine output  --maintain K>4 and Mg>2    Infectious Disease: Multiple organisms recovered in sputum; possible intracranial abscess; possible bacteremia from enteric source  --cont meropenem, daptomycin  --ID following   Hematology and Oncology: Acute anemia due to GI bleed  --holding heparin as above   --cryo PRN fibrinogen < 200; FFP for INR >2  --Transfuse for Hgb<10   Endocrinology: No known medical history. BG elevated.  --insulin PRN   Lines: R femoral arterial and venous ECMO cannulae   Left brachial arterial line  L femoral CVC   ETT   Pisano catheter  OG tube   Current lines are required for patient management             Medications:   I have reviewed this patient's current medications           Review of Systems:   Review of systems not obtained due to patient factors - intubation              Physical Exam:   Exam and Labs by System  Neuro: Exam: intubated and sedated, pupils equal sluggish; opens eyes to stimulation; moves toes with stimulation  General Appearance:   Comfortable       Cardiovascular:    BP: 129/80Heart Rate: 95  Exam:    Normal s1 and s2, balloon pump noise; no audible murmur, cool extremities.     Recent Labs   Lab 03/18/19  0357 03/17/19  2214 03/17/19  1614   TROPI 1.436* 1.539* 1.588*       Pulm:   Vent  Settings:  Resp: 21 SpO2: 100 % O2 Device: Mechanical Ventilator    Ventilation Mode: CMV/AC  (Continuous Mandatory Ventilation/ Assist Control)  FiO2 (%): 60 %  Rate Set (breaths/minute): 12 breaths/min  Tidal Volume Set (mL): 530 mL  PEEP (cm H2O): 10 cmH2O  Oxygen Concentration (%): 60 %  Resp: 21    Exam:   Symmetrical chest shape and movements with each tidal breath  Good patient-ventilator synchrony     Arterial Blood Gas:   Recent Labs   Lab 03/18/19  0951 03/18/19  0820 03/18/19  0615 03/18/19  0356   PH 7.42 7.44 7.43 7.44   PCO2 39 38 39 38   PO2 96 125* 119* 125*   HCO3 25 25 26 26   O2PER 60 60 60 60       Renal:   Meds:  Vitals:    03/16/19 0600 03/17/19 0500 03/18/19 0400   Weight: 95.6 kg (210 lb 12.2 oz) 96.3 kg (212 lb 4.9 oz) 97.4 kg (214 lb 11.7 oz)   I/O last 3 completed shifts:  In: 3314.38 [I.V.:982.38; Other:25; NG/GT:320]  Out: 2928 [Emesis/NG output:1600; Other:728; Stool:600]  Recent Labs   Lab 03/18/19  0951 03/18/19 0357 03/17/19 2214   NA  --  137  --  137   POTASSIUM  --  4.1  --  4.1   CHLORIDE  --  103  --  103   CO2  --  22  --  24   ANIONGAP  --  12  --  10   * 148*   < > 159*   BUN  --  42*  --  41*   CR  --  1.38*  --  1.42*   ALEKSANDER  --  8.3*  --  8.2*    < > = values in this interval not displayed.     No components found for: URINE     GI:   Exam:    Distended, no bowel sounds     Diet: tube feeds  Recent Labs   Lab 03/18/19  0357 03/17/19 2214 03/17/19  1614   AST 1,102* 1,372* 1,468*   * 902* 948*   BILITOTAL 36.5* 35.9* 34.5*   ALBUMIN 2.3* 2.2* 2.2*   PROTTOTAL 4.9* 5.0* 4.8*   ALKPHOS 477* 492* 498*       Heme/ID:   Meds:  Temp: 99.3  F (37.4  C) Temp src: EsophagealTemp  Min: 97.3  F (36.3  C)  Max: 99.5  F (37.5  C)   Recent Labs   Lab 03/18/19  0951 03/18/19  0357 03/17/19  2214 03/17/19  1614 03/17/19  0951   WBC 32.5* 32.8* 33.7* 32.5* 33.8*   HGB 10.9* 11.1* 10.2* 10.4* 11.2*   HCT 33.2* 33.4* 31.6* 32.0* 34.5*   MCV 95 95 96 96 95   RDW 20.6* 20.2*  20.7* 20.3* 20.4*   PLT 82* 100* 92* 96* 91*     Recent Labs   Lab 03/18/19  0357 03/17/19  2214 03/17/19  1614 03/17/19  0951 03/17/19  0427 03/16/19  2157   INR 1.47* 1.56* 1.69* 1.59* 1.67* 1.72*   PTT 43* 45* 45* 47*  --  45*     Recent Labs   Lab 03/18/19  0607 03/18/19  0358 03/17/19  0444 03/16/19  0546 03/15/19  1651 03/15/19  1624   CULT PENDING PENDING No growth after 22 hours No growth after 2 days No growth after 3 days No growth after 3 days       Endo:   Meds:  Recent Labs   Lab 03/18/19  0951 03/18/19  0357 03/18/19  0356 03/17/19  2214 03/17/19  2156   * 148* 155* 159* 176*       Lines:    No erythema or discharge at the catheter entry site               Data:   All lab results reviewed    The pt was discussed and evaluated with Dr. Baer.    Jersey Alexander MD  Cardiovascular Medicine Fellow, PGY-7  [phone]

## 2019-03-18 NOTE — PROGRESS NOTES
ECMO Shift Summary:    Patient remains on VA ECMO, all equipment is functioning and alarms are appropriately set. RPM's 3500 with flow range 4.5 L/min. Sweep gas is at 1.5 LPM and FiO2 60%. Circuit remains free of air, there are some areas of clot and fibrin on corners of oxygenator, pigtails and connectors. Cannulas are secure with no bleeding from site. Extremities are warm to the touch. Suctioned ETT for small amount of thick yellow secretions.    Significant Shift Events: No significant events over night.    Vent settings:  Ventilation Mode: CMV/AC  (Continuous Mandatory Ventilation/ Assist Control)  FiO2 (%): 60 %  Rate Set (breaths/minute): 12 breaths/min  Tidal Volume Set (mL): 530 mL  PEEP (cm H2O): 10 cmH2O  Oxygen Concentration (%): 60 %  Resp: 14  .    Heparin is currently off, ACT range 172-176.    Urine output is minimal, patient remains on CRRT, no blood loss. Products given included 1 unit of platelets and 1 unit of PRBC.      Intake/Output Summary (Last 24 hours) at 3/18/2019 0642  Last data filed at 3/18/2019 0600  Gross per 24 hour   Intake 3314.38 ml   Output 2928 ml   Net 386.38 ml       ECHO:  No results found for this or any previous visit.No results found for this or any previous visit.    CXR:  No results found for this or any previous visit (from the past 24 hour(s)).    Labs:  Recent Labs   Lab 03/18/19  0615 03/18/19  0356 03/18/19  0209 03/17/19  2352   PH 7.43 7.44 7.44 7.43   PCO2 39 38 39 38   PO2 119* 125* 126* 91   HCO3 26 26 26 25   O2PER 60 60 60 60       Lab Results   Component Value Date    HGB 11.1 (L) 03/18/2019    PHGB Interfering substances, unable to perform test 03/18/2019     (L) 03/18/2019    FIBR 458 (H) 03/18/2019    INR 1.47 (H) 03/18/2019    PTT 43 (H) 03/18/2019    DD 14.4 (H) 03/18/2019    AXA Results not available-specimen icteric 03/18/2019    ANTCH 38 (L) 03/17/2019         Plan is to convert to VV ECMO.      Guillermo Menjivar, RRT  3/18/2019 6:42  AM

## 2019-03-18 NOTE — BRIEF OP NOTE
[facility], [place]    Brief Operative Note    Pre-operative diagnosis: Heart failure  Post-operative diagnosis Same  Procedure: Procedure(s):  RIGHT FEMORAL EXTRACORPORAL MEMBRANE OXYGENATOR DECANNULATION ; REPAIR RIGHT GROIN VENOUS AND ARTERIAL VESSELS  RIGHT COMMON FEMORAL EMBOLECTOMY AND SAPHENOUS VEIN PATCH  Surgeon: Surgeon(s) and Role:  Panel 1:     * Craig Berry MD - Primary     * Andrews Covarrubias MD - Assisting     * Parris Mccall MD - Assisting  Panel 2:     * Tereza Gibbs MD - Primary  Anesthesia: General   Estimated blood loss: 300 ml  Drains: None  Specimens: * No specimens in log *  Findings:   Right CFA thrombus.   Complications: None.  Implants: None.    Parris Mccall MD  General Surgery PGY-3

## 2019-03-18 NOTE — ANESTHESIA POSTPROCEDURE EVALUATION
Anesthesia POST Procedure Evaluation    Patient: Hellen Riddle   MRN:     [id number] Gender:   male   Age:    39 year old :      1980        Preoperative Diagnosis: Heart failure   Procedure(s):  RIGHT FEMORAL EXTRACORPORAL MEMBRANE OXYGENATOR DECANNULATION ; REPAIR RIGHT GROIN VENOUS AND ARTERIAL VESSELS  RIGHT COMMON FEMORAL EMBOLECTOMY AND SAPHENOUS VEIN PATCH   Postop Comments: No value filed.       Anesthesia Type:  General    Reportable Event: NO     PAIN: Uncomplicated   Sign Out status: Comfortable, Well controlled pain     PONV: No PONV   Sign Out status:  No Nausea or Vomiting     Neuro/Psych: Uneventful perioperative course   Sign Out Status: Preoperative baseline; Age appropriate mentation     Airway/Resp.: Uneventful perioperative course   Sign Out Status: Airway Device present     Airway Device: ETT     CV: Uneventful perioperative course   Sign Out status: Appropriate BP and perfusion indices; Appropriate HR/Rhythm     Disposition:   Sign Out in:  ICU  Disposition:  ICU  Recovery Course: Uneventful  Follow-Up: Not required     Comments/Narrative:  Patient transported directly from the OR to the ICU sedated and intubated.  On epi 0.07, NE 0.13, vaso 2.4.  S/p VA ECMO decannulation, currently maintained on VV ECMO           Last Anesthesia Record Vitals:  CRNA VITALS  3/18/2019 1726 - 3/18/2019 1826      3/18/2019             Resp Rate (observed):  10    Resp Rate (set):  12          Last PACU/Preop Vitals:  Vitals:    19 1200 19 1300 19 1400   BP:      Pulse:      Resp: 20 16 21   Temp: 37.4  C (99.3  F)     SpO2: 100%           Electronically Signed By: Violeta Newby MD, 2019, 6:36 PM

## 2019-03-18 NOTE — PROVIDER NOTIFICATION
MD notified unable to doppler pedal pulse in RLE, R foot cooler than L. Tube feed colored output from OG after administering medications through feeding tube. Plan for KUB to assess feeding tube placement and RLE US to assess for flow.

## 2019-03-18 NOTE — PLAN OF CARE
Pt to OR at 1400 for VA ECMO decannulation.  Arrived back to unit at 1820 from OR. MD at bs, ok to go down for head CT. 1855 up from CT to room.  Pt remains intubated on VV ECMO and CRRT to be restarted. Currently on Epi, Levophed, Vasopressin, Amio and Fentanyl - refer to MAR for current dosages. Family at bs intermittently during shift.  Will continue to monitor pt and notify MD as needed. See flowsheets and MAR for details.

## 2019-03-18 NOTE — ANESTHESIA PROCEDURE NOTES
JOSLYN Probe Insertion Note:    Inserted by:  Quang Plascencia MD (Responsible Anesthesiologist)  Probe Number: [id number]  Probe Status PRE Insertion: NO obvious damage  Probe type:  Adult 3D    Bite block used:   Yes  Insertion Technique: Jaw Lift  Insertion complications: None obvious    Billing Report:JOSLYN report by Anesthesiologist (See Separate Report note)    Probe Status POST Removal: NO obvious damage

## 2019-03-18 NOTE — PROGRESS NOTES
"CRRT STATUS NOTE    DATA:  Time:  6:28 PM  Pressures WNL:  yes  Filter Status:  good    Problems Reported/Alarms Noted:  Flow issues    Supplies Present:  yes    ASSESSMENT:  Patient Net Fluid Balance:  3/17:  +206 ml  3/18:  +1406 ml since MN  Vital Signs:  /80 (BP Location: Right arm)   Pulse 78   Temp 99.3  F (37.4  C) (Esophageal)   Resp 21   Ht 1.68 m (5' 6.14\")   Wt 97.4 kg (214 lb 11.7 oz)   SpO2 100%   BMI 34.51 kg/m      Labs:  K 4.4, UN 43, Cr. 1.28, hgb 8.3, plts 82, WBC 32.5  Goals of Therapy: I=O as BP allows. Continues with vasopressin, levophed, and epinephrine.     INTERVENTIONS:   Mat in use to minimize flow alarms.  ECMO decannulation.  Presently gone for head CT.  Restarted @    PLAN:  Continue with goals of therapy.  Change circuit q 72 hrs and prn.  Call Resource RN with concerns and/or issues @ [phone].    "

## 2019-03-18 NOTE — PROGRESS NOTES
CRRT STATUS NOTE    DATA:  Time:  6:17 AM  Pressures WNL:  YES  Filter Status:  WDL    Problems Reported/Alarms Noted:  Few flow alarms noted.    Supplies Present:  YES    ASSESSMENT:  Patient Net Fluid Balance:  Net -403 ml @ 0600  Vital Signs:  HR 93, /71, MAP 84  Labs:  K 4.1, Mg 2.3, Phos 3.1, iCa 4.5, Hgb 11.1, Plt 100, WBC 32.8   Goals of Therapy:  I = O as BP allows.    INTERVENTIONS:   Mat in use. Hair ties off currently.    PLAN:  Continue to monitor circuit daily and change set q72 hours or PRN for clotting/clogging. Please call CRRT RN with any questions/problems.

## 2019-03-18 NOTE — ANESTHESIA PROCEDURE NOTES
Perioperative JOSLYN Report  Anesthesia Information  JOSLYN probe placed and report generated by: : Quang Plascencia MD Kiberenge, Roy Kagumba, MD  Images for this study have been archived.   Surgeon:  Craig Berry MD      Preanesthesia Checklist:  Patient identified, IV assessed, risks and benefits discussed, monitors and equipment assessed, procedure being performed at surgeon's request and anesthesia consent obtained.  General Procedure Information  Modalities:  2D, 3D, CW Doppler and PW Doppler    ECMO decannulation  Echocardiographic and Doppler Measurements      Other Ventricular Findings:  LV is dilated in size, with depressed systolic function   LVEF: 25-30 % by visual estimation     RV is normal in size with mildly depressed systolic function          Valves          Other Valve Findings:  AV: trileaflet         no AS         Max P mmHg,  Mean PG: 3 mmHg, Vmax: 1.4 m/s,            no AI,              MV: normal mitral leaflet morphology          no mitral annular calcification          mild MR                     TV: mild TR.      PV: no PI     Aorta: Ascending Aorta: Size normal.    Aortic Arch: Size normal.     Descending Aorta: Size normal.         Right Atrium:  Size normal.    Left Atrium: Size normal.  Left atrial appendage thrombus.   Other Atria Findings:  JUSTIN Vmax 28.7cm/s     Ventricular Septum: Intra-ventricular septum morphology normal.         Post Intervention Findings  . .   Regional wall motion:   Surgeon(s) notified of all postintervention findings:  Yes  .  .  .   .  .  .  .  .  .  .        Echocardiogram Comments

## 2019-03-18 NOTE — PROGRESS NOTES
Nephrology Progress Note  03/18/2019       Mr Riddle is a 39 yom w/HLP who had cardiac arrest on 2/23/19 with unclear down-time.  PCI done emergently, Nephrology consulted for management of FERNANDA, started CRRT on 2/25.       Interval History :   Mr Riddle continues on CRRT for management of electrolytes and volume, attempting ECMO decannulation today depending on turndown tolerance, will need new CRRT access.  No changes in plan, continuing to try to match intake, with recent bleeding and GI output our UF has been less of a factor in output but still needing 1.5-2L of UF with tenuous hemodynamics, continuing CRRT, I=O as able with mix of 2k/4k baths.       Assessment & Recommendations:   FERNANDA-Baseline Cr unclear, admitted with Cr of 1.7 post arrest.  FERNANDA due to hemodynamic injury, also received contrast and had elevated CK.  Started CRRT on 2/25, has been anuric since starting CRRT, ~48h after arrest.  Continuing CRRT with no signs of recovery, still needing extensive mechanical support and is on pressors with elevated lacate.  Having issues with bleeding.                 -Access is ECMO circuit.               -CRRT, I=O as BP's allow, mix of 4k/2k baths.      Volume status-Net even yesterday with minimal intake, pulled ~800cc of UF to match.  Continuing to match as able although BP's are tenuous.       Electrolytes/pH-No acute issues on CRRT, K 4.3, bicarb 23, on mix of 4k/2k baths with elevated lactate.       Ca/phos/pth-Ca 8.1 with ical of 4.5, Mg 2.3, phos 3.2, stable with CRRT.      Anemia-Hgb 10.9, needing intermittent PRBC's with ECMO and bleeding.     Nutrition-Impact TF.       Seen and discussed with Dr Pyle     Recommendations were communicated to primary team via verbal communication.       Darren Vidal  Clinical Nurse Specialist  [phone]    Review of Systems:   I reviewed the following systems:  ROS not done due to vent/sedation.     Physical Exam:   I/O last 3 completed shifts:  In: 3314.38 [I.V.:982.38;  "Other:25; NG/GT:320]  Out: 2928 [Emesis/NG output:1600; Other:728; Stool:600]   /80 (BP Location: Right arm)   Pulse 78   Temp 99.3  F (37.4  C) (Esophageal)   Resp 21   Ht 1.68 m (5' 6.14\")   Wt 97.4 kg (214 lb 11.7 oz)   SpO2 100%   BMI 34.51 kg/m       GENERAL APPEARANCE: Intubated and sedated, on ECMO and IABP  EYES:  No scleral icterus, pupils equal  HENT: mouth without ulcers or lesions  PULM: lungs clear to auscultation, equal air movement, no cyanosis or clubbing  CV: regular rhythm, normal rate, no rub     -edema +1 LE  GI: soft, non-tender, non-distended, dark red stool  MS: no evidence of inflammation in joints, no muscle tenderness  NEURO:  Intubated and sedated    Labs:   All labs reviewed by me  Electrolytes/Renal -   Recent Labs   Lab Test 03/18/19  0951 03/18/19 0357 03/18/19 0356 03/17/19 2214    137  --  137   POTASSIUM 4.3 4.1  --  4.1   CHLORIDE 104 103  --  103   CO2 23 22  --  24   BUN 43* 42*  --  41*   CR 1.28* 1.38*  --  1.42*   *  165* 148* 155* 159*   ALEKSANDER 8.1* 8.3*  --  8.2*   MAG 2.3 2.4*  --  2.3   PHOS 3.2 3.1  --  3.0       CBC -   Recent Labs   Lab Test 03/18/19  0951 03/18/19 0357 03/17/19 2214   WBC 32.5* 32.8* 33.7*   HGB 10.9* 11.1* 10.2*   PLT 82* 100* 92*       LFTs -   Recent Labs   Lab Test 03/18/19 0357 03/17/19 2214 03/17/19  1614   ALKPHOS 477* 492* 498*   BILITOTAL 36.5* 35.9* 34.5*   * 902* 948*   AST 1,102* 1,372* 1,468*   PROTTOTAL 4.9* 5.0* 4.8*   ALBUMIN 2.3* 2.2* 2.2*       Iron Panel - No lab results found.        Current Medications:    [Auto Hold] B and C vitamin Complex with folic acid  5 mL Per Feeding Tube Daily     [Auto Hold] DAPTOmycin (CUBICIN) intermittent infusion  8 mg/kg (Dosing Weight) Intravenous Q24H     [Auto Hold] insulin aspart  1-6 Units Subcutaneous Q4H     [Auto Hold] meropenem  2 g Intravenous Q8H     [Auto Hold] pantoprazole (PROTONIX) IV  40 mg Intravenous BID     sodium chloride (PF)  3 mL " Intracatheter Q8H     ticagrelor  90 mg Oral or Feeding Tube BID       amiodarone 0.5 mg/min (03/18/19 1409)     - MEDICATION INSTRUCTIONS -       IV fluid REPLACEMENT ONLY 30 mL/hr at 03/07/19 1100     CRRT replacement solution 12.5 mL/kg/hr (03/18/19 1041)     EPINEPHrine IV infusion ADULT       fentaNYL 25 mcg/hr (03/18/19 1409)     heparin 2 unit/mL in 0.9% NaCl 3 mL/hr (03/18/19 1400)     HEParin Stopped (03/12/19 0148)     - MEDICATION INSTRUCTIONS -       norepinephrine 0.12 mcg/kg/min (03/18/19 1445)     [Auto Hold] Percutaneous Coronary Intervention orders placed (this is information for BPA alerting)       CRRT replacement solution 200 mL/hr at 03/17/19 1717     CRRT replacement solution 12.5 mL/kg/hr (03/18/19 1041)     ACE/ARB/ARNI NOT PRESCRIBED       BETA BLOCKER NOT PRESCRIBED       vasopressin (PITRESSIN) infusion ADULT (40 mL) 1.5 Units/hr (03/18/19 1409)

## 2019-03-19 NOTE — OP NOTE
Date: March 18, 2019    Pre-operative Diagnosis: Thrombosed right common femoral and external iliac arteries s/p ECMO decannulation     Post-operative Diagnosis: Same     Procedure(s): 1. Common femoral embolectomy 2. Vein Patch Closure of right common femoral artery     Surgeon: Tereza Gibbs MD     Assistant: Andrews Covarrubias MD and Parris Mccall MD     Anesthesia: General     Indications: This 39 year old male was undergoing an ECMO decannulation by Dr Craig Berry. I received a stat intraop consult from the Cardiac fellow regarding a pulse discrepancy in the common femoral artery after purse string closure with Prolene had been under taken.           Findings: Thrombosed right common femoral and external iliac artery after ECMO decannulation     Complications: None     Estimated Blood Loss:  300 ml     Drains: None     Specimens: None        Procedure Details:     When I arrived in the operating room the patient was hemodynamically stable with the right groin open in the common femoral artery exposed.  The common femoral artery was looped proximally with an umbilical tape in the profunda femoris and superficial femoral arteries loop with Vesseloops.  The patient was systemically heparinized with IV heparin.  A longitudinal incision was made over the common femoral artery and fresh thrombus was present.  A #4 Cirilo embolectomy catheter was passed proximally until return of normal arterial inflow.  The same Cirilo was passed distally into the SFA with no retrieval of thrombus.  There was excellent backbleeding from from the profunda femoris artery.  A local piece of greater saphenous vein was then resected and used as a patch over the common femoral artery given the high risk of infection in that the artery and vein had been previously cannulated and open for ECMO for the past 2 weeks.  The vein patch was anastomosed with 5-0 Prolene suture.  Prior to completion of the anastomosis antegrade and retrograde flushing  confirmed appropriate flow in the arteries.  At this point the case was turned back over to the cardiac surgery service.  I scrubbed out and dopplered a dorsalis pedis and posterior tibial signal on the right lower extremity. I was present throughout my entire portion of the procedure.              Tereza Gibbs MD, FACS, RPVI  Director, [place] [facility]  Chief, Vascular and Endovascular Surgery  [facility]  Cell: [phone]  [email or web address]

## 2019-03-19 NOTE — PROGRESS NOTES
ECMO Shift Summary:    Patient remains on VV ECMO, all equipment is functioning and alarms are appropriately set. RPM's 3600 with flow range 2.7-2.8 L/min. Sweep gas is at 3 LPM and FiO2 60%. Circuit remains free of air, clot and fibrin. Cannulas are secure with no bleeding from site. Extremities are perfused.     Significant Shift Events: None    Vent settings:  Ventilation Mode: CMV/AC  (Continuous Mandatory Ventilation/ Assist Control)  FiO2 (%): 60 %  Rate Set (breaths/minute): 12 breaths/min  Tidal Volume Set (mL): 530 mL  PEEP (cm H2O): 10 cmH2O  Oxygen Concentration (%): 60 %  Resp: 18  .    Heparin is not running, ACT range 180-184.    Blood loss was minimal.  No product was given.      Intake/Output Summary (Last 24 hours) at 3/19/2019 1348  Last data filed at 3/19/2019 1300  Gross per 24 hour   Intake 5987.55 ml   Output 2750 ml   Net 3237.55 ml       CXR:  Recent Results (from the past 24 hour(s))   CT Head w/o & w Contrast    Narrative    CT HEAD W/O & W CONTRAST 3/18/2019 7:03 PM    Provided History: Parenchymal hemorrhage proven; ecmo;  intraparenchymal hemorrhage; also concern for intracranial abscess    Comparison: 3/13/2019.    Technique: Using multidetector thin collimation helical acquisition  technique, axial, coronal and sagittal CT images from the skull base  to the vertex were obtained without intravenous contrast.     Findings:    Redemonstration of the right occipital intraparenchymal hemorrhages  slightly decreased in size with expected evolution of blood products.  Today measuring 2.3 x 1.4 cm (series 3 image 14), previously 2.5 x 1.5  cm. The previously described small rounded density over the left  frontal lobe is not appreciated on today's exam. The ventricles are  proportionate to the cerebral sulci. The gray to white matter  differentiation of the cerebral hemispheres is preserved. The basal  cisterns are patent.    Scattered mucosal thickening in the paranasal sinuses. The mastoid  air  cells are clear.       Impression    Impression:   1. Slightly decreased right occipital intraparenchymal hemorrhage with  expected evolution of blood product. No new hemorrhages.  2. Previously described density in the left frontal lobe is not  appreciated on today's exam.    I have personally reviewed the examination and initial interpretation  and I agree with the findings.    JOEL GARNETT MD   XR Abdomen Port 1 View    Narrative    Exam: XR ABDOMEN PORT 1 VW, 3/18/2019 7:31 PM    Indication: gastric tube positioning    Comparison: Same day.    Findings:   Single supine view of the abdomen. Feeding tube tip terminates over  the first/second portion of the duodenum. Interval removal of inferior  approach ECMO cannula. Left inferior approach central venous catheter  tip projects over the IVC. Nonobstructive bowel gas pattern. No  pneumatosis or portal venous gas. Partially visualized granular  airspace opacities in the lower lung fields. For additional findings  in the lung bases is, please see same-day dedicated chest x-ray.      Impression    Impression:   1. Gastric tube coils in the distal esophagus extends superiorly into  retrograde fashion; tip not included the field-of-view. Recommend  repositioning.  2. Feeding tube tip projects in an indeterminate location potentially  within the gastric antrum or proximal duodenum. This could be  confirmed with barium.    I have personally reviewed the examination and initial interpretation  and I agree with the findings.    ASHLEIGH CHAN MD   XR Abdomen Port 1 View    Narrative    Exam: XR ABDOMEN PORT 1 VW, 3/19/2019 1:27 AM    Indication: Gastric Tube Reposition    Comparison: 3/18/2019    Findings:   Supine view of the abdomen.    Feeding tube tip terminates over the first/second portion of the  duodenum. Unchanged left femoral line with tip overlying the  intrahepatic IVC. New OG/NG tube with the sidehole projecting in the  stomach body. Nonspecific possibly  of bowel gas.      Impression    Impression:   1. OG/NG tube with the sidehole projecting in the stomach body.  2. Nonspecific paucity of bowel gas.    I have personally reviewed the examination and initial interpretation  and I agree with the findings.    MIKKI DAI MD   XR Chest Port 1 View    Narrative    Exam: XR CHEST PORT 1 VW, 3/19/2019 2:05 AM    Indication: monitor position of supportive devices    Comparison: 3/18/2019    Findings:   Endotracheal tube tip 5.1 cm above the tereso. ECMO cannula unchanged  in position, with tip at the main pulmonary artery. Enteric and  feeding tubes descend below the field of view. Redemonstrated  interstitial pulmonary edema. New consolidative opacities peripherally  in the right upper lobe. Coronary artery stents.      Impression    Impression:   1. New consolidative opacity along the peripherally in the right upper  lobe, suspicious for infection.  2. Persistent interstitial pulmonary edema.    I have personally reviewed the examination and initial interpretation  and I agree with the findings.    MIKKI DAI MD       Labs:  Recent Labs   Lab 03/19/19  1156 03/19/19  0956 03/19/19  0805 03/19/19  0623   PH 7.48* 7.46* 7.45 7.46*   PCO2 31* 34* 34* 33*   PO2 94 96 76* 80   HCO3 23 24 24 23   O2PER 60.0 60.0 60 60.0       Lab Results   Component Value Date    HGB 10.6 (L) 03/19/2019    PHGB 100 (H) 03/19/2019    PLT 88 (L) 03/19/2019    FIBR 513 (H) 03/19/2019    INR 1.53 (H) 03/19/2019    PTT 43 (H) 03/19/2019    DD 12.7 (H) 03/19/2019    AXA Results not available-specimen icteric 03/19/2019    ANTCH 56 (L) 03/18/2019         Plan is to continue VV ECMO.      Min Sanders, RRT  3/19/2019 1:48 PM

## 2019-03-19 NOTE — PROGRESS NOTES
"CRRT STATUS NOTE    DATA:  Time:  7:35 PM  Pressures WNL:  yes  Filter Status: WNL    Problems Reported/Alarms Noted:  Minimal flow alarms.    Supplies Present:  yes    ASSESSMENT:  Patient Net Fluid Balance: Net + 2867 ml yesterday. Net 0314 ml since MN  Vital Signs:  On epi, levophed gtts.  /80 (BP Location: Right arm)   Pulse 78   Temp 98  F (36.7  C) (Axillary)   Resp 25   Ht 1.68 m (5' 6.14\")   Wt 100.3 kg (221 lb 1.9 oz)   SpO2 100%   BMI 35.54 kg/m      Labs:  K4.6, UN 48, Cr 1.41, hgb 10.7, plts 91, WBC 34.3  Goals of Therapy:  I=O as BP allows    INTERVENTIONS: None      PLAN:  Continue with goals of therapy.  Change circuit q 72 hrs and prn.  Call Resource RN with concerns.    "

## 2019-03-19 NOTE — PROGRESS NOTES
Nephrology Progress Note  03/19/2019       Mr Riddle is a 39 yom w/HLP who had cardiac arrest on 2/23/19 with unclear down-time.  PCI done emergently, Nephrology consulted for management of FERNANDA, started CRRT on 2/25.       Interval History :   Mr Riddle continues on CRRT for management of electrolytes and volume, on VV ECMO after VA ECMO decannulation yesterday.  Net even today after being net positive yesterday with decannulation.  No changes in nephrology plan, maintaining electrolytes and pulling fluid as able which has been difficult with 3 pressors and hypotension.      Assessment & Recommendations:   FERNANDA-Baseline Cr unclear, admitted with Cr of 1.7 post arrest.  FERNANDA due to hemodynamic injury, also received contrast and had elevated CK.  Started CRRT on 2/25, has been anuric since starting CRRT, ~48h after arrest.  Continuing CRRT with no signs of recovery, still needing extensive mechanical support and is on pressors with elevated lacate.  VA ECMO decannulated 3/18, remains on VV ECMO.               -Access is ECMO circuit.               -CRRT, I=O as BP's allow, mix of 4k/2k baths.      Volume status-Net positive yesterday by nearly 3L due to hypotension and down-time with OR trip for decannulation of VA ECMO.  Net even today, trying to pull fluid as able.       Electrolytes/pH-No acute issues on CRRT, K 4.6, bicarb 23, on mix of 4k/2k baths with elevated lactate.       Ca/phos/pth-Ca 8.4 with ical of 4.7, Mg 2.4, phos 3.6, stable with CRRT.      Anemia-Hgb 10.6, needing intermittent PRBC's with ECMO and bleeding.     Nutrition-Impact TF.       Seen and discussed with Dr Pyle     Recommendations were communicated to primary team via verbal communication.      Darren Vidal  Clinical Nurse Specialist  [phone]    Review of Systems:   I reviewed the following systems:  ROS not done due to vent/sedation.     Physical Exam:   I/O last 3 completed shifts:  In: 6158.9 [I.V.:2775.9; NG/GT:180]  Out: 2872 [Emesis/NG  "output:150; Other:2072; Stool:550; Blood:100]   /80 (BP Location: Right arm)   Pulse 78   Temp 98.2  F (36.8  C) (Axillary)   Resp 20   Ht 1.68 m (5' 6.14\")   Wt 100.3 kg (221 lb 1.9 oz)   SpO2 100%   BMI 35.54 kg/m       GENERAL APPEARANCE: Intubated and sedated, on ECMO and IABP  EYES:  No scleral icterus, pupils equal  HENT: mouth without ulcers or lesions  PULM: lungs clear to auscultation, equal air movement, no cyanosis or clubbing  CV: regular rhythm, normal rate, no rub     -edema +1 LE  GI: soft, non-tender, non-distended, dark red stool  MS: no evidence of inflammation in joints, no muscle tenderness  NEURO:  Intubated and sedated    Labs:   All labs reviewed by me  Electrolytes/Renal -   Recent Labs   Lab Test 03/19/19  0956 03/19/19  0505 03/18/19  2227    137 137   POTASSIUM 4.6 4.6 5.0   CHLORIDE 104 103 103   CO2 23 20 20   BUN 49* 50* 52*   CR 1.28* 1.34* 1.44*   *  191* 180*  189* 208*  206*   ALEKSANDER 8.4* 7.8* 7.9*   MAG 2.4* 2.4* 2.3   PHOS 3.6 3.9 5.5*       CBC -   Recent Labs   Lab Test 03/19/19  0956 03/19/19  0505 03/18/19  2227   WBC 32.5* 33.0* 35.2*   HGB 10.6* 10.8* 10.2*   PLT 88* 103* 70*       LFTs -   Recent Labs   Lab Test 03/19/19  0505 03/18/19  1903 03/18/19  0357   ALKPHOS 334* 324* 477*   BILITOTAL 36.6* 29.1* 36.5*   * 436* 768*   * 594* 1,102*   PROTTOTAL 5.1* 4.3* 4.9*   ALBUMIN 2.2* 2.0* 2.3*       Iron Panel - No lab results found.        Current Medications:    [START ON 3/20/2019] amiodarone  200 mg Oral or Feeding Tube Daily     B and C vitamin Complex with folic acid  5 mL Per Feeding Tube Daily     DAPTOmycin (CUBICIN) intermittent infusion  8 mg/kg (Dosing Weight) Intravenous Q24H     insulin aspart  1-6 Units Subcutaneous Q4H     meropenem  2 g Intravenous Q8H     pantoprazole (PROTONIX) IV  40 mg Intravenous BID     sodium chloride (PF)  3 mL Intracatheter Q8H     ticagrelor  90 mg Oral or Feeding Tube BID       - MEDICATION " INSTRUCTIONS -       IV fluid REPLACEMENT ONLY 30 mL/hr at 03/07/19 1100     CRRT replacement solution 12.5 mL/kg/hr (03/19/19 1148)     EPINEPHrine IV infusion ADULT 0.07 mcg/kg/min (03/19/19 1500)     fentaNYL 25 mcg/hr (03/19/19 1500)     heparin 2 unit/mL in 0.9% NaCl Stopped (03/18/19 1500)     HEParin Stopped (03/12/19 0148)     - MEDICATION INSTRUCTIONS -       norepinephrine 0.03 mcg/kg/min (03/19/19 1500)     Percutaneous Coronary Intervention orders placed (this is information for BPA alerting)       CRRT replacement solution 200 mL/hr at 03/18/19 1550     CRRT replacement solution 12.5 mL/kg/hr (03/19/19 1148)     ACE/ARB/ARNI NOT PRESCRIBED       BETA BLOCKER NOT PRESCRIBED       vasopressin (PITRESSIN) infusion ADULT (40 mL) Stopped (03/19/19 1100)

## 2019-03-19 NOTE — PROGRESS NOTES
"VASCULAR SURGERY PROGRESS NOTE    Subjective:  Remains sedated, intubated, on VV ECMO and CRT.    Objective:    Intake/Output Summary (Last 24 hours) at 3/19/2019 1000  Last data filed at 3/19/2019 0900  Gross per 24 hour   Intake 5709.91 ml   Output 2825 ml   Net 2884.91 ml     Labs:  ROUTINE IP LABS (Last four results)  BMP  Recent Labs   Lab 03/19/19  0505 03/18/19  2227 03/18/19  1903 03/18/19  1655  03/18/19  0951    137 135 134   < > 137   POTASSIUM 4.6 5.0 4.8 4.4   < > 4.3   CHLORIDE 103 103 101  --   --  104   ALEKSANDER 7.8* 7.9* 7.3*  --   --  8.1*   CO2 20 20 21  --   --  23   BUN 50* 52* 55*  --   --  43*   CR 1.34* 1.44* 1.59*  --   --  1.28*   *  189* 208*  206* 228*  233* 188*   < > 151*  165*    < > = values in this interval not displayed.     CBC  Recent Labs   Lab 03/19/19  0505 03/18/19  2227 03/18/19  1903 03/18/19  1655  03/18/19  0951   WBC 33.0* 35.2* 34.7*  --   --  32.5*   RBC 3.55* 3.25* 2.77*  --   --  3.50*   HGB 10.8* 10.2* 8.6* 8.3*   < > 10.9*   HCT 32.3* 30.6* 26.5*  --   --  33.2*   MCV 91 94 96  --   --  95   MCH 30.4 31.4 31.0  --   --  31.1   MCHC 33.4 33.3 32.5  --   --  32.8   RDW 18.3* 18.2* 19.4*  --   --  20.6*   * 70* 63*  --   --  82*    < > = values in this interval not displayed.     INR  Recent Labs   Lab 03/19/19  0505 03/18/19  0357 03/17/19  2214 03/17/19  1614   INR 1.53* 1.47* 1.56* 1.69*     PHYSICAL EXAM:  /80 (BP Location: Right arm)   Pulse 78   Temp 98.2  F (36.8  C) (Axillary)   Resp 25   Ht 1.68 m (5' 6.14\")   Wt 100.3 kg (221 lb 1.9 oz)   SpO2 98%   BMI 35.54 kg/m    General: The patient is intubated and sedated.  Extremities: Right groin vac in place. Doppler PT and peroneal on right, DP and PT on left.      ASSESSMENT / PLAN:  Stable pulse exam s/p intra-operative embolectomy and vein patch repair of right common femoral artery after ECMO decannulation with Cardiac Surgery. Wound care per Cardiac Surgery. Will follow " peripherally.      Tereza Gibbs MD, FACS, RPVI  Director, [facility]  Chief, Vascular and Endovascular Surgery  [facility]  Cell: [phone]  [email or web address]

## 2019-03-19 NOTE — PROGRESS NOTES
ECMO Shift Summary:    Patient remains on VV ECMO, all equipment is functioning and alarms are appropriately set. RPM's 3600 with flow range 2.93-2.99 L/min. Sweep gas is at 3 LPM and FiO2 100%. Circuit remains free of air, clot and fibrin. Cannulas are secure with no bleeding from site. Extremities are warm. Suctioned ETT for a scant amt of bloody secretions.    Significant Shift Events: Pt was converted from VA-V to VV in the OR.     Vent settings:  Ventilation Mode: CMV/AC  (Continuous Mandatory Ventilation/ Assist Control)  FiO2 (%): 60 %  Rate Set (breaths/minute): 12 breaths/min  Tidal Volume Set (mL): 530 mL  PEEP (cm H2O): 10 cmH2O  Oxygen Concentration (%): 60 %  Resp: 22  .    No heparin, ACT goal is 160-180s, and ACT range has been 196-217s.     Urine output is per RN charting, blood loss was moderate. Product given included two units of PRBC's and one unit of plts.      Intake/Output Summary (Last 24 hours) at 3/18/2019 2157  Last data filed at 3/18/2019 2130  Gross per 24 hour   Intake 5521.36 ml   Output 1938 ml   Net 3583.36 ml       ECHO:  No results found for this or any previous visit.No results found for this or any previous visit.    CXR:  Recent Results (from the past 24 hour(s))   XR Chest Port 1 View    Narrative    Exam: XR CHEST PORT 1 VW, 3/18/2019 1:53 AM    Indication: monitor position of supportive devices    Comparison: 3/17/2019    Findings:   Single portable view the chest. ECMO cannula unchanged in position  from previous. Intra-aortic balloon pump removed. Enteric tubes with  the tips projecting out of the field-of-view. Endotracheal tube  projecting over the mid intrathoracic trachea, 4.2 cm above the  tereso. Stable cardiomediastinal silhouette. Interstitial pulmonary  edema. No pneumothorax.      Impression    Impression:   1. Persistent interstitial pulmonary edema.  2. Bilateral lower lobes atelectasis versus consolidation, unchanged.  3. Stable support devices.   4. Further  clearing of right upper lobe consolidation.    I have personally reviewed the examination and initial interpretation  and I agree with the findings.    MIKKI DAI MD   US Lower Extremity Arterial Duplex Right    Narrative    Exam: US LOWER EXTREMITY ARTERIAL DUPLEX RIGHT, 3/18/2019 9:52 AM    Indication: loss of pulses; VA ecmo on right    Comparison: Ultrasound 2/23/2019    Technique: Includes Gray Scale images, color Doppler, spectral Doppler  waveforms and velocities with appropriate angles of 60 degrees or  less.    Findings:   ECMO in the right groin, obscuring the right common femoral artery and  superficial femoral artery and deep femoral artery origin due to  overlying bandaging.    Right lower extremity:     Mid SFA: 9.9 cm/s  Popliteal artery: 11.4 cm/s  PTA at ankle: 6.9 cm/s  MAILE at ankle 6.5 cm/s    Waveforms: Slow flow with monophasic parvus tardus waveforms  throughout.      Impression    Impression:   Slow flow in the right lower extremity with monophasic tardus parvus  waveforms throughout.    I have personally reviewed the examination and initial interpretation  and I agree with the findings.    GLADYS MCLEAN MD   XR Abdomen Port 1 View    Narrative    Exam: XR ABDOMEN PORT 1 VW, 3/18/2019 10:51 AM    Indication: Feeding tube position    Comparison: CT 3/13/2019, CR 3/12/2019    Findings:   Portable supine radiograph of the abdomen. Inferior approach ECMO and  right and left lower approach central venous catheters are visualized.  Gastric tube tip and sidehole project over expected location of  stomach.  Feeding tube projects of expected location of second portion  of duodenum.  No pneumatosis. No abnormal air filled bowel. Visualized  portions of the lower lung fields are unremarkable.  Intrahepatic  balloon pump (IABP) is no longer visualized      Impression    Impression:   1. Feeding tube tip terminates over the second portion of the  duodenum.  2. Support devices as above.    I have  personally reviewed the examination and initial interpretation  and I agree with the findings.    LANDRY YI MD   CT Head w/o & w Contrast    Narrative    CT HEAD W/O & W CONTRAST 3/18/2019 7:03 PM    Provided History: Parenchymal hemorrhage proven; ecmo;  intraparenchymal hemorrhage; also concern for intracranial abscess    Comparison: 3/13/2019.    Technique: Using multidetector thin collimation helical acquisition  technique, axial, coronal and sagittal CT images from the skull base  to the vertex were obtained without intravenous contrast.     Findings:    Redemonstration of the right occipital intraparenchymal hemorrhages  slightly decreased in size with expected evolution of blood products.  Today measuring 2.3 x 1.4 cm (series 3 image 14), previously 2.5 x 1.5  cm. The previously described small rounded density over the left  frontal lobe is not appreciated on today's exam. The ventricles are  proportionate to the cerebral sulci. The gray to white matter  differentiation of the cerebral hemispheres is preserved. The basal  cisterns are patent.    Scattered mucosal thickening in the paranasal sinuses. The mastoid air  cells are clear.       Impression    Impression:   1. Slightly decreased right occipital intraparenchymal hemorrhage with  expected evolution of blood product. No new hemorrhages.  2. Previously described density in the left frontal lobe is not  appreciated on today's exam.    I have personally reviewed the examination and initial interpretation  and I agree with the findings.    JOEL GARNETT MD   XR Abdomen Port 1 View    Narrative    Exam: XR ABDOMEN PORT 1 VW, 3/18/2019 7:31 PM    Indication: gastric tube positioning    Comparison: Same day.    Findings:   Single supine view of the abdomen. Feeding tube tip terminates over  the first/second portion of the duodenum. Interval removal of inferior  approach ECMO cannula. Left inferior approach central venous catheter  tip projects over the  IVC. Nonobstructive bowel gas pattern. No  pneumatosis or portal venous gas. Partially visualized granular  airspace opacities in the lower lung fields. For additional findings  in the lung bases is, please see same-day dedicated chest x-ray.      Impression    Impression:   1. Gastric tube coils in the distal esophagus extends superiorly into  retrograde fashion; tip not included the field-of-view. Recommend  repositioning.  2. Feeding tube tip projects in an indeterminate location potentially  within the gastric antrum or proximal duodenum. This could be  confirmed with barium.    I have personally reviewed the examination and initial interpretation  and I agree with the findings.    ASHLEIGH CHAN MD       Labs:  Recent Labs   Lab 03/18/19  2042 03/18/19  1903 03/18/19  1655 03/18/19  1550   PH 7.34* 7.36 7.36 7.27*   PCO2 37 40 41 54*   PO2 92 60* 129* 125*   HCO3 20* 22 23 25   O2PER 60.0 60% 100% 100%       Lab Results   Component Value Date    HGB 8.6 (L) 03/18/2019    PHGB Interfering substances, unable to perform test 03/18/2019    PLT 63 (L) 03/18/2019    FIBR 392 03/18/2019    INR 1.47 (H) 03/18/2019    PTT 86 (H) 03/18/2019    DD 13.0 (H) 03/18/2019    AXA Canceled, Test credited 03/18/2019    ANTCH 56 (L) 03/18/2019         Plan is to continue on VV ECMO.      Marilee Cleveland, RRT  3/18/2019 9:57 PM

## 2019-03-19 NOTE — PROGRESS NOTES
"CRRT STATUS NOTE    DATA:  Time:  8:02 AM  Pressures WNL:  Yes  Filter Status:  WDL\"  Problems Reported/Alarms Noted: No alarms reported by bedside RN or noted.    Supplies Present: Yes    ASSESSMENT:  Patient Net Fluid Balance:  Pt was net positive 2.8liters for the last 24hrs and net even for the last 8hrs.  Vital Signs:  Reviewed  Labs:  Reviewed TBili 36.6, , , , LDH 1698, WBC 33, Trop 1.358.  Goals of Therapy:     INTERVENTIONS:   Pt restarted on therapy after return from OR. Otherwise, no other interventions required this shift.    PLAN:  Continue current POC. CHeck circuitqshift and PRN. CHange circuit q72hrs or PRN.  For questions or concerns please call CRRT RN @[phone]    "

## 2019-03-19 NOTE — PROGRESS NOTES
[facility]  Cardiac ICU Progress Note  03/19/2019           Key events - last 24 hours / Hospital course   VA ecmo decannulated yesterday. He came out on 3 pressors at moderate dose. titrating down throughout the day today  SFA thrombus noted intraoperatively and embolectomy performed.  3u of red cells and 1u plt overnight  CT head shows improving intraparenchymal hemorrhage  Moving more to stimulation today    Today's plan:  - ENT consult for trach  - wean off vasopressin in favor for norepi              Assessment and Plan:   Hellen Riddle is a 39 year old male who was admitted on 2/23/2019 after cardiac arrest.    Neurology: Intraparenchymal hemorrhage  Intubated, off sedation since 3/14  --monitor   Cardiovascular / Hemodynamics: Refractory VF arrest, placed on VA ECMO, VV ecmo added.  Coronary angiography on admission showed 3v disease and he underwent MITA to mRCA & mLCx. Has IABP as well.  ECMO decannulation stymied by VT in OR on 3/6. Returned to the cath lab for MITA to  LAD and distal LCx.   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 Stable  SFA occlusion s/p embolectomy  TTE: LV EF 50-55%.  RV dysfunction is mild.  --IABP removed 3/17/19  --VA ecmo removed 3/18/19  --vascular surgery consulted for aorto-esophageal fistula, but declined to operate due to futility and high risk; will reconsult if he continues to improve  --wean pressors as able; MAP goal >65  --hold heparin gtt and ASA given ongoing bleeding and cont ticagrelor--ACT is still in therapeutic range due to liver failure  --hold lipitor for now given hepatic injury during arrest  --hold ACE/ARB for now given renal failure and shock  --holding beta blocker given shock    Pulmonary: Acute hypoxic respiratory failure due to pulmonary edema, VAP  On VV ecmo--rapidly desaturates when sweep is turned off-  --ENT consult for trach  --cont abx as below  --wean vent as able  --daily CXR  --Q2h ABGs  --consider scheduled  duonebs if signs of lung dz, currently PRN     GI and Nutrition: GI bleeding from aorto-esophageal fistula: stable  Shock liver  Liver and splenic infarcts  --PPI BID  --tube feeds at goal  --monitor BID LFTs   Renal, Fluid and Electrolytes: Renal failure.  Appreciate nephrology's assistance.  On CRRT.  --monitor urine output  --maintain K>4 and Mg>2    Infectious Disease: Multiple organisms recovered in sputum; possible bacteremia from enteric source  --cont meropenem, daptomycin  --ID following   Hematology and Oncology: Acute anemia due to GI bleed; stable  --holding heparin as above   --cryo PRN fibrinogen < 200; FFP for INR >2  --Transfuse for Hgb<10   Endocrinology: No known medical history. BG elevated.  --insulin PRN   Lines: Left brachial arterial line  L femoral CVC   ETT   Pisano catheter  OG tube   Current lines are required for patient management             Medications:   I have reviewed this patient's current medications           Review of Systems:   Review of systems not obtained due to patient factors - intubation              Physical Exam:   Exam and Labs by System  Neuro: Exam: intubated, pupils equal sluggish; opens eyes to stimulation; moves toes with stimulation    Cardiovascular:     Heart Rate: 93  Exam:    Normal s1 and s2, no audible murmur, cool extremities.     Recent Labs   Lab 03/19/19  0505 03/18/19  0357 03/17/19  2214   TROPI 1.358* 1.436* 1.539*       Pulm:   Vent Settings:  Resp: 18 SpO2: 100 % O2 Device: Mechanical Ventilator    Ventilation Mode: CMV/AC  (Continuous Mandatory Ventilation/ Assist Control)  FiO2 (%): 60 %  Rate Set (breaths/minute): 12 breaths/min  Tidal Volume Set (mL): 530 mL  PEEP (cm H2O): 10 cmH2O  Oxygen Concentration (%): 60 %  Resp: 18    Exam:   Symmetrical chest shape and movements with each tidal breath  Good patient-ventilator synchrony     Arterial Blood Gas:   Recent Labs   Lab 03/19/19  1405 03/19/19  1156 03/19/19  0956 03/19/19  0805   PH 7.50*  7.48* 7.46* 7.45   PCO2 31* 31* 34* 34*   PO2 120* 94 96 76*   HCO3 24 23 24 24   O2PER 60.0 60.0 60.0 60       Renal:   Meds:  Vitals:    03/17/19 0500 03/18/19 0400 03/19/19 0000   Weight: 96.3 kg (212 lb 4.9 oz) 97.4 kg (214 lb 11.7 oz) 100.3 kg (221 lb 1.9 oz)   I/O last 3 completed shifts:  In: 6158.9 [I.V.:2775.9; NG/GT:180]  Out: 2872 [Emesis/NG output:150; Other:2072; Stool:550; Blood:100]  Recent Labs   Lab 03/19/19  0956 03/19/19  0505    137   POTASSIUM 4.6 4.6   CHLORIDE 104 103   CO2 23 20   ANIONGAP 11 13   *  191* 180*  189*   BUN 49* 50*   CR 1.28* 1.34*   ALEKSANDER 8.4* 7.8*     No components found for: URINE     GI:   Exam:    Soft, mildly distended, hypoactive bowel sounds     Diet: tube feeds  Recent Labs   Lab 03/19/19  0505 03/18/19  1903 03/18/19  0357   * 594* 1,102*   * 436* 768*   BILITOTAL 36.6* 29.1* 36.5*   ALBUMIN 2.2* 2.0* 2.3*   PROTTOTAL 5.1* 4.3* 4.9*   ALKPHOS 334* 324* 477*       Heme/ID:   Meds:  Temp: 98.2  F (36.8  C) Temp src: AxillaryTemp  Min: 97.6  F (36.4  C)  Max: 98.2  F (36.8  C)   Recent Labs   Lab 03/19/19  0956 03/19/19  0505 03/18/19  2227 03/18/19  1903 03/18/19  1655  03/18/19  0951   WBC 32.5* 33.0* 35.2* 34.7*  --   --  32.5*   HGB 10.6* 10.8* 10.2* 8.6* 8.3*   < > 10.9*   HCT 30.9* 32.3* 30.6* 26.5*  --   --  33.2*   MCV 90 91 94 96  --   --  95   RDW 18.6* 18.3* 18.2* 19.4*  --   --  20.6*   PLT 88* 103* 70* 63*  --   --  82*    < > = values in this interval not displayed.     Recent Labs   Lab 03/19/19  0956 03/19/19  0505 03/18/19  2227 03/18/19  1903 03/18/19  0951 03/18/19  0357 03/17/19  2214 03/17/19  1614 03/17/19  0951   INR  --  1.53*  --   --   --  1.47* 1.56* 1.69* 1.59*   PTT 43* 40* 44* 86* 44* 43* 45* 45* 47*     Recent Labs   Lab 03/19/19  0505 03/19/19  0401 03/18/19  0607 03/18/19  0358 03/17/19  0444 03/16/19  0546   CULT PENDING No growth after 2 hours No growth after 1 day Light growth  Candida albicans /  dubliniensis  Candida albicans and Candida dubliniensis are not routinely speciated  Susceptibility testing not routinely done  * No growth after 2 days No growth after 3 days       Endo:   Meds:  Recent Labs   Lab 03/19/19  0956 03/19/19  0505 03/18/19  2227 03/18/19  1903 03/18/19  1655   *  191* 180*  189* 208*  206* 228*  233* 188*       Lines:    No erythema or discharge at the catheter entry site               Data:   All lab results reviewed    The pt was discussed and evaluated with Dr. Baer.    Jersey Alexander MD  Cardiovascular Medicine Fellow, PGY-7  [phone]

## 2019-03-19 NOTE — PLAN OF CARE
D: VT/VF  I/A: Neuro: Opens eyes spontaneously. Not tracking. Not withdrawing from pain.  Resp: No changes to vent settings overnight; See Abgs  VV ECMO: 2.8LPM; 3600RPM; Sweep @ 3LPM; 100%  Card: SR; Vaso/Levo/Epi titrated for MAP >65  : Anuric; CRRT  GI: Tube feeds being advanced to goal  Afebrile  Product Given:  3U PRBC  2U Platelets    P: Continue to monitor; POC updated to family

## 2019-03-19 NOTE — PLAN OF CARE
Assessment:   Cardiac: NSR 80s-90s. MAPs 50s-90s (goal >65). VV ECMO with flows ~2.7LPM, FiO2 60%, Sweep 2.5.  Resp: CMV- FiO2 60%, tV 530, Rate 12, Peep 10. Lung sounds coarse, diminished. Small amount of red, thick secretions. Small cough/gag present with ET suctioning. Afebrile.  Neuro: Off Versed since 3/14. Unresponsive, but does move bilat feet randomly. Bilat eyes remained open throughout day- goggles applied. Not following any commands. Pupils 3mm, equal, reactive.   : Anuric. CRRT with goal I=O.   GI: NJ with TF @ 60ml/hr (goal). OG to suction with 150cc bloody/dark red output. Rectal tube in place with 100cc of loose, brown output. Abdomen distended, firm.   Endocrine: Sliding scale insulin q4h.   Drips: Fentanyl, Epi, Levo.      Plan: Rest on VV ECMO for a few more days. Family at bedside and updated on plan/patient's status. Will continue to monitor/assess and update MDs as needed.

## 2019-03-19 NOTE — PROGRESS NOTES
ECMO Shift Summary:    Patient remains on V-V ECMO, all equipment is functioning and alarms are appropriately set. RPM's 3600 with flow range 2.8 L/min. Sweep gas is at 3 LPM and FiO2 100%. Circuit remains free of air, clot and fibrin. Cannulas are secure with no bleeding from site. Extremities are warm. Suctioned ETT for bloody secretions.    Significant Shift Events: Sputum sample was sent.    Vent settings:  Ventilation Mode: CMV/AC  (Continuous Mandatory Ventilation/ Assist Control)  FiO2 (%): 60 %  Rate Set (breaths/minute): 12 breaths/min  Tidal Volume Set (mL): 530 mL  PEEP (cm H2O): 10 cmH2O  Oxygen Concentration (%): 60 %  Resp: 19  .    Heparin is not running. ACT range 176.    Urine output is none, blood loss was minimal. Product given included a unit of RBC, and a unit of Platelets.      Intake/Output Summary (Last 24 hours) at 3/19/2019 0619  Last data filed at 3/19/2019 0600  Gross per 24 hour   Intake 5644.51 ml   Output 1810 ml   Net 3834.51 ml       ECHO:  No results found for this or any previous visit.No results found for this or any previous visit.    CXR:  Recent Results (from the past 24 hour(s))   US Lower Extremity Arterial Duplex Right    Narrative    Exam: US LOWER EXTREMITY ARTERIAL DUPLEX RIGHT, 3/18/2019 9:52 AM    Indication: loss of pulses; VA ecmo on right    Comparison: Ultrasound 2/23/2019    Technique: Includes Gray Scale images, color Doppler, spectral Doppler  waveforms and velocities with appropriate angles of 60 degrees or  less.    Findings:   ECMO in the right groin, obscuring the right common femoral artery and  superficial femoral artery and deep femoral artery origin due to  overlying bandaging.    Right lower extremity:     Mid SFA: 9.9 cm/s  Popliteal artery: 11.4 cm/s  PTA at ankle: 6.9 cm/s  MAILE at ankle 6.5 cm/s    Waveforms: Slow flow with monophasic parvus tardus waveforms  throughout.      Impression    Impression:   Slow flow in the right lower extremity with  monophasic tardus parvus  waveforms throughout.    I have personally reviewed the examination and initial interpretation  and I agree with the findings.    GLADYS MCLEAN MD   XR Abdomen Port 1 View    Narrative    Exam: XR ABDOMEN PORT 1 VW, 3/18/2019 10:51 AM    Indication: Feeding tube position    Comparison: CT 3/13/2019, CR 3/12/2019    Findings:   Portable supine radiograph of the abdomen. Inferior approach ECMO and  right and left lower approach central venous catheters are visualized.  Gastric tube tip and sidehole project over expected location of  stomach.  Feeding tube projects of expected location of second portion  of duodenum.  No pneumatosis. No abnormal air filled bowel. Visualized  portions of the lower lung fields are unremarkable.  Intrahepatic  balloon pump (IABP) is no longer visualized      Impression    Impression:   1. Feeding tube tip terminates over the second portion of the  duodenum.  2. Support devices as above.    I have personally reviewed the examination and initial interpretation  and I agree with the findings.    LANDRY YI MD   CT Head w/o & w Contrast    Narrative    CT HEAD W/O & W CONTRAST 3/18/2019 7:03 PM    Provided History: Parenchymal hemorrhage proven; ecmo;  intraparenchymal hemorrhage; also concern for intracranial abscess    Comparison: 3/13/2019.    Technique: Using multidetector thin collimation helical acquisition  technique, axial, coronal and sagittal CT images from the skull base  to the vertex were obtained without intravenous contrast.     Findings:    Redemonstration of the right occipital intraparenchymal hemorrhages  slightly decreased in size with expected evolution of blood products.  Today measuring 2.3 x 1.4 cm (series 3 image 14), previously 2.5 x 1.5  cm. The previously described small rounded density over the left  frontal lobe is not appreciated on today's exam. The ventricles are  proportionate to the cerebral sulci. The gray to white  matter  differentiation of the cerebral hemispheres is preserved. The basal  cisterns are patent.    Scattered mucosal thickening in the paranasal sinuses. The mastoid air  cells are clear.       Impression    Impression:   1. Slightly decreased right occipital intraparenchymal hemorrhage with  expected evolution of blood product. No new hemorrhages.  2. Previously described density in the left frontal lobe is not  appreciated on today's exam.    I have personally reviewed the examination and initial interpretation  and I agree with the findings.    JOEL GARNETT MD   XR Abdomen Port 1 View    Narrative    Exam: XR ABDOMEN PORT 1 VW, 3/18/2019 7:31 PM    Indication: gastric tube positioning    Comparison: Same day.    Findings:   Single supine view of the abdomen. Feeding tube tip terminates over  the first/second portion of the duodenum. Interval removal of inferior  approach ECMO cannula. Left inferior approach central venous catheter  tip projects over the IVC. Nonobstructive bowel gas pattern. No  pneumatosis or portal venous gas. Partially visualized granular  airspace opacities in the lower lung fields. For additional findings  in the lung bases is, please see same-day dedicated chest x-ray.      Impression    Impression:   1. Gastric tube coils in the distal esophagus extends superiorly into  retrograde fashion; tip not included the field-of-view. Recommend  repositioning.  2. Feeding tube tip projects in an indeterminate location potentially  within the gastric antrum or proximal duodenum. This could be  confirmed with barium.    I have personally reviewed the examination and initial interpretation  and I agree with the findings.    ASHLEIGH CHAN MD       Labs:  Recent Labs   Lab 03/19/19  0516 03/19/19  0505 03/19/19  0153 03/19/19  0018 03/18/19  2227   PH  --  7.45 7.43 7.41 7.38   PCO2  --  33* 34* 34* 35   PO2  --  88 74* 81 91   HCO3  --  23 22 22 21   O2PER 60.0 60.0 60.0 60.0 60.0       Lab Results    Component Value Date    HGB 10.8 (L) 03/19/2019    PHGB Interfering substances, unable to perform test 03/18/2019     (L) 03/19/2019    FIBR 446 (H) 03/19/2019    INR 1.53 (H) 03/19/2019    PTT 40 (H) 03/19/2019    DD 12.7 (H) 03/19/2019    AXA Results not available-specimen icteric 03/19/2019    ANTCH 56 (L) 03/18/2019         Plan is to remain on V-V ECMO.      Rafiq Mccabe, RRT  3/19/2019 6:19 AM

## 2019-03-20 NOTE — CONSULTS
"Otolaryngology/ENT History & Physical  March 19, 2019    CC: \"tracheostomy consult\"    HPI: Hellen Riddle is a 39-year-old man with history of familial HLD who suffered a cardiac arrest on 2/23/19 and was found down. He has had an extensive course since admission, s/p IABP, VA ECMO, MITA x2, cooling, rewarming, CRRT, bacteremia, conversion of VA ECMO to VAV ECMO, unsuccessful VA ECMO decannulation, MITA x6, massive GIB, IPH, concern for aortoesophageal fistula, IABP discontinuation, and discontinuation of VA ECMO now on VV ECMO. ENT is consulted after prolonged 24-day orotracheal intubation for consideration of tracheostomy placement. The patient is intubated and sedated in the ICU with no family members currently at bedside.    PMH, PSH, Med, All, FH, SH: Reviewed.    Physical Exam:  /80 (BP Location: Right arm)   Pulse 78   Temp 98  F (36.7  C) (Axillary)   Resp 27   Ht 1.68 m (5' 6.14\")   Wt 100.3 kg (221 lb 1.9 oz)   SpO2 100%   BMI 35.54 kg/m    General: Intubated, sedated.  Ears:  Necrosis of skin of left auricle   Nose:  No nasal alar necrosis.  Neck: Easily palpable landmarks. ECMO catheters in right lateral neck.  Neuro: Sedated. Does not follow commands.  Respiratory: Mechanically ventilated.    ROUTINE IP LABS (Last four results)  BMP  Recent Labs   Lab 03/19/19  1550 03/19/19  0956 03/19/19  0505 03/18/19  2227    137 137 137   POTASSIUM 4.6 4.6 4.6 5.0   CHLORIDE 104 104 103 103   ALEKSANDER 8.4* 8.4* 7.8* 7.9*   CO2 22 23 20 20   BUN 48* 49* 50* 52*   CR 1.41* 1.28* 1.34* 1.44*   *  190* 183*  191* 180*  189* 208*  206*     CBC  Recent Labs   Lab 03/19/19  1550 03/19/19  0956 03/19/19  0505 03/18/19  2227   WBC 34.3* 32.5* 33.0* 35.2*   RBC 3.54* 3.43* 3.55* 3.25*   HGB 10.7* 10.6* 10.8* 10.2*   HCT 31.8* 30.9* 32.3* 30.6*   MCV 90 90 91 94   MCH 30.2 30.9 30.4 31.4   MCHC 33.6 34.3 33.4 33.3   RDW 19.3* 18.6* 18.3* 18.2*   PLT 91* 88* 103* 70*     INR  Recent Labs   Lab " 03/19/19  0505 03/18/19  0357 03/17/19  2214 03/17/19  1614   INR 1.53* 1.47* 1.56* 1.69*     Assessment and Plan: The patient is a 39-year-old man with history of familial HLD s/p unwitnessed cardiac arrest, IABP, VA ECMO, MITA x2, cooling, rewarming, CRRT, bacteremia, conversion of VA ECMO to VAV ECMO, unsuccessful VA ECMO decannulation, MITA x6, massive GIB, IPH, aortoesophageal fistula, IABP discontinuation, and discontinuation of VA ECMO now on VV ECMO. Bleeding risk worsened further by thrombocytopenia, increased INR (1.53), and ticagrelor administration.    - Patient not medically suitable for an open tracheotomy in the OR under general anesthesia, given that he is on VV ECMO and requiring multiple pressors. Significant risk of decompensation and hemorrhagic complication for what is not an acutely essential surgery.    - If patient's status improves, may re-consult ENT.    Richelle Ayala MD PGY-1  Otolaryngology-Head & Neck Surgery  Please page ENT on call with questions by dialing [phone] and entering job code [phone].    I, Isis Goff, saw this patient with the resident and agree with the resident s findings and plan of care as documented in the resident s note.

## 2019-03-20 NOTE — PROGRESS NOTES
[facility] ID Service: Follow Up Note      Patient:  Hellen Riddle, Date of birth 1980, Medical record number [id number]  DOS: 3/15/19         Assessment and Recommendations:   Problem List:  -Aorto-esophageal fistula diagnosed 3/13. Not a candidate for repair.  -Right posterior parietal/occipital intraparenchymal hemorrhage, concern for abscess as well  -Leukocytosis  -Lactic acidosis  -Hypoxic respiratory failure  -Influenza A infection, status post 9 days of tamiflu (stopped 3/14)  -E. 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 Now off ECMO.    Recommendations:  - Continue meropenem (CNS dosing given concern for intracranial abscess).   - Stop metronidazole (carbepenems provide excellent anaerobic coverage).   - Continue daptomycin renally dosed equivalent of 8mg/kg IV q24 hours to cover possibility of VRE bacteremia related to aortoenteric fistula or MRSA bacteremia related to indwelling lines.  - If signs of worsening fungal infection, could also add micafungin.  - Check CK now (added on for you) and q week while on dapto.  - Repeat blood cx x2 today.  - Repeat C diff.  - Longer term abx plan based on goals of care.     Discussion:  38 yo male admitted 2/23/19 with refractory VF OHCA s/p VA ECMO on admission, found to have 3v CAD s/p intervention. ID consulted 3/4/19 for increasing leukocytosis, hypoxia, lactic acidosis possibly related to influenza A or VAP, both of which were treated. Course further complicated by GIB, presumably related to an aortoenteric fistula, as well as new CNS lesion concerning for bleed vs abscess. His WBC is now rising on meropenem/metro. He is clearly at risk for polymicrobial infection given communication between GI tract and bloodstream, so could broaden coverage by adding dapto for VRE; this would also cover  MRSA bacteremia related to line infection (multiple lines in place).     Sona Murphy MD  [phone]  ID staff        Interval History:   Intubated sedated on vent. S/p ECMO decannulation    ROS unable to be obtained given intubation and sedation.          Current Antimicrobials   Meropenem 3/5-3/7, restart 3/14  Daptomycin started 3/15/19    Prior antibiotics:  Micafungin 3/5-3/6  Pip/tazo 2/23-3/4  IV Vancomycin 2/23-3/6, 3/8-3/11  Oseltamivir 3/4-3/14  Ertapenem 3/7-3/14         Physical Exam:   Ranges for vital signs:  Temp:  [98  F (36.7  C)-98.2  F (36.8  C)] 98  F (36.7  C)  Heart Rate:  [] 92  Resp:  [15-28] 27  MAP:  [51 mmHg-114 mmHg] 53 mmHg  Arterial Line BP: ()/(41-92) 72/42  FiO2 (%):  [60 %] 60 %  SpO2:  [91 %-100 %] 97 %  GENERAL:  Intubated and sedated   ENT:  Head is normocephalic, atraumatic. ETT in place.  NECK: Supple   LUNGS:  Coarse bilaterally  ABDOMEN:  Slightly distended, +BS, no masses.   EXT: Extremities warm, mild BLE edema.   SKIN:  No acute rashes. S/p ECMO decannulation.   NEUROLOGIC: Sedated          Laboratory Data:   Reviewed.    WBC 34.5  Hgb stable      Recent pertinent micro data:  Blood  3/13, 3/14, 3/15, 3/17-/3/19 all NGTD    Sputum   3/11: Candida, Klebsiella, E coli  3/12: Candida, Klebsiella  3/13: Candida, Klebsiella  3/14: Candida, Klebsiella  3/15:  Candida, Klebsiella, E coli            Imaging:   Exam: XR CHEST PORT 1 VW, 3/19/2019 2:05 AM     Indication: monitor position of supportive devices     Comparison: 3/18/2019     Findings:   Endotracheal tube tip 5.1 cm above the tereso. ECMO cannula unchanged  in position, with tip at the main pulmonary artery. Enteric and  feeding tubes descend below the field of view. Redemonstrated  interstitial pulmonary edema. New consolidative opacities peripherally  in the right upper lobe. Coronary artery stents.                                                                      Impression:   1. New consolidative opacity  along the peripherally in the right upper  lobe, suspicious for infection.  2. Persistent interstitial pulmonary edema.

## 2019-03-20 NOTE — PROGRESS NOTES
"CRRT STATUS NOTE    DATA:  Time:  6:51 AM  Pressures WNL:  YES  Filter Status:  WDL    Problems Reported/Alarms Noted:  None      Supplies Present:  YES    ASSESSMENT:  Patient Net Fluid Balance:  3/19 -2039 ml, Since 0000 -1955 ml   High NGT output reported per bedside RN  Vital Signs:  /80 (BP Location: Right arm)   Pulse 78   Temp 98.4  F (36.9  C) (Axillary)   Resp 24   Ht 1.68 m (5' 6.14\")   Wt 98 kg (216 lb 0.8 oz)   SpO2 100%   BMI 34.72 kg/m      Labs:  K+ 4.7, Cr 1.27, Bili 33.4, , Lactate Dehydrogenase 1539, Troponin 0.864, WBC 34.6, Hgb 10.2, INR 1.43, Fibrinogen 557, D-dimer 12.9    Goals of Therapy:  I=O is BP allows    INTERVENTIONS:   none    PLAN:  Continue with goals of therapy.  Change circuit q 72 hrs and prn.  Call Resource RN with concerns.        "

## 2019-03-20 NOTE — PLAN OF CARE
D: VT/VF  I/A: Neuro: Opens eyes spontaneously. Not tracking. Not withdrawing from pain.  Resp: No changes to vent settings overnight; See Abgs  VV ECMO: 2.8LPM; 3600RPM; Sweep @ 0.5LPM; 70%  Card: SR; Levo/Epi titrated for MAP >65  : Anuric; CRRT  GI: Tube feeds at goal; Large amounts of dark red OG secretions  Afebrile  Product Given:  none     P: Continue to monitor; POC updated to family

## 2019-03-20 NOTE — PROGRESS NOTES
ECMO Shift Summary:    Patient remains on VV ECMO, all equipment is functioning and alarms are appropriately set. RPM's 3600 with flow range 2.76 L/min. Sweep gas is at 1 LPM and FiO2 70%. Circuit remains free of air. Some clot and fibrin post oxygenator. Cannulas are secure with no bleeding from site. Extremities are warm to the touch. Suctioned ETT for small amount of blood tinged secretions.    Significant Shift Events: No significant events    Vent settings:  Ventilation Mode: CMV/AC  (Continuous Mandatory Ventilation/ Assist Control)  FiO2 (%): 60 %  Rate Set (breaths/minute): 12 breaths/min  Tidal Volume Set (mL): 530 mL  PEEP (cm H2O): 10 cmH2O  Oxygen Concentration (%): 60 %  Resp: 27  .    Heparin is off, ACT range 180.    Patient remains anuric on CRRT, no blood loss, no products given.      Intake/Output Summary (Last 24 hours) at 3/19/2019 2232  Last data filed at 3/19/2019 2215  Gross per 24 hour   Intake 3504.25 ml   Output 5521 ml   Net -2016.75 ml       ECHO:  No results found for this or any previous visit.No results found for this or any previous visit.    CXR:  Recent Results (from the past 24 hour(s))   XR Abdomen Port 1 View    Narrative    Exam: XR ABDOMEN PORT 1 , 3/19/2019 1:27 AM    Indication: Gastric Tube Reposition    Comparison: 3/18/2019    Findings:   Supine view of the abdomen.    Feeding tube tip terminates over the first/second portion of the  duodenum. Unchanged left femoral line with tip overlying the  intrahepatic IVC. New OG/NG tube with the sidehole projecting in the  stomach body. Nonspecific possibly of bowel gas.      Impression    Impression:   1. OG/NG tube with the sidehole projecting in the stomach body.  2. Nonspecific paucity of bowel gas.    I have personally reviewed the examination and initial interpretation  and I agree with the findings.    MIKKI DAI MD   XR Chest Port 1 View    Narrative    Exam: XR CHEST PORT 1 VW, 3/19/2019 2:05 AM    Indication:  monitor position of supportive devices    Comparison: 3/18/2019    Findings:   Endotracheal tube tip 5.1 cm above the tereso. ECMO cannula unchanged  in position, with tip at the main pulmonary artery. Enteric and  feeding tubes descend below the field of view. Redemonstrated  interstitial pulmonary edema. New consolidative opacities peripherally  in the right upper lobe. Coronary artery stents.      Impression    Impression:   1. New consolidative opacity along the peripherally in the right upper  lobe, suspicious for infection.  2. Persistent interstitial pulmonary edema.    I have personally reviewed the examination and initial interpretation  and I agree with the findings.    MIKKI DAI MD       Labs:  Recent Labs   Lab 03/19/19 2010 03/19/19  1805 03/19/19  1550 03/19/19  1405   PH 7.47* 7.51* 7.49* 7.50*   PCO2 33* 31* 31* 31*   PO2 72* 73* 92 120*   HCO3 24 24 24 24   O2PER 60.0 60.0 60 60.0       Lab Results   Component Value Date    HGB 10.7 (L) 03/19/2019    PHGB 100 (H) 03/19/2019    PLT 91 (L) 03/19/2019    FIBR 498 (H) 03/19/2019    INR 1.53 (H) 03/19/2019    PTT 43 (H) 03/19/2019    DD 12.7 (H) 03/19/2019    AXA Canceled, Test credited 03/19/2019    ANTCH 56 (L) 03/18/2019         Plan is to continue managing patient on ECMO.      Guillermo Menjivar, RRT  3/19/2019 10:32 PM

## 2019-03-20 NOTE — PROGRESS NOTES
ECMO Shift Summary:    Patient remains on VV ECMO, all equipment is functioning and alarms are appropriately set. RPM's 2860 with flow range 2 L/min. Sweep gas is at 0 LPM and FiO2 60%. Circuit remains free of air, clot and fibrin. Cannulas are secure with no bleeding from site. Extremities are warm. Suctioned ETT for scant thick tan/dark secretions.    Significant Shift Events: Sweep turned off at 0902 and FIO2 down to 60% on circuit, plan to travel to heart [facility] for possible decannulation    Vent settings:  Ventilation Mode: CMV/AC  (Continuous Mandatory Ventilation/ Assist Control)  FiO2 (%): 60 %  Rate Set (breaths/minute): 12 breaths/min  Tidal Volume Set (mL): 530 mL  PEEP (cm H2O): 10 cmH2O  Oxygen Concentration (%): 60 %  Resp: 29  .    Heparin is not running, ACT range 160-180.    Urine output is as charted, blood loss was from og. Product given included 1 unit(s) PRBC and 1 unit(s) PLT.      Intake/Output Summary (Last 24 hours) at 3/20/2019 1826  Last data filed at 3/20/2019 1800  Gross per 24 hour   Intake 3373.9 ml   Output 7041 ml   Net -3667.1 ml       ECHO:  No results found for this or any previous visit.No results found for this or any previous visit.    CXR:  Recent Results (from the past 24 hour(s))   XR Chest Port 1 View    Narrative    Exam: XR CHEST PORT 1 VW, 3/20/2019 1:00 AM    Indication: monitor position of supportive devices    Comparison: 3/19/2019    Findings:   Endotracheal tube tip 5.9 cm above the tereso. The patchy perihilar  markings extending to the periphery of the lung representing  interstitial pulmonary edema have significantly decreased since prior.  Interval decrease in the peripheral right upper lobe pulmonary  opacity. Feeding and enteric tubes descend below the field of view.  Right IJ ECMO cannula is stable with tip in the pulmonary artery.      Impression    Impression:   1. Stable support devices.  2. Significant decrease in interstitial pulmonary edema since  prior.  3. Decreased peripheral right upper lobe opacity, suspicious for  infection versus infarction.    I have personally reviewed the examination and initial interpretation  and I agree with the findings.    MIKKI DAI MD       Labs:  Recent Labs   Lab 03/20/19  1804 03/20/19  1547 03/20/19  1354 03/20/19  1158   PH 7.50* 7.48* 7.47* 7.49*   PCO2 32* 34* 34* 32*   PO2 68* 68* 100 94   HCO3 25 25 24 24   O2PER 60.0 60.0 60 60       Lab Results   Component Value Date    HGB 10.4 (L) 03/20/2019    PHGB Canceled, Test credited 03/20/2019    PLT 85 (L) 03/20/2019    FIBR 508 (H) 03/20/2019    INR 1.43 (H) 03/20/2019    PTT 41 (H) 03/20/2019    DD 12.6 (H) 03/20/2019    AXA Results not available-specimen icteric 03/20/2019    ANTCH 56 (L) 03/18/2019         Plan is possible decannulation.      Greer Gillis, RRT  3/20/2019 6:26 PM

## 2019-03-20 NOTE — PROGRESS NOTES
[facility]  Cardiac ICU Progress Note  03/20/2019           Key events - last 24 hours / Hospital course   Large OG output overnight, bloody; but no drop in hemoglobin so may be old blood  Stable pressors    Today's plan:  - turn down VV ECMO and assess for ability to decannulate              Assessment and Plan:   Hellen Riddle is a 39 year old male who was admitted on 2/23/2019 after cardiac arrest.    Neurology: Intraparenchymal hemorrhage  CT head from 3/18 is stable  Intubated, off sedation since 3/14  --monitor   Cardiovascular / Hemodynamics: Refractory VF arrest, placed on VA ECMO, VV ecmo added.  Coronary angiography on admission showed 3v disease and he underwent MITA to mRCA & mLCx. Has IABP as well.  ECMO decannulation stymied by VT in OR on 3/6. Returned to the cath lab for MITA to  LAD and distal LCx.   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 Stable  SFA occlusion s/p embolectomy  TTE: LV EF 50-55%.  RV dysfunction is mild.  --IABP removed 3/17/19  --VA ecmo removed 3/18/19  --vascular surgery consulted for aorto-esophageal fistula, but declined to operate due to futility and high risk; will reconsult if he continues to improve  --wean pressors as able; MAP goal >65  --holding heparin gtt and ASA; cont ticagrelor--ACT is still in therapeutic range due to liver failure  --hold lipitor for now given hepatic injury during arrest  --hold ACE/ARB for now given renal failure and shock  --holding beta blocker given shock    Pulmonary: Acute hypoxic respiratory failure due to pulmonary edema, VAP  On VV ecmo--O2 stable with sweep at zero  --possible VV ecmo decannulation today  --ENT consulted for trach--declined due to critically ill; will reassess when off VV ecmo  --cont abx as below  --wean vent as able  --daily CXR  --Q2h ABGs     GI and Nutrition: GI bleeding from aorto-esophageal fistula: stable  Shock liver  OG output likely old blood   Liver and splenic  infarcts  --PPI BID  --tube feeds at goal  --monitor BID LFTs   Renal, Fluid and Electrolytes: Renal failure.  Appreciate nephrology's assistance.  On CRRT.  --monitor urine output  --maintain K>4 and Mg>2    Infectious Disease: Multiple organisms recovered in sputum; possible bacteremia from enteric source  --cont meropenem, daptomycin  --ID following   Hematology and Oncology: Acute anemia due to GI bleed; stable  --holding heparin as above   --cryo PRN fibrinogen < 200; FFP for INR >2  --Transfuse for Hgb<10   Endocrinology: No known medical history.  --insulin PRN   Lines: Left brachial arterial line  L femoral CVC   RIJ protec duo  ETT   Pisano catheter  OG tube   Current lines are required for patient management             Medications:   I have reviewed this patient's current medications           Review of Systems:   Review of systems not obtained due to patient factors - intubation              Physical Exam:   Exam and Labs by System  Neuro: Exam: intubated, pupils equal sluggish; opens eyes to stimulation; moves toes with stimulation    Cardiovascular:     Heart Rate: 91  Exam:    Normal s1 and s2, no audible murmur, warm extremities.     Recent Labs   Lab 03/20/19  0417 03/19/19  0505 03/18/19  0357   TROPI 0.864* 1.358* 1.436*       Pulm:   Vent Settings:  Resp: 28 SpO2: 100 % O2 Device: Mechanical Ventilator    Ventilation Mode: CMV/AC  (Continuous Mandatory Ventilation/ Assist Control)  FiO2 (%): 60 %  Rate Set (breaths/minute): 12 breaths/min  Tidal Volume Set (mL): 530 mL  PEEP (cm H2O): 10 cmH2O  Oxygen Concentration (%): 60 %  Resp: 28    Exam:   Symmetrical chest shape and movements with each tidal breath  Good patient-ventilator synchrony     Arterial Blood Gas:   Recent Labs   Lab 03/20/19  0827 03/20/19  0559 03/20/19  0417 03/20/19  0416 03/20/19  0146   PH 7.48* 7.50*  --  7.46* 7.47*   PCO2 33* 32*  --  35 34*   PO2 60* 147*  --  103 140*   HCO3 25 25  --  25 25   O2PER 60 60 60 60 60        Renal:   Meds:  Vitals:    03/18/19 0400 03/19/19 0000 03/20/19 0400   Weight: 97.4 kg (214 lb 11.7 oz) 100.3 kg (221 lb 1.9 oz) 98 kg (216 lb 0.8 oz)   I/O last 3 completed shifts:  In: 2868.45 [I.V.:1238.45; NG/GT:240]  Out: 6987 [Emesis/NG output:4100; Other:2787; Stool:100]  Recent Labs   Lab 03/20/19 0417 03/20/19 0416 03/19/19 2155     --  138   POTASSIUM 4.7  --  4.6   CHLORIDE 104  --  104   CO2 22  --  22   ANIONGAP 12  --  11   * 183* 194*  198*   BUN 49*  --  49*   CR 1.27*  --  1.30*   ALEKSANDER 8.4*  --  8.3*     No components found for: URINE     GI:   Exam:    Soft, mildly distended, hypoactive bowel sounds     Diet: tube feeds  Recent Labs   Lab 03/20/19 0417 03/19/19  1550 03/19/19  0505   * 443* 517*   * 374* 421*   BILITOTAL 33.4* 35.0* 36.6*   ALBUMIN 2.3* 2.2* 2.2*   PROTTOTAL 5.0* 5.0* 5.1*   ALKPHOS 332* 335* 334*       Heme/ID:   Meds:  Temp: 98.2  F (36.8  C) Temp src: AxillaryTemp  Min: 98  F (36.7  C)  Max: 98.4  F (36.9  C)   Recent Labs   Lab 03/20/19 0417 03/19/19 2155 03/19/19  1550 03/19/19  0956 03/19/19  0505   WBC 34.6* 34.6* 34.3* 32.5* 33.0*   HGB 10.2* 10.4* 10.7* 10.6* 10.8*   HCT 30.5* 30.6* 31.8* 30.9* 32.3*   MCV 91 91 90 90 91   RDW 19.6* 19.3* 19.3* 18.6* 18.3*   PLT 77* 77* 91* 88* 103*     Recent Labs   Lab 03/20/19  0417 03/19/19  2155 03/19/19  1550 03/19/19  0956 03/19/19  0505  03/18/19  0357 03/17/19  2214 03/17/19  1614   INR 1.43*  --   --   --  1.53*  --  1.47* 1.56* 1.69*   PTT 41* 41* 43* 43* 40*   < > 43* 45* 45*    < > = values in this interval not displayed.     Recent Labs   Lab 03/20/19  0416 03/20/19  0304 03/19/19  0505 03/19/19  0401 03/18/19  0607 03/18/19  0358   CULT PENDING PENDING PENDING No growth after 20 hours No growth after 2 days Light growth  Candida albicans / dubliniensis  Candida albicans and Candida dubliniensis are not routinely speciated  Susceptibility testing not routinely done  *       Endo:    Meds:  Recent Labs   Lab 03/20/19  0417 03/20/19  0416 03/19/19  2155 03/19/19  1550 03/19/19  0956   * 183* 194*  198* 188*  190* 183*  191*       Lines:    No erythema or discharge at the catheter entry site               Data:   All lab results reviewed    The pt was discussed and evaluated with Dr. Baer.    Jersey Alexander MD  Cardiovascular Medicine Fellow, PGY-7  [phone]

## 2019-03-20 NOTE — PROGRESS NOTES
Nephrology Progress Note  03/20/2019         Mr Riddle is a 39 yom w/HLP who had cardiac arrest on 2/23/19 with unclear down-time.  PCI done emergently, Nephrology consulted for management of FERNANDA, started CRRT on 2/25.       Interval History :   Mr Riddle continues on CRRT for management of electrolytes and volume, on VV ECMO but oxygenation off, may be able to wean off today.  Will need new HD access in that scenario, team aware.  No major changes in renal plan, trying to pull fluid as able, has large amount of GI output due to upper GIB, still with poor neuro status and tenuous hemodynamics.       Assessment & Recommendations:   FERNANDA-Baseline Cr unclear, admitted with Cr of 1.7 post arrest.  FERNANDA due to hemodynamic injury, also received contrast and had elevated CK.  Started CRRT on 2/25, has been anuric since starting CRRT, ~48h after arrest.  Continuing CRRT with no signs of recovery, still needing extensive mechanical support and is on pressors with elevated lacate.  VA ECMO decannulated 3/18, remains on VV ECMO.               -Access is ECMO circuit.               -CRRT, I=O as BP's allow, mix of 4k/2k baths.      Volume status-Net negative yesterday, much of this was due to GI output with ongoing upper GIB, did pull ~3L of UF, wt has been essentially stable through course due to bleeding, has some volume overload but much of it 3rd spaced.       Electrolytes/pH-No acute issues on CRRT, K 4.7, bicarb 22, on mix of 4k/2k baths.       Ca/phos/pth-Ca 8.4 with ical of 4.4, Mg 2.4, phos 3.3, stable with CRRT.      Anemia-Hgb 10.2, needing intermittent PRBC's with ECMO and bleeding.     Nutrition-Impact TF.       Seen and discussed with Dr Pyle     Recommendations were communicated to primary team via verbal communication.    Darren Vidal  Clinical Nurse Specialist  [phone]    Review of Systems:   I reviewed the following systems:  ROS not done due to vent/sedation.     Physical Exam:   I/O last 3 completed  "shifts:  In: 2868.45 [I.V.:1238.45; NG/GT:240]  Out: 6987 [Emesis/NG output:4100; Other:2787; Stool:100]   /80 (BP Location: Right arm)   Pulse 78   Temp 98.2  F (36.8  C) (Axillary)   Resp (!) 32   Ht 1.68 m (5' 6.14\")   Wt 98 kg (216 lb 0.8 oz)   SpO2 99%   BMI 34.72 kg/m        GENERAL APPEARANCE: Intubated and sedated, on VV ECMO and IABP  EYES:  No scleral icterus, pupils equal  HENT: mouth without ulcers or lesions  PULM: lungs clear to auscultation, equal air movement, no cyanosis or clubbing  CV: regular rhythm, normal rate, no rub     -edema +1 LE  GI: soft, non-tender, non-distended, dark red stool  MS: no evidence of inflammation in joints, no muscle tenderness  NEURO:  Intubated and sedated    Labs:   All labs reviewed by me  Electrolytes/Renal -   Recent Labs   Lab Test 03/20/19 0417 03/20/19 0416 03/19/19 2155 03/19/19  1550     --  138 137   POTASSIUM 4.7  --  4.6 4.6   CHLORIDE 104  --  104 104   CO2 22  --  22 22   BUN 49*  --  49* 48*   CR 1.27*  --  1.30* 1.41*   * 183* 194*  198* 188*  190*   ALEKSANDER 8.4*  --  8.3* 8.4*   MAG 2.4*  --  2.4* 2.4*   PHOS 3.3  --  3.5 3.3       CBC -   Recent Labs   Lab Test 03/20/19 0417 03/19/19 2155 03/19/19  1550   WBC 34.6* 34.6* 34.3*   HGB 10.2* 10.4* 10.7*   PLT 77* 77* 91*       LFTs -   Recent Labs   Lab Test 03/20/19 0417 03/19/19  1550 03/19/19  0505   ALKPHOS 332* 335* 334*   BILITOTAL 33.4* 35.0* 36.6*   * 374* 421*   * 443* 517*   PROTTOTAL 5.0* 5.0* 5.1*   ALBUMIN 2.3* 2.2* 2.2*       Iron Panel - No lab results found.        Current Medications:    amiodarone  200 mg Oral or Feeding Tube Daily     B and C vitamin Complex with folic acid  5 mL Per Feeding Tube Daily     DAPTOmycin (CUBICIN) intermittent infusion  8 mg/kg (Dosing Weight) Intravenous Q24H     insulin aspart  1-6 Units Subcutaneous Q4H     meropenem  2 g Intravenous Q8H     pantoprazole (PROTONIX) IV  40 mg Intravenous BID     sodium chloride " (PF)  3 mL Intracatheter Q8H     ticagrelor  90 mg Oral or Feeding Tube BID       - MEDICATION INSTRUCTIONS -       IV fluid REPLACEMENT ONLY 30 mL/hr at 03/07/19 1100     CRRT replacement solution 12.5 mL/kg/hr (03/20/19 0735)     EPINEPHrine IV infusion ADULT 0.07 mcg/kg/min (03/20/19 1000)     fentaNYL 25 mcg/hr (03/20/19 1000)     - MEDICATION INSTRUCTIONS -       norepinephrine 0.03 mcg/kg/min (03/20/19 1000)     Percutaneous Coronary Intervention orders placed (this is information for BPA alerting)       CRRT replacement solution 200 mL/hr at 03/19/19 1936     CRRT replacement solution 12.5 mL/kg/hr (03/20/19 0735)     ACE/ARB/ARNI NOT PRESCRIBED       BETA BLOCKER NOT PRESCRIBED       vasopressin (PITRESSIN) infusion ADULT (40 mL) Stopped (03/19/19 1100)

## 2019-03-20 NOTE — PROGRESS NOTES
CRRT STATUS NOTE    DATA:  Time:  6:31 PM  Pressures WNL:  YES  Filter Status:  WDL    Problems Reported/Alarms Noted:  Frequent flow alarms.  Mat in place under CRRT machine.    Supplies Present:  YES    ASSESSMENT:  Patient Net Fluid Balance:  -2042 mL ()  Vital Signs:  Tmax 98.2, HR , MAPs 57-85 on Levophed and Epinephrine drips, RR 24-32, SpO2 % on vent support (60% FiO2, PEEP 10) and VV ECMO.  Labs:  LFTs elevated, WBC 29 (slight decrease), Platelets 85 (increased after 1 dose platelets given)  Goals of Therapy:  I=O as BP allows.  Over-achieving goals of therapy - however majority of negative balance due to 3.5L OG output throughout the day.    INTERVENTIONS:   No acute CRRT interventions.  May consider applying hair binders to bags due to frequent flow alarms.    PLAN:  Fluid removal as tolerated per goals of therapy.  Consider decrease in fluid removal as patient over 1L negative today due to GI output.  Check filter daily and change filter q 72 hrs and PRN.  Patient may be decannulated yet this evening - needs dialysis access if this occurs.  Please contact the CRRT resource RN at [phone] with any questions/concerns.

## 2019-03-20 NOTE — PROGRESS NOTES
ECMO Shift Summary:    Patient remains on VV ECMO, all equipment is functioning and alarms are appropriately set. RPM's 3600 with flow range ~2.8 L/min. Sweep gas is at 1.0 LPM and FiO2 70%. Circuit remains free of air and clot.  Small amount of fibrin visible on arterial side of the oxy. Cannulas are secure with no bleeding from site. Suctioned ETT for moderate amount of paul secretions.    Significant Shift Events: None.    Vent settings:  Ventilation Mode: CMV/AC  (Continuous Mandatory Ventilation/ Assist Control)  FiO2 (%): 60 %  Rate Set (breaths/minute): 12 breaths/min  Tidal Volume Set (mL): 530 mL  PEEP (cm H2O): 10 cmH2O  Oxygen Concentration (%): 60 %  Resp: 24  .    Heparin is off.    Urine output is nothing.  There was no blood loss.  No product was given.      Intake/Output Summary (Last 24 hours) at 3/20/2019 0609  Last data filed at 3/20/2019 0600  Gross per 24 hour   Intake 2868.45 ml   Output 6987 ml   Net -4118.55 ml       ECHO:  No results found for this or any previous visit.No results found for this or any previous visit.    CXR:  No results found for this or any previous visit (from the past 24 hour(s)).    Labs:  Recent Labs   Lab 03/20/19  0417 03/20/19  0416 03/20/19  0146 03/20/19  0018 03/19/19  2155   PH  --  7.46* 7.47* 7.49* 7.48*   PCO2  --  35 34* 32* 32*   PO2  --  103 140* 92 93   HCO3  --  25 25 24 24   O2PER 60 60 60 60 60.0       Lab Results   Component Value Date    HGB 10.2 (L) 03/20/2019    PHGB 100 (H) 03/19/2019    PLT 77 (L) 03/20/2019    FIBR 557 (H) 03/20/2019    INR 1.43 (H) 03/20/2019    PTT 41 (H) 03/20/2019    DD 12.9 (H) 03/20/2019    AXA Results not available-specimen icteric 03/20/2019    ANTCH 56 (L) 03/18/2019         Plan is to maintain support on VV ECMO.      Migue Alves, RRT-NPS, CPFT  3/20/2019 6:09 AM

## 2019-03-20 NOTE — PROGRESS NOTES
ECMO Attending Progress Note  3/19/2019     Hellen Riddle is a 39 year old male who was cannulated for ECMO  due to refractory VF arrest.  He has since been cannulated with a protek duo for VAV ECMO due to severe hypoxia from influenza infection.     Interval events: respiratory status improving daily - moving toward decannulation of VV     Physical Exam:  T 36.7, HR 89, BP 84/47/65, O2 Sat 100%     CMV: 12/530/10/60%    General: no acute distress, intubated and sedated  HEENT: PERRLA, conjunctiva clear, without icterus or pallor, oropharyx clear  Cardiac: RRR nl S1S2   Lungs: clear to auscultation and percussion bilaterally anterior  Abdomen: soft, nontender, non distended, no hepatosplenomegaly or masses  Extremities: without edema or cyanosis; pulses are palpable    Skin: normal skin appearance without worrisome lesions.   Neurologic:intubated and sedated     Labs:  AB.49/31/92/24  Cr 1.41  WBC 34, Hgb 11, Plt 91  INR 1.5     ECMO Issues including assessments and plan on DOS 3/19/2019:  Neuro: Sedated for mechanical ventilation and ECMO.  NIRS stable b/l  RASS goal: -3  CV: Cardiogenic shock.  Hemodynamically stable on minimal pressors  Pulm: Flows unchanged at 3, Sweep unchanged at 3, Keep vent settings at rest settings  FEN/Renal: Electrolytes stable w/ replacement protocols in place, Cr stable, UOP stable  Heme: ACT goal: 160-180, Hemoglobin .  Goals: if O2 sat >85% Hgb >8.  If O2 Sat <85% keep Hgb 10-12.  Minimal oozing around the ECMO cannulas.  ID: Receiving empiric antibiotics  Cannulae: Position is acceptable on exam and the available imaging.  Distal perfusion cannula is in place and patent.     I have personally reviewed the ECMO flows, oxygenation and CO2 clearance, anticoagulation, and cannula position.  I have also personally assessed the patient's systemic response with hemodynamics, oxygenation, ventilation, and bleeding.       The patient requires continued ECMO support and management in the  ICU.  Plan for decannulation of VA today.  VV will stay in place. I have discussed patient care and treatment plan with the primary team.       Abraham Baer MD, PhD  Interventional/Critical Care Cardiology  [phone]     March 19, 2019

## 2019-03-20 NOTE — PROGRESS NOTES
[facility] ID Service: Follow Up Note      Patient:  Hellen Riddle, Date of birth 1980, Medical record number [id number]  DOS: 3/20/19         Assessment and Recommendations:   Problem List:  -Aorto-esophageal fistula diagnosed 3/13. Not a candidate for repair.  -Right posterior parietal/occipital intraparenchymal hemorrhage, concern for abscess as well  -Leukocytosis  -Lactic acidosis  -Hypoxic respiratory failure  -Influenza A infection, status post 9 days of tamiflu (stopped 3/14)  -E. 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 Now off ECMO.    Recommendations:  - Continue meropenem (CNS dosing given concern for intracranial abscess).   - Continue daptomycin renally dosed equivalent of 8mg/kg IV q24 hours to cover possibility of VRE bacteremia related to aortoenteric fistula or MRSA bacteremia related to indwelling lines.  - If signs of worsening fungal infection, could also add micafungin.  - Check CK q week while on dapto.      Discussion:  40 yo male admitted 2/23/19 with refractory VF OHCA s/p VA ECMO on admission, found to have 3v CAD s/p intervention. ID consulted 3/4/19 for increasing leukocytosis, hypoxia, lactic acidosis possibly related to influenza A or VAP, both of which were treated. Course further complicated by GIB, presumably related to an aortoenteric fistula, as well as new CNS lesion concerning for bleed vs abscess. His WBC is now rising on meropenem/metro. He is clearly at risk for polymicrobial infection given communication between GI tract and bloodstream, so could broaden coverage by adding dapto for VRE; this would also cover MRSA bacteremia related to line infection (multiple lines in place).     Sona Murphy MD  [phone]  ID staff        Interval History:   Intubated sedated on vent. S/p ECMO decannulation  3/20/19.    ROS unable to be obtained given intubation and sedation.          Current Antimicrobials   Meropenem 3/5-3/7, restart 3/14  Daptomycin started 3/15/19    Prior antibiotics:  Micafungin 3/5-3/6  Pip/tazo 2/23-3/4  IV Vancomycin 2/23-3/6, 3/8-3/11  Oseltamivir 3/4-3/14  Ertapenem 3/7-3/14         Physical Exam:   Ranges for vital signs:  Temp:  [98  F (36.7  C)-98.4  F (36.9  C)] 98.1  F (36.7  C)  Heart Rate:  [] 95  Resp:  [20-33] 29  MAP:  [51 mmHg-114 mmHg] 72 mmHg  Arterial Line BP: ()/(41-92) 106/55  FiO2 (%):  [60 %] 60 %  SpO2:  [88 %-100 %] 99 %  GENERAL:  Intubated and sedated   ENT:  Head is normocephalic, atraumatic. ETT in place.  NECK: Supple   LUNGS:  Coarse bilaterally  ABDOMEN:  Slightly distended, +BS, no masses.   EXT: Extremities warm, mild BLE edema.   SKIN:  No acute rashes. S/p ECMO decannulation.   NEUROLOGIC: Sedated          Laboratory Data:   Reviewed.    WBC 34.5  Hgb stable      Recent pertinent micro data:  Blood  3/13, 3/14, 3/15, 3/17-/3/19 all NGTD    Sputum   3/11: Candida, Klebsiella, E coli  3/12: Candida, Klebsiella  3/13: Candida, Klebsiella  3/14: Candida, Klebsiella  3/15:  Candida, Klebsiella, E coli            Imaging:   Exam: XR CHEST PORT 1 VW, 3/19/2019 2:05 AM     Indication: monitor position of supportive devices     Comparison: 3/18/2019     Findings:   Endotracheal tube tip 5.1 cm above the tereso. ECMO cannula unchanged  in position, with tip at the main pulmonary artery. Enteric and  feeding tubes descend below the field of view. Redemonstrated  interstitial pulmonary edema. New consolidative opacities peripherally  in the right upper lobe. Coronary artery stents.                                                                      Impression:   1. New consolidative opacity along the peripherally in the right upper  lobe, suspicious for infection.  2. Persistent interstitial pulmonary edema.

## 2019-03-20 NOTE — PLAN OF CARE
Assessment:   Cardiac: NSR/ST 80s-100s. MAPs 50s-90s (goal >65). VV ECMO with flows ~2.0LPM, FiO2 60%, Sweep off since 0900.  Resp: CMV- FiO2 60%, tV 530, Rate 12, Peep 10. Lung sounds coarse, diminished. Small amount of red, thick secretions. Small cough/gag present with ET suctioning. Afebrile.  Neuro: Off Versed since 3/14. Unresponsive, but does move bilat feet randomly. Bilat eyes remained open throughout day- goggles on; eye lubrication applied. Not following any commands. Pupils 3-4mm, equal, reactive.   : Anuric. Bladder scanned 293cc at 1800- straight cathed for 30cc. CRRT with goal 0-50cc/hr.   GI: NJ with TF @ 60ml/hr (goal). OG to suction with 1600cc bloody/dark red output. Rectal tube in place with 0cc output. Abdomen distended, firm.   Endocrine: Sliding scale insulin q4h.   Drips: Fentanyl, Epi, Levo.     Interventions: Gave 1u PRBC and 1u PLT.     Plan: Remove VV ECMO tomorrow 3/21 and place new CRRT and Central Line. Family at bedside and updated on plan/patient's status. Will continue to monitor/assess and update MDs as needed.

## 2019-03-20 NOTE — PROGRESS NOTES
ECMO Attending Progress Note  3/18/2019     Hellen Riddle is a 39 year old male who was cannulated for ECMO  due to refractory VF arrest.  He has since been cannulated with a protek duo for VAV ECMO due to severe hypoxia from influenza infection.     Interval events: Hemodynamics improving - now ready for VA decannulation     Physical Exam:  T 37, HR 89, /74/92, O2 Sat 100%     CMV: 12/530/10/60%    General: no acute distress, intubated and sedated  HEENT: PERRLA, conjunctiva clear, without icterus or pallor, oropharyx clear  Cardiac: RRR nl S1S2   Lungs: clear to auscultation and percussion bilaterally anterior  Abdomen: soft, nontender, non distended, no hepatosplenomegaly or masses  Extremities: without edema or cyanosis; pulses are palpable    Skin: normal skin appearance without worrisome lesions.   Neurologic:intubated and sedated     Labs:  AB.43/39/119/26  Cr 1.38  WBC 33, Hgb 11, Plt 100  INR 1.5     ECMO Issues including assessments and plan on DOS 3/18/2019:  Neuro: Sedated for mechanical ventilation and ECMO.  NIRS stable b/l  RASS goal: -3  CV: Cardiogenic shock.  Hemodynamically stable on minimal pressors  Pulm: Flows unchanged at 4.5, Sweep unchanged at 1.5, Keep vent settings at rest settings  FEN/Renal: Electrolytes stable w/ replacement protocols in place, Cr stable, UOP stable  Heme: ACT goal: 160-180, Hemoglobin .  Goals: if O2 sat >85% Hgb >8.  If O2 Sat <85% keep Hgb 10-12.  Minimal oozing around the ECMO cannulas.  ID: Receiving empiric antibiotics  Cannulae: Position is acceptable on exam and the available imaging.  Distal perfusion cannula is in place and patent.     I have personally reviewed the ECMO flows, oxygenation and CO2 clearance, anticoagulation, and cannula position.  I have also personally assessed the patient's systemic response with hemodynamics, oxygenation, ventilation, and bleeding.       The patient requires continued ECMO support and management in the ICU.  Plan  for decannulation of VA today.  VV will stay in place. I have discussed patient care and treatment plan with the primary team.       Abraham Baer MD, PhD  Interventional/Critical Care Cardiology  [phone]     March 18, 2019

## 2019-03-21 NOTE — PROGRESS NOTES
ECLS Discontinuation Note:    ECLS was discontinued 3/21/2019 at 1532.    Marilee Cleveland, RRT  3/21/2019 4:08 PM

## 2019-03-21 NOTE — PROGRESS NOTES
ECMO Shift Summary:    Patient remains on VV ECMO, all equipment is functioning and alarms are appropriately set. RPM's 2860 with flow range 2.0-2.1 L/min. Sweep gas is at 0 LPM and FiO2 60%. Circuit remains free of air, with some clots in oxygnenator and fibrin around pigtail connection. Cannulas are secure with no bleeding from site. Extremities are warm. Suctioned ETT for none    Significant Shift Events: None     Vent settings:  Ventilation Mode: CMV/AC  (Continuous Mandatory Ventilation/ Assist Control)  FiO2 (%): 60 %  Rate Set (breaths/minute): 12 breaths/min  Tidal Volume Set (mL): 530 mL  PEEP (cm H2O): 10 cmH2O  Oxygen Concentration (%): 60 %  Resp: 27  .    Heparin is not running ACT range 168-176.    Urine output is as RN charted, blood loss was minimal. Product given included none.      Intake/Output Summary (Last 24 hours) at 3/21/2019 0628  Last data filed at 3/21/2019 0600  Gross per 24 hour   Intake 3306.6 ml   Output 4309 ml   Net -1002.4 ml       ECHO:  No results found for this or any previous visit.No results found for this or any previous visit.    CXR:  No results found for this or any previous visit (from the past 24 hour(s)).    Labs:  Recent Labs   Lab 03/21/19  0605 03/21/19  0331 03/21/19  0207 03/20/19  2337   PH 7.48* 7.47* 7.48* 7.50*   PCO2 32* 33* 33* 31*   PO2 64* 168* 89 86   HCO3 24 24 24 25   O2PER 60.0 60.0  60.0 60.0 60.0       Lab Results   Component Value Date    HGB 10.6 (L) 03/21/2019    PHGB Interfering substances, unable to perform test 03/21/2019    PLT 83 (L) 03/21/2019    FIBR 498 (H) 03/21/2019    INR 1.39 (H) 03/21/2019    PTT 41 (H) 03/20/2019    DD 14.1 (H) 03/21/2019    AXA Results not available-specimen icteric 03/20/2019    ANTCH 56 (L) 03/18/2019         Plan is to possibly decannulate today.      Migue Chatman, RRT  3/21/2019 6:28 AM

## 2019-03-21 NOTE — PLAN OF CARE
Assessment:   Cardiac: NSR 80s-90s. MAPs 50s-90s (goal >65).   Resp: CMV- FiO2 60%, tV 530, Rate 12, Peep 10. Lung sounds coarse, diminished. Small amount of red, thick secretions. Small cough/gag present with ET suctioning. Afebrile.  Neuro: Off Versed since 3/14. Unresponsive, but does move bilat feet randomly. Bilat eyes remained open throughout day- goggles on; eye lubrication applied. Not following any commands. Pupils 3-4mm, equal, reactive.   : Anuric. CRRT with goal 0-50cc/hr.   GI: NJ with TF @ 60ml/hr (goal). OG to suction with 700cc bloody/dark red output. Rectal tube removed today. Abdomen distended, firm. No bm. Bowel sounds hypoactive. Scheduled bowel meds restarted.   Endocrine: Sliding scale insulin q4h.   Drips: Epi and Levo. Fentanyl drip turned off this afternoon.      Interventions: VV ECMO decannulated this afternoon. New HD line placed for CRRT. New PICC line currently being placed.     Plan: Remove left groin Thermogard catheter tonight so patient can get up to chair tomorrow. Family at bedside and updated on plan/patient's status. Will continue to monitor/assess and update MDs as needed.

## 2019-03-21 NOTE — PROGRESS NOTES
Nephrology Progress Note  03/21/2019       Mr Riddle is a 39 yom w/HLP who had cardiac arrest on 2/23/19 with unclear down-time.  PCI done emergently, Nephrology consulted for management of FERNANDA, started CRRT on 2/25.       Interval History :   Mr Riddle continues on CRRT for management of electrolytes and volume.  UF not a large factor in output as much of his output is GI with ongoing upper GIB issues.  Is planned for decannulation of VV ECMO, will need HD access to continue and team is aware.  Matching I=O, electrolytes stable.      Assessment & Recommendations:   FERNANDA-Baseline Cr unclear, admitted with Cr of 1.7 post arrest.  FERNANDA due to hemodynamic injury, also received contrast and had elevated CK.  Started CRRT on 2/25, has been anuric since starting CRRT, ~48h after arrest.  Continuing CRRT with no signs of recovery, still needing extensive mechanical support and is on pressors with elevated lacate.  VA ECMO decannulated 3/18, remains on VV ECMO but may decannulate today.               -Access is ECMO circuit, will need access if decannulated.               -CRRT, I=O as BP's allow, mix of 4k/2k baths.      Volume status-Net negative again yesterday but mostly due to GIB with large NG output although Hgb is stable.  Continuing to pull I=O, UF not a large factor in overall output currently, wt stable.      Electrolytes/pH-No acute issues on CRRT, K 4.5, bicarb 23, on mix of 4k/2k baths.       Ca/phos/pth-Ca 8.4, Mg 2.4, phos 3.0, stable with CRRT.      Anemia-Hgb 10.5, needing intermittent PRBC's with ECMO and bleeding.     Nutrition-Impact TF.       Seen and discussed with Dr Pyle     Recommendations were communicated to primary team via verbal communication.       Darren Vidal  Clinical Nurse Specialist  [phone]    Review of Systems:   I reviewed the following systems:  ROS not done due to vent/sedation.     Physical Exam:   I/O last 3 completed shifts:  In: 2778 [I.V.:1086; Other:12; NG/GT:240]  Out: 9599  "[Urine:30; Emesis/NG output:2000; Other:2556]   /80 (BP Location: Right arm)   Pulse 78   Temp 98.1  F (36.7  C) (Axillary)   Resp (!) 32   Ht 1.68 m (5' 6.14\")   Wt 94.6 kg (208 lb 8.9 oz)   SpO2 100%   BMI 33.52 kg/m       GENERAL APPEARANCE: Intubated and sedated, on VV ECMO and IABP  EYES:  No scleral icterus, pupils equal  HENT: mouth without ulcers or lesions  PULM: lungs clear to auscultation, equal air movement, no cyanosis or clubbing  CV: regular rhythm, normal rate, no rub     -edema +1 LE  GI: soft, non-tender, non-distended, dark red stool  MS: no evidence of inflammation in joints, no muscle tenderness  NEURO:  Intubated and sedated    Labs:   All labs reviewed by me  Electrolytes/Renal -   Recent Labs   Lab Test 03/21/19  0954 03/21/19  0331 03/20/19  2233    136 137   POTASSIUM 4.5 4.5 4.5   CHLORIDE 103 104 104   CO2 23 22 21   BUN 48* 48* 48*   CR 0.92 1.06 1.05   * 156* 169*   ALEKSANDER 8.4* 8.2* 8.4*   MAG 2.4* 2.4* 2.3   PHOS 3.0 2.9 2.7       CBC -   Recent Labs   Lab Test 03/21/19  0954 03/21/19  0331 03/20/19  2233   WBC 27.0* 27.7* 29.0*   HGB 10.5* 10.6* 10.4*   PLT 81* 83* 90*       LFTs -   Recent Labs   Lab Test 03/21/19  0331 03/20/19  1554 03/20/19  0417   ALKPHOS 316* 292* 332*   BILITOTAL 35.3* 32.9* 33.4*   * 275* 329*   * 334* 376*   PROTTOTAL 5.3* 5.1* 5.0*   ALBUMIN 2.2* 2.1* 2.3*       Iron Panel - No lab results found.        Current Medications:    amiodarone  200 mg Oral or Feeding Tube Daily     B and C vitamin Complex with folic acid  5 mL Per Feeding Tube Daily     DAPTOmycin (CUBICIN) intermittent infusion  8 mg/kg (Dosing Weight) Intravenous Q24H     insulin aspart  1-6 Units Subcutaneous Q4H     meropenem  2 g Intravenous Q8H     pantoprazole (PROTONIX) IV  40 mg Intravenous BID     sennosides  8.6 mg Oral BID     sodium chloride (PF)  3 mL Intracatheter Q8H     ticagrelor  90 mg Oral or Feeding Tube BID       - MEDICATION INSTRUCTIONS " -       IV fluid REPLACEMENT ONLY 30 mL/hr at 03/07/19 1100     CRRT replacement solution 12.5 mL/kg/hr (03/21/19 1642)     EPINEPHrine IV infusion ADULT 0.04 mcg/kg/min (03/21/19 1500)     fentaNYL 100 mcg/hr (03/21/19 1526)     - MEDICATION INSTRUCTIONS -       norepinephrine 0.02 mcg/kg/min (03/21/19 1500)     Percutaneous Coronary Intervention orders placed (this is information for BPA alerting)       CRRT replacement solution 200 mL/hr at 03/21/19 1641     CRRT replacement solution 12.5 mL/kg/hr (03/21/19 1641)     ACE/ARB/ARNI NOT PRESCRIBED       BETA BLOCKER NOT PRESCRIBED

## 2019-03-21 NOTE — PROGRESS NOTES
[facility] ID Service: Follow Up Note      Patient:  Hellen Riddle, Date of birth 1980, Medical record number [id number]  DOS: 3/20/19         Assessment and Recommendations:   Problem List:  - s/p VF cardiac arrest 2/23/19  -Aorto-esophageal fistula diagnosed 3/13. Not a candidate for repair.  -Right posterior parietal/occipital intraparenchymal hemorrhage, concern for possible abscess as well  -Leukocytosis  -Lactic acidosis (resolved now)  -Hypoxic respiratory failure, remains on ventilator  -Influenza A infection, status post 9 days of tamiflu (stopped 3/14)  -E. 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  Recommendations:  - Continue meropenem (CNS dosing given concern for intracranial abscess).   - Continue daptomycin renally dosed equivalent of 8mg/kg IV q24 hours to cover possibility of VRE bacteremia related to aortoenteric fistula or MRSA bacteremia related to indwelling lines. If no fevers after ECMO decannulation and the blood cultures all remain negative would consider stopping the daptomycin.   - Check CK q week while on dapto. This medication was begun empirically one week ago (3/21/19) . CK was elevated 3/16. Would recommend recheck now.       Discussion:  38 yo male admitted 2/23/19 with refractory VF OHCA s/p VA ECMO on admission, found to have 3v CAD s/p intervention. ID consulted 3/4/19 for increasing leukocytosis, hypoxia, lactic acidosis possibly related to influenza A or VAP, both of which were treated. Course further complicated by GIB, presumably related to an aortoenteric fistula, as well as new CNS lesion concerning for bleed vs abscess. His WBC is now rising on meropenem/metro. He is clearly at risk for polymicrobial infection given communication between GI tract and bloodstream, so could  broaden coverage by adding dapto for VRE; this would also cover MRSA bacteremia related to line infection (multiple lines in place).     Sona Murphy MD  [phone]  ID staff        Interval History:   Intubated sedated on vent. S/p VA ECMO decannulation     ROS unable to be obtained given intubation and sedation.          Current Antimicrobials   Meropenem 3/5-3/7, restart 3/14  Daptomycin started 3/15/19    Prior antibiotics:  Micafungin 3/5-3/6  Pip/tazo 2/23-3/4  IV Vancomycin 2/23-3/6, 3/8-3/11  Oseltamivir 3/4-3/14  Ertapenem 3/7-3/14         Physical Exam:   Ranges for vital signs:  Temp:  [97.8  F (36.6  C)-98.6  F (37  C)] 98.1  F (36.7  C)  Heart Rate:  [79-97] 89  Resp:  [24-32] 32  MAP:  [52 mmHg-116 mmHg] 66 mmHg  Arterial Line BP: ()/(41-88) 87/54  FiO2 (%):  [60 %] 60 %  SpO2:  [94 %-100 %] 100 %  GENERAL:  Intubated and sedated   ENT:  Head is normocephalic, atraumatic. ETT in place.  NECK: Supple   LUNGS:  Coarse bilaterally  ABDOMEN:  Slightly distended, +BS, no masses.   EXT: Extremities warm, mild BLE edema.   SKIN:  No acute rashes.  NEUROLOGIC: Sedated          Laboratory Data:   Reviewed.    WBC 34.5  Hgb stable      Recent pertinent micro data:  Blood  3/13, 3/14, 3/15, 3/17-/3/19 all NGTD    Sputum   3/11: Candida, Klebsiella, E coli  3/12: Candida, Klebsiella  3/13: Candida, Klebsiella  3/14: Candida, Klebsiella  3/15:  Candida, Klebsiella, E coli            Imaging:   Exam: XR CHEST PORT 1 VW, 3/19/2019 2:05 AM     Indication: monitor position of supportive devices     Comparison: 3/18/2019     Findings:   Endotracheal tube tip 5.1 cm above the tereso. ECMO cannula unchanged  in position, with tip at the main pulmonary artery. Enteric and  feeding tubes descend below the field of view. Redemonstrated  interstitial pulmonary edema. New consolidative opacities peripherally  in the right upper lobe. Coronary artery stents.                                                                       Impression:   1. New consolidative opacity along the peripherally in the right upper  lobe, suspicious for infection.  2. Persistent interstitial pulmonary edema.

## 2019-03-21 NOTE — PLAN OF CARE
D: VT/VF  I/A: Neuro: Opens eyes spontaneously. Not tracking. Not withdrawing from pain.  Resp: No changes to vent settings overnight; See Abgs  VV ECMO: 2LPM; 2860 RPM; Sweep @ 0 LPM; 60%  Card: SR; Levo/Epi titrated for MAP >65  : Anuric; CRRT  GI: Tube feeds at goal; Large amounts of dark red OG secretions  Afebrile  Product Given:  none     P: Continue to monitor; POC updated to family. Decannulation today?

## 2019-03-21 NOTE — PROGRESS NOTES
CRRT STATUS NOTE    DATA:  Time:  6:23 AM  Pressures WNL:  YES  Filter Status:  WDL    Problems Reported/Alarms Noted:  none    Supplies Present:  YES    ASSESSMENT:  Patient Net Fluid Balance:  3/20: Net -1.9L; 3/21: Net -1.1L  Vital Signs:  T 98-98.6 ax, HR 81-96, MAP  (pressors), RR 27-30, SPO2 100% (vent/ECMO)  Labs:  Trop 0.686, K 4.5, Crt 1.06, Mg 2.4, Phos 2.9, WBC 27.7   Goals of Therapy: Net -0-50 mL/h as BP allows, exceeding goals this am d/t large output from OGT    INTERVENTIONS:   none    PLAN:  Possible decannulation today and CRRT and CVC line placement. Continue fluid removal as tolerated per goals of therapy. Check circuit daily and change circuit q72h and prn. Please contact CRRT resource RN at [phone] with any questions/concerns.

## 2019-03-21 NOTE — PROGRESS NOTES
[facility]  Cardiac ICU Progress Note  03/21/2019           Key events - last 24 hours / Hospital course   Continues to have OG output overnight, bloody; but no drop in hemoglobin  Stable pressors  No acute events    Today's plan:  - decannulate VV ECMO              Assessment and Plan:   Hellen Riddle is a 39 year old male who was admitted on 2/23/2019 after cardiac arrest.    Neurology: Intraparenchymal hemorrhage  CT head from 3/18 is stable  Intubated, off sedation since 3/14  --monitor   Cardiovascular / Hemodynamics: Refractory VF arrest, placed on VA ECMO, VV ecmo added.  Coronary angiography on admission showed 3v disease and he underwent MITA to mRCA & mLCx. Has IABP as well.  ECMO decannulation stymied by VT in OR on 3/6. Returned to the cath lab for MITA to  LAD and distal LCx.   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 Stable  SFA occlusion s/p embolectomy  TTE: LV EF 50-55%.  RV dysfunction is mild.  --IABP removed 3/17/19  --VA ecmo removed 3/18/19  --vascular surgery consulted for aorto-esophageal fistula, but declined to operate due to futility and high risk; will reconsult if he continues to improve  --wean pressors as able; MAP goal >65  --holding heparin gtt and ASA; cont ticagrelor--ACT is still in therapeutic range due to liver failure  --hold lipitor for now given hepatic injury during arrest  --hold ACE/ARB for now given renal failure and shock  --holding beta blocker given shock    Pulmonary: Acute hypoxic respiratory failure due to pulmonary edema, VAP  On VV ecmo--O2 stable with sweep at zero and flow at 2lpm  --VV ecmo decannulation today  --ENT consulted for trach--declined due to critically ill; will reassess when off VV ecmo  --cont abx as below  --wean vent as able  --daily CXR  --Q2h ABGs     GI and Nutrition: GI bleeding from aorto-esophageal fistula: stable  Shock liver  OG output likely old blood   Liver and splenic infarcts  --PPI BID  --tube  feeds at goal  --monitor BID LFTs   Renal, Fluid and Electrolytes: Renal failure.  Appreciate nephrology's assistance.  On CRRT.  --monitor urine output  --maintain K>4 and Mg>2    Infectious Disease: Multiple organisms recovered in sputum; possible bacteremia from enteric source  --cont meropenem, daptomycin  --ID following   Hematology and Oncology: Acute anemia due to GI bleed; stable  --holding heparin as above   --cryo PRN fibrinogen < 200; FFP for INR >2  --Transfuse for Hgb<10   Endocrinology: No known medical history.  --insulin PRN   Lines: Left brachial arterial line  L femoral CVC   RIJ protec duo  ETT   Pisano catheter  OG tube   Current lines are required for patient management             Medications:   I have reviewed this patient's current medications           Review of Systems:   Review of systems not obtained due to patient factors - intubation              Physical Exam:   Exam and Labs by System  Neuro: Exam: intubated, pupils equal sluggish; opens eyes to stimulation; moves toes with stimulation    Cardiovascular:     Heart Rate: 84  Exam:    Normal s1 and s2, no audible murmur, warm extremities.     Recent Labs   Lab 03/21/19  0331 03/20/19  0417 03/19/19  0505   TROPI 0.686* 0.864* 1.358*       Pulm:   Vent Settings:  Resp: 27 SpO2: 100 % O2 Device: Mechanical Ventilator    Ventilation Mode: CMV/AC  (Continuous Mandatory Ventilation/ Assist Control)  FiO2 (%): 60 %  Rate Set (breaths/minute): 12 breaths/min  Tidal Volume Set (mL): 530 mL  PEEP (cm H2O): 10 cmH2O  Oxygen Concentration (%): 60 %  Resp: 27    Exam:   Symmetrical chest shape and movements with each tidal breath  Clear lungs     Arterial Blood Gas:   Recent Labs   Lab 03/21/19  0755 03/21/19  0605 03/21/19  0331 03/21/19  0207   PH 7.48* 7.48* 7.47* 7.48*   PCO2 33* 32* 33* 33*   PO2 138* 64* 168* 89   HCO3 25 24 24 24   O2PER 60.0 60.0 60.0  60.0 60.0       Renal:   Meds:  Vitals:    03/19/19 0000 03/20/19 0400 03/21/19 0400    Weight: 100.3 kg (221 lb 1.9 oz) 98 kg (216 lb 0.8 oz) 94.6 kg (208 lb 8.9 oz)   I/O last 3 completed shifts:  In: 3477.1 [I.V.:1258.1; Other:30; NG/GT:240]  Out: 4637 [Urine:30; Emesis/NG output:2800; Other:1807]  Recent Labs   Lab 03/21/19 0331 03/20/19 2233    137   POTASSIUM 4.5 4.5   CHLORIDE 104 104   CO2 22 21   ANIONGAP 10 12   * 169*   BUN 48* 48*   CR 1.06 1.05   ALEKSANDER 8.2* 8.4*     No components found for: URINE     GI:   Exam:    Soft, mildly distended, bowel sounds present     Diet: tube feeds  Recent Labs   Lab 03/21/19 0331 03/20/19  1554 03/20/19  0417   * 334* 376*   * 275* 329*   BILITOTAL 35.3* 32.9* 33.4*   ALBUMIN 2.2* 2.1* 2.3*   PROTTOTAL 5.3* 5.1* 5.0*   ALKPHOS 316* 292* 332*       Heme/ID:   Meds:  Temp: 97.8  F (36.6  C) Temp src: AxillaryTemp  Min: 97.8  F (36.6  C)  Max: 98.6  F (37  C)   Recent Labs   Lab 03/21/19 0331 03/20/19 2233 03/20/19  1554 03/20/19  1000 03/20/19  0417   WBC 27.7* 29.0* 29.0* 31.9* 34.6*   HGB 10.6* 10.4* 10.4* 10.0* 10.2*   HCT 31.8* 30.8* 30.5* 29.9* 30.5*   MCV 92 92 92 92 91   RDW 19.1* 19.2* 18.8* 19.5* 19.6*   PLT 83* 90* 85* 74* 77*     Recent Labs   Lab 03/21/19 0331 03/20/19  0417 03/19/19  2155 03/19/19  1550 03/19/19  0956 03/19/19  0505  03/18/19  0357 03/17/19  2214   INR 1.39* 1.43*  --   --   --  1.53*  --  1.47* 1.56*   PTT  --  41* 41* 43* 43* 40*   < > 43* 45*    < > = values in this interval not displayed.     Recent Labs   Lab 03/21/19  0629 03/21/19  0303 03/20/19  0416 03/20/19  0304 03/19/19  0505 03/19/19  0401   CULT PENDING No growth after 2 hours PENDING No growth after 1 day Moderate growth  Candida albicans / dubliniensis  Candida albicans and Candida dubliniensis are not routinely speciated  Susceptibility testing not routinely done  *  Light growth  Probable  Escherichia coli  *  Light growth  Probable  Klebsiella pneumoniae  *  Culture in progress No growth after 2 days       Endo:    Meds:  Recent Labs   Lab 03/21/19  0331 03/20/19  2233 03/20/19  1554 03/20/19  1000 03/20/19  0417   * 169* 166* 183* 184*       Lines:    No erythema or discharge at the catheter entry site               Data:   All lab results reviewed    The pt was discussed and evaluated with Dr. Humphreys.    Jersey Alexander MD  Cardiovascular Medicine Fellow, PGY-7  [phone]       Critical Care Services Progress Note:     Hellen Riddle remains critically ill with acute coronary syndrome, hypotension and shock     I personally examined and evaluated the patient today.   The patient s prognosis today is grave  I have evaluated all laboratory values and imaging studies from the past 24 hours.  Key findings and decisions made today included Plan for decannulation of ECMO today  I personally managed the ventilator, sedation, pain control and analgesia, metabolic abnormalities, antibiotic therapy and vasoactive medications.   Consults ongoing and ordered are Surgery  Procedures that will happen today are: none  All treatments were placed at my direction.  I formulated today s plan with the house staff team or resident(s) and agree with the findings and plan in the associated note.       The above plans and care have been discussed with family and all questions and concerns were addressed.     I spent a total of 60 minutes (excluding procedure time) personally providing and directing critical care services at the bedside and on the critical care unit for Hellen Riddle.        Oleksandr Humphreys

## 2019-03-21 NOTE — PROGRESS NOTES
CRRT STATUS NOTE    DATA:  Time:  6:07 PM  Pressures WNL:  YES  Filter Status:  WDL    Problems Reported/Alarms Noted:  Frequent flow alarms    Supplies Present:  YES    ASSESSMENT:  Patient Net Fluid Balance:  -1963 mL ()  Vital Signs:  Tmax 98.1, HR 83-94, MAPs  on Epinephrine and Levophed drips, RR 24-32, SpO2 100% on vent support (60% FiO2, PEEp 10)  Labs:  LFTs elevated, but stable, WBC 27 (decreased), Platelets 81 (stable)  Goals of Therapy:  0-50 as BP allows.  Currently achieving more than goals - largely due to nearly 2L OG output.    INTERVENTIONS:   Patient decannulated and new line placed for CRRT in cath lab.  Patient restarted via LIJ dialysis catheter at 1702 when returned from cath lab.  Blood warmer applied at that time.    PLAN:  Continue fluid removal as tolerated per goals of therapy.  Check filter daily, change filter q 72 hrs and PRN.  Please contact the CRRT resource RN at [phone] with any questions/concerns.

## 2019-03-21 NOTE — PROGRESS NOTES
[facility] Nurse Inpatient Adult Pressure Injury Prevention Assessment: ECMO  Follow up for L) ear PI and L) forehead    3/8/19- pt's forehead was wrapped with kerlix to secure the ECMO tubings on right side of th head, unable to visualize the area.   3/11- unable to visualize, NIRS over wounds, wounds may have healed.   3/14- Stage 2, healing, thin scab    Positioning Tolerance: Fair  Date of ECMO cannulation: 2/23 Right Groin VA ECMO and Right internal jugular VAV ECMO  Presence of Ischemia: No  Location of ischemia: NA- BL toes are boggy    Pressure Injury Prevention Interventions In Place:  Optifoam Dressing under ECMO Cannula, Z flow Positioner under head, Pillows for repositioning, TAPs Wedge Positioners in use, Heel off-loading boots, Pillows under calves for heel suspension, Mepilex Sacral Dressing and Dressing to sacrum for prevention   Current support surface: Standard  Low air loss mattress       Pressure Injury Prevention Interventions Added:  None - reinforced education to ensure offloading the pressure around ears        Plan of Care for Positioning and Pressure Injury Prevention  Reposition patient every 1-2 hours using TAP Wedges  Position head on Z flow positioner, mold indentation at areas of pressure points.  Pad ECMO IJ cannula with Optifoam (#[id number]) along face and scalp between skin and cannula and under Coban head wraps    Pad ECMO groin and chest cannula under rigid connectors with Optifoam or Soft cloth  Heel off-loading Boots at all times  Sacral Mepilex for Prevention, change every 5 days and prn  Low Air loss mattress    Patient History:   According to medical record: Ciara Winslow is a 38 year old male who was admitted on 2/23/2019. Wife noticed agonal breathing and seizure like activity. Called EMS, who came and started chest compressions. No history of HTN, DM. Does have history of dyslipidemia. Non smoker. Went to bed at 11:30, wife found him  foaming at mouth around midnight. Called EMS, did not get significant chest compressions. EMS arrived 5 minutes later. [facility] to [facility].    Current Diet / Nutrition:     None    Output:    I/O last 3 completed shifts:  In: 3477.1 [I.V.:1258.1; Other:30; NG/GT:240]  Out: 4637 [Urine:30; Emesis/NG output:2800; Other:1807]  Containment: of urine/stool: Rectal Pouch and Anuric    Risk Assessment:   Sensory Perception: 1-->completely limited  Moisture: 3-->occasionally moist  Activity: 1-->bedfast  Mobility: 1-->completely immobile  Nutrition: 3-->adequate  Friction and Shear: 2-->potential problem  Kirby Score: 11    Labs:    Recent Labs   Lab 03/21/19  0954 03/21/19  0331   ALBUMIN  --  2.2*   HGB 10.5* 10.6*   INR  --  1.39*   WBC 27.0* 27.7*   CRP  --  160.0*     Focused Assessment:  Ears and Face    Pressure Injury Present::Yes      ASSESSMENT  1. Pressure Injury: under EEG leads , hospital acquired ,   This is a Medical Device Related Pressure Injury (MDRPI) due to EEG and NIRs with surglist to hold it in place.   Pressure Injury is Stage 2  Contributing factor of the pressure injury: pressure and immobility, cannot rule out burn from NIRs being placed over EEG.   Status: Follow up assessment, healing   Recommend provider order: NA    2. L) ear- DTPI,  HAPI  Contributing factor of the pressure injury- Pressure   Status- follow up assessment, continues to evolve     TREATMENT PLAN  Bilateral Forehead wounds: BID and PRN cleanse with saline and pat dry. Apply thin layer of Vaseline. Avoid placing NIRs directly over EEG leads. Avoid placing EEG leads directly on wound.     L) ear- Continue to use Z-flow pillow and ensure to mould around it.  Orders reviewed   WOC Nurse follow-up plan:twice weekly  Nursing to notify the Provider(s) and re-consult the WOC Nurse if wound(s) deteriorates or new skin concern.    Physical Exam    Wound Location:  Bilateral Forehead (assessment from 3/4)                                                                                     3/14    Date of last Photo 3/14  Wound History: Pt had NIRs in place over EEG held in place with Surgilast netting.   Measurements (length x width x depth, in cm) 0.8 cm x 0.4 cm  x  0.0 cm  Wound Base:  100 % superficial thin scab  Tunneling N/A  Undermining N/A  Palpation of the wound bed: normal   Periwound skin: intact  Color: normal and consistent with surrounding tissue  Temperature: normal   Drainage:, none  Description of drainage: none  Odor: none  Pain: no grimacing or signs of discomfort and unable to assess due to  sedation , insensate    2. L) ear      3/14                                                                    3/21      Date of last Photo 3/21  Wound History: Pt have been on ECMO, Z-flow positioner have been in place after initiation of ECMO. Per RN he was hemodynamically unstable for couple of shifts.  Measurements (length x width x depth, in cm) Proximal- 0.3x0.9x0.0cm- 100% dark purple/maroon discoloration. Distal- 2.8x1.1x0.0cm, 100% dark dusky/purple/maroon deflated blister   Tunneling N/A  Undermining N/A  Palpation of the wound bed: normal   Periwound skin: intact  Color: normal and consistent with surrounding tissue  Temperature: normal   Drainage:, none  Description of drainage: none  Odor: none  Pain: no grimacing or signs of discomfort and unable to assess due to  sedation , insensate    Education provided to: nurse  Discussed importance of:their role in pressure injury prevention  Discussed plan of care with Nurse  WOC Nurse follow-up plan:twice weekly    Dora Johnston RN CWOCN

## 2019-03-22 NOTE — PROGRESS NOTES
[facility] ID Service: Follow Up Note      Patient:  Hellen Riddle, Date of birth 1980, Medical record number [id number]  DOS: 3/20/19         Assessment and Recommendations:   Problem List:  - s/p VF cardiac arrest 2/23/19  -Aorto-esophageal fistula diagnosed 3/13. Not a candidate for repair.  -Right posterior parietal/occipital intraparenchymal hemorrhage, concern for possible abscess as well  -Leukocytosis  -Lactic acidosis (resolved now)  -Hypoxic respiratory failure, remains on ventilator  -Influenza A infection, status post 9 days of tamiflu (stopped 3/14)  -E. 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  - budding yeast in blood cultures new finding     Recommendations:  - Agree with initiation of micafungin for newly detected suspected Candidemia (budding yeast in blood). Patient will need 14 day course of antifungal therapy after blood cultures are cleared. Would recommend pulling or changing out all central lines when possible to help clear the yeast in blood and to prevent relapse.     - repeat blood cultures daily until cleared x 72 hours on at 3 consecutive days.     - Discontinue meropenem since all GNRs in sputum have been susceptible to cephalosporins    - start ceftriaxone 2 grams IV Q 24 hours for E. Coli and Klebsiella VAP    - Consider discontinuation of daptomycin.   Daptomycin was begun empirically one week ago due to concern for possible VRE or MRSA infection in patient with aorto-esophageal fistula. Blood cultures have remined negative for these pathogens.     - Check CK q week while on dapto. This medication was begun empirically one week ago (3/21/19) . CK was elevated 3/16. Would recommend recheck now.       Discussion:  38 yo male admitted 2/23/19 with refractory VF [facility] s/p VA ECMO on admission,  found to have 3v CAD s/p intervention. ID consulted 3/4/19 for increasing leukocytosis, hypoxia, lactic acidosis possibly related to influenza A or VAP, both of which were treated. Course further complicated by GIB, presumably related to an aortoenteric fistula, as well as new CNS lesion concerning for bleed vs abscess. His WBC is now rising on meropenem/metro. He is clearly at risk for polymicrobial infection given communication between GI tract and bloodstream, so could broaden coverage by adding dapto for VRE; this would also cover MRSA bacteremia related to line infection (multiple lines in place).     Sona Murphy MD  [phone]  ID staff        Interval History:   Intubated sedated on vent moves legs when I palpate his abdomen o/w not responding         Current Antimicrobials   Meropenem 3/5-3/7, restart 3/14  Daptomycin started 3/15/19  Micafungin started 3/21-     Prior antibiotics:  Micafungin 3/5-3/6  Pip/tazo 2/23-3/4  IV Vancomycin 2/23-3/6, 3/8-3/11  Oseltamivir 3/4-3/14  Ertapenem 3/7-3/14         Physical Exam:   Ranges for vital signs:  Temp:  [98  F (36.7  C)-98.4  F (36.9  C)] 98.2  F (36.8  C)  Heart Rate:  [] 88  Resp:  [25-32] 30  MAP:  [50 mmHg-118 mmHg] 97 mmHg  Arterial Line BP: ()/(41-93) 145/73  FiO2 (%):  [60 %-80 %] 70 %  SpO2:  [90 %-100 %] 90 %  GENERAL:  Intubated not alert  ENT:  Head is normocephalic, atraumatic. ETT in place.  NECK: Supple   LUNGS:  Coarse bilaterally  ABDOMEN:  Slightly distended, +BS, no masses.   EXT: Extremities warm, mild BLE edema.   SKIN:  No acute rashes.  NEUROLOGIC: Sedated          Laboratory Data:   Reviewed.    WBC 34.5  Hgb stable      Recent pertinent micro data:  Blood:  Blood cultures now growing budding yeast from 3/18 and 3/19 both not positive until third day of incubtation  3/13, 3/14, 3/15, 3/17 all NGTD    Sputum   3/11: Candida, Klebsiella, E coli  3/12: Candida, Klebsiella  3/13: Candida, Klebsiella  3/14: Candida,  Klebsiella  3/15:  Candida, Klebsiella, E coli  3/19 sputum E. Coli, Klebsiella and Candida albicans  3/20: lactose fermenting GNR               Imaging:   Exam: XR CHEST PORT 1 VW, 3/19/2019 2:05 AM     Indication: monitor position of supportive devices     Comparison: 3/18/2019     Findings:   Endotracheal tube tip 5.1 cm above the tereso. ECMO cannula unchanged  in position, with tip at the main pulmonary artery. Enteric and  feeding tubes descend below the field of view. Redemonstrated  interstitial pulmonary edema. New consolidative opacities peripherally  in the right upper lobe. Coronary artery stents.                                                                      Impression:   1. New consolidative opacity along the peripherally in the right upper  lobe, suspicious for infection.  2. Persistent interstitial pulmonary edema.

## 2019-03-22 NOTE — PROGRESS NOTES
[facility]  Cardiac ICU Progress Note  03/22/2019           Key events - last 24 hours / Hospital course     38 yo male with CAD and cardiac arrest placed on ECMO. Course was complicated by influenza and bacterial pneumonia, difficultly weaning ecmo and was on VA and VV emco for a time. He had a massive GI bleed and CT showed aorto-esophageal fistula, non-operative but bleeding has stopped. Right intraparenchymal hemorrhage which is improving. He has been here for 4 weeks.VA ecmo removed about a week ago and VV ecmo removed today. Still on CRRT. Neuro function slow to recover. Just this evening micro called and said he was growing yeast in a blood culture?? so started micafungin. A new HD line is in the left neck and PICC order was placed so all his old lines should be removed now or soon.     Pt remains with eyes open, prn eye gtts given and eye covers in place. Contracts lower extremities slightly, does not withdraw to pain - more uncomfortable, given fentanyl pushes.   Continues to have OG output overnight, bloody; but no drop in hemoglobin.    Stable pressors - epi 0.04, levophed 0.06.   CRRT with goal of 0-50/hr, pt tolerating pull  LA 1.5.  No acute events              Assessment and Plan:   Hellen Riddle is a 39 year old male who was admitted on 2/23/2019 after cardiac arrest.    Neurology: Intraparenchymal hemorrhage  CT head from 3/18 is stable  Intubated, off sedation since 3/14  --monitor   Cardiovascular / Hemodynamics: Refractory VF arrest, placed on VA ECMO, VV ecmo added.  Coronary angiography on admission showed 3v disease and he underwent MITA to mRCA & mLCx. Has IABP as well.  ECMO decannulation stymied by VT in OR on 3/6. Returned to the cath lab for MITA to  LAD and distal LCx.   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 Stable  SFA occlusion s/p embolectomy  TTE: LV EF 50-55%.  RV dysfunction is mild.  --IABP removed 3/17/19  --VA ecmo removed  3/18/19  --vascular surgery consulted for aorto-esophageal fistula, but declined to operate due to futility and high risk; will reconsult if he continues to improve  --wean pressors as able; MAP goal >65  --holding heparin gtt and ASA; cont ticagrelor--ACT is still in therapeutic range due to liver failure  --hold lipitor for now given hepatic injury during arrest  --hold ACE/ARB for now given renal failure and shock  --holding beta blocker given shock    Pulmonary: Acute hypoxic respiratory failure due to pulmonary edema, VAP  On VV ecmo--O2 stable with sweep at zero and flow at 2lpm  --VV ecmo decannulation today  --surgery consulted for trach/peg-- 3/22/2019 Surgery determined it was not clinically appropriate at this time  --cont abx as below  --wean vent as able  --daily CXR  --Q2h ABGs     GI and Nutrition: GI bleeding from aorto-esophageal fistula: stable  Shock liver  OG output likely old blood   Liver and splenic infarcts  --PPI BID  --tube feeds at goal  --monitor BID LFTs   Renal, Fluid and Electrolytes: Renal failure.  Appreciate nephrology's assistance.  On CRRT.  --monitor urine output  --maintain K>4 and Mg>2    Infectious Disease: Multiple organisms recovered in sputum; possible bacteremia from enteric source  --cont meropenem, daptomycin  --ID following   Hematology and Oncology: Acute anemia due to GI bleed; stable  --holding heparin as above   --cryo PRN fibrinogen < 200; FFP for INR >2  --Transfuse for Hgb<10   Endocrinology: No known medical history.  --insulin PRN   Lines: Left brachial arterial line  L femoral CVC   RIJ protec duo  ETT   Pisano catheter  OG tube   Current lines are required for patient management             Medications:   I have reviewed this patient's current medications           Review of Systems:   Review of systems not obtained due to patient factors - intubation              Physical Exam:   Exam and Labs by System  Neuro: Exam: intubated, pupils equal sluggish; opens  eyes to stimulation; moves toes with stimulation    Cardiovascular:     Heart Rate: 91  Exam:    Normal s1 and s2, no audible murmur, warm extremities.     Recent Labs   Lab 03/22/19  0327 03/21/19  0331 03/20/19  0417   TROPI 0.546* 0.686* 0.864*       Pulm:   Vent Settings:  Resp: 28 SpO2: 100 % O2 Device: Mechanical Ventilator    Ventilation Mode: CMV/AC  (Continuous Mandatory Ventilation/ Assist Control)  FiO2 (%): 70 %  Rate Set (breaths/minute): 12 breaths/min  Tidal Volume Set (mL): 530 mL  PEEP (cm H2O): 10 cmH2O  Oxygen Concentration (%): 70 %  Resp: 28    Exam:   Symmetrical chest shape and movements with each tidal breath  Clear lungs     Arterial Blood Gas:   Recent Labs   Lab 03/22/19  1023 03/22/19  0530 03/22/19  0327 03/21/19  2131   PH 7.44 7.46* 7.46* 7.47*   PCO2 32* 33* 33* 32*   PO2 81 108* 56* 85   HCO3 22 24 24 24   O2PER 70 80 60 60       Renal:   Meds:  Vitals:    03/20/19 0400 03/21/19 0400 03/22/19 0400   Weight: 98 kg (216 lb 0.8 oz) 94.6 kg (208 lb 8.9 oz) 94 kg (207 lb 3.7 oz)   I/O last 3 completed shifts:  In: 2682.52 [I.V.:1062.52; NG/GT:300]  Out: 3703 [Emesis/NG output:1200; Other:2503]  Recent Labs   Lab 03/22/19  1023 03/22/19  0327    137   POTASSIUM 3.8 4.4   CHLORIDE 103 103   CO2 20 20   ANIONGAP 13 14   * 174*   BUN 56* 53*   CR 1.05 1.14   ALEKSANDER 8.0* 8.1*     No components found for: URINE     GI:   Exam:    Soft, mildly distended, bowel sounds present     Diet: tube feeds  Recent Labs   Lab 03/22/19  0327 03/21/19  1711 03/21/19  0331   * 330* 345*   * 238* 264*   BILITOTAL 34.7* 33.4* 35.3*   ALBUMIN 2.0* 1.9* 2.2*   PROTTOTAL 5.2* 4.9* 5.3*   ALKPHOS 318* 295* 316*       Heme/ID:   Meds:  Temp: 98.5  F (36.9  C) Temp src: OralTemp  Min: 98.1  F (36.7  C)  Max: 98.5  F (36.9  C)   Recent Labs   Lab 03/22/19  1023 03/22/19  0327 03/21/19  2131 03/21/19  1711 03/21/19  0954   WBC 26.0* 26.2* 26.7* 25.1* 27.0*   HGB 10.4* 10.3* 10.7* 10.0* 10.5*    HCT 31.5* 31.4* 32.1* 30.5* 31.5*   MCV 94 93 93 93 92   RDW 20.3* 19.6* 19.5* 19.7* 19.0*   * 110* 103* 84* 81*     Recent Labs   Lab 03/22/19  0327 03/21/19  0331 03/20/19  0417 03/19/19  2155 03/19/19  1550 03/19/19  0956 03/19/19  0505  03/18/19  0357   INR 1.44* 1.39* 1.43*  --   --   --  1.53*  --  1.47*   PTT  --   --  41* 41* 43* 43* 40*   < > 43*    < > = values in this interval not displayed.     Recent Labs   Lab 03/21/19  0629 03/21/19  0303 03/20/19  0416 03/20/19  0304 03/19/19  0505 03/19/19  0401   CULT Culture in progress No growth after 1 day Light growth  Klebsiella pneumoniae  *  Light growth  Escherichia coli  *  Moderate growth  Candida albicans / dubliniensis  Candida albicans and Candida dubliniensis are not routinely speciated  *  Susceptibility testing done on previous specimen No growth after 2 days Moderate growth  Candida albicans / dubliniensis  Candida albicans and Candida dubliniensis are not routinely speciated  Susceptibility testing not routinely done  *  Light growth  Escherichia coli  *  Light growth  Klebsiella pneumoniae  * Cultured on the 3rd day of incubation:  Budding yeast  *  Critical Value/Significant Value, preliminary result only, called to and read back by  Arielle Larson RN on 3.22.19 at 0834 by .         Endo:   Meds:  Recent Labs   Lab 03/22/19  1023 03/22/19  0327 03/21/19  2131 03/21/19  1711 03/21/19  0954   * 174* 168* 155* 164*       Lines:    No erythema or discharge at the catheter entry site               Data:   All lab results reviewed    The pt was discussed and evaluated with Dr. Humphreys.    Jersey Alexander MD  Cardiovascular Medicine Fellow, PGY-7  [phone]

## 2019-03-22 NOTE — PROGRESS NOTES
[facility] Nurse Inpatient Adult Pressure Injury Prevention Assessment: ECMO  Follow up for L) ear PI and L) forehead  3/22 New consult for occiput, other wounds recently assessed.   3/22- when RN shaving to apply comfeel, larger wound was found.     3/8/19- pt's forehead was wrapped with kerlix to secure the ECMO tubings on right side of th head, unable to visualize the area.   3/11- unable to visualize, NIRS over wounds, wounds may have healed.   3/14- Stage 2, healing, thin scab    Positioning Tolerance: Fair  Date of ECMO cannulation: 2/23 Right Groin VA ECMO and Right internal jugular VAV ECMO  Presence of Ischemia: No  Location of ischemia: NA- BL toes are boggy  ECMO decannulation 3/21    Pressure Injury Prevention Interventions In Place:  Optifoam Dressing under ECMO Cannula, Z flow Positioner under head, Pillows for repositioning, TAPs Wedge Positioners in use, Heel off-loading boots, Pillows under calves for heel suspension, Mepilex Sacral Dressing and Dressing to sacrum for prevention   Current support surface: Standard  Low air loss mattress       Pressure Injury Prevention Interventions Added:  None - reinforced education to ensure offloading the pressure around ears        Plan of Care for Positioning and Pressure Injury Prevention  Reposition patient every 1-2 hours using TAP Wedges  Position head on Z flow positioner, mold indentation at areas of pressure points.  Pad ECMO IJ cannula with Optifoam (#[id number]) along face and scalp between skin and cannula and under Coban head wraps    Pad ECMO groin and chest cannula under rigid connectors with Optifoam or Soft cloth  Heel off-loading Boots at all times  Sacral Mepilex for Prevention, change every 5 days and prn  Low Air loss mattress    Patient History:   According to medical record: Ciara Winslow is a 38 year old male who was admitted on 2/23/2019. Wife noticed agonal breathing and seizure like activity.  Called EMS, who came and started chest compressions. No history of HTN, DM. Does have history of dyslipidemia. Non smoker. Went to bed at 11:30, wife found him foaming at mouth around midnight. Called EMS, did not get significant chest compressions. EMS arrived 5 minutes later. Taken to [facility].    Current Diet / Nutrition:     None    Output:    I/O last 3 completed shifts:  In: 2777.81 [I.V.:1077.81; NG/GT:440]  Out: 3809 [Emesis/NG output:1450; Other:2359]  Containment: of urine/stool: Rectal Pouch and Anuric    Risk Assessment:   Sensory Perception: 1-->completely limited  Moisture: 3-->occasionally moist  Activity: 1-->bedfast  Mobility: 1-->completely immobile  Nutrition: 3-->adequate  Friction and Shear: 2-->potential problem  Kirby Score: 11    Labs:    Recent Labs   Lab 03/22/19  1023 03/22/19  0327   ALBUMIN  --  2.0*   HGB 10.4* 10.3*   INR  --  1.44*   WBC 26.0* 26.2*   CRP  --  120.0*     Focused Assessment:  Ears and Face    Pressure Injury Present::Yes      ASSESSMENT  1. Pressure Injury: under EEG leads , hospital acquired ,   This is a Medical Device Related Pressure Injury (MDRPI) due to EEG and NIRs with surglist to hold it in place.   Pressure Injury is Stage 2  Contributing factor of the pressure injury: pressure and immobility, cannot rule out burn from NIRs being placed over EEG.   Status: Follow up assessment, healing   Recommend provider order: NA    2. L) ear- DTPI,  HAPI  Contributing factor of the pressure injury- Pressure   Status- follow up assessment, continues to evolve    3. Right occiput pressure injury versus dried drainage. Unable to stage until wound base cleaned.  Status - initial assessment     TREATMENT PLAN  Bilateral Forehead wounds: BID and PRN cleanse with saline and pat dry. Apply thin layer of Vaseline. Avoid placing NIRs directly over EEG leads. Avoid placing EEG leads directly on wound.     L) ear- Continue to use Z-flow pillow and ensure to mould around it.  Orders  reviewed   [facility] Nurse follow-up plan:twice weekly  Nursing to notify the Provider(s) and re-consult the [facility] Nurse if wound(s) deteriorates or new skin concern.    Right occiput - continue to use z-flow pillow and ensure to mold around area.  Please clip hair around wound as able.  If free from hair apply comfeel.      Physical Exam    3/22: New wound on occiput assessed, other wounds not viewed    Wound Location:  Bilateral Forehead (assessment from 3/4)                                                                                    3/14    Date of last Photo 3/14  Wound History: Pt had NIRs in place over EEG held in place with Surgilast netting.   Measurements (length x width x depth, in cm) 0.8 cm x 0.4 cm  x  0.0 cm  Wound Base:  100 % superficial thin scab  Tunneling N/A  Undermining N/A  Palpation of the wound bed: normal   Periwound skin: intact  Color: normal and consistent with surrounding tissue  Temperature: normal   Drainage:, none  Description of drainage: none  Odor: none  Pain: no grimacing or signs of discomfort and unable to assess due to  sedation , insensate    2. L) ear     3/14                                                                    3/21    Date of last Photo 3/21  Wound History: Pt have been on ECMO, Z-flow positioner have been in place after initiation of ECMO. Per RN he was hemodynamically unstable for couple of shifts.  Measurements (length x width x depth, in cm) Proximal- 0.3x0.9x0.0cm- 100% dark purple/maroon discoloration. Distal- 2.8x1.1x0.0cm, 100% dark dusky/purple/maroon deflated blister   Tunneling N/A  Undermining N/A  Palpation of the wound bed: normal   Periwound skin: intact  Color: normal and consistent with surrounding tissue  Temperature: normal   Drainage:, none  Description of drainage: none  Odor: none  Pain: no grimacing or signs of discomfort and unable to assess due to  sedation , insensate          Wound Location: Right occiput        (post shave)  Date of  last Photo 3/22  Wound History: Pt have been on ECMO, Z-flow positioner have been in place after initiation of ECMO. Per RN he was hemodynamically unstable for couple of shifts. Suspected pressure injury based on location though unable to determine due to hair   Measurements (length x width x depth, in cm)   0.5 x 0.5 x 0 cm eschar versus dried drainage.  Edges lifting- firm  0.9 x 1.4 x 0 cm eschar versus dried drainage. Edges lifting- firm  3.0 x 6.0 x 0 cm eschar - semi boggy  Periwound skin: dry, scaly   Color: normal and consistent with surrounding tissue  Temperature: normal   Drainage:, scant  Description of drainage: serosanguinous  Odor: none  Pain: no grimacing or signs of discomfort and unable to assess due to  sedation    Education provided to: nurse  Discussed importance of:their role in pressure injury prevention  Discussed plan of care with Nurse  [facility] Nurse follow-up plan:twice weekly       Violeta Grossman RN CWON

## 2019-03-22 NOTE — OP NOTE
Procedure Date: 2019      PREOPERATIVE DIAGNOSIS:  ECMO for cardiogenic shock.      POSTOPERATIVE DIAGNOSIS:  ECMO for cardiogenic shock.      PROCEDURE:  Right femoral extracorporeal membrane oxygenation decannulation and repair of right femoral artery and vein.      SURGEON:  Craig Espinal MD.      ASSISTANT:  Andrews Covarrubias MD and Parris Mccall MD.      OPERATIVE INDICATIONS:  The patient is a 39-year-old who was placed on ECMO for cardiogenic shock.  Decision was made to proceed with ECMO decannulation.      DESCRIPTION OF PROCEDURE:  The patient was brought to operating room in stable condition.  Under general anesthesia, the patient's abdomen was prepped and draped in sterile manner.  A right groin incision was made.  The femoral artery and vein were identified.  Incision was opened via the existing incision.  Cannulas were removed.  After the ECMO support was weaned off, the patient remained hemodynamically stable.  The femoral artery and vein were closed with interrupted 5-0 Prolene suture, however, there was poor pulse in the distal femoral artery, and we got an intraoperative consult by Dr. Tereza Gibbs.  Please refer to her note for details.  Following the repair, the wound was closed in layers.  The patient transferred to ICU in stable critical condition.         CRAIG ESPINAL MD             D: 2019   T: 2019   MT: REGINA      Name:     LIONEL CARTY   MRN:      [id number]        Account:        [id number]   :      1980           Procedure Date: 2019      Document: [id number]       cc: [facility] Surgery Billing

## 2019-03-22 NOTE — PLAN OF CARE
Pt remains with eyes open, prn eye gtts given and eye covers in place. Contracts lower extremities slightly, does not withdraw to pain. Appears more uncomfortable this morning with increased RR and contraction of lower extremity muscles, giving prn fentanyl bumps. MD said ok to restart fentanyl gtt if necessary. SR, pressure labile and increase greatly with any stimulation. Remains on levo and epi. Tachypnea and increasing PIP worsening this am, MD notified and said to continue to treat with fentanyl. Lungs coarse with occasional wheezes, sputum culture sent this am. OG to low suction with approximately 200 of dark red output overnight. No BM, senna given. Small amount of UOP noted, bladder scan showed only 9ml. CRRT with goal of 0-50/hr, pt tolerating pull. L thermagard line removed, no hematoma noted. Family at bedside earlier in evening and updated on current condition. Will continue to monitor.

## 2019-03-22 NOTE — PROVIDER NOTIFICATION
03/21/19 1900   PICC Triple Lumen 03/21/19 Right Brachial vein lateral Access   Placement Date/Time: 03/21/19 1902   Catheter Brand: spotfluxo TL open ended  Size (Fr): 6 Fr  Lot #: [id number]  Full barrier precautions done: Yes, hand hygiene, sterile gown, sterile gloves, mask, cap, full body drape, chlorhexidine scrub  Consent Signed...   Site Assessment WDL   External Cath Length (cm) 1.5 cm   Extremity Circumference (cm) 30 cm   Dressing Intervention Chlorhexidine patch;Transparent;Securing device   Dressing Change Due 03/28/19   Lumen A - Cap Change Due 03/24/19   Lumen B - Cap Change Due 03/24/19   Lumen C - Cap Change Due 03/24/19

## 2019-03-22 NOTE — PROGRESS NOTES
CRRT STATUS NOTE    DATA:  Time:  4:57 PM  Pressures WNL:  YES  Filter Status:  WDL    Problems Reported/Alarms Noted:  none    Supplies Present:  YES    ASSESSMENT:  Patient Net Fluid Balance:  Net - 825 ml since MN 3/22  Vital Signs:  T=98.5 oral, YP=731, BP= 149/82 w/VEI=442, O2 sats 98% on 70% FiO2. Pt is on 2 pressors.  Labs:  Hgb= 10.5 and vyjb=751  Goals of Therapy:  0-50 ml /hr as BP allows.    INTERVENTIONS:   Discussed goals of therapy with bedside RN.    PLAN:  Continue POC. Call CRRT RN at [phone] with any questions or concerns.

## 2019-03-22 NOTE — PROGRESS NOTES
[facility] Nurse Inpatient Adult Pressure Injury Prevention Assessment: ECMO  Follow up for L) ear PI and L) forehead  3/22 New consult for occiput, other wounds recently assessed    3/8/19- pt's forehead was wrapped with kerlix to secure the ECMO tubings on right side of th head, unable to visualize the area.   3/11- unable to visualize, NIRS over wounds, wounds may have healed.   3/14- Stage 2, healing, thin scab    Positioning Tolerance: Fair  Date of ECMO cannulation: 2/23 Right Groin VA ECMO and Right internal jugular VAV ECMO  Presence of Ischemia: No  Location of ischemia: NA- BL toes are boggy    Pressure Injury Prevention Interventions In Place:  Optifoam Dressing under ECMO Cannula, Z flow Positioner under head, Pillows for repositioning, TAPs Wedge Positioners in use, Heel off-loading boots, Pillows under calves for heel suspension, Mepilex Sacral Dressing and Dressing to sacrum for prevention   Current support surface: Standard  Low air loss mattress       Pressure Injury Prevention Interventions Added:  None - reinforced education to ensure offloading the pressure around ears        Plan of Care for Positioning and Pressure Injury Prevention  Reposition patient every 1-2 hours using TAP Wedges  Position head on Z flow positioner, mold indentation at areas of pressure points.  Pad ECMO IJ cannula with Optifoam (#[id number]) along face and scalp between skin and cannula and under Coban head wraps    Pad ECMO groin and chest cannula under rigid connectors with Optifoam or Soft cloth  Heel off-loading Boots at all times  Sacral Mepilex for Prevention, change every 5 days and prn  Low Air loss mattress    Patient History:   According to medical record: Ciara Winslow is a 38 year old male who was admitted on 2/23/2019. Wife noticed agonal breathing and seizure like activity. Called EMS, who came and started chest compressions. No history of HTN, DM. Does have history of  dyslipidemia. Non smoker. Went to bed at 11:30, wife found him foaming at mouth around midnight. Called EMS, did not get significant chest compressions. EMS arrived 5 minutes later. Taken to [facility].    Current Diet / Nutrition:     None    Output:    I/O last 3 completed shifts:  In: 2682.52 [I.V.:1062.52; NG/GT:300]  Out: 3703 [Emesis/NG output:1200; Other:2503]  Containment: of urine/stool: Rectal Pouch and Anuric    Risk Assessment:   Sensory Perception: 1-->completely limited  Moisture: 3-->occasionally moist  Activity: 1-->bedfast  Mobility: 1-->completely immobile  Nutrition: 3-->adequate  Friction and Shear: 2-->potential problem  Kirby Score: 11    Labs:    Recent Labs   Lab 03/22/19  1023 03/22/19  0327   ALBUMIN  --  2.0*   HGB 10.4* 10.3*   INR  --  1.44*   WBC 26.0* 26.2*   CRP  --  120.0*     Focused Assessment:  Ears and Face    Pressure Injury Present::Yes      ASSESSMENT  1. Pressure Injury: under EEG leads , hospital acquired ,   This is a Medical Device Related Pressure Injury (MDRPI) due to EEG and NIRs with surglist to hold it in place.   Pressure Injury is Stage 2  Contributing factor of the pressure injury: pressure and immobility, cannot rule out burn from NIRs being placed over EEG.   Status: Follow up assessment, healing   Recommend provider order: NA    2. L) ear- DTPI,  HAPI  Contributing factor of the pressure injury- Pressure   Status- follow up assessment, continues to evolve    3. Right occiput pressure injury versus dried drainage.   Status - initial assessment     TREATMENT PLAN  Bilateral Forehead wounds: BID and PRN cleanse with saline and pat dry. Apply thin layer of Vaseline. Avoid placing NIRs directly over EEG leads. Avoid placing EEG leads directly on wound.     L) ear- Continue to use Z-flow pillow and ensure to mould around it.  Orders reviewed   WOC Nurse follow-up plan:twice weekly  Nursing to notify the Provider(s) and re-consult the WOC Nurse if wound(s) deteriorates or  new skin concern.    Right occiput - continue to use z-flow pillow and ensure to mold around area.  Please clip hair around wound as able.  If free from hair apply comfeel.      Physical Exam    3/22: New wound on occiput assessed, other wounds not viewed    Wound Location:  Bilateral Forehead (assessment from 3/4)                                                                                    3/14    Date of last Photo 3/14  Wound History: Pt had NIRs in place over EEG held in place with Surgilast netting.   Measurements (length x width x depth, in cm) 0.8 cm x 0.4 cm  x  0.0 cm  Wound Base:  100 % superficial thin scab  Tunneling N/A  Undermining N/A  Palpation of the wound bed: normal   Periwound skin: intact  Color: normal and consistent with surrounding tissue  Temperature: normal   Drainage:, none  Description of drainage: none  Odor: none  Pain: no grimacing or signs of discomfort and unable to assess due to  sedation , insensate    2. L) ear     3/14                                                                    3/21    Date of last Photo 3/21  Wound History: Pt have been on ECMO, Z-flow positioner have been in place after initiation of ECMO. Per RN he was hemodynamically unstable for couple of shifts.  Measurements (length x width x depth, in cm) Proximal- 0.3x0.9x0.0cm- 100% dark purple/maroon discoloration. Distal- 2.8x1.1x0.0cm, 100% dark dusky/purple/maroon deflated blister   Tunneling N/A  Undermining N/A  Palpation of the wound bed: normal   Periwound skin: intact  Color: normal and consistent with surrounding tissue  Temperature: normal   Drainage:, none  Description of drainage: none  Odor: none  Pain: no grimacing or signs of discomfort and unable to assess due to  sedation , insensate          Wound Location: Right occiput  Date of last Photo 3/22  Wound History: Pt have been on ECMO, Z-flow positioner have been in place after initiation of ECMO. Per RN he was hemodynamically unstable for  couple of shifts. Suspected pressure injury based on location though unable to determine due to hair   Measurements (length x width x depth, in cm)   0.5 x 0.5 x 0 cm eschar versus dried drainage.  Edges lifting  0.9 x 1.4 x 0 cm eschar versus dried drainage. Edges lifting  Palpation of the wound bed: firm  Periwound skin: intact  Color: normal and consistent with surrounding tissue  Temperature: normal   Drainage:, scant  Description of drainage: serosanguinous  Odor: none  Pain: no grimacing or signs of discomfort and unable to assess due to  sedation    Education provided to: nurse  Discussed importance of:their role in pressure injury prevention  Discussed plan of care with Nurse  WOC Nurse follow-up plan:twice weekly         Paulina Dasilva RN CWOCN

## 2019-03-22 NOTE — PROGRESS NOTES
Nephrology Progress Note  03/22/2019       Mr Riddle is a 39 yom w/HLP who had cardiac arrest on 2/23/19 with unclear down-time.  PCI done emergently, Nephrology consulted for management of FERNANDA, started CRRT on 2/25.       Interval History :   Mr Riddle continues on CRRT for management of electrolytes and volume.  UF not a large factor in output as much of his output is GI with ongoing upper GIB issues.  VV ECMO decannulated yesterday, hemodynamically improving.    Matching I=O, electrolytes stable.      Assessment & Recommendations:   FERNANDA-Baseline Cr unclear, admitted with Cr of 1.7 post arrest.  FERNANDA due to hemodynamic injury, also received contrast and had elevated CK.  Started CRRT on 2/25, has been anuric since starting CRRT, ~48h after arrest.  Continuing CRRT with no signs of recovery, still needing extensive mechanical support and is on pressors with elevated lacate.  VA ECMO decannulated 3/18, remains on VV ECMO but may decannulate today.               -Access is ECMO circuit, will need access if decannulated.               -CRRT, I=O as BP's allow, mix of 4k/2k baths.      Volume status-Net negative again yesterday with 2.5L of UF and 2.2L of GI ouptut.  Continuing to try to pull fluid, unclear EDW but will      Electrolytes/pH-No acute issues on CRRT, K 3.8, bicarb 20, on mix of 4k/2k baths.       Ca/phos/pth-Ca 8.0, Mg 2.3, phos 3.5, stable with CRRT.      Anemia-Hgb 10.4, needing intermittent PRBC's with ECMO and bleeding.     Nutrition-Impact TF.       Seen and discussed with Dr Pyle     Recommendations were communicated to primary team via verbal communication.    Darren Vidal  Clinical Nurse Specialist  [phone]    Review of Systems:   I reviewed the following systems:  ROS not done due to vent/sedation.     Physical Exam:   I/O last 3 completed shifts:  In: 2777.81 [I.V.:1077.81; NG/GT:440]  Out: 3809 [Emesis/NG output:1450; Other:2359]   /80 (BP Location: Right arm)   Pulse 78   Temp 98.5  F  "(36.9  C) (Oral)   Resp 30   Ht 1.68 m (5' 6.14\")   Wt 94 kg (207 lb 3.7 oz)   SpO2 96%   BMI 33.30 kg/m       GENERAL APPEARANCE: Intubated and sedated, on VV ECMO and IABP  EYES:  No scleral icterus, pupils equal  HENT: mouth without ulcers or lesions  PULM: lungs clear to auscultation, equal air movement, no cyanosis or clubbing  CV: regular rhythm, normal rate, no rub     -edema +1 LE  GI: soft, non-tender, non-distended, dark red stool  MS: no evidence of inflammation in joints, no muscle tenderness  NEURO:  Intubated and sedated    Labs:   All labs reviewed by me  Electrolytes/Renal -   Recent Labs   Lab Test 03/22/19  1023 03/22/19  0327 03/21/19  2131    137 136   POTASSIUM 3.8 4.4 4.8   CHLORIDE 103 103 102   CO2 20 20 21   BUN 56* 53* 52*   CR 1.05 1.14 1.13   * 174* 168*   ALEKSANDER 8.0* 8.1* 8.1*   MAG 2.3 2.4* 2.3   PHOS 3.5 3.3 3.4       CBC -   Recent Labs   Lab Test 03/22/19  1023 03/22/19 0327 03/21/19 2131   WBC 26.0* 26.2* 26.7*   HGB 10.4* 10.3* 10.7*   * 110* 103*       LFTs -   Recent Labs   Lab Test 03/22/19  0327 03/21/19  1711 03/21/19  0331   ALKPHOS 318* 295* 316*   BILITOTAL 34.7* 33.4* 35.3*   * 238* 264*   * 330* 345*   PROTTOTAL 5.2* 4.9* 5.3*   ALBUMIN 2.0* 1.9* 2.2*       Iron Panel - No lab results found.        Current Medications:    amiodarone  200 mg Oral or Feeding Tube Daily     B and C vitamin Complex with folic acid  5 mL Per Feeding Tube Daily     DAPTOmycin (CUBICIN) intermittent infusion  8 mg/kg (Dosing Weight) Intravenous Q24H     heparin lock flush  5-10 mL Intracatheter Q24H     insulin aspart  1-6 Units Subcutaneous Q4H     meropenem  2 g Intravenous Q8H     micafungin  100 mg Intravenous Q24H     pantoprazole (PROTONIX) IV  40 mg Intravenous BID     sennosides  8.6 mg Oral BID     sodium chloride (PF)  3 mL Intracatheter Q8H     ticagrelor  90 mg Oral or Feeding Tube BID       - MEDICATION INSTRUCTIONS -       IV fluid REPLACEMENT " ONLY 30 mL/hr at 03/07/19 1100     CRRT replacement solution 12.5 mL/kg/hr (03/22/19 1537)     EPINEPHrine IV infusion ADULT 0.04 mcg/kg/min (03/22/19 1400)     fentaNYL Stopped (03/21/19 1800)     - MEDICATION INSTRUCTIONS -       norepinephrine 0.06 mcg/kg/min (03/22/19 1400)     Percutaneous Coronary Intervention orders placed (this is information for BPA alerting)       CRRT replacement solution 200 mL/hr at 03/21/19 1641     CRRT replacement solution 12.5 mL/kg/hr (03/22/19 1538)     ACE/ARB/ARNI NOT PRESCRIBED       BETA BLOCKER NOT PRESCRIBED

## 2019-03-22 NOTE — PROGRESS NOTES
[facility]  Cardiac ICU Progress Note  03/22/2019           Key events - last 24 hours / Hospital course     38 yo male with CAD and cardiac arrest placed on ECMO. Course was complicated by influenza and bacterial pneumonia, difficultly weaning ecmo and was on VA and VV emco for a time. He had a massive GI bleed and CT showed aorto-esophageal fistula, non-operative but bleeding has stopped. Right intraparenchymal hemorrhage which is improving. He has been here for 4 weeks.VA ecmo removed about a week ago and VV ecmo removed today. Still on CRRT. Neuro function slow to recover. Just this evening micro called and said he was growing yeast in a blood culture?? so started micafungin. A new HD line is in the left neck and PICC order was placed so all his old lines should be removed now or soon.     Pt remains with eyes open, prn eye gtts given and eye covers in place. Contracts lower extremities slightly, does not withdraw to pain - more uncomfortable, given fentanyl pushes.   Continues to have OG output overnight, bloody; but no drop in hemoglobin.    Stable pressors - epi 0.04, levophed 0.06.   CRRT with goal of 0-50/hr, pt tolerating pull  LA 1.5.  No acute events              Assessment and Plan:   Hellen Riddle is a 39 year old male who was admitted on 2/23/2019 after cardiac arrest.    Neurology: Intraparenchymal hemorrhage  CT head from 3/18 is stable  Intubated, off sedation since 3/14  --monitor   Cardiovascular / Hemodynamics: Refractory VF arrest, placed on VA ECMO, VV ecmo added.  Coronary angiography on admission showed 3v disease and he underwent MITA to mRCA & mLCx. Has IABP as well.  ECMO decannulation stymied by VT in OR on 3/6. Returned to the cath lab for MITA to  LAD and distal LCx.   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 Stable  SFA occlusion s/p embolectomy  TTE: LV EF 50-55%.  RV dysfunction is mild.  --IABP removed 3/17/19  --VA ecmo removed  3/18/19  --vascular surgery consulted for aorto-esophageal fistula, but declined to operate due to futility and high risk; will reconsult if he continues to improve  --wean pressors as able; MAP goal >65  --holding heparin gtt and ASA; cont ticagrelor--ACT is still in therapeutic range due to liver failure  --hold lipitor for now given hepatic injury during arrest  --hold ACE/ARB for now given renal failure and shock  --holding beta blocker given shock    Pulmonary: Acute hypoxic respiratory failure due to pulmonary edema, VAP  On VV ecmo--O2 stable with sweep at zero and flow at 2lpm  --VV ecmo decannulation today  --surgery consulted for trach/peg  --cont abx as below  --wean vent as able  --daily CXR  --Q2h ABGs     GI and Nutrition: GI bleeding from aorto-esophageal fistula: stable  Shock liver  OG output likely old blood   Liver and splenic infarcts  --PPI BID  --tube feeds at goal  --monitor BID LFTs   Renal, Fluid and Electrolytes: Renal failure.  Appreciate nephrology's assistance.  On CRRT.  --monitor urine output  --maintain K>4 and Mg>2    Infectious Disease: Multiple organisms recovered in sputum; possible bacteremia from enteric source  --cont meropenem, daptomycin  --ID following   Hematology and Oncology: Acute anemia due to GI bleed; stable  --holding heparin as above   --cryo PRN fibrinogen < 200; FFP for INR >2  --Transfuse for Hgb<10   Endocrinology: No known medical history.  --insulin PRN   Lines: Left brachial arterial line  L femoral CVC   RIJ protec duo  ETT   Pisano catheter  OG tube   Current lines are required for patient management             Medications:   I have reviewed this patient's current medications           Review of Systems:   Review of systems not obtained due to patient factors - intubation              Physical Exam:   Exam and Labs by System  Neuro: Exam: intubated, pupils equal sluggish; opens eyes to stimulation; moves toes with stimulation    Cardiovascular:     Heart  Rate: 80  Exam:    Normal s1 and s2, no audible murmur, warm extremities.     Recent Labs   Lab 03/22/19 0327 03/21/19 0331 03/20/19  0417   TROPI 0.546* 0.686* 0.864*       Pulm:   Vent Settings:  Resp: 27 SpO2: 99 % O2 Device: Mechanical Ventilator    Ventilation Mode: CMV/AC  (Continuous Mandatory Ventilation/ Assist Control)  FiO2 (%): 70 %  Rate Set (breaths/minute): 12 breaths/min  Tidal Volume Set (mL): 530 mL  PEEP (cm H2O): 10 cmH2O  Oxygen Concentration (%): 60 %  Resp: 27    Exam:   Symmetrical chest shape and movements with each tidal breath  Clear lungs     Arterial Blood Gas:   Recent Labs   Lab 03/22/19  0530 03/22/19 0327 03/21/19 2131 03/21/19 1711   PH 7.46* 7.46* 7.47* 7.44   PCO2 33* 33* 32* 33*   PO2 108* 56* 85 75*   HCO3 24 24 24 23   O2PER 80 60 60 60       Renal:   Meds:  Vitals:    03/20/19 0400 03/21/19 0400 03/22/19 0400   Weight: 98 kg (216 lb 0.8 oz) 94.6 kg (208 lb 8.9 oz) 94 kg (207 lb 3.7 oz)   I/O last 3 completed shifts:  In: 2768.05 [I.V.:1208.05; NG/GT:240]  Out: 4696 [Emesis/NG output:2150; Other:2546]  Recent Labs   Lab 03/22/19 0327 03/21/19 2131    136   POTASSIUM 4.4 4.8   CHLORIDE 103 102   CO2 20 21   ANIONGAP 14 12   * 168*   BUN 53* 52*   CR 1.14 1.13   ALEKSANDER 8.1* 8.1*     No components found for: URINE     GI:   Exam:    Soft, mildly distended, bowel sounds present     Diet: tube feeds  Recent Labs   Lab 03/22/19 0327 03/21/19 1711 03/21/19 0331   * 330* 345*   * 238* 264*   BILITOTAL 34.7* 33.4* 35.3*   ALBUMIN 2.0* 1.9* 2.2*   PROTTOTAL 5.2* 4.9* 5.3*   ALKPHOS 318* 295* 316*       Heme/ID:   Meds:  Temp: 98.2  F (36.8  C) Temp src: AxillaryTemp  Min: 97.8  F (36.6  C)  Max: 98.4  F (36.9  C)   Recent Labs   Lab 03/22/19  0327 03/21/19  2131 03/21/19  1711 03/21/19  0954 03/21/19  0331   WBC 26.2* 26.7* 25.1* 27.0* 27.7*   HGB 10.3* 10.7* 10.0* 10.5* 10.6*   HCT 31.4* 32.1* 30.5* 31.5* 31.8*   MCV 93 93 93 92 92   RDW 19.6* 19.5*  19.7* 19.0* 19.1*   * 103* 84* 81* 83*     Recent Labs   Lab 03/22/19  0327 03/21/19  0331 03/20/19  0417 03/19/19  2155 03/19/19  1550 03/19/19  0956 03/19/19  0505  03/18/19  0357   INR 1.44* 1.39* 1.43*  --   --   --  1.53*  --  1.47*   PTT  --   --  41* 41* 43* 43* 40*   < > 43*    < > = values in this interval not displayed.     Recent Labs   Lab 03/21/19  0629 03/21/19  0303 03/20/19  0416 03/20/19  0304 03/19/19  0505 03/19/19  0401   CULT PENDING No growth after 22 hours Light growth  Lactose fermenting gram negative rods  *  Culture in progress No growth after 2 days Moderate growth  Candida albicans / dubliniensis  Candida albicans and Candida dubliniensis are not routinely speciated  Susceptibility testing not routinely done  *  Light growth  Escherichia coli  *  Light growth  Klebsiella pneumoniae  * No growth after 3 days       Endo:   Meds:  Recent Labs   Lab 03/22/19  0327 03/21/19  2131 03/21/19  1711 03/21/19  0954 03/21/19  0331   * 168* 155* 164* 156*       Lines:    No erythema or discharge at the catheter entry site               Data:   All lab results reviewed    The pt was discussed and evaluated with Dr. Humphreys.    Jersey Alexander MD  Cardiovascular Medicine Fellow, PGY-7  [phone]

## 2019-03-22 NOTE — CONSULTS
General Surgery Consult Note     Patient name: Hellen Riddle  Date of Service: 3/22/2019  Reason for Consult: Trach/PEG   Requesting Physician: Dr. Barba      Assessment/Plan:   39 M who was admitted to the ICU 4 wk ago following cardiac arrest, prolonged course requiring VA and VV ECMO, IABP, ongoing anticoagulation, with massive GIB and aortoesophageal fistula.  GIB appears to have slowed however patient still on pressors.  Vent requirements remain somewhat high.    - Would not perform PEG at this time due to aortoesophageal fistula and risk of GIB with insufflation/ instrumentation of the esophagus  - Would not perform tracheostomy at this time due to ongoing pressors and current vent requirements.  Would consider once decreased to PEEP 5 and FiO2<60%  - Please reconsult once meeting these requirements    Discussed with staff, Dr. Plaza.    Starr Rondon  General Surgery PGY2    ------------------------------------------------------------------------  HPI:   Mr. Riddle is a 38 yo M who was admitted on 2/23 following a V fib arrest at home, found to have extensive 3 vessel disease now s/p MITA, VA ECMO then VV ECMO (decannulated today).  Hospital course has been complicated by acute ventilator dependent respiratory failure, pneumonia, massive GI bleed found to have an aortoesophageal fistula (not a candidate for surgical repair), intraparenchymal hemorrhage, renal failure on CRRT, liver/ splenic infarcts.  He remains on 2 pressors, and requiring moderate ventilatory support.  Wife at bedside, patient has not had any previous abdominal or neck surgeries.    PMH:   Past Medical History:   Diagnosis Date     Dyslipidemia      PSH:   Past Surgical History:   Procedure Laterality Date     CV EXTRACORPERAL MEMBRANE OXYGENATION N/A 3/21/2019    Procedure: VV ECMO decannulation, put in dialysis catheter;  Surgeon: Abraham Baer MD;  Location:  HEART CARDIAC CATH LAB     CV HEART CATHETERIZATION WITH POSSIBLE INTERVENTION  "N/A 3/7/2019    Procedure: Coronary Angiogram;  Surgeon: Dante Molina MD;  Location: [facility] HEART CARDIAC CATH LAB     EMBOLECTOMY LOWER EXTREMITY Right 3/18/2019    Procedure: RIGHT COMMON FEMORAL EMBOLECTOMY AND SAPHENOUS VEIN PATCH;  Surgeon: Tereza Gibbs MD;  Location: [facility] OR     INSERT EXTRACORPORAL MEMBRANE OXYGENATOR Right 3/6/2019    Procedure: Emergent Insert VA extracorporal membrane oxygenator right groin;  Surgeon: Reggie Perez MD;  Location: [facility] OR     INSERT EXTRACORPORAL MEMBRANE OXYGENATOR N/A 3/18/2019    Procedure: RIGHT FEMORAL EXTRACORPORAL MEMBRANE OXYGENATOR DECANNULATION ; REPAIR RIGHT GROIN VENOUS AND ARTERIAL VESSELS;  Surgeon: Craig Berry MD;  Location: [facility] OR     REMOVE EXTRACORPORAL MEMBRANE OXYGENATOR ADULT N/A 3/6/2019    Procedure: EXTRACORPORAL MEMBRANE OXYGENATOR REMOVAL FEMORAL CANNULAS repair right femoral vessels;  Surgeon: Craig Berry MD;  Location: [facility] OR     Meds:   No medications prior to admission.     Allergies: No Known Allergies  FamHx: No family history on file.  SocHx:        ROS: unable to perform 2/2 mental status     Objective:   /80 (BP Location: Right arm)   Pulse 78   Temp 98.5  F (36.9  C) (Oral)   Resp 28   Ht 1.68 m (5' 6.14\")   Wt 94 kg (207 lb 3.7 oz)   SpO2 100%   BMI 33.30 kg/m    General - no acute distress, sedated  HEENT - normocephalic, atraumatic, ETT in place  Neck- no surgical scars, average size/ width  Cardio - regular rate, regular rhythm  Pulm - non labored on vent: CMV 12/530/10/70%  Abdomen - soft, mildly distended, nontender, no surgical scars  Neuro - no purposeful movement, tracks with eyes  Extremities - no lower extremity edema, warm, well-perfused  Skin - no rash or bruising  Psych - unable to assess     Labs:  AB.44/32/81/22  WBC 36  Hbg 10.4  Plts 131    Tbili 34  Dbili 31    Imaging:  CTA A/P 3/13:  IMPRESSION:   1. Large volume of hemorrhage markedly distending the stomach and  duodenal " bulb. 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  2. Numerous patchy areas of hypoattenuation peripherally in the liver  and spleen most consistent with infarction.  3. 6.7 x 6.2 cm hematoma in the right groin about the access point of  the femoral lines.  4. Intraoperative balloon pump tip seen just below the level of the  renal arteries. Both kidneys normally enhance.  5. Bibasilar atelectasis, with interval decrease in patchy confluent  airspace opacities consistent with resolving pulmonary edema versus  diffuse alveolar damage.  6. Evidence areas of subendocardial hypoperfusion of the left  ventricle, suggesting ischemia or infarct.

## 2019-03-22 NOTE — PROGRESS NOTES
CRRT STATUS NOTE    DATA:  Time:  6:13 AM  Pressures WNL:  YES  Filter Status:  WDL    Problems Reported/Alarms Noted:  High access pressure alarms. Minimal flow alarms overnight.     Supplies Present:  YES    ASSESSMENT:  Patient Net Fluid Balance:  Net -271 ml @ 0600.  Vital Signs:  HR 84, /58, MAP 75  Labs:  K 4.4, Mg 2.4, Phos 3.3, iCa 4.5, Hgb 10.3, Plt 110  Goals of Therapy:  0-50 ml/hr as BP allows.     INTERVENTIONS:   Floor mat and hair ties in use. Lines were reversed, switched to normal position, and now reversed again.    PLAN:  Continue to monitor circuit daily and change set q72 hours or PRN for clotting/clogging. Please call CRRT RN with any questions/problems.

## 2019-03-23 NOTE — PROGRESS NOTES
CRRT STATUS NOTE    DATA:  Time:  5:12 PM  Pressures WNL:  YES  Filter Status:  WDL    Problems Reported/Alarms Noted:  No      Supplies Present:  YES    ASSESSMENT:  Patient Net Fluid Balance:  Yesterday -1199                                               Today +463                                               Since Admission +261  Vital Signs:  110/*60, 89, levo 0.08, epi 0.04  Labs:  3.9,   Goals of Therapy:  0-50    INTERVENTIONS:   Matts and hair ties in use    PLAN:  Continue to follow plan of care.  Contact resource with questions at [phone]

## 2019-03-23 NOTE — PROCEDURES
"Procedure/Surgery Information   [facility], [place]     Procedure Note  Date of Service (when I performed the procedure): 03/23/2019    Procedure: Dialysis line, left internal jugular    Consent already completed    I have reviewed the lab findings, diagnostic data, medications, and the plan for procedure. I have determined this patient to be an appropriate candidate for the planned procedure and have reassessed the patient IMMEDIATELY PRIOR to ocedure.  Prior to the start of the procedure and with procedural staff participation, I verbally confirmed the patient s identity using two indicators, relevant allergies, that the procedure was appropriate and matched the consent or emergent situation, and that the correct equipment/implants were available. Immediately prior to starting the procedure I conducted the Time Out with the procedural staff and re-confirmed the patient s name, procedure, and site/side. (The Joint Commission universal protocol was followed.)  Yes    Central line  Date/Time: 3/23/2019 6:14 AM  Performed by: Ulises Conley MD  Authorized by: Ulises Conley MD   Consent: Verbal consent not obtained. Written consent obtained.  Required items: required blood products, implants, devices, and special equipment available  Patient identity confirmed: verbally with patient and hospital-assigned identification number  Time out: Immediately prior to procedure a \"time out\" was called to verify the correct patient, procedure, equipment, support staff and site/side marked as required.  Indications: vascular access    Anesthesia:  Local Anesthetic: lidocaine 1% with epinephrine  Anesthetic total: 3 mL    Sedation:  Patient sedated: no    Preparation: skin prepped with chlorhexidine and sterile field established  Location details: left internal jugular  Patient position: flat  Catheter type: double lumen  Pre-procedure: landmarks identified  Ultrasound guidance: " no  Number of attempts: 1  Successful placement: yes  Post-procedure: line sutured and dressing applied  Assessment: blood return through all parts,  free fluid flow and placement verified by x-ray  Patient tolerance: Patient tolerated the procedure well with no immediate complications        Performed by: Ulises Conley  Authorized by: Ulises Conley

## 2019-03-23 NOTE — PROGRESS NOTES
[facility] ID SERVICE: ONGOING CONSULTATION      Patient:  Hellen Riddle, Date of birth 1980, Medical record number [id number]  Date of Visit:  March 23, 2019         Assessment and Recommendations:   Problem List:  - s/p VF cardiac arrest 2/23/19  -Aorto-esophageal fistula diagnosed 3/13. Not a candidate for repair.  -Right posterior parietal/occipital intraparenchymal hemorrhage, concern for possible abscess as well  -Leukocytosis  -Lactic acidosis (resolved now)  -Hypoxic respiratory failure, remains on ventilator  -Influenza A infection, status post 9 days of tamiflu (stopped 3/14)  -E. 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  - Candidemia 3/18, 3/19       Impression: 40 yo male admitted 2/23/19 with refractory VF OHCA s/p VA ECMO on admission, found to have 3v CAD s/p intervention. ID consulted 3/4/19 for increasing leukocytosis, hypoxia, lactic acidosis possibly related to influenza A or VAP, both of which were treated. Found to have VAP, on Meropenem since 3/14. Though his course should be finishing, patient was febrile this am.   Found to have Candidemia on 3/18, 3/19 -just started Micafungin on 3/22. Fever could be from ongoing candidemia, endocarditis, endophthalmitis -all complications of candidemia. Would obtain surveillance blood cultures, TTE, and ophtho consult to evaluate this further. Would pan culture sputum, urine, and check for Cdiff infection.     Recommendations:  1. Continue Micafungin -can reduce dose to 100mg IV q24h  2. Obtain surveillance cultures today: blood, urine, and sputum cultures, and Cdiff PCR  3. Obtain TTE to evaluate for endocarditis  4. Obtain ophtho consult to evaluate for endophalmitis due to candidemia  5. Okay to continue Vanconmycin and Meropenem for now while cultures are  pending.   6. PIV and A-line will need to be changed in the context of candidemia    ID will continue to follow      Attestation:  I have reviewed today's vital signs, medications, labs and imaging.  Shima Stone MD  Pager [phone]          Interval History:       Patient's HD line replaced on 3/22 evening. Febrile this am. Patient unable to respond to questions. Vancomycin started this am.          Review of Systems:   Patient unable to answer questions         Current Antimicrobials   Vancomycin 1750mg  Meropenem 1g IV q8h  Micafungin 150mg IV q24h         Physical Exam:   Ranges for vital signs:  Temp:  [98.2  F (36.8  C)-103.1  F (39.5  C)] 98.2  F (36.8  C)  Pulse:  [89] 89  Heart Rate:  [] 83  Resp:  [23-35] 23  BP: (144)/(90) 144/90  MAP:  [57 mmHg-116 mmHg] 95 mmHg  Arterial Line BP: ()/(45-91) 133/73  FiO2 (%):  [70 %] 70 %  SpO2:  [93 %-100 %] 100 %      Exam:  GENERAL:  Jaundiced, does not respond to questioning.   ENT:  Head is normocephalic, atraumatic. Oropharynx is moist without exudates or ulcers.  EYES:  Eyes have anicteric sclerae. PERRL.   NECK:  Supple. No cervical lymphadenopathy  LUNGS:  Coarse anteriorly  CARDIOVASCULAR:  Regular rate and rhythm with no murmurs, gallops or rubs.  ABDOMEN:  Normal bowel sounds, soft, nontender. No hepatosplenomegaly.   EXT: Extremities warm and edematous.  SKIN:  No acute rashes.          Laboratory Data:     Creatinine   Date Value Ref Range Status   03/23/2019 1.59 (H) 0.66 - 1.25 mg/dL Final   03/23/2019 1.33 (H) 0.66 - 1.25 mg/dL Final   03/22/2019 1.02 0.66 - 1.25 mg/dL Final   03/22/2019 0.97 0.66 - 1.25 mg/dL Final   03/22/2019 1.05 0.66 - 1.25 mg/dL Final     WBC   Date Value Ref Range Status   03/23/2019 24.2 (H) 4.0 - 11.0 10e9/L Final   03/23/2019 24.4 (H) 4.0 - 11.0 10e9/L Final   03/22/2019 25.2 (H) 4.0 - 11.0 10e9/L Final   03/22/2019 26.7 (H) 4.0 - 11.0 10e9/L Final   03/22/2019 26.0 (H) 4.0 - 11.0 10e9/L Final      Hemoglobin   Date Value Ref Range Status   03/23/2019 9.0 (L) 13.3 - 17.7 g/dL Final     Platelet Count   Date Value Ref Range Status   03/23/2019 202 150 - 450 10e9/L Final     Sed Rate   Date Value Ref Range Status   03/23/2019 44 (H) 0 - 15 mm/h Final   03/22/2019 44 (H) 0 - 15 mm/h Final   03/21/2019 28 (H) 0 - 15 mm/h Final   03/20/2019 27 (H) 0 - 15 mm/h Final   03/19/2019 17 (H) 0 - 15 mm/h Final     CRP Inflammation   Date Value Ref Range Status   03/23/2019 100.0 (H) 0.0 - 8.0 mg/L Final   03/22/2019 120.0 (H) 0.0 - 8.0 mg/L Final   03/21/2019 160.0 (H) 0.0 - 8.0 mg/L Final   03/20/2019 180.0 (H) 0.0 - 8.0 mg/L Final   03/19/2019 160.0 (H) 0.0 - 8.0 mg/L Final     AST   Date Value Ref Range Status   03/23/2019 302 (H) 0 - 45 U/L Final   03/22/2019 338 (H) 0 - 45 U/L Final   03/22/2019 342 (H) 0 - 45 U/L Final   03/21/2019 330 (H) 0 - 45 U/L Final   03/21/2019 345 (H) 0 - 45 U/L Final     ALT   Date Value Ref Range Status   03/23/2019 217 (H) 0 - 70 U/L Final   03/22/2019 241 (H) 0 - 70 U/L Final   03/22/2019 245 (H) 0 - 70 U/L Final   03/21/2019 238 (H) 0 - 70 U/L Final   03/21/2019 264 (H) 0 - 70 U/L Final     Bilirubin Total   Date Value Ref Range Status   03/23/2019 31.0 (HH) 0.2 - 1.3 mg/dL Final     Comment:     Critical result, provider not notified due to previous critical result   notification.     03/22/2019 34.2 (HH) 0.2 - 1.3 mg/dL Final     Comment:     Critical result, provider not notified due to previous critical result   notification.     03/22/2019 34.7 (HH) 0.2 - 1.3 mg/dL Final     Comment:     Critical result, provider not notified due to previous critical result   notification.     03/21/2019 33.4 (HH) 0.2 - 1.3 mg/dL Final     Comment:     Critical result, provider not notified due to previous critical result   notification.     03/21/2019 35.3 (HH) 0.2 - 1.3 mg/dL Final     Comment:     Critical result, provider not notified due to previous critical result   notification.        Lab Results   Component Value Date     03/23/2019    BUN 75 (H) 03/23/2019    CO2 19 (L) 03/23/2019       Culture data:  3/22 Sputum culture pending  3/21 Blood cultures NGTD  3/20 Blood culture NGTD  3/19 Blood culture: Candida albicans  3/18 Blood culture: Candida albicans  3/15 Blood cultures x2 Negative  3/15 Sputum culture: Klebsiella and Ecoli    All cultures:  Recent Labs   Lab 03/23/19  0804 03/23/19  0508 03/22/19  0248 03/21/19  0629 03/21/19  0303 03/20/19  0416 03/20/19  0304 03/19/19  0505 03/19/19  0401 03/18/19  0607 03/18/19  0358 03/17/19  0444   CULT PENDING No growth after 2 hours Culture in progress Heavy growth  Richa albicans / dubliniensis  Candida albicans and Candida dubliniensis are not routinely speciated  Susceptibility testing not routinely done  *  Culture in progress No growth after 2 days Light growth  Klebsiella pneumoniae  *  Light growth  Escherichia coli  *  Moderate growth  Candida albicans / dubliniensis  Candida albicans and Candida dubliniensis are not routinely speciated  *  Susceptibility testing done on previous specimen No growth after 3 days Moderate growth  Candida albicans / dubliniensis  Candida albicans and Candida dubliniensis are not routinely speciated  Susceptibility testing not routinely done  *  Light growth  Escherichia coli  *  Light growth  Klebsiella pneumoniae  * Cultured on the 3rd day of incubation:  Candida albicans / dubliniensis  Speciation in progress  Referred to mycology for identification  *  Critical Value/Significant Value, preliminary result only, called to and read back by  Arielle Larson RN on 3.22.19 at 0834 by .    Susceptibility testing done on previous specimen Cultured on the 3rd day of incubation:  Candida albicans / dubliniensis  Speciation in progress  Referred to mycology for identification  *  Critical Value/Significant Value, preliminary result only, called to and read back by  Vazquez Lr at [email or web address] on  03.21.2019 at 18:08.  JRT    Susceptibility testing in progress Light growth  Candida albicans / dubliniensis  Candida albicans and Candida dubliniensis are not routinely speciated  Susceptibility testing not routinely done  * No growth     Imaging:  CXR 3/23  1. New left IJ catheter with tip at the high right atrium.  2. Subtle right upper lobe opacity is nearly resolved.

## 2019-03-23 NOTE — PLAN OF CARE
39 y.o. M admitted in cardiac arrest s/p VA ECMO, currently on CRRT  Neuro: Pt unresponsive. Pt's eyes remain open other than occasional blinking. No tracking noted, does not protect to threat. Eye lid twitch noted. Minimal coughing with suction. No gag noted with oral suction. Pupils PERRL. Fentanyl x1 for possible s/s of pain.  CV: HR 's, rare PVCs and PACs. MAP goal >65, levo and epi titrated accordingly. Large fluctuation in BP noted, frequent hypotension episodes followed by HTN.   Resp: Vent setting continued. RR 25-37. Moderate amount of paul colored secretions noted from ETT. Small amount of bleeding noted in pt's mouth, unable to see source of bleeding.   GI: x3 loose mucous stools. Rectal tube replaced.   : Scant amount of brown urine noted on chux pad. CRRT continued, see CRRT resource note regarding complications overnight.  Ineffective/no CRRT since 0044-.  Plan: Continue to monitor. Address concerns with CARDs CSI.

## 2019-03-23 NOTE — PROGRESS NOTES
[facility]  Cardiac ICU Progress Note  03/23/2019           Key events - last 24 hours / Hospital course     40 yo male with CAD and cardiac arrest placed on ECMO. Course was complicated by influenza and bacterial pneumonia, difficultly weaning ecmo and was on VA and VV emco for a time. He had a massive GI bleed and CT showed aorto-esophageal fistula, non-operative but bleeding has stopped. Right intraparenchymal hemorrhage which is improving.     Previously required VA ECMO then VV ECMO, now off ECMO. Still on CRRT. Neuro function slow to recover. Started on micafungin 3/21/2019 for yeast in blood. A new HD line is in the left neck and PICC is in place.     Pt remains with eyes open, prn eye gtts given and eye covers in place. Contracts lower extremities slightly, does not withdraw to pain.    HD line replaced overnight 3/22-3/23.  Febrile to 39.4 on 3/23/2019, re cultured, per ID recs switched from daptomycin to vancomycin, TTE ordered to evaluate for endocarditis.               Assessment and Plan:   Hellen Riddle is a 39 year old male who was admitted on 2/23/2019 after cardiac arrest.    Neurology: Intraparenchymal hemorrhage  CT head from 3/18 is stable  Intubated, off sedation since 3/14  --monitor   Cardiovascular / Hemodynamics: Refractory VF arrest, placed on VA ECMO, VV ecmo added.  Coronary angiography on admission showed 3v disease and he underwent MITA to mRCA & mLCx. Has IABP as well.  ECMO decannulation stymied by VT in OR on 3/6. Returned to the cath lab for MITA to  LAD and distal LCx.   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 Stable  SFA occlusion s/p embolectomy  TTE: LV EF 50-55%.  RV dysfunction is mild.  --IABP removed 3/17/19  --VA ecmo removed 3/18/19  --vascular surgery consulted for aorto-esophageal fistula, but declined to operate due to futility and high risk; will reconsult if he continues to improve  --wean pressors as able; MAP goal >65  --holding  heparin gtt and ASA; cont ticagrelor  --hold lipitor for now given hepatic injury during arrest  --hold ACE/ARB for now given renal failure and shock  --holding beta blocker given shock    Pulmonary: Acute hypoxic respiratory failure due to pulmonary edema, VAP  --surgery consulted for trach/peg-- 3/22/2019 Surgery evaluated, determined it was not clinically appropriate at this time  --cont abx as below  --wean vent as able  --daily CXR  -- ABGs     GI and Nutrition: GI bleeding from aorto-esophageal fistula: stable  Shock liver  OG output likely old blood   Liver and splenic infarcts  --PPI BID  --tube feeds at goal  --monitor BID LFTs   Renal, Fluid and Electrolytes: Renal failure.  Appreciate nephrology's assistance.  On CRRT.  --monitor urine output  --maintain K>4 and Mg>2    Infectious Disease: Multiple organisms recovered in sputum; possible bacteremia from enteric source  --cont meropenem  --switch from daptomycin to vancomycin   -- cont micafungin  --ID following   Hematology and Oncology: Acute anemia due to GI bleed; stable  --holding heparin as above   --cryo PRN fibrinogen < 200; FFP for INR >2  --Transfuse for Hgb<10   Endocrinology: No known medical history.  --insulin PRN          Medications:   I have reviewed this patient's current medications           Review of Systems:   Review of systems not obtained due to patient factors - intubation              Physical Exam:   Exam and Labs by System  Neuro: Exam: intubated, pupils equal sluggish; opens eyes to stimulation; moves toes with stimulation    Cardiovascular:    BP: 144/90Heart Rate: 108  Exam:    Normal s1 and s2, no audible murmur, warm extremities.     Recent Labs   Lab 03/23/19  0429 03/22/19  0327 03/21/19  0331   TROPI 0.470* 0.546* 0.686*       Pulm:   Vent Settings:  Resp: (!) 32 SpO2: 100 % O2 Device: Mechanical Ventilator    Ventilation Mode: CMV/AC  (Continuous Mandatory Ventilation/ Assist Control)  FiO2 (%): 70 %  Rate Set  (breaths/minute): 12 breaths/min  Tidal Volume Set (mL): 530 mL  PEEP (cm H2O): 10 cmH2O  Oxygen Concentration (%): 70 %  Resp: (!) 32    Exam:   Symmetrical chest shape and movements with each tidal breath  Clear lungs     Arterial Blood Gas:   Recent Labs   Lab 03/23/19 0429 03/22/19  2146 03/22/19  1613 03/22/19  1023   PH 7.39 7.47* 7.46* 7.44   PCO2 31* 31* 32* 32*   PO2 93 146* 80 81   HCO3 19* 23 23 22   O2PER 70.0 70 70 70       Renal:   Meds:  Vitals:    03/21/19 0400 03/22/19 0400 03/23/19 0530   Weight: 94.6 kg (208 lb 8.9 oz) 94 kg (207 lb 3.7 oz) 92.7 kg (204 lb 5.9 oz)   I/O last 3 completed shifts:  In: 2846.06 [I.V.:1061.06; NG/GT:465]  Out: 3734 [Emesis/NG output:1305; Other:2429]  Recent Labs   Lab 03/23/19 0429 03/22/19 2203    136   POTASSIUM 4.2 3.9   CHLORIDE 106 103   CO2 19* 22   ANIONGAP 13 11   * 162*   BUN 70* 57*   CR 1.33* 1.02   ALEKSANDER 7.1* 8.1*     No components found for: URINE     GI:   Exam:    Soft, mildly distended, bowel sounds present     Diet: tube feeds  Recent Labs   Lab 03/23/19 0429 03/22/19 1613 03/22/19  0327   * 338* 342*   * 241* 245*   BILITOTAL 31.0* 34.2* 34.7*   ALBUMIN 1.8* 1.9* 2.0*   PROTTOTAL 4.6* 5.2* 5.2*   ALKPHOS 312* 323* 318*       Heme/ID:   Meds:  Temp: 100.7  F (38.2  C) Temp src: OralTemp  Min: 98.2  F (36.8  C)  Max: 100.7  F (38.2  C)   Recent Labs   Lab 03/23/19 0429 03/22/19 2203 03/22/19 1613 03/22/19  1023 03/22/19  0327   WBC 24.4* 25.2* 26.7* 26.0* 26.2*   HGB 9.5* 10.0* 10.5* 10.4* 10.3*   HCT 29.3* 30.9* 32.2* 31.5* 31.4*   MCV 94 93 93 94 93   RDW 20.2* 20.2* 20.1* 20.3* 19.6*    170 161 131* 110*     Recent Labs   Lab 03/23/19  0429 03/22/19  0327 03/21/19  0331 03/20/19  0417 03/19/19  2155 03/19/19  1550 03/19/19  0956 03/19/19  0505   INR 1.48* 1.44* 1.39* 1.43*  --   --   --  1.53*   PTT  --   --   --  41* 41* 43* 43* 40*     Recent Labs   Lab 03/23/19  0508 03/22/19  0248 03/21/19  0629  03/21/19  0303 03/20/19  0416 03/20/19  0304   CULT PENDING Culture negative < 24 hours, reincubate Heavy growth  Richa albicans / dubliniensis  Candida albicans and Candida dubliniensis are not routinely speciated  Susceptibility testing not routinely done  *  Culture in progress No growth after 1 day Light growth  Klebsiella pneumoniae  *  Light growth  Escherichia coli  *  Moderate growth  Candida albicans / dubliniensis  Candida albicans and Candida dubliniensis are not routinely speciated  *  Susceptibility testing done on previous specimen No growth after 2 days       Endo:   Meds:  Recent Labs   Lab 03/23/19  0429 03/22/19  2203 03/22/19  1613 03/22/19  1023 03/22/19  0327   * 162* 166* 163* 174*       Lines:    No erythema or discharge at the catheter entry site               Data:   All lab results reviewed    The pt was discussed and evaluated with the Attending physician.    Lion Caraballo MD  PGY-6 Cardiology  Pager: [phone]  March 23, 2019

## 2019-03-23 NOTE — PHARMACY-VANCOMYCIN DOSING SERVICE
Pharmacy Vancomycin Initial Note  Date of Service 2019  Patient's  1980  39 year old, male    Indication: Sepsis    Current estimated CrCl = Estimated Creatinine Clearance: 65.3 mL/min (A) (based on SCr of 1.59 mg/dL (H)).    Creatinine for last 3 days  3/20/2019:  3:54 PM Creatinine 1.08 mg/dL; 10:33 PM Creatinine 1.05 mg/dL  3/21/2019:  3:31 AM Creatinine 1.06 mg/dL;  9:54 AM Creatinine 0.92 mg/dL;  5:11 PM Creatinine 1.33 mg/dL;  9:31 PM Creatinine 1.13 mg/dL  3/22/2019:  3:27 AM Creatinine 1.14 mg/dL; 10:23 AM Creatinine 1.05 mg/dL;  4:13 PM Creatinine 0.97 mg/dL; 10:03 PM Creatinine 1.02 mg/dL  3/23/2019:  4:29 AM Creatinine 1.33 mg/dL; 10:35 AM Creatinine 1.59 mg/dL    Recent Vancomycin Level(s) for last 3 days  No results found for requested labs within last 72 hours.      Vancomycin IV Administrations (past 72 hours)      No vancomycin orders with administrations in past 72 hours.                Nephrotoxins and other renal medications (From now, onward)    Start     Dose/Rate Route Frequency Ordered Stop    19 1300  vancomycin (VANCOCIN) 1,750 mg in sodium chloride 0.9 % 250 mL intermittent infusion      1,750 mg (central catheter)  over 60 Minutes Intravenous EVERY 24 HOURS 19 1158      19 1300  vancomycin (VANCOCIN) 2,000 mg in sodium chloride 0.9 % 250 mL intermittent infusion      2,000 mg (central catheter)  over 60 Minutes Intravenous ONCE 19 1158      19 1900  norepinephrine (LEVOPHED) 16 mg in D5W 250 mL infusion      0.03-0.4 mcg/kg/min × 106 kg  3-39.8 mL/hr  Intravenous CONTINUOUS 19 1849              Plan:  1.  Start vancomycin  2000 mg IV once, then 1750mg IV Q24h.   2.  Goal Trough Level: 15-20 mg/L   3.  Pharmacy will check trough levels as appropriate in 1-3 Days.    4. Serum creatinine levels will be ordered daily.    5. [place] method utilized to dose vancomycin therapy: Method 2    Italia Natino, VinayD  [facility]  Pharmacist  [phone]  #[phone]

## 2019-03-23 NOTE — PROGRESS NOTES
Nephrology Progress Note  03/23/2019         Mr Riddle is a 39 yom w/HLP who had cardiac arrest on 2/23/19 with unclear down-time.  PCI done emergently, Nephrology consulted for management of FERNANDA, started CRRT on 2/25.       Interval History :   Mr Riddle continues on CRRT for management of electrolytes and volume.  UF of 3L yesterday, negative 1.2L, continuing 50cc/hr net negative, electrolytes and pH stable.  More stable overall, off of mechanical support, still on vent and CRRT.       Assessment & Recommendations:   FERNANDA-Baseline Cr unclear, admitted with Cr of 1.7 post arrest.  FERNANDA due to hemodynamic injury, also received contrast and had elevated CK.  Started CRRT on 2/25, has been anuric since starting CRRT, ~48h after arrest.  Continuing CRRT with no signs of recovery, still needing extensive mechanical support and is on pressors with elevated lacate.  VA ECMO decannulated 3/18, remains on VV ECMO but may decannulate today.               -Access is [facility], positional overnight but functioning well currently.                -CRRT, 0-50cc/hr as BP's allow, mix of 4k/2k baths.      Volume status-Net negative again yesterday with 3.0L of UF and 1.1L of GI ouptut.  At low wt for his course, continuing to pull fluid as able.      Electrolytes/pH-No acute issues on CRRT, K 4.0, bicarb 19, on mix of 4k/2k baths.  Lactate mildly up, following.     Ca/phos/pth-Ca 7.4, Mg 2.3, phos 3.9, stable with CRRT.      Anemia-Hgb 9, needing intermittent PRBC's with ECMO and bleeding.     Nutrition-Impact TF.       Seen and discussed with Dr Pyle     Recommendations were communicated to primary team via verbal communication.    Darren Vidal  Clinical Nurse Specialist  [phone]    Review of Systems:   I reviewed the following systems:  ROS not done due to vent/sedation.     Physical Exam:   I/O last 3 completed shifts:  In: 2846.06 [I.V.:1061.06; NG/GT:465]  Out: 3734 [Emesis/NG output:1305; Other:2429]   /90 (BP Location: Left  "leg)   Pulse 89   Temp 99.4  F (37.4  C) (Oral)   Resp 29   Ht 1.68 m (5' 6.14\")   Wt 92.7 kg (204 lb 5.9 oz)   SpO2 100%   BMI 32.84 kg/m       GENERAL APPEARANCE: Intubated and sedated, on VV ECMO and IABP  EYES:  No scleral icterus, pupils equal  HENT: mouth without ulcers or lesions  PULM: lungs clear to auscultation, equal air movement, no cyanosis or clubbing  CV: regular rhythm, normal rate, no rub     -edema +1 LE  GI: soft, non-tender, non-distended, dark red stool  MS: no evidence of inflammation in joints, no muscle tenderness  NEURO:  Intubated and sedated      Labs:   All labs reviewed by me  Electrolytes/Renal -   Recent Labs   Lab Test 03/23/19  1035 03/23/19  0429 03/22/19  2203    138 136   POTASSIUM 4.0 4.2 3.9   CHLORIDE 106 106 103   CO2 19* 19* 22   BUN 75* 70* 57*   CR 1.59* 1.33* 1.02   * 163* 162*   ALEKSANDER 7.4* 7.1* 8.1*   MAG 2.3 2.3 2.3   PHOS 3.9 4.2 3.3       CBC -   Recent Labs   Lab Test 03/23/19  1035 03/23/19  0429 03/22/19  2203   WBC 24.2* 24.4* 25.2*   HGB 9.0* 9.5* 10.0*    187 170       LFTs -   Recent Labs   Lab Test 03/23/19  0429 03/22/19  1613 03/22/19  0327   ALKPHOS 312* 323* 318*   BILITOTAL 31.0* 34.2* 34.7*   * 241* 245*   * 338* 342*   PROTTOTAL 4.6* 5.2* 5.2*   ALBUMIN 1.8* 1.9* 2.0*       Iron Panel - No lab results found.        Current Medications:    amiodarone  200 mg Oral or Feeding Tube Daily     B and C vitamin Complex with folic acid  5 mL Per Feeding Tube Daily     Carboxymethylcellulose Sod PF  2 drop Both Eyes 4x Daily     heparin lock flush  5-10 mL Intracatheter Q24H     insulin aspart  1-12 Units Subcutaneous Q4H     meropenem  1 g Intravenous Q8H     micafungin  150 mg Intravenous Q24H     pantoprazole (PROTONIX) IV  40 mg Intravenous BID     sennosides  8.6 mg Oral BID     sodium chloride (PF)  3 mL Intracatheter Q8H     ticagrelor  90 mg Oral or Feeding Tube BID     vancomycin (VANCOCIN) IV  2,000 mg (central " catheter) Intravenous Once    Followed by     [START ON 3/24/2019] vancomycin (VANCOCIN) IV  1,750 mg (central catheter) Intravenous Q24H       - MEDICATION INSTRUCTIONS -       IV fluid REPLACEMENT ONLY 30 mL/hr at 03/07/19 1100     CRRT replacement solution 12.5 mL/kg/hr (03/23/19 0922)     EPINEPHrine IV infusion ADULT 0.05 mcg/kg/min (03/23/19 1046)     fentaNYL Stopped (03/21/19 1800)     - MEDICATION INSTRUCTIONS -       norepinephrine 0.08 mcg/kg/min (03/23/19 1000)     Percutaneous Coronary Intervention orders placed (this is information for BPA alerting)       CRRT replacement solution 200 mL/hr at 03/23/19 0841     CRRT replacement solution 12.5 mL/kg/hr (03/23/19 0922)     ACE/ARB/ARNI NOT PRESCRIBED       BETA BLOCKER NOT PRESCRIBED

## 2019-03-23 NOTE — PROGRESS NOTES
"CRRT STATUS NOTE    DATA:  Time:  6:29 AM  Pressures WNL:  NO  Filter Status:  On hold    Problems Reported/Alarms Noted:  Around midnight after turning, bedside nurse reported access negative alarms.  With repositioning, therapy continued but alarmed frequently.  Lines flushed, attempted reversal, repositioning.  Resident pulled access line out a few cm and CRRT attempted to be restarted.  New set pulled air into the access line and into the circuit.  2nd new set was successful and restarted without issue.  Within 45 minutes, access negative alarm restarted.  Set taken down, resident rewired left internal jugular access.  Awaiting xray, plan to restart after placement confirmation.    Supplies Present:  YES    ASSESSMENT:  Patient Net Fluid Balance:  Net +4mL since MN, -198mL since admit.  Vital Signs:  Blood pressure 144/90, pulse 89, temperature 100.7  F (38.2  C), temperature source Oral, resp. rate 30, height 1.68 m (5' 6.14\"), weight 94 kg (207 lb 3.7 oz), SpO2 99 %.    Labs:  Cr 1.33, LA 2.6  Goals of Therapy:  0 to net -50 as BP's allow.    INTERVENTIONS:   Restarted several times, access pulled back and rewired.  Discuss plans with bedside nurse.    PLAN:  Restart CRRT per plan of care.  Contact resource with questions or concerns.    "

## 2019-03-23 NOTE — PLAN OF CARE
S:  Pt off CRRT off and on all night 2/2 flow issues/alarms.  This morning at ~0820, after pt had been off CRRT since 0540 pt developed 103.1 oral temp.  Blood cx and sputum cx had been sent.  Pt cooled with tepid bath, decreasing room temp and use of pt fan. CRRT restarted with newly wired L internal jugular dialysis line at 0909 and pt's fever resolved.  Pt moves all extremities spontaneously (RU and RLE> than Left)but does not withdraw from pain, or do anything to commands. Pt now closes and opens eyes spontaneously, but still does not blink to threat.  SR occasional PVC/PAC.  Levo and Epi continue to maintain MAP's>65.  Pt given 500 fluid flush of LR this am for rising lactate.  Removing fluid less aggressively with CRRT until lactate normalizes.  B: Pt s/p cardiac arrest requiring ECMO.  A:  Pt unchanged neurologically. Very febrile this am after being off CRRT for less than 3 hours.  Now normothermic with CRRT heater on and no additional warming modalities in use.  LS coarse/clear/wheezy/dim.  Rectal tube in place-pt stooling.  TF continue at goal rate of 60cc/hour.    P:  Continue to monitor labs, v/s, I&O's, mental status closely.  CRRT fluid removal per order as patient tolerates. Wean pressors if hemodynamics adequate.  Peg and Trach planned for early this coming week.

## 2019-03-24 NOTE — PLAN OF CARE
39 y.o. M admitted in cardiac arrest s/p VA ECMO, currently on CRRT  Neuro: Pt unresponsive, other than withdrawal from pain. Pt spontaneously moved BLE, not following commands. Pt's eyes remain open other than occasional blinking. No tracking noted, does not protect to threat. No coughing noted. No gag noted with oral suction. Pupils PERRL. Fentanyl x2 for possible s/s of pain.  CV: HR 60's-90's's, rare PVCs and PACs. MAP goal >65, levo and epi titrated accordingly.   Resp: Vent setting continued. RR 24-34.  GI: Rectal tube remains in place, >1000mL out during shift. Pt stooled around tube once, balloon checked, no issue since.   : CRRT continued, see CRRT flowsheet.  Plan: Continue to monitor. Address concerns with CARDs CSI.

## 2019-03-24 NOTE — PROGRESS NOTES
[facility]  Cardiac ICU Progress Note  03/24/2019           Key events - last 24 hours / Hospital course     40 yo male with CAD and cardiac arrest placed on ECMO. Course was complicated by influenza and bacterial pneumonia, difficultly weaning ecmo and was on VA and VV emco for a time. He had a massive GI bleed and CT showed aorto-esophageal fistula, non-operative but bleeding has stopped. Right intraparenchymal hemorrhage which is improving.     Previously required VA ECMO then VV ECMO, now off ECMO. Still on CRRT. Neuro function slow to recover. Started on micafungin 3/21/2019 for yeast in blood. A new HD line is in the left neck and PICC is in place.     Pt remains with eyes open, prn eye gtts given and eye covers in place. Contracts lower extremities slightly, does not withdraw to pain.    HD line replaced overnight 3/22-3/23.  Yesterday per ID recs switched from daptomycin to vancomycin, micafungin dose decreased, TTE evaluate for endocarditis, showed LVEF 40-45%, normal RV function, no significant valvular abnormalities.  Ophthalmology consult tomorrow 3/25 for endophthalmitis routine evaluation in setting of candidemia  Will switch out arterial line today  PIV lines removed               Assessment and Plan:   Hellen Riddle is a 39 year old male who was admitted on 2/23/2019 after cardiac arrest.    Neurology: Intraparenchymal hemorrhage  CT head from 3/18 is stable  Intubated, off sedation since 3/14  --monitor   Cardiovascular / Hemodynamics: Refractory VF arrest, placed on VA ECMO, VV ecmo added.  Coronary angiography on admission showed 3v disease and he underwent MITA to mRCA & mLCx. Has IABP as well.  ECMO decannulation stymied by VT in OR on 3/6. Returned to the cath lab for MITA to  LAD and distal LCx.   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 Stable  SFA occlusion s/p embolectomy  TTE: LV EF 50-55%.  RV dysfunction is mild.  --IABP removed 3/17/19  --VA ecmo  removed 3/18/19  --vascular surgery consulted for aorto-esophageal fistula, but declined to operate due to futility and high risk; will reconsult if he continues to improve  --wean pressors as able; MAP goal >65  --holding heparin gtt and ASA; cont ticagrelor  --hold lipitor for now given hepatic injury during arrest  --hold ACE/ARB for now given renal failure and shock  --holding beta blocker given shock    Pulmonary: Acute hypoxic respiratory failure due to pulmonary edema, VAP  --surgery consulted for trach/peg-- 3/22/2019 Surgery evaluated, determined it was not clinically appropriate at this time  --cont abx as below  --wean vent as able  --daily CXR  -- ABGs     GI and Nutrition: GI bleeding from aorto-esophageal fistula: stable  Shock liver  OG output likely old blood   Liver and splenic infarcts  --PPI BID  --tube feeds at goal  --monitor BID LFTs   Renal, Fluid and Electrolytes: Renal failure.  Appreciate nephrology's assistance.  On CRRT.  --monitor urine output  --maintain K>4 and Mg>2    Infectious Disease: Multiple organisms recovered in sputum; possible bacteremia from enteric source  --cont meropenem  --switch from daptomycin to vancomycin   -- cont micafungin  --ID following   Hematology and Oncology: Acute anemia due to GI bleed; stable  --holding heparin as above   --cryo PRN fibrinogen < 200; FFP for INR >2  --Transfuse for Hgb<10   Endocrinology: No known medical history.  --insulin PRN          Medications:   I have reviewed this patient's current medications           Review of Systems:   Review of systems not obtained due to patient factors - intubation              Physical Exam:   Exam and Labs by System  Neuro: Exam: intubated, pupils equal sluggish; opens eyes to stimulation; moves toes with stimulation    Cardiovascular:     Heart Rate: 92  Exam:    Normal s1 and s2, no audible murmur, warm extremities.     Recent Labs   Lab 03/24/19  0344 03/23/19  0429 03/22/19  0327   TROPI 0.391*  0.470* 0.546*       Pulm:   Vent Settings:  Resp: (!) 31 SpO2: 97 % O2 Device: Mechanical Ventilator    Ventilation Mode: CMV/AC  (Continuous Mandatory Ventilation/ Assist Control)  FiO2 (%): 100 %  Rate Set (breaths/minute): 12 breaths/min  Tidal Volume Set (mL): 530 mL  PEEP (cm H2O): 10 cmH2O  Oxygen Concentration (%): 60 %  Resp: (!) 31    Exam:   Symmetrical chest shape and movements with each tidal breath  Clear lungs     Arterial Blood Gas:   Recent Labs   Lab 03/24/19  0952 03/24/19  0339 03/23/19  2150 03/23/19  1555   PH 7.43 7.42 7.42 7.42   PCO2 33* 34* 35 33*   PO2 184* 173* 186* 162*   HCO3 22 22 22 21   O2PER 60.0 60 70 70       Renal:   Meds:  Vitals:    03/22/19 0400 03/23/19 0530 03/24/19 0600   Weight: 94 kg (207 lb 3.7 oz) 92.7 kg (204 lb 5.9 oz) 86 kg (189 lb 9.5 oz)   I/O last 3 completed shifts:  In: 3937.97 [I.V.:1563.97; Other:19; NG/GT:415; IV Piggyback:500]  Out: 4683 [Emesis/NG output:975; Other:1408; Stool:2300]  Recent Labs   Lab 03/24/19  0952 03/24/19  0344    138   POTASSIUM 3.9 3.6   CHLORIDE 105 105   CO2 21 22   ANIONGAP 10 10   * 161*   BUN 58* 58*   CR 0.93 0.92   ALEKSANDER 8.1* 7.8*     No components found for: URINE     GI:   Exam:    Soft, mildly distended, bowel sounds present     Diet: tube feeds  Recent Labs   Lab 03/24/19  0952 03/24/19  0344 03/23/19  1555   * 325* 354*   * 234* 227*   BILITOTAL 32.1* 30.7* 30.4*   ALBUMIN 1.9* 1.8* 1.7*   PROTTOTAL 5.4* 5.1* 4.7*   ALKPHOS 314* 299* 309*       Heme/ID:   Meds:  Temp: 98.4  F (36.9  C) Temp src: OralTemp  Min: 97.5  F (36.4  C)  Max: 98.4  F (36.9  C)   Recent Labs   Lab 03/24/19  0952 03/24/19  0344 03/23/19  2150 03/23/19  1555 03/23/19  1035   WBC 25.6* 25.7* 25.1* 26.8* 24.2*   HGB 9.6* 9.1* 9.3* 9.0* 9.0*   HCT 29.9* 27.7* 29.1* 28.1* 27.4*   MCV 94 93 94 94 94   RDW 20.7* 20.8* 20.8* 20.4* 20.3*    234 224 214 202     Recent Labs   Lab 03/24/19 0344 03/23/19  0429 03/22/19  0328  03/21/19  0331 03/20/19  0417 03/19/19  2155 03/19/19  1550 03/19/19  0956 03/19/19  0505   INR 1.43* 1.48* 1.44* 1.39* 1.43*  --   --   --  1.53*   PTT  --   --   --   --  41* 41* 43* 43* 40*     Recent Labs   Lab 03/24/19  0415 03/23/19  0804 03/23/19  0508 03/22/19  0248 03/21/19  0629 03/21/19  0303   CULT No growth after 2 hours PENDING No growth after 1 day Heavy growth  Yeast  *  Culture in progress Light growth  Probable  Klebsiella pneumoniae  *  Heavy growth  Candida albicans / dubliniensis  Candida albicans and Candida dubliniensis are not routinely speciated  Susceptibility testing not routinely done  *  Culture in progress No growth after 3 days       Endo:   Meds:  Recent Labs   Lab 03/24/19  0952 03/24/19  0344 03/23/19  2150 03/23/19  1555 03/23/19  1035   * 161* 172* 192* 167*       Lines:    No erythema or discharge at the catheter entry site               Data:   All lab results reviewed    The pt was discussed and evaluated with the Attending physician.    Lion Caraballo MD  PGY-6 Cardiology  Pager: [phone]  March 24, 2019

## 2019-03-24 NOTE — PROGRESS NOTES
Nephrology Progress Note  03/24/2019           Mr Riddle is a 39 yom w/HLP who had cardiac arrest on 2/23/19 with unclear down-time.  PCI done emergently, Nephrology consulted for management of FERNANDA, started CRRT on 2/25.       Interval History :   Mr Riddle continues on CRRT for management of electrolytes and volume, still on 2 low dose pressors but overall more stable.  Was slightly net negative yesterday, similar goal of 0-50cc/hr today.  Care conference in coming days, will continue CRRT for management of volume and electrolytes.      Assessment & Recommendations:   FERNANDA-Baseline Cr unclear, admitted with Cr of 1.7 post arrest.  FERNANDA due to hemodynamic injury, also received contrast and had elevated CK.  Started CRRT on 2/25, has been anuric since starting CRRT, ~48h after arrest.  Continuing CRRT with no signs of recovery, now off of both VV and VA ECMO, still on pressors, gently pulling fluid but nearly euvolemic.                -Access is [facility], 3/221 positional initially but functioning well currently.                -CRRT, 0-50cc/hr as BP's allow, mix of 4k/2k baths, nearing euvolemic.      Volume status-Net negative slightly yesterday, mostly GI ouput but ~1L of UF, similar goal today.  Wt is down to low for hospitalization, CXR without pulm edema, may be nearing euvolemic.       Electrolytes/pH-No acute issues on CRRT, K 3.6, bicarb 22, on mix of 4k/2k baths.  Lactate mildly up, following.     Ca/phos/pth-Ca 7.8, Mg 2.2, phos 3.9, stable with CRRT.      Anemia-Hgb 9.1, needing intermittent PRBC's with ECMO and bleeding.     Nutrition-Impact TF.       Seen and discussed with Dr Pyle     Recommendations were communicated to primary team via verbal communication.    Darren Vidal  Clinical Nurse Specialist  [phone]    Review of Systems:   I reviewed the following systems:  ROS not done due to vent/sedation.    Physical Exam:   I/O last 3 completed shifts:  In: 3937.97 [I.V.:1563.97; Other:19; NG/GT:415; IV  "Piggyback:500]  Out: 4683 [Emesis/NG output:975; Other:1408; Stool:2300]   /90 (BP Location: Left leg)   Pulse 89   Temp 98.4  F (36.9  C) (Oral)   Resp (!) 31   Ht 1.68 m (5' 6.14\")   Wt 86 kg (189 lb 9.5 oz)   SpO2 99%   BMI 30.47 kg/m       GENERAL APPEARANCE: Intubated and sedated, on VV ECMO and IABP  EYES:  No scleral icterus, pupils equal  HENT: mouth without ulcers or lesions  PULM: lungs clear to auscultation, equal air movement, no cyanosis or clubbing  CV: regular rhythm, normal rate, no rub     -edema +1 LE  GI: soft, non-tender, non-distended, dark red stool  MS: no evidence of inflammation in joints, no muscle tenderness  NEURO:  Intubated and sedated    Labs:   All labs reviewed by me  Electrolytes/Renal -   Recent Labs   Lab Test 03/24/19  0344 03/23/19  2150 03/23/19  1555    136 138   POTASSIUM 3.6 3.7 3.9   CHLORIDE 105 105 106   CO2 22 21 20   BUN 58* 66* 69*   CR 0.92 1.15 1.22   * 172* 192*   ALEKSANDER 7.8* 8.0* 7.7*   MAG 2.2 2.3 2.3   PHOS 3.9 3.9 3.9       CBC -   Recent Labs   Lab Test 03/24/19 0344 03/23/19 2150 03/23/19  1555   WBC 25.7* 25.1* 26.8*   HGB 9.1* 9.3* 9.0*    224 214       LFTs -   Recent Labs   Lab Test 03/24/19  0344 03/23/19  1555 03/23/19  0429   ALKPHOS 299* 309* 312*   BILITOTAL 30.7* 30.4* 31.0*   * 227* 217*   * 354* 302*   PROTTOTAL 5.1* 4.7* 4.6*   ALBUMIN 1.8* 1.7* 1.8*       Iron Panel - No lab results found.        Current Medications:    amiodarone  200 mg Oral or Feeding Tube Daily     B and C vitamin Complex with folic acid  5 mL Per Feeding Tube Daily     Carboxymethylcellulose Sod PF  2 drop Both Eyes 4x Daily     heparin lock flush  5-10 mL Intracatheter Q24H     insulin aspart  1-12 Units Subcutaneous Q4H     meropenem  1 g Intravenous Q8H     micafungin  100 mg Intravenous Q24H     pantoprazole (PROTONIX) IV  40 mg Intravenous BID     sennosides  8.6 mg Oral BID     sodium chloride (PF)  3 mL Intracatheter Q8H "     ticagrelor  90 mg Oral or Feeding Tube BID     vancomycin (VANCOCIN) IV  1,750 mg (central catheter) Intravenous Q24H       - MEDICATION INSTRUCTIONS -       IV fluid REPLACEMENT ONLY 30 mL/hr at 03/07/19 1100     CRRT replacement solution       EPINEPHrine IV infusion ADULT 0.04 mcg/kg/min (03/24/19 0800)     fentaNYL Stopped (03/21/19 1800)     - MEDICATION INSTRUCTIONS -       norepinephrine 0.06 mcg/kg/min (03/24/19 0800)     Percutaneous Coronary Intervention orders placed (this is information for BPA alerting)       CRRT replacement solution       CRRT replacement solution       ACE/ARB/ARNI NOT PRESCRIBED       BETA BLOCKER NOT PRESCRIBED

## 2019-03-24 NOTE — PROGRESS NOTES
CRRT STATUS NOTE    DATA:  Time:  5:03 PM  Pressures WNL:  YES  Filter Status:  WDL    Problems Reported/Alarms Noted:  none    Supplies Present:  YES    ASSESSMENT:  Patient Net Fluid Balance:  Net - 773 ml since MN 3/24  Vital Signs:  HR=90, AR=824/65 with MAP=85. Pt is on 2 pressors. O2 sats 99% on 40 % FiO2 on vent.  Labs:  K=3.9 and Hgb=9.6  Goals of Therapy:  0-50cc/hr as BP allows    INTERVENTIONS:   Discussed golas of therapy with bedside RN.    PLAN:  Continue POC with CRRT. Call resource RN at [phone] with any questions or concerns.

## 2019-03-24 NOTE — PROGRESS NOTES
"CRRT STATUS NOTE    DATA:  Time:  6:41 AM  Pressures WNL:  YES  Filter Status:  WDL    Problems Reported/Alarms Noted:  none    Supplies Present:  YES    ASSESSMENT:  Patient Net Fluid Balance:  Net -266 since MN, +631 since admit.  Vital Signs:  Blood pressure 144/90, pulse 89, temperature 97.6  F (36.4  C), temperature source Oral, resp. rate 24, height 1.68 m (5' 6.14\"), weight 86 kg (189 lb 9.5 oz), SpO2 97 %.    Labs:  K 3.6, Cr 0.92  Goals of Therapy:  0-50 as BP's allow.    INTERVENTIONS:   Discuss POC with bedside nurse.    PLAN:  Continue CRRT per orders, contact resource with questions or concerns.    "

## 2019-03-25 NOTE — PROGRESS NOTES
[facility], [facility] Nurse Inpatient Adult Pressure Injury Prevention Assessment: ECMO  Follow up for L) ear PI and L) forehead  3/22 New consult for occiput, other wounds recently assessed.   3/22- when RN shaving to apply comfeel, larger wound was found.     3/8/19- pt's forehead was wrapped with kerlix to secure the ECMO tubings on right side of th head, unable to visualize the area.   3/11- unable to visualize, NIRS over wounds, wounds may have healed.   3/14- Stage 2, healing, thin scab    Positioning Tolerance: Fair  Date of ECMO cannulation: 2/23 Right Groin VA ECMO and Right internal jugular VAV ECMO  Presence of Ischemia: No  Location of ischemia: NA- BL toes are boggy  ECMO decannulation 3/21    Pressure Injury Prevention Interventions In Place:  Optifoam Dressing under ECMO Cannula, Z flow Positioner under head, Pillows for repositioning, TAPs Wedge Positioners in use, Heel off-loading boots, Pillows under calves for heel suspension, Mepilex Sacral Dressing and Dressing to sacrum for prevention   Current support surface: Standard  Low air loss mattress       Pressure Injury Prevention Interventions Added:  None - reinforced education to ensure offloading the pressure around ears        Plan of Care for Positioning and Pressure Injury Prevention  Reposition patient every 1-2 hours using TAP Wedges  Position head on Z flow positioner, mold indentation at areas of pressure points.  Pad ECMO IJ cannula with Optifoam (#[id number]) along face and scalp between skin and cannula and under Coban head wraps    Pad ECMO groin and chest cannula under rigid connectors with Optifoam or Soft cloth  Heel off-loading Boots at all times  Sacral Mepilex for Prevention, change every 5 days and prn  Low Air loss mattress    Patient History:   According to medical record: Ciara Winslow is a 38 year old male who was admitted on 2/23/2019. Wife noticed agonal breathing and seizure like activity.  Called EMS, who came and started chest compressions. No history of HTN, DM. Does have history of dyslipidemia. Non smoker. Went to bed at 11:30, wife found him foaming at mouth around midnight. Called EMS, did not get significant chest compressions. EMS arrived 5 minutes later. Taken to [facility].    Current Diet / Nutrition:     None    Output:    I/O last 3 completed shifts:  In: 3067.29 [I.V.:1222.29; Other:70; NG/GT:395]  Out: 4001 [Emesis/NG output:650; Other:1151; Stool:2200]  Containment: of urine/stool: Rectal Pouch and Anuric    Risk Assessment:   Sensory Perception: 1-->completely limited  Moisture: 4-->rarely moist  Activity: 2-->chairfast  Mobility: 1-->completely immobile  Nutrition: 3-->adequate  Friction and Shear: 1-->problem  Kirby Score: 12    Labs:    Recent Labs   Lab 03/25/19  0630 03/25/19  0358  03/23/19  1555   ALBUMIN  --  1.8*   < > 1.7*   HGB 8.8*  --    < > 9.0*   INR  --  1.51*   < >  --    WBC 24.2*  --    < > 26.8*   A1C  --   --   --  5.0   CRP  --  70.0*   < >  --     < > = values in this interval not displayed.     Focused Assessment:  Ears and Face    Pressure Injury Present::Yes      ASSESSMENT  1. Pressure Injury: under EEG leads , hospital acquired ,   This is a Medical Device Related Pressure Injury (MDRPI) due to EEG and NIRs with surglist to hold it in place.   Pressure Injury is Stage 2  Contributing factor of the pressure injury: pressure and immobility, cannot rule out burn from NIRs being placed over EEG.   Status: Follow up assessment, healing     2. L) ear- DTPI,  HAPI  Contributing factor of the pressure injury- Pressure   Status- follow up assessment, continues to evolve    3. Occiput pressure injury versus dried drainage. Unable to stage until wound base cleaned.  Status - follow up assessment, evolving   Recommend provider order: NA     TREATMENT PLAN  Bilateral Forehead wounds: BID and PRN cleanse with saline and pat dry. Apply thin layer of Vaseline. Avoid placing  NIRs directly over EEG leads. Avoid placing EEG leads directly on wound.     L) ear- Continue to use Z-flow pillow and ensure to mould around it.  Orders reviewed   [facility] Nurse follow-up plan:twice weekly  Nursing to notify the Provider(s) and re-consult the [facility] Nurse if wound(s) deteriorates or new skin concern.    Right occiput - continue to use z-oseas positioner and ensure to mold around area, repositioning Q2H.  Leave comfeel in place, WOC to change. If dressing removed replace and date.     Physical Exam    Wound Location:  Bilateral Forehead                                                                                Date of last Photo 3/25  Wound History: Pt had NIRs in place over EEG held in place with Surgilast netting.   Measurements (length x width x depth, in cm) 0.8 cm x 0.4 cm  x  0.05 cm  Wound Base:  100 % superficial thin scab with some induration   Tunneling N/A  Undermining N/A  Palpation of the wound bed: normal   Periwound skin: intact  Color: normal and consistent with surrounding tissue  Temperature: normal   Drainage:, none  Description of drainage: none  Odor: none  Pain: no grimacing or signs of discomfort and unable to assess due to  sedation , insensate    2. L) ear     3/14                                                                    3/21    Date of last Photo 3/25  Wound History: Pt have been on ECMO, Z-flow positioner have been in place after initiation of ECMO. Per RN he was hemodynamically unstable for couple of shifts.  Measurements (length x width x depth, in cm) Proximal- 0.5 x 0.6 x0.05 cm- 100% dark purple/maroon discoloration. Distal- 2.8 x 0.8 x 0.05 cm, 100% dark dusky/purple/maroon deflated blister   Tunneling N/A  Undermining N/A  Palpation of the wound bed: normal   Periwound skin: intact  Color: normal and consistent with surrounding tissue  Temperature: normal   Drainage:, none  Description of drainage: none  Odor: none  Pain: no grimacing or signs of discomfort and  unable to assess due to  sedation , insensate    Wound Location: Occiput        (post shave)    Date of last Photo 3/25  Wound History: Pt have been on ECMO, Z-flow positioner have been in place after initiation of ECMO. Per RN he was hemodynamically unstable for couple of shifts. Suspected pressure injury based on location though unable to determine due to hair   Measurements (length x width x depth, in cm) proximal to distal  0.5 x 0.5 x 0 cm stage 2  0.9 x 1.4 x 0 cm eschar versus dried drainage. Edges lifting- soft  3.2 x 8.0 x 0 cm eschar vs drainage - semi boggy  Left occiput (not seen in photo) 1.0 x 2.0 x 0 cm eschar versus dried drainage  Periwound skin: dry, scaly   Color: normal and consistent with surrounding tissue  Temperature: normal   Drainage:, scant  Description of drainage: serosanguinous  Odor: none  Pain: no grimacing or signs of discomfort and unable to assess due to  sedation    Education provided to: nurse  Discussed importance of:their role in pressure injury prevention  Discussed plan of care with Nurse  WOC Nurse follow-up plan:twice weekly       Violeta Grossman RN CWON

## 2019-03-25 NOTE — PLAN OF CARE
39 y.o. M admitted in cardiac arrest s/p VA ECMO, currently on CRRT  Neuro: Neuro status unchanged.  Fentanyl gtt started at 25 mcg for pain control after pt broke/loosened 2 lower teeth. MD aware, will consult dental to see pt today.   CV: HR 60's-90's's, rare PVCs and PACs. MAP goal >65, levo and epi titrated accordingly. +1 palpable pulses.  Resp: Vent setting continued. RR 24-34. LS coarse intermittently. No secretions noted with ETT suctioning.   GI: Rectal tube remains in place, >1000mL out during shift.   : CRRT continued, see CRRT flowsheet.  Plan: Continue to monitor. Address concerns with CARDs CSI.

## 2019-03-25 NOTE — CONSULTS
OPHTHALMOLOGY CONSULT NOTE  03/25/19    Patient: Hellen Riddle  Consulted by: Dr. Caraballo  Reason for Consult: Candidemia     HISTORY OF PRESENTING ILLNESS:     Hellen Riddle is a 39 year old male who is admitted since 2/23/19 after VF cardiac arrest, found to have 3v CAD s/p PCI, remains intubated and sedated. 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 Coli and klebsiella VAP on meropenem, FERNANDA on CRRT, shock liver, acute blood loss anemia from GI bleed and candidemia. We are consulted to rule out endophthalmitis. No family at bedside. Patient intubated and sedated.     Review of systems- unable.    OCULAR/MEDICAL/SURGICAL HISTORIES:     Past Ocular History:  Unknown    Past Medical History:   Diagnosis Date     Dyslipidemia        Past Surgical History:   Procedure Laterality Date     CV EXTRACORPERAL MEMBRANE OXYGENATION N/A 3/21/2019    Procedure: VV ECMO decannulation, put in dialysis catheter;  Surgeon: Abraham Baer MD;  Location:  HEART CARDIAC CATH LAB     CV HEART CATHETERIZATION WITH POSSIBLE INTERVENTION N/A 3/7/2019    Procedure: Coronary Angiogram;  Surgeon: Dante Molina MD;  Location:  HEART CARDIAC CATH LAB     EMBOLECTOMY LOWER EXTREMITY Right 3/18/2019    Procedure: RIGHT COMMON FEMORAL EMBOLECTOMY AND SAPHENOUS VEIN PATCH;  Surgeon: Tereza Gibbs MD;  Location: [facility] OR     INSERT EXTRACORPORAL MEMBRANE OXYGENATOR Right 3/6/2019    Procedure: Emergent Insert VA extracorporal membrane oxygenator right groin;  Surgeon: Reggie Perez MD;  Location: [facility] OR     INSERT EXTRACORPORAL MEMBRANE OXYGENATOR N/A 3/18/2019    Procedure: RIGHT FEMORAL EXTRACORPORAL MEMBRANE OXYGENATOR DECANNULATION ; REPAIR RIGHT GROIN VENOUS AND ARTERIAL VESSELS;  Surgeon: Craig Berry MD;  Location: [facility] OR     REMOVE EXTRACORPORAL MEMBRANE OXYGENATOR ADULT N/A 3/6/2019    Procedure: EXTRACORPORAL MEMBRANE OXYGENATOR REMOVAL  FEMORAL CANNULAS repair right femoral vessels;  Surgeon: Craig Berry MD;  Location: [facility] OR       EXAMINATION:     Base Eye Exam     Visual Acuity    Unable           Tonometry (Tonopen, 5:15 PM)       Right Left    Pressure 10 10          Pupils       Pupils APD    Right PERRL None    Left PERRL None          Visual Fields    Unable           Extraocular Movement    Unable           Neuro/Psych    Intubated and sedated           Dilation     Both eyes:  1.0% Mydriacyl, 2.5% Gianni Synephrine @ 5:15 PM            Slit Lamp and Fundus Exam     Slit Lamp Exam       Right Left    Lids/Lashes Jaundice, significant lag  Jaundice, significant lag    Conjunctiva/Sclera Icteric, chemosis inferiorly Icteric, chemosis inferiorly    Cornea Irregular, PEE, no anselmo defect Epi defect centrally without infiltrate    Anterior Chamber Deep and quiet Deep and quiet    Iris Round and reactive Round and reactive    Lens NS  NS           Fundus Exam       Right Left    Disc Normal Normal    Macula Few heme, multiple white retinal lesions, appear flat Few heme, multiple white retinal lesions, appear flat but tough view     Vessels Normal Normal    Periphery Few heme Few heme              Labs/Studies/Imaging Performed  WBC 22K, Hgb 8.8     ASSESSMENT/PLAN:     Hellen Riddle is a 39 year old male who presents in critical condition with multiple complications after cardiac arrest 2/23/19 with new candidemia.     Candidemia with bilateral white retinal lesions   -Dilated exam with few heme and white lesions in both eyes-do not appear fluffy like typical fungal lesions, more consistent with cotton wool spots, however view tough given very poor surface left > right eye in the setting of significant lagophthalmos   -ID following remains on broad spectrum antibiotics as well as micafungin   -Will repeat dilated exam in the next few days to assess for any change    Lagophthalmos, bilateral with epi defect left eye  -Significant lagophthalmos of both  eyes with large epithelial defect of central cornea on the left eye, no infiltrate noted  -Continue moisture chamber   -Start erythromycin ointment QID left eye  -Start lubricating ointment QID left eye, alternating with erythromycin  -Start lubricating ointment QID right eye    Ophthalmology will continue to follow, plan for repeat surface check tomorrow.    It is our pleasure to participate in this patient's care and treatment. Please contact us with any further questions or concerns.    Mayra Sexton MD  Ophthalmology Resident, PGY-2    Teaching Statement  I did not examine the patient, but was available to see the patient if needed. I have discussed the case with the resident and the assessment and plan as documented seems reasonable to me.    Sharmila Mccartney MD  Comprehensive Ophthalmology & Ocular Pathology  Department of Ophthalmology and Visual Neurosciences  [email or web address]  Pager [phone]

## 2019-03-25 NOTE — PROGRESS NOTES
[facility] ID SERVICE: ONGOING CONSULTATION      Patient:  Hellen Riddle, Date of birth 1980, Medical record number [id number]  Date of Visit:  March 25, 2019         Assessment and Recommendations:   Problem List:  - s/p VF cardiac arrest 2/23/19  -Aorto-esophageal fistula diagnosed 3/13. Not a candidate for repair.  -Right posterior parietal/occipital intraparenchymal hemorrhage, concern for possible abscess as well  -Leukocytosis  -Lactic acidosis (resolved now)  -Hypoxic respiratory failure, remains on ventilator  -Influenza A infection, status post 9 days of tamiflu (stopped 3/14)  -E. 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  - Candidemia 3/18, 3/19       Impression: 40 yo male admitted 2/23/19 with refractory VF OHCA s/p VA ECMO on admission, found to have 3v CAD s/p intervention. ID consulted 3/4/19 for increasing leukocytosis, hypoxia, lactic acidosis possibly related to influenza A or VAP, both of which were treated. Found to have VAP, on Meropenem since 3/14. Though his course should be finishing, patient was febrile over the weekend, but afebrile overnight.     Found to have Candidemia on 3/18, 3/19 -just started Micafungin on 3/22. Fever could be from ongoing candidemia, endocarditis, endophthalmitis -all complications of candidemia. TTE negative. Would obtain JOSLYN to evaluate for endocarditis, and ophtho consult to evaluate this further.     If remains afebrile, with negative cultures, would consider de-escalating antibiotics in the coming days.     Recommendations:  1. Continue Micafungin   2. Obtain JOSLYN to evaluate for endocarditis  3. Obtain ophtho consult to evaluate for endophalmitis due to candidemia  4. Okay to continue Vanconmycin and Meropenem for now while cultures are pending.       ID will continue  to follow      Attestation:  I have reviewed today's vital signs, medications, labs and imaging.  Shima Stone MD  Pager [phone]          Interval History:       Patient's afebrile overnight.          Review of Systems:   Patient unable to answer questions         Current Antimicrobials   Vancomycin 1750mg  Meropenem 1g IV q8h  Micafungin 150mg IV q24h         Physical Exam:   Ranges for vital signs:  Temp:  [97.3  F (36.3  C)-98.8  F (37.1  C)] 98.8  F (37.1  C)  Heart Rate:  [70-99] 79  Resp:  [25-35] 25  MAP:  [57 mmHg-111 mmHg] 68 mmHg  Arterial Line BP: ()/(46-82) 106/55  FiO2 (%):  [40 %-50 %] 50 %  SpO2:  [85 %-100 %] 100 %      Exam:  GENERAL:  Jaundiced, does not respond to questioning.   ENT:  Head is normocephalic, atraumatic. Oropharynx is moist without exudates or ulcers.  EYES:  Eyes have anicteric sclerae. PERRL.   NECK:  Supple. No cervical lymphadenopathy  LUNGS:  Coarse anteriorly  CARDIOVASCULAR:  Regular rate and rhythm with no murmurs, gallops or rubs.  ABDOMEN:  Normal bowel sounds, soft, nontender. No hepatosplenomegaly.   EXT: Extremities warm and edematous.  SKIN:  No acute rashes.          Laboratory Data:     Creatinine   Date Value Ref Range Status   03/25/2019 0.99 0.66 - 1.25 mg/dL Final   03/25/2019 0.87 0.66 - 1.25 mg/dL Final   03/24/2019 1.09 0.66 - 1.25 mg/dL Final   03/24/2019 0.98 0.66 - 1.25 mg/dL Final   03/24/2019 0.93 0.66 - 1.25 mg/dL Final     WBC   Date Value Ref Range Status   03/25/2019 23.7 (H) 4.0 - 11.0 10e9/L Final   03/25/2019 24.2 (H) 4.0 - 11.0 10e9/L Final   03/24/2019 24.7 (H) 4.0 - 11.0 10e9/L Final   03/24/2019 27.3 (H) 4.0 - 11.0 10e9/L Final   03/24/2019 25.6 (H) 4.0 - 11.0 10e9/L Final     Hemoglobin   Date Value Ref Range Status   03/25/2019 8.7 (L) 13.3 - 17.7 g/dL Final     Platelet Count   Date Value Ref Range Status   03/25/2019 289 150 - 450 10e9/L Final     Sed Rate   Date Value Ref Range Status   03/24/2019 58 (H) 0 - 15 mm/h Final    03/24/2019 56 (H) 0 - 15 mm/h Final   03/23/2019 44 (H) 0 - 15 mm/h Final   03/22/2019 44 (H) 0 - 15 mm/h Final   03/21/2019 28 (H) 0 - 15 mm/h Final     CRP Inflammation   Date Value Ref Range Status   03/25/2019 70.0 (H) 0.0 - 8.0 mg/L Final   03/24/2019 74.0 (H) 0.0 - 8.0 mg/L Final   03/24/2019 86.0 (H) 0.0 - 8.0 mg/L Final   03/23/2019 100.0 (H) 0.0 - 8.0 mg/L Final   03/22/2019 120.0 (H) 0.0 - 8.0 mg/L Final     AST   Date Value Ref Range Status   03/25/2019 266 (H) 0 - 45 U/L Final   03/25/2019 269 (H) 0 - 45 U/L Final   03/24/2019 289 (H) 0 - 45 U/L Final   03/24/2019 285 (H) 0 - 45 U/L Final   03/24/2019 346 (H) 0 - 45 U/L Final     ALT   Date Value Ref Range Status   03/25/2019 220 (H) 0 - 70 U/L Final   03/25/2019 216 (H) 0 - 70 U/L Final   03/24/2019 227 (H) 0 - 70 U/L Final   03/24/2019 217 (H) 0 - 70 U/L Final   03/24/2019 250 (H) 0 - 70 U/L Final     Bilirubin Total   Date Value Ref Range Status   03/25/2019 27.3 (HH) 0.2 - 1.3 mg/dL Final     Comment:     Critical result, provider not notified due to previous critical result   notification.     03/25/2019 29.0 (HH) 0.2 - 1.3 mg/dL Final     Comment:     Critical Value called to and read back by  AMINATA HOOVER 3/25/19 0716 4E ANICETO     03/24/2019 28.6 (HH) 0.2 - 1.3 mg/dL Final     Comment:     Critical result, provider not notified due to previous critical result   notification.     03/24/2019 27.0 (HH) 0.2 - 1.3 mg/dL Final     Comment:     Critical result, provider not notified due to previous critical result   notification.     03/24/2019 32.1 (HH) 0.2 - 1.3 mg/dL Final     Comment:     Critical result, provider not notified due to previous critical result   notification.       Lab Results   Component Value Date     03/25/2019    BUN 50 (H) 03/25/2019    CO2 21 03/25/2019       Culture data:  3/25 Blood culture NGTD  3/24 Cdiff PCR negative  3/24 Blood culture NGTD  3/23 Sputum culture Klebsiella -light growth  3/23 Blood culture NGTD  3/22  Sputum culture candida  3/21 Blood cultures NGTD  3/20 Blood culture NGTD  3/19 Blood culture: Candida albicans  3/18 Blood culture: Candida albicans  3/15 Blood cultures x2 Negative  3/15 Sputum culture: Klebsiella and Ecoli    All cultures:  Recent Labs   Lab 03/25/19  0351 03/24/19  0415 03/23/19  0804 03/23/19  0508 03/22/19  0248 03/21/19  0629 03/21/19  0303 03/20/19  0416 03/20/19  0304 03/19/19  0505 03/19/19  0401   CULT No growth after 8 hours No growth after 1 day Moderate growth  Candida albicans / dubliniensis  Candida albicans and Candida dubliniensis are not routinely speciated  Susceptibility testing not routinely done  *  Light growth  Klebsiella pneumoniae  Susceptibility testing done on previous specimen  * No growth after 2 days Heavy growth  Richa albicans / dubliniensis  Candida albicans and Candida dubliniensis are not routinely speciated  Susceptibility testing not routinely done  * Light growth  Klebsiella pneumoniae  *  Heavy growth  Candida albicans / dubliniensis  Candida albicans and Candida dubliniensis are not routinely speciated  Susceptibility testing not routinely done  * No growth after 4 days Light growth  Klebsiella pneumoniae  *  Light growth  Escherichia coli  *  Moderate growth  Candida albicans / dubliniensis  Candida albicans and Candida dubliniensis are not routinely speciated  *  Susceptibility testing done on previous specimen Cultured on the 4th day of incubation:  Budding yeast with pseudohyphae  *  Critical Value/Significant Value, preliminary result only, called to and read back by  AMINATA AGUAYO, AMELIE 0001 3.25.19 NDP   Moderate growth  Candida albicans / dubliniensis  Candida albicans and Candida dubliniensis are not routinely speciated  Susceptibility testing not routinely done  *  Light growth  Escherichia coli  *  Light growth  Klebsiella pneumoniae  * Cultured on the 3rd day of incubation:  Candida albicans  *  Critical Value/Significant Value,  preliminary result only, called to and read back by  Arielle Larson RN on 3.22.19 at 0834 by SS.    Susceptibility testing done on previous specimen     Imaging: Reviewed in Epic    3/23 TTE negative for vegetation

## 2019-03-25 NOTE — PLAN OF CARE
S:  Pt neurologically unchanged.  Remains on CRRT-able to meet fluid removal goals for shift.  Less OG and rectal tube output this afternoon than previous few shifts.  Afebrile on CRRT.  No electrolyte or blood product replacement required this shift.  SR. PIV d/c'd, new alexandra placed and old alexandra d/c'd per ID recommendations for yeast bacteremia.  R groin wound vac dressing removed per Dr Baer 2/2 wound vac not being operational 2/2 clogging in sxn tubing.  Wound not approximated, edges grey and macerated, scant serosanguinous drainage. Dr Caraballo notified, CVTS MD into evalutate. Wet to dry dressing applied per MD instructions.  B:  Pt had cardiac arrest requiring ECMO.  A:  Pt off sedation greater than one week with little improvement in mental status. Total Bili continues to be in low 30's.  Prn Fentanyl given x2 this shift for tachypnea and HTN.  Continues to be hemodynamically stable on 2 low dose pressors.  P:  Wean PEEP as able if Fi02 needs remain at 50% or lower per Dr Baer.  Plan for trach mid week if vent settings allow.  Continue to monitor closely.

## 2019-03-25 NOTE — PROGRESS NOTES
CRRT STATUS NOTE    DATA:  Time:  6:56 PM  Pressures WNL:  YES  Filter Status:  WDL    Problems Reported/Alarms Noted:  Flow alarm on post for a few hours, seems to have resolved.      Supplies Present:  YES    ASSESSMENT:  Patient Net Fluid Balance:  -920ml  Vital Signs:  98.1, 82, 98/47, 25, 100%  Labs:  K 4.0, Hgb 8.5, Plt 308  Goals of Therapy:  0-50ml as BP allows.    INTERVENTIONS:   Mat and hair ties in place.  Flow alarms have become less frequent.    PLAN:  Continue to pull as able.  Contact CRRT RN with questions/concerns.

## 2019-03-25 NOTE — PROGRESS NOTES
CLINICAL NUTRITION SERVICES - REASSESSMENT NOTE     Nutrition Prescription    RECOMMENDATIONS FOR MDs/PROVIDERS TO ORDER:  Consider changing bowel regimen to PRN    Malnutrition Status:    Non-severe malnutrition in the context of acute illness     Recommendations already ordered by Registered Dietitian (RD):  Continue Impact Peptide @ 60 ml/hr (1440 ml/day) to provide 2160 kcals (82% MREE vs 30 kcal/kg), 135 g PRO (1.9 g/kg), 1109 ml free H2O, 92 g Fat (50% from MCTs), 202 g CHO and no Fiber daily.    Future/Additional Recommendations:  While remains on mechanical ventilation, recommend obtain weekly repeat metabolic cart study to assess approp of energy provisions to avoid over/under feeding.    If stool outputs do not decrease, add 1 pkt Nutrisource fiber QID.      EVALUATION OF THE PROGRESS TOWARD GOALS   Diet: NPO  Enteral access: Nasoduodenal   Enteral Regimen:  Impact Peptide @ 60 ml/hr   Intake: Average intake over past 5 days = 2070 kcal (29 kcal/kg) and 130 g protein (1.8 g/kg)    NEW FINDINGS   -Resp: Obtained metabolic cart study 3/25 @ 0845 with the following results: MREE = 2623 kcals/day (equiv to 37 kcal/kg/day) with RQ = 0.86.  Pt received 1380 ml of TF in 24 hours preceding the study providing 2070 kcals (79% MREE).  RQ is within physiologic range and logical given provisions received prior to study.  Would aim energy intakes minimally at 60% of this MREE (equiv to 22 kcal/kg/day).  -Wt: Lowest wts 90.6 kg on 3/11 --> 86 kg 3/24 (however pt 92.7 and 91.2 kg the day before and after). Will continue to use ~91 kg as dry wt until further trends obtained.  -GI: No stools 3/20-3/22. Stooling has increased to >1L over past 2 days. Pt has senna ordered (held by RD x 2 days) as well as some suspension medications.   -Skin: Kirby 12. Pressure injuries per WOC on 3/22: under EEG leads, stage 2 and healing; L ear, evovling. Occiput, not staged yet. Bridle position inspected. Appears appropriate with no  pressure to nare, no skin breakdown present.   -Renal: CRRT. On nephronex. K+ and phos WNL.   -Labs:  on 3/20 --> 70 on 3/25. Continue CRP q monday to monitor appropriateness of immune-modulating formula.     ASSESSED NUTRITION NEEDS:  Estimated Energy Needs: 1574 - 2098 kcals/day (60-80% MREE vs 22 - 30 kcals/kg)  Justification: Vented, obese. Aim toward higher end w/ rapid wt loss earlier this admission    MALNUTRITION  % Intake: no decreased intake this week  % Weight Loss: remains with > 5% in 1 month (severe)  Subcutaneous Fat Loss: None observed  Muscle Loss: Temporal, upper leg, clavicle region: mild  Fluid Accumulation/Edema: Mild  Malnutrition Diagnosis: Non-severe malnutrition in the context of acute illness     Previous Goals   Total avg nutritional intake to meet a minimum of 25 kcal/kg and 1.5 g PRO/kg daily (per dosing wt 71 kg).  Evaluation: Met    Previous Nutrition Diagnosis  Inadequate protein-energy intake related to TF being held and at a rate not meeting needs as evidenced by average energy intake giving 54% and protein intake giving 56%.   Evaluation: resolved    CURRENT NUTRITION DIAGNOSIS  Predicted inadequate nutrient intake (kcal/protein) related to reliance on TF to meet nutritional needs while vented AEB potential for TF interruptions and changes to metabolic needs    INTERVENTIONS  Implementation  Enteral Nutrition - No change  Ordered metabolic cart study    Goals  Total avg nutritional intake to meet a minimum of 60% MREE (22 kcal/kg) and 1.5 g PRO/kg daily (per dosing wt 71 kg).    Monitoring/Evaluation  Progress toward goals will be monitored and evaluated per protocol.    Sudha Winters RD, LD, CNSC  CVICU Dietitian  Pager: [phone]

## 2019-03-25 NOTE — PLAN OF CARE
Stable over the day. Seen by CSI, dentist, optometrist. Wife updated in person and then by phone this evening. MD spoke with wife for update as well this afternoon.   Able to wean epi and norepi a bit today, MAP less labile this evening. Continues on fentanyl drip for pain. No changes in neuro status. Berta continued. Net neg 959 at this time. Continues on vent, no changes. Anuric. Tolerating tube feeds at 60. Giving pt more time for neuro recovery.

## 2019-03-25 NOTE — PROGRESS NOTES
[facility]  Cardiac ICU Progress Note  03/25/2019           Key events - last 24 hours / Hospital course     40 yo male with CAD and cardiac arrest placed on ECMO. Course was complicated by influenza and bacterial pneumonia, difficultly weaning ecmo and was on VA and VV emco for a time. He had a massive GI bleed and CT showed aorto-esophageal fistula, non-operative but bleeding has stopped. Right intraparenchymal hemorrhage which is improving.     Previously required VA ECMO then VV ECMO, now off ECMO. Still on CRRT. Neuro function slow to recover. Started on micafungin 3/21/2019 for yeast in blood. A new HD line is in the left neck and PICC is in place.     Pt remains with eyes open, prn eye gtts given and eye covers in place. Contracts lower extremities slightly, withdraws to pain.     HD line replaced overnight 3/22-3/23.  Arterial line replaced 3/24.  TTE 3/23 showed LVEF 40-45%, normal RV function, no significant valvular abnormalities.  Ophthalmology consulted for endophthalmitis routine evaluation in setting of candidemia  PIV lines removed   Dental consult for damaged teeth 3/24 overnight after biting the ET tube              Assessment and Plan:   Hellen Riddle is a 39 year old male who was admitted on 2/23/2019 after cardiac arrest.    Neurology: Intraparenchymal hemorrhage  CT head from 3/18 is stable  Intubated, off sedation since 3/14, restarted fentanyl at 25mcg 3/25 after he bit his ETT and damaged his teeth  --monitor   Cardiovascular / Hemodynamics: Refractory VF arrest, placed on VA ECMO, VV ecmo added.  Coronary angiography on admission showed 3v disease and he underwent MITA to mRCA & mLCx. Has IABP as well.  ECMO decannulation stymied by VT in OR on 3/6. Returned to the cath lab for MITA to  LAD and distal LCx.   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 Stable  SFA occlusion s/p embolectomy  TTE: LV EF 50-55%.  RV dysfunction is mild.  --IABP removed  3/17/19  --VA ecmo removed 3/18/19  --vascular surgery consulted for aorto-esophageal fistula, but declined to operate due to futility and high risk; will reconsult if he continues to improve  --wean pressors as able; MAP goal >65  --holding heparin gtt and ASA; cont ticagrelor  --hold lipitor for now given hepatic injury during arrest  --hold ACE/ARB for now given renal failure and shock  --holding beta blocker given shock    Pulmonary: Acute hypoxic respiratory failure due to pulmonary edema, VAP  --surgery consulted for trach/peg-- 3/22/2019 Surgery evaluated, determined it was not clinically appropriate at this time  --cont abx as below  --wean vent as able  --daily CXR  -- ABGs     GI and Nutrition: GI bleeding from aorto-esophageal fistula: stable  Shock liver  OG output likely old blood   Liver and splenic infarcts  --PPI BID  --tube feeds at goal  --monitor BID LFTs   Renal, Fluid and Electrolytes: Renal failure.  Appreciate nephrology's assistance.  On CRRT.  --monitor urine output  --maintain K>4 and Mg>2    Infectious Disease: Multiple organisms recovered in sputum; possible bacteremia from enteric source  --cont meropenem  --switched from daptomycin to vancomycin   -- cont micafungin  --ID following   Hematology and Oncology: Acute anemia due to GI bleed; stable  --holding heparin as above   --cryo PRN fibrinogen < 200; FFP for INR >2  --Transfuse for Hgb<10   Endocrinology: No known medical history.  --insulin PRN          Medications:   I have reviewed this patient's current medications         Review of Systems:   Review of systems not obtained due to patient factors - intubation         Physical Exam:   Exam and Labs by System  Neuro: Exam: intubated, pupils equal sluggish; opens eyes to stimulation; moves toes with stimulation    Cardiovascular:     Heart Rate: 88  Exam:    Normal s1 and s2, no audible murmur, warm extremities.     Recent Labs   Lab 03/25/19  0358 03/24/19  2209 03/24/19  4067    TROPI 0.274* 0.336* 0.391*     Pulm:   Vent Settings:  Resp: 25 SpO2: 100 % O2 Device: Mechanical Ventilator    Ventilation Mode: CMV/AC  (Continuous Mandatory Ventilation/ Assist Control)  FiO2 (%): 50 %  Rate Set (breaths/minute): 12 breaths/min  Tidal Volume Set (mL): 450 mL  PEEP (cm H2O): 10 cmH2O  Oxygen Concentration (%): 50 %  Resp: 25    Exam:   Symmetrical chest shape and movements with each tidal breath  Clear lungs     Arterial Blood Gas:   Recent Labs   Lab 03/25/19  1003 03/25/19  0356 03/24/19 2209 03/24/19 2002   PH 7.38 7.39 7.40 7.38   PCO2 39 38 38 40   PO2 105 106* 129* 68*   HCO3 23 23 23 23   O2PER 50.0 50% 50.0 40.0       Renal:   Meds:  Vitals:    03/23/19 0530 03/24/19 0600 03/25/19 0600   Weight: 92.7 kg (204 lb 5.9 oz) 86 kg (189 lb 9.5 oz) 91.2 kg (201 lb 1 oz)   I/O last 3 completed shifts:  In: 3067.29 [I.V.:1222.29; Other:70; NG/GT:395]  Out: 4001 [Emesis/NG output:650; Other:1151; Stool:2200]  Recent Labs   Lab 03/25/19  1003 03/25/19  0358    140   POTASSIUM 3.9 4.0   CHLORIDE 106 109   CO2 21 18*   ANIONGAP 11 12   * 166*   BUN 50* 51*   CR 0.99 0.87   ALEKSANDER 7.7* 7.5*     No components found for: URINE     GI:   Exam:    Soft, minimally distended      Diet: tube feeds  Recent Labs   Lab 03/25/19  1003 03/25/19  0358 03/24/19 2209   * 269* 289*   * 216* 227*   BILITOTAL 27.3* 29.0* 28.6*   ALBUMIN 1.8* 1.8* 1.7*   PROTTOTAL 5.0* 5.0* 5.4*   ALKPHOS 290* 267* 281*       Heme/ID:   Meds:  Temp: 98.8  F (37.1  C) Temp src: OralTemp  Min: 97.3  F (36.3  C)  Max: 98.8  F (37.1  C)   Recent Labs   Lab 03/25/19  1003 03/25/19  0630 03/24/19 2209 03/24/19  1716 03/24/19  0952   WBC 23.7* 24.2* 24.7* 27.3* 25.6*   HGB 8.7* 8.8* 8.6* 9.0* 9.6*   HCT 27.6* 27.8* 27.0* 27.5* 29.9*   MCV 96 95 94 94 94   RDW 21.1* 20.8* 20.9* 21.0* 20.7*    289 277 261 271     Recent Labs   Lab 03/25/19  0358 03/24/19 2209 03/24/19  0344 03/23/19  0429 03/22/19  0327   03/20/19  0417 03/19/19  2155 03/19/19  1550 03/19/19  0956 03/19/19  0505   INR 1.51* 1.44* 1.43* 1.48* 1.44*   < > 1.43*  --   --   --  1.53*   PTT  --   --   --   --   --   --  41* 41* 43* 43* 40*    < > = values in this interval not displayed.     Recent Labs   Lab 03/25/19  0351 03/24/19  0415 03/23/19  0804 03/23/19  0508 03/22/19  0248 03/21/19  0629   CULT No growth after 1 hour No growth after 1 day Moderate growth  Yeast  *  Culture in progress No growth after 2 days Heavy growth  Richa albicans / dubliniensis  Candida albicans and Candida dubliniensis are not routinely speciated  Susceptibility testing not routinely done  * Light growth  Klebsiella pneumoniae  *  Heavy growth  Candida albicans / dubliniensis  Candida albicans and Candida dubliniensis are not routinely speciated  Susceptibility testing not routinely done  *       Endo:   Meds:  Recent Labs   Lab 03/25/19  1003 03/25/19  0358 03/24/19  2209 03/24/19  1716 03/24/19  0952   * 166* 157* 162* 161*               Data:   All lab results reviewed    The pt was discussed and evaluated with the Attending physician.    Lion Caraballo MD  PGY-6 Cardiology  Pager: [phone]  March 25, 2019      Critical Care Services Progress Note:     Hellen Riddle remains critically ill with acute coronary syndrome, cardiac arrhythmia and shock     I personally examined and evaluated the patient today.   The patient s prognosis today is grave  I have evaluated all laboratory values and imaging studies from the past 24 hours.  Key findings and decisions made today included: Continue the current care with off sedation to assess mental status recovery.   I personally managed the ventilator, sedation, pain control and analgesia, metabolic abnormalities, antibiotic therapy, nutritional status and vasoactive medications.   Consults ongoing and ordered are Neurology  Procedures that will happen today are: none  All treatments were placed at my direction.  I formulated  today s plan with the house staff team or resident(s) and agree with the findings and plan in the associated note.       The above plans and care have been discussed with no one and all questions and concerns were addressed.     I spent a total of 45 minutes (excluding procedure time) personally providing and directing critical care services at the bedside and on the critical care unit for Hellen RiddleNoble Humphreys

## 2019-03-25 NOTE — PROGRESS NOTES
Nephrology Progress Note  03/25/2019           Mr Riddle is a 39 yom w/HLP who had cardiac arrest on 2/23/19 with unclear down-time.  PCI done emergently, Nephrology consulted for management of FERNANDA, started CRRT on 2/25.       Interval History :   Mr Riddle continues on CRRT for management of electrolytes and volume, still on 2 low dose pressors but overall stable.  No major change in nephrology plan, nearing iHD but still on pressors.  Needs tunneled line soon, will likely get trach/PEG as well in near future as family still wants to continue aggressive cares.       Assessment & Recommendations:   FERNANDA-Baseline Cr unclear, admitted with Cr of 1.7 post arrest.  FERNANDA due to hemodynamic injury, also received contrast and had elevated CK.  Started CRRT on 2/25, has been anuric since starting CRRT, ~48h after arrest.  Continuing CRRT with no signs of recovery, now off of both VV and VA ECMO, still on pressors, gently pulling fluid but nearly euvolemic.                -Access is LIJ, 3/221 positional initially but functioning well currently.                -CRRT, 0-50cc/hr as BP's allow, mix of 4k/2k baths, nearing euvolemic.      Volume status-Net negative slightly yesterday, mainly due to GI output but continues to improve volume status.       Electrolytes/pH-No acute issues on CRRT, K 3.9, bicarb 21, on mix of 4k/2k baths.  Lactate mildly up, following.     Ca/phos/pth-Ca 7.7, Mg 2.6, phos 4.1, stable with CRRT.      Anemia-Hgb 8.8, needing intermittent PRBC's with ECMO and bleeding.     Nutrition-Impact TF.       Seen and discussed with Dr Rajan     Recommendations were communicated to primary team via verbal communication.    Darren Vidal  Clinical Nurse Specialist  [phone]    Review of Systems:   I reviewed the following systems:  ROS not done due to vent/sedation.      Physical Exam:   I/O last 3 completed shifts:  In: 3067.29 [I.V.:1222.29; Other:70; NG/GT:395]  Out: 4001 [Emesis/NG output:650; Other:1151;  "Stool:2200]   /90 (BP Location: Left leg)   Pulse 89   Temp 98.8  F (37.1  C) (Oral)   Resp 25   Ht 1.68 m (5' 6.14\")   Wt 91.2 kg (201 lb 1 oz)   SpO2 100%   BMI 32.31 kg/m       GENERAL APPEARANCE: Intubated and sedated, on VV ECMO and IABP  EYES:  No scleral icterus, pupils equal  HENT: mouth without ulcers or lesions  PULM: lungs clear to auscultation, equal air movement, no cyanosis or clubbing  CV: regular rhythm, normal rate, no rub     -edema +1 LE  GI: soft, non-tender, non-distended, dark red stool  MS: no evidence of inflammation in joints, no muscle tenderness  NEURO:  Intubated and sedated    Labs:   All labs reviewed by me  Electrolytes/Renal -   Recent Labs   Lab Test 03/25/19  1003 03/25/19  0358 03/24/19 2209    140 137   POTASSIUM 3.9 4.0 3.8   CHLORIDE 106 109 106   CO2 21 18* 22   BUN 50* 51* 53*   CR 0.99 0.87 1.09   * 166* 157*   ALEKSANDER 7.7* 7.5* 7.7*   MAG 2.6* 2.5* 2.4*   PHOS 4.1 4.5 4.1       CBC -   Recent Labs   Lab Test 03/25/19  1003 03/25/19  0630 03/24/19 2209   WBC 23.7* 24.2* 24.7*   HGB 8.7* 8.8* 8.6*    289 277       LFTs -   Recent Labs   Lab Test 03/25/19  1003 03/25/19  0358 03/24/19 2209   ALKPHOS 290* 267* 281*   BILITOTAL 27.3* 29.0* 28.6*   * 216* 227*   * 269* 289*   PROTTOTAL 5.0* 5.0* 5.4*   ALBUMIN 1.8* 1.8* 1.7*       Iron Panel - No lab results found.        Current Medications:    amiodarone  200 mg Oral or Feeding Tube Daily     B and C vitamin Complex with folic acid  5 mL Per Feeding Tube Daily     Carboxymethylcellulose Sod PF  2 drop Both Eyes 4x Daily     heparin lock flush  5-10 mL Intracatheter Q24H     insulin aspart  1-12 Units Subcutaneous Q4H     meropenem  1 g Intravenous Q8H     micafungin  100 mg Intravenous Q24H     pantoprazole (PROTONIX) IV  40 mg Intravenous BID     sennosides  8.6 mg Oral BID     sodium chloride (PF)  3 mL Intracatheter Q8H     ticagrelor  90 mg Oral or Feeding Tube BID     vancomycin " (VANCOCIN) IV  1,750 mg (central catheter) Intravenous Q24H       - MEDICATION INSTRUCTIONS -       IV fluid REPLACEMENT ONLY 30 mL/hr at 03/07/19 1100     CRRT replacement solution 12.5 mL/kg/hr (03/25/19 1311)     EPINEPHrine IV infusion ADULT 0.04 mcg/kg/min (03/25/19 1300)     fentaNYL 25 mcg/hr (03/25/19 1300)     - MEDICATION INSTRUCTIONS -       norepinephrine 0.05 mcg/kg/min (03/25/19 1300)     Percutaneous Coronary Intervention orders placed (this is information for BPA alerting)       CRRT replacement solution 1.887 mL/kg/hr (03/25/19 0832)     CRRT replacement solution 12.5 mL/kg/hr (03/25/19 1311)     ACE/ARB/ARNI NOT PRESCRIBED       BETA BLOCKER NOT PRESCRIBED         I, Thuy Rajan, saw and evaluated this patient as part of a shared visit.  I have reviewed and discussed with the advanced practice provider their history, physical and plan.    I personally reviewed the vital signs, medications, labs and imaging.    My key history or physical exam findings:  FERNANDA, on CRRT for volume and solute control, tenuous ICU course  Key management decisions made by me:  Continue CRRT , continue same UF goal    Thuy Rajan  Date of Service (when I saw the patient): 3/25

## 2019-03-25 NOTE — PROGRESS NOTES
"EP CONSULT PROGRESS NOTE:    Sub:  - Remains intubated, on CRRT. Off VA and VV ECMO now.  - Found to have candidemia     Obj:  /90 (BP Location: Left leg)   Pulse 89   Temp 98.8  F (37.1  C) (Oral)   Resp 25   Ht 1.68 m (5' 6.14\")   Wt 91.2 kg (201 lb 1 oz)   SpO2 100%   BMI 32.31 kg/m    GEN: intubated, sedated  Neck: Unable to accurately assess JVP  LUNGS: No wheezing. +rales at both bases  HEART: S1S2 audible, no murmurs or rubs. Regular rhythm  ABDOMEN: Soft, nt, nd. +BS  EXTREMITIES: Warm calves, +DPs, trace LE edema       Assessment and Recs:  39 year old male who has a past medical history significant for HLD. He was [facility] to [facility] on 2/23/2019 after having a refractory out-of-hospital VF cardiac arrest. Emergent coronary angiogram revealed severe 3v CAD w/  of the LAD, severe RCA and LCx stenosis now s/p PCI of the mRCA and mLCx.ECMO decannulation stymied by VT in OR on 3/8/19.  Since then started on amiodarone and no recurrent ventricular arrhythmias.   Now off VA and VV ECMO, remains intubated, remains on CRRT. Found to have candidemia.     We would recommend ICD implantation if he proves to make meaningful recovery. Currently, in the setting of candidemia, and his overall unclear prognosis, we will hold off on ICD placement and will continue to follow him.       Patient seen and discussed with Dr. Estuardo Gonzalez MD  Cardiovascular Disease Fellow  Pager: [phone]      "

## 2019-03-25 NOTE — PROGRESS NOTES
CRRT STATUS NOTE    DATA:  Time:  6:58 AM  Pressures WNL:  YES  Filter Status:  WDL    Problems Reported/Alarms Noted:  None    Supplies Present:  YES    ASSESSMENT:  Patient Net Fluid Balance:  Net -429 ml @ 0600.  Vital Signs:  HR 94, /70, MAP 96  Labs:  K 4, Mg 2.5, Phos 4.5, iCa 4.4, Hgb 8.8, Plt 289  Goals of Therapy:  0-50 ml/hr as BP allows.    INTERVENTIONS:   Floor mat in use as well as hair ties on all bags except the effluent.     PLAN:  Continue to monitor circuit daily and change set q72 hours or PRN for clotting/clogging. Please call CRRT RN with any questions/problems.

## 2019-03-26 NOTE — PROGRESS NOTES
[facility]  Cardiac ICU Progress Note  03/26/2019           Key events - last 24 hours / Hospital course     40 yo male with CAD and cardiac arrest placed on ECMO. Course was complicated by influenza and bacterial pneumonia, difficultly weaning ecmo and was on VA and VV emco for a time. He had a massive GI bleed and CT showed aorto-esophageal fistula, non-operative but bleeding has stopped. Right intraparenchymal hemorrhage which is improving.     Previously required VA ECMO then VV ECMO, now off ECMO. Still on CRRT. Neuro function slow to recover. Started on micafungin 3/21/2019 for yeast in blood. A new HD line is in the left neck and PICC is in place.     Pt remains with eyes open, prn eye gtts given and eye covers in place. Contracts lower extremities slightly, withdraws to pain.     HD line replaced overnight 3/22-3/23.  Arterial line replaced 3/24.  TTE 3/23 showed LVEF 40-45%, normal RV function, no significant valvular abnormalities.  Ophthalmology consulted for endophthalmitis routine evaluation in setting of candidemia, appreciate assistance, antibiotic ointment ordered  PIV lines removed   Dental consult for damaged teeth 3/24 after biting the ET tube, appreciate assistance  Trial a sedation holiday again to reassess neuro function  Weaning vent requirements to goal PEEP 5 and FiO2 <60% for consideration of tracheostomy per Surgery recs (consult 3/22)               Assessment and Plan:   Hellen Riddle is a 39 year old male who was admitted on 2/23/2019 after cardiac arrest.    Neurology: Intraparenchymal hemorrhage  CT head from 3/18 is stable  Intubated, off sedation since 3/14, restarted fentanyl at 25mcg 3/25 after he bit his ETT and damaged his teeth, off 3/26  --monitor   Cardiovascular / Hemodynamics: Refractory VF arrest, placed on VA ECMO, VV ecmo added.  Coronary angiography on admission showed 3v disease and he underwent MITA to mRCA & mLCx. Has IABP as well.  ECMO  decannulation stymied by VT in OR on 3/6. Returned to the cath lab for MITA to  LAD and distal LCx.   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 Stable  SFA occlusion s/p embolectomy  TTE: LV EF 50-55%.  RV dysfunction is mild.  --IABP removed 3/17/19  --VA ecmo removed 3/18/19  --vascular surgery consulted for aorto-esophageal fistula, but declined to operate due to futility and high risk; will reconsult if he continues to improve  --wean pressors as able; MAP goal >65  --holding heparin gtt and ASA; cont ticagrelor  --hold lipitor for now given hepatic injury during arrest  --hold ACE/ARB for now given renal failure and shock  --holding beta blocker given shock    Pulmonary: Acute hypoxic respiratory failure due to pulmonary edema, VAP  --surgery consulted for trach/peg-- 3/22/2019 Surgery evaluated, determined it was not clinically appropriate at this time  --cont abx as below  --wean vent as able  --daily CXR  -- ABGs     GI and Nutrition: GI bleeding from aorto-esophageal fistula: stable  Shock liver  OG output likely old blood   Liver and splenic infarcts  --PPI BID  --tube feeds at goal  --monitor BID LFTs   Renal, Fluid and Electrolytes: Renal failure.  Appreciate nephrology's assistance.  On CRRT.  --monitor urine output  --maintain K>4 and Mg>2    Infectious Disease: Multiple organisms recovered in sputum; possible bacteremia from enteric source  --cont meropenem  --switched from daptomycin to vancomycin   -- cont micafungin  --ID following   Hematology and Oncology: Acute anemia due to GI bleed; stable  --holding heparin as above   --cryo PRN fibrinogen < 200; FFP for INR >2  --Transfuse for Hgb<10   Endocrinology: No known medical history.  --insulin PRN          Medications:   I have reviewed this patient's current medications         Review of Systems:   Review of systems not obtained due to patient factors - intubation         Physical Exam:   Exam and Labs by System  Neuro: Exam: intubated,  pupils equal sluggish; opens eyes to stimulation; moves toes with stimulation    Cardiovascular:     Heart Rate: 70  Exam:    Normal s1 and s2, no audible murmur, warm extremities.     Recent Labs   Lab 03/26/19 0329 03/25/19 0358 03/24/19 2209   TROPI 0.287* 0.274* 0.336*     Pulm:   Vent Settings:  Resp: 28 SpO2: 100 % O2 Device: Mechanical Ventilator    Ventilation Mode: CMV/AC  (Continuous Mandatory Ventilation/ Assist Control)  FiO2 (%): 50 %  Rate Set (breaths/minute): 12 breaths/min  Tidal Volume Set (mL): 450 mL  PEEP (cm H2O): 10 cmH2O  Oxygen Concentration (%): 50 %  Resp: 28    Exam:   Symmetrical chest shape and movements with each tidal breath     Arterial Blood Gas:   Recent Labs   Lab 03/26/19 0329 03/25/19 2151 03/25/19  1558 03/25/19  1003   PH 7.40 7.39 7.42 7.38   PCO2 38 38 36 39   PO2 140* 90 94 105   HCO3 23 23 23 23   O2PER 50 50 50 50.0       Renal:   Meds:  Vitals:    03/24/19 0600 03/25/19 0600 03/26/19 0400   Weight: 86 kg (189 lb 9.5 oz) 91.2 kg (201 lb 1 oz) 90.6 kg (199 lb 11.8 oz)   I/O last 3 completed shifts:  In: 2771.71 [I.V.:1315.71; Other:6; NG/GT:250]  Out: 3386 [Emesis/NG output:300; Other:1486; Stool:1600]  Recent Labs   Lab 03/26/19 0329 03/25/19 2151    138   POTASSIUM 3.8 3.7   CHLORIDE 107 106   CO2 23 22   ANIONGAP 7 11   * 162*   BUN 48* 51*   CR 1.01 0.96   ALEKSANDER 7.9* 7.6*     No components found for: URINE     GI:   Exam:    Soft, minimally distended      Diet: tube feeds  Recent Labs   Lab 03/26/19 0329 03/25/19 2151 03/25/19  1558   * 260* 254*   * 219* 215*   BILITOTAL 25.3* 27.4* 27.3*   ALBUMIN 1.7* 1.7* 1.8*   PROTTOTAL 4.9* 5.0* 5.1*   ALKPHOS 284* 305* 302*       Heme/ID:   Meds:  Temp: 98  F (36.7  C) Temp src: OralTemp  Min: 97.8  F (36.6  C)  Max: 98.8  F (37.1  C)   Recent Labs   Lab 03/26/19  0329 03/25/19  2151 03/25/19  1558 03/25/19  1003 03/25/19  0630   WBC 21.8* 21.7* 21.8* 23.7* 24.2*   HGB 8.4* 8.8* 8.5* 8.7* 8.8*    HCT 25.9* 27.4* 26.3* 27.6* 27.8*   MCV 94 95 94 96 95   RDW 21.1* 21.0* 20.8* 21.1* 20.8*    298 308 289 289     Recent Labs   Lab 03/25/19  0358 03/24/19  2209 03/24/19  0344 03/23/19  0429 03/22/19  0327  03/20/19  0417 03/19/19  2155 03/19/19  1550 03/19/19  0956   INR 1.51* 1.44* 1.43* 1.48* 1.44*   < > 1.43*  --   --   --    PTT  --   --   --   --   --   --  41* 41* 43* 43*    < > = values in this interval not displayed.     Recent Labs   Lab 03/26/19  0646 03/25/19  0351 03/24/19  0415 03/23/19  0804 03/23/19  0508 03/22/19  0248   CULT PENDING No growth after 1 day No growth after 2 days Moderate growth  Candida albicans / dubliniensis  Candida albicans and Candida dubliniensis are not routinely speciated  Susceptibility testing not routinely done  *  Light growth  Klebsiella pneumoniae  Susceptibility testing done on previous specimen  * No growth after 3 days Heavy growth  Richa albicans / dubliniensis  Candida albicans and Candida dubliniensis are not routinely speciated  Susceptibility testing not routinely done  *       Endo:   Meds:  Recent Labs   Lab 03/26/19  0329 03/25/19  2151 03/25/19  1558 03/25/19  1003 03/25/19  0358   * 162* 158* 173* 166*               Data:   All lab results reviewed    The pt was discussed and evaluated with the Attending physician.    Lion Caraballo MD  PGY-6 Cardiology  Pager: [phone]  March 26, 2019

## 2019-03-26 NOTE — PROGRESS NOTES
[facility] ID SERVICE: ONGOING CONSULTATION      Patient:  Hellen Riddle, Date of birth 1980, Medical record number [id number]  Date of Visit:  March 26, 2019         Assessment and Recommendations:   Problem List:  - s/p VF cardiac arrest 2/23/19  -Aorto-esophageal fistula diagnosed 3/13. Not a candidate for repair.  -Right posterior parietal/occipital intraparenchymal hemorrhage, concern for possible abscess as well  -Leukocytosis  -Lactic acidosis (resolved now)  -Hypoxic respiratory failure, remains on ventilator  -Influenza A infection, status post 9 days of tamiflu (stopped 3/14)  -E. 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  - Candidemia 3/18, 3/19       Impression: 38 yo male admitted 2/23/19 with refractory VF OHCA s/p VA ECMO on admission, found to have 3v CAD s/p intervention. ID consulted 3/4/19 for increasing leukocytosis, hypoxia, lactic acidosis possibly related to influenza A or VAP, both of which were treated. Found to have VAP, on Meropenem since 3/14. Though his course should be finishing, patient was febrile over the weekend, but afebrile overnight.     Found to have Candidemia on 3/18, 3/19 -just started Micafungin on 3/22. Fever could be from ongoing candidemia, endocarditis, endophthalmitis -all complications of candidemia. TTE negative. Awaiting JOSLYN to evaluate for endocarditis, and ophtho consult to evaluate this further.     If remains afebrile, with negative cultures, would consider de-escalating antibiotics in the coming days.     Recommendations:  1. Continue Micafungin  2. No need for daily blood cultures if afebrile   3. Awaiting JOSLYN to evaluate for endocarditis  4. Obtain ophtho consult to evaluate for endophalmitis due to candidemia  5. Consider stopping Vancomycin -no evidence of MRSA  infection. Patient afebrile.   6. Okay to continue Meropenem for now       ID will continue to follow      Attestation:  I have reviewed today's vital signs, medications, labs and imaging.  Shima Stone MD  Pager [phone]          Interval History:       Patient's afebrile overnight. No events.          Review of Systems:   Patient unable to answer questions         Current Antimicrobials   Vancomycin 1750mg  Meropenem 1g IV q8h  Micafungin 150mg IV q24h         Physical Exam:   Ranges for vital signs:  Temp:  [97.8  F (36.6  C)-98.8  F (37.1  C)] 98  F (36.7  C)  Heart Rate:  [] 89  Resp:  [25-35] 35  MAP:  [59 mmHg-111 mmHg] 81 mmHg  Arterial Line BP: ()/(45-83) 118/61  FiO2 (%):  [50 %] 50 %  SpO2:  [95 %-100 %] 98 %      Exam:  GENERAL:  Jaundiced, does not respond to questioning.   ENT:  Head is normocephalic, atraumatic. Oropharynx is moist without exudates or ulcers.  EYES:  Eyes have anicteric sclerae. PERRL.   NECK:  Supple. No cervical lymphadenopathy  LUNGS:  Coarse anteriorly.   CARDIOVASCULAR:  Regular rate and rhythm with no murmurs, gallops or rubs.  ABDOMEN:  Normal bowel sounds, soft, nontender. No hepatosplenomegaly.   EXT: Extremities warm and edematous.  SKIN:  No acute rashes.          Laboratory Data:     Creatinine   Date Value Ref Range Status   03/26/2019 0.87 0.66 - 1.25 mg/dL Final   03/26/2019 1.01 0.66 - 1.25 mg/dL Final   03/25/2019 0.96 0.66 - 1.25 mg/dL Final   03/25/2019 0.85 0.66 - 1.25 mg/dL Final   03/25/2019 0.99 0.66 - 1.25 mg/dL Final     WBC   Date Value Ref Range Status   03/26/2019 23.1 (H) 4.0 - 11.0 10e9/L Final   03/26/2019 21.8 (H) 4.0 - 11.0 10e9/L Final   03/25/2019 21.7 (H) 4.0 - 11.0 10e9/L Final   03/25/2019 21.8 (H) 4.0 - 11.0 10e9/L Final   03/25/2019 23.7 (H) 4.0 - 11.0 10e9/L Final     Hemoglobin   Date Value Ref Range Status   03/26/2019 8.8 (L) 13.3 - 17.7 g/dL Final     Platelet Count   Date Value Ref Range Status   03/26/2019 316 150 -  450 10e9/L Final     Sed Rate   Date Value Ref Range Status   03/26/2019 69 (H) 0 - 15 mm/h Final   03/24/2019 58 (H) 0 - 15 mm/h Final   03/24/2019 56 (H) 0 - 15 mm/h Final   03/23/2019 44 (H) 0 - 15 mm/h Final   03/22/2019 44 (H) 0 - 15 mm/h Final     CRP Inflammation   Date Value Ref Range Status   03/26/2019 70.0 (H) 0.0 - 8.0 mg/L Final   03/25/2019 70.0 (H) 0.0 - 8.0 mg/L Final   03/24/2019 74.0 (H) 0.0 - 8.0 mg/L Final   03/24/2019 86.0 (H) 0.0 - 8.0 mg/L Final   03/23/2019 100.0 (H) 0.0 - 8.0 mg/L Final     AST   Date Value Ref Range Status   03/26/2019 282 (H) 0 - 45 U/L Final   03/26/2019 244 (H) 0 - 45 U/L Final   03/25/2019 260 (H) 0 - 45 U/L Final   03/25/2019 254 (H) 0 - 45 U/L Final   03/25/2019 266 (H) 0 - 45 U/L Final     ALT   Date Value Ref Range Status   03/26/2019 219 (H) 0 - 70 U/L Final   03/26/2019 209 (H) 0 - 70 U/L Final   03/25/2019 219 (H) 0 - 70 U/L Final   03/25/2019 215 (H) 0 - 70 U/L Final   03/25/2019 220 (H) 0 - 70 U/L Final     Bilirubin Total   Date Value Ref Range Status   03/26/2019 26.0 (HH) 0.2 - 1.3 mg/dL Final     Comment:     Critical result, provider not notified due to previous critical result   notification.     03/26/2019 25.3 (HH) 0.2 - 1.3 mg/dL Final     Comment:     Critical result, provider not notified due to previous critical result   notification.     03/25/2019 27.4 (HH) 0.2 - 1.3 mg/dL Final     Comment:     Critical result, provider not notified due to previous critical result   notification.     03/25/2019 27.3 (HH) 0.2 - 1.3 mg/dL Final     Comment:     Critical result, provider not notified due to previous critical result   notification.     03/25/2019 27.3 (HH) 0.2 - 1.3 mg/dL Final     Comment:     Critical result, provider not notified due to previous critical result   notification.       Lab Results   Component Value Date     03/26/2019    BUN 46 (H) 03/26/2019    CO2 20 03/26/2019       Culture data:  3/26 Blood culture NGTD  3/25 Blood culture  NGTD  3/24 Cdiff PCR negative  3/24 Blood culture NGTD  3/23 Sputum culture Klebsiella -light growth  3/23 Blood culture NGTD  3/22 Sputum culture candida  3/21 Blood cultures NGTD  3/20 Blood culture NGTD  3/19 Blood culture: Candida albicans  3/18 Blood culture: Candida albicans  3/15 Blood cultures x2 Negative  3/15 Sputum culture: Klebsiella and Ecoli    All cultures:  Recent Labs   Lab 03/26/19  0646 03/25/19  0351 03/24/19  0415 03/23/19  0804 03/23/19  0508 03/22/19  0248 03/21/19  0629 03/21/19  0303 03/20/19  0416 03/20/19  0304   CULT PENDING No growth after 1 day No growth after 2 days Moderate growth  Candida albicans / dubliniensis  Candida albicans and Candida dubliniensis are not routinely speciated  Susceptibility testing not routinely done  *  Light growth  Klebsiella pneumoniae  Susceptibility testing done on previous specimen  * No growth after 3 days Heavy growth  Richa albicans / dubliniensis  Candida albicans and Candida dubliniensis are not routinely speciated  Susceptibility testing not routinely done  * Light growth  Klebsiella pneumoniae  *  Heavy growth  Candida albicans / dubliniensis  Candida albicans and Candida dubliniensis are not routinely speciated  Susceptibility testing not routinely done  * No growth after 5 days Light growth  Klebsiella pneumoniae  *  Light growth  Escherichia coli  *  Moderate growth  Candida albicans / dubliniensis  Candida albicans and Candida dubliniensis are not routinely speciated  *  Susceptibility testing done on previous specimen Cultured on the 4th day of incubation:  Candida albicans / dubliniensis  Speciation in progress  Referred to mycology for identification  *  Critical Value/Significant Value, preliminary result only, called to and read back by  AMINATA AGUAYO RN 0001 3.25.19 NDP    Susceptibility testing done on previous specimen     Imaging: Reviewed in Epic    3/23 TTE negative for vegetation

## 2019-03-26 NOTE — PLAN OF CARE
Stable vitals. Continues on 2 drips. Fentanyl held to assess neuro status. Attempting to wean PEEP today. ABG WNL after change in peep from 10 to 7.PEEP weaned from 7 to 5 and ABG to be checked at 1600. If pt continues to tolerate PEEP 5 and <60 % FiO2 will plan for trach tomorrow. If able to trach, will plan for teeth to be removed after ETT removed.  Family updated via phone this mid-morning. TF continue to be held for procedures.

## 2019-03-26 NOTE — PROGRESS NOTES
Called MD for therapy order and informed of generalized stiffness and pt unable to turn head to right side. WOC RN concerned r/t wound on occipital area.

## 2019-03-26 NOTE — CONSULTS
Dental Service Consultation        Hellen Riddle MRN# [id number]   YOB: 1980 Age: 39 year old   Date of Admission: 2/23/2019     Reason for consult: I was asked by Dr. Lion Caraballo to evaluate this patient for loose teeth.           Assessment and Plan:   Assessment: Patient has been hospitalized since 2/23/19 after cardiac arrest. During sessions to lighten sedation the patient bit down on the intubation tube causing trauma to the the mandibular anterior teeth. Lower left lateral, central and right lateral (#23, 24, 25) are displaced and seated horizontal to the long axis of the unaffected teeth. The intubation tube is seated perfectly in this created defect. During exam it was hard to move the tube to the right or left due to the patient closing and the perfect area created to the size of the tube.       Plan: #23, 24,25 should be removed due to the severe mobility and displacement the prognosis of saving the teeth would be hopeless. Due to the stability of the patient this will need to be done in the OR. This will need to be coordinated with the [facility] residents/attandings and the cardiology team.              Chief Complaint:   Loose teeth from trauma of closing on the intubation tube.     Unable to obtain a history from the patient due to intubation.          History of Present Illness:   This patient is a 39 year old male was admitted to cardiology ICU with cardiac arrest.              Past Medical History:     Past Medical History:   Diagnosis Date     Dyslipidemia              Past Surgical History:     Past Surgical History:   Procedure Laterality Date     CV EXTRACORPERAL MEMBRANE OXYGENATION N/A 3/21/2019    Procedure: VV ECMO decannulation, put in dialysis catheter;  Surgeon: Abraham Baer MD;  Location: [facility] CARDIAC CATH LAB     CV HEART CATHETERIZATION WITH POSSIBLE INTERVENTION N/A 3/7/2019    Procedure: Coronary Angiogram;  Surgeon: Dante Molina MD;  Location: [facility] CARDIAC  CATH LAB     EMBOLECTOMY LOWER EXTREMITY Right 3/18/2019    Procedure: RIGHT COMMON FEMORAL EMBOLECTOMY AND SAPHENOUS VEIN PATCH;  Surgeon: Tereza Gibbs MD;  Location: [facility] OR     INSERT EXTRACORPORAL MEMBRANE OXYGENATOR Right 3/6/2019    Procedure: Emergent Insert VA extracorporal membrane oxygenator right groin;  Surgeon: Reggie Perez MD;  Location: [facility] OR     INSERT EXTRACORPORAL MEMBRANE OXYGENATOR N/A 3/18/2019    Procedure: RIGHT FEMORAL EXTRACORPORAL MEMBRANE OXYGENATOR DECANNULATION ; REPAIR RIGHT GROIN VENOUS AND ARTERIAL VESSELS;  Surgeon: Craig Berry MD;  Location: [facility] OR     REMOVE EXTRACORPORAL MEMBRANE OXYGENATOR ADULT N/A 3/6/2019    Procedure: EXTRACORPORAL MEMBRANE OXYGENATOR REMOVAL FEMORAL CANNULAS repair right femoral vessels;  Surgeon: Craig Berry MD;  Location: [facility] OR               Social History:     Social History     Tobacco Use     Smoking status: Not on file   Substance Use Topics     Alcohol use: Not on file             Family History:   No family history on file.          Immunizations:     There is no immunization history on file for this patient.          Allergies:   No Known Allergies          Medications:     Current Facility-Administered Medications Ordered in Epic   Medication Dose Route Frequency Last Rate Last Dose     acetaminophen (TYLENOL) tablet 325 mg  325 mg Oral Q4H PRN   325 mg at 03/06/19 1530     albuterol (PROVENTIL) neb solution 2.5 mg  2.5 mg Nebulization Q2H PRN         amiodarone (CORDARONE) suspension 200 mg  200 mg Oral or Feeding Tube Daily   200 mg at 03/26/19 0751     artificial tears ophthalmic ointment   Both Eyes Q8H PRN         B and C vitamin Complex with folic acid (NEPHRONEX) liquid 5 mL  5 mL Per Feeding Tube Daily   5 mL at 03/26/19 0751     calcium chloride 1 g in sodium chloride 0.9 % 100 mL intermittent infusion  1 g Intravenous Q4H PRN   1 g at 03/26/19 0013     calcium chloride 2 g in sodium chloride 0.9 % 100 mL intermittent infusion   2 g Intravenous Q4H PRN         calcium chloride 3 g in sodium chloride 0.9 % 200 mL intermittent infusion  3 g Intravenous Q4H PRN         calcium chloride 4 g in sodium chloride 0.9 % 200 mL intermittent infusion  4 g Intravenous Q4H PRN         Carboxymethylcellulose Sod PF (REFRESH PLUS) 0.5 % ophthalmic solution 2 drop  2 drop Both Eyes 4x Daily   2 drop at 03/26/19 0751     Continuing antiplatelet from home medication list OR antiplatelet order already placed during this visit   Does not apply DOES NOT GO TO MAR         dextrose 10 % 1,000 mL infusion   Intravenous Continuous PRN 30 mL/hr at 03/07/19 1100       glucose gel 15-30 g  15-30 g Oral Q15 Min PRN        Or     dextrose 50 % injection 25-50 mL  25-50 mL Intravenous Q15 Min PRN        Or     glucagon injection 1 mg  1 mg Subcutaneous Q15 Min PRN         glucose gel 15-30 g  15-30 g Oral Q15 Min PRN        Or     dextrose 50 % injection 25-50 mL  25-50 mL Intravenous Q15 Min PRN        Or     glucagon injection 1 mg  1 mg Subcutaneous Q15 Min PRN         dialysate for CVVHD & CVVHDF (Phoxillum BK4/2.5)  12.5 mL/kg/hr CRRT Continuous 1,100 mL/hr at 03/26/19 0753 12.5 mL/kg/hr at 03/26/19 0753     EPINEPHrine (ADRENALIN) 5 mg in sodium chloride 0.9 % 250 mL infusion  0.03-0.3 mcg/kg/min (Dosing Weight) Intravenous Continuous 12.7 mL/hr at 03/26/19 0800 0.04 mcg/kg/min at 03/26/19 0800     erythromycin (ROMYCIN) ophthalmic ointment   Left Eye 4x Daily   1 g at 03/26/19 0752     erythromycin (ROMYCIN) ophthalmic ointment   Right Eye At Bedtime   1 g at 03/26/19 0014     fentaNYL (PF) (SUBLIMAZE) injection 50 mcg  50 mcg Intravenous Q30 Min PRN   50 mcg at 03/24/19 1937     fentaNYL (SUBLIMAZE) infusion  25-50 mcg/hr Intravenous Continuous 1 mL/hr at 03/26/19 0800 50 mcg/hr at 03/26/19 0800     heparin lock flush 10 UNIT/ML injection 2-5 mL  2-5 mL Intracatheter Once PRN         heparin lock flush 10 UNIT/ML injection 5-10 mL  5-10 mL Intracatheter Q24H          heparin lock flush 10 UNIT/ML injection 5-10 mL  5-10 mL Intracatheter Q1H PRN         insulin aspart (NovoLOG) inj (RAPID ACTING)  1-12 Units Subcutaneous Q4H   1 Units at 03/26/19 0347     lidocaine (LMX4) cream   Topical Once PRN         lidocaine (LMX4) cream   Topical Q1H PRN         lidocaine 1 % 1 mL  1 mL Other Q1H PRN   6 mL at 03/21/19 1542     LUBRIFRESH P.M. OINT   Ophthalmic 4x Daily         magnesium sulfate 2 g in water intermittent infusion  2 g Intravenous Q6H PRN         meropenem (MERREM) 1 g vial to attach to  mL bag  1 g Intravenous Q8H 200 mL/hr at 03/25/19 0617 1 g at 03/26/19 0602     micafungin (MYCAMINE) 100 mg in sodium chloride 0.9 % 100 mL intermittent infusion  100 mg Intravenous Q24H 100 mL/hr at 03/25/19 1936 100 mg at 03/25/19 1936     naloxone (NARCAN) injection 0.1-0.4 mg  0.1-0.4 mg Intravenous Q2 Min PRN         nitroGLYcerin (NITROSTAT) sublingual tablet 0.4 mg  0.4 mg Sublingual Q5 Min PRN         No heparin required   Does not apply Continuous PRN         norepinephrine (LEVOPHED) 16 mg in D5W 250 mL infusion  0.03-0.4 mcg/kg/min Intravenous Continuous 4 mL/hr at 03/26/19 0800 0.04 mcg/kg/min at 03/26/19 0800     pantoprazole (PROTONIX) 40 mg IV push injection  40 mg Intravenous BID   40 mg at 03/26/19 0347     Percutaneous Coronary Intervention orders placed (this is information for BPA alerting)   Does not apply DOES NOT GO TO MAR         polyethylene glycol (MIRALAX/GLYCOLAX) Packet 17 g  17 g Oral or Feeding Tube Daily PRN   17 g at 03/21/19 1039     POST-filter replacement solution for CVVHD & CVVHDF (Phoxillum BK4/2.5)   CRRT Continuous 200 mL/hr at 03/26/19 0758 1.887 mL/kg/hr at 03/26/19 0758     potassium chloride 20 mEq in 50 mL intermittent infusion  20 mEq Intravenous Q6H PRN         PRE-filter replacement solution for CVVHD & CVVHDF (Phoxillum BK4/2.5)  12.5 mL/kg/hr CRRT Continuous 1,100 mL/hr at 03/26/19 0753 12.5 mL/kg/hr at 03/26/19 0753     Reason  ACE/ARB/ARNI order not selected   Other DOES NOT GO TO MAR         Reason beta blocker not prescribed   Does not apply DOES NOT GO TO MAR         senna-docusate (SENOKOT-S/PERICOLACE) 8.6-50 MG per tablet 1 tablet  1 tablet Oral or Feeding Tube At Bedtime PRN   1 tablet at 03/20/19 2008     sennosides (SENOKOT) tablet 8.6 mg  8.6 mg Oral BID   Stopped at 03/23/19 0800     sodium chloride (PF) 0.9% PF flush 10-20 mL  10-20 mL Intracatheter q1 min prn         sodium chloride (PF) 0.9% PF flush 3 mL  3 mL Intracatheter Q1H PRN         sodium chloride (PF) 0.9% PF flush 3 mL  3 mL Intracatheter Q8H   3 mL at 03/26/19 0752     sodium chloride (PF) 0.9% PF flush 5-50 mL  5-50 mL Intracatheter Once PRN         sodium phosphate 20 mmol in D5W 100 mL intermittent infusion  20 mmol Intravenous Q6H PRN         ticagrelor (BRILINTA) tablet 90 mg  90 mg Oral or Feeding Tube BID   90 mg at 03/26/19 0751     vancomycin (VANCOCIN) 1,750 mg in sodium chloride 0.9 % 250 mL intermittent infusion  1,750 mg (central catheter) Intravenous Q24H   1,750 mg at 03/25/19 1300     No current [facility]-ordered outpatient medications on file.             Review of Systems:   The 10 point Review of Systems is negative other than noted in the HPI            Physical Exam:   Vitals were reviewed  Temp: 98  F (36.7  C) Temp src: Oral     Heart Rate: 70 Resp: 28 SpO2: 100 % O2 Device: Mechanical Ventilator      Head and neck exam:   +No lymphadenopathy  +No masses or lesions    Intraoral exam:  +Limited view due to intubation tube   +Teeth #23, 24, 25 displaced and sitting horizontal to normal long axis of teeth (intubation tube in this area)   +unable to fully assess if maxillary anteriors affected, #9 not aligned with #8 but unable to assess if mobile and if was out of alignment prior to hospitalization.          Data:   Radiographic interpretation: No imaging obtained.   Osseous pathology:  Pulpal Pathology:  Periodontal  Pathology:  Caries:  Odontogenic pathology:    The patient was discussed with: Dr. Caraballo and Dr. Samuel Go, DMD   PGY1  Pager: [phone]

## 2019-03-26 NOTE — PROGRESS NOTES
Nephrology Progress Note  03/26/2019         Mr Riddle is a 39 yom w/HLP who had cardiac arrest on 2/23/19 with unclear down-time.  PCI done emergently, Nephrology consulted for management of FERNANDA, started CRRT on 2/25.       Interval History :   Mr Riddle continues on CRRT for management of electrolytes and volume, still on 2 low dose pressors but able to be net negative the past week.  No major changes in plan, trying to wean PEEP to facilitate trach placement, likely PEG and tunneled line, current line is from 3/21.     Assessment & Recommendations:   FERNANDA-Baseline Cr unclear, admitted with Cr of 1.7 post arrest.  FERNANDA due to hemodynamic injury, also received contrast and had elevated CK.  Started CRRT on 2/25, has been anuric since starting CRRT, ~48h after arrest.  Continuing CRRT with no signs of recovery, now off of both VV and VA ECMO, still on pressors, gently pulling fluid but nearly euvolemic.                -Access is [facility], 3/21 positional initially but functioning well currently.                -CRRT, 0-50cc/hr as BP's allow, mix of 4k/2k baths, nearing euvolemic.      Volume status-Net negative slightly yesterday, mainly due to GI output but continues to improve volume status.  Still on 2 pressors.       Electrolytes/pH-No acute issues on CRRT, K 4.2, bicarb 20, on mix of 4k/2k baths.       Ca/phos/pth-Ca 7.8, Mg 2.4, phos 4.8, stable with CRRT.      Anemia-Hgb 8.8, needing intermittent PRBC's with ECMO and bleeding.     Nutrition-Impact TF.       Seen and discussed with Dr Rajan     Recommendations were communicated to primary team via verbal communication.    Darren Vidal  Clinical Nurse Specialist  [phone]    Review of Systems:   I reviewed the following systems:  Gen: No fevers or chills  CV: No CP at rest  Resp: No SOB at rest  GI: No N/V    Physical Exam:   I/O last 3 completed shifts:  In: 2269.97 [I.V.:1340.97; Other:4; NG/GT:205]  Out: 3624 [Emesis/NG output:475; Other:1549; Stool:1600]   BP  "144/90 (BP Location: Left leg)   Pulse 89   Temp 98  F (36.7  C) (Axillary)   Resp (!) 35   Ht 1.68 m (5' 6.14\")   Wt 90.6 kg (199 lb 11.8 oz)   SpO2 97%   BMI 32.10 kg/m       GENERAL APPEARANCE: Intubated and sedated, on VV ECMO and IABP  EYES:  No scleral icterus, pupils equal  HENT: mouth without ulcers or lesions  PULM: lungs clear to auscultation, equal air movement, no cyanosis or clubbing  CV: regular rhythm, normal rate, no rub     -edema +1 LE  GI: soft, non-tender, non-distended, dark red stool  MS: no evidence of inflammation in joints, no muscle tenderness  NEURO:  Intubated and sedated        Labs:   All labs reviewed by me  Electrolytes/Renal -   Recent Labs   Lab Test 03/26/19  1116 03/26/19 0329 03/25/19 2151    137 138   POTASSIUM 4.2 3.8 3.7   CHLORIDE 105 107 106   CO2 20 23 22   BUN 46* 48* 51*   CR 0.87 1.01 0.96   * 156* 162*   ALEKSANDER 7.8* 7.9* 7.6*   MAG 2.4* 2.6* 2.6*   PHOS 4.8* 4.4 4.0       CBC -   Recent Labs   Lab Test 03/26/19  1116 03/26/19 0329 03/25/19 2151   WBC 23.1* 21.8* 21.7*   HGB 8.8* 8.4* 8.8*    298 298       LFTs -   Recent Labs   Lab Test 03/26/19  1116 03/26/19  0329 03/25/19  2151   ALKPHOS 298* 284* 305*   BILITOTAL 26.0* 25.3* 27.4*   * 209* 219*   * 244* 260*   PROTTOTAL 5.1* 4.9* 5.0*   ALBUMIN 1.8* 1.7* 1.7*       Iron Panel - No lab results found.        Current Medications:    amiodarone  200 mg Oral or Feeding Tube Daily     B and C vitamin Complex with folic acid  5 mL Per Feeding Tube Daily     Carboxymethylcellulose Sod PF  2 drop Both Eyes 4x Daily     erythromycin   Left Eye 4x Daily     erythromycin   Right Eye At Bedtime     heparin lock flush  5-10 mL Intracatheter Q24H     insulin aspart  1-12 Units Subcutaneous Q4H     LUBRIFRESH P.M.   Ophthalmic 4x Daily     meropenem  1 g Intravenous Q8H     micafungin  100 mg Intravenous Q24H     pantoprazole (PROTONIX) IV  40 mg Intravenous BID     sennosides  8.6 mg Oral " BID     sodium chloride (PF)  3 mL Intracatheter Q8H     ticagrelor  90 mg Oral or Feeding Tube BID       - MEDICATION INSTRUCTIONS -       IV fluid REPLACEMENT ONLY 30 mL/hr at 03/07/19 1100     CRRT replacement solution 12.5 mL/kg/hr (03/26/19 1357)     EPINEPHrine IV infusion ADULT 0.03 mcg/kg/min (03/26/19 1500)     fentaNYL Stopped (03/26/19 0900)     - MEDICATION INSTRUCTIONS -       norepinephrine 0.04 mcg/kg/min (03/26/19 1500)     Percutaneous Coronary Intervention orders placed (this is information for BPA alerting)       CRRT replacement solution 1.887 mL/kg/hr (03/26/19 0758)     CRRT replacement solution 12.5 mL/kg/hr (03/26/19 1356)     ACE/ARB/ARNI NOT PRESCRIBED       BETA BLOCKER NOT PRESCRIBED         I, Thuy Rajan, saw and evaluated this patient as part of a shared visit.  I have reviewed and discussed with the advanced practice provider their history, physical and plan.    I personally reviewed the vital signs, medications, labs and imaging.    My key history or physical exam findings:  FERNANDA on CRRT for volume and solute control  Key management decisions made by me:  Continue CRRT, 0-50 negative per  hour    Thuy Rajan  Date of Service (when I saw the patient): 3/26

## 2019-03-27 NOTE — PROGRESS NOTES
"CRRT STATUS NOTE    DATA:  Time:  7:30 AM  Pressures WNL:  YES  Filter Status:  WDL    Problems Reported/Alarms Noted:  None    Supplies Present:  YES    ASSESSMENT:  Patient Net Fluid Balance:  -57ml since 0000  Vital Signs:  /90 (BP Location: Left leg)   Pulse 89   Temp 99.5  F (37.5  C) (Axillary)   Resp 28   Ht 1.68 m (5' 6.14\")   Wt 91.4 kg (201 lb 8 oz)   SpO2 92%   BMI 32.38 kg/m    Labs:    Lab Results   Component Value Date     03/27/2019    POTASSIUM 4.6 03/27/2019    CHLORIDE 105 03/27/2019    CO2 19 (L) 03/27/2019     (H) 03/27/2019     Lab Results   Component Value Date    BUN 39 (H) 03/27/2019    CR 0.89 03/27/2019     Goals of Therapy:  Meeting goals    INTERVENTIONS:   None    PLAN:  Contact CRRT RN at [phone] with any questions or concerns.      "

## 2019-03-27 NOTE — PROGRESS NOTES
[facility] ID SERVICE: ONGOING CONSULTATION      Patient:  Hellen Riddle, Date of birth 1980, Medical record number [id number]  Date of Visit:  March 27, 2019         Assessment and Recommendations:   Problem List:  - s/p VF cardiac arrest 2/23/19  -Aorto-esophageal fistula diagnosed 3/13. Not a candidate for repair.  -Right posterior parietal/occipital intraparenchymal hemorrhage, concern for possible abscess as well  -Leukocytosis  -Lactic acidosis (resolved now)  -Hypoxic respiratory failure, remains on ventilator  -Influenza A infection, status post 9 days of tamiflu (stopped 3/14)  -E. 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  - Candidemia 3/18, 3/19       Impression: 40 yo male admitted 2/23/19 with refractory VF OHCA s/p VA ECMO on admission, found to have 3v CAD s/p intervention. ID consulted 3/4/19 for increasing leukocytosis, hypoxia, lactic acidosis possibly related to influenza A or VAP, both of which were treated. Found to have VAP, on Meropenem since 3/14. Though his course should be finishing, patient was febrile over the weekend, but afebrile overnight.     Found to have Candidemia on 3/18, 3/19 -just started Micafungin on 3/22. Leukocytosis this am could be from ongoing candidemia, endocarditis, endophthalmitis -all complications of candidemia. TTE negative. Awaiting JOSLYN to evaluate for endocarditis. Course of Micafungin will depend on JOSLYN results.       Recommendations:  1. Continue Micafungin d1=3/20 (today is day 8)  2. No need for daily blood cultures if afebrile   3. Awaiting JOSLYN to evaluate for endocarditis  4. Okay to continue Meropenem for now       ID will continue to follow      Attestation:  I have reviewed today's vital signs, medications, labs and imaging.  Shima Stone MD  Pager  [phone]          Interval History:       Patient Tm 99.5 overnight. No events.          Review of Systems:   Patient unable to answer questions         Current Antimicrobials   Vancomycin 1750mg -stopped 3/26  Meropenem 1g IV q8h  Micafungin 150mg IV q24h         Physical Exam:   Ranges for vital signs:  Temp:  [96.3  F (35.7  C)-99.5  F (37.5  C)] 97.5  F (36.4  C)  Heart Rate:  [67-99] 82  Resp:  [21-39] 39  MAP:  [52 mmHg-106 mmHg] 101 mmHg  Arterial Line BP: ()/(43-80) 154/73  FiO2 (%):  [40 %-60 %] 50 %  SpO2:  [91 %-100 %] 100 %      Exam:  GENERAL:  Jaundiced, does not respond to questioning.   ENT:  Head is normocephalic, atraumatic. Oropharynx is moist without exudates or ulcers.  EYES:  Eyes have anicteric sclerae. PERRL.   NECK:  Supple. No cervical lymphadenopathy  LUNGS:  Coarse anteriorly.   CARDIOVASCULAR:  Regular rate and rhythm with no murmurs, gallops or rubs.  ABDOMEN:  Normal bowel sounds, soft, nontender. No hepatosplenomegaly.   EXT: Extremities warm and edematous.  SKIN:  No acute rashes.          Laboratory Data:     Creatinine   Date Value Ref Range Status   03/27/2019 PENDING 0.66 - 1.25 mg/dL Incomplete   03/27/2019 0.89 0.66 - 1.25 mg/dL Final   03/26/2019 1.00 0.66 - 1.25 mg/dL Final   03/26/2019 0.95 0.66 - 1.25 mg/dL Final   03/26/2019 0.87 0.66 - 1.25 mg/dL Final     WBC   Date Value Ref Range Status   03/27/2019 30.2 (H) 4.0 - 11.0 10e9/L Final   03/27/2019 30.9 (H) 4.0 - 11.0 10e9/L Final   03/26/2019 26.6 (H) 4.0 - 11.0 10e9/L Final   03/26/2019 25.2 (H) 4.0 - 11.0 10e9/L Final   03/26/2019 23.1 (H) 4.0 - 11.0 10e9/L Final     Hemoglobin   Date Value Ref Range Status   03/27/2019 8.8 (L) 13.3 - 17.7 g/dL Final     Platelet Count   Date Value Ref Range Status   03/27/2019 379 150 - 450 10e9/L Final     Sed Rate   Date Value Ref Range Status   03/27/2019 57 (H) 0 - 15 mm/h Final   03/26/2019 69 (H) 0 - 15 mm/h Final   03/24/2019 58 (H) 0 - 15 mm/h Final   03/24/2019 56  (H) 0 - 15 mm/h Final   03/23/2019 44 (H) 0 - 15 mm/h Final     CRP Inflammation   Date Value Ref Range Status   03/27/2019 79.0 (H) 0.0 - 8.0 mg/L Final   03/26/2019 70.0 (H) 0.0 - 8.0 mg/L Final   03/25/2019 70.0 (H) 0.0 - 8.0 mg/L Final   03/24/2019 74.0 (H) 0.0 - 8.0 mg/L Final   03/24/2019 86.0 (H) 0.0 - 8.0 mg/L Final     AST   Date Value Ref Range Status   03/27/2019 354 (H) 0 - 45 U/L Final   03/27/2019 318 (H) 0 - 45 U/L Final   03/26/2019 300 (H) 0 - 45 U/L Final   03/26/2019 305 (H) 0 - 45 U/L Final   03/26/2019 282 (H) 0 - 45 U/L Final     ALT   Date Value Ref Range Status   03/27/2019 253 (H) 0 - 70 U/L Final   03/27/2019 232 (H) 0 - 70 U/L Final   03/26/2019 226 (H) 0 - 70 U/L Final   03/26/2019 234 (H) 0 - 70 U/L Final   03/26/2019 219 (H) 0 - 70 U/L Final     Bilirubin Total   Date Value Ref Range Status   03/27/2019 24.6 (HH) 0.2 - 1.3 mg/dL Final     Comment:     Critical result, provider not notified due to previous critical result   notification.     03/27/2019 23.7 (HH) 0.2 - 1.3 mg/dL Final     Comment:     Critical result, provider not notified due to previous critical result   notification.     03/26/2019 23.7 (HH) 0.2 - 1.3 mg/dL Final     Comment:     Critical result, provider not notified due to previous critical result   notification.     03/26/2019 25.4 (HH) 0.2 - 1.3 mg/dL Final     Comment:     Critical result, provider not notified due to previous critical result   notification.     03/26/2019 26.0 (HH) 0.2 - 1.3 mg/dL Final     Comment:     Critical result, provider not notified due to previous critical result   notification.       Lab Results   Component Value Date     03/27/2019    BUN 38 (H) 03/27/2019    CO2 20 03/27/2019       Culture data:  3/26 Blood culture NGTD  3/25 Blood culture NGTD  3/24 Cdiff PCR negative  3/24 Blood culture NGTD  3/23 Sputum culture Klebsiella -light growth  3/23 Blood culture NGTD  3/22 Sputum culture candida  3/21 Blood cultures NGTD  3/20  Blood culture NGTD  3/19 Blood culture: Candida albicans  3/18 Blood culture: Candida albicans  3/15 Blood cultures x2 Negative  3/15 Sputum culture: Klebsiella and Ecoli    All cultures:  Recent Labs   Lab 03/27/19  0320 03/27/19  0318 03/26/19  0646 03/25/19  0351 03/24/19  0415 03/23/19  0804 03/23/19  0508 03/22/19  0248 03/21/19  0629 03/21/19  0303   CULT No growth after 1 hour PENDING No growth after 1 day No growth after 2 days No growth after 3 days Moderate growth  Candida albicans / dubliniensis  Candida albicans and Candida dubliniensis are not routinely speciated  Susceptibility testing not routinely done  *  Light growth  Klebsiella pneumoniae  Susceptibility testing done on previous specimen  * No growth after 4 days Heavy growth  Richa albicans / dubliniensis  Candida albicans and Candida dubliniensis are not routinely speciated  Susceptibility testing not routinely done  * Light growth  Klebsiella pneumoniae  *  Heavy growth  Candida albicans / dubliniensis  Candida albicans and Candida dubliniensis are not routinely speciated  Susceptibility testing not routinely done  * No growth     Imaging: Reviewed in Epic    3/23 TTE negative for vegetation

## 2019-03-27 NOTE — PROGRESS NOTES
OPHTHALMOLOGY CONSULT NOTE  03/25/19    Patient: Hellen Riddle  Consulted by: Dr. Caraballo  Reason for Consult: Candidemia     HISTORY OF PRESENTING ILLNESS:     Hellen Riddle is a 39 year old male who is admitted since 2/23/19 after VF cardiac arrest, found to have 3v CAD s/p PCI, remains intubated and sedated. 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 Coli and klebsiella VAP on meropenem, FERNANDA on CRRT, shock liver, acute blood loss anemia from GI bleed and candidemia. We are consulted to rule out endophthalmitis. No family at bedside. Patient intubated and sedated.     Review of systems- unable.    Interval update: Patient remains intubated and sedated. No family at bedside.     OCULAR/MEDICAL/SURGICAL HISTORIES:     Past Ocular History:  Unknown    Past Medical History:   Diagnosis Date     Dyslipidemia        Past Surgical History:   Procedure Laterality Date     CV EXTRACORPERAL MEMBRANE OXYGENATION N/A 3/21/2019    Procedure: VV ECMO decannulation, put in dialysis catheter;  Surgeon: Abraham Baer MD;  Location:  [facility]ATH [facility]     CV HEART CATHETERIZATION WITH POSSIBLE INTERVENTION N/A 3/7/2019    Procedure: Coronary Angiogram;  Surgeon: Dante Molina MD;  Location:  HEART CARD[facility]AC CATH LAB     EMBOLECTOMY LOWER EXTREMITY Right 3/18/2019    Procedure: RIGHT COMMON FEMORAL EMBOLECTOMY AND SAPHENOUS VEIN PATCH;  Surgeon: Tereza Gibbs MD;  Location: [facility] OR     INSERT EXTRACORPORAL MEMBRANE OXYGENATOR Right 3/6/2019    Procedure: Emergent Insert VA extracorporal membrane oxygenator right groin;  Surgeon: Reggie Perez MD;  Location: [facility] OR     INSERT EXTRACORPORAL MEMBRANE OXYGENATOR N/A 3/18/2019    Procedure: RIGHT FEMORAL EXTRACORPORAL MEMBRANE OXYGENATOR DECANNULATION ; REPAIR RIGHT GROIN VENOUS AND ARTERIAL VESSELS;  Surgeon: Craig Berry MD;  Location: [facility] OR     REMOVE EXTRACORPORAL MEMBRANE OXYGENATOR  ADULT N/A 3/6/2019    Procedure: EXTRACORPORAL MEMBRANE OXYGENATOR REMOVAL FEMORAL CANNULAS repair right femoral vessels;  Surgeon: Craig Berry MD;  Location: [facility] OR       EXAMINATION:     Base Eye Exam     Visual Acuity    Unable           Pupils       Pupils APD    Right PERRL None    Left PERRL None            Slit Lamp and Fundus Exam     Slit Lamp Exam       Right Left    Lids/Lashes Jaundice, improving lag  Jaundice, improving lag    Conjunctiva/Sclera Icteric, chemosis inferiorly Icteric, chemosis inferiorly    Cornea Irregular, PEE, no anselmo defect Epi defect centrally without infiltrate stable from last exam    Anterior Chamber Deep and quiet Deep and quiet    Iris Round and reactive Round and reactive    Lens NS  NS               Labs/Studies/Imaging Performed  WBC 22K, Hgb 8.8     ASSESSMENT/PLAN:     Hellen Riddle is a 39 year old male who presents in critical condition with multiple complications after cardiac arrest 2/23/19 with new candidemia.     Candidemia with bilateral white retinal lesions   -Dilated exam yesterday with few heme and white lesions in both eyes-do not appear fluffy like typical fungal lesions, more consistent with cotton wool spots, however view tough given very poor surface left > right eye in the setting of significant lagophthalmos   -ID following remains on broad spectrum antibiotics as well as micafungin   -Will repeat dilated exam in the next few days to assess for any change    Lagophthalmos, bilateral with epi defect left eye  -Significant lagophthalmos of both eyes with large epithelial defect of central cornea on the left eye, no infiltrate noted-stable on today's exam  -Continue moisture chamber   -Continue erythromycin ointment QID left eye  -Continue lubricating ointment QID left eye, alternating with erythromycin  -Continue lubricating ointment QID right eye    Ophthalmology will continue to follow, plan for repeat surface check tomorrow.    It is our pleasure to  participate in this patient's care and treatment. Please contact us with any further questions or concerns.    Mayra Sexton MD  Ophthalmology Resident, PGY-2    Teaching Statement  I did not examine the patient, but was available to see the patient if needed. I have discussed the case with the resident and the assessment and plan as documented seems reasonable to me.    Sharmila Mccartney MD  Comprehensive Ophthalmology & Ocular Pathology  Department of Ophthalmology and Visual Neurosciences  [email or web address]  Pager [phone]

## 2019-03-27 NOTE — PLAN OF CARE
Discharge Planner OT   Patient plan for discharge: unstated  Current status: pt with no functional command following, PROM only today of which pt demos moderate deficits throughout likely 2/2 edema and ligamentous tightness, however B shoulders PROM WFL. CMV PEEP 5 FiO2 50%  Barriers to return to prior living situation: decreased mental status, deconditioning   Recommendations for discharge: LTC with potential for LTACH  Rationale for recommendations: currently pt showing no signs of functional response with limited rehab potential at this time, if pt progresses in functional skills he would likely require LTACH to regain cognitive and physical function.        Entered by: Dieudonne Woodson 03/27/2019 9:19 AM

## 2019-03-27 NOTE — PROGRESS NOTES
Nephrology Progress Note  03/27/2019         Mr Riddle is a 39 yom w/HLP who had cardiac arrest on 2/23/19 with unclear down-time.  PCI done emergently, Nephrology consulted for management of FERNANDA, started CRRT on 2/25.       Interval History :   Mr Riddle continues on CRRT for management of electrolytes and volume, with large GI output in the last week he has not needed more than 1.7L of UF in a day, most are in the 0.7-1L range which have made him net negative and near euvolemic.  Given this, can hold on restarting CRRT if the circuit clots overnight, can try iHD with minimal UF.  Line is still temp LIJ from 3/21, will consider tunneled line soon along with Trach/PEG if able.      Assessment & Recommendations:   FERNANDA-Baseline Cr unclear, admitted with Cr of 1.7 post arrest.  FERNANDA due to hemodynamic injury, also received contrast and had elevated CK.  Started CRRT on 2/25, has been anuric since starting CRRT, ~48h after arrest.  Continuing CRRT with no signs of recovery, now off of both VV and VA ECMO, still on pressors, gently pulling fluid but nearly euvolemic.                -Access is LIJ, 3/21 positional initially but functioning well currently.                -CRRT, 0-50cc/hr as BP's allow, 4k baths, can hold on restarting should circuit clot.      Volume status-Net negative 1L yesterday, mainly due to GI output but continues to improve volume status.  On 2 low dose pressors, intake essentially matched by GI output and insensible losses, has not needed large UF in the past week to manage volume.  Given this, plan to hold on restarting should circuit clot and can transition to iHD, would need 1-2L of UF with runs every other day currently.       Electrolytes/pH-No acute issues on CRRT, K 4.7, bicarb 20, on 4k baths.       Ca/phos/pth-Ca 7.8, Mg 2.7, phos 5.2, stable with CRRT.      Anemia-Hgb 8.8, needing intermittent PRBC's with ECMO and bleeding.     Nutrition-Impact TF.       Seen and discussed with  "Dr Rajan     Recommendations were communicated to primary team via verbal communication.        Darren Vidal  Clinical Nurse Specialist  [phone]    Review of Systems:   I reviewed the following systems:  Gen: No fevers or chills  CV: No CP at rest  Resp: No SOB at rest  GI: No N/V    Physical Exam:   I/O last 3 completed shifts:  In: 1357.22 [I.V.:1170.22; Other:12; NG/GT:175]  Out: 2414 [Emesis/NG output:700; Other:1064; Stool:650]   /90 (BP Location: Left leg)   Pulse 89   Temp 97.6  F (36.4  C) (Axillary)   Resp (!) 31   Ht 1.68 m (5' 6.14\")   Wt 91.4 kg (201 lb 8 oz)   SpO2 90%   BMI 32.38 kg/m       GENERAL APPEARANCE: Intubated and sedated, on VV ECMO and IABP  EYES:  No scleral icterus, pupils equal  HENT: mouth without ulcers or lesions  PULM: lungs clear to auscultation, equal air movement, no cyanosis or clubbing  CV: regular rhythm, normal rate, no rub     -edema +1 LE  GI: soft, non-tender, non-distended, dark red stool  MS: no evidence of inflammation in joints, no muscle tenderness  NEURO:  Intubated and sedated    Labs:   All labs reviewed by me  Electrolytes/Renal -   Recent Labs   Lab Test 03/27/19  1006 03/27/19 0318 03/26/19 2131    136 138   POTASSIUM 4.7 4.6 4.4   CHLORIDE 105 105 106   CO2 20 19* 20   BUN 38* 39* 40*   CR 1.02 0.89 1.00   * 111* 100*   ALEKSANDER 7.8* 7.5* 7.8*   MAG 2.7* 2.7* 2.5*   PHOS 5.2* 5.4* 4.9*       CBC -   Recent Labs   Lab Test 03/27/19  1006 03/27/19 0318 03/26/19 2131   WBC 30.2* 30.9* 26.6*   HGB 8.8* 8.7* 8.5*    334 335       LFTs -   Recent Labs   Lab Test 03/27/19  1006 03/27/19 0318 03/26/19  2131   ALKPHOS 287* 266* 278*   BILITOTAL 24.6* 23.7* 23.7*   * 232* 226*   * 318* 300*   PROTTOTAL 5.2* 4.6* 4.9*   ALBUMIN 1.8* 1.7* 1.7*       Iron Panel - No lab results found.        Current Medications:    amiodarone  200 mg Oral or Feeding Tube Daily     B and C vitamin Complex with folic acid  5 mL Per " Feeding Tube Daily     Carboxymethylcellulose Sod PF  2 drop Both Eyes 4x Daily     erythromycin   Left Eye 4x Daily     erythromycin   Right Eye At Bedtime     heparin lock flush  5-10 mL Intracatheter Q24H     insulin aspart  1-12 Units Subcutaneous Q4H     LUBRIFRESH P.M.   Ophthalmic 4x Daily     meropenem  1 g Intravenous Q8H     micafungin  100 mg Intravenous Q24H     pantoprazole (PROTONIX) IV  40 mg Intravenous BID     sennosides  8.6 mg Oral BID     sodium chloride (PF)  3 mL Intracatheter Q8H     ticagrelor  90 mg Oral or Feeding Tube BID       - MEDICATION INSTRUCTIONS -       IV fluid REPLACEMENT ONLY 30 mL/hr at 03/07/19 1100     CRRT replacement solution 12.5 mL/kg/hr (03/27/19 1317)     EPINEPHrine IV infusion ADULT 0.05 mcg/kg/min (03/27/19 1400)     fentaNYL Stopped (03/26/19 0900)     - MEDICATION INSTRUCTIONS -       norepinephrine 0.04 mcg/kg/min (03/27/19 1422)     Percutaneous Coronary Intervention orders placed (this is information for BPA alerting)       CRRT replacement solution 1.887 mL/kg/hr (03/27/19 1109)     CRRT replacement solution 12.5 mL/kg/hr (03/27/19 1317)     ACE/ARB/ARNI NOT PRESCRIBED       BETA BLOCKER NOT PRESCRIBED       I, Thuy Rajan, saw and evaluated this patient as part of a shared visit.  I have reviewed and discussed with the advanced practice provider their history, physical and plan.    I personally reviewed the vital signs, medications, labs and imaging.    My key history or physical exam findings:  FERNANDA, on dialysis for volume and solute control  Key management decisions made by me:  Continue CRRT, but no restart transition to IHD if clots; on low dose epi/norepi, will see if IHD is tolerated    Thuy Rajan  Date of Service (when I saw the patient): 3/27

## 2019-03-27 NOTE — PROGRESS NOTES
03/27/19 0900   Quick Adds   Type of Visit Initial Occupational Therapy Evaluation   Living Environment   Living Environment Comment unable to gather any social information.    Self-Care   Activity/Exercise/Self-Care Comment unable to assess   Functional Level   Prior Functional Level Comment unable to assess, no family present to gather information.    General Information   Referring Physician Lion Caraballo   Patient/Family Goals Statement unstated   Additional Occupational Profile Info/Pertinent History of Current Problem 40 yo male with CAD and cardiac arrest placed on ECMO. Course was complicated by influenza and bacterial pneumonia, difficultly weaning ecmo and was on VA and VV emco for a time. He had a massive GI bleed and CT showed aorto-esophageal fistula, non-operative but bleeding has stopped. Right intraparenchymal hemorrhage which is improving.    Precautions/Limitations fall precautions;oxygen therapy device and L/min  (PEEP 5, FiO2 50%)   General Observations pt intubates with no sedation, no active or functional response to stimuli, minimal B UE movement in response to ROM.    Cognitive Status Examination   Level of Consciousness unresponsive   Cognitive Comment will assess as appropriate, pt unresponsive at this time.    Visual Perception   Visual Perception Comments unable to assess, pt not tracking therapist   Sensory Examination   Sensory Quick Adds Other (describe)   Sensory Comments no overt response to painful stimuli.    Integumentary/Edema   Integumentary/Edema other (describe)   Integumentary/Edema Comments moderate edema in B UE/LE   Range of Motion (ROM)   ROM Quick Adds Other (describe)   ROM Comment Pt with overal moderate deficits in B UE/LE except in B shoulders which are WFL all PROM, PROM deficits likely caused by edema however ligamentous tightness noted throughout as well except in B shoulders as noted above.    Strength   Manual Muscle Testing Quick Adds Other   Strength Comments Pt  unable to particiapte   Hand Strength   Hand Strength Comments unable to assess   Coordination   Coordination Comments unable to assess   Upper Body Dressing   Level of [place]: Dress Upper Body dependent (less than 25% patients effort)   Grooming   Level of [place]: Grooming dependent (less than 25% patients effort)   Instrumental Activities of Daily Living (IADL)   IADL Comments unable to gather any information on PLOF   Activities of Daily Living Analysis   Impairments Contributing to Impaired Activities of Daily Living cognition impaired;coordination impaired;flexibility decreased;strength decreased   General Therapy Interventions   Planned Therapy Interventions ADL retraining;IADL retraining;bed mobility training;cognition;fine motor coordination training;joint mobilization;manual therapy;motor coordination training;neuromuscular re-education;orthotic fitting/training;ROM;strengthening;stretching;transfer training;visual perception;home program guidelines;progressive activity/exercise;risk factor education   Clinical Impression   Criteria for Skilled Therapeutic Interventions Met yes, treatment indicated   OT Diagnosis decreased ADL I   Assessment of Occupational Performance 5 or more Performance Deficits   Identified Performance Deficits dressing, bathing,otileting, G/H, leisure, homemaking.    Clinical Decision Making (Complexity) Low complexity   Therapy Frequency 3 times/wk   Predicted Duration of Therapy Intervention (days/wks) 4 weeks   Anticipated Discharge Disposition Long Term Care Facility   Risks and Benefits of Treatment have been explained. Yes   Patient, Family & other staff in agreement with plan of care (pt unable to demo understanding. )   Clinical Impression Comments Pt presents to OT with cognitive deficits, decreased ROM, strength deficits and at great risk of further decompensation. pt to benefit from skileld OT intervention to address the above problem list. See daily note for  "treatment provided today.    [facility] TM \"6 Clicks\"   2016, Trustees of [facility], under license to [employer].  All rights reserved.   6 Clicks Short Forms Daily Activity Inpatient Short Form   [facility]  \"6 Clicks\" Daily Activity Inpatient Short Form   1. Putting on and taking off regular lower body clothing? 1 - Total   2. Bathing (including washing, rinsing, drying)? 1 - Total   3. Toileting, which includes using toilet, bedpan or urinal? 1 - Total   4. Putting on and taking off regular upper body clothing? 1 - Total   5. Taking care of personal grooming such as brushing teeth? 1 - Total   6. Eating meals? 1 - Total   Daily Activity Raw Score (Score out of 24.Lower scores equate to lower levels of function) 6   Total Evaluation Time   Total Evaluation Time (Minutes) 3     "

## 2019-03-27 NOTE — PROGRESS NOTES
[facility]  Cardiac ICU Progress Note  03/27/2019           Key events - last 24 hours / Hospital course     40 yo male with CAD and cardiac arrest placed on ECMO. Course was complicated by influenza and bacterial pneumonia, difficultly weaning ecmo and was on VA and VV emco for a time. He had a massive GI bleed and CT showed aorto-esophageal fistula, non-operative but bleeding has stopped. Right intraparenchymal hemorrhage which is improving.     Previously required VA ECMO then VV ECMO, now off ECMO. Still on CRRT. Neuro function slow to recover. Started on micafungin 3/21/2019 for yeast in blood. A new HD line is in the left neck and PICC is in place.     Pt remains with eyes open, prn eye gtts given and eye covers in place. Contracts lower extremities slightly, withdraws to pain.     HD line replaced overnight 3/22-3/23.  Arterial line replaced 3/24.  TTE 3/23 showed LVEF 40-45%, normal RV function, no significant valvular abnormalities.  Ophthalmology consulted for endophthalmitis routine evaluation in setting of candidemia, appreciate assistance, antibiotic ointment ordered  PIV lines removed   Continue off sedation to assess neuro function  Vent requirements are now at goal PEEP 5 and FiO2 <60% for consideration of tracheostomy per Surgery recs (consult 3/22)   Dental consult for damaged teeth 3/24 after biting the ET tube, appreciate assistance  JOSLYN after teeth are extracted, which should happen after tracheostomy              Assessment and Plan:   Hellen Riddle is a 39 year old male who was admitted on 2/23/2019 after cardiac arrest.    Neurology: Intraparenchymal hemorrhage  CT head from 3/18 is stable  Intubated, off sedation since 3/14, restarted fentanyl at 25mcg 3/25 after he bit his ETT and damaged his teeth, off 3/26  --monitor   Cardiovascular / Hemodynamics: Refractory VF arrest, placed on VA ECMO, VV ecmo added.  Coronary angiography on admission showed 3v disease and he  underwent MITA to mRCA & mLCx. Has IABP as well.  ECMO decannulation stymied by VT in OR on 3/6. Returned to the cath lab for MITA to  LAD and distal LCx.   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 Stable  SFA occlusion s/p embolectomy  TTE: LV EF 50-55%.  RV dysfunction is mild.  --IABP removed 3/17/19  --VA ecmo removed 3/18/19  --vascular surgery consulted for aorto-esophageal fistula, but declined to operate due to futility and high risk; will reconsult if he continues to improve  --wean pressors as able; MAP goal >65  --holding heparin gtt and ASA; cont ticagrelor  --hold lipitor for now given hepatic injury during arrest  --hold ACE/ARB for now given renal failure and shock  --holding beta blocker given shock    Pulmonary: Acute hypoxic respiratory failure due to pulmonary edema, VAP  --surgery consulted for trach/peg-- 3/22/2019 Surgery evaluated, determined it was not clinically appropriate at this time, reassessing 3/27 now that vent requirements are decr  --cont abx as below  --wean vent as able  --daily CXR  -- ABGs     GI and Nutrition: GI bleeding from aorto-esophageal fistula: stable  Shock liver  OG output likely old blood   Liver and splenic infarcts  --PPI BID  --tube feeds at goal  --monitor BID LFTs   Renal, Fluid and Electrolytes: Renal failure.  Appreciate nephrology's assistance.  On CRRT.  --monitor urine output  --maintain K>4 and Mg>2    Infectious Disease: Multiple organisms recovered in sputum; possible bacteremia from enteric source  --cont meropenem  --switched from daptomycin to vancomycin   -- cont micafungin  --ID following   Hematology and Oncology: Acute anemia due to GI bleed; stable  --holding heparin as above   --cryo PRN fibrinogen < 200; FFP for INR >2  --Transfuse for Hgb<10   Endocrinology: No known medical history.  --insulin PRN          Medications:   I have reviewed this patient's current medications         Review of Systems:   Review of systems not obtained  due to patient factors - intubation         Physical Exam:   Exam and Labs by System  Neuro: Exam: intubated, pupils equal sluggish, withdraws to pain     Cardiovascular:     Heart Rate: 82  Exam:    Normal s1 and s2, no audible murmur, warm extremities.     Recent Labs   Lab 03/27/19  0318 03/26/19  0329 03/25/19  0358   TROPI 0.257* 0.287* 0.274*     Pulm:   Vent Settings:  Resp: (!) 39 SpO2: 100 % O2 Device: Mechanical Ventilator    Ventilation Mode: CMV/AC  (Continuous Mandatory Ventilation/ Assist Control)  FiO2 (%): 50 %  Rate Set (breaths/minute): 12 breaths/min  Tidal Volume Set (mL): 450 mL  PEEP (cm H2O): 5 cmH2O  Oxygen Concentration (%): 50 %  Resp: (!) 39    Exam:   Symmetrical chest shape and movements with each tidal breath     Arterial Blood Gas:   Recent Labs   Lab 03/27/19  1006 03/27/19  0813 03/27/19 0318 03/26/19 2128   PH 7.43 7.40 7.43 7.39   PCO2 32* 35 32* 37   PO2 81 75* 140* 66*   HCO3 21 21 21 22   O2PER 50.0 50.0 60.0 50       Renal:   Meds:  Vitals:    03/25/19 0600 03/26/19 0400 03/27/19 0400   Weight: 91.2 kg (201 lb 1 oz) 90.6 kg (199 lb 11.8 oz) 91.4 kg (201 lb 8 oz)   I/O last 3 completed shifts:  In: 1357.22 [I.V.:1170.22; Other:12; NG/GT:175]  Out: 2414 [Emesis/NG output:700; Other:1064; Stool:650]  Recent Labs   Lab 03/27/19  1006 03/27/19  0318    136   POTASSIUM 4.7 4.6   CHLORIDE 105 105   CO2 20 19*   ANIONGAP 12 12   * 111*   BUN 38* 39*   CR 1.02 0.89   ALEKSANDER 7.8* 7.5*     No components found for: URINE     GI:   Exam:    Soft, minimally distended      Diet: tube feeds  Recent Labs   Lab 03/27/19  1006 03/27/19  0318 03/26/19  2131   * 318* 300*   * 232* 226*   BILITOTAL 24.6* 23.7* 23.7*   ALBUMIN 1.8* 1.7* 1.7*   PROTTOTAL 5.2* 4.6* 4.9*   ALKPHOS 287* 266* 278*       Heme/ID:   Meds:  Temp: 97.5  F (36.4  C) Temp src: AxillaryTemp  Min: 96.3  F (35.7  C)  Max: 99.5  F (37.5  C)   Recent Labs   Lab 03/27/19  1006 03/27/19  0318 03/26/19  2137  03/26/19  1614 03/26/19  1116   WBC 30.2* 30.9* 26.6* 25.2* 23.1*   HGB 8.8* 8.7* 8.5* 8.9* 8.8*   HCT 27.6* 26.8* 26.7* 28.2* 27.9*   MCV 94 94 95 94 95   RDW 21.9* 21.6* 21.2* 21.2* 21.4*    334 335 348 316     Recent Labs   Lab 03/25/19  0358 03/24/19  2209 03/24/19  0344 03/23/19  0429 03/22/19  0327   INR 1.51* 1.44* 1.43* 1.48* 1.44*     Recent Labs   Lab 03/27/19  0320 03/27/19  0318 03/26/19  0646 03/25/19  0351 03/24/19  0415 03/23/19  0804   CULT No growth after 1 hour PENDING No growth after 1 day No growth after 2 days No growth after 3 days Moderate growth  Candida albicans / dubliniensis  Candida albicans and Candida dubliniensis are not routinely speciated  Susceptibility testing not routinely done  *  Light growth  Klebsiella pneumoniae  Susceptibility testing done on previous specimen  *       Endo:   Meds:  Recent Labs   Lab 03/27/19  1006 03/27/19  0318 03/26/19  2131 03/26/19  1614 03/26/19  1116   * 111* 100* 106* 134*               Data:   All lab results reviewed    The pt was discussed and evaluated with the Attending physician.    Lion Caraballo MD  PGY-6 Cardiology  Pager: [phone]  March 27, 2019

## 2019-03-27 NOTE — PLAN OF CARE
PT 4E: Will HOLD due to diminished command following. OT to follow and will initiate PT when appropriate.

## 2019-03-27 NOTE — PLAN OF CARE
Neuro- remains unresponsive. Withdraws to pain in upper extremities. Opens eyes spontaneously, no blink to threat. Pupils equal and sluggish. Up to chair x 2, tolerated well. Blood pressure and RR up and down throughout day, becomes hypertensive and tachypneic at same time, DENISE Avery notified.  Net negative for the day, no issues with CRRT.  Small amount of urine noted on gown/chux after bladder scanned for 34cc. Unable to move ETT for q 2 hour repositioning due to broken teeth and biting on tube. Per CVTS wound vac placed onto R groin. Will continue to monitor and follow plan of care.

## 2019-03-27 NOTE — PROGRESS NOTES
CRRT STATUS NOTE    DATA:  Time:  5:14 PM  Pressures WNL:  YES  Filter Status:  CRRT Filter Status:WDL    Problems Reported/Alarms Noted:  none    Supplies Present:  YES    ASSESSMENT:  Patient Net Fluid Balance:  Net negative 550cc since midnight  Vital Signs: T97.8 HR 67 /90 ALE374 RR 30  Labs:  K 4.6 ICa 4.3 pH art 7.42  Goals of Therapy:  0 to 50 as BP allows    INTERVENTIONS:   Discussed goals of care with bedside RN    PLAN:  Trach this week, continue plan of care

## 2019-03-27 NOTE — PLAN OF CARE
Uneventful night. Labile BP. Epi 0 - 0.05m/k/m. Levo 0 - 0.02m/k/m to keep MAPs greater than 65. No following commands. Opens eyes spontaneously. Padding added to eye shields. Sinus rhythm. AC 50% 450/12/5. Net -190mL this shift. CRRT continues.     Kieran Quispe RN 03/27/19 6:40 AM    Hours of cares [phone]

## 2019-03-28 NOTE — PLAN OF CARE
No acute changes overnight. Continues unresponsive. Epi and levo titrated to keep MAP>65, BP highly variable and pt has episodes of tachypnea and hypertension, at times sustaining RR>40. Somewhat improved after increasing fluid removal on CRRT. Continue w/ current plan of care.

## 2019-03-28 NOTE — PLAN OF CARE
S:  Pt's neuro status remains unchanged. Blood pressure continues to be labile, although consistently tachypneic with map's 90's with stimulation. Afebrile. CRRT set at 0 patient fluid removal this shift 2/2 large stool and OG output since MN.  B:  Pt s/p cardiac arrest requiring ECMO.  A:  Pt continues to make no neurological improvements.  Anuric on CRRT.  Afebrile on CRRT.  SR with PAC/PVC's.    P:  CT scan tomorrow. Trach placement in OR tomorrow.  Pt will transition to HD on Saturday-CRRT circuit expires tomorrow and will be left down after pt leaves for OR.

## 2019-03-28 NOTE — PROGRESS NOTES
OPHTHALMOLOGY CONSULT NOTE  03/25/19    Patient: Hellen Riddle  Consulted by: Dr. Caraballo  Reason for Consult: Candidemia     HISTORY OF PRESENTING ILLNESS:     Hellen Riddle is a 39 year old male who is admitted since 2/23/19 after VF cardiac arrest, found to have 3v CAD s/p PCI, remains intubated and sedated. 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 Coli and klebsiella VAP on meropenem, FERNANDA on CRRT, shock liver, acute blood loss anemia from GI bleed and candidemia. We are consulted to rule out endophthalmitis. No family at bedside. Patient intubated and sedated.     Review of systems- unable.    Interval update: Patient remains intubated and sedated. No family at bedside.     OCULAR/MEDICAL/SURGICAL HISTORIES:     Past Ocular History:  Unknown    Past Medical History:   Diagnosis Date     Dyslipidemia        Past Surgical History:   Procedure Laterality Date     CV EXTRACORPERAL MEMBRANE OXYGENATION N/A 3/21/2019    Procedure: VV ECMO decannulation, put in dialysis catheter;  Surgeon: Abraham Baer MD;  Location:  [facility]     CV HEART CATHETERIZATION WITH POSSIBLE INTERVENTION N/A 3/7/2019    Procedure: Coronary Angiogram;  Surgeon: Dante Molina MD;  Location:  [facility]     EMBOLECTOMY LOWER EXTREMITY Right 3/18/2019    Procedure: RIGHT COMMON FEMORAL EMBOLECTOMY AND SAPHENOUS VEIN PATCH;  Surgeon: Tereza Gibbs MD;  Location: [facility] OR     INSERT EXTRACORPORAL MEMBRANE OXYGENATOR Right 3/6/2019    Procedure: Emergent Insert VA extracorporal membrane oxygenator right groin;  Surgeon: Reggie Perez MD;  Location: [facility] OR     INSERT EXTRACORPORAL MEMBRANE OXYGENATOR N/A 3/18/2019    Procedure: RIGHT FEMORAL EXTRACORPORAL MEMBRANE OXYGENATOR DECANNULATION ; REPAIR RIGHT GROIN VENOUS AND ARTERIAL VESSELS;  Surgeon: Craig Berry MD;  Location: [facility] OR     REMOVE EXTRACORPORAL MEMBRANE OXYGENATOR  ADULT N/A 3/6/2019    Procedure: EXTRACORPORAL MEMBRANE OXYGENATOR REMOVAL FEMORAL CANNULAS repair right femoral vessels;  Surgeon: Craig Berry MD;  Location: [facility] OR       EXAMINATION:     Base Eye Exam     Visual Acuity    Unable           Pupils       Pupils APD    Right PERRL None    Left PERRL None          Neuro/Psych    Intubated and sedated             Slit Lamp and Fundus Exam     Slit Lamp Exam       Right Left    Lids/Lashes Jaundice, significant lag  Jaundice, significant lag    Conjunctiva/Sclera Icteric, chemosis inferiorly Icteric, chemosis inferiorly    Cornea Irregular, PEE, no anselmo defect Epi defect centrally without infiltrate improved from last exam    Anterior Chamber Deep and quiet Deep and quiet    Iris Round and reactive Round and reactive    Lens NS  NS               Labs/Studies/Imaging Performed  WBC 29K, Hgb 8.9     ASSESSMENT/PLAN:     Hellen Riddle is a 39 year old male who presents in critical condition with multiple complications after cardiac arrest 2/23/19 with new candidemia.     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    It is our pleasure to participate in this patient's care and treatment. Please contact us with any further questions or concerns.    Mayra Sexton MD  Ophthalmology Resident, PGY-2    Teaching Statement  I did not examine the patient, but was available to see the patient if needed. I have discussed the case with the resident and the assessment and plan as documented seems reasonable to me.    Sharmila Mccartney MD  Comprehensive Ophthalmology & Ocular Pathology  Department of Ophthalmology and Visual Neurosciences  [email or web address]  Pager [phone]

## 2019-03-28 NOTE — PROGRESS NOTES
OPHTHALMOLOGY CONSULT NOTE  03/25/19    Patient: Hellen Riddle  Consulted by: Dr. Caraballo  Reason for Consult: Candidemia     HISTORY OF PRESENTING ILLNESS:     Hellen Riddle is a 39 year old male who is admitted since 2/23/19 after VF cardiac arrest, found to have 3v CAD s/p PCI, remains intubated and sedated. 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 Coli and klebsiella VAP on meropenem, FERNANDA on CRRT, shock liver, acute blood loss anemia from GI bleed and candidemia. We are consulted to rule out endophthalmitis. No family at bedside. Patient intubated and sedated.     Review of systems- unable.    Interval update: Patient remains intubated and sedated. No family at bedside.     OCULAR/MEDICAL/SURGICAL HISTORIES:     Past Ocular History:  Unknown    Past Medical History:   Diagnosis Date     Dyslipidemia        Past Surgical History:   Procedure Laterality Date     CV EXTRACORPERAL MEMBRANE OXYGENATION N/A 3/21/2019    Procedure: VV ECMO decannulation, put in dialysis catheter;  Surgeon: Abraham Baer MD;  Location:  [facility]     CV HEART CATHETERIZATION WITH POSSIBLE INTERVENTION N/A 3/7/2019    Procedure: Coronary Angiogram;  Surgeon: Dante Molina MD;  Location:  [facility]     EMBOLECTOMY LOWER EXTREMITY Right 3/18/2019    Procedure: RIGHT COMMON FEMORAL EMBOLECTOMY AND SAPHENOUS VEIN PATCH;  Surgeon: Tereza Gibbs MD;  Location: [facility]     INSERT EXTRACORPORAL MEMBRANE OXYGENATOR Right 3/6/2019    Procedure: Emergent Insert VA extracorporal membrane oxygenator right groin;  Surgeon: Reggie Perez MD;  Location: [facility] OR     INSERT EXTRACORPORAL MEMBRANE OXYGENATOR N/A 3/18/2019    Procedure: RIGHT FEMORAL EXTRACORPORAL MEMBRANE OXYGENATOR DECANNULATION ; REPAIR RIGHT GROIN VENOUS AND ARTERIAL VESSELS;  Surgeon: Craig Berry MD;  Location: [facility]     REMOVE EXTRACORPORAL MEMBRANE OXYGENATOR  ADULT N/A 3/6/2019    Procedure: EXTRACORPORAL MEMBRANE OXYGENATOR REMOVAL FEMORAL CANNULAS repair right femoral vessels;  Surgeon: Craig Berry MD;  Location: [facility]       EXAMINATION:     Base Eye Exam     Pupils       Pupils APD    Right PERRL None    Left PERRL None          Neuro/Psych    Intubated and sedated           Dilation     Both eyes:  1.0% Mydriacyl, 2.5% Gianni Synephrine @ 5:55 PM            Slit Lamp and Fundus Exam     Slit Lamp Exam       Right Left    Lids/Lashes Jaundice, significant lag  Jaundice, significant lag    Conjunctiva/Sclera Icteric, chemosis inferiorly Icteric, chemosis inferiorly    Cornea Irregular, PEE, no anselmo defect Epi defect centrally without infiltrate improved from last exam, about 2mm across central K    Anterior Chamber Deep and quiet Deep and quiet    Iris Round and reactive Round and reactive    Lens NS  NS           Fundus Exam       Right Left    Disc Normal Normal    Macula Few heme, multiple white retinal lesions, appear flat Few heme, multiple white retinal lesions, appear flat but tough view     Vessels Normal Normal    Periphery Few heme Few heme              Labs/Studies/Imaging Performed  WBC 29K, Hgb 8.9     ASSESSMENT/PLAN:     Hellen Riddle is a 39 year old male who presents in critical condition with multiple complications after cardiac arrest 2/23/19 with new candidemia.     Candidemia with bilateral white retinal lesions   -Dilated exam 3/25 with few heme and white lesions in both eyes-do not appear fluffy like typical fungal lesions, more consistent with cotton wool spots, however view tough given very poor surface left > right eye in the setting of significant lagophthalmos  -Repeated dilated exam today showed stable findings consistent with cotton wool spots  -ID following remains on broad spectrum antibiotics as well as micafungin     Lagophthalmos-improved, bilateral with epi defect left eye IMPROVING  -Significant lagophthalmos of both eyes with large  epithelial defect of central cornea on the left eye, no infiltrate noted-continues to be improved on today's exam  -Continue moisture chamber   -Continue erythromycin ointment QID left eye  -Continue lubricating ointment QID left eye, alternating with erythromycin  -Continue lubricating ointment QID right eye    Ophthalmology will continue to follow, plan for repeat surface check tomorrow.    It is our pleasure to participate in this patient's care and treatment. Please contact us with any further questions or concerns.    Mayra Sexton MD  Ophthalmology Resident, PGY-2    Teaching Statement  I did not examine the patient, but was available to see the patient if needed. I have discussed the case with the resident and the assessment and plan as documented seems reasonable to me.    Sharmila Mccartney MD  Comprehensive Ophthalmology & Ocular Pathology  Department of Ophthalmology and Visual Neurosciences  [email or web address]  Pager [phone]

## 2019-03-28 NOTE — PROGRESS NOTES
Nephrology Progress Note  03/28/2019         Mr Riddle is a 39 yom w/HLP who had cardiac arrest on 2/23/19 with unclear down-time.  PCI done emergently, Nephrology consulted for management of FERNANDA, started CRRT on 2/25.       Interval History :   Mr Riddle continues on CRRT for management of electrolytes and volume, with large GI output in the last week he has not needed more than 1.7L of UF in a day, most are in the 0.7-1L range which have made him net negative and near euvolemic.  Given this, can hold on restarting CRRT if the circuit clots overnight, can try iHD with minimal UF.  Line is still temp LIJ from 3/21, will consider tunneled line soon along with Trach/PEG if able.      Assessment & Recommendations:   FERNANDA-Baseline Cr unclear, admitted with Cr of 1.7 post arrest.  FERNANDA due to hemodynamic injury, also received contrast and had elevated CK.  Started CRRT on 2/25, has been anuric since starting CRRT, ~48h after arrest.  Continuing CRRT with no signs of recovery, now off of both VV and VA ECMO, still on pressors, gently pulling fluid but nearly euvolemic.                -Access is LIJ, 3/21 positional initially but functioning well currently.                -CRRT, 0-50cc/hr as BP's allow, 4k baths, can hold on restarting should circuit clot. HD run Saturday     Volume status-Net negative yesterday, also negative so far today.  Suspect he is near euvolemic. Will see if we can transition to IHD given obligate intake is now near / nder 2 liters. Aim for IHD Saturday, cassette up Friday.       Electrolytes/pH-No acute issues on CRRT, K 3.7, bicarb 22, on 4k baths.       Ca/phos/pth-Ca 7.5, Mg 2.6, phos 5.2, stable with CRRT.      Anemia-Hgb 8.8, needing intermittent PRBC's with ECMO and bleeding.     Nutrition-Impact TF.         Recommendations were communicated to primary team via note        Review of Systems:   I reviewed the following systems:  Gen: No fevers or chills  CV: No CP at rest  Resp: No SOB at rest  GI: No  "N/V    Physical Exam:   I/O last 3 completed shifts:  In: 2237.69 [I.V.:1247.69; NG/GT:180]  Out: 4013 [Emesis/NG output:750; Other:2263; Stool:1000]   /90 (BP Location: Left leg)   Pulse 89   Temp 97.5  F (36.4  C) (Oral)   Resp 28   Ht 1.68 m (5' 6.14\")   Wt 89.4 kg (197 lb 1.5 oz)   SpO2 100%   BMI 31.67 kg/m       GENERAL APPEARANCE: Intubated, VV ECMO and IABP  EYES:  covered today  HENT: mouth without ulcers or lesions  PULM: lungs clear to auscultation, equal air movement, no cyanosis or clubbing  CV: regular rhythm, normal rate, no rub     -edema +1 LE  GI: soft, non-tender, non-distended, dark red stool  MS: no evidence of inflammation in joints, no muscle tenderness  NEURO:  Intubated and sedated    Labs:   All labs reviewed by me  Electrolytes/Renal -   Recent Labs   Lab Test 03/28/19  1009 03/28/19 0359 03/27/19 2229    138 138   POTASSIUM 3.7 4.0 4.1   CHLORIDE 107 108 106   CO2 22 20 19*   BUN 41* 38* 39*   CR 0.92 0.88 0.92   * 150* 131*   ALEKSANDER 7.5* 8.1* 7.7*   MAG 2.6* 2.6* 2.5*   PHOS 4.0 4.4 4.8*       CBC -   Recent Labs   Lab Test 03/28/19  1009 03/28/19  0359 03/27/19 2229   WBC 23.3* 25.2* 26.3*   HGB 8.2* 8.3* 8.5*    394 389       LFTs -   Recent Labs   Lab Test 03/28/19  1009 03/28/19  0359 03/27/19 2229   ALKPHOS 290* 293* 293*   BILITOTAL 24.6* 24.2* 22.9*   * 237* 250*   * 313* 344*   PROTTOTAL 4.8* 4.5* 5.0*   ALBUMIN 1.7* 1.7* 1.6*       Iron Panel - No lab results found.        Current Medications:    amiodarone  200 mg Oral or Feeding Tube Daily     B and C vitamin Complex with folic acid  5 mL Per Feeding Tube Daily     Carboxymethylcellulose Sod PF  2 drop Both Eyes 4x Daily     erythromycin   Left Eye 4x Daily     erythromycin   Right Eye At Bedtime     heparin lock flush  5-10 mL Intracatheter Q24H     insulin aspart  1-12 Units Subcutaneous Q4H     LUBRIFRESH P.M.   Ophthalmic 4x Daily     micafungin  100 mg Intravenous Q24H     " pantoprazole (PROTONIX) IV  40 mg Intravenous BID     piperacillin-tazobactam  4.5 g Intravenous Q6H     sennosides  8.6 mg Oral BID     sodium chloride (PF)  3 mL Intracatheter Q8H     ticagrelor  90 mg Oral or Feeding Tube BID       - MEDICATION INSTRUCTIONS -       IV fluid REPLACEMENT ONLY 30 mL/hr at 03/07/19 1100     CRRT replacement solution 12.5 mL/kg/hr (03/28/19 1153)     EPINEPHrine IV infusion ADULT 0.03 mcg/kg/min (03/28/19 1400)     fentaNYL Stopped (03/26/19 0900)     - MEDICATION INSTRUCTIONS -       norepinephrine 0.06 mcg/kg/min (03/28/19 1400)     Percutaneous Coronary Intervention orders placed (this is information for BPA alerting)       CRRT replacement solution 1.887 mL/kg/hr (03/28/19 1330)     CRRT replacement solution 12.5 mL/kg/hr (03/28/19 1153)     ACE/ARB/ARNI NOT PRESCRIBED       BETA BLOCKER NOT PRESCRIBED       Thuy Rajan

## 2019-03-28 NOTE — PROGRESS NOTES
[facility] ID SERVICE: ONGOING CONSULTATION      Patient:  Hellen Riddle, Date of birth 1980, Medical record number [id number]  Date of Visit:  March 28, 2019         Assessment and Recommendations:   Problem List:  - s/p VF cardiac arrest 2/23/19  -Aorto-esophageal fistula diagnosed 3/13. Not a candidate for repair.  -Right posterior parietal/occipital intraparenchymal hemorrhage, concern for possible abscess as well  -Leukocytosis  -Lactic acidosis (resolved now)  -Hypoxic respiratory failure, remains on ventilator  -Influenza A infection, status post 9 days of tamiflu (stopped 3/14)  -E. 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  - Candidemia 3/18, 3/19       Impression: 40 yo male admitted 2/23/19 with refractory VF OHCA s/p VA ECMO on admission, found to have 3v CAD s/p intervention. ID consulted 3/4/19 for increasing leukocytosis, hypoxia, lactic acidosis possibly related to influenza A or VAP, both of which were treated. Found to have VAP, on Meropenem since 3/14. Though his course should be finishing, patient was febrile over the weekend, but afebrile overnight.     Found to have Candidemia on 3/18, 3/19 -just started Micafungin on 3/22. Leukocytosis this am could be from ongoing candidemia, endocarditis, endophthalmitis -all complications of candidemia. TTE negative. Awaiting JOSLYN to evaluate for endocarditis. Course of Micafungin will depend on JOSLYN results.     Klebsiella grew from sputum on 3/23. Would narrow meropenem to zosyn.       Recommendations:  1. Continue Micafungin d1=3/20 (today is day 9)  2. No need for daily blood cultures if afebrile   3. Awaiting JOSLYN to evaluate for endocarditis  4. Stop Meropenem  5. Start Zosyn for klebsiella VAP (today is day 5 of 8 of treatment)      ID will continue to  follow      Attestation:  I have reviewed today's vital signs, medications, labs and imaging.  Shima Stone MD  Pager [phone]          Interval History:     No events overnight. .          Review of Systems:   Patient unable to answer questions         Current Antimicrobials   Vancomycin 1750mg -stopped 3/26  Meropenem 1g IV q8h  Micafungin 150mg IV q24h         Physical Exam:   Ranges for vital signs:  Temp:  [97.6  F (36.4  C)-99  F (37.2  C)] 98.9  F (37.2  C)  Heart Rate:  [] 76  Resp:  [27-42] 40  MAP:  [53 mmHg-112 mmHg] 73 mmHg  Arterial Line BP: ()/(38-80) 114/57  FiO2 (%):  [50 %-60 %] 50 %  SpO2:  [88 %-100 %] 100 %      Exam:  GENERAL:  Jaundiced, does not respond to questioning.   ENT:  Head is normocephalic, atraumatic. Oropharynx is moist without exudates or ulcers.  EYES:  Eyes have anicteric sclerae. PERRL.   NECK:  Supple. No cervical lymphadenopathy  LUNGS:  Coarse anteriorly.   CARDIOVASCULAR:  Regular rate and rhythm with no murmurs, gallops or rubs.  ABDOMEN:  Normal bowel sounds, soft, nontender. No hepatosplenomegaly.   EXT: Extremities warm and edematous.  SKIN:  No acute rashes.          Laboratory Data:     Creatinine   Date Value Ref Range Status   03/28/2019 PENDING 0.66 - 1.25 mg/dL Incomplete   03/28/2019 0.88 0.66 - 1.25 mg/dL Final   03/27/2019 0.92 0.66 - 1.25 mg/dL Final   03/27/2019 0.97 0.66 - 1.25 mg/dL Final   03/27/2019 1.02 0.66 - 1.25 mg/dL Final     WBC   Date Value Ref Range Status   03/28/2019 23.3 (H) 4.0 - 11.0 10e9/L Final   03/28/2019 25.2 (H) 4.0 - 11.0 10e9/L Final   03/27/2019 26.3 (H) 4.0 - 11.0 10e9/L Final   03/27/2019 28.9 (H) 4.0 - 11.0 10e9/L Final   03/27/2019 30.2 (H) 4.0 - 11.0 10e9/L Final     Hemoglobin   Date Value Ref Range Status   03/28/2019 8.2 (L) 13.3 - 17.7 g/dL Final     Platelet Count   Date Value Ref Range Status   03/28/2019 378 150 - 450 10e9/L Final     Sed Rate   Date Value Ref Range Status   03/28/2019 59 (H) 0 - 15  mm/h Final   03/27/2019 57 (H) 0 - 15 mm/h Final   03/26/2019 69 (H) 0 - 15 mm/h Final   03/24/2019 58 (H) 0 - 15 mm/h Final   03/24/2019 56 (H) 0 - 15 mm/h Final     CRP Inflammation   Date Value Ref Range Status   03/28/2019 70.0 (H) 0.0 - 8.0 mg/L Final   03/27/2019 79.0 (H) 0.0 - 8.0 mg/L Final   03/26/2019 70.0 (H) 0.0 - 8.0 mg/L Final   03/25/2019 70.0 (H) 0.0 - 8.0 mg/L Final   03/24/2019 74.0 (H) 0.0 - 8.0 mg/L Final     AST   Date Value Ref Range Status   03/28/2019 304 (H) 0 - 45 U/L Final   03/28/2019 313 (H) 0 - 45 U/L Final   03/27/2019 344 (H) 0 - 45 U/L Final   03/27/2019 354 (H) 0 - 45 U/L Final   03/27/2019 354 (H) 0 - 45 U/L Final     ALT   Date Value Ref Range Status   03/28/2019 243 (H) 0 - 70 U/L Final   03/28/2019 237 (H) 0 - 70 U/L Final   03/27/2019 250 (H) 0 - 70 U/L Final   03/27/2019 259 (H) 0 - 70 U/L Final   03/27/2019 253 (H) 0 - 70 U/L Final     Bilirubin Total   Date Value Ref Range Status   03/28/2019 24.6 (HH) 0.2 - 1.3 mg/dL Final     Comment:     Critical result, provider not notified due to previous critical result   notification.     03/28/2019 24.2 (HH) 0.2 - 1.3 mg/dL Final     Comment:     Critical result, provider not notified due to previous critical result   notification.     03/27/2019 22.9 (HH) 0.2 - 1.3 mg/dL Final     Comment:     Critical result, provider not notified due to previous critical result   notification.     03/27/2019 24.3 (HH) 0.2 - 1.3 mg/dL Final     Comment:     Critical result, provider not notified due to previous critical result   notification.     03/27/2019 24.6 (HH) 0.2 - 1.3 mg/dL Final     Comment:     Critical result, provider not notified due to previous critical result   notification.       Lab Results   Component Value Date     03/28/2019    BUN 41 (H) 03/28/2019    CO2 22 03/28/2019       Culture data:  3/28 Blood cultures NGTD  3/27 Blood cultures NGTD  3/27 Sputum cx: pending  3/26 Blood culture NGTD  3/25 Blood culture NGTD  3/24  Cdiff PCR negative  3/24 Blood culture NGTD  3/23 Sputum culture Klebsiella -light growth  3/23 Blood culture NGTD  3/22 Sputum culture candida  3/21 Blood cultures NGTD  3/20 Blood culture NGTD  3/19 Blood culture: Candida albicans  3/18 Blood culture: Candida albicans  3/15 Blood cultures x2 Negative  3/15 Sputum culture: Klebsiella and Ecoli    All cultures:  Recent Labs   Lab 03/28/19  0401 03/27/19  0320 03/27/19  0318 03/26/19  0646 03/25/19  0351 03/24/19  0415 03/23/19  0804 03/23/19  0508 03/22/19  0248   CULT No growth after 1 hour No growth after 1 day PENDING No growth after 2 days No growth after 3 days No growth after 4 days Moderate growth  Candida albicans / dubliniensis  Candida albicans and Candida dubliniensis are not routinely speciated  Susceptibility testing not routinely done  *  Light growth  Klebsiella pneumoniae  Susceptibility testing done on previous specimen  * No growth after 5 days Heavy growth  Richa albicans / dubliniensis  Candida albicans and Candida dubliniensis are not routinely speciated  Susceptibility testing not routinely done  *     Imaging: Reviewed in Epic    3/23 TTE negative for vegetation

## 2019-03-28 NOTE — PROGRESS NOTES
[facility]  Cardiac ICU Progress Note  03/28/2019           Key events - last 24 hours / Hospital course     40 yo male with CAD and cardiac arrest placed on ECMO. Course was complicated by influenza and bacterial pneumonia, difficultly weaning ecmo and was on VA and VV emco for a time. He had a massive GI bleed and CT showed aorto-esophageal fistula, non-operative but bleeding has stopped. Right intraparenchymal hemorrhage which is improving.     Previously required VA ECMO then VV ECMO, now off ECMO. Still on CRRT. Neuro function slow to recover. Started on micafungin 3/21/2019 for yeast in blood. A new HD line is in the left neck and PICC is in place.     HD line replaced overnight 3/22-3/23.  Arterial line replaced 3/24.  TTE 3/23 showed LVEF 40-45%, normal RV function, no significant valvular abnormalities.  Ophthalmology consulted for endophthalmitis routine evaluation in setting of candidemia, appreciate assistance, antibiotic ointment ordered  PIV lines removed   Continue off sedation to assess neuro function    Discussed case with ENT, they will plan for OR tomorrow 3/29 for tracheostomy, appreciate assistance  Dental consulted for damaged teeth 3/24 after biting the ET tube, appreciate assistance  JOSLYN recommended by ID, however due to his aortoesophageal fistula this is unlikely to be able to be performed  Will plan for repeat CT tomorrow to reassess aorto esophageal fistula, if stable or improved will restart ASA 81mg daily               Assessment and Plan:   Hellen Riddle is a 39 year old male who was admitted on 2/23/2019 after cardiac arrest.    Neurology: Intraparenchymal hemorrhage  CT head from 3/18 is stable  Intubated, off sedation since 3/14, restarted fentanyl at 25mcg 3/25 after he bit his ETT and damaged his teeth, off 3/26  --monitor   Cardiovascular / Hemodynamics: Refractory VF arrest, placed on VA ECMO, VV ecmo added.  Coronary angiography on admission showed 3v  disease and he underwent MITA to mRCA & mLCx. Has IABP as well.  ECMO decannulation stymied by VT in OR on 3/6. Returned to the cath lab for MITA to  LAD and distal LCx.   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 Stable  SFA occlusion s/p embolectomy  TTE: LV EF 50-55%.  RV dysfunction is mild.  --IABP removed 3/17/19  --VA ecmo removed 3/18/19  --vascular surgery consulted for aorto-esophageal fistula, but declined to operate due to futility and high risk; will reconsult if he continues to improve  --wean pressors as able; MAP goal >65  --holding heparin gtt and ASA; cont ticagrelor  --hold lipitor for now given hepatic injury during arrest  --hold ACE/ARB for now given renal failure and shock  --holding beta blocker given shock    Pulmonary: Acute hypoxic respiratory failure due to pulmonary edema, VAP  --surgery consulted for trach/peg-- 3/22/2019 Surgery evaluated, determined it was not clinically appropriate at this time, reassessing 3/27 now that vent requirements are decr  --cont abx as below  --wean vent as able  --daily CXR  -- ABGs     GI and Nutrition: GI bleeding from aorto-esophageal fistula: stable, will recheck CT 3/29  Shock liver  OG output likely old blood   Liver and splenic infarcts  --PPI BID  --tube feeds at goal  --monitor BID LFTs   Renal, Fluid and Electrolytes: Renal failure.  Appreciate nephrology's assistance.  On CRRT.  --monitor urine output  --maintain K>4 and Mg>2    Infectious Disease: Multiple organisms recovered in sputum; possible bacteremia from enteric source  --meropenem changed to pip-tazo 3/28 per ID recs  --switched from daptomycin to vancomycin   -- cont micafungin  --ID following, appreciate assistance   JOSLYN recommended by ID, however due to his aortoesophageal fistula this is unlikely to be able to be performed   Hematology and Oncology: Acute anemia due to GI bleed; stable  --holding heparin as above   --cryo PRN fibrinogen < 200; FFP for INR >2  --Transfuse  for Hgb<10   Endocrinology: No known medical history.  --insulin PRN          Medications:   I have reviewed this patient's current medications         Review of Systems:   Review of systems not obtained due to patient factors - intubation         Physical Exam:   Exam and Labs by System  Neuro: Exam: intubated, pupils equal sluggish, withdraws to pain     Cardiovascular:     Heart Rate: 77  Exam:    Normal s1 and s2, no audible murmur, warm extremities.     Recent Labs   Lab 03/28/19  0359 03/27/19  0318 03/26/19  0329   TROPI 0.269* 0.257* 0.287*     Pulm:   Vent Settings:  Resp: 29 SpO2: 100 % O2 Device: Mechanical Ventilator    Ventilation Mode: CMV/AC  (Continuous Mandatory Ventilation/ Assist Control)  FiO2 (%): 50 %  Rate Set (breaths/minute): 12 breaths/min  Tidal Volume Set (mL): 450 mL  PEEP (cm H2O): 5 cmH2O  Oxygen Concentration (%): 50 %  Resp: 29    Exam:   Symmetrical chest shape and movements with each tidal breath     Arterial Blood Gas:   Recent Labs   Lab 03/28/19  1009 03/28/19  0359 03/27/19  2229 03/27/19  1541   PH 7.41 7.42 7.42 7.42   PCO2 36 33* 33* 32*   PO2 86 132* 113* 92   HCO3 23 22 22 21   O2PER 50 60 60 60.0       Renal:   Meds:  Vitals:    03/26/19 0400 03/27/19 0400 03/28/19 0400   Weight: 90.6 kg (199 lb 11.8 oz) 91.4 kg (201 lb 8 oz) 89.4 kg (197 lb 1.5 oz)   I/O last 3 completed shifts:  In: 2237.69 [I.V.:1247.69; NG/GT:180]  Out: 4013 [Emesis/NG output:750; Other:2263; Stool:1000]  Recent Labs   Lab 03/28/19  1009 03/28/19  0359    138   POTASSIUM 3.7 4.0   CHLORIDE 107 108   CO2 22 20   ANIONGAP 11 9   * 150*   BUN 41* 38*   CR 0.92 0.88   ALEKSANDER 7.5* 8.1*     No components found for: URINE     GI:   Exam:    Soft, minimally distended      Diet: tube feeds  Recent Labs   Lab 03/28/19  1009 03/28/19  0359 03/27/19  2229   * 313* 344*   * 237* 250*   BILITOTAL 24.6* 24.2* 22.9*   ALBUMIN 1.7* 1.7* 1.6*   PROTTOTAL 4.8* 4.5* 5.0*   ALKPHOS 290* 293* 293*        Heme/ID:   Meds:  Temp: 97.5  F (36.4  C) Temp src: OralTemp  Min: 97.5  F (36.4  C)  Max: 99  F (37.2  C)   Recent Labs   Lab 03/28/19  1009 03/28/19  0359 03/27/19  2229 03/27/19  1541 03/27/19  1006   WBC 23.3* 25.2* 26.3* 28.9* 30.2*   HGB 8.2* 8.3* 8.5* 8.9* 8.8*   HCT 25.8* 25.4* 26.0* 27.1* 27.6*   MCV 96 94 95 93 94   RDW 23.2* 22.6* 22.5* 22.0* 21.9*    394 389 382 379     Recent Labs   Lab 03/25/19  0358 03/24/19  2209 03/24/19  0344 03/23/19  0429 03/22/19  0327   INR 1.51* 1.44* 1.43* 1.48* 1.44*     Recent Labs   Lab 03/28/19  0401 03/27/19  0320 03/27/19  0318 03/26/19  0646 03/25/19  0351 03/24/19  0415   CULT No growth after 1 hour No growth after 1 day PENDING No growth after 2 days No growth after 3 days No growth after 4 days       Endo:   Meds:  Recent Labs   Lab 03/28/19  1009 03/28/19  0359 03/27/19  2229 03/27/19  1541 03/27/19  1006   * 150* 131* 102* 112*               Data:   All lab results reviewed    The pt was discussed and evaluated with the Attending physician.    Lion Caraballo MD  PGY-6 Cardiology  Pager: [phone]  March 28, 2019      Critical Care Services Progress Note:     Hellen Riddle remains critically ill with acute coronary syndrome, severe anemia, cardiac arrhythmia and shock     I personally examined and evaluated the patient today.   The patient s prognosis today is grave  I have evaluated all laboratory values and imaging studies from the past 24 hours.  Key findings and decisions made today included Continue with HD  I personally managed the ventilator, sedation, pain control and analgesia, metabolic abnormalities, antibiotic therapy, nutritional status and vasoactive medications.   Consults ongoing and ordered are Neurology  Procedures that will happen today are: none  All treatments were placed at my direction.  I formulated today s plan with the house staff team or resident(s) and agree with the findings and plan in the associated note.       The above  plans and care have been discussed with family and all questions and concerns were addressed.     I spent a total of 45 minutes (excluding procedure time) personally providing and directing critical care services at the bedside and on the critical care unit for Hellen RiddleNoble Humphreys

## 2019-03-28 NOTE — PROGRESS NOTES
"ENT Progress Note   3/28/2019    S: Patient continues to be intubated and sedated in the CVICU. Now orotracheally intubated for 34 consecutive days. Interval events since last seen include ECMO decannulation, candidemia growth on blood cultures, and dislodged teeth #23, 24, and 25 from ETT-related trauma. The patient continues on two pressors and CRRT, but his thrombocytopenia has resolved. ENT is reconsulted regarding open tracheostomy in the OR given prolonged intubation and improvement in status. Per discussions with the CVICU, General Surgery does not believe percutaneous tracheostomy could be safely performed while the patient is on ticagrelor. This medication cannot be held given the history of recent MITA x8.    O:  Temp: 97.5  F (36.4  C) Temp src: Oral     Heart Rate: 77 Resp: 28 SpO2: 98 % O2 Device: Mechanical Ventilator   Height: 168 cm (5' 6.14\") Weight: 89.4 kg (197 lb 1.5 oz)  Estimated body mass index is 31.67 kg/m  as calculated from the following:    Height as of this encounter: 1.68 m (5' 6.14\").    Weight as of this encounter: 89.4 kg (197 lb 1.5 oz).  General: Intubated, sedated. Teeth #23, 24, and 25 are pushed anteriorly, nearly perpendicular to the mandible.  Ears: Necrosis of skin of left auricle.   Neck: Easily palpable landmarks. Catheter secured in left internal jugular vein.  Neuro: Sedated. Does not follow commands. Withdraws to pain.  Respiratory: Mechanically ventilated.    A/P: The patient is a 39-year-old man with history of familial HLD s/p unwitnessed cardiac arrest, IABP, VA ECMO, MITA x2, cooling, rewarming, CRRT, bacteremia, conversion of VA ECMO to VAV ECMO, unsuccessful VA ECMO decannulation, MITA x6, massive GIB, IPH, aortoesophageal fistula, IABP discontinuation, and discontinuation of ECMO. On ticagrelor administration which cannot be held given patient's condition.    - Open tracheostomy scheduled for OR on 3/29 at 3:45 PM with staff surgeon Dr. Lisseth Williamson  - Consent " obtained from wife, placed in chart  - NPO at midnight, please hold tube feeds  - If possible, recommend removal of the left internal jugular line due to infection risk from tracheal secretions after the tracheostomy is placed  - Continue ticagrelor per CVICU recommendations, but do not start any other antiplatelet medication or anticoagulation unless clinically necessary  - Dental paged regarding second panel in OR to partially remove mandibular dentition; they are not available Friday afternoon and will schedule their part of the procedure for next week    Patient and plan discussed with staff on-call, Dr. Hdz, and staff surgeon, Dr. Williamson.    Richelle Ayala MD PGY-1  Otolaryngology-Head & Neck Surgery  Please page ENT on call with questions by dialing * [phone] and entering job code [phone].

## 2019-03-28 NOTE — PROGRESS NOTES
[facility] Nurse Inpatient Adult Pressure Injury Prevention Assessment: ECMO  Follow up for L) ear PI, occiput, and L) forehead    Positioning Tolerance: Fair  Date of ECMO cannulation: 2/23 Right Groin VA ECMO and Right internal jugular VAV ECMO  Presence of Ischemia: No  Location of ischemia: NA- BL toes are boggy  ECMO decannulation 3/21    Patient History:   According to medical record: Ciara Winslow is a 38 year old male who was admitted on 2/23/2019. Wife noticed agonal breathing and seizure like activity. Called EMS, who came and started chest compressions. No history of HTN, DM. Does have history of dyslipidemia. Non smoker. Went to bed at 11:30, wife found him foaming at mouth around midnight. Called EMS, did not get significant chest compressions. EMS arrived 5 minutes later. Taken to [facility].    Current Diet / Nutrition:     None    Output:    I/O last 3 completed shifts:  In: 2237.69 [I.V.:1247.69; NG/GT:180]  Out: 4013 [Emesis/NG output:750; Other:2263; Stool:1000]  Containment: of urine/stool: Rectal Pouch and Anuric    Risk Assessment:   Sensory Perception: 1-->completely limited  Moisture: 3-->occasionally moist  Activity: 2-->chairfast  Mobility: 1-->completely immobile  Nutrition: 3-->adequate  Friction and Shear: 1-->problem  Kirby Score: 11    Labs:    Recent Labs   Lab 03/28/19  0359  03/25/19  0358  03/23/19  1555   ALBUMIN 1.7*   < > 1.8*   < > 1.7*   HGB 8.3*   < >  --    < > 9.0*   INR  --   --  1.51*   < >  --    WBC 25.2*   < >  --    < > 26.8*   A1C  --   --   --   --  5.0   CRP 70.0*   < > 70.0*   < >  --     < > = values in this interval not displayed.     Focused Assessment:  Ears and Face and occiput    Pressure Injury Present::Yes      ASSESSMENT  1. Pressure Injury: under EEG leads , hospital acquired ,   This is a Medical Device Related Pressure Injury (MDRPI) due to EEG and NIRs with surglist to hold it in place.   Pressure Injury is Stage  2  Contributing factor of the pressure injury: pressure and immobility, cannot rule out burn from NIRs being placed over EEG.   Status: Follow up assessment, healing     2. L) ear- DTPI,  HAPI- 2 vs 3   Contributing factor of the pressure injury- Pressure   Status- follow up assessment, continues to evolve    3. Occiput pressure injury eschar versus dried drainage. Defer staging until wound base is cleaned.  Status - follow up assessment, evolving   Recommend provider order: NA     TREATMENT PLAN  Bilateral Forehead wounds: BID and PRN cleanse with saline and pat dry. Apply thin layer of Vaseline. Avoid placing NIRs directly over EEG leads. Avoid placing EEG leads directly on wound.     L) ear- Continue to use Z-flow pillow and ensure to mould around it.  Orders reviewed   WOC Nurse follow-up plan: twice weekly T/F  Nursing to notify the Provider(s) and re-consult the WOC Nurse if wound(s) deteriorates or new skin concern.    Right occiput - continue to use z-oseas positioner and ensure to mold around area, repositioning Q2H.  Leave comfeel in place, WOC to change. If dressing removed replace and date.     Physical Exam    Wound Location:  Bilateral Forehead                                                                                Date of last Photo 3/25  Wound History: Pt had NIRs in place over EEG held in place with Surgilast netting.   Measurements (length x width x depth, in cm) 0.6 cm x 0.3 cm  x  0.0 cm  Wound Base:  100 % superficial thin scab   Tunneling N/A  Undermining N/A  Palpation of the wound bed: normal   Periwound skin: intact  Color: normal and consistent with surrounding tissue  Temperature: normal   Drainage:, none  Description of drainage: none  Odor: none  Pain: no grimacing or signs of discomfort and unable to assess due to  sedation , insensate    2. L) ear     3/14                                                                    3/21    3/25                                                                               3/28    Date of last Photo 3/28  Wound History: Pt have been on ECMO, Z-flow positioner have been in place after initiation of ECMO. Per RN he was hemodynamically unstable for couple of shifts.  Measurements (length x width x depth, in cm) Proximal- 0.5 x 0.6 x0.05 cm- 100% dark purple/maroon discoloration. Distal- 2.8 x 0.8 x 0.05 cm, 100% dark dusky/purple/maroon deflated blister - appears to be more darker dusky today however area feels superficial upon assessment  Tunneling N/A  Undermining N/A  Palpation of the wound bed: normal   Periwound skin: intact  Color: normal and consistent with surrounding tissue  Temperature: normal   Drainage:, none  Description of drainage: none  Odor: none  Pain: no grimacing or signs of discomfort and unable to assess due to  sedation , insensate    Wound Location: Occiput        (post shave)    3/25                                                                          3/28    Date of last Photo 3/28  Wound History: Pt have been on ECMO, Z-flow positioner have been in place after initiation of ECMO. Per RN he was hemodynamically unstable for couple of shifts previously.   Measurements (length x width x depth, in cm) proximal to distal (measurement from 3/25)  0.5 x 0.5 x 0 cm stage 2  0.9 x 1.4 x 0 cm eschar versus dried drainage. Edges lifting- soft  3.2 x 8.0 x 0 cm eschar vs drainage - semi boggy  Left occiput (not seen in photo) 1.0 x 2.0 x 0 cm eschar versus dried drainage  Periwound skin: dry, scaly   Color: normal and consistent with surrounding tissue  Temperature: normal   Drainage:, small  Description of drainage: serous  Odor: none  Pain: no grimacing or signs of discomfort and unable to assess due to  sedation    Education provided to: nurse  Discussed importance of:their role in pressure injury prevention  Discussed plan of care with Nurse, changed dressing using thin Comfeel dressing. Plan is to leave this dressing till Monday and  next change on Tuesday to promote further autolytic debridement.   WOC Nurse follow-up plan:twice weekly       Dora Johnston RN CWOCN

## 2019-03-29 NOTE — PROGRESS NOTES
"SPIRITUAL HEALTH SERVICES  PALLIATIVE SPIRITUAL ASSESSMENT   [facility] ([place] 4E    PRIMARY FOCUS:     Goals of care    Emotional/spiritual/Presybeterian distress    Support for coping    Care conference with family of patient Hellen \"Ting\" Do: wife Manolo, sister-in-law Raissa, brother Janes, and niece Luis (by phone) with primary and palliative care teams.    Distress: Family wept as they heard about Ting's change in condition. They feel unable to make a decision for him because they fear both for his life and for his mother's life if he were to die.     Coping/Meaning Making: Family are deeply supportive of Raiza. They draw on a combination of Hindu, Taoist, and traditional beliefs in making sense of this situation. They are grieving as they anticipate his death.    Intervention: Reviewed documentation. Offered spiritual support and support in goals of care conversation through reflective listening and encouraging values-centered decision-making on Ting's behalf.    PLAN: I will follow for spiritual support while Palliative Care is consulted.    Yue Hnesley  Palliative Care Consult Service   Pager [phone]  [facility] Inpatient Team Consult pager [phone] (M-F 8-4:30)  After-hours Answering Service [phone]       "

## 2019-03-29 NOTE — PROGRESS NOTES
Brief ENT Progress Note  3/29/2019    Overnight patient's vent requirements and pressor requirements worsening. New finding of pneumoperitoneum on CT scan. Discussed with ENT attending and cardiology fellow, plan to cancel OR today for trach. Please re-consult ENT once patient is stable. Ideally would like patient off pressors, vent settings with FiO2 < 50% and PEEP 5.     Mishel Daniels PA-C  Otolaryngology-Head & Neck Surgery  Please contact ENT by dialing * [phone] and entering job code [phone].

## 2019-03-29 NOTE — CONSULTS
"General Surgery Consultation Note  Date of Service: 3/29/2019 9:37 AM    Hellen Riddle  MRN: [id number]  male  39 year old    Chief Complaint:  \"Pneumoperitoneum\"    Assessment & Plan:  39 year old male admitted for cardiac arrest s/p ECMO (decannulated), hospital couse c/b influenza, bacterial PNA, massive GIB d/t aortoesophageal fistula (non-op mgmt) which has subsided, R intraparenchymal hemorrhage, CRRT, fungemia. Surgery consulted for new CT finding of free air in LUQ. Pt has been on pressors since arrival - epi + norepi since 3/13; likely source of free air is perforated gastric or duodenal ulcer 2/2 ischemia. This in the setting of critically ill ICU pt bodes a very poor prognosis. Pt's NSQIP risk for death if operative intervention were to be attempted is as high as 85%. Recommend non-operative mgmt and readdress goals of care with family in light of this new finding and dire prognosis.  - serial abd exams  - remaining cares per primary  - surgery will follow    D/w staff, Dr. Naveen Keller MD  Surgery PGY2      HPI:  39 year old male h/o HLD admitted 2/23 following v-fib arrest at home, found to have extensive 3-vessel disease s/p MITA, VA ECMO then VV ECMO (decannulated 3/22) w/ hospital course c/b acute ventilator-dependent respiratory failure, PNA, massive GIB found to have aortoesophageal fistula (not candidate for surgical repair, resolved), intraparenchymal hemorrhage, renal failure on CRRT, liver/splenic infarcts.  Surgery consulted 3/29 after CT obtained yesterday shows small amount of free air in LUQ along greater curvature of stomach, unclear etiology. Per primary team, abd exam has not changed - did have increased RR o/n which prompted CT.    Patient Active Problem List   Diagnosis     Cardiac arrest (H)     Past Surgical History:  Past Surgical History:   Procedure Laterality Date     CV EXTRACORPERAL MEMBRANE OXYGENATION N/A 3/21/2019    Procedure: VV ECMO decannulation, put in dialysis " "catheter;  Surgeon: Abraham Baer MD;  Location:  [facility]     CV HEART CATHETERIZATION WITH POSSIBLE INTERVENTION N/A 3/7/2019    Procedure: Coronary Angiogram;  Surgeon: Dante Molina MD;  Location:  [facility]     EMBOLECTOMY LOWER EXTREMITY Right 3/18/2019    Procedure: RIGHT COMMON FEMORAL EMBOLECTOMY AND SAPHENOUS VEIN PATCH;  Surgeon: Tereza Gibbs MD;  Location: [facility] OR     INSERT EXTRACORPORAL MEMBRANE OXYGENATOR Right 3/6/2019    Procedure: Emergent Insert VA extracorporal membrane oxygenator right groin;  Surgeon: Reggie Perez MD;  Location: [facility] OR     INSERT EXTRACORPORAL MEMBRANE OXYGENATOR N/A 3/18/2019    Procedure: RIGHT FEMORAL EXTRACORPORAL MEMBRANE OXYGENATOR DECANNULATION ; REPAIR RIGHT GROIN VENOUS AND ARTERIAL VESSELS;  Surgeon: Craig Berry MD;  Location:  OR     REMOVE EXTRACORPORAL MEMBRANE OXYGENATOR ADULT N/A 3/6/2019    Procedure: EXTRACORPORAL MEMBRANE OXYGENATOR REMOVAL FEMORAL CANNULAS repair right femoral vessels;  Surgeon: Craig Berry MD;  Location:  OR     Past Medical History:  Past Medical History:   Diagnosis Date     Dyslipidemia      Social History:  Social History     Tobacco Use     Smoking status: Not on file   Substance Use Topics     Alcohol use: Not on file     Family History:  No family history on file.     Allergies:  No Known Allergies    Active Medications:  No current outpatient medications on file.     ROS:  Otherwise negative    Physical Examination:   Vital Signs: /90 (BP Location: Left leg)   Pulse 89   Temp 98.6  F (37  C) (Axillary)   Resp (!) 40   Ht 1.68 m (5' 6.14\")   Wt 89.4 kg (197 lb 1.5 oz)   SpO2 98%   BMI 31.67 kg/m    GEN: intubated, sedated  Neuro: unable to assess, does not arouse to pain stimuli  EENT: normal  Pulm: ventilated  Abdomen: soft, non-distended, unable to assess tenderness  Extremities: mild edema throughout extremities  Skin: WWP  Psych: unable to " assess    Labs/Imaging/Other:  Results for orders placed or performed during the hospital encounter of 02/23/19 (from the past 24 hour(s))   Fibrinogen activity   Result Value Ref Range    Fibrinogen 377 200 - 420 mg/dL   Blood gas arterial and oxyhgb   Result Value Ref Range    pH Arterial 7.41 7.35 - 7.45 pH    pCO2 Arterial 36 35 - 45 mm Hg    pO2 Arterial 86 80 - 105 mm Hg    Bicarbonate Arterial 23 21 - 28 mmol/L    FIO2 50     Oxyhemoglobin Arterial 95 92 - 100 %    Base Deficit Art 1.6 mmol/L   Magnesium (AM Draw)   Result Value Ref Range    Magnesium 2.6 (H) 1.6 - 2.3 mg/dL   Calcium, ionized whole blood (1200)   Result Value Ref Range    Calcium Ionized Whole Blood 3.9 (L) 4.4 - 5.2 mg/dL   Comprehensive metabolic panel   Result Value Ref Range    Sodium 139 133 - 144 mmol/L    Potassium 3.7 3.4 - 5.3 mmol/L    Chloride 107 94 - 109 mmol/L    Carbon Dioxide 22 20 - 32 mmol/L    Anion Gap 11 3 - 14 mmol/L    Glucose 181 (H) 70 - 99 mg/dL    Urea Nitrogen 41 (H) 7 - 30 mg/dL    Creatinine 0.92 0.66 - 1.25 mg/dL    GFR Estimate >90 >60 mL/min/[1.73_m2]    GFR Estimate If Black >90 >60 mL/min/[1.73_m2]    Calcium 7.5 (L) 8.5 - 10.1 mg/dL    Bilirubin Total 24.6 (HH) 0.2 - 1.3 mg/dL    Albumin 1.7 (L) 3.4 - 5.0 g/dL    Protein Total 4.8 (L) 6.8 - 8.8 g/dL    Alkaline Phosphatase 290 (H) 40 - 150 U/L     (H) 0 - 70 U/L     (H) 0 - 45 U/L   Phosphorus   Result Value Ref Range    Phosphorus 4.0 2.5 - 4.5 mg/dL   Calcium ionized   Result Value Ref Range    Calcium Ionized 4.2 (L) 4.4 - 5.2 mg/dL   CBC with platelets differential   Result Value Ref Range    WBC 23.3 (H) 4.0 - 11.0 10e9/L    RBC Count 2.70 (L) 4.4 - 5.9 10e12/L    Hemoglobin 8.2 (L) 13.3 - 17.7 g/dL    Hematocrit 25.8 (L) 40.0 - 53.0 %    MCV 96 78 - 100 fl    MCH 30.4 26.5 - 33.0 pg    MCHC 31.8 31.5 - 36.5 g/dL    RDW 23.2 (H) 10.0 - 15.0 %    Platelet Count 378 150 - 450 10e9/L    Diff Method Automated Method     % Neutrophils 95.4 %     % Lymphocytes 1.5 %    % Monocytes 1.5 %    % Eosinophils 0.0 %    % Basophils 0.1 %    % Immature Granulocytes 1.5 %    Nucleated RBCs 1 (H) 0 /100    Absolute Neutrophil 22.2 (H) 1.6 - 8.3 10e9/L    Absolute Lymphocytes 0.4 (L) 0.8 - 5.3 10e9/L    Absolute Monocytes 0.3 0.0 - 1.3 10e9/L    Absolute Eosinophils 0.0 0.0 - 0.7 10e9/L    Absolute Basophils 0.0 0.0 - 0.2 10e9/L    Abs Immature Granulocytes 0.4 0 - 0.4 10e9/L    Absolute Nucleated RBC 0.2    Lactic acid whole blood   Result Value Ref Range    Lactic Acid 1.5 0.7 - 2.0 mmol/L   Glucose by meter   Result Value Ref Range    Glucose 145 (H) 70 - 99 mg/dL   Glucose by meter   Result Value Ref Range    Glucose 133 (H) 70 - 99 mg/dL   Fibrinogen activity   Result Value Ref Range    Fibrinogen 359 200 - 420 mg/dL   D dimer quantitative   Result Value Ref Range    D Dimer 10.2 (H) 0.0 - 0.50 ug/ml FEU   Blood gas arterial and oxyhgb   Result Value Ref Range    pH Arterial 7.42 7.35 - 7.45 pH    pCO2 Arterial 35 35 - 45 mm Hg    pO2 Arterial 76 (L) 80 - 105 mm Hg    Bicarbonate Arterial 23 21 - 28 mmol/L    FIO2 50     Oxyhemoglobin Arterial 93 92 - 100 %    Base Deficit Art 1.5 mmol/L   Magnesium (AM Draw)   Result Value Ref Range    Magnesium 2.6 (H) 1.6 - 2.3 mg/dL   Calcium, ionized whole blood (1200)   Result Value Ref Range    Calcium Ionized Whole Blood 4.6 4.4 - 5.2 mg/dL   Comprehensive metabolic panel   Result Value Ref Range    Sodium 140 133 - 144 mmol/L    Potassium 3.6 3.4 - 5.3 mmol/L    Chloride 108 94 - 109 mmol/L    Carbon Dioxide 22 20 - 32 mmol/L    Anion Gap 10 3 - 14 mmol/L    Glucose 152 (H) 70 - 99 mg/dL    Urea Nitrogen 41 (H) 7 - 30 mg/dL    Creatinine 0.93 0.66 - 1.25 mg/dL    GFR Estimate >90 >60 mL/min/[1.73_m2]    GFR Estimate If Black >90 >60 mL/min/[1.73_m2]    Calcium 7.8 (L) 8.5 - 10.1 mg/dL    Bilirubin Total 23.7 (HH) 0.2 - 1.3 mg/dL    Albumin 1.6 (L) 3.4 - 5.0 g/dL    Protein Total 4.7 (L) 6.8 - 8.8 g/dL    Alkaline  Phosphatase 281 (H) 40 - 150 U/L     (H) 0 - 70 U/L     (H) 0 - 45 U/L   Phosphorus   Result Value Ref Range    Phosphorus 3.7 2.5 - 4.5 mg/dL   CBC with platelets differential   Result Value Ref Range    WBC 23.4 (H) 4.0 - 11.0 10e9/L    RBC Count 2.63 (L) 4.4 - 5.9 10e12/L    Hemoglobin 8.1 (L) 13.3 - 17.7 g/dL    Hematocrit 25.3 (L) 40.0 - 53.0 %    MCV 96 78 - 100 fl    MCH 30.8 26.5 - 33.0 pg    MCHC 32.0 31.5 - 36.5 g/dL    RDW 23.4 (H) 10.0 - 15.0 %    Platelet Count 379 150 - 450 10e9/L    Diff Method Automated Method     % Neutrophils 96.1 %    % Lymphocytes 1.3 %    % Monocytes 1.0 %    % Eosinophils 0.0 %    % Basophils 0.1 %    % Immature Granulocytes 1.5 %    Nucleated RBCs 1 (H) 0 /100    Absolute Neutrophil 22.5 (H) 1.6 - 8.3 10e9/L    Absolute Lymphocytes 0.3 (L) 0.8 - 5.3 10e9/L    Absolute Monocytes 0.2 0.0 - 1.3 10e9/L    Absolute Eosinophils 0.0 0.0 - 0.7 10e9/L    Absolute Basophils 0.0 0.0 - 0.2 10e9/L    Abs Immature Granulocytes 0.4 0 - 0.4 10e9/L    Absolute Nucleated RBC 0.2    Bilirubin direct   Result Value Ref Range    Bilirubin Direct 19.4 (H) 0.0 - 0.2 mg/dL   Glucose by meter   Result Value Ref Range    Glucose 152 (H) 70 - 99 mg/dL   Lactic acid whole blood   Result Value Ref Range    Lactic Acid 1.4 0.7 - 2.0 mmol/L   Fibrinogen activity   Result Value Ref Range    Fibrinogen 369 200 - 420 mg/dL   Blood gas arterial and oxyhgb   Result Value Ref Range    pH Arterial 7.38 7.35 - 7.45 pH    pCO2 Arterial 40 35 - 45 mm Hg    pO2 Arterial 89 80 - 105 mm Hg    Bicarbonate Arterial 23 21 - 28 mmol/L    FIO2 60     Oxyhemoglobin Arterial 95 92 - 100 %    Base Deficit Art 1.7 mmol/L   Magnesium (AM Draw)   Result Value Ref Range    Magnesium 2.6 (H) 1.6 - 2.3 mg/dL   Calcium, ionized whole blood (1200)   Result Value Ref Range    Calcium Ionized Whole Blood 4.0 (L) 4.4 - 5.2 mg/dL   Comprehensive metabolic panel   Result Value Ref Range    Sodium 139 133 - 144 mmol/L     Potassium 3.9 3.4 - 5.3 mmol/L    Chloride 107 94 - 109 mmol/L    Carbon Dioxide 22 20 - 32 mmol/L    Anion Gap 10 3 - 14 mmol/L    Glucose 156 (H) 70 - 99 mg/dL    Urea Nitrogen 41 (H) 7 - 30 mg/dL    Creatinine 0.94 0.66 - 1.25 mg/dL    GFR Estimate >90 >60 mL/min/[1.73_m2]    GFR Estimate If Black >90 >60 mL/min/[1.73_m2]    Calcium 7.6 (L) 8.5 - 10.1 mg/dL    Bilirubin Total 24.2 (HH) 0.2 - 1.3 mg/dL    Albumin 1.6 (L) 3.4 - 5.0 g/dL    Protein Total 4.7 (L) 6.8 - 8.8 g/dL    Alkaline Phosphatase 285 (H) 40 - 150 U/L     (H) 0 - 70 U/L     (H) 0 - 45 U/L   Phosphorus   Result Value Ref Range    Phosphorus 3.8 2.5 - 4.5 mg/dL   CBC with platelets differential   Result Value Ref Range    WBC 27.7 (H) 4.0 - 11.0 10e9/L    RBC Count 2.77 (L) 4.4 - 5.9 10e12/L    Hemoglobin 8.5 (L) 13.3 - 17.7 g/dL    Hematocrit 26.7 (L) 40.0 - 53.0 %    MCV 96 78 - 100 fl    MCH 30.7 26.5 - 33.0 pg    MCHC 31.8 31.5 - 36.5 g/dL    RDW 23.7 (H) 10.0 - 15.0 %    Platelet Count 372 150 - 450 10e9/L    Diff Method Automated Method     % Neutrophils 95.6 %    % Lymphocytes 1.2 %    % Monocytes 1.3 %    % Eosinophils 0.1 %    % Basophils 0.1 %    % Immature Granulocytes 1.7 %    Nucleated RBCs 0 0 /100    Absolute Neutrophil 26.5 (H) 1.6 - 8.3 10e9/L    Absolute Lymphocytes 0.3 (L) 0.8 - 5.3 10e9/L    Absolute Monocytes 0.4 0.0 - 1.3 10e9/L    Absolute Eosinophils 0.0 0.0 - 0.7 10e9/L    Absolute Basophils 0.0 0.0 - 0.2 10e9/L    Abs Immature Granulocytes 0.5 (H) 0 - 0.4 10e9/L    Absolute Nucleated RBC 0.1    Glucose by meter   Result Value Ref Range    Glucose 167 (H) 70 - 99 mg/dL   Glucose by meter   Result Value Ref Range    Glucose 125 (H) 70 - 99 mg/dL   CRP inflammation   Result Value Ref Range    CRP Inflammation 66.0 (H) 0.0 - 8.0 mg/L   Erythrocyte sedimentation rate auto   Result Value Ref Range    Sed Rate 54 (H) 0 - 15 mm/h   Hemoglobin plasma   Result Value Ref Range    Hemoglobin Plasma 50 (H) <30 mg/dL    Lactate Dehydrogenase   Result Value Ref Range    Lactate Dehydrogenase 678 (H) 85 - 227 U/L   Fibrinogen activity   Result Value Ref Range    Fibrinogen 380 200 - 420 mg/dL   D dimer quantitative   Result Value Ref Range    D Dimer 11.3 (H) 0.0 - 0.50 ug/ml FEU   Lactic acid whole blood   Result Value Ref Range    Lactic Acid 1.6 0.7 - 2.0 mmol/L   Troponin I   Result Value Ref Range    Troponin I ES 0.288 (HH) 0.000 - 0.045 ug/L   Blood gas arterial and oxyhgb   Result Value Ref Range    pH Arterial 7.39 7.35 - 7.45 pH    pCO2 Arterial 39 35 - 45 mm Hg    pO2 Arterial 64 (L) 80 - 105 mm Hg    Bicarbonate Arterial 23 21 - 28 mmol/L    FIO2 60     Oxyhemoglobin Arterial 89 (L) 92 - 100 %    Base Deficit Art 1.7 mmol/L   Magnesium (AM Draw)   Result Value Ref Range    Magnesium 2.5 (H) 1.6 - 2.3 mg/dL   Calcium, ionized whole blood (1200)   Result Value Ref Range    Calcium Ionized Whole Blood 5.4 (H) 4.4 - 5.2 mg/dL   Comprehensive metabolic panel   Result Value Ref Range    Sodium 139 133 - 144 mmol/L    Potassium 4.1 3.4 - 5.3 mmol/L    Chloride 107 94 - 109 mmol/L    Carbon Dioxide 21 20 - 32 mmol/L    Anion Gap 11 3 - 14 mmol/L    Glucose 128 (H) 70 - 99 mg/dL    Urea Nitrogen 38 (H) 7 - 30 mg/dL    Creatinine 0.71 0.66 - 1.25 mg/dL    GFR Estimate >90 >60 mL/min/[1.73_m2]    GFR Estimate If Black >90 >60 mL/min/[1.73_m2]    Calcium 8.4 (L) 8.5 - 10.1 mg/dL    Bilirubin Total 23.6 (HH) 0.2 - 1.3 mg/dL    Albumin 1.6 (L) 3.4 - 5.0 g/dL    Protein Total 4.8 (L) 6.8 - 8.8 g/dL    Alkaline Phosphatase 264 (H) 40 - 150 U/L     (H) 0 - 70 U/L     (H) 0 - 45 U/L   Phosphorus   Result Value Ref Range    Phosphorus 4.0 2.5 - 4.5 mg/dL   CBC with platelets differential   Result Value Ref Range    WBC 27.4 (H) 4.0 - 11.0 10e9/L    RBC Count 2.80 (L) 4.4 - 5.9 10e12/L    Hemoglobin 8.6 (L) 13.3 - 17.7 g/dL    Hematocrit 26.9 (L) 40.0 - 53.0 %    MCV 96 78 - 100 fl    MCH 30.7 26.5 - 33.0 pg    MCHC 32.0 31.5  - 36.5 g/dL    RDW 23.7 (H) 10.0 - 15.0 %    Platelet Count 359 150 - 450 10e9/L    Diff Method Automated Method     % Neutrophils 94.5 %    % Lymphocytes 1.2 %    % Monocytes 1.5 %    % Eosinophils 0.1 %    % Basophils 0.1 %    % Immature Granulocytes 2.6 %    Nucleated RBCs 1 (H) 0 /100    Absolute Neutrophil 25.8 (H) 1.6 - 8.3 10e9/L    Absolute Lymphocytes 0.3 (L) 0.8 - 5.3 10e9/L    Absolute Monocytes 0.4 0.0 - 1.3 10e9/L    Absolute Eosinophils 0.0 0.0 - 0.7 10e9/L    Absolute Basophils 0.0 0.0 - 0.2 10e9/L    Abs Immature Granulocytes 0.7 (H) 0 - 0.4 10e9/L    Absolute Nucleated RBC 0.1    Bilirubin direct   Result Value Ref Range    Bilirubin Direct 19.5 (H) 0.0 - 0.2 mg/dL   Blood culture   Result Value Ref Range    Specimen Description Blood Left Arm     Special Requests Received in aerobic bottle only     Culture Micro No growth after 2 hours    CT Head w/o Contrast    Narrative    CT HEAD W/O CONTRAST 3/29/2019 3:59 AM    Provided History: Neuro deficit(s), subacute; eval for continued poor  neurologic function  ICD-10:    Comparison: 3/18/2019.    Technique: Using multidetector thin collimation helical acquisition  technique, axial, coronal and sagittal CT images from the skull base  to the vertex were obtained without intravenous contrast.     Findings:    Small residual area of hypodensity in the posterior right frontal  white matter, in the area of the previously seen intraparenchymal  hemorrhage. There is a hypoattenuating area along the right frontal  lobe, which is more conspicuous then on prior exams, although was  likely present on the prior exam. Rounded hypodensity in the right  cerebellar hemisphere. Previously there is a very small hyperdensity  seen in this location.    Opacification of the sphenoid sinus appears somewhat improved. Patchy  paranasal sinus mucosal thickening elsewhere. Hypoplastic mastoid.  Partially visualized nasoenteric tube. No acute calvarial  abnormalities.       Impression    Impression:   1. Foci of low attenuation suspicious for infarcts in the right  frontal lobe, left occipital lobe, and right cerebellar hemisphere,  suspicious for infarcts, new since the prior study 3/18/2019.  2. Region of hypodensity in the right parietal white matter in the  area of the previously seen intraparenchymal hemorrhage. No evidence  of residual hemorrhage.    I have personally reviewed the examination and initial interpretation  and I agree with the findings.    JOEL GARNETT MD   CT Chest Abdomen Pelvis w/o Contrast    Narrative    EXAMINATION: CT CHEST ABDOMEN PELVIS W/O CONTRAST, 3/29/2019 4:12 AM    TECHNIQUE:  Helical CT images from the thoracic inlet through the  symphysis pubis were obtained  without contrast.   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 Left-sided IJ central venous catheter tip terminates in the SVC.  Coronary artery stents. Gastric tube terminates in the gastric antrum.  Postpyloric tube terminates in the third portion of the duodenum.  Endotracheal tube terminates in the mid trachea.    Chest: Minimal endobronchial debris in the left mainstem bronchus. The  central airways are otherwise clear. The heart size is mildly  enlarged. No significant lymphadenopathy.    Slightly improved patchy centrilobular groundglass abnormalities with  intralobular septal thickening. Focal more pronounced consolidation in  the medial lower lobes bilaterally.    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 Additional areas of subcapsular hypoattenuation lung the right  lobe of the liver appear grossly stable, likely evolving infarcts.  Hyperdense contrast in the gallbladder. The adrenal glands are mildly  thickened. Heterogeneous appearance of the  nonenhanced spleen.    There is slightly increased mesenteric edema throughout the upper  abdomen. There is focal area of free intraperitoneal air along the  greater curvature of the stomach, with associated fluid surrounding  it. Moderate amount of peripancreatic fluid. Somewhat more loculated  fluid anterior to the duodenum.    Striated appearance of both kidneys.    Postoperative changes in the right groin with multiple surgical clips  and a moderate-sized density hematoma measuring approximately 7.5 x  4.9 cm. Some extraperitoneal/retroperitoneal hemorrhage confined to  the low pelvis.    There is no bowel obstruction. No significant colonic thickening. The  there is a rectal tube in place. The bladder is decompressed.    There is hyperdense material in the central and inferior vena cava  (series 5 image 167).    Bones and soft tissues: No acute bony normalities. Multiple rib  fracture deformities      Impression    IMPRESSION:   1. Small amount of free air in the left upper quadrant, along the  greater curvature the stomach, of unclear etiology. Question small  gastric perforation given the proximity to the body of the stomach.   2. Prominent fluid surrounding the pancreas in the left upper  quadrant, suspicious for pancreatitis.  Recommend correlation with a  lipase measurement.    3. Right groin hematoma and mild amount of extraperitoneal hemorrhage  isolated to the low pelvis.  4. The stomach is decompressed. There is no evidence to suggest that  there is a persistent aortoenteric fistula.  5. Somewhat improved findings in the lungs, with persistent  groundglass opacities and interlobular septal thickening consistent  with edema and/or diffuse alveolar hemorrhage.  6. Evolving infarcts in the liver and spleen.  7. Density in the infrarenal IVC, conceivably from retained catheter  components.    Findings discussed with the ordering provider, Dr. Beckford, by Dr. Foster at 5 am on 3/29/19.      I have  personally reviewed the examination and initial interpretation  and I agree with the findings.    MIKKI DAI MD   Blood gas arterial with oxyhemoglobin (1200)   Result Value Ref Range    pH Arterial 7.39 7.35 - 7.45 pH    pCO2 Arterial 37 35 - 45 mm Hg    pO2 Arterial 147 (H) 80 - 105 mm Hg    Bicarbonate Arterial 22 21 - 28 mmol/L    FIO2 60     Oxyhemoglobin Arterial 97 92 - 100 %    Base Deficit Art 2.6 mmol/L   EKG 12-lead, tracing only   Result Value Ref Range    Interpretation ECG Click View Image link to view waveform and result    Blood gas arterial with oxyhemoglobin (1200)   Result Value Ref Range    pH Arterial 7.41 7.35 - 7.45 pH    pCO2 Arterial 35 35 - 45 mm Hg    pO2 Arterial 152 (H) 80 - 105 mm Hg    Bicarbonate Arterial 22 21 - 28 mmol/L    FIO2 60     Oxyhemoglobin Arterial 98 92 - 100 %    Base Deficit Art 2.5 mmol/L   Glucose by meter   Result Value Ref Range    Glucose 124 (H) 70 - 99 mg/dL   Blood gas arterial   Result Value Ref Range    pH Arterial 7.43 7.35 - 7.45 pH    pCO2 Arterial 33 (L) 35 - 45 mm Hg    pO2 Arterial 71 (L) 80 - 105 mm Hg    Bicarbonate Arterial 22 21 - 28 mmol/L    Base Deficit Art 2.1 mmol/L    FIO2 60

## 2019-03-29 NOTE — PROGRESS NOTES
"Nephrology Progress Note  03/29/2019         Mr Riddle is a 39 yom w/HLP who had cardiac arrest on 2/23/19 with unclear down-time.  PCI done emergently, Nephrology consulted for management of FERNANDA, started CRRT on 2/25.       Interval History :   Mr Riddle continues on CRRT for management of electrolytes and volume. He has been noted with pneumoperitoneum and now more infarcts. Family conference today and they wish for us to make decisions as they cannot withdraw support. He is make DNR      Assessment & Recommendations:   FERNANDA-Baseline Cr unclear, admitted with Cr of 1.7 post arrest.  FERNANDA due to hemodynamic injury, also received contrast and had elevated CK.  Started CRRT on 2/25, has been anuric since starting CRRT, ~48h after arrest.  Continuing CRRT with no signs of recovery, now off of both VV and VA ECMO, still on pressors, gently pulling fluid but nearly euvolemic.                -Access is [facility], 3/21 positional initially but functioning well currently.                -CRRT, will aim for I = O     Volume status- near euvolemia. Will continue CRRT with I = O. Had planned to try IHD but given overall instability, will hold off     Electrolytes/pH-No acute issues on CRRT, K and bicarb stable, on 4k baths.       Ca/phos/pth-Ca 7.5, Mg 2.6, phos 5.2, stable with CRRT.      Anemia-Hgb 8.5, needing intermittent PRBC's with ECMO and bleeding.     Nutrition-Impact TF.         Recommendations were communicated to primary team via note        Review of Systems:   I reviewed the following systems:  Gen: No fevers or chills  CV: No CP at rest  Resp: No SOB at rest  GI: No N/V    Physical Exam:   I/O last 3 completed shifts:  In: 1993.08 [I.V.:1108.08; NG/GT:300]  Out: 1849 [Emesis/NG output:700; Other:99; Stool:1050]   /90 (BP Location: Left leg)   Pulse 89   Temp 102.8  F (39.3  C) (Axillary)   Resp (!) 32   Ht 1.68 m (5' 6.14\")   Wt 89.4 kg (197 lb 1.5 oz)   SpO2 99%   BMI 31.67 kg/m       GENERAL " APPEARANCE: Intubated  EYES:  covered today  HENT: mouth without ulcers or lesions  PULM: lungs clear to auscultation, equal air movement, no cyanosis or clubbing  CV: regular rhythm, normal rate, no rub     -edema +1 LE  GI: soft, non-tender, non-distended, dark red stool  MS: no evidence of inflammation in joints, no muscle tenderness  NEURO:  Intubated and sedated    Labs:   All labs reviewed by me  Electrolytes/Renal -   Recent Labs   Lab Test 03/29/19  1005 03/29/19  0323 03/28/19  2200    139 139   POTASSIUM 4.5 4.1 3.9   CHLORIDE 106 107 107   CO2 20 21 22   BUN 47* 38* 41*   CR 1.08 0.71 0.94   * 128* 156*   ALEKSANDER 8.0* 8.4* 7.6*   MAG 2.5* 2.5* 2.6*   PHOS 4.6* 4.0 3.8       CBC -   Recent Labs   Lab Test 03/29/19  1526 03/29/19  1005 03/29/19  0323   WBC 24.8* 25.2* 27.4*   HGB 8.5* 8.3* 8.6*    359 359       LFTs -   Recent Labs   Lab Test 03/29/19  1005 03/29/19  0323 03/28/19  2200   ALKPHOS 263* 264* 285*   BILITOTAL 23.5* 23.6* 24.2*   * 237* 241*   * 280* 294*   PROTTOTAL 4.6* 4.8* 4.7*   ALBUMIN 1.6* 1.6* 1.6*       Iron Panel - No lab results found.        Current Medications:    amiodarone  200 mg Oral or Feeding Tube Daily     B and C vitamin Complex with folic acid  5 mL Per Feeding Tube Daily     Carboxymethylcellulose Sod PF  2 drop Both Eyes 4x Daily     erythromycin   Left Eye 4x Daily     erythromycin   Right Eye At Bedtime     [START ON 3/30/2019] fluconazole  400 mg Oral or Feeding Tube Daily     fluconazole  800 mg Oral or Feeding Tube Once     heparin lock flush  5-10 mL Intracatheter Q24H     insulin aspart  1-12 Units Subcutaneous Q4H     LUBRIFRESH P.M.   Ophthalmic 4x Daily     pantoprazole (PROTONIX) IV  40 mg Intravenous BID     piperacillin-tazobactam  4.5 g Intravenous Q6H     sennosides  8.6 mg Oral BID     sodium chloride (PF)  3 mL Intracatheter Q8H     ticagrelor  90 mg Oral or Feeding Tube BID       - MEDICATION INSTRUCTIONS -       IV fluid  REPLACEMENT ONLY 30 mL/hr at 03/07/19 1100     CRRT replacement solution 12.5 mL/kg/hr (03/29/19 0211)     EPINEPHrine IV infusion ADULT 0.06 mcg/kg/min (03/29/19 1600)     fentaNYL 50 mcg/hr (03/29/19 1600)     - MEDICATION INSTRUCTIONS -       norepinephrine 0.06 mcg/kg/min (03/29/19 1600)     Percutaneous Coronary Intervention orders placed (this is information for BPA alerting)       CRRT replacement solution 1.887 mL/kg/hr (03/28/19 1330)     CRRT replacement solution 12.5 mL/kg/hr (03/29/19 0211)     ACE/ARB/ARNI NOT PRESCRIBED       BETA BLOCKER NOT PRESCRIBED       Thuy Rajan

## 2019-03-29 NOTE — CONSULTS
"[facility], [place]  Palliative Care Progress Note      Patient: Hellen Riddle  Date of Admission:  2/23/2019    Requesting provider/team: Lion Caraballo MD  Reason for consult: Goals of care    Recommendations:  - Change code status to DNR  - Fentanyl infusion  mcg/hr  - Ongoing goals of care discussions with Raiza's family are appropriate. Of note, they do not wish to make \"choices\" about his medical care but repeatedly said that they trust the medical team and would accept what the medical providers tell them is in Raiza's best interest. They want to try to give him as much time and the opportunity to get better. They also accept he may not survive.     These recommendations have been discussed with Dr. Caraballo and bedside RN.    Thank you for the opportunity to participate in the care of this patient and family. Our team will follow. Please feel free to contact the on-call Palliative provider with any urgent needs.     Eric Vance MD  Internal Medicine, PGY3    I was present and participated in the interview and exam of this patient. I have reviewed labs and imaging indpendently. I agree with the above document and have edited it to reflect my findings.     ISRAEL Sosa CNP  Palliative Care Consult Team  Pager: [phone]    [facility] Inpatient Team Consult pager [phone] (M-F 8-4:30)  After-hours Answering Service [phone]   Palliative Clinic: [phone]     110 minutes spent, with >50% counseling and in care coordination  Provider pre-conference from [phone] and Family care conference from [phone]    Assessment:  Hellen \"Raiza\"  is a 39 year old male initially admitted 2/23/19 with cardiac arrest requiring ECMO for 3 weeks. He is continuing to require pressers, is ventilator and dialysis dependent. Yesterday he was found to have new strokes and a new pneumoperitoneum. Palliative care was consulted to assist with goals of care.    Symptoms:   - Pain: patient responsive to " pain and at times appears uncomfortable in the bed. However, he has not been receiving regular opioids due to concern that it would interfere with neurologic assessment.    Social: Lives with very supportive and actively engaged wife with whom he has two children. He has an older brother who is actively involved in decision making for him. It is also important to know that he is his mother's caregiver and the rest of the family is now struggling to care for her (she apparently has multiple medical problems). Family shared that they think if Raiza dies, his mother will also die.     Coping: Family is very supportive of each other. They voiced their jenny and trust in their medical providers and believe Raiza has gotten excellent care; they know the medical team has done everything they can.    Spiritual/Gnosticist: Christianity, Zoroastrian, and traditional [place] practices     Advance Care Planning:  - Goals of Care: Long discussion was had today with the family to update them regarding his current medical state. They were informed that Raiza is not a surgical candidate for his pneumoperitoneum. They were told that he has had multiple new strokes/clots to the brain and that no treatment for these could be offered because of his previous bleeds both cerebral and GI. They asked what this all meant and were told that it means he is dying. They remain very reluctant to stop life-sustaining treatments such as pressers, ventilatory support, and CRRT. However, they also are very respectful of the medical providers and the recommendations of those providers. The family was very clear in their desire to let Raiza die naturally if his heart were to stop. In terms of the pressers, no clear decision was reached about escalation. With the CRRT, the family would like to continue until they are able to all speak together. Whatever happens, they said they need to know that everything that could be done was, and that they did not choose for  him to die. Agreed to change code status to DNR, start pain medication for comfort and see if he is able to pull through this like he has in the past.     History of Present Illness   Sources of History: electronic health record    Mr. Riddle is a 40yo M w/ CAD s/p cardiac arrest requiring intubation, pressers, and VA ECMO later switched to VV. He was successfully weaned off of ECMO after about 3 weeks but has not been able to come off pressers or the ventilator. 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 General surgery was consulted and he is not a surgical candidate for his bowel perforation.     ROS: Not able to be obtained as patient is intubated and responsive only to pain.       Family History:   No family history on file.         Allergies:   No Known Allergies       Medications:   I have reviewed this patient's medication profile and medications given in the past 24 hours.           Physical Exam:   Vital Signs: Temp: 101.9  F (38.8  C) Temp src: Axillary     Heart Rate: 112 Resp: (!) 39 SpO2: 100 % O2 Device: Mechanical Ventilator    Weight: 197 lbs 1.46 oz    Physical Exam:  General: critically ill male, intubated and unresponsive to voice  Pulm: tachypneic, on mechanical ventilation   Neuro: responsive to pain only     Data reviewed:     CMP  Recent Labs   Lab 03/29/19  1005 03/29/19  0323 03/28/19  2200 03/28/19  1611    139 139 140   POTASSIUM 4.5 4.1 3.9 3.6   CHLORIDE 106 107 107 108   CO2 20 21 22 22   ANIONGAP 13 11 10 10   * 128* 156* 152*   BUN 47* 38* 41* 41*   CR 1.08 0.71 0.94 0.93   GFRESTIMATED 85 >90 >90 >90   GFRESTBLACK >90 >90 >90 >90   ALEKSANDER 8.0* 8.4* 7.6* 7.8*   MAG 2.5* 2.5* 2.6* 2.6*   PHOS 4.6* 4.0 3.8 3.7   PROTTOTAL 4.6* 4.8* 4.7* 4.7*   ALBUMIN 1.6* 1.6* 1.6* 1.6*    BILITOTAL 23.5* 23.6* 24.2* 23.7*   ALKPHOS 263* 264* 285* 281*   * 280* 294* 284*   * 237* 241* 232*     CBC  Recent Labs   Lab 03/29/19  1526 03/29/19  1005 03/29/19  0323 03/28/19  2200   WBC 24.8* 25.2* 27.4* 27.7*   RBC 2.80* 2.69* 2.80* 2.77*   HGB 8.5* 8.3* 8.6* 8.5*   HCT 26.7* 25.7* 26.9* 26.7*   MCV 95 96 96 96   MCH 30.4 30.9 30.7 30.7   MCHC 31.8 32.3 32.0 31.8   RDW 23.5* 23.8* 23.7* 23.7*    359 359 372     INR  Recent Labs   Lab 03/25/19  0358 03/24/19  2209 03/24/19  0344 03/23/19  0429   INR 1.51* 1.44* 1.43* 1.48*     Arterial Blood Gas  Recent Labs   Lab 03/29/19  1528 03/29/19  1228 03/29/19  1005 03/29/19  0814   PH 7.44 7.45 7.45 7.43   PCO2 31* 31* 32* 33*   PO2 95 85 96 71*   HCO3 21 21 22 22   O2PER 70 70 70 60

## 2019-03-29 NOTE — PLAN OF CARE
Discharge Planner OT   Patient plan for discharge: unstated  Current status: no command following limited tolerance for PROM with increased RR to 50 resolving to 30's after approx 15 seconds post   Barriers to return to prior living situation: decreased cognitive skills, deconditioning  Recommendations for discharge: anticipate LTC  Rationale for recommendations: Pt with limited to no participation in therapy and no functional cognitive skills at this time, rehab potential will rely on medical prognosis.        Entered by: Dieudonne Woodson 03/29/2019 11:27 AM

## 2019-03-29 NOTE — PROGRESS NOTES
Family meeting held with Pt's wife, older brother, family member and an extended family member on the phone, Nephrology, Palliative care, Spiritual care, RN, and the author representing the primary Cardiology service. Overall hospital course was reviewed, as well as the events of the last 24h, namely the evidence of new strokes, ischemic toe, emboli to his organs (old), and pneumoperitoneum not amenable to surgical management per General Surgery consultation.     Questions and concerns of family were discussed.     At this time the wife and family decided that the Pt will be made DNR.     They would like to continue with full supportive cares at this time including CRRT.      Discussion about further goals of care will be had in the future.    Lion Caraballo MD  PGY-6 Cardiology  Pager: [phone]  March 29, 2019

## 2019-03-29 NOTE — PROGRESS NOTES
[facility]  Cardiac ICU Progress Note  03/29/2019           Key events - last 24 hours / Hospital course     38 yo male with CAD and cardiac arrest placed on ECMO. Course was complicated by influenza and bacterial pneumonia, difficultly weaning ecmo and was on VA and VV emco for a time. He had a massive GI bleed and CT showed aorto-esophageal fistula, non-operative but bleeding has stopped. Right intraparenchymal hemorrhage which is improving.     Previously required VA ECMO then VV ECMO, now off ECMO. Still on CRRT. Neuro function slow to recover. Started on micafungin 3/21/2019 for yeast in blood. A new HD line is in the left neck and PICC is in place.     HD line replaced overnight 3/22-3/23.  Arterial line replaced 3/24.  TTE 3/23 showed LVEF 40-45%, normal RV function, no significant valvular abnormalities.  Ophthalmology consulted for endophthalmitis routine evaluation in setting of candidemia, appreciate assistance, antibiotic ointment ordered   PIV lines removed   Continue off sedation to assess neuro function  Dental consulted for damaged teeth 3/24 after biting the ET tube, appreciate assistance  JOSLYN recommended by ID, however due to his aortoesophageal fistula this is unlikely to be able to be performed    Overnight 3/28-29 RR increased, CT head/chest/abd/pelvis obtained notable for new probably embolic CVA, pneumoperitoneum  General Surgery consulted, recommended non-operative management, assessment is that prognosis is poor, appreciate assistance  Palliative consulted, appreciate assistance                Assessment and Plan:   Hellen Riddle is a 39 year old male who was admitted on 2/23/2019 after cardiac arrest.    Neurology: Intraparenchymal hemorrhage  CT head from 3/29 showed new strokes from 3/18 CT scan  Intubated, off sedation since 3/14, restarted fentanyl at 25mcg 3/25 after he bit his ETT and damaged his teeth, off 3/26  --monitor   Cardiovascular / Hemodynamics:  Refractory VF arrest, placed on VA ECMO, VV ecmo added.  Coronary angiography on admission showed 3v disease and he underwent MITA to mRCA & mLCx. Has IABP as well.  ECMO decannulation stymied by VT in OR on 3/6. Returned to the cath lab for MITA to  LAD and distal LCx.   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 Stable  SFA occlusion s/p embolectomy  TTE: LV EF 50-55%.  RV dysfunction is mild.  --IABP removed 3/17/19  --VA ecmo removed 3/18/19  --vascular surgery consulted for aorto-esophageal fistula, but declined to operate due to futility and high risk; will reconsult if he continues to improve  --wean pressors as able; MAP goal >65  --holding heparin gtt and ASA; cont ticagrelor  --hold lipitor for now given hepatic injury during arrest  --hold ACE/ARB for now given renal failure and shock  --holding beta blocker given shock    Pulmonary: Acute hypoxic respiratory failure due to pulmonary edema, VAP  --surgery consulted for trach/peg-- 3/22/2019 Surgery evaluated, determined it was not clinically appropriate at this time, reassessing 3/27 now that vent requirements are decr  --cont abx as below  --wean vent as able  --daily CXR  -- ABGs     GI and Nutrition: GI bleeding from aorto-esophageal fistula: stable  Shock liver  OG output likely old blood   Liver and splenic infarcts  Pneumoperitoneum new from CT 3/29, General Surgery consulted, appreciate assistance, not an operative candidate  --PPI BID  --holding TFs due to pneumoperitoneum likely 2/2 gastric ulcer 2/2 prolonged requirement of vasoactive agents  --monitor BID LFTs   Renal, Fluid and Electrolytes: Renal failure.  Appreciate nephrology's assistance.  On CRRT.  --monitor urine output  --maintain K>4 and Mg>2    Infectious Disease: Multiple organisms recovered in sputum; possible bacteremia from enteric source  --meropenem changed to pip-tazo 3/28 per ID recs  --switched from daptomycin to vancomycin   -- switched from micafungin to  fluconazole   --ID following, appreciate assistance   JOSLYN recommended by ID, however due to his aortoesophageal fistula this is unlikely to be able to be performed   Hematology and Oncology: Acute anemia due to GI bleed; stable  --holding heparin as above   --cryo PRN fibrinogen < 200; FFP for INR >2  --Transfuse for Hgb<10   Endocrinology: No known medical history.  --insulin PRN          Medications:   I have reviewed this patient's current medications         Review of Systems:   Review of systems not obtained due to patient factors - intubation         Physical Exam:   Exam and Labs by System  Neuro: Exam: intubated, pupils equal sluggish, withdraws to pain     Cardiovascular:     Heart Rate: 105  Exam:    Normal s1 and s2, no audible murmur, warm extremities.     Recent Labs   Lab 03/29/19  0323 03/28/19  0359 03/27/19  0318   TROPI 0.288* 0.269* 0.257*     Pulm:   Vent Settings:  Resp: (!) 50 SpO2: 98 % O2 Device: Mechanical Ventilator    Ventilation Mode: SIMV/PS  (Synchronized Intermittent Mandatory Ventilation with Pressure Support)  FiO2 (%): 60 %  Rate Set (breaths/minute): 12 breaths/min  Tidal Volume Set (mL): 450 mL  PEEP (cm H2O): 8 cmH2O  Pressure Support (cm H2O): 10 cmH2O  Oxygen Concentration (%): 60 %  Resp: (!) 50    Exam:   Symmetrical chest shape and movements with each tidal breath     Arterial Blood Gas:   Recent Labs   Lab 03/29/19  1005 03/29/19  0814 03/29/19  0702 03/29/19  0533   PH 7.45 7.43 7.41 7.39   PCO2 32* 33* 35 37   PO2 96 71* 152* 147*   HCO3 22 22 22 22   O2PER 70 60 60 60       Renal:   Meds:  Vitals:    03/27/19 0400 03/28/19 0400 03/29/19 0400   Weight: 91.4 kg (201 lb 8 oz) 89.4 kg (197 lb 1.5 oz) 89.4 kg (197 lb 1.5 oz)   I/O last 3 completed shifts:  In: 2666.6 [I.V.:1194.6; NG/GT:440]  Out: 2399 [Emesis/NG output:700; Other:399; Stool:1300]  Recent Labs   Lab 03/29/19  1005 03/29/19  0323    139   POTASSIUM 4.5 4.1   CHLORIDE 106 107   CO2 20 21   ANIONGAP 13 11    * 128*   BUN 47* 38*   CR 1.08 0.71   ALEKSANDER 8.0* 8.4*     No components found for: URINE     GI:   Exam:    Soft, minimally distended, tympanitic      Diet: tube feeds  Recent Labs   Lab 03/29/19  1005 03/29/19 0323 03/28/19 2200   * 280* 294*   * 237* 241*   BILITOTAL 23.5* 23.6* 24.2*   ALBUMIN 1.6* 1.6* 1.6*   PROTTOTAL 4.6* 4.8* 4.7*   ALKPHOS 263* 264* 285*       Heme/ID:   Meds:  Temp: 98.6  F (37  C) Temp src: AxillaryTemp  Min: 97.8  F (36.6  C)  Max: 98.9  F (37.2  C)   Recent Labs   Lab 03/29/19  1005 03/29/19  0323 03/28/19 2200 03/28/19  1611 03/28/19  1009   WBC 25.2* 27.4* 27.7* 23.4* 23.3*   HGB 8.3* 8.6* 8.5* 8.1* 8.2*   HCT 25.7* 26.9* 26.7* 25.3* 25.8*   MCV 96 96 96 96 96   RDW 23.8* 23.7* 23.7* 23.4* 23.2*    359 372 379 378     Recent Labs   Lab 03/25/19  0358 03/24/19 2209 03/24/19  0344 03/23/19  0429   INR 1.51* 1.44* 1.43* 1.48*     Recent Labs   Lab 03/29/19  0331 03/28/19  0401 03/27/19  0320 03/27/19  0318 03/26/19  0646 03/25/19  0351   CULT No growth after 2 hours No growth after 1 day No growth after 2 days Moderate growth  Candida albicans / dubliniensis  Candida albicans and Candida dubliniensis are not routinely speciated  Susceptibility testing not routinely done  *  Light growth  Klebsiella pneumoniae  Susceptibility testing in progress  * No growth after 3 days No growth after 4 days       Endo:   Meds:  Recent Labs   Lab 03/29/19  1005 03/29/19  0323 03/28/19 2200 03/28/19  1611 03/28/19  1009   * 128* 156* 152* 181*               Data:   All lab results reviewed    The pt was discussed and evaluated with the Attending physician.    Lion Caraballo MD  PGY-6 Cardiology  Pager: [phone]  March 29, 2019      I have seen and examined the patient with the [facility] team. I agree with the assessment and plan of the note above.I have reviewed pertinent labs.     Caleb Chatterjee MD  Interventional Cardiology  Pager: [phone]

## 2019-03-29 NOTE — PLAN OF CARE
Neuro: Pupils reactive. No response to stimuli. Rarely will withdraw legs to pain. Unclear posturing vs. accessory muscle use while breathing.  CV: Continues w/ labile pressures. Epi and levo adjusted to keep MAP>65.  Resp: pt noted to be increasingly hypoxic and fighting ventilator overnight, changed to SIMV settings and increased PEEP to 8 with much improvement in ventilator compliance and improved O2 sats  GI: Large amounts of OG and rectal output, tube feeds held at midnight for OR.  : on CRRT until 0300 when paused for CT run, not pulling fluid as pt was hypotensive w/ increasing pressure requirements, still net negative w/ large amounts of GI output    Head/chest/abd CT completed overnight. Plan for OR at 1545 for trach placement. Continue to monitor and update team w/ changes.

## 2019-03-29 NOTE — PLAN OF CARE
D: Continues to be labile with episodes of tachycardia. CT this AM of head and abdomen showed new strokes in brain, no change to aorto-esophageal fistula, and perforated peritoneum--not a candidate for surgery as per surgical consult. Dark red/brown output from OG; on low continuous suction, Hgb stable. Order in for micro-repositioning ETT d/t broken/loose teeth from biting down on tube.Team informed; dental will follow up on POC when available next week per consult. Decreased output in fecal containment device from last night, small amount of dark urine noted on dry cloth infrequently near groin for incontinence; bladder scanned for 21 mL.      I/A: Increased PEEP to 8 after cancellation of trach d/t perforated esophagus. RR in 30s-50s with accessory muscle use. Tube feedings discontinued d/t perforated bowel/stomach. Care conference with family to discuss POC: DNR status initiated, CRRT to restart later today. Fever has increased throughout the day to 102.8. Tylenol given and ice packs/fan applied and room temp lowered. Blood cultures ordered.     P: Continue on epi, levo; fentanyl for comfort. Restart CRRT. No plan to add additional pressors per provider.

## 2019-03-29 NOTE — PROGRESS NOTES
CRRT STATUS NOTE    DATA:  Time:  6:53  AM  Pressures WNL:  YES  Filter Status:  WDL    Problems Reported/Alarms Noted:  None    Supplies Present:  YES    ASSESSMENT:  Patient Net Fluid Balance:  Net +60ml @ 0600.  Vital Signs:  HR 70, /54, MAP 71  Labs:  K 4.1, Mg 2.5, Phos 4, iCa 5.4, Hgb 8.6, Plt 359  Goals of Therapy:  Off CRRT.    INTERVENTIONS:   CRRT off at 0317 for CT of the head. Plan is to attempt iHD.     PLAN:  Continue to monitor pt and attempt iHD. Please call CRRT RN with any questions/problems.

## 2019-03-29 NOTE — PROGRESS NOTES
CRRT STATUS NOTE    DATA:  Time:  7:26 PM  Pressures WNL:  YES  Filter Status:  WDL    Problems Reported/Alarms Noted:  no    Supplies Present:  YES    ASSESSMENT:  Patient Net Fluid Balance: -1200  Vital Signs:  140/63, 80s  Labs: k 3.6, bilit T 23, hgb 8.1  Goals of Therapy: 0-50    INTERVENTIONS:   none    PLAN:  Stop when circuit clots.

## 2019-03-29 NOTE — PROGRESS NOTES
[facility] Nurse Inpatient Pressure Injury Assessment   Reason for consultation: Evaluate and treat upper and lower lips  Also following L) ear, forehead, and occiput PI- see separate note from 3/28      ASSESSMENT  Pressure Injury: on Upper and lower Lips , hospital acquired ,   This is a Medical Device Related Pressure Injury (MDRPI) due to ETT  Pressure Injury is Stage Mucosal   Contributing factor of the pressure injury: pressure and suspect trauma as well  Status: initial assessment,   Recommend provider order: MD aware- order in place to minimal reposition of ETT tube due to risk for dislodging of loose teeth     TREATMENT PLAN  Upper and lower lip wound: Every shift and PRN   Continue to perform oral care as per unit routine and reposition ETT tube as able.  Orders Reviewed and Written  WOC Nurse follow-up plan:twice weekly  Nursing to notify the Provider(s) and re-consult the WOC Nurse if wound(s) deteriorates or new skin concern.    Patient History  According to provider note(s):  38 yo male with CAD and cardiac arrest placed on ECMO. Course was complicated by influenza and bacterial pneumonia, difficultly weaning ecmo and was on VA and VV emco for a time. He had a massive GI bleed and CT showed aorto-esophageal fistula, non-operative but bleeding has stopped. Right intraparenchymal hemorrhage which is improving.     Objective Data  Containment of urine/stool: Indwelling catheter    Current Diet/ Nutrition:  None      Output:   I/O last 3 completed shifts:  In: 2666.6 [I.V.:1194.6; NG/GT:440]  Out: 2399 [Emesis/NG output:700; Other:399; Stool:1300]    Risk Assessment:   Sensory Perception: 1-->completely limited  Moisture: 3-->occasionally moist  Activity: 1-->bedfast  Mobility: 1-->completely immobile  Nutrition: 2-->probably inadequate  Friction and Shear: 1-->problem  Kirby Score: 9      Labs:   Recent Labs   Lab 03/29/19  1005 03/29/19  0323  03/25/19  0358  03/23/19  1555   ALBUMIN 1.6* 1.6*   < > 1.8*   < >  1.7*   HGB 8.3* 8.6*   < >  --    < > 9.0*   INR  --   --   --  1.51*   < >  --    WBC 25.2* 27.4*   < >  --    < > 26.8*   A1C  --   --   --   --   --  5.0   CRP  --  66.0*   < > 70.0*   < >  --     < > = values in this interval not displayed.       Physical Exam  Skin inspection: focused mouth and lips    Wound Location:  Upper and lower lips                Date of last Photo 3/29/19  Wound History: Pt is intubated and was biting down. Upper guard is loose enough that is not creating pressure against lip. Has 3 loose teeth on bottom section.  Measurements (length x width x depth, in cm) Lower lip- 2 areas of 0.3 cm x 0.5 cm  x  0.1 cm   Upper lip (left side)- 0.3x0.2x0.1cm  Wound Base:  100 % mucosal and pale yellowish  Tunneling N/A  Undermining N/A  Palpation of the wound bed: normal   Periwound skin: intact  Color: normal and consistent with surrounding tissue  Temperature: normal   Drainage:, moist  Description of drainage: saliva and bloody drainage  Odor: none  Pain: unable to assess due to  sedation ,     Interventions  Current support surface: Standard  Low air loss mattress  Current off-loading measures: Heel off-loading boot(s), Foam padding and Pillows under calves  Repositioning aid: Turn and Position System and Z-oseas positioner(s)  Visual inspection of wound(s) completed   Tube Securement: ETT guard  Wound Care: was done per plan of care.  Supplies: floor stock and discussed with RN  Educated provided: importance of repositioning, plan of care and wound progress  Education provided to: nurse  Discussed importance of:repositioning every 2 hours and off-loading pressure to wound as able to to risk for dislodging teeths  Discussed plan of care with Nurse    Dora Johnston RN

## 2019-03-29 NOTE — PROGRESS NOTES
SPIRITUAL HEALTH SERVICES  SPIRITUAL ASSESSMENT Progress Note  [facility] ([place]) 4E     REFERRAL SOURCE: Length of stay    Since this was my first visit and family was not present I will come back when they are here.    PLAN: Will visit again when family is present.    Getachew Leblanc M.Div.  Priest Gibson

## 2019-03-29 NOTE — PROGRESS NOTES
OPHTHALMOLOGY CONSULT NOTE  03/29/19    Patient: Hellen Riddle  Consulted by: Dr. Caraballo  Reason for Consult: Candidemia     HISTORY OF PRESENTING ILLNESS:     Hellen Riddle is a 39 year old male who is admitted since 2/23/19 after VF cardiac arrest, found to have 3v CAD s/p PCI, remains intubated and sedated. 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 Coli and klebsiella VAP on meropenem, FERNANDA on CRRT, shock liver, acute blood loss anemia from GI bleed and candidemia. We are consulted to rule out endophthalmitis. No family at bedside. Patient intubated and sedated.     Review of systems- unable.    Interval update: Patient remains intubated and sedated. No family at bedside.     OCULAR/MEDICAL/SURGICAL HISTORIES:     Past Ocular History:  Unknown    Past Medical History:   Diagnosis Date     Dyslipidemia        Past Surgical History:   Procedure Laterality Date     CV EXTRACORPERAL MEMBRANE OXYGENATION N/A 3/21/2019    Procedure: VV ECMO decannulation, put in dialysis catheter;  Surgeon: Abraham Baer MD;  Location:  HEART CARDIAC CATH LAB     CV HEART CATHETERIZATION WITH POSSIBLE INTERVENTION N/A 3/7/2019    Procedure: Coronary Angiogram;  Surgeon: Dante Molina MD;  Location:  HEART CARDIAC CATH LAB     EMBOLECTOMY LOWER EXTREMITY Right 3/18/2019    Procedure: RIGHT COMMON FEMORAL EMBOLECTOMY AND SAPHENOUS VEIN PATCH;  Surgeon: Tereza Gibbs MD;  Location: [facility] OR     INSERT EXTRACORPORAL MEMBRANE OXYGENATOR Right 3/6/2019    Procedure: Emergent Insert VA extracorporal membrane oxygenator right groin;  Surgeon: Reggie Perez MD;  Location: [facility] OR     INSERT EXTRACORPORAL MEMBRANE OXYGENATOR N/A 3/18/2019    Procedure: RIGHT FEMORAL EXTRACORPORAL MEMBRANE OXYGENATOR DECANNULATION ; REPAIR RIGHT GROIN VENOUS AND ARTERIAL VESSELS;  Surgeon: Craig Berry MD;  Location: [facility] OR     REMOVE EXTRACORPORAL MEMBRANE OXYGENATOR  ADULT N/A 3/6/2019    Procedure: EXTRACORPORAL MEMBRANE OXYGENATOR REMOVAL FEMORAL CANNULAS repair right femoral vessels;  Surgeon: Craig Berry MD;  Location: [facility] OR       EXAMINATION:     Base Eye Exam     Pupils       Pupils APD    Right PERRL None    Left PERRL None          Neuro/Psych    Intubated and sedated             Slit Lamp and Fundus Exam     Slit Lamp Exam       Right Left    Lids/Lashes Jaundice, significant lag  Jaundice, significant lag    Conjunctiva/Sclera Icteric, chemosis inferiorly Icteric, chemosis inferiorly    Cornea Irregular, PEE, no anselmo defect Epi defect centrally without infiltrate improved from last exam, about 1.5mm x 5 across central K    Anterior Chamber Deep and quiet Deep and quiet    Iris Round and reactive Round and reactive    Lens NS  NS               Labs/Studies/Imaging Performed  WBC 25K, Hgb 8.3     ASSESSMENT/PLAN:     Hellen Riddle is a 39 year old male who presents in critical condition with multiple complications after cardiac arrest 2/23/19 with new candidemia.     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 Please contact us with any further questions or concerns.    Mayra Sexton MD  Ophthalmology Resident, PGY-2    Teaching Statement  I did not examine the patient, but was available to see the patient if needed. I have discussed the case with the resident and the assessment and plan as documented seems reasonable to me.  Sharmila Mccartney MD  Comprehensive Ophthalmology & Ocular Pathology  Department of Ophthalmology and Visual Neurosciences  [email or web address]  Pager [phone]

## 2019-03-29 NOTE — PROGRESS NOTES
[facility] ID SERVICE: ONGOING CONSULTATION      Patient:  Hellen Riddle, Date of birth 1980, Medical record number [id number]  Date of Visit:  March 29, 2019         Assessment and Recommendations:   Problem List:  - s/p VF cardiac arrest 2/23/19  -Aorto-esophageal fistula diagnosed 3/13. Not a candidate for repair.  -Right posterior parietal/occipital intraparenchymal hemorrhage, concern for possible abscess as well  -Leukocytosis  -Lactic acidosis (resolved now)  -Hypoxic respiratory failure, remains on ventilator  -Influenza A infection, status post 9 days of tamiflu (stopped 3/14)  -E. 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  - Candidemia 3/18, 3/19, 3/20  - pneumoperitoneum -identified by CT on 3/29      Impression: 40 yo male admitted 2/23/19 with refractory VF OHCA s/p VA ECMO on admission, found to have 3v CAD s/p intervention. ID consulted 3/4/19 for increasing leukocytosis, hypoxia, lactic acidosis possibly related to influenza A or VAP, both of which were treated. Found to have VAP, on Meropenem since 3/14. Though his course should be finishing, patient was febrile over the weekend, but afebrile overnight.     Found to have Candidemia on 3/18, 3/19 -just started Micafungin on 3/22. Leukocytosis this am could be from ongoing candidemia, endocarditis, endophthalmitis -all complications of candidemia. TTE negative. It's not clear if JOSLYN can be pursued with aortoenteric fistula. Initially team was waiting for trach to occur, but now trach will not occur. If this cannot be pursued, patient would require a longer course of antifungals for candidemia. Fluconazole sensitive. Would stop Micafungin today, and transition to fluconazole.     Klebsiella grew from sputum on 3/23. Continue Zosyn -today is day 6 of 8 of  treatment. To end 3/31.       Recommendations:  1. Stop Micafungin  2. Start fluconazole  800mg x1 loading dose, thereafter 400mg daily  (d1=3/21)  3. No need for daily blood cultures if afebrile   4. Continue Zosyn for klebsiella VAP (today is day 6 of 8 of treatment). Plan to stop on 3/31.       ID will continue to follow      Attestation:  I have reviewed today's vital signs, medications, labs and imaging.  Shima Stone MD  Pager [phone]          Interval History:     Patient found to have free air in his abdomen on CT performed yesterday. ENT holding off on trach.          Review of Systems:   Patient unable to answer questions         Current Antimicrobials     Zosyn -d1=3/28  Micafungin 150mg IV q24h         Physical Exam:   Ranges for vital signs:  Temp:  [97.5  F (36.4  C)-98.9  F (37.2  C)] 98.6  F (37  C)  Heart Rate:  [] 105  Resp:  [25-51] 33  MAP:  [56 mmHg-103 mmHg] 101 mmHg  Arterial Line BP: ()/(44-76) 153/74  FiO2 (%):  [50 %-60 %] 60 %  SpO2:  [91 %-100 %] 98 %      Exam:  GENERAL:  Jaundiced, does not respond to questioning.   ENT:  Head is normocephalic, atraumatic. Oropharynx is moist without exudates or ulcers.  EYES:  Eyes have anicteric sclerae. PERRL.   NECK:  Supple. No cervical lymphadenopathy  LUNGS:  Coarse anteriorly.   CARDIOVASCULAR:  Regular rate and rhythm with no murmurs, gallops or rubs.  ABDOMEN:  Normal bowel sounds, soft, nontender. No hepatosplenomegaly.   EXT: Extremities warm and edematous.  SKIN:  No acute rashes.          Laboratory Data:     Creatinine   Date Value Ref Range Status   03/29/2019 0.71 0.66 - 1.25 mg/dL Final   03/28/2019 0.94 0.66 - 1.25 mg/dL Final   03/28/2019 0.93 0.66 - 1.25 mg/dL Final   03/28/2019 0.92 0.66 - 1.25 mg/dL Final   03/28/2019 0.88 0.66 - 1.25 mg/dL Final     WBC   Date Value Ref Range Status   03/29/2019 25.2 (H) 4.0 - 11.0 10e9/L Final   03/29/2019 27.4 (H) 4.0 - 11.0 10e9/L Final   03/28/2019 27.7 (H) 4.0 - 11.0  10e9/L Final   03/28/2019 23.4 (H) 4.0 - 11.0 10e9/L Final   03/28/2019 23.3 (H) 4.0 - 11.0 10e9/L Final     Hemoglobin   Date Value Ref Range Status   03/29/2019 8.3 (L) 13.3 - 17.7 g/dL Final     Platelet Count   Date Value Ref Range Status   03/29/2019 359 150 - 450 10e9/L Final     Sed Rate   Date Value Ref Range Status   03/29/2019 54 (H) 0 - 15 mm/h Final   03/28/2019 59 (H) 0 - 15 mm/h Final   03/27/2019 57 (H) 0 - 15 mm/h Final   03/26/2019 69 (H) 0 - 15 mm/h Final   03/24/2019 58 (H) 0 - 15 mm/h Final     CRP Inflammation   Date Value Ref Range Status   03/29/2019 66.0 (H) 0.0 - 8.0 mg/L Final   03/28/2019 70.0 (H) 0.0 - 8.0 mg/L Final   03/27/2019 79.0 (H) 0.0 - 8.0 mg/L Final   03/26/2019 70.0 (H) 0.0 - 8.0 mg/L Final   03/25/2019 70.0 (H) 0.0 - 8.0 mg/L Final     AST   Date Value Ref Range Status   03/29/2019 280 (H) 0 - 45 U/L Final   03/28/2019 294 (H) 0 - 45 U/L Final   03/28/2019 284 (H) 0 - 45 U/L Final   03/28/2019 304 (H) 0 - 45 U/L Final   03/28/2019 313 (H) 0 - 45 U/L Final     ALT   Date Value Ref Range Status   03/29/2019 237 (H) 0 - 70 U/L Final   03/28/2019 241 (H) 0 - 70 U/L Final   03/28/2019 232 (H) 0 - 70 U/L Final   03/28/2019 243 (H) 0 - 70 U/L Final   03/28/2019 237 (H) 0 - 70 U/L Final     Bilirubin Total   Date Value Ref Range Status   03/29/2019 23.6 (HH) 0.2 - 1.3 mg/dL Final     Comment:     Critical result, provider not notified due to previous critical result   notification.     03/28/2019 24.2 (HH) 0.2 - 1.3 mg/dL Final     Comment:     Critical result, provider not notified due to previous critical result   notification.     03/28/2019 23.7 (HH) 0.2 - 1.3 mg/dL Final     Comment:     Critical result, provider not notified due to previous critical result   notification.     03/28/2019 24.6 (HH) 0.2 - 1.3 mg/dL Final     Comment:     Critical result, provider not notified due to previous critical result   notification.     03/28/2019 24.2 (HH) 0.2 - 1.3 mg/dL Final     Comment:      Critical result, provider not notified due to previous critical result   notification.       Lab Results   Component Value Date     03/29/2019    BUN 38 (H) 03/29/2019    CO2 21 03/29/2019       Culture data:  3/28 Blood cultures NGTD  3/27 Blood cultures NGTD  3/27 Sputum cx: candida  3/26 Blood culture NGTD  3/25 Blood culture NGTD  3/24 Cdiff PCR negative  3/24 Blood culture NGTD  3/23 Sputum culture Klebsiella -light growth  3/23 Blood culture NGTD  3/22 Sputum culture candida  3/21 Blood cultures NGTD  3/20 Blood culture NGTD  3/19 Blood culture: Candida albicans  3/18 Blood culture: Candida albicans  3/15 Blood cultures x2 Negative  3/15 Sputum culture: Klebsiella and Ecoli    All cultures:  Recent Labs   Lab 03/29/19  0331 03/28/19  0401 03/27/19  0320 03/27/19  0318 03/26/19  0646 03/25/19  0351 03/24/19  0415 03/23/19  0804 03/23/19  0508   CULT No growth after 2 hours No growth after 1 day No growth after 2 days Moderate growth  Candida albicans / dubliniensis  Candida albicans and Candida dubliniensis are not routinely speciated  Susceptibility testing not routinely done  *  Culture in progress No growth after 3 days No growth after 4 days No growth after 5 days Moderate growth  Candida albicans / dubliniensis  Candida albicans and Candida dubliniensis are not routinely speciated  Susceptibility testing not routinely done  *  Light growth  Klebsiella pneumoniae  Susceptibility testing done on previous specimen  * No growth     Imaging:    3/23 TTE negative for vegetation    3/29 CT Abdomen  IMPRESSION:   1. Small amount of free air in the left upper quadrant, along the  greater curvature the stomach, of unclear etiology. Question small  gastric perforation given the proximity to the body of the stomach.   2. Prominent fluid surrounding the pancreas in the left upper  quadrant, suspicious for pancreatitis.  Recommend correlation with a  lipase measurement.    3. Right groin hematoma and mild  amount of extraperitoneal hemorrhage  isolated to the low pelvis.  4. The stomach is decompressed. There is no evidence to suggest that  there is a persistent aortoenteric fistula.  5. Somewhat improved findings in the lungs, with persistent  groundglass opacities and interlobular septal thickening consistent  with edema and/or diffuse alveolar hemorrhage.  6. Evolving infarcts in the liver and spleen.  7. Density in the infrarenal IVC, conceivably from retained catheter  components.

## 2019-03-30 NOTE — PROGRESS NOTES
CRRT RESTART NOTE    Reason for Restart: Per goals of care.      Patient s Vascular Access: Catheter: L IJ              Placement Confirmed: YES    DATA:  Procedure: CVVHDF  Start Time: 2043  Machine#: 2  Filter:  M150  Blood Flow:   200 mL/min  Pre-Replacement Solution: Phoxillum BK4/2.5  Post-Replacement Solution:  Phoxillum BK4/2.5  Dialysate Solution: Phoxillum BK4/2.5  Pre-Replacement Solution Rate:  1300mL/hr  Post-Replacement Solution Rate:  200mL/hr  Dialysate Flow Rate:  1300mL/hr  Patient Removal Rate:  30mL/hr  Anticoagulation Type and Rate: none    ASSESSMENT:  How Patient Tolerated Restart:  tolerated  Vital Signs: , /78 (98)  Initial Pressures:  Access: -98  Filter: 138  Return: 97  TMP: 52  Change in Filter Pressure: 17    INTERVENTIONS:  Restarted CVVHDF post-goals-of-care discussion with family/team.    PLAN:  Continue CRRT for supportive care. Patient now DNR.

## 2019-03-30 NOTE — PROGRESS NOTES
CRRT STATUS NOTE    DATA:  Time:  6:43 AM  Pressures: WDL  Filter Status: WDL    Problems Reported/Alarms Noted: none    Supplies Present: WDL    ASSESSMENT:  Patient Net Fluid Balance: about I = O since midnight.   Vital Signs: HR 58, /59 (77)  Labs: Tbili 23.3, WBC 29   Goals of Therapy: DNR    INTERVENTIONS:   none    PLAN:  Call CRRT [phone].

## 2019-03-30 NOTE — PLAN OF CARE
Neuro-Unresponsive this shift. Intermittently withdraws from painful stimuli. Opens eyes spontaneously, but does not startle. PERRL  CV-SR at start of author's shift. Rates have gradually decreased over NOC, now as low as 45 at times (unsustained). Notified Cards fellow of same; no new orders at this time. BP labile; maintaining MAP>65 with norepi and epi.  Pulm- Coarse LS with moderate/thick secretions. Remains on vent, SIMV settings.  GI- Hypoactive BS. OG to low continuous suction; moderate output. BS monitored Q4 hours.  -Incontinent x1. Bladder scan 0mL.  Skin-No new concerns; repositioned [id number].  Pain-Continuous fentanyl gtt. CPOT 0.  See flow sheets for further interventions and assessments. Continuing to monitor.      Adult Inpatient Plan of Care  Plan of Care Review  3/30/2019 0556 - Declining by Ileana Allan RN     Adult Inpatient Plan of Care  Patient-Specific Goal (Individualization)  3/30/2019 0556 - Declining by Ileana Allan RN     Adult Inpatient Plan of Care  Absence of Hospital-Acquired Illness or Injury  3/30/2019 0556 - Declining by Ileana Allan RN     Adult Inpatient Plan of Care  Optimal Comfort and Wellbeing  3/30/2019 0556 - Declining by Ileana Allan, AMELIE     Adult Inpatient Plan of Care  Readiness for Transition of Care  3/30/2019 0556 - Declining by Ileana Allan RN     Adult Inpatient Plan of Care  Rounds/Family Conference  3/30/2019 0556 - Declining by Ileana Allan, AMELIE     Infection (CRRT (Continuous Renal Replacement Therapy))  Absence of Infection Signs/Symptoms  3/30/2019 0556 - Declining by Ileana Allan, AMELIE     Hypothermia (CRRT (Continuous Renal Replacement Therapy))  Body Temperature Maintained in Desired Range  3/30/2019 0556 - Declining by Ileana Allan, RN

## 2019-03-30 NOTE — PROGRESS NOTES
CRRT STATUS NOTE    DATA:  Time:  5:04 PM  Pressures WNL:  YES  Filter Status:  WDL    Problems Reported/Alarms Noted:  Pt moved to 4A - Filter changed    Supplies Present:  YES    ASSESSMENT:  Patient Net Fluid Balance:  +13.95 ml since midnight  Vital Signs:  T 99.5 F, HR 80, RR 19, BP 91/49 (66) art line, Sat 100% on 50% FiO2 (vent)  Labs:    Results for LIONEL CARTY (MRN [id number]) as of 3/30/2019 19:05   Ref. Range 3/30/2019 16:29   Sodium Latest Ref Range: 133 - 144 mmol/L 140   Potassium Latest Ref Range: 3.4 - 5.3 mmol/L 5.2   Chloride Latest Ref Range: 94 - 109 mmol/L 107   Carbon Dioxide Latest Ref Range: 20 - 32 mmol/L 19 (L)   Urea Nitrogen Latest Ref Range: 7 - 30 mg/dL 42 (H)   Creatinine Latest Ref Range: 0.66 - 1.25 mg/dL 1.03   GFR Estimate Latest Ref Range: >60 mL/min/1.73_m2 90   GFR Estimate If Black Latest Ref Range: >60 mL/min/1.73_m2 >90   Calcium Latest Ref Range: 8.5 - 10.1 mg/dL 7.3 (L)   Anion Gap Latest Ref Range: 3 - 14 mmol/L 13   Magnesium Latest Ref Range: 1.6 - 2.3 mg/dL 2.5 (H)   Phosphorus Latest Ref Range: 2.5 - 4.5 mg/dL 6.7 (H)   Albumin Latest Ref Range: 3.4 - 5.0 g/dL 1.5 (L)   Protein Total Latest Ref Range: 6.8 - 8.8 g/dL 4.7 (L)   Bilirubin Total Latest Ref Range: 0.2 - 1.3 mg/dL 22.0 (HH)   Alkaline Phosphatase Latest Ref Range: 40 - 150 U/L 239 (H)   ALT Latest Ref Range: 0 - 70 U/L 282 (H)   AST Latest Ref Range: 0 - 45 U/L 438 (H)   Bilirubin Direct Latest Ref Range: 0.0 - 0.2 mg/dL 18.6 (H)   Glucose Latest Ref Range: 70 - 99 mg/dL 82   WBC Latest Ref Range: 4.0 - 11.0 10e9/L 30.9 (H)   Hemoglobin Latest Ref Range: 13.3 - 17.7 g/dL 7.7 (L)   Hematocrit Latest Ref Range: 40.0 - 53.0 % 24.8 (L)   Platelet Count Latest Ref Range: 150 - 450 10e9/L 314   RBC Count Latest Ref Range: 4.4 - 5.9 10e12/L 2.50 (L)     Goals of Therapy:  I = O     INTERVENTIONS:   Filter changed when pt moved to 4A.    PLAN:  Continue CRRT as per MD order. Notify CRRT Resource RN with any  questions or concerns.

## 2019-03-30 NOTE — PROGRESS NOTES
"Nephrology Progress Note  03/30/2019         Mr Riddle is a 39 yom w/HLP who had cardiac arrest on 2/23/19 with unclear down-time.  PCI done emergently, Nephrology consulted for management of FERNANDA, started CRRT on 2/25.       Interval History :   Mr Riddle continues on CRRT for management of electrolytes and volume. He has been noted with pneumoperitoneum and now more infarcts. Family conference yesterday and they wish for us to make decisions as they cannot withdraw support. He was make DNR  Overnight, increased lactate.       Assessment & Recommendations:   FERNANDA-Baseline Cr unclear, admitted with Cr of 1.7 post arrest.  FERNANDA due to hemodynamic injury, also received contrast and had elevated CK.  Started CRRT on 2/25, has been anuric since starting CRRT, ~48h after arrest.  Continuing CRRT with no signs of recovery, now off of both VV and VA ECMO, still on pressors, gently pulling fluid but nearly euvolemic.                -Access is [facility], 3/21 positional initially but functioning well currently.                -CRRT, will aim for I = O     Volume status- near euvolemia. Will continue CRRT with I = O. Had planned to try IHD but given overall instability, will hold off   - increased lactic acid overnight    Electrolytes/pH- On CRRT, K and bicarb stable, on 4k baths.     - pH lower, lactic acidosis    Ca/phos/pth-Ca 7.6, Mg 2.4, phos 6, stable with CRRT.      Anemia-Hgb 8.5, needing intermittent PRBC's with ECMO and bleeding.     Nutrition-Impact TF.         Recommendations were communicated to primary team via note        Review of Systems:   I reviewed the following systems:  Gen: No fevers or chills  CV: No CP at rest  Resp: No SOB at rest  GI: No N/V    Physical Exam:   I/O last 3 completed shifts:  In: 1384.04 [I.V.:1189.04; NG/GT:195]  Out: 1339 [Emesis/NG output:850; Other:339; Stool:150]   /90 (BP Location: Left leg)   Pulse 89   Temp 94.4  F (34.7  C) (Tympanic)   Resp 21   Ht 1.68 m (5' 6.14\")   Wt 88.9 kg " (195 lb 15.8 oz)   SpO2 100%   BMI 31.50 kg/m       GENERAL APPEARANCE: Intubated  EYES:  covered today  HENT: mouth without ulcers or lesions  PULM: lungs clear to auscultation, equal air movement, no cyanosis or clubbing  CV: regular rhythm, normal rate, no rub     -edema +1 LE  GI: soft, non-tender, non-distended, dark red stool  MS: no evidence of inflammation in joints, no muscle tenderness  NEURO:  Intubated and sedated    Labs:   All labs reviewed by me  Electrolytes/Renal -   Recent Labs   Lab Test 03/30/19  0933 03/30/19  0407 03/29/19  2224    138 140   POTASSIUM 4.7 4.7 4.8   CHLORIDE 107 108 109   CO2 20 18* 18*   BUN 42* 48* 60*   CR 0.95 1.02 1.34*   * 124* 111*   ALEKSANDER 7.6* 7.3* 7.4*   MAG 2.4* 2.5* 2.6*   PHOS 6.0* 5.8* 5.4*       CBC -   Recent Labs   Lab Test 03/30/19  0933 03/30/19  0407 03/29/19  2224   WBC 30.2* 29.1* 26.4*   HGB 7.9* 8.2* 7.9*    326 345       LFTs -   Recent Labs   Lab Test 03/30/19  0933 03/30/19  0407 03/29/19  2224   ALKPHOS 243* 254* 255*   BILITOTAL 22.4* 23.3* 22.0*   * 262* 249*   * 384* 353*   PROTTOTAL 4.5* 4.6* 4.4*   ALBUMIN 1.5* 1.5* 1.5*       Iron Panel - No lab results found.        Current Medications:    amiodarone  200 mg Oral or Feeding Tube Daily     B and C vitamin Complex with folic acid  5 mL Per Feeding Tube Daily     Carboxymethylcellulose Sod PF  2 drop Both Eyes 4x Daily     erythromycin   Left Eye 4x Daily     erythromycin   Right Eye At Bedtime     fluconazole  400 mg Oral or Feeding Tube Daily     heparin lock flush  5-10 mL Intracatheter Q24H     insulin aspart  1-12 Units Subcutaneous Q4H     LUBRIFRESH P.M.   Ophthalmic 4x Daily     pantoprazole (PROTONIX) IV  40 mg Intravenous BID     piperacillin-tazobactam  4.5 g Intravenous Q6H     sennosides  8.6 mg Oral BID     sodium chloride (PF)  3 mL Intracatheter Q8H     ticagrelor  90 mg Oral or Feeding Tube BID       - MEDICATION INSTRUCTIONS -       IV fluid  REPLACEMENT ONLY 30 mL/hr at 03/07/19 1100     CRRT replacement solution 12.5 mL/kg/hr (03/30/19 1134)     EPINEPHrine IV infusion ADULT 0.06 mcg/kg/min (03/30/19 1200)     fentaNYL 50 mcg/hr (03/30/19 1200)     - MEDICATION INSTRUCTIONS -       norepinephrine 0.06 mcg/kg/min (03/30/19 1200)     Percutaneous Coronary Intervention orders placed (this is information for BPA alerting)       CRRT replacement solution 1.887 mL/kg/hr (03/29/19 1838)     CRRT replacement solution 12.5 mL/kg/hr (03/30/19 1134)     ACE/ARB/ARNI NOT PRESCRIBED       BETA BLOCKER NOT PRESCRIBED       Thuy aRjan

## 2019-03-30 NOTE — PROGRESS NOTES
"Surgery Progress Note  03/30/2019       Subjective:  Made DNR and TF stopped yesterday. DOMINGO overnight. Febrile up to 102.9 yesterday evening, MAP maintained 70-98 on 0.06 epi and 0.07 norepi, mech vent (PS/12/450/8/10/70), ABG with mild worsening acidosis (7.31 from 7.40).      Objective:  Temp:  [92.4  F (33.6  C)-102.9  F (39.4  C)] 92.4  F (33.6  C)  Heart Rate:  [] 65  Resp:  [15-52] 16  MAP:  [61 mmHg-105 mmHg] 79 mmHg  Arterial Line BP: ()/(46-78) 108/59  FiO2 (%):  [50 %-70 %] 50 %  SpO2:  [98 %-100 %] 98 %    I/O last 3 completed shifts:  In: 1384.04 [I.V.:1189.04; NG/GT:195]  Out: 1339 [Emesis/NG output:850; Other:339; Stool:150]      GEN: intubated, sedated  Neuro: unable to assess, does not arouse to pain stimuli  EENT: normal  Pulm: ventilated  Abdomen: soft, non-distended, unable to assess tenderness  Extremities: mild edema throughout extremities  Skin: WWP  Psych: unable to assess     Labs:  Recent Labs   Lab 03/30/19  0407 03/29/19  2224 03/29/19  1526   WBC 29.1* 26.4* 24.8*   HGB 8.2* 7.9* 8.5*    345 359       Recent Labs   Lab 03/30/19  0407 03/29/19  2224 03/29/19  1526    140 140   POTASSIUM 4.7 4.8 5.1   CHLORIDE 108 109 108   CO2 18* 18* 18*   BUN 48* 60* 56*   CR 1.02 1.34* 1.48*   * 111* 101*   ALEKSANDER 7.3* 7.4* 7.8*   MAG 2.5* 2.6* 2.5*   PHOS 5.8* 5.4* 4.7*       Imaging:  CXR - 3/30   \"Findings: Single AP view of the chest. Endotracheal tube tip projects  over the trachea 5.3 cm above the tereso. Left IJ Central venous  catheter tip projects over the superior cavoatrial junction. Coronary  artery stents. Right-sided PICC tip projects over the SVC. Stable  retrocardiac atelectasis and/or consolidation. Slightly increased  opacities in the right upper lung. Lung volumes are diminished.                                                     Impression:   1. Stable support devices.  2. Increased right-sided pulmonary opacities, nonspecific, could  represent " "infection/aspiration, hemorrhage, edema, or atelectasis.  3. Stable retrocardiac atelectasis and/or consolidation.\"     Assessment/Plan:   39 year old male admitted for cardiac arrest s/p ECMO (decannulated), hospital couse c/b influenza, bacterial PNA, massive GIB d/t aortoesophageal fistula (non-op mgmt) which has subsided, R intraparenchymal hemorrhage, CRRT, fungemia. Surgery consulted for new CT finding of free air in LUQ. Pt has been on pressors since arrival - epi + norepi since 3/13; likely source of free air is perforated gastric or duodenal ulcer 2/2 ischemia. This in the setting of critically ill ICU pt bodes a very poor prognosis. Pt's NSQIP risk for death if operative intervention were to be attempted is as high as 85%. Recommend non-operative mgmt and to continue to discuss goals of care with family in light of this new finding and dire prognosis.    - Continue cares per family's stated goals  - NPO  - continue abx  - PPI BID  - Primary cares per CV ICU  - surgery will sign off, please contact with any questions or concerns     Seen and discussed with chief resident and staff.  - - - - - - - - - - - - - - - - - -  Jeff Gastelum MD  General Surgery PGY-1  (p) [phone]    "

## 2019-03-30 NOTE — PROGRESS NOTES
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    Plan is to continue the current supportive cares for now.  Family is still coming to terms with his poor prognosis.           Key events - last 24 hours:   Family conference yesterday: family designated him DNR but want to continue maximal therapy otherwise.    Nursing notes reviewed; mostly not responsive overnight but opens eyes spontaneously and withdraws from painful stimuli.  Thick secretions from ETT.  Dark brown output from OG tube, likely old blood.    On CVVHD, fluid balance was net even last 24 hrs.    Pressor requirements stable.  Slight lactic acidosis this morning.              Assessment and Plan:   Hellen Riddle is a 39 year old male who was admitted on 2/23/2019.    Neurology: Intraparenchymal hemorrhage  CT head from 3/29 showed new strokes from 3/18 CT scan  Intubated, off sedation since 3/14, restarted fentanyl at 25mcg 3/25 after he bit his ETT and damaged his teeth, off 3/26   Cardiovascular / Hemodynamics: Refractory VF arrest, placed on VA ECMO, VV ecmo added.  Coronary angiography on admission showed 3v disease and he underwent MITA to mRCA & mLCx. Has IABP as well.  ECMO decannulation stymied by VT in OR on 3/6. Returned to the cath lab for MITA to  LAD and distal LCx.   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 Stable  SFA occlusion s/p embolectomy  TTE: LV EF 50-55%.  RV dysfunction is mild.  --IABP removed 3/17/19  --VA ecmo removed 3/18/19  --vascular surgery consulted for aorto-esophageal fistula, but declined to  operate due to futility and high risk; will reconsult if he continues to improve  --wean pressors as able; MAP goal >65  --holding heparin gtt and ASA; cont ticagrelor  --hold lipitor for now given hepatic injury during arrest  --hold ACE/ARB for now given renal failure and shock  --holding beta blocker given shock    Pulmonary: Acute hypoxic respiratory failure due to pulmonary edema, VAP  --surgery consulted for trach/peg-- 3/22/2019 Surgery evaluated, determined it was not clinically appropriate at this time, reassessing 3/27 now that vent requirements are decr  --cont abx as below  --wean vent as able  --daily CXR  -- ABGs     GI and Nutrition: GI bleeding from aorto-esophageal fistula: stable  Shock liver  Acute pancreatitis  OG output- old blood vs ongoing bleeding?  Liver and splenic infarcts  Pneumoperitoneum new from CT 3/29, General Surgery consulted, appreciate assistance, not an operative candidate  --PPI BID  --holding TFs due to pneumoperitoneum likely 2/2 gastric ulcer 2/2 prolonged requirement of vasoactive agents  --monitor BID LFTs   Renal, Fluid and Electrolytes: Renal failure.  Appreciate nephrology's assistance.  On CRRT.  --monitor urine output  --maintain K>4 and Mg>2    Infectious Disease: Multiple organisms recovered in sputum; possible bacteremia from enteric source  --meropenem changed to pip-tazo 3/28 per ID recs  --switched from daptomycin to vancomycin   -- switched from micafungin to fluconazole   --ID following, appreciate assistance   JOSLYN recommended by ID, however due to his aortoesophageal fistula this is unlikely to be able to be performed   Hematology and Oncology: Acute anemia due to GI bleed; stable  --holding heparin as above   --cryo PRN fibrinogen < 200; FFP for INR >2  --Transfuse for Hgb<10   Endocrinology: No known medical history - insulin as needed for glycemic control.     Code Status: DNR              Hospital Course:             Medications:   I have reviewed this  patient's current medications           Review of Systems:   Review of systems not obtained due to patient factors - abnormal mental status              Physical Exam:   Exam and Labs by System  Neuro:   Exam: intubated and lightly sedated, pupils equal and reactive  General Appearance:   Comfortable, no pain or respiratory distress     Cardiovascular:     Heart Rate: 55  Exam:    Normal S1,S2, and no gallop, rub or murmur     Recent Labs   Lab 03/30/19  0407 03/29/19  0323 03/28/19  0359   TROPI 0.417* 0.288* 0.269*       Pulm:   Meds:  Vent Settings:  Resp: 15 SpO2: 100 % O2 Device: Mechanical Ventilator    Ventilation Mode: SIMV/PS  (Synchronized Intermittent Mandatory Ventilation with Pressure Support)  FiO2 (%): 50 %  Rate Set (breaths/minute): 12 breaths/min  Tidal Volume Set (mL): 450 mL  PEEP (cm H2O): 8 cmH2O  Pressure Support (cm H2O): 10 cmH2O  Oxygen Concentration (%): 70 %  Resp: 15    Exam:   Symmetrical chest shape and movements with each tidal breath  Good patient-ventilator synchrony     Arterial Blood Gas:   Recent Labs   Lab 03/30/19  0407 03/29/19  2224 03/29/19  1528 03/29/19  1228   PH 7.31* 7.40 7.44 7.45   PCO2 38 31* 31* 31*   PO2 162* 112* 95 85   HCO3 19* 19* 21 21   O2PER 60.0 60 70 70         Renal:   Meds:  Vitals:    03/28/19 0400 03/29/19 0400 03/30/19 0000   Weight: 89.4 kg (197 lb 1.5 oz) 89.4 kg (197 lb 1.5 oz) 88.9 kg (195 lb 15.8 oz)   I/O last 3 completed shifts:  In: 1384.04 [I.V.:1189.04; NG/GT:195]  Out: 1339 [Emesis/NG output:850; Other:339; Stool:150]  Recent Labs   Lab 03/30/19  0407 03/29/19  2224    140   POTASSIUM 4.7 4.8   CHLORIDE 108 109   CO2 18* 18*   ANIONGAP 13 13   * 111*   BUN 48* 60*   CR 1.02 1.34*   ALEKSANDER 7.3* 7.4*     No components found for: URINE     GI:   Exam:    Soft  Dark brown OG output     Diet: NPO  Recent Labs   Lab 03/30/19  0407 03/29/19  2224 03/29/19  1526   * 353* 347*   * 249* 256*   BILITOTAL 23.3* 22.0* 23.5*    ALBUMIN 1.5* 1.5* 1.6*   PROTTOTAL 4.6* 4.4* 5.0*   ALKPHOS 254* 255* 290*       Heme/ID:   Meds:  Temp: 95.9  F (35.5  C) Temp src: TympanicTemp  Min: 95.9  F (35.5  C)  Max: 102.9  F (39.4  C)   Recent Labs   Lab 03/30/19  0407 03/29/19  2224 03/29/19  1526 03/29/19  1005 03/29/19  0323   WBC 29.1* 26.4* 24.8* 25.2* 27.4*   HGB 8.2* 7.9* 8.5* 8.3* 8.6*   HCT 25.1* 24.5* 26.7* 25.7* 26.9*   MCV 97 96 95 96 96   RDW 24.4* 23.9* 23.5* 23.8* 23.7*    345 359 359 359     Recent Labs   Lab 03/25/19  0358 03/24/19  2209 03/24/19  0344   INR 1.51* 1.44* 1.43*     Recent Labs   Lab 03/30/19  0420 03/29/19  1838 03/29/19  1822 03/29/19  0331 03/28/19  0401 03/27/19  0320   CULT Canceled, Test credited  Duplicate request    Canceled, Test credited  Duplicate request   No growth after 9 hours No growth after 9 hours No growth after 1 day No growth after 2 days No growth after 3 days       Endo:   Meds:  Recent Labs   Lab 03/30/19  0407 03/29/19  2224 03/29/19  1526 03/29/19  1005 03/29/19  0323   * 111* 101* 114* 128*       Lines:    No erythema or discharge at the catheter entry site               Data:     ROUTINE ICU LABS (Last four results)  CMP  Recent Labs   Lab 03/30/19  2136 03/30/19  1629 03/30/19  0933 03/30/19  0407 03/29/19  2224    140 140 138 140   POTASSIUM PENDING 5.2 4.7 4.7 4.8   CHLORIDE 106 107 107 108 109   CO2 15* 19* 20 18* 18*   ANIONGAP PENDING 13 12 13 13   GLC 72 82 115* 124* 111*   BUN 37* 42* 42* 48* 60*   CR PENDING 1.03 0.95 1.02 1.34*   GFRESTIMATED PENDING 90 >90 >90 65   GFRESTBLACK PENDING >90 >90 >90 76   ALEKSANDER 7.7* 7.3* 7.6* 7.3* 7.4*   MAG 2.6* 2.5* 2.4* 2.5* 2.6*   PHOS  --  6.7* 6.0* 5.8* 5.4*   PROTTOTAL PENDING 4.7* 4.5* 4.6* 4.4*   ALBUMIN 1.4* 1.5* 1.5* 1.5* 1.5*   BILITOTAL PENDING 22.0* 22.4* 23.3* 22.0*   ALKPHOS PENDING 239* 243* 254* 255*   AST PENDING 438* 390* 384* 353*   ALT PENDING 282* 265* 262* 249*     CBC  Recent Labs   Lab 03/30/19  2730  03/30/19  1629 03/30/19  0933 03/30/19  0407   WBC 32.2* 30.9* 30.2* 29.1*   RBC 2.41* 2.50* 2.52* 2.60*   HGB 7.6* 7.7* 7.9* 8.2*   HCT 24.5* 24.8* 24.7* 25.1*   * 99 98 97   MCH 31.5 30.8 31.3 31.5   MCHC 31.0* 31.0* 32.0 32.7   RDW 25.8* 25.3* 24.9* 24.4*    314 308 326     INR  Recent Labs   Lab 03/25/19  0358 03/24/19  2209 03/24/19  0344   INR 1.51* 1.44* 1.43*     Arterial Blood Gas  Recent Labs   Lab 03/30/19  2131 03/30/19  1602 03/30/19  0933 03/30/19  0924 03/30/19  0407   PH 7.18* 7.29* 7.28*  --  7.31*   PCO2 41 40 43  --  38   PO2 145* 145* 128*  --  162*   HCO3 15* 19* 20*  --  19*   O2PER 40.0 50.0 50 50 60.0     The pt was discussed and evaluated with Dr. MAHAD CHATTERJEE, who agrees with the assessment and plan above.     Joseph Li MD  Cardiovascular disease fellow  I have seen and examined the patient with the [facility] team. I agree with the assessment and plan of the note above.I have reviewed pertinent labs.     Caleb Chatterjee MD  Interventional Cardiology  Pager: [phone]

## 2019-03-31 NOTE — PROGRESS NOTES
Updated MD on patient condition at bedside. MD updated family, who wish to continue current medications but not escalate cares. Family verbalized that understand that patient will likely pass away. Per MD, may disconnect CRRT.

## 2019-03-31 NOTE — PROGRESS NOTES
Updated Dr. Li at patient condition at bedside, notified that pressures not improved despite addition of 3rd vasopressor, that pH 6.94, and Lactic 13.8. Also notified that desatted to 80%, currently at 75% on 100% FiO2. Expressed concern regarding goals of care and patient condition.  Family at bedside, updated by Dr. Li. Family verbalized understanding that patient is declining rapidly, are calling other family members to bedside. Will continue with current support at this time.  and palliative care paged. Family declining  at this time. Supportive care provided to family.

## 2019-03-31 NOTE — PROGRESS NOTES
"  Internal Medicine Crosscover Note      Hellen Riddle MRN# [id number]   YOB: 1980 Age: 39 year old     Interval history:  Patient was evaluated after lactate had returned at 6.7, now increasing to 8.0. He has continued to requiring increasing rates of multiple pressors to maintain support. Patient and family were seen by family on 3/29 at which time goals of care were discussed. Overall prognosis was discussed at this time and were aware that patient may not survive this hospitalization. On 3/29/19 was noted to have new strokes in the frontal lobe, left occipital lobe and right cerebellar hemisphere. CT from 3/29 also demonstrated pneumoperitoneum but not felt to be surgical candidate per general surgery.     Vitals were reviewed  /90 (BP Location: Left leg)   Pulse 89   Temp 96.3  F (35.7  C) (Axillary)   Resp 14   Ht 1.68 m (5' 6.14\")   Wt 88.9 kg (195 lb 15.8 oz)   SpO2 100%   BMI 31.50 kg/m      Intake/Output Summary (Last 24 hours) at 3/30/2019 2330  Last data filed at 3/30/2019 2300  Gross per 24 hour   Intake 1940.56 ml   Output 1185 ml   Net 755.56 ml       Labs:  BMP  Recent Labs   Lab 03/30/19 2136 03/30/19 1629 03/30/19  0933 03/30/19  0407    140 140 138   POTASSIUM 5.7* 5.2 4.7 4.7   CHLORIDE 106 107 107 108   ALEKSANDER 7.7* 7.3* 7.6* 7.3*   CO2 15* 19* 20 18*   BUN 37* 42* 42* 48*   CR 0.74 1.03 0.95 1.02   GLC 72 82 115* 124*     CBC  Recent Labs   Lab 03/30/19 2136 03/30/19  1629 03/30/19  0933 03/30/19  0407   WBC 32.2* 30.9* 30.2* 29.1*   RBC 2.41* 2.50* 2.52* 2.60*   HGB 7.6* 7.7* 7.9* 8.2*   HCT 24.5* 24.8* 24.7* 25.1*   * 99 98 97   MCH 31.5 30.8 31.3 31.5   MCHC 31.0* 31.0* 32.0 32.7   RDW 25.8* 25.3* 24.9* 24.4*    314 308 326      INR  Recent Labs   Lab 03/25/19  0358 03/24/19  2209 03/24/19  0344   INR 1.51* 1.44* 1.43*     LFTs  Recent Labs   Lab 03/30/19  2136 03/30/19  1629 03/30/19  0933 03/30/19  0407   ALKPHOS 228* 239* 243* 254*   * " 438* 390* 384*   * 282* 265* 262*   BILITOTAL 22.1* 22.0* 22.4* 23.3*   PROTTOTAL 4.7* 4.7* 4.5* 4.6*   ALBUMIN 1.4* 1.5* 1.5* 1.5*     Last Arterial Blood Gas:  pH Arterial   Date Value Ref Range Status   03/30/2019 7.18 (LL) 7.35 - 7.45 pH Final     Comment:     Critical Value called to and read back by  PHOENIX JIMENEZ.ON 4A 3/30/19,2146 BY        pCO2 Arterial   Date Value Ref Range Status   03/30/2019 41 35 - 45 mm Hg Final     pO2 Arterial   Date Value Ref Range Status   03/30/2019 145 (H) 80 - 105 mm Hg Final     Bicarbonate Arterial   Date Value Ref Range Status   03/30/2019 15 (L) 21 - 28 mmol/L Final     O2 Sat Arterial   Date Value Ref Range Status   02/23/2019 100 92 - 100 % Final     Base Excess Art   Date Value Ref Range Status   03/22/2019 0.1 mmol/L Final     Comment:     Reference range:  -9.0 to 1.8        Assessment and Plan:  40 yo male with cardiac arrest in the setting of multivessel CAD, cardiogenic shock prompting VA-ECMO, ARDS due to bacterial and viral pneumonia necessitating VV-ECMO, FERNANDA requiring CRRT, 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    An extensive discussion with multiple family members present was held in which prognosis was discussed in the setting of rising lactate and acidosis. Multiple family members voiced an understanding that he may not survive this hospitalization. Family emphasized that patient would prefer to die a natural death and worried that current supports (ie pressors, CRRT and ventilator) would prolong this process. Family would like to continue current cares including CRRT and ventilator support. They would also like to continue current pressors with up titration as needed, but decline addition of 3rd pressor. They have declined bicarbonate for his acidosis and would prefer to defer additional therapeutic agents  at this time. Will continue to reassess patient throughout the night.     Ady Beckford, DO  Internal Medicine PGY2  Pager [phone] (Text Page)

## 2019-03-31 NOTE — PROGRESS NOTES
Updated CSI on patient condition, notified of PVCs, lytes, persistent hypotension w/decreasing pulse pressures. No new interventions, continue comfort cares (+ pressors and mechanical ventilation).

## 2019-03-31 NOTE — PROGRESS NOTES
Updated MD on patient condition, notified that L pupil irregularly shaped. Also discussed concerns regarding goals of care. Per MD discussion with family, no procedural interventions at this time, will continue medication management.

## 2019-03-31 NOTE — PROVIDER NOTIFICATION
0805: Updated CSI on patient condition, notified that on 2 pressors. Per team notes from yesterday, not adding 3rd pressor.  0820: MD spoke with family, will add 3rd pressor. (Patient maxed on 2 pressors).

## 2019-03-31 NOTE — SIGNIFICANT EVENT
SPIRITUAL HEALTH SERVICES Significant Event  [employer] of ANOINTING  [facility] ([place] 4A    Pt. is imminently dying. Many family present. Pt requested prayers and anointing. They said the pt had been anointed and confirmed a couple of weeks ago by his [place] .    I ANOINTED PT. ON 3/31/2019.    Getachew Leblanc M.Div.  [facility]

## 2019-03-31 NOTE — PLAN OF CARE
D: Family aware of Raiza's declining condition. They know he is getting pain medication and feel he is comfortable. Many stayed overnight as he had multiple decreases in VS.     Neuro: Somnolent. Opens eyes spontaneously, blinks to threat occasionally. Pupils 4/sluggish, L is cloudy and filmed. No response to pain, extensor postures with turns.   VS: Temp trend hypothermic 97.2-93.8, lesly hugger on highest setting. HR 63-73, BP /43-64, Map 56-79. RR 12-14.   CV: !st degree AV block. Prolonged QT. Rare PVC.   Pulm: SIMV/40%/12/450/10/8. LS clear/diminished. No cough noted( family says he did once).   GI: TF meds only. OG with dark red output to LIS, 0 out, 100ml in container.   : anuric, rare scant drops onto chux.   Lines/Gtts:  R TL PICC, abx line, pressor line, D5 carrying fentanyl now at 100mcg/hour.   Skin:   Lips scabbed, vaseline applied. Teeth loose, careful oral care and ETT micro-positioning. R groin (dc'd) line site to wound vac , scant sanguinous output.   Drains: wound vac, OG.   Labs: see LABS, notable increasing lactic acid now 11, decreased ABG ph 7.09. WBC trend up to 35.8.   P: Assist family through this very difficult time, as they make decisions. Provide for comfort for Ting.

## 2019-03-31 NOTE — PROGRESS NOTES
"Overnight, patient developed a worsening lactic acidosis and hypotension.  This morning he was on maximum doses of epi and norepi with MAPs in the upper 50s.    I updated the family and they reversed their stance slightly, requesting that we employ additional pressors, blood transfusions, and whatever medical options we had at our disposal because they knew \"he wants to keep fighting\" as long as we have interventions to offer.  They simply could not \"bring themselves to pull the plug\".    Nonetheless, his BP remained in the 60s/40s even after addition of vasopressin.    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    I told the family that his prognosis is grim even if we were to initiate blood transfusion, since we cannot address the underlying problem.  I do not believe that additional pressor support will alter his prognosis.    His wife accepted this has called additional family to come and be present as he declines and inevitably passes away.  "

## 2019-03-31 NOTE — PROGRESS NOTES
"[facility], [place]  Palliative Care Progress Note      Patient: Hellen Riddle  Date of Admission:  2/23/2019    Requesting provider/team: Lion Caraballo MD  Reason for consult: Goals of care    Recommendations:  - Continue Fentanyl infusion 100 mcg/hr  - From previous discussions, see palliative note 3/29 -  they do not wish to make \"choices\" about his medical care but repeatedly said that they trust the medical team and would accept what the medical providers tell them is in Raiza's best interest.    - I agree that further interventions will not change Raiza's course and would not recommend additional interventions.  - Family would like to continue current level of care with current pressors.   - Appreciate  involvement for spiritual support    These recommendations have been discussed in person with  bedside RN and , and  primary team.     Moraima Acosta MD  Palliative Medicine  Pager [phone]     [facility] Inpatient Team Consult pager [phone] (M-F 8-4:30)  After-hours Answering Service [phone]   Palliative Clinic: [phone]     Assessment:  Hellen \"Ting\"  is a 39 year old male with a history of HLD  initially admitted 2/23/19 with cardiac arrest requiring ECMO for 3 weeks. He is continuing to require pressers, is ventilator and dialysis dependent. On 3/28,  he was found to have new strokes and a new pneumoperitoneum. I was asked by bedside RN to visit for family support today as Ting is worsening.      Symptoms:   - Pain: patient currently on continuous opioid and and will withdraw extremity to pain.  Does not appear uncomfortable.     Social: Lives with very supportive and actively engaged wife with whom he has two children.   - >20 family members present in room today, wife in and out of the room crying during visit    Spiritual/Scientology: Yarsani, Bahai, and traditional [place] practices.   visited today for annointing and support.     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  At this point, family does not want to limit interventions.  Primary has told them they do not believe further interventions will change that he is dying.  Family have gathered and are supportive of each other.      Interval Events  Worsening lactate and hypotension overnight despite addition of 3rd pressor.  Continued liver failure and poor mental status.  Family have gathered at bedside.      ROS: Not able to be obtained as patient is intubated     Allergies:   No Known Allergies       Medications:   I have reviewed this patient's medication profile and medications given in the past 24 hours.    Fentanyl 100 mcg/hr running       Physical Exam:   Vital Signs: Temp: 97  F (36.1  C) Temp src: Axillary     Heart Rate: 60 Resp: 12 SpO2: (!) 81 % O2 Device: Mechanical Ventilator   BP 65/50 on arterial line  Weight: 195 lbs 15.82 oz    Physical Exam:  General: critically ill male, intubated and unresponsive to voice  HEENT: + icterus, pupils nonreactive, L expotropia.  ET tube in place.   Pulm: Slow deep breathing, on mechanical ventilation   Neuro: responsive to pain only  Skin: +Jaundice     Data reviewed:     CMP  Recent Labs   Lab 03/31/19  0933 03/31/19  0343 03/30/19  2136 03/30/19  1629    137 137 140   POTASSIUM 6.4* 5.6* 5.7* 5.2   CHLORIDE 105 105 106 107   CO2 12* 14* 15* 19*   ANIONGAP 19* 19* 17* 13   GLC 42* 52* 72 82   BUN 25 28 37* 42*   CR 0.67 0.84 0.74 1.03   GFRESTIMATED >90 >90 >90 90   GFRESTBLACK >90 >90 >90 >90   ALEKSANDER 7.2* 7.3* 7.7* 7.3*   MAG 2.7* 2.6* 2.6* 2.5*   PHOS 7.8* 7.2* 7.0* 6.7*   PROTTOTAL 3.9* 4.2* 4.7* 4.7*   ALBUMIN 1.3* 1.4* 1.4* 1.5*   BILITOTAL 20.7* 21.4* 22.1* 22.0*   ALKPHOS 226* 219* 228* 239*   AST 1,814* 900* 569* 438*   * 376* 322* 282*     CBC  Recent Labs   Lab  03/31/19  0933 03/31/19  0343 03/30/19  2136 03/30/19  1629   WBC 34.3* 35.8* 32.2* 30.9*   RBC 2.23* 2.34* 2.41* 2.50*   HGB 6.9* 7.4* 7.6* 7.7*   HCT 24.6* 24.7* 24.5* 24.8*   * 106* 102* 99   MCH 30.9 31.6 31.5 30.8   MCHC 28.0* 30.0* 31.0* 31.0*   RDW 26.3* 26.5* 25.8* 25.3*    300 336 314     INR  Recent Labs   Lab 03/25/19  0358 03/24/19  2209   INR 1.51* 1.44*     Arterial Blood Gas  Recent Labs   Lab 03/31/19  0933 03/31/19  0819 03/31/19  0340 03/30/19  2131 03/30/19  1602   PH 6.91*  --  7.09* 7.18* 7.29*   PCO2 56*  --  43 41 40   PO2 147*  --  131* 145* 145*   HCO3 11*  --  13* 15* 19*   O2PER 60 40 40 40.0 50.0     Lactate 13.5    25 minutes, >50% in counseling and coordination of care.

## 2019-03-31 NOTE — PROGRESS NOTES
Pt CRRT therapy discharge by bedside RN after MD team discussion with family as patient is imminently dying.

## 2019-03-31 NOTE — PLAN OF CARE
Neuro: L pupil noted to be smaller than R this morning. Opened eyes to name intermittently, not following commands.   Resp: accessory muscle use; FiO2 decreased to 40%, remains coarse  CV: requiring increased doses of Epi/Levo; concentration change to Epi due to large volume intake. Pulses weaker than yesterday. Thought to have ST changes; MD updated and obtained EKG.   GI: Not able to hear BS; rectal tube with no output, removed this afternoon. OG changed to LIS from continuous; noted to have increased dark red output; MD notified of downtrending Hgb, no change to POC. BG downtrending; added D5W MIVF (instead of D10 due to osmolality/incompatibility with other IV meds); required D50 for BG as low as 59.  : Unable to meet I = O goal for CRRT; x1 dribble of UOP, brown. Ca+ replaced.   Skin: No new concerns.     Continue with supportive cares, update CSI team with concerns.

## 2019-03-31 NOTE — DISCHARGE SUMMARY
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    Admitting Diagnosis  Out of hospital, witnessed cardiac arrest  Cardiogenic shock prompting veno-arterial extracorporeal life support  Severe multivessel coronary artery disease status post percutaneous coronary intervention with drug eluting stents    Additional Diagnoses  Acute respiratory distress syndrome and hypoxic respiratory failure due to bacterial and viral pneumonia necessitating veno-venous extracorporeal membrane oxygenation  Anuric acute kidney injury due to shock, necessitating continuous renal replacement therapy  Hypoxic brain injury  Hemorrhagic stroke due to intraparenchymal hemorrhage  Ischemic strokes  Acute liver injury due to circulatory shock  Gastric microperforation with pneumoperitoneum  Hepatic and splenic embolism and infarction  Acute pancreatitis  Fungemia    Hospital Course   Hellen Riddle is a 39 year old male w/ no PMH admitted 2/23/2019 s/p refractory out-of-hospital VF cardiac arrest.  Coronary angiogram revealed severe 3v CAD w/  of the LAD, severe RCA and LCx stenosis and he underwent s/p PCI of the mRCA and mLCx.  He suffered multisystem organ failure due to the numerous complications of his arrest and necessary supportive measures.  Vascular surgery felt that he was too high risk to undergo surgery for the aortoesophageal fistula and that it would be a futile measure, especially in light of his other complications and poor prognosis.  Despite anselmo discussion about his grave prognosis, his family wanted to continue as aggressive a level of care as we felt appropriate until he passed away irrespective of our therapies.  Last night and this morning he developed refractory hypotension and increasing bloody OG output  suggestive of recurrent hemorrhage from the aortoesophageal fistula.  I communicated to the family that, as surgery was not an option, there was no way to address the underlying problem and that further transfusions and vasopressors were unlikely to alter his outcome.  The family accepted this and gathered to be with him as he ultimately passed away.    Physical Exam: Unresponsive to noxious stimuli, Spontaneous respirations absent, Breath sounds absent, Carotid pulse absent, Heart sounds absent, Pupillary light reflex absent and Corneal blink reflex absent.    Patient was pronounced dead at 2:14 PM, March 31, 2019.    The patient was discussed with Dr. MAHAD SOMMER.    Joseph Li MD  Cardiovascular Medicine Service  [facility], [place]  Pager: [phone]  ______________________________________________________________________      Significant Results and Procedures   Most Recent 3 CBC's:  Recent Labs   Lab Test 03/31/19  0933 03/31/19  0343 03/30/19  2136   WBC 34.3* 35.8* 32.2*   HGB 6.9* 7.4* 7.6*   * 106* 102*    300 336     Most Recent 3 BMP's:  Recent Labs   Lab Test 03/31/19  0933 03/31/19  0343 03/30/19  2136    137 137   POTASSIUM 6.4* 5.6* 5.7*   CHLORIDE 105 105 106   CO2 12* 14* 15*   BUN 25 28 37*   CR 0.67 0.84 0.74   ANIONGAP 19* 19* 17*   ALEKSANDER 7.2* 7.3* 7.7*   GLC 42* 52* 72     Most Recent 2 LFT's:  Recent Labs   Lab Test 03/31/19  0933 03/31/19  0343   AST 1,814* 900*   * 376*   ALKPHOS 226* 219*   BILITOTAL 20.7* 21.4*     Most Recent 3 INR's:  Recent Labs   Lab Test 03/25/19  0358 03/24/19 2209 03/24/19  0344   INR 1.51* 1.44* 1.43*     Most Recent 3 Troponin's:  Recent Labs   Lab Test 03/31/19  0343 03/30/19  0407 03/29/19  0323   TROPI 0.374* 0.417* 0.288*     Most Recent 3 BNP's:No lab results found.  Most Recent D-dimer:  Recent Labs   Lab Test 03/29/19  0323   DD 11.3*     Most Recent 6 Bacteria Isolates From Any Culture  (See EPIC Reports for Culture Details):  Recent Labs   Lab Test 03/30/19  0420 03/29/19  1838 03/29/19  1822 03/29/19  0331 03/28/19  0401 03/27/19  0320   CULT Light growth  Klebsiella pneumoniae  Susceptibility testing done on previous specimen  *  Moderate growth  Candida albicans / dubliniensis  Candida albicans and Candida dubliniensis are not routinely speciated  Susceptibility testing not routinely done  *  Culture in progress  Canceled, Test credited  Duplicate request    Canceled, Test credited  Duplicate request   No growth after 2 days No growth after 2 days No growth after 2 days No growth after 3 days No growth after 4 days     Most Recent TSH and T4:No lab results found.  Most Recent Hemoglobin A1c:  Recent Labs   Lab Test 03/23/19  1555   A1C 5.0     Most Recent ABG:  Recent Labs   Lab Test 03/31/19  0933  03/22/19  0530   PH 6.91*   < > 7.46*   PO2 147*   < > 108*   PCO2 56*   < > 33*   HCO3 11*   < > 24   GIA  --   --  0.1    < > = values in this interval not displayed.     Most Recent ESR & CRP:  Recent Labs   Lab Test 03/31/19  0343   SED 42*   CRP 59.0*     Most Recent CPK:  Recent Labs   Lab Test 03/31/19  0933 03/22/19  0327 03/16/19  0431   CKT 1,064* 359* 413*   ,   Results for orders placed or performed during the hospital encounter of 02/23/19   CT Head w/o Contrast    Narrative    CT HEAD W/O CONTRAST 2/23/2019 4:36 AM    Provided History: Intraop and postop complications of circulatory  system    Comparison: None available.    Technique: Using multidetector thin collimation helical acquisition  technique, axial, coronal and sagittal CT images from the skull base  to the vertex were obtained without intravenous contrast.     Findings:    Diffuse increased density of the vascular structures consistent with  recent catheter angiography. No intracranial hemorrhage, mass effect,  or midline shift. Mild cerebral volume loss. The ventricles are  proportionate to the cerebral sulci. The gray  to white matter  differentiation of the cerebral hemispheres is preserved. The basal  cisterns are patent. There is opacification of the cerebral arteries.    Mild paranasal sinus mucosal thickening. The mastoid air cells are  hypoplastic.      Impression    Impression:   No acute intracranial pathology.    I have personally reviewed the examination and initial interpretation  and I agree with the findings.    MONIKA VALENTIN MD   CT Chest Abdomen Pelvis w/o Contrast    Narrative    Exam: CT CHEST ABDOMEN PELVIS W/O CONTRAST, 2/23/2019 4:42 AM    Indication: Intraop and postop complications of circulatory system;  ecmo    Comparison: None available    Findings:   Lines and tubes: Inferior approach ECMO catheter terminating in the  low SVC. Endotracheal tube terminating in the mid thoracic trachea.  Enteric tube with tip and sidehole in the gastric body. Coronary  stents in the RCA and mid circumflex. Right inferior approach arterial  catheter terminating in the distal aorta proximal external iliac  artery. Left-sided inferior approach intra-aortic balloon pump,  terminating at the distal aortic arch, immediately distal to the left  subclavian origin. Left lower extremity central venous catheter  terminating in the high IVC. Pisano balloon in the urinary bladder.    Chest: Thyroid gland is unremarkable. No supraclavicular or axillary  lymphadenopathy. No mediastinal lymphadenopathy. Heart size is  enlarged. No pericardial effusion. Ascending aorta measures 3.7 cm  maximal diameter. Main pulmonary artery caliber within normal limits.  Visualized portions of the aortic arch branching vessels are  unremarkable. Esophagus is unremarkable.    Trachea and bronchi are patent and clear of debris. There are are  significant confluent consolidations through the posterior lungs with  air bronchograms. The aerated portions of the anterior lung  demonstrate diffuse interlobular septal thickening with scattered  areas of  groundglass opacity. No pneumothorax. No pleural effusion.    Abdomen and pelvis: Diffuse hepatic steatosis. The spleen, adrenal  glands, and gallbladder are unremarkable. Mild fatty replacement of  pancreas. The kidneys are unremarkable with a amount of contrast  within the bilateral collecting systems. Hypoattenuating Small amount  of contrast within the urinary bladder. Stomach is markedly distended  with gastric contents. The small intestine and colon are unremarkable.  Appendix is visualized and normal in caliber. No suspicious abdominal  or pelvic lymphadenopathy. No free fluid. No pneumoperitoneum.    Abdominal aorta is normal in caliber. Inflated intra-aortic balloon  pump in the thoracoabdominal aorta.     Bones and soft tissues: Bilateral nondisplaced manubrial fractures.  Mild degenerative changes of the spine. Nondisplaced rib fracture of  the left anterior and posterior fourth rib, left anterior fifth and  sixth ribs rib, right anterior fourth and sixth rib. Trace amount of  air in the right inguinal canal and left groin. Ovoid mass in the left  inguinal canal, possibly partially retracted testicle.       Impression    IMPRESSION:   1. Extensive confluent opacities in the bilateral posterior lungs,  likely atelectasis. Intralobular septal thickening and groundglass  opacities throughout the aerated portions of the lung, alveolar  hemorrhage versus pulmonary edema.   2. Lines and tubes as described in findings. Note the right-sided  arterial cannula entry point may be above the inguinal ligament which  may put the patient at risk for retroperitoneal bleed at the time of  catheter removal.    3. Bilateral nondisplaced rib fractures.  4. 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    I have personally reviewed the examination and initial interpretation  and I agree with the findings.    AYSE LEROY MD   XR Chest Port 1 View    Narrative    Exam: XR CHEST PORT 1 , 2/23/2019 6:50 AM    Indication:  arrest    Comparison: Same day CT chest abdomen pelvis.    Findings: Single AP chest radiograph. Endotracheal tube terminating  approximately 5.9 cm above the tereso. Enteric tube projecting over  the esophagus, tip not visualized. Intra-aortic balloon pump marker 3  cm above the tereso.  Inferior approach ECMO catheter terminating at  the midsuperior vena cava. Catheter visualized terminating over the  inferior cavoatrial junction. Cardiomediastinal silhouette is  completely obscured. Diffuse opacification of the bilateral lungs.  Nondisplaced rib fractures are not well appreciated on radiograph.      Impression    Impression:   1. Intra-aortic balloon pump 3 cm above the tereso. Inferior ECMO  cannula terminating at the midsuperior vena cava. Enteric tube in the  mid thoracic trachea.  2. Near complete opacification of the bilateral lungs, correspond with  confluent opacities seen on same-day CT and likely  pulmonary edema.    I have personally reviewed the examination and initial interpretation  and I agree with the findings.    AYSE LEROY MD   XR Chest Port 1 View    Narrative    Exam: XR CHEST PORT 1 VW, 2/24/2019 2:55 AM    Indication: arrest    Comparison: 2/23/2019    Findings: Single AP chest radiograph. ET tube projects approximately 3  cm above the tereso. Enteric tube projects over the esophagus, tip not  visualized. ECMO catheter in unchanged position. Intra-aortic balloon  pump marker in stable position, projecting at the level of the tereso.    Cardiomediastinal silhouette is obscured by diffuse alveolar airspace  opacities. Slight aeration of the upper left lung, not significantly  changed from prior. No appreciable pneumothorax.      Impression    Impression:   1. Endotracheal tube approximately 3 cm above the tereso. Additional  support devices in stable position.  2. Diffuse bilateral alveolar airspace opacities, right worse than  left but overall stable.    I have personally reviewed the  examination and initial interpretation  and I agree with the findings.    MIKKI DAI MD   US Lower Extremity Arterial Duplex Bilateral    Narrative    Exam: Duplex ultrasound of bilateral lower extremity arteries dated  2/23/2019 9:21 AM     Clinical information: Cardiac Arrest     Comparison: None    Technique: Includes Gray Scale images, color Doppler, spectral Doppler  waveforms and velocities with appropriate angles of 60 degrees or  less.    Findings: ECMO in right groin, balloon pump in left groin    Right lower extremity:     Mid SFA: 24 cm/sec.  Distal SFA: 16 cm/sec.  Popliteal artery: 10 cm/sec.  PTA ankle: 6 cm/sec.  MAILE ankle: 5 cm/sec.    Waveforms: Monophasic tardus parvus waveforms throughout    Left lower extremity:    Proximal SFA: 87 cm/sec.  Mid SFA: 57 cm/sec.  Distal SFA: 71 cm/sec.  Popliteal artery: Unable to evaluate secondary to cooling device  PTA ankle: 26 cm/sec.  MAILE ankle: 31 cm/sec.    Waveforms: Triphasic waveforms throughout with blunted upstroke      Impression    Impression:     1. Right leg: Monophasic tardus parvus waveforms throughout, likely  secondary to right femoral ECMO.  2. Left leg: Triphasic waveforms throughout with blunted upstroke,  likely secondary to IABP.     Guidelines:  [facility] duplex criteria for lower limb arterial  occlusive disease  -Percent stenosis- Normal (1-19%): Peak systolic velocity (cm/s):  <150, End-diastolic velocity (cm/s): <40, Velocity ration (Vr): <1.5,  Distal arterial waveform: Triphasic  -Percent stenosis- 20-49%: Peak systolic velocity (cm/s): 150-200,  End-diastolic velocity (cm/s): <40, Velocity ration (Vr): 1.5-2.0,  Distal arterial waveform: Triphasic  -Percent stenosis- 50-75%: Peak systolic velocity (cm/s): 200-300,  End-diastolic velocity (cm/s): <90, Velocity ration (Vr): 2.0-3.9,  Distal arterial waveform: Poststenotic turbulence distal to stenosis,  monophasic distal waveform  -Percent stenosis- >75%: Peak  systolic velocity (cm/s): >300,  End-diastolic velocity (cm/s): <90, Velocity ration (Vr): >4.0, Distal  arterial waveform: Dampened distal waveform and low PSV/EDV* in the  stenosis  -Percent stenosis- Occlusion: Absent flow by color Doppler/pulsed  Doppler spectral analysis; length of occlusion estimated from distance  between exit and reentry collateral arteries  *PSV = peak systolic velocity, EDV = end-diastolic velocity  http://link.[employer].com/chapter/10.1007/[email or web address]/fulltext  html    I have personally reviewed the examination and initial interpretation  and I agree with the findings.    AYSE LEROY MD   XR Chest Port 1 View    Narrative    Exam: XR CHEST PORT 1 VW, 2/23/2019 2:32 PM    Indication: ETT repositioned    Comparison: Radiograph on 12/23/2019 at 0627    Findings:   Supine frontal view of chest.  The tip of ETT projects 4 cm above the tereso. Unchanged position of  ECMO cannula. An enteric tube courses below the left hemidiaphragm and  out of field of view. The metallic marker of IABP unchanged in  position.    Interval moderate improvement in aeration of the left lung. No  significant change in diffuse airspace opacity of about the right  lung. No appreciable pneumothorax.      Impression    Impression:   1. The tip of ETT projects 4 cm above the tereso.  2. Interval moderate improvement of left lung aeration.  3. No significant change in diffuse right lung airspace opacities.    I have personally reviewed the examination and initial interpretation  and I agree with the findings.    MIKKI DAI MD   XR Abdomen Port 1 View    Narrative    Abdominal radiograph one view  2/23/2019 2:35 PM      HISTORY: Evaluate for ischemic bowel    COMPARISON: CT earlier today    FINDINGS: Gastric tube tip and sidehole project over the stomach. Left  femoral line at the atriocaval junction. Right femoral ECMO partially  seen. Nonobstructive bowel gas pattern with no pneumatosis or  portal  venous gas. Persistent bilateral nephrogram, right greater than left  from recent CT of the brain with contrast.      Impression    IMPRESSION: Nonobstructive bowel gas pattern with no pneumatosis or  portal venous gas.    I have personally reviewed the examination and initial interpretation  and I agree with the findings.    MIKKI DAI MD   XR Chest Port 1 View    Narrative    Exam: XR CHEST PORT 1 , 2/25/2019 1:45 AM    Indication: arrest    Comparison: Chest radiograph dated 2/24/2019.    Findings:   Single AP view of the chest. Endotracheal tube in place with the tip  projecting at the level of the lower thoracic trachea. Enteric tube in  place the distal end extending beyond the field-of-view. ECMO cannula  stable in position. Intra-aortic balloon pump in place with the marker  stable in position near the level of the tereso. Additional lines and  hardware presumed exterior to the patient.    Worsening bilateral airspace opacities compared to prior exam. There  is now near complete consolidation of the right hemithorax and the  left lower lung. Cardiac silhouette not well visualized.      Impression    Impression:   1. Worsening airspace opacities with near complete consolidation of  the right hemithorax and the left lower lung.  2. Stable support devices as detailed above.    I have personally reviewed the examination and initial interpretation  and I agree with the findings.    ROSELYN CASH MD   XR Chest Port 1 View    Narrative    Exam: XR CHEST PORT 1 , 2/26/2019 1:46 AM    Indication: arrest    Comparison: Chest radiograph dated 2/25/2019.    Findings:   Single AP view of the chest. Endotracheal tube in place with the tip  projecting at the level of the lower thoracic trachea. ECMO cannula in  place with the tip projected at approximately the expected location of  the right atrium. Intra-aortic balloon pump with the marker projecting  at the level of the tereso. Extrathoracic  defibrillator pad additional  lines presumed exterior to the patient.    Worsening airspace opacities with complete opacification of both lungs  with air bronchograms. The cardiac silhouette is obscured. Visualized  portion of the abdomen is unremarkable.      Impression    Impression:   1. Worsening airspace opacities with complete opacification of both  lungs.  2. Support devices as detailed above.    I have personally reviewed the examination and initial interpretation  and I agree with the findings.    ROSELYN CASH MD   CT Head w/o Contrast    Narrative    CT HEAD W/O CONTRAST 2/25/2019 10:25 AM    Provided History: Neuro deficit(s), subacute  ICD-10:    Comparison: 2/23/2019.    Technique: Using multidetector thin collimation helical acquisition  technique, axial, coronal and sagittal CT images from the skull base  to the vertex were obtained without intravenous contrast.     Findings:    No intracranial hemorrhage, mass effect, or midline shift. The  ventricles are proportionate to the cerebral sulci. Cerebral atrophy.  The gray to white matter differentiation of the cerebral hemispheres  is preserved. The basal cisterns are patent. Mild bilateral maxillary  mucosal thickening.    The visualized paranasal sinuses are clear. The mastoid air cells are  clear.       Impression    Impression: No acute intracranial pathology. No definite hypoxic  ischemic injury. Repeat CT could be considered, as clinically  indicated.    I have personally reviewed the examination and initial interpretation  and I agree with the findings.    JOEL GARNETT MD   XR Abdomen Port 1 View    Narrative    EXAM: XR ABDOMEN PORT 1 VW  2/25/2019 3:59 PM      HISTORY: TF placement    COMPARISON: 2/23/2019    FINDINGS: Single supine radiograph of the abdomen. Feeding tube tip  projects over the expected location of the fourth portion of the  duodenum. Multiple cardiac leads visualized. Gastric tube tip and  sidehole project over the  expected location of the stomach. Right  femoral ECMO tip terminating at the mid superior vena cava. Inferior  intra-aortic balloon pump in place with marker in stable position.  Left femoral approach venous catheter in stable position. Additional  lines and hardware presumed exterior to the patient.  Partially  visualized opacification and air bronchograms in the lung bases.   Cutaneous pacer pads.    Paucity of air filled bowel gas pattern. No pneumatosis. No portal  venous gas.       Impression    IMPRESSION:  1. Feeding tube tip projects over the expected location of the fourth  portion of the duodenum.  2. Stable support device positioning.  3. Paucity of air filled bowel.  4.  Partially visualized opacification of lungs.    I have personally reviewed the examination and initial interpretation  and I agree with the findings.    LANDRY YI MD   XR Chest Port 1 View    Narrative    Exam: XR CHEST PORT 1 , 2/27/2019 1:55 AM    Indication: arrest    Comparison: Chest radiograph dated 2/26/2018.    Findings:   Single AP view of the chest. Endotracheal tube in place with the tip  in the mid thoracic trachea. Enteric tubes in place with the distal  end extending beyond the field-of-view. The gastric tube has a loop in  the stomach. Intra-aortic balloon pump marker at the level of the  tereos. Defibrillator pad has been removed.    Near complete opacification of both lungs with air bronchograms  similar to prior exam. Cardiac silhouette is obscured.      Impression    Impression:   1. Unchanged near complete opacification of the lungs with air  bronchograms.  2. Support devices as detailed above.    I have personally reviewed the examination and initial interpretation  and I agree with the findings.    ROSELYN CASH MD   XR Chest Port 1 View    Narrative    Exam: XR CHEST PORT 1 VW, 2/28/2019 1:40 AM    Indication: arrest    Comparison: Radiograph on 2/27/2019    Findings:   AP view of the chest. Endotracheal  tube with the distal end in the  lower thoracic trachea. Intra-aortic balloon pump marker at the level  of the tereso. Enteric tubes extending beyond the field-of-view. ECMO  cannula tip projecting over the right atrium.    Near complete opacification of both lungs similar to prior exam.  Cardiac silhouette remains obscured. No pneumothorax. Visualized  portion of the upper abdomen is unremarkable.      Impression    Impression:   1. Unchanged near complete opacification of both lungs similar to  prior exam. Likely edema and/or hemorrhage.  2. Support devices as detailed above.    I have personally reviewed the examination and initial interpretation  and I agree with the findings.    ROSELYN CASH MD   XR Chest Port 1 View    Narrative    Exam: XR CHEST PORT 1 VW, 2/27/2019 5:20 PM    Indication: hypoxia, lines    Comparison: 2/27/2019    Findings: AP chest radiograph. Endotracheal tube is 2.9 cm above the  tereso. Enteric and nasogastric tube projecting over the esophagus,  tips not visualized and field-of-view. Inferior approach ECMO cannula  terminating over the inferior cavoatrial junction. Intra-aortic  balloon pump in stable position, at the level immediately above the  tereso. Cardiomediastinal silhouette is obscured. Near complete  opacification of the bilateral lungs, right greater than left, with  air bronchograms. No pneumothorax. No acute osseous abnormalities.      Impression    Impression:   1. Support devices in stable position.  2. Unchanged near complete opacification of the bilateral lungs with  hilar atelectasis.    I have personally reviewed the examination and initial interpretation  and I agree with the findings.    ASHLEIGH CHAN MD   XR Chest Port 1 View    Narrative    Exam: XR CHEST PORT 1 VW, 3/1/2019 1:45 AM    Indication: arrest    Comparison: 2/28/2019    Findings:   AP view of the chest. Endotracheal tube with the tip in the lower  thoracic trachea. Intra-aortic balloon pump marker at  the level of the  tereso. Enteric tubes with the end extending beyond the field-of-view.  Unchanged loop seen within the stomach. ECMO cannula with the tip  projecting over the upper right atrium.    Bilateral opacification of the lungs similar to prior exam. Right  greater than left pleural effusions. Cardiac silhouette remains  obscured. Upper abdomen is unremarkable.      Impression    Impression:   1. Near complete opacification of both lungs similar to prior exam.  Findings likely representing edema and/or hemorrhage.  2. Support devices as detailed above.    I have personally reviewed the examination and initial interpretation  and I agree with the findings.    ROSELYN CASH MD   XR Chest Port 1 View    Narrative    Exam: XR CHEST PORT 1 VW, 3/2/2019 1:55 AM    Indication: arrest    Comparison: 3/1/2019    Findings:   AP view of the chest. Endotracheal tube tip in the lower thoracic  trachea. Intra-aortic balloon pump marker at the level of the tereso.  Unchanged ECMO cannula. Gastric and feeding tubes remain in place. The  distal end extending beyond the field-of-view.    Bilateral airspace opacities are again seen. There is been slight  interval in treatment in aeration of both upper lungs. Right greater  than left pleural effusions, unchanged. No pneumothorax. Cardiac  silhouette is unchanged. Upper abdomen is unremarkable.      Impression    Impression:   1. Bilateral airspace opacities consistent with pulmonary edema and/or  hemorrhage are again seen. There is been slight interval improvement  in aeration of the upper lungs.  2. Right greater than left pleural effusions, unchanged.  3. Support devices as detailed above.    I have personally reviewed the examination and initial interpretation  and I agree with the findings.    AYSE ALVAREZ   XR Chest Port 1 View    Narrative    Exam: XR CHEST PORT 1 VW 3/3/2019 10:03 AM    History: ECMO, intubated    Comparison: 3/2/2019    Findings: Single AP view of  the chest. Inferior approach ECMO cannula  projects over the right atrium. Endotracheal tube is in appropriate  position, stable. Feeding tube and gastric tubes extend below the  diaphragm, tip not included the field-of-view. Stable position of the  intra-aortic balloon pump superior metallic marker. Cardia mediastinal  silhouette is stable, slightly obscured. Lung volumes are normal.  Diffuse pulmonary opacities, slightly improved since prior. Aeration  in both lungs, especially pronounced superiorly. Small area pleural  effusions, unchanged. No pneumothorax. Visualized upper abdomen is  unremarkable. No acute bony abnormality.      Impression    Impression:   1. Inferior approach ECMO cannula projects over the right atrium,  stable. Additional lines and tubes as above.  2. Diffuse pulmonary opacities, slightly improved since prior.  Improving aeration, especially pronounced in the upper lungs.  3. Small layering pleural effusions are again seen.    ASHLEIGH CHAN MD   XR Chest Port 1 View    Narrative    EXAM: XR CHEST PORT 1 VW  3/4/2019 1:53 AM      HISTORY: Cardiac arrest    COMPARISON: Radiograph 3/30/2018, 2/27/2019    FINDINGS: AP radiograph of the chest. Intra-aortic balloon pump tip  projects at the level of the tereso. ECMO cannula projects over the  right atrium. Endotracheal tube projects over the midthoracic trachea.  Feeding tube passes below the diaphragm. Small right pleural effusion.  Diffuse patchy airspace opacities, stable. No pneumothorax.      Impression    IMPRESSION:   1. Intra-aortic balloon pump tip projects at the level of the tereso.   2. ECMO cannula projects over the right atrium.   3. Endotracheal tube projects over the midthoracic trachea.   4. Diffuse patchy airspace opacities, stable, which may indicate  pulmonary edema versus severe infection.    I have personally reviewed the examination and initial interpretation  and I agree with the findings.    ROSELYN CASH MD   XR Chest  Port 1 View    Narrative    EXAM: XR CHEST PORT 1 VW  3/5/2019 1:35 AM      HISTORY: Cardiac arrest, ECMO    COMPARISON: Radiograph 3/4/2019    FINDINGS: AP radiograph of the chest. Right IJ ECMO catheter tip  projects over the right pulmonary artery. Lower approach ECMO catheter  projects over the right atrium. Intra-aortic balloon pump tip projects  at the level of the tereso. Endotracheal tube tip projects over the  midthoracic trachea. Feeding tube and gastric tube pass below the  diaphragm, collimated off the field-of-view.    Small pleural effusions are stable. Diffuse interstitial opacities,  right greater than left, slightly improved. Stable retrocardiac  opacity.       Impression    IMPRESSION:   1. Right IJ ECMO catheter tip projects over the right pulmonary  artery. Lower approach ECMO catheter projects over the right atrium.    2. Intra-aortic balloon pump tip projects at the level of the tereso.   3. Diffuse interstitial opacities, right greater than left, slightly  improved. Likely pulmonary edema.  4. Stable retrocardiac opacity, likely atelectasis.    I have personally reviewed the examination and initial interpretation  and I agree with the findings.    ROSELYN CASH MD   XR Chest Port 1 View    Narrative    EXAM: XR CHEST PORT 1 VW  3/6/2019 2:00 AM      HISTORY: Cardiac arrest    COMPARISON: Radiograph 3/5/2018    FINDINGS: AP radiograph of the chest. Intrahepatic balloon pump tip  projects above the level of the tereso, stable. ECMO cannula project  over the right pulmonary artery and right atrium, stable. Endotracheal  tube tip projects over the midthoracic trachea, stable. Feeding tube  and gastric tube pass below the diaphragm, collimated off the  field-of-view.    Low lung volumes. Diffuse interstitial opacities are stable, likely  representing pulmonary edema. Bibasilar atelectasis is stable. No  pneumothorax. Small pleural effusions.      Impression    IMPRESSION:   1. Stable lines and  tubes.  2. Stable mild pulmonary edema.  3. Stable bibasilar atelectasis and small pleural effusions.    I have personally reviewed the examination and initial interpretation  and I agree with the findings.    ROSELYN CASH MD   XR Chest Port 1 View    Narrative    EXAM: XR CHEST PORT 1 VW  3/6/2019 9:44 PM      HISTORY: Peripheral ECMO placement    COMPARISON: Radiograph 3/6/2019    FINDINGS: AP radiograph of the chest. Interval readjustment of the low  approach ECMO cannula. The tip now projects over the central left  brachiocephalic vein. Right IJ ECMO tip projects over the pulmonary  artery. Feeding tube and gastric tube pass below the diaphragm,  collimated off the field-of-view. Endotracheal tube projects over the  midthoracic trachea. Esophageal temperature probe projects over the  cervical spine.    Worsening diffuse alveolar opacities. No pneumothorax. No pleural  effusions. Pulmonary vascularity is obscured.       Impression    IMPRESSION:   1.  Interval readjustment of the low approach ECMO cannula. The tip  now projects over the central left brachiocephalic vein. This may need  to be retracted.  2. Esophageal temperature probe projects over the cervical spine.  Recommend advancement to ensure esophageal placement.  3. Worsening diffuse patchy and interstitial opacities. Findings  suggest cardiogenic or noncardiogenic pulmonary edema versus  infection.    Findings of ECMO catheter placement were discussed with Dr. Covarrubias by  telephone at 3/6/2019 10:00 PM by Dr. Jai Burr.    I have personally reviewed the examination and initial interpretation  and I agree with the findings.    BEVERLY BOLTON MD   XR Abdomen Port 1 View    Narrative    EXAM: XR ABDOMEN PORT 1 VW  3/6/2019 10:15 PM      HISTORY: OG placement    COMPARISON: Radiograph 2/25/2019, chest radiograph 3/6/2018.    FINDINGS: Supine radiograph of the abdomen. Gastric tube tip and  side-port project over the stomach. Feeding tube tip  projects over the  ligament of Treitz. Lower approach PICC projects over the central IVC.  Stable lines and tubes in the chest, as described on same day chest  radiograph.     Generalized paucity small bowel gas. No definite pneumatosis or portal  venous gas. Diffuse mixed airspace opacities in the lungs, as  described on same day chest radiograph.      Impression    IMPRESSION:   1. Gastric tube tip and side-port project over the stomach.  2. Feeding tube tip projects over the ligament of Treitz.  3. Please see same day chest radiograph regarding lines and tubes and  pulmonary findings.    I have personally reviewed the examination and initial interpretation  and I agree with the findings.    MIKKI DAI MD   US Abdomen Limited    Narrative    EXAMINATION: Limited Abdominal Ultrasound, 3/7/2019 7:42 PM     COMPARISON: CT CAP dated 2/23/2018    HISTORY: Concern for choledocholithiasis    FINDINGS:   Fluid: No evidence of ascites or pleural effusions.    Liver: The liver parenchyma is diffusely hyperechoic with sparing  around the gallbladder, measuring 19.5 cm in craniocaudal dimension.  There is no focal mass.     Gallbladder: Small volume of echogenic sludge in the gallbladder. No  echogenic stones or wall thickening. No pericholecystic fluid  accumulations.    Bile Ducts: Both the intra- and extrahepatic biliary system are of  normal caliber.  The common bile duct measures 4 mm in diameter.    Pancreas: Visualized portions of the head and body of the pancreas are  unremarkable.     Kidney: The right kidney measures 13.8 cm long. There is mild  hydronephrosis with central but no peripheral dilatation of the renal  collecting system. No echogenic stones or focal mass.      Impression    IMPRESSION:   1.  Hepatomegaly with steatosis. No focal mass.  2.  Small amount of echogenic sludge in the gallbladder without  definite evidence of acute cholecystitis.    I have personally reviewed the examination and initial  interpretation  and I agree with the findings.    AYSE LEROY MD   XR Chest Port 1 View    Narrative    EXAM: XR CHEST PORT 1 VW  3/8/2019 1:35 AM      HISTORY: Lines and tubes.    COMPARISON: Radiograph 3/6/2019    FINDINGS: AP radiograph of the chest. Venous ECMO cannula projects  over the left brachiocephalic vein, stable. Arterial ECMO cannula  projects over the pulmonary artery. Endotracheal tube projects over  the midthoracic trachea. Feeding tube and gastric tube pass below the  diaphragm, collimated off the field-of-view. Esophageal temperature  probe has been removed.    Cardiomegaly with diffuse patchy and interstitial airspace opacities,  slightly decreased from prior. No pneumothorax. No pleural effusions.      Impression    IMPRESSION:   1. Stable placement of the ECMO cannula. The venous cannula continues  to project over the left brachiocephalic vein.  2. Improved patchy and interstitial airspace opacities, likely  indicating improving pulmonary edema.    I have personally reviewed the examination and initial interpretation  and I agree with the findings.    MIKKI DAI MD   XR Chest Port 1 View    Narrative    EXAM: XR CHEST PORT 1 VW  3/9/2019 1:52 AM      HISTORY: Lines and tubes.    COMPARISON: Radiograph 3/8/2019    FINDINGS: AP radiograph of the chest. Endotracheal tube tip projects  over the midthoracic trachea. Inferior ECMO cannula projects over the  left brachiocephalic vein, stable. Superior ECMO catheter tip projects  over the right pulmonary artery, stable. Intraaortic balloon pump tip  projects close to the aortic knob, stable. Esophageal temperature  probe projects over C3. Feeding tube and gastric tube pass below the  diaphragm, collimated off the field-of-view. Coronary artery stents  present.    Cardiomegaly. Stable patchy and interstitial airspace opacities. No  pneumothorax. No pleural effusions.      Impression    IMPRESSION:   1. Esophageal temperature probe projects  over C3. Recommend  advancement.  2. Stable ECMO cannulae.  3. Stable mixed patchy and interstitial airspace opacities, likely  indicating pulmonary edema/ARDS.    I have personally reviewed the examination and initial interpretation  and I agree with the findings.    BEVERLY BOLTON MD   XR Chest Port 1 View    Narrative    EXAM: XR CHEST PORT 1 VW  3/10/2019 1:42 AM      HISTORY: Lines and tubes    COMPARISON: Radiograph 3/9/2019    FINDINGS: AP radiograph of the chest. Esophageal temperature probe  projects over the high cervical spine. Endotracheal tube projects over  the midthoracic trachea. ECMO cannula remain in stable position.  Feeding tube and gastric tube pass below the diaphragm, collimated off  the field-of-view. Intra-aortic balloon pump marker above the tereso,  in stable position.     Stable cardiac silhouette. Diffuse patchy airspace opacities and  interstitial opacities, slightly improved compared with prior  examination.      Impression    IMPRESSION:   1. Esophageal temperature probe projects over the high cervical spine.  Recommend advancement.  2. Stable ECMO cannulae.  3. Diffuse patchy airspace opacities and interstitial opacities,  slightly improved compared with prior examination. Findings likely  represents pulmonary edema.    I have personally reviewed the examination and initial interpretation  and I agree with the findings.    ERIN BABB MD   XR Chest Port 1 View    Narrative    Exam: XR CHEST PORT 1 VW, 3/10/2019 9:37 AM    Indication: ET tube placement    Comparison: 3/10/2019    Findings:   Lower approach cannula tip seen in the left brachiocephalic vein.  There are 2 right IJ approach cannula, one in the right heart and the  other in the pulmonary artery.    Endotracheal tube tip 4.9 cm above the tereso. Intervertebral balloon  pump tip at the proximal descending thoracic aorta. Enteric tubes  descend below the field of view in change. Mild perihilar pulmonary  edema  unchanged.      Impression    Impression:   1. Endotracheal tube tip 4.9 cm above the tereso.  2. Patchy perihilar pulmonary edema unchanged.    I have personally reviewed the examination and initial interpretation  and I agree with the findings.    ERIN BABB MD   XR Chest Port 1 View    Narrative    Exam: XR CHEST PORT 1 VW, 3/11/2019 1:45 AM    Indication: VAV ECMO/IABP/INTUBATED    Comparison: 3/10/2019    Findings:   Single portable view of the chest. ECMO cannulas in similar position.  Enteric tubes projecting over the esophagus and stomach. Intra-aortic  balloon pump tip 2 cm above the tereso at the aortic arch.  Endotracheal tube approximately 5 cm above the tereso. The  cardiomediastinal silhouette and upper abdomen are unremarkable. No  pleural effusion. No pneumothorax. Hazy patchy opacities of the lungs,  similar to previous.      Impression    Impression:   1. Support devices in stable position  2. Patchy perihilar pulmonary edema is unchanged.    I have personally reviewed the examination and initial interpretation  and I agree with the findings.    ROSELYN CASH MD   XR Chest Port 1 View    Narrative    Exam: XR CHEST PORT 1 VW, 3/12/2019 1:52 AM    Indication: VAV ECMO/IABP/INTUBATED    Comparison: 3/11/2019    Findings:   Single portable view of the chest. ECMO cannula unchanged in position  from previous. Unchanged intra-aortic balloon pump. Enteric tubes  projecting over the esophagus and stomach. Endotracheal tube  approximately 4 cm above the tereso.     The cardiomediastinum and upper abdomen are unchanged. Slightly  decreased patchy perihilar pulmonary opacities. No pneumothorax. No  pleural effusion.      Impression    Impression: Slightly decreased patchy perihilar opacities. Support  devices unchanged in position.    I have personally reviewed the examination and initial interpretation  and I agree with the findings.    ROSELYN CASH MD   XR Abdomen Port 1 View    Narrative     Exam: XR ABDOMEN PORT 1 VW, 3/12/2019 1:51 AM    Indication: abdominal distention, ileus? pneumatosis intestinalis from  gut ischemia?    Comparison: 3/6/2019    Findings:   Supine views of the abdomen. Inferior approach ECMO cannulas in place.  Enteric tubes projecting over the stomach with one terminating within  the stomach the other terminating in the second part of the duodenum.  Prominent gas-filled transverse colon. No portal venous gas. No  pneumatosis.      Impression    Impression: Prominent gas-filled transverse colon without evidence of  pneumatosis.    I have personally reviewed the examination and initial interpretation  and I agree with the findings.    ROSELYN CASH MD   XR Chest Port 1 View    Narrative    Exam: XR CHEST PORT 1 VW, 3/13/2019 1:30 AM    Indication: VAV ECMO/IABP/INTUBATED    Comparison: 3/12/2019    Findings:   Single portable view of the chest. ECMO cannula unchanged in position  from previous. Unchanged intra-aortic balloon pump at the tereso.  Enteric tubes projecting over the esophagus and stomach. Endotracheal  tube projecting approximately 3.5 cm above the tereso, slightly  advanced from previous. The cardiomediastinum and upper abdomen are  unchanged. No pleural effusion. No pneumothorax. No focal airspace  opacities.      Impression    Impression:  1. Endotracheal tube slightly advanced from previous, otherwise, no  significant interval change.  2. Stable mild pulmonary edema.    I have personally reviewed the examination and initial interpretation  and I agree with the findings.    MIKKI DAI MD   CT Head w/o Contrast     Value    Radiologist flags (Urgent)     Right posterior parietal/occipital intraparenchymal    Narrative    CT HEAD W/O CONTRAST 3/13/2019 3:43 PM    Provided History: Altered mental status (AMS), unclear cause    ICD-10: Altered mental status    Comparison: CT dated 2/25/2019.    Technique: Using multidetector thin collimation helical  acquisition  technique, axial, coronal and sagittal CT images from the skull base  to the vertex were obtained without intravenous contrast.     Findings:  Intraparenchymal hemorrhage in the posterior right  parietal/occipital lobe measuring 2.5 x 1.5 cm and associated with  surrounding edema. No significant mass effect or midline shift. The  ventricles are proportionate to the cerebral sulci. The gray to white  matter differentiation of the cerebral hemispheres is preserved. The  basal cisterns are patent.    The visualized paranasal sinuses are clear. The mastoid air cells are  clear.       Impression    Impression: Intraparenchymal hemorrhage in the posterior right  parietal/occipital lobe, with a smaller hemorrhagic focus in the  anterior left frontal lobe. Recommend 6 hour follow-up CT to assess  stability.    [Urgent Result: Right posterior parietal/occipital intraparenchymal  hemorrhage]    Finding was identified on 3/13/2019 3:46 PM.     Dr. Latasha Alexander was contacted by Dr. Mckeon at 3/13/2019 3:49 PM and  verbalized understanding of the urgent finding.     I have personally reviewed the examination and initial interpretation  and I agree with the findings.    JOEL GARNETT MD   CTA Abdomen Pelvis with Contrast     Value    Radiologist flags Gastric hemorrhage (Urgent)    Narrative    EXAMINATION: CTA ABDOMEN PELVIS WITH CONTRAST, 3/13/2019 3:56 PM    TECHNIQUE:  Helical CT images from the lung bases through the  symphysis pubis were obtained  with IV contrast. Contrast dose:  iopamidol (ISOVUE-370) solution 130 mL    COMPARISON: CT chest/abdomen/pelvis 2/23/2018    HISTORY: GI bleed; On VAV ECMO; brisk intermittent GI bleed with  concern for ischemic bowel vs arterio-enteric fistula    FINDINGS:  Chest:  Compressive bibasilar atelectasis the lungs. Patchy, diffuse  centrilobular groundglass opacity, appears decreased compared to  2/23/2019. Significant continuous hypoattenuation along the  subendocardium in the  left ventricle involving the apex and along the  apical, mid and basilar segments of the left ventricular free wall. No  pericardial effusion. Leads in the right ventricle. The right femoral  approach ECMO cannula extends through the right atrium, and ascends  above the field-of-view.    Abdomen/pelvis:  Newly visualized is a large volume of hemorrhage in the stomach and  duodenal bulb, which is markedly distends the stomach. There are 2  enteric tubes, with one terminating in the stomach, and another within  the second/third portion of the duodenum. 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    New since the prior examination, marked peripherally located patchy  areas of hypoattenuation, several of which are geographic or  wedge-shaped in the liver, with a large region of involvement in the  left lobe of the liver.    Significant patchy hypoattenuation peripherally in the spleen.    Symmetric enhancement of the kidneys. No suspicious renal mass. No  hydronephrosis. Hyperenhancement of the adrenal glands consistent with  hypoperfusion complex. Pancreas is unremarkable.    No bowel junction. No significant free pelvic fluid. No  pneumoperitoneum. Contrast in the urinary bladder which is  decompressed. Rectal tube.    Right femoral lines and ECMO catheter noted, there is a lobulated 6.7  x 6.2 cm hematoma about the femoral vasculature at the insertion  point.    Left femoral approach intra-aortic balloon pump, with the inferior tip  just below the level of the renal arteries and the superior tip above  the field-of-view.    Bones:  Bones are unremarkable.      Impression    IMPRESSION:   1. Large volume of hemorrhage markedly distending the stomach and  duodenal bulb. 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  2. Numerous patchy areas of hypoattenuation peripherally in  the liver  and spleen most consistent with infarction.  3. 6.7 x 6.2 cm hematoma in the right groin about the access point of  the femoral lines.  4. Intraoperative balloon pump tip seen just below the level of the  renal arteries. Both kidneys normally enhance.  5. Bibasilar atelectasis, with interval decrease in patchy confluent  airspace opacities consistent with resolving pulmonary edema versus  diffuse alveolar damage.  6. Evidence areas of subendocardial hypoperfusion of the left  ventricle, suggesting ischemia or infarct.    [Urgent Result: Gastric hemorrhage]    Finding was identified on 3/13/2019 4:01 PM.     Dr. Muller was contacted by  at 3/13/2019 4:26 PM and  verbalized understanding of the urgent finding.      The preliminary report of no aortoenteric fistula was provided by . After review by the staff radiologist, the final interpretation  of aortoesophageal fistula was provided by  and communicated  to Dr. Muller at 4:45 PM on 3/13/2019.    I have personally reviewed the examination and initial interpretation  and I agree with the findings.    AYSE LEROY MD   CT Chest w/o Contrast    Narrative    EXAMINATION: CT CHEST W/O CONTRAST, 3/13/2019 3:52 PM    TECHNIQUE:  Helical CT images from the thoracic inlet through the lung  bases were obtained without IV contrast. Contrast dose: None    COMPARISON: 2/23/2019    HISTORY: Interstitial lung disease    FINDINGS:    Endotracheal tube tip in midthoracic trachea. Right IJ approach ECMO  cannula with tip in the right pulmonary artery. Inferior approach ECMO  cannula with tip in the left brachiocephalic vein. Gastric tube  coursing below the field-of-view of this study. Intra-aortic balloon  pump superior marker at the level of the tereso. Inferior approach  central line with tip at the hepatic IVC.    Diffuse centrilobular groundglass opacities with mild interlobular  septal thickening. Subpleural and gravity dependent  sparing. Bilateral  lower lobar atelectasis. No pneumothorax or pleural effusion.    Left ventricular enlargement. Multiple coronary stents. No pericardial  effusion. Normal caliber and configuration of the thoracic great  vessels. No thoracic adenopathy.    Large amount of blood in the stomach better visualized on the abdomen  and pelvis portion. Splenic and hepatic infarcts also better  appreciated on the abdominal pelvic portion. Vicarious excretion of  contrast in the gallbladder. Sternal fracture, nondisplaced.      Impression    IMPRESSION:   1. Right IJ approach ECMO cannula with tip in the right pulmonary  artery. Inferior approach ECMO cannula with tip in the left  brachiocephalic vein.   2. Diffuse pulmonary opacities in a pattern most consistent with  pulmonary edema or diffuse alveolar hemorrhage.  3. Large amount of blood products in the stomach better visualized on  the abdominal pelvic study from today.    I have personally reviewed the examination and initial interpretation  and I agree with the findings.    AYSE LEROY MD   XR Chest Port 1 View    Narrative    Exam: XR CHEST PORT 1 VW, 3/14/2019 1:50 AM    Indication: VAV ECMO/IABP/INTUBATED    Comparison: 3/13/2019    Findings:   Single portable view the chest. ECMO cannula unchanged in position  from previous. Unchanged intra-aortic balloon pump projecting at the  tereso. Enteric tube projecting over the esophagus and stomach.  Endotracheal tube in the mid thoracic trachea. The cardiomediastinum  and upper abdomen are unchanged. There are increased interstitial and  hazy opacities of lungs most prominent in the right apex and  retrocardiac space. These findings likely representing pulmonary edema  has better delineated on CT 3/13/2019.      Impression    Impression:   1. Increased patchy hazy and interstitial opacities of lungs likely  representing pulmonary edema, better delineated on CT 2/13/2019.  2. Support devices in stable  position.  3. Right upper lung changes suspicious for pneumonic process or severe  atelectasis.    I have personally reviewed the examination and initial interpretation  and I agree with the findings.    MIKKI DAI MD   XR Chest Port 1 View    Narrative    Exam: XR CHEST PORT 1 VW, 3/15/2019 1:25 AM    Indication: VAV ECMO/IABP/INTUBATED    Comparison: 3/14/2019    Findings:   Single portable view the chest. ECMO cannula unchanged in position  from previous. Unchanged intra-aortic balloon pump projecting at the  level of the tereso. Enteric tube projecting over the esophagus and  stomach. Endotracheal tube projecting over the mid thoracic trachea.  The cardiac mediastinum and upper abdomen are unchanged. There is  stable interstitial and hazy opacities of the lungs. Slightly  decreased right upper lobe opacity.      Impression    Impression: Slightly decreased pulmonary edema. Support devices in  stable position.    I have personally reviewed the examination and initial interpretation  and I agree with the findings.    MIKKI DAI MD   XR Chest Port 1 View    Narrative    Exam: XR CHEST PORT 1 VW, 3/17/2019 3:47 AM    Indication: monitor position of supportive devices    Comparison: 3/15/2019    Findings:   Single portable view the chest. ECMO cannula unchanged in position  from previous. Unchanged intra-aortic balloon pump projecting at the  level of the tereso. Enteric tubes with the tips projecting out of the  field-of-view. Endotracheal tube projecting over the mid intrathoracic  trachea, similar to previous. Stable cardiomediastinal silhouette.  Stable interstitial and hazy opacities of the lungs. No pneumothorax.      Impression    Impression:   1. Stable pulmonary edema versus atelectasis versus infection.  2. Bilateral lower lobes atelectasis versus consolidation, unchanged.  3. Stable support devices.    I have personally reviewed the examination and initial interpretation  and I agree with the  findings.    KARISHMA CHE MD   XR Chest Port 1 View    Narrative    Exam: XR CHEST PORT 1 , 3/18/2019 1:53 AM    Indication: monitor position of supportive devices    Comparison: 3/17/2019    Findings:   Single portable view the chest. ECMO cannula unchanged in position  from previous. Intra-aortic balloon pump removed. Enteric tubes with  the tips projecting out of the field-of-view. Endotracheal tube  projecting over the mid intrathoracic trachea, 4.2 cm above the  tereso. Stable cardiomediastinal silhouette. Interstitial pulmonary  edema. No pneumothorax.      Impression    Impression:   1. Persistent interstitial pulmonary edema.  2. Bilateral lower lobes atelectasis versus consolidation, unchanged.  3. Stable support devices.   4. Further clearing of right upper lobe consolidation.    I have personally reviewed the examination and initial interpretation  and I agree with the findings.    MIKKI DAI MD   XR Chest Port 1 View    Narrative    Exam: XR CHEST PORT 1 , 3/19/2019 2:05 AM    Indication: monitor position of supportive devices    Comparison: 3/18/2019    Findings:   Endotracheal tube tip 5.1 cm above the tereso. ECMO cannula unchanged  in position, with tip at the main pulmonary artery. Enteric and  feeding tubes descend below the field of view. Redemonstrated  interstitial pulmonary edema. New consolidative opacities peripherally  in the right upper lobe. Coronary artery stents.      Impression    Impression:   1. New consolidative opacity along the peripherally in the right upper  lobe, suspicious for infection.  2. Persistent interstitial pulmonary edema.    I have personally reviewed the examination and initial interpretation  and I agree with the findings.    MIKKI DAI MD   XR Abdomen Port 1 View    Narrative    Exam: XR ABDOMEN PORT 1 , 3/18/2019 10:51 AM    Indication: Feeding tube position    Comparison: CT 3/13/2019, CR 3/12/2019    Findings:   Portable supine radiograph  of the abdomen. Inferior approach ECMO and  right and left lower approach central venous catheters are visualized.  Gastric tube tip and sidehole project over expected location of  stomach.  Feeding tube projects of expected location of second portion  of duodenum.  No pneumatosis. No abnormal air filled bowel. Visualized  portions of the lower lung fields are unremarkable.  Intrahepatic  balloon pump (IABP) is no longer visualized      Impression    Impression:   1. Feeding tube tip terminates over the second portion of the  duodenum.  2. Support devices as above.    I have personally reviewed the examination and initial interpretation  and I agree with the findings.    LANDRY YI MD   US Lower Extremity Arterial Duplex Right    Narrative    Exam: US LOWER EXTREMITY ARTERIAL DUPLEX RIGHT, 3/18/2019 9:52 AM    Indication: loss of pulses; [facility] ecmo on right    Comparison: Ultrasound 2/23/2019    Technique: Includes Gray Scale images, color Doppler, spectral Doppler  waveforms and velocities with appropriate angles of 60 degrees or  less.    Findings:   ECMO in the right groin, obscuring the right common femoral artery and  superficial femoral artery and deep femoral artery origin due to  overlying bandaging.    Right lower extremity:     Mid SFA: 9.9 cm/s  Popliteal artery: 11.4 cm/s  PTA at ankle: 6.9 cm/s  MAILE at ankle 6.5 cm/s    Waveforms: Slow flow with monophasic parvus tardus waveforms  throughout.      Impression    Impression:   Slow flow in the right lower extremity with monophasic tardus parvus  waveforms throughout.    I have personally reviewed the examination and initial interpretation  and I agree with the findings.    GLADYS MCLEAN MD   CT Head w/o & w Contrast    Narrative    CT HEAD W/O & W CONTRAST 3/18/2019 7:03 PM    Provided History: Parenchymal hemorrhage proven; ecmo;  intraparenchymal hemorrhage; also concern for intracranial abscess    Comparison: 3/13/2019.    Technique: Using multidetector  thin collimation helical acquisition  technique, axial, coronal and sagittal CT images from the skull base  to the vertex were obtained without intravenous contrast.     Findings:    Redemonstration of the right occipital intraparenchymal hemorrhages  slightly decreased in size with expected evolution of blood products.  Today measuring 2.3 x 1.4 cm (series 3 image 14), previously 2.5 x 1.5  cm. The previously described small rounded density over the left  frontal lobe is not appreciated on today's exam. The ventricles are  proportionate to the cerebral sulci. The gray to white matter  differentiation of the cerebral hemispheres is preserved. The basal  cisterns are patent.    Scattered mucosal thickening in the paranasal sinuses. The mastoid air  cells are clear.       Impression    Impression:   1. Slightly decreased right occipital intraparenchymal hemorrhage with  expected evolution of blood product. No new hemorrhages.  2. Previously described density in the left frontal lobe is not  appreciated on today's exam.    I have personally reviewed the examination and initial interpretation  and I agree with the findings.    JOEL GARNETT MD   XR Abdomen Port 1 View    Narrative    Exam: XR ABDOMEN PORT 1 VW, 3/18/2019 7:31 PM    Indication: gastric tube positioning    Comparison: Same day.    Findings:   Single supine view of the abdomen. Feeding tube tip terminates over  the first/second portion of the duodenum. Interval removal of inferior  approach ECMO cannula. Left inferior approach central venous catheter  tip projects over the IVC. Nonobstructive bowel gas pattern. No  pneumatosis or portal venous gas. Partially visualized granular  airspace opacities in the lower lung fields. For additional findings  in the lung bases is, please see same-day dedicated chest x-ray.      Impression    Impression:   1. Gastric tube coils in the distal esophagus extends superiorly into  retrograde fashion; tip not included the  field-of-view. Recommend  repositioning.  2. Feeding tube tip projects in an indeterminate location potentially  within the gastric antrum or proximal duodenum. This could be  confirmed with barium.    I have personally reviewed the examination and initial interpretation  and I agree with the findings.    ASHLEIGH CHAN MD   XR Abdomen Port 1 View    Narrative    Exam: XR ABDOMEN PORT 1 VW, 3/19/2019 1:27 AM    Indication: Gastric Tube Reposition    Comparison: 3/18/2019    Findings:   Supine view of the abdomen.    Feeding tube tip terminates over the first/second portion of the  duodenum. Unchanged left femoral line with tip overlying the  intrahepatic IVC. New OG/NG tube with the sidehole projecting in the  stomach body. Nonspecific possibly of bowel gas.      Impression    Impression:   1. OG/NG tube with the sidehole projecting in the stomach body.  2. Nonspecific paucity of bowel gas.    I have personally reviewed the examination and initial interpretation  and I agree with the findings.    MIKKI DAI MD   XR Chest Port 1 View    Narrative    Exam: XR CHEST PORT 1 VW, 3/20/2019 1:00 AM    Indication: monitor position of supportive devices    Comparison: 3/19/2019    Findings:   Endotracheal tube tip 5.9 cm above the tereso. The patchy perihilar  markings extending to the periphery of the lung representing  interstitial pulmonary edema have significantly decreased since prior.  Interval decrease in the peripheral right upper lobe pulmonary  opacity. Feeding and enteric tubes descend below the field of view.  Right IJ ECMO cannula is stable with tip in the pulmonary artery.      Impression    Impression:   1. Stable support devices.  2. Significant decrease in interstitial pulmonary edema since prior.  3. Decreased peripheral right upper lobe opacity, suspicious for  infection versus infarction.    I have personally reviewed the examination and initial interpretation  and I agree with the findings.    MIKKI  MD MALAIKA   XR Chest Port 1 View    Narrative    Exam: XR CHEST PORT 1 VW, 3/21/2019 1:50 AM    Indication: monitor position of supportive devices    Comparison: 3/20/2019    Findings:   Endotracheal tube tip 7.4 cm above the tereso. Decreased minimal  interstitial pulmonary edema. Interval decrease in the peripheral  right upper lobe pulmonary opacity. Feeding and enteric tubes descend  below the field of view. Right IJ ECMO cannula is stable with tip in  the pulmonary artery.      Impression    Impression:   1. Stable support devices.  2. Decreased minimal interstitial pulmonary edema.  3. Further decrease in peripheral right upper lobe opacity,  representing infection versus infarction.    I have personally reviewed the examination and initial interpretation  and I agree with the findings.    MIKKI DAI MD   XR Chest Port 1 View    Narrative    XR CHEST PORT 1 VW 3/21/2019 12:49 PM    History: monitor position of supportive devices    Comparison: 3/21/2019    Findings: Single AP view of the chest. Endotracheal tube tip projects  over the trachea, 6.0 cm above the tereso. Left IJ non tunneled line  tip projects over the innominate confluence. Right-sided PICC tip  projects in the SVC. Heart size is normal. Opacity in the right upper  lobe is stable. Elevation the right hemidiaphragm compared to left.      Impression    Impression:   1. Stable right upper lung opacity representing atelectasis or  consolidation.  2. Endotracheal tube tip 6.0 cm above the tereso. Lines and tubes are  stable.    I have personally reviewed the examination and initial interpretation  and I agree with the findings.    ROSELYN CASH MD   XR Abdomen Port 1 View    Narrative    Exam: XR ABDOMEN PORT 1 VW, 3/21/2019 7:03 PM    Indication: tube position    Comparison: 3/19/2019    Findings:   Single portable view the abdomen. Left lower approach central venous  catheter terminating in the IVC at the level of diaphragm. Gastric  tube  with sidehole projecting over gastric body. Feeding tube terminus  projecting over the pylorus/first part of the duodenum. Paucity of air  in the abdomen. No portal venous gas. Visualized lung bases are  unremarkable.      Impression    Impression:   1. Slightly retracted enteric tubes.  2. OG/NG tube side-port projecting over gastric body.  3. Otherwise no significant change.    I have personally reviewed the examination and initial interpretation  and I agree with the findings.    MIKKI DAI MD   XR Chest Port 1 View    Narrative    XR CHEST PORT 1 VW 3/21/2019 7:04 PM    History: PICC position    Comparison: Earlier same day    Findings: Single AP view of the chest. Endotracheal tube tip projects  over the trachea, 4.7 cm above the tereso. Left IJ non tunneled line  tip projects over the innominate confluence. Right-sided PICC tip  projects in the SVC. Heart size is normal. Opacity in the I apex is  stable. Elevation the right hemidiaphragm compared to left. Interval  removal of right IJ ECMO catheter.      Impression    Impression:   1. New right-sided PICC tip projects over the SVC, new left IJ non  tunneled line projects over the innominate confluence.  2. Stable right upper lung opacity representing atelectasis or  consolidation.    I have personally reviewed the examination and initial interpretation  and I agree with the findings.    MIKKI DAI MD   XR Chest Port 1 View    Narrative    XR CHEST PORT 1 VW 3/23/2019 4:24 AM    History: monitor position of supportive devices    Comparison: 3/23/2019r    Findings: Single AP view of the chest. Endotracheal tube tip projects  over the trachea, 5.5 cm above the tereso. Left IJ central line has  been retracted, now in the left innominate vein near the confluence  with the IJ. Right-sided PICC tip projects in the SVC. Heart size is  normal. No change in subtle residual opacity of the right upper lobe.  Elevation the right hemidiaphragm compared to left.       Impression    Impression:   1. Left IJ central line tip has been retracted, and is now at the  level of the left innominate vein near the confluence with the IJ and  subclavian vein.  2. No change in subtle residual right upper lobe opacity.    I have personally reviewed the examination and initial interpretation  and I agree with the findings.    KALA BILLINGS MD   XR Chest Port 1 View    Narrative    XR CHEST PORT 1 VW 3/23/2019 2:30 AM    History: dialysis line flow resistance    Comparison: 3/22/2019    Findings: Single AP view of the chest. Endotracheal tube tip projects  over the trachea, 5.5 cm above the tereso. Left IJ non tunneled line  tip projects over the innominate confluence. Right-sided PICC tip  projects in the low SVC. Heart size is normal. Opacity in the right  apex is slightly decreased. Elevation the right hemidiaphragm compared  to left.      Impression    Impression:   1. Stable left IJ catheter tip at the confluence of the innominate  vein/high SVC.  2. Slightly improved right upper lung opacity.    I have personally reviewed the examination and initial interpretation  and I agree with the findings.    KALA BILLINGS MD   XR Chest Port 1 View    Narrative    Exam: XR CHEST PORT 1 VW, 3/23/2019 7:04 AM    Indication: verify new dialysis line    Comparison: None    Findings: Single AP view of the chest. Endotracheal tube tip projects  over the trachea, 6.1 cm above the tereso. New left IJ catheter with  tip at the high right atrium. Right-sided PICC tip projects in the  SVC. Heart size is normal. No change in subtle opacity of the right  upper lobe. Elevation the right hemidiaphragm compared to left.      Impression    Impression:   1. New left IJ catheter with tip at the high right atrium.  2. Subtle right upper lobe opacity is nearly resolved.    I have personally reviewed the examination and initial interpretation  and I agree with the findings.    KALA BILLINGS MD   XR Chest  Port 1 View    Narrative    Exam: XR CHEST PORT 1 VW, 3/24/2019 2:09 AM    Indication: monitor position of supportive devices    Comparison: 3/23/2019    Findings: Single AP view of the chest. Endotracheal tube tip projects  over the trachea, 6.0 cm above the tereso. Left IJ catheter with tip  at the high right atrium. Right-sided PICC tip projects in the SVC.  Heart size is normal. Minimal residual linear markings in the right  upper lobe. Elevation the right hemidiaphragm compared to left comment  stable.      Impression    Impression:   No significant change.    I have personally reviewed the examination and initial interpretation  and I agree with the findings.    KARISHMA CHE MD   XR Chest Port 1 View    Narrative    XR CHEST PORT 1 VW 3/25/2019 1:26 AM    History: monitor position of supportive devices    Comparison: 3/24/2019    Findings: Single AP view of the chest. Endotracheal tube tip projects  over the trachea 5.7 cm above the tereso. Right-sided PICC tip  projects over the SVC. Left-sided IJ central venous catheter tip  projects over the superior cavoatrial junction. Enteric tubes course  out of the field-of-view. Low lung volumes. Mild bibasilar atelectasis  and/or consolidation is not substantially changed. No pneumothorax.      Impression    Impression:   1. Stable support devices.  2. Stable mild bibasilar atelectasis.    I have personally reviewed the examination and initial interpretation  and I agree with the findings.    MIKKI DAI MD   XR Chest Port 1 View    Narrative    XR CHEST PORT 1 VW 3/27/2019 1:30 AM    History: monitor position of supportive devices    Comparison: 3/25/2019    Findings: Single AP view of the chest. Endotracheal tube tip projects  over the trachea 4.9 cm above the tereso. Right PICC tip projects over  the cavoatrial junction. Enteric tube and gastric tube course out of  the field-of-view. Left IJ central venous catheter tip projects over  the cavoatrial  junction.    Elevation of the right hemidiaphragm. Slightly increased bibasilar  atelectasis and/or consolidation. Mild perihilar edema. No significant  pleural effusion or pneumothorax.      Impression    Impression:   1. Stable support devices.  2. Slightly increased bibasilar atelectasis and/or consolidation.  3. Slightly increased perihilar opacities, likely pulmonary edema.    I have personally reviewed the examination and initial interpretation  and I agree with the findings.    MIKKI DAI MD   XR Chest Port 1 View    Narrative    XR CHEST PORT 1 VW 3/28/2019 1:40 AM    History: monitor position of supportive devices    Comparison: 3/27/2019    Findings: AP view of the chest. Endotracheal tube tip projects over  the trachea 4.3 cm above the tereso. Right right-sided PICC tip  projects over the SVC. Left-sided central venous catheter tip projects  over the superior vena cava. Enteric tubes course out of the  field-of-view. Elevation of the right hemidiaphragm compared to the  left. Increased bibasilar atelectasis and/or consolidation. Increased  right pleural effusion.      Impression    Impression:   1. Stable support devices.  2. Slightly increased right pleural effusion and associated right  basilar atelectasis or consolidation.  3. Suspicion of increased pulmonary edema.    I have personally reviewed the examination and initial interpretation  and I agree with the findings.    MIKKI DAI MD   XR Chest Port 1 View    Narrative    XR CHEST PORT 1 VW 3/29/2019 1:35 AM    History: monitor position of supportive devices    Comparison: 3/28/2019    Findings: AP view of the chest. Endotracheal tube tip projects over  the trachea 5.2 cm above the tereso. Right-sided PICC tip projects  over the SVC. Left-sided central venous catheter tip projects over the  SVC. Enteric tubes coursing out of the field-of-view. Slightly  improved right pleural effusion and associated. Right basilar  atelectasis and or  consolidation. Persistent retrocardiac opacities  with air bronchograms and a small left pleural effusion. Mild  pulmonary edema.      Impression    Impression:   1. Stable support devices.  2. Improved right pleural effusion and associated right basilar  opacities. Persistent retrocardiac opacities with air bronchograms.  3. Stable moderate pulmonary edema.    I have personally reviewed the examination and initial interpretation  and I agree with the findings.    MIKKI DAI MD   XR Chest Port 1 View    Narrative    XR CHEST PORT 1 VW 3/30/2019 1:50 AM    History: monitor position of supportive devices    Comparison: 3/29/2019    Findings: Single AP view of the chest. Endotracheal tube tip projects  over the trachea 5.3 cm above the tereso. Left IJ Central venous  catheter tip projects over the superior cavoatrial junction. Coronary  artery stents. Right-sided PICC tip projects over the SVC. Stable  retrocardiac atelectasis and/or consolidation. Slightly increased  opacities in the right upper lung. Lung volumes are diminished.      Impression    Impression:   1. Stable support devices.  2. Increased right-sided pulmonary opacities, nonspecific, could  represent infection/aspiration, hemorrhage, edema, or atelectasis.  3. Stable retrocardiac atelectasis and/or consolidation.    I have personally reviewed the examination and initial interpretation  and I agree with the findings.    AYSE ALVAREZ   CT Chest Abdomen Pelvis w/o Contrast    Narrative    EXAMINATION: CT CHEST ABDOMEN PELVIS W/O CONTRAST, 3/29/2019 4:12 AM    TECHNIQUE:  Helical CT images from the thoracic inlet through the  symphysis pubis were obtained  without contrast.   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 Left-sided IJ central venous  catheter tip terminates in the SVC.  Coronary artery stents. Gastric tube terminates in the gastric antrum.  Postpyloric tube terminates in the third portion of the duodenum.  Endotracheal tube terminates in the mid trachea.    Chest: Minimal endobronchial debris in the left mainstem bronchus. The  central airways are otherwise clear. The heart size is mildly  enlarged. No significant lymphadenopathy.    Slightly improved patchy centrilobular groundglass abnormalities with  intralobular septal thickening. Focal more pronounced consolidation in  the medial lower lobes bilaterally.    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 Additional areas of subcapsular hypoattenuation lung the right  lobe of the liver appear grossly stable, likely evolving infarcts.  Hyperdense contrast in the gallbladder. The adrenal glands are mildly  thickened. Heterogeneous appearance of the nonenhanced spleen.    There is slightly increased mesenteric edema throughout the upper  abdomen. There is focal area of free intraperitoneal air along the  greater curvature of the stomach, with associated fluid surrounding  it. Moderate amount of peripancreatic fluid. Somewhat more loculated  fluid anterior to the duodenum.    Striated appearance of both kidneys.    Postoperative changes in the right groin with multiple surgical clips  and a moderate-sized density hematoma measuring approximately 7.5 x  4.9 cm. Some extraperitoneal/retroperitoneal hemorrhage confined to  the low pelvis.    There is no bowel obstruction. No significant colonic thickening. The  there is a rectal tube in place. The bladder is decompressed.    There is hyperdense material in the central and inferior vena cava  (series 5 image 167).    Bones and soft tissues: No acute bony normalities. Multiple rib  fracture deformities      Impression    IMPRESSION:   1. Small amount of free air in the left upper quadrant, along the  greater curvature the  stomach, of unclear etiology. Question small  gastric perforation given the proximity to the body of the stomach.   2. Prominent fluid surrounding the pancreas in the left upper  quadrant, suspicious for pancreatitis.  Recommend correlation with a  lipase measurement.    3. Right groin hematoma and mild amount of extraperitoneal hemorrhage  isolated to the low pelvis.  4. The stomach is decompressed. There is no evidence to suggest that  there is a persistent aortoenteric fistula.  5. Somewhat improved findings in the lungs, with persistent  groundglass opacities and interlobular septal thickening consistent  with edema and/or diffuse alveolar hemorrhage.  6. Evolving infarcts in the liver and spleen.  7. Density in the infrarenal IVC, conceivably from retained catheter  components.    Findings discussed with the ordering provider, Dr. Beckford, by Dr. Foster at 5 am on 3/29/19.      I have personally reviewed the examination and initial interpretation  and I agree with the findings.    MIKKI DAI MD   CT Head w/o Contrast    Narrative    CT HEAD W/O CONTRAST 3/29/2019 3:59 AM    Provided History: Neuro deficit(s), subacute; eval for continued poor  neurologic function  ICD-10:    Comparison: 3/18/2019.    Technique: Using multidetector thin collimation helical acquisition  technique, axial, coronal and sagittal CT images from the skull base  to the vertex were obtained without intravenous contrast.     Findings:    Small residual area of hypodensity in the posterior right frontal  white matter, in the area of the previously seen intraparenchymal  hemorrhage. There is a hypoattenuating area along the right frontal  lobe, which is more conspicuous then on prior exams, although was  likely present on the prior exam. Rounded hypodensity in the right  cerebellar hemisphere. Previously there is a very small hyperdensity  seen in this location.    Opacification of the sphenoid sinus appears somewhat improved.  Patchy  paranasal sinus mucosal thickening elsewhere. Hypoplastic mastoid.  Partially visualized nasoenteric tube. No acute calvarial  abnormalities.      Impression    Impression:   1. Foci of low attenuation suspicious for infarcts in the right  frontal lobe, left occipital lobe, and right cerebellar hemisphere,  suspicious for infarcts, new since the prior study 3/18/2019.  2. Region of hypodensity in the right parietal white matter in the  area of the previously seen intraparenchymal hemorrhage. No evidence  of residual hemorrhage.    I have personally reviewed the examination and initial interpretation  and I agree with the findings.    JOEL GARNETT MD   XR Chest Port 1 View    Narrative    XR CHEST PORT 1 VW 3/31/2019 6:35 AM    History: monitor position of supportive devices    Comparison: 3/30/2019    Findings: Endotracheal tube tip projects over the trachea 5.2 cm above  the tereso. Left IJ Central venous catheter projects over the superior  cavoatrial junction. Right-sided PICC tip projects over the SVC.  Enteric tubes course out of the field-of-view. Coronary stents.    Improved right mid-upper lung opacities. Bibasilar opacities are not  substantially changed. No pleural effusion or pneumothorax.      Impression    Impression:   1. Stable lines and tubes.  2. Improved  right mid-upper lung opacities. Bibasilar opacities are  not substantially changed.    I have personally reviewed the examination and initial interpretation  and I agree with the findings.    KALA BILLINGS MD   I have seen and examined the patient with the [facility] team. I agree with the assessment and plan of the discharge summary note above.I have reviewed pertinent labs. More than 30 minutes were spent organizing the patient's discharge.    Caleb Chatterjee MD  Interventional Cardiology  Pager: [phone]

## 2019-03-31 NOTE — PROGRESS NOTES
CRRT STATUS NOTE    DATA:  Time:  6:28 AM  Pressures WNL: YES  Filter Status: WDL    Problems Reported/Alarms Noted:  NONE    Supplies Present: YES  ASSESSMENT:  Patient Net Fluid Balance: +800cc since midnight    Goals of Therapy: Continuing CRRT per family request as patient is DNR and requiring two high dose pressors (see drips). PH is 7.09 on AM lab draw.    INTERVENTIONS:   CVVHDF    PLAN:  Continue CRRT per family request.

## 2019-04-01 LAB
BACTERIA SPEC CULT: ABNORMAL
BACTERIA SPEC CULT: ABNORMAL
BACTERIA SPEC CULT: NO GROWTH
INTERPRETATION ECG - MUSE: NORMAL
Lab: ABNORMAL
Lab: NORMAL
SPECIMEN SOURCE: ABNORMAL
SPECIMEN SOURCE: NORMAL

## 2019-04-02 LAB
BACTERIA SPEC CULT: NO GROWTH
Lab: NORMAL
SPECIMEN SOURCE: NORMAL

## 2019-04-03 LAB
BACTERIA SPEC CULT: ABNORMAL
BACTERIA SPEC CULT: ABNORMAL
BACTERIA SPEC CULT: NO GROWTH
Lab: ABNORMAL
Lab: NORMAL
SPECIMEN SOURCE: ABNORMAL
SPECIMEN SOURCE: NORMAL

## 2019-04-04 LAB
BACTERIA SPEC CULT: NO GROWTH
BACTERIA SPEC CULT: NO GROWTH
Lab: NORMAL
Lab: NORMAL
SPECIMEN SOURCE: NORMAL
SPECIMEN SOURCE: NORMAL

## 2019-06-10 NOTE — PROGRESS NOTES
ECMO Attending Progress Note  3/10/2019    Hellen Riddle is a 39 year old male w/ no PMH admitted 2/23/2019 s/p refractory out-of-hospital VF cardiac arrest.  Coronary angiogram revealed severe 3v CAD w/  of the LAD, severe RCA and LCx stenosis now s/p PCI of the mRCA and mLCx.    Interval events: slowly improving pressor requirements    Physical Exam:  Afeb, HR 86, BP 82/49/64, O2 sat 100%  CMV 12/530/10/55%  General: no acute distress, intubated and sedated  HEENT: PERRLA, conjunctiva clear, without icterus or pallor, oropharyx clear  Cardiac: RRR nl S1S2   Lungs: clear to auscultation and percussion bilaterally anterior  Abdomen: soft, nontender, non distended, no hepatosplenomegaly or masses  Extremities: without edema or cyanosis; pulses are palpable   Skin: normal skin appearance without worrisome lesions.   Neurologic:intubated and sedated    Labs:  7.42/37/104/24  Cr 2.04, WBC 25, Hgb 8.8  INR 1.1    ECMO Issues including assessments and plan on DOS 3/10/2019:  Neuro: Sedated for mechanical ventilation and ECMO.  NIRS stable b/l  RASS goal: -3  CV: Cardiogenic shock.  Hemodynamically stable on minimal pressors  Pulm: Flows unchanged at 3.9, Sweep unchanged at 5, Keep vent settings at rest settings  FEN/Renal: Electrolytes stable w/ replacement protocols in place, Cr stable, UOP stable  Heme: ACT goal: 160-180, Hemoglobin .  Goals: if O2 sat >85% Hgb >8.  If O2 Sat <85% keep Hgb 10-12.  Minimal oozing around the ECMO cannulas.  ID: Receiving empiric antibiotics  Cannulae: Position is acceptable on exam and the available imaging.  Distal perfusion cannula is in place and patent.     I have personally reviewed the ECMO flows, oxygenation and CO2 clearance, anticoagulation, and cannula position.  I have also personally assessed the patient's systemic response with hemodynamics, oxygenation, ventilation, and bleeding.       The patient requires continued ECMO support and management in the ICU.  I have discussed  patient care and treatment plan with the primary team.      Abraham Baer MD, PhD  Interventional/Critical Care Cardiology  [phone]    March 10, 2019

## 2019-10-31 NOTE — PLAN OF CARE
Progress Note





- Progress Note


Date of Service: 10/31/19


SOAP: 


Subjective: awake in bed in NAD


[]








Objective:


 Vital Signs











Temp  97.8 F   10/31/19 07:00


 


Pulse  66   10/31/19 08:07


 


Resp  16   10/31/19 08:07


 


BP  124/66   10/31/19 07:00


 


Pulse Ox  98   10/31/19 08:07








 Intake & Output











 10/30/19 10/31/19 10/31/19





 18:59 06:59 18:59


 


Intake Total 240 130 480


 


Balance 240 130 480


 


Weight 214 lb 12.8 oz  


 


Intake:   


 


  IV Fluids  15 


 


    NS (0.9%)  15 


 


  IVPB  115 


 


    NS (0.9%)  115 


 


  Oral 240 0 480


 


Other:   


 


  # Voids  1 








PEX:





Right inguinal wound:  approx 1cm x 4.5cm x 1cm wound


                                         good granulation tissue


                                         medial base has tunnel to pubis


                                         no pus, no bleeding, no d/c


[]








Assessment:  25 yo male s/p right radical orchiectomy x 1 month ago post op 

infection s/p 2 rounds abx, previous Right Inguinal Hernia repair with mesh


[]








Plan:  Wound was explored and examined by Dr Mccarthy at bedside, he discussed 

his impresion with Dr Mac from Oncology.  Due to the urgent need for 

chemotherapy the patient has been made NPO for OR later today for exploration 

of the wound.  


[] Shift summary [id number]    Neuro: Unchanged. Fentanyl gtt increased to 50 mcg/hr after increased episodes of tachypnea, hypertension, and vent dyssynchrony. Opens eyes spontaneously but no purposeful movements. Slight contraction of legs to painful stimuli  CV: SR 60-90 w/ PVCs. Epi and levo titrated to keep MAP >65, BP continues to be labile.  Resp: Tolerating vent settings, brown secretions from ETT  GI: TF held for procedure today. Rectal tube remains w/ large amounts of watery stool  : anuric, CRRT continues, only matching intake most of night but this am tolerating gentle fluid removal.    Plan: possible JOSLYN today. Continue to closely monitor neuro status, update team w/ changes.

## 2020-01-08 NOTE — ADDENDUM NOTE
Addendum  created 01/08/20 1045 by Quang Plascencia MD    Clinical Note Signed, Intraprocedure Blocks edited

## 2020-02-02 NOTE — PROGRESS NOTES
"Nephrology Progress Note  03/15/2019           Mr Riddle is a 39 yom w/HLP who had cardiac arrest on 2/23/19 with unclear down-time.  PCI done emergently, Nephrology consulted for management of FERNANDA, started CRRT on 2/25.       Interval History :   Mr Riddle continues on CRRT, situation remains tenuous with IABP, ECMO and pressors needed to maintain perfusion, LFT's up as well as lactate, WBC now up to 46k.  Difficult case, continuing CRRT to try to maintain electrolytes and pH, pulling fluid to match I=O as able.       Assessment & Recommendations:   FERNANDA-Baseline Cr unclear, admitted with Cr of 1.7 post arrest.  FERNANDA due to hemodynamic injury, also received contrast and had elevated CK.  Started CRRT on 2/25, has been anuric since starting CRRT, ~48h after arrest.  Continuing CRRT with no signs of recovery, still needing extensive mechanical support and is on pressors now with elevated lacate               -Access is ECMO circuit.               -CRRT, I=O as BP's allow, 2k baths.      Volume status-Net even yesterday with minimal intake, pulled ~800cc of UF to match.  Continuing to match as able although BP's are tenuous.       Electrolytes/pH-No acute issues on CRRT, K 4.5, bicarb 19, on 2k baths with elevated lactate.       Ca/phos/pth-Ca 9 with ical of 4.8, Mg 2.3, phos 5.1, stable with CRRT.      Anemia-Hgb 10.1, needing intermittent PRBC's with ECMO and bleeding.     Nutrition-Impact TF.       Seen and discussed with Dr Altamirano     Recommendations were communicated to primary team via verbal communication.       Darren Vidal  Clinical Nurse Specialist  [phone]    Review of Systems:   I reviewed the following systems:  ROS not done due to vent/sedation.     Physical Exam:   I/O last 3 completed shifts:  In: 1351.63 [I.V.:1181.63; Other:35; NG/GT:135]  Out: 1388 [Emesis/NG output:600; Other:713; Stool:75]   BP (!) 70/46   Pulse 78   Temp 97.7  F (36.5  C)   Resp 17   Ht 1.68 m (5' 6.14\")   Wt 96.9 kg (213 lb 10 " oz)   SpO2 99%   BMI 34.33 kg/m       GENERAL APPEARANCE: Intubated and sedated, on ECMO and IABP  EYES:  No scleral icterus, pupils equal  HENT: mouth without ulcers or lesions  PULM: lungs clear to auscultation, equal air movement, no cyanosis or clubbing  CV: regular rhythm, normal rate, no rub     -edema +1 LE  GI: soft, non-tender, non-distended, dark red stool  MS: no evidence of inflammation in joints, no muscle tenderness  NEURO:  Intubated and sedated    Labs:   All labs reviewed by me  Electrolytes/Renal -   Recent Labs   Lab Test 03/15/19  0958 03/15/19  0342 03/14/19  2354 03/14/19  2216    136  --  136   POTASSIUM 4.5 4.4  --  4.2   CHLORIDE 99 100  --  100   CO2 19* 22  --  24   BUN 36* 39*  --  39*   CR 1.61* 1.56*  --  1.69*   *  115* 129*  132* 122* 114*   ALEKSANDER 9.0 8.6  --  8.7   MAG 2.3 2.2  --  2.1   PHOS 5.1* 4.5  --  3.8       CBC -   Recent Labs   Lab Test 03/15/19  0958 03/15/19  0342 03/14/19  2216   WBC 45.7* 41.5* 36.9*   HGB 10.1* 10.2* 10.3*   * 133* 145*       LFTs -   Recent Labs   Lab Test 03/15/19  0958 03/15/19  0342 03/14/19  2216   ALKPHOS 631* 640* 626*   BILITOTAL 23.6* 23.6* 22.1*   ALT 2,382* 2,530* 2,711*   AST 3,046* 3,526* 3,880*   PROTTOTAL 4.9* 4.6* 5.0*   ALBUMIN 2.8* 2.8* 2.8*       Iron Panel - No lab results found.        Current Medications:    B and C vitamin Complex with folic acid  5 mL Per Feeding Tube Daily     DAPTOmycin (CUBICIN) intermittent infusion  8 mg/kg (Dosing Weight) Intravenous Q24H     [START ON 3/16/2019] hydrocortisone sodium succinate PF  50 mg Intravenous Daily     insulin aspart  1-6 Units Subcutaneous Q4H     meropenem  2 g Intravenous Q8H     pantoprazole (PROTONIX) IV  40 mg Intravenous BID     polyethylene glycol  17 g Oral or Feeding Tube Daily     senna-docusate  1 tablet Oral or Feeding Tube At Bedtime     sodium chloride (PF)  3 mL Intracatheter Q8H     ticagrelor  90 mg Oral or Feeding Tube BID       amiodarone  0.5 mg/min (03/15/19 1300)     - MEDICATION INSTRUCTIONS -       IV fluid REPLACEMENT ONLY 30 mL/hr at 03/07/19 1100     CRRT replacement solution 12.5 mL/kg/hr (03/15/19 1151)     fentaNYL 50 mcg/hr (03/15/19 1300)     heparin 2 unit/mL in 0.9% NaCl 3 mL/hr (03/15/19 1300)     HEParin Stopped (03/12/19 0148)     midazolam Stopped (03/14/19 1200)     - MEDICATION INSTRUCTIONS -       norepinephrine 0.1 mcg/kg/min (03/15/19 1300)     Percutaneous Coronary Intervention orders placed (this is information for BPA alerting)       CRRT replacement solution 200 mL/hr at 03/14/19 1822     CRRT replacement solution 12.5 mL/kg/hr (03/15/19 1148)     ACE/ARB/ARNI NOT PRESCRIBED       BETA BLOCKER NOT PRESCRIBED       vasopressin (PITRESSIN) infusion ADULT (40 mL) 3 Units/hr (03/15/19 1300)            I personally performed the service described in the documentation recorded by the scribe in my presence, and it accurately and completely records my words and actions.

## 2020-02-26 NOTE — Clinical Note
Secured by transparent dressing.  Increase OOB  D/C IVF  D/C PCEA  PO Pain Protocol  Continue Regular Diet  Continue Routine Postop/Postpartum Care      Nadine Del Cid PA-C

## 2020-05-24 NOTE — PROGRESS NOTES
ECMO Attending Progress Note  2/26/2019    Hellen Riddle is a 39 year old male who was cannulated for ECMO 2/23/19 due to refractory VF arrest    Interval events: Pressor requirement stable to improving, started CRRT    Physical Exam:  Temp:  [97  F (36.1  C)-99  F (37.2  C)] 97  F (36.1  C)  Heart Rate:  [67-97] 73  Resp:  [15-18] 16  MAP:  [54 mmHg-279 mmHg] 67 mmHg  Arterial Line BP: ()/() 90/46  FiO2 (%):  [40 %-80 %] 40 %  SpO2:  [88 %-100 %] 100 %    Intake/Output Summary (Last 24 hours) at 2/26/2019 2321  Last data filed at 2/26/2019 2300  Gross per 24 hour   Intake 3387.05 ml   Output 4908 ml   Net -1520.95 ml      Ventilation Mode: APRV  (Airway Pressure Release Ventilation)  FiO2 (%): 40 %  Rate Set (breaths/minute): 12 breaths/min  PEEP (cm H2O): 10 cmH2O  Oxygen Concentration (%): 40 %  Peak Inspiratory Pressure (cm H2O) (Drager Mary): 28  Resp: 16     General: no acute distress, intubated and sedated  HEENT: PERRLA, conjunctiva clear, without icterus or pallor, oropharyx clear  Cardiac: RRR nl S1S2   Lungs: clear to auscultation and percussion bilaterally anterior  Abdomen: soft, nontender, non distended, no hepatosplenomegaly or masses  Extremities: without edema or cyanosis; pulses are dopplerable;   Skin: normal skin appearance without worrisome lesions.   Neurologic:intubated and sedated    Labs:  Recent Labs   Lab 02/26/19 2155 02/26/19 1956 02/26/19  1809 02/26/19  1614   PH 7.39 7.39 7.45 7.44   PCO2 39 38 34* 34*   PO2 134* 156* 293* 254*   HCO3 24 23 24 23   O2PER 40 40 40 40      Recent Labs   Lab 02/26/19 2155 02/26/19  1614 02/26/19  0944 02/26/19  0353   WBC 17.4* 17.9* 18.1* 19.4*   HGB 9.4* 9.5* 9.6* 9.7*     Creatinine   Date Value Ref Range Status   02/26/2019 4.71 (H) 0.66 - 1.25 mg/dL Final   02/26/2019 4.58 (H) 0.66 - 1.25 mg/dL Final   02/26/2019 4.76 (H) 0.66 - 1.25 mg/dL Final   02/26/2019 4.93 (H) 0.66 - 1.25 mg/dL Final       ECMO Issues including assessments and  plan on DOS 2/26/2019:  Neuro: Sedated for mechanical ventilation and ECMO.  NIRS stable b/l  RASS goal: -3  CV: Cardiogenic shock.  Hemodynamically stable on norepi and vaso  Pulm: Flows unchanged at 4.5, Sweep unchanged at 10, Keep vent settings at rest settings  FEN/Renal: Electrolytes stable w/ replacement protocols in place, Cr worsening now on CRRT, UOP stable  Heme: ACT goal: 180-200, Hemoglobin stable .  Goals: if O2 sat >85% Hgb >8.  If O2 Sat <85% keep Hgb 10-12.  Minimal oozing around the ECMO cannulas.  ID: Receiving empiric antibiotics  Cannulae: Position is acceptable on exam and the available imaging.  Distal perfusion cannula is in place and patent.     I have personally reviewed the ECMO flows, oxygenation and CO2 clearance, anticoagulation, and cannula position.  I have also personally assessed the patient's systemic response with hemodynamics, oxygenation, ventilation, and bleeding.       The patient requires continued ECMO support and management in the ICU.  I have discussed patient care and treatment plan with the primary team.      Abraham Baer MD, PhD  Interventional/Critical Care Cardiology  [phone]    February 26, 2019     Alert and oriented, no focal deficits, no motor or sensory deficits.

## 2022-01-27 NOTE — Clinical Note
The first balloon was inserted into the circumflex and middle circumflex.    Balloon reinflated a second time:  Balloon reinflated a third time:  Balloon reinflated a fourth time:   no

## 2022-06-28 NOTE — PHARMACY-VANCOMYCIN DOSING SERVICE
Pharmacy Vancomycin Initial Note  Date of Service 2019  Patient's  1980  39 year old, male    Indication: Sepsis    Current estimated CrCl = CRRT    Creatinine for last 3 days  3/28/2019: 10:09 AM Creatinine 0.92 mg/dL;  4:11 PM Creatinine 0.93 mg/dL; 10:00 PM Creatinine 0.94 mg/dL  3/29/2019:  3:23 AM Creatinine 0.71 mg/dL; 10:05 AM Creatinine 1.08 mg/dL;  3:26 PM Creatinine 1.48 mg/dL; 10:24 PM Creatinine 1.34 mg/dL  3/30/2019:  4:07 AM Creatinine 1.02 mg/dL;  9:33 AM Creatinine 0.95 mg/dL;  4:29 PM Creatinine 1.03 mg/dL;  9:36 PM Creatinine 0.74 mg/dL  3/31/2019:  3:43 AM Creatinine 0.84 mg/dL    Recent Vancomycin Level(s) for last 3 days  No results found for requested labs within last 72 hours.      Vancomycin IV Administrations (past 72 hours)      No vancomycin orders with administrations in past 72 hours.                Nephrotoxins and other renal medications (From now, onward)    Start     Dose/Rate Route Frequency Ordered Stop    19 0845  vancomycin 1500 mg in 0.9% NaCl 250 ml intermittent infusion 1,500 mg      1,500 mg  over 90 Minutes Intravenous EVERY 24 HOURS 19 0836      19 0830  vasopressin (VASOSTRICT) 40 Units in D5W 40 mL infusion      2.4 Units/hr  2.4 mL/hr  Intravenous CONTINUOUS 19 0829      19 1200  piperacillin-tazobactam (ZOSYN) 4.5 g vial to attach to  mL bag      4.5 g  over 30 Minutes Intravenous EVERY 6 HOURS 19 1154      19 1900  norepinephrine (LEVOPHED) 16 mg in D5W 250 mL infusion      0.03-0.4 mcg/kg/min × 106 kg  3-39.8 mL/hr  Intravenous CONTINUOUS 19 1849            Contrast Orders - past 72 hours (72h ago, onward)    None                Plan:  1.  Start vancomycin  1500 mg IV q24h.   2.  Goal Trough Level: 15-20 mg/L   3.  Pharmacy will check trough levels as appropriate in 1-3 Days.    4. Serum creatinine levels will be ordered daily for the first week of therapy and at least twice weekly for subsequent  weeks.    5. Gateway method utilized to dose vancomycin therapy: CRRT    Lori Ocampo, PharmD  Pager [phone]       Doxepin Pregnancy And Lactation Text: This medication is Pregnancy Category C and it isn't known if it is safe during pregnancy. It is also excreted in breast milk and breast feeding isn't recommended.

## 2023-05-18 NOTE — Clinical Note
Balloon inserted to right coronary artery and middle right coronary artery. PAST SURGICAL HISTORY:  H/O appendicitis     H/O cholecystitis s/p cholecystectomy
